# Patient Record
Sex: MALE | Race: WHITE | Employment: OTHER
[De-identification: names, ages, dates, MRNs, and addresses within clinical notes are randomized per-mention and may not be internally consistent; named-entity substitution may affect disease eponyms.]

---

## 2017-01-12 RX ORDER — OMEPRAZOLE 20 MG/1
CAPSULE, DELAYED RELEASE ORAL
Qty: 30 CAPSULE | Refills: 2 | Status: SHIPPED | OUTPATIENT
Start: 2017-01-12 | End: 2017-04-13 | Stop reason: SDUPTHER

## 2017-01-20 RX ORDER — FUROSEMIDE 40 MG/1
TABLET ORAL
Qty: 60 TABLET | Refills: 2 | Status: SHIPPED | OUTPATIENT
Start: 2017-01-20 | End: 2017-05-07 | Stop reason: SDUPTHER

## 2017-01-24 RX ORDER — INSULIN GLARGINE 100 [IU]/ML
INJECTION, SOLUTION SUBCUTANEOUS
Qty: 15 ML | Refills: 0 | Status: SHIPPED | OUTPATIENT
Start: 2017-01-24 | End: 2017-02-15 | Stop reason: SDUPTHER

## 2017-01-24 RX ORDER — PEN NEEDLE, DIABETIC 32GX 5/32"
NEEDLE, DISPOSABLE MISCELLANEOUS
Qty: 100 EACH | Refills: 0 | Status: SHIPPED | OUTPATIENT
Start: 2017-01-24 | End: 2017-01-24 | Stop reason: SDUPTHER

## 2017-01-25 RX ORDER — PEN NEEDLE, DIABETIC 32GX 5/32"
NEEDLE, DISPOSABLE MISCELLANEOUS
Qty: 100 EACH | Refills: 1 | Status: SHIPPED | OUTPATIENT
Start: 2017-01-25 | End: 2017-06-22 | Stop reason: SDUPTHER

## 2017-01-27 ENCOUNTER — TELEPHONE (OUTPATIENT)
Dept: FAMILY MEDICINE CLINIC | Facility: CLINIC | Age: 72
End: 2017-01-27

## 2017-02-10 ENCOUNTER — HOSPITAL ENCOUNTER (OUTPATIENT)
Age: 72
Setting detail: SPECIMEN
Discharge: HOME | End: 2017-02-10
Payer: MEDICARE

## 2017-02-10 ENCOUNTER — OFFICE VISIT (OUTPATIENT)
Dept: FAMILY MEDICINE CLINIC | Facility: CLINIC | Age: 72
End: 2017-02-10

## 2017-02-10 VITALS
TEMPERATURE: 97.4 F | DIASTOLIC BLOOD PRESSURE: 68 MMHG | HEIGHT: 71 IN | WEIGHT: 233.2 LBS | BODY MASS INDEX: 32.65 KG/M2 | SYSTOLIC BLOOD PRESSURE: 132 MMHG | HEART RATE: 72 BPM

## 2017-02-10 DIAGNOSIS — E11.42 TYPE 2 DIABETES MELLITUS WITH DIABETIC POLYNEUROPATHY, WITH LONG-TERM CURRENT USE OF INSULIN (HCC): Primary | ICD-10-CM

## 2017-02-10 DIAGNOSIS — I25.810 CORONARY ARTERY DISEASE INVOLVING CORONARY BYPASS GRAFT OF NATIVE HEART WITHOUT ANGINA PECTORIS: ICD-10-CM

## 2017-02-10 DIAGNOSIS — Z79.4 TYPE 2 DIABETES MELLITUS WITH DIABETIC POLYNEUROPATHY, WITH LONG-TERM CURRENT USE OF INSULIN (HCC): Primary | ICD-10-CM

## 2017-02-10 PROCEDURE — 4040F PNEUMOC VAC/ADMIN/RCVD: CPT | Performed by: FAMILY MEDICINE

## 2017-02-10 PROCEDURE — 3044F HG A1C LEVEL LT 7.0%: CPT | Performed by: FAMILY MEDICINE

## 2017-02-10 PROCEDURE — 1123F ACP DISCUSS/DSCN MKR DOCD: CPT | Performed by: FAMILY MEDICINE

## 2017-02-10 PROCEDURE — G8598 ASA/ANTIPLAT THER USED: HCPCS | Performed by: FAMILY MEDICINE

## 2017-02-10 PROCEDURE — G8484 FLU IMMUNIZE NO ADMIN: HCPCS | Performed by: FAMILY MEDICINE

## 2017-02-10 PROCEDURE — G8427 DOCREV CUR MEDS BY ELIG CLIN: HCPCS | Performed by: FAMILY MEDICINE

## 2017-02-10 PROCEDURE — G8417 CALC BMI ABV UP PARAM F/U: HCPCS | Performed by: FAMILY MEDICINE

## 2017-02-10 PROCEDURE — 3017F COLORECTAL CA SCREEN DOC REV: CPT | Performed by: FAMILY MEDICINE

## 2017-02-10 PROCEDURE — 1036F TOBACCO NON-USER: CPT | Performed by: FAMILY MEDICINE

## 2017-02-10 PROCEDURE — 99214 OFFICE O/P EST MOD 30 MIN: CPT | Performed by: FAMILY MEDICINE

## 2017-02-10 ASSESSMENT — PATIENT HEALTH QUESTIONNAIRE - PHQ9
SUM OF ALL RESPONSES TO PHQ QUESTIONS 1-9: 0
1. LITTLE INTEREST OR PLEASURE IN DOING THINGS: 0
SUM OF ALL RESPONSES TO PHQ9 QUESTIONS 1 & 2: 0
2. FEELING DOWN, DEPRESSED OR HOPELESS: 0

## 2017-02-11 ASSESSMENT — ENCOUNTER SYMPTOMS
SORE THROAT: 0
CONSTIPATION: 0
ABDOMINAL PAIN: 0
NAUSEA: 0
SHORTNESS OF BREATH: 0
DIARRHEA: 0

## 2017-02-14 LAB
CREATININE URINE: 74.3 MG/DL (ref 39–259)
MICROALBUMIN/CREAT 24H UR: 197 MG/L
MICROALBUMIN/CREAT UR-RTO: 265 MCG/MG CREAT
PHYSICIAN ID COMMENT: NORMAL
PHYSICIAN: NORMAL
ZZ NTE CLEAN UP: ORDERED TEST: NORMAL
ZZ NTE WITH NAME CLEAN UP: SPECIMEN SOURCE: NORMAL

## 2017-02-16 RX ORDER — INSULIN GLARGINE 100 [IU]/ML
INJECTION, SOLUTION SUBCUTANEOUS
Qty: 15 ML | Refills: 1 | Status: SHIPPED | OUTPATIENT
Start: 2017-02-16 | End: 2017-03-29 | Stop reason: SDUPTHER

## 2017-02-28 ENCOUNTER — HOSPITAL ENCOUNTER (OUTPATIENT)
Dept: PAIN MANAGEMENT | Age: 72
Discharge: HOME | End: 2017-02-28
Payer: MEDICARE

## 2017-02-28 ENCOUNTER — HOSPITAL ENCOUNTER (OUTPATIENT)
Age: 72
Discharge: HOME | End: 2017-02-28
Payer: MEDICARE

## 2017-02-28 DIAGNOSIS — I25.810 CORONARY ARTERY DISEASE INVOLVING CORONARY BYPASS GRAFT OF NATIVE HEART WITHOUT ANGINA PECTORIS: ICD-10-CM

## 2017-02-28 DIAGNOSIS — E11.610 CHARCOT FOOT DUE TO DIABETES MELLITUS (HCC): ICD-10-CM

## 2017-02-28 DIAGNOSIS — Z51.81 MEDICATION MONITORING ENCOUNTER: ICD-10-CM

## 2017-02-28 DIAGNOSIS — M47.816 SPONDYLOSIS OF LUMBAR REGION WITHOUT MYELOPATHY OR RADICULOPATHY: ICD-10-CM

## 2017-02-28 DIAGNOSIS — M48.061 LUMBAR SPINAL STENOSIS: ICD-10-CM

## 2017-02-28 DIAGNOSIS — M47.26 OSTEOARTHRITIS OF SPINE WITH RADICULOPATHY, LUMBAR REGION: Primary | ICD-10-CM

## 2017-02-28 DIAGNOSIS — M75.100 TEAR OF ROTATOR CUFF, UNSPECIFIED LATERALITY, UNSPECIFIED TEAR EXTENT: ICD-10-CM

## 2017-02-28 DIAGNOSIS — K59.03 DRUG-INDUCED CONSTIPATION: ICD-10-CM

## 2017-02-28 DIAGNOSIS — G89.29 ENCOUNTER FOR CHRONIC PAIN MANAGEMENT: ICD-10-CM

## 2017-02-28 DIAGNOSIS — M00.9 PYOGENIC ARTHRITIS OF LEFT KNEE JOINT, DUE TO UNSPECIFIED ORGANISM (HCC): ICD-10-CM

## 2017-02-28 DIAGNOSIS — E11.42 DIABETIC PERIPHERAL NEUROPATHY ASSOCIATED WITH TYPE 2 DIABETES MELLITUS (HCC): ICD-10-CM

## 2017-02-28 DIAGNOSIS — M47.899 FACET SYNDROME: ICD-10-CM

## 2017-02-28 DIAGNOSIS — M51.36 DDD (DEGENERATIVE DISC DISEASE), LUMBAR: ICD-10-CM

## 2017-02-28 DIAGNOSIS — E08.41 DIABETIC MONONEUROPATHY ASSOCIATED WITH DIABETES MELLITUS DUE TO UNDERLYING CONDITION (HCC): ICD-10-CM

## 2017-02-28 DIAGNOSIS — M00.862 ARTHRITIS OF LEFT KNEE DUE TO OTHER BACTERIA (HCC): ICD-10-CM

## 2017-02-28 DIAGNOSIS — M50.30 DDD (DEGENERATIVE DISC DISEASE), CERVICAL: ICD-10-CM

## 2017-02-28 LAB
ALBUMIN SERPL-MCNC: 3.9 G/DL (ref 3.5–5.2)
ALBUMIN/GLOBULIN RATIO: ABNORMAL (ref 1–2.5)
ALP BLD-CCNC: 97 U/L (ref 40–129)
ALT SERPL-CCNC: 16 U/L (ref 5–41)
ANION GAP SERPL CALCULATED.3IONS-SCNC: 11 MMOL/L (ref 9–17)
AST SERPL-CCNC: 14 U/L
BILIRUB SERPL-MCNC: 0.42 MG/DL (ref 0.3–1.2)
BUN BLDV-MCNC: 27 MG/DL (ref 8–23)
BUN/CREAT BLD: ABNORMAL (ref 9–20)
CALCIUM SERPL-MCNC: 9.2 MG/DL (ref 8.6–10.4)
CHLORIDE BLD-SCNC: 108 MMOL/L (ref 98–107)
CHOLESTEROL/HDL RATIO: 2.4
CHOLESTEROL: 90 MG/DL
CO2: 26 MMOL/L (ref 20–31)
CREAT SERPL-MCNC: 1.23 MG/DL (ref 0.7–1.2)
GFR AFRICAN AMERICAN: >60 ML/MIN
GFR NON-AFRICAN AMERICAN: 58 ML/MIN
GFR SERPL CREATININE-BSD FRML MDRD: ABNORMAL ML/MIN/{1.73_M2}
GFR SERPL CREATININE-BSD FRML MDRD: ABNORMAL ML/MIN/{1.73_M2}
GLUCOSE BLD-MCNC: 54 MG/DL (ref 70–99)
HDLC SERPL-MCNC: 37 MG/DL
LDL CHOLESTEROL: 38 MG/DL (ref 0–130)
POTASSIUM SERPL-SCNC: 4.6 MMOL/L (ref 3.7–5.3)
SODIUM BLD-SCNC: 145 MMOL/L (ref 135–144)
TOTAL PROTEIN: 7 G/DL (ref 6.4–8.3)
TRIGL SERPL-MCNC: 73 MG/DL
VLDLC SERPL CALC-MCNC: ABNORMAL MG/DL (ref 1–30)

## 2017-02-28 PROCEDURE — 80061 LIPID PANEL: CPT

## 2017-02-28 PROCEDURE — 36415 COLL VENOUS BLD VENIPUNCTURE: CPT

## 2017-02-28 PROCEDURE — 99213 OFFICE O/P EST LOW 20 MIN: CPT

## 2017-02-28 PROCEDURE — 99213 OFFICE O/P EST LOW 20 MIN: CPT | Performed by: NURSE PRACTITIONER

## 2017-02-28 PROCEDURE — 80053 COMPREHEN METABOLIC PANEL: CPT

## 2017-02-28 PROCEDURE — 80307 DRUG TEST PRSMV CHEM ANLYZR: CPT

## 2017-02-28 RX ORDER — FENTANYL 25 UG/H
1 PATCH TRANSDERMAL
Qty: 10 PATCH | Refills: 0 | Status: SHIPPED | OUTPATIENT
Start: 2017-03-01 | End: 2017-02-28 | Stop reason: SDUPTHER

## 2017-02-28 RX ORDER — FENTANYL 25 UG/H
1 PATCH TRANSDERMAL
Qty: 10 PATCH | Refills: 0 | Status: SHIPPED | OUTPATIENT
Start: 2017-03-07 | End: 2017-04-05 | Stop reason: SDUPTHER

## 2017-03-05 LAB
6-ACETYLMORPHINE, UR: NOT DETECTED
7-AMINOCLONAZEPAM, URINE: NOT DETECTED
ALPHA-OH-ALPRAZ, URINE: NOT DETECTED
ALPRAZOLAM, URINE: NOT DETECTED
AMPHETAMINES, URINE: NOT DETECTED
BARBITURATES, URINE: NOT DETECTED
BENZOYLECGONINE, UR: NOT DETECTED
BUPRENORPHINE URINE: NOT DETECTED
CARISOPRODOL, UR: NOT DETECTED
CLONAZEPAM, URINE: NOT DETECTED
CODEINE, URINE: NOT DETECTED
CREATININE URINE: 196.9 MG/DL (ref 20–400)
DIAZEPAM, URINE: NOT DETECTED
DRUGS EXPECTED, UR: NORMAL
EER HI RES INTERP UR: NORMAL
ETHYL GLUCURONIDE UR: NOT DETECTED
FENTANYL URINE: PRESENT
HYDROCODONE, URINE: NOT DETECTED
HYDROMORPHONE, URINE: NOT DETECTED
LORAZEPAM, URINE: NOT DETECTED
MARIJUANA METAB, UR: NOT DETECTED
MDA, UR: NOT DETECTED
MDEA, EVE, UR: NOT DETECTED
MDMA URINE: NOT DETECTED
MEPERIDINE METAB, UR: NOT DETECTED
METHADONE, URINE: NOT DETECTED
METHAMPHETAMINE, URINE: NOT DETECTED
METHYLPHENIDATE: NOT DETECTED
MIDAZOLAM, URINE: NOT DETECTED
MORPHINE URINE: NOT DETECTED
NORBUPRENORPHINE, URINE: NOT DETECTED
NORDIAZEPAM, URINE: NOT DETECTED
NORFENTANYL, URINE: PRESENT
NORHYDROCODONE, URINE: NOT DETECTED
NOROXYCODONE, URINE: NOT DETECTED
NOROXYMORPHONE, URINE: NOT DETECTED
OXAZEPAM, URINE: NOT DETECTED
OXYCODONE URINE: NOT DETECTED
OXYMORPHONE, URINE: NOT DETECTED
PAIN MANAGEMENT DRUG PANEL INTERP, URINE: NORMAL
PAIN MGT DRUG PANEL, HI RES, UR: NORMAL
PCP,URINE: NOT DETECTED
PHENTERMINE, UR: NOT DETECTED
PROPOXYPHENE, URINE: NOT DETECTED
TAPENTADOL, URINE: NOT DETECTED
TAPENTADOL-O-SULFATE, URINE: NOT DETECTED
TEMAZEPAM, URINE: NOT DETECTED
TRAMADOL, URINE: NOT DETECTED
ZOLPIDEM, URINE: NOT DETECTED

## 2017-03-07 ENCOUNTER — PROCEDURE VISIT (OUTPATIENT)
Dept: PODIATRY | Facility: CLINIC | Age: 72
End: 2017-03-07

## 2017-03-07 VITALS
WEIGHT: 228 LBS | DIASTOLIC BLOOD PRESSURE: 73 MMHG | BODY MASS INDEX: 30.88 KG/M2 | HEIGHT: 72 IN | HEART RATE: 62 BPM | SYSTOLIC BLOOD PRESSURE: 121 MMHG

## 2017-03-07 DIAGNOSIS — Z79.4 TYPE 2 DIABETES MELLITUS WITH DIABETIC POLYNEUROPATHY, WITH LONG-TERM CURRENT USE OF INSULIN (HCC): ICD-10-CM

## 2017-03-07 DIAGNOSIS — B35.1 ONYCHOMYCOSIS: Primary | ICD-10-CM

## 2017-03-07 DIAGNOSIS — M14.672 CHARCOT'S JOINT OF LEFT FOOT: ICD-10-CM

## 2017-03-07 DIAGNOSIS — E11.42 TYPE 2 DIABETES MELLITUS WITH DIABETIC POLYNEUROPATHY, WITH LONG-TERM CURRENT USE OF INSULIN (HCC): ICD-10-CM

## 2017-03-07 PROCEDURE — 11721 DEBRIDE NAIL 6 OR MORE: CPT | Performed by: PODIATRIST

## 2017-03-07 PROCEDURE — 1036F TOBACCO NON-USER: CPT | Performed by: PODIATRIST

## 2017-03-07 ASSESSMENT — ENCOUNTER SYMPTOMS
NAUSEA: 0
DIARRHEA: 0
CONSTIPATION: 0
COLOR CHANGE: 0
VOMITING: 0

## 2017-03-22 RX ORDER — SIMVASTATIN 20 MG
TABLET ORAL
Qty: 30 TABLET | Refills: 2 | Status: SHIPPED | OUTPATIENT
Start: 2017-03-22 | End: 2017-06-22 | Stop reason: SDUPTHER

## 2017-03-30 RX ORDER — INSULIN GLARGINE 100 [IU]/ML
INJECTION, SOLUTION SUBCUTANEOUS
Qty: 15 ML | Refills: 1 | Status: SHIPPED | OUTPATIENT
Start: 2017-03-30 | End: 2017-05-09 | Stop reason: SDUPTHER

## 2017-04-05 ENCOUNTER — HOSPITAL ENCOUNTER (OUTPATIENT)
Dept: PAIN MANAGEMENT | Age: 72
Discharge: HOME | End: 2017-04-05
Payer: MEDICARE

## 2017-04-05 DIAGNOSIS — K59.03 DRUG-INDUCED CONSTIPATION: ICD-10-CM

## 2017-04-05 DIAGNOSIS — M00.9 PYOGENIC ARTHRITIS OF LEFT KNEE JOINT, DUE TO UNSPECIFIED ORGANISM (HCC): ICD-10-CM

## 2017-04-05 DIAGNOSIS — M47.899 FACET SYNDROME: ICD-10-CM

## 2017-04-05 DIAGNOSIS — G89.29 ENCOUNTER FOR CHRONIC PAIN MANAGEMENT: ICD-10-CM

## 2017-04-05 DIAGNOSIS — E11.42 TYPE 2 DIABETES MELLITUS WITH DIABETIC POLYNEUROPATHY, WITH LONG-TERM CURRENT USE OF INSULIN (HCC): ICD-10-CM

## 2017-04-05 DIAGNOSIS — M48.061 LUMBAR SPINAL STENOSIS: ICD-10-CM

## 2017-04-05 DIAGNOSIS — E11.42 DIABETIC PERIPHERAL NEUROPATHY ASSOCIATED WITH TYPE 2 DIABETES MELLITUS (HCC): ICD-10-CM

## 2017-04-05 DIAGNOSIS — E08.41 DIABETIC MONONEUROPATHY ASSOCIATED WITH DIABETES MELLITUS DUE TO UNDERLYING CONDITION (HCC): ICD-10-CM

## 2017-04-05 DIAGNOSIS — M47.816 SPONDYLOSIS OF LUMBAR REGION WITHOUT MYELOPATHY OR RADICULOPATHY: ICD-10-CM

## 2017-04-05 DIAGNOSIS — E11.610 CHARCOT FOOT DUE TO DIABETES MELLITUS (HCC): ICD-10-CM

## 2017-04-05 DIAGNOSIS — E09.41 DIABETIC MONONEUROPATHY ASSOCIATED WITH DRUG OR CHEMICAL INDUCED DIABETES MELLITUS (HCC): ICD-10-CM

## 2017-04-05 DIAGNOSIS — M47.26 OSTEOARTHRITIS OF SPINE WITH RADICULOPATHY, LUMBAR REGION: Primary | ICD-10-CM

## 2017-04-05 DIAGNOSIS — Z51.81 MEDICATION MONITORING ENCOUNTER: ICD-10-CM

## 2017-04-05 DIAGNOSIS — Z79.4 TYPE 2 DIABETES MELLITUS WITH DIABETIC POLYNEUROPATHY, WITH LONG-TERM CURRENT USE OF INSULIN (HCC): ICD-10-CM

## 2017-04-05 DIAGNOSIS — M00.862 ARTHRITIS OF LEFT KNEE DUE TO OTHER BACTERIA (HCC): ICD-10-CM

## 2017-04-05 DIAGNOSIS — M75.100 TEAR OF ROTATOR CUFF, UNSPECIFIED LATERALITY, UNSPECIFIED TEAR EXTENT: ICD-10-CM

## 2017-04-05 DIAGNOSIS — M51.36 DDD (DEGENERATIVE DISC DISEASE), LUMBAR: ICD-10-CM

## 2017-04-05 PROCEDURE — 99213 OFFICE O/P EST LOW 20 MIN: CPT

## 2017-04-05 PROCEDURE — 99213 OFFICE O/P EST LOW 20 MIN: CPT | Performed by: NURSE PRACTITIONER

## 2017-04-05 RX ORDER — TOPIRAMATE 50 MG/1
50 TABLET, FILM COATED ORAL 2 TIMES DAILY
Qty: 60 TABLET | Refills: 3 | Status: SHIPPED | OUTPATIENT
Start: 2017-04-20 | End: 2017-08-03 | Stop reason: SDUPTHER

## 2017-04-05 RX ORDER — FENTANYL 25 UG/H
1 PATCH TRANSDERMAL
Qty: 10 PATCH | Refills: 0 | Status: SHIPPED | OUTPATIENT
Start: 2017-04-06 | End: 2017-05-05 | Stop reason: SDUPTHER

## 2017-04-05 RX ORDER — OXYCODONE HYDROCHLORIDE AND ACETAMINOPHEN 5; 325 MG/1; MG/1
1 TABLET ORAL EVERY 6 HOURS PRN
Qty: 120 TABLET | Refills: 0 | Status: SHIPPED | OUTPATIENT
Start: 2017-04-05 | End: 2017-08-03 | Stop reason: SDUPTHER

## 2017-04-05 RX ORDER — GABAPENTIN 800 MG/1
800 TABLET ORAL DAILY
Qty: 90 TABLET | Refills: 3 | Status: SHIPPED | OUTPATIENT
Start: 2017-04-29 | End: 2018-03-01 | Stop reason: SDUPTHER

## 2017-04-13 RX ORDER — OMEPRAZOLE 20 MG/1
CAPSULE, DELAYED RELEASE ORAL
Qty: 30 CAPSULE | Refills: 2 | Status: SHIPPED | OUTPATIENT
Start: 2017-04-13 | End: 2017-07-11 | Stop reason: SDUPTHER

## 2017-05-05 ENCOUNTER — HOSPITAL ENCOUNTER (OUTPATIENT)
Dept: PAIN MANAGEMENT | Age: 72
Discharge: HOME | End: 2017-05-05
Payer: MEDICARE

## 2017-05-05 DIAGNOSIS — Z79.4 TYPE 2 DIABETES MELLITUS WITH DIABETIC POLYNEUROPATHY, WITH LONG-TERM CURRENT USE OF INSULIN (HCC): ICD-10-CM

## 2017-05-05 DIAGNOSIS — K59.03 DRUG-INDUCED CONSTIPATION: ICD-10-CM

## 2017-05-05 DIAGNOSIS — M47.899 FACET SYNDROME: ICD-10-CM

## 2017-05-05 DIAGNOSIS — M00.862 ARTHRITIS OF LEFT KNEE DUE TO OTHER BACTERIA (HCC): ICD-10-CM

## 2017-05-05 DIAGNOSIS — M75.100 TEAR OF ROTATOR CUFF, UNSPECIFIED LATERALITY, UNSPECIFIED TEAR EXTENT: ICD-10-CM

## 2017-05-05 DIAGNOSIS — E11.42 TYPE 2 DIABETES MELLITUS WITH DIABETIC POLYNEUROPATHY, WITH LONG-TERM CURRENT USE OF INSULIN (HCC): ICD-10-CM

## 2017-05-05 DIAGNOSIS — G89.29 ENCOUNTER FOR CHRONIC PAIN MANAGEMENT: ICD-10-CM

## 2017-05-05 DIAGNOSIS — M51.36 DDD (DEGENERATIVE DISC DISEASE), LUMBAR: ICD-10-CM

## 2017-05-05 DIAGNOSIS — M47.816 SPONDYLOSIS OF LUMBAR REGION WITHOUT MYELOPATHY OR RADICULOPATHY: ICD-10-CM

## 2017-05-05 DIAGNOSIS — M47.26 OSTEOARTHRITIS OF SPINE WITH RADICULOPATHY, LUMBAR REGION: Primary | ICD-10-CM

## 2017-05-05 DIAGNOSIS — E11.42 DIABETIC PERIPHERAL NEUROPATHY ASSOCIATED WITH TYPE 2 DIABETES MELLITUS (HCC): ICD-10-CM

## 2017-05-05 DIAGNOSIS — M48.061 LUMBAR SPINAL STENOSIS: ICD-10-CM

## 2017-05-05 DIAGNOSIS — E11.610 CHARCOT FOOT DUE TO DIABETES MELLITUS (HCC): ICD-10-CM

## 2017-05-05 DIAGNOSIS — Z51.81 MEDICATION MONITORING ENCOUNTER: ICD-10-CM

## 2017-05-05 PROCEDURE — 99213 OFFICE O/P EST LOW 20 MIN: CPT | Performed by: NURSE PRACTITIONER

## 2017-05-05 PROCEDURE — 99213 OFFICE O/P EST LOW 20 MIN: CPT

## 2017-05-05 RX ORDER — FENTANYL 25 UG/H
1 PATCH TRANSDERMAL
Qty: 10 PATCH | Refills: 0 | Status: SHIPPED | OUTPATIENT
Start: 2017-05-06 | End: 2017-06-05 | Stop reason: SDUPTHER

## 2017-05-08 RX ORDER — FUROSEMIDE 40 MG/1
TABLET ORAL
Qty: 60 TABLET | Refills: 2 | Status: SHIPPED | OUTPATIENT
Start: 2017-05-08 | End: 2017-08-29 | Stop reason: SDUPTHER

## 2017-05-10 ENCOUNTER — PROCEDURE VISIT (OUTPATIENT)
Dept: PODIATRY | Age: 72
End: 2017-05-10
Payer: MEDICARE

## 2017-05-10 VITALS
WEIGHT: 227 LBS | SYSTOLIC BLOOD PRESSURE: 118 MMHG | HEIGHT: 72 IN | BODY MASS INDEX: 30.75 KG/M2 | HEART RATE: 63 BPM | DIASTOLIC BLOOD PRESSURE: 66 MMHG

## 2017-05-10 DIAGNOSIS — Z79.4 TYPE 2 DIABETES MELLITUS WITH DIABETIC POLYNEUROPATHY, WITH LONG-TERM CURRENT USE OF INSULIN (HCC): ICD-10-CM

## 2017-05-10 DIAGNOSIS — B35.1 ONYCHOMYCOSIS: Primary | ICD-10-CM

## 2017-05-10 DIAGNOSIS — E11.42 TYPE 2 DIABETES MELLITUS WITH DIABETIC POLYNEUROPATHY, WITH LONG-TERM CURRENT USE OF INSULIN (HCC): ICD-10-CM

## 2017-05-10 PROCEDURE — 1036F TOBACCO NON-USER: CPT | Performed by: PODIATRIST

## 2017-05-10 PROCEDURE — 11721 DEBRIDE NAIL 6 OR MORE: CPT | Performed by: PODIATRIST

## 2017-05-10 RX ORDER — INSULIN GLARGINE 100 [IU]/ML
INJECTION, SOLUTION SUBCUTANEOUS
Qty: 15 ML | Refills: 0 | Status: SHIPPED | OUTPATIENT
Start: 2017-05-10 | End: 2017-06-05 | Stop reason: SDUPTHER

## 2017-05-10 RX ORDER — TRAZODONE HYDROCHLORIDE 50 MG/1
TABLET ORAL
Qty: 30 TABLET | Refills: 1 | Status: SHIPPED | OUTPATIENT
Start: 2017-05-10 | End: 2017-07-11 | Stop reason: SDUPTHER

## 2017-05-10 ASSESSMENT — ENCOUNTER SYMPTOMS
VOMITING: 0
CONSTIPATION: 0
COLOR CHANGE: 0
DIARRHEA: 0
NAUSEA: 0

## 2017-05-12 ENCOUNTER — OFFICE VISIT (OUTPATIENT)
Dept: FAMILY MEDICINE CLINIC | Age: 72
End: 2017-05-12
Payer: MEDICARE

## 2017-05-12 VITALS
BODY MASS INDEX: 31.83 KG/M2 | TEMPERATURE: 97.9 F | WEIGHT: 235 LBS | HEART RATE: 59 BPM | SYSTOLIC BLOOD PRESSURE: 126 MMHG | DIASTOLIC BLOOD PRESSURE: 68 MMHG | HEIGHT: 72 IN

## 2017-05-12 DIAGNOSIS — E78.5 HYPERLIPIDEMIA, UNSPECIFIED HYPERLIPIDEMIA TYPE: ICD-10-CM

## 2017-05-12 DIAGNOSIS — Z79.4 TYPE 2 DIABETES MELLITUS WITH DIABETIC POLYNEUROPATHY, WITH LONG-TERM CURRENT USE OF INSULIN (HCC): Primary | ICD-10-CM

## 2017-05-12 DIAGNOSIS — Z23 NEED FOR PROPHYLACTIC VACCINATION AGAINST STREPTOCOCCUS PNEUMONIAE (PNEUMOCOCCUS): ICD-10-CM

## 2017-05-12 DIAGNOSIS — E11.42 TYPE 2 DIABETES MELLITUS WITH DIABETIC POLYNEUROPATHY, WITH LONG-TERM CURRENT USE OF INSULIN (HCC): Primary | ICD-10-CM

## 2017-05-12 DIAGNOSIS — I25.810 CORONARY ARTERY DISEASE INVOLVING CORONARY BYPASS GRAFT OF NATIVE HEART WITHOUT ANGINA PECTORIS: ICD-10-CM

## 2017-05-12 LAB — HBA1C MFR BLD: 6.6 %

## 2017-05-12 PROCEDURE — G8427 DOCREV CUR MEDS BY ELIG CLIN: HCPCS | Performed by: FAMILY MEDICINE

## 2017-05-12 PROCEDURE — 3044F HG A1C LEVEL LT 7.0%: CPT | Performed by: FAMILY MEDICINE

## 2017-05-12 PROCEDURE — 83036 HEMOGLOBIN GLYCOSYLATED A1C: CPT | Performed by: FAMILY MEDICINE

## 2017-05-12 PROCEDURE — 3017F COLORECTAL CA SCREEN DOC REV: CPT | Performed by: FAMILY MEDICINE

## 2017-05-12 PROCEDURE — 99214 OFFICE O/P EST MOD 30 MIN: CPT | Performed by: FAMILY MEDICINE

## 2017-05-12 PROCEDURE — 1123F ACP DISCUSS/DSCN MKR DOCD: CPT | Performed by: FAMILY MEDICINE

## 2017-05-12 PROCEDURE — G8417 CALC BMI ABV UP PARAM F/U: HCPCS | Performed by: FAMILY MEDICINE

## 2017-05-12 PROCEDURE — 4040F PNEUMOC VAC/ADMIN/RCVD: CPT | Performed by: FAMILY MEDICINE

## 2017-05-12 PROCEDURE — G0009 ADMIN PNEUMOCOCCAL VACCINE: HCPCS | Performed by: FAMILY MEDICINE

## 2017-05-12 PROCEDURE — 90732 PPSV23 VACC 2 YRS+ SUBQ/IM: CPT | Performed by: FAMILY MEDICINE

## 2017-05-12 PROCEDURE — G8598 ASA/ANTIPLAT THER USED: HCPCS | Performed by: FAMILY MEDICINE

## 2017-05-12 PROCEDURE — 1036F TOBACCO NON-USER: CPT | Performed by: FAMILY MEDICINE

## 2017-05-12 ASSESSMENT — PATIENT HEALTH QUESTIONNAIRE - PHQ9
1. LITTLE INTEREST OR PLEASURE IN DOING THINGS: 0
2. FEELING DOWN, DEPRESSED OR HOPELESS: 0
SUM OF ALL RESPONSES TO PHQ QUESTIONS 1-9: 0
SUM OF ALL RESPONSES TO PHQ9 QUESTIONS 1 & 2: 0

## 2017-05-12 ASSESSMENT — ENCOUNTER SYMPTOMS
ABDOMINAL PAIN: 0
SHORTNESS OF BREATH: 0
NAUSEA: 0
SORE THROAT: 0
CONSTIPATION: 0

## 2017-06-05 ENCOUNTER — HOSPITAL ENCOUNTER (OUTPATIENT)
Dept: PAIN MANAGEMENT | Age: 72
Discharge: HOME | End: 2017-06-05
Payer: MEDICARE

## 2017-06-05 DIAGNOSIS — E08.41 DIABETIC MONONEUROPATHY ASSOCIATED WITH DIABETES MELLITUS DUE TO UNDERLYING CONDITION (HCC): ICD-10-CM

## 2017-06-05 DIAGNOSIS — M48.061 LUMBAR SPINAL STENOSIS: ICD-10-CM

## 2017-06-05 DIAGNOSIS — M51.36 DDD (DEGENERATIVE DISC DISEASE), LUMBAR: ICD-10-CM

## 2017-06-05 DIAGNOSIS — Z79.4 TYPE 2 DIABETES MELLITUS WITH DIABETIC POLYNEUROPATHY, WITH LONG-TERM CURRENT USE OF INSULIN (HCC): ICD-10-CM

## 2017-06-05 DIAGNOSIS — E11.610 CHARCOT FOOT DUE TO DIABETES MELLITUS (HCC): ICD-10-CM

## 2017-06-05 DIAGNOSIS — M00.862 ARTHRITIS OF LEFT KNEE DUE TO OTHER BACTERIA (HCC): ICD-10-CM

## 2017-06-05 DIAGNOSIS — M50.30 DDD (DEGENERATIVE DISC DISEASE), CERVICAL: ICD-10-CM

## 2017-06-05 DIAGNOSIS — E11.42 DIABETIC PERIPHERAL NEUROPATHY ASSOCIATED WITH TYPE 2 DIABETES MELLITUS (HCC): ICD-10-CM

## 2017-06-05 DIAGNOSIS — E11.42 TYPE 2 DIABETES MELLITUS WITH DIABETIC POLYNEUROPATHY, WITH LONG-TERM CURRENT USE OF INSULIN (HCC): ICD-10-CM

## 2017-06-05 DIAGNOSIS — M47.816 SPONDYLOSIS OF LUMBAR REGION WITHOUT MYELOPATHY OR RADICULOPATHY: ICD-10-CM

## 2017-06-05 DIAGNOSIS — M47.899 FACET SYNDROME: ICD-10-CM

## 2017-06-05 DIAGNOSIS — K59.03 DRUG-INDUCED CONSTIPATION: ICD-10-CM

## 2017-06-05 DIAGNOSIS — E09.41 DIABETIC MONONEUROPATHY ASSOCIATED WITH DRUG OR CHEMICAL INDUCED DIABETES MELLITUS (HCC): ICD-10-CM

## 2017-06-05 DIAGNOSIS — M75.100 TEAR OF ROTATOR CUFF, UNSPECIFIED LATERALITY, UNSPECIFIED TEAR EXTENT: ICD-10-CM

## 2017-06-05 DIAGNOSIS — M00.9 PYOGENIC ARTHRITIS OF LEFT KNEE JOINT, DUE TO UNSPECIFIED ORGANISM (HCC): ICD-10-CM

## 2017-06-05 DIAGNOSIS — M47.26 OSTEOARTHRITIS OF SPINE WITH RADICULOPATHY, LUMBAR REGION: Primary | ICD-10-CM

## 2017-06-05 DIAGNOSIS — G89.29 ENCOUNTER FOR CHRONIC PAIN MANAGEMENT: ICD-10-CM

## 2017-06-05 DIAGNOSIS — Z51.81 MEDICATION MONITORING ENCOUNTER: ICD-10-CM

## 2017-06-05 PROCEDURE — 99213 OFFICE O/P EST LOW 20 MIN: CPT

## 2017-06-05 PROCEDURE — 99213 OFFICE O/P EST LOW 20 MIN: CPT | Performed by: NURSE PRACTITIONER

## 2017-06-05 RX ORDER — FENTANYL 25 UG/H
1 PATCH TRANSDERMAL
Qty: 10 PATCH | Refills: 0 | Status: SHIPPED | OUTPATIENT
Start: 2017-06-05 | End: 2017-06-27 | Stop reason: SDUPTHER

## 2017-06-06 RX ORDER — INSULIN GLARGINE 100 [IU]/ML
INJECTION, SOLUTION SUBCUTANEOUS
Qty: 5 PEN | Refills: 1 | Status: SHIPPED | OUTPATIENT
Start: 2017-06-06 | End: 2017-07-21 | Stop reason: SDUPTHER

## 2017-06-23 RX ORDER — PEN NEEDLE, DIABETIC 32GX 5/32"
NEEDLE, DISPOSABLE MISCELLANEOUS
Qty: 100 EACH | Refills: 1 | Status: SHIPPED | OUTPATIENT
Start: 2017-06-23 | End: 2017-09-28 | Stop reason: SDUPTHER

## 2017-06-23 RX ORDER — SIMVASTATIN 20 MG
TABLET ORAL
Qty: 30 TABLET | Refills: 2 | Status: SHIPPED | OUTPATIENT
Start: 2017-06-23 | End: 2017-09-27 | Stop reason: SDUPTHER

## 2017-06-27 ENCOUNTER — HOSPITAL ENCOUNTER (OUTPATIENT)
Dept: PAIN MANAGEMENT | Age: 72
Discharge: HOME | End: 2017-06-27
Payer: MEDICARE

## 2017-06-27 DIAGNOSIS — M75.100 TEAR OF ROTATOR CUFF, UNSPECIFIED LATERALITY, UNSPECIFIED TEAR EXTENT: ICD-10-CM

## 2017-06-27 DIAGNOSIS — K59.03 DRUG-INDUCED CONSTIPATION: ICD-10-CM

## 2017-06-27 DIAGNOSIS — Z51.81 MEDICATION MONITORING ENCOUNTER: ICD-10-CM

## 2017-06-27 DIAGNOSIS — E11.42 DIABETIC PERIPHERAL NEUROPATHY ASSOCIATED WITH TYPE 2 DIABETES MELLITUS (HCC): ICD-10-CM

## 2017-06-27 DIAGNOSIS — E08.41 DIABETIC MONONEUROPATHY ASSOCIATED WITH DIABETES MELLITUS DUE TO UNDERLYING CONDITION (HCC): ICD-10-CM

## 2017-06-27 DIAGNOSIS — E11.610 CHARCOT FOOT DUE TO DIABETES MELLITUS (HCC): ICD-10-CM

## 2017-06-27 DIAGNOSIS — M47.816 SPONDYLOSIS OF LUMBAR REGION WITHOUT MYELOPATHY OR RADICULOPATHY: ICD-10-CM

## 2017-06-27 DIAGNOSIS — M47.899 FACET SYNDROME: ICD-10-CM

## 2017-06-27 DIAGNOSIS — M50.30 DDD (DEGENERATIVE DISC DISEASE), CERVICAL: ICD-10-CM

## 2017-06-27 DIAGNOSIS — M00.862 ARTHRITIS OF LEFT KNEE DUE TO OTHER BACTERIA (HCC): ICD-10-CM

## 2017-06-27 DIAGNOSIS — G89.29 ENCOUNTER FOR CHRONIC PAIN MANAGEMENT: ICD-10-CM

## 2017-06-27 DIAGNOSIS — M47.26 OSTEOARTHRITIS OF SPINE WITH RADICULOPATHY, LUMBAR REGION: Primary | ICD-10-CM

## 2017-06-27 DIAGNOSIS — M51.36 DDD (DEGENERATIVE DISC DISEASE), LUMBAR: ICD-10-CM

## 2017-06-27 DIAGNOSIS — M48.061 LUMBAR SPINAL STENOSIS: ICD-10-CM

## 2017-06-27 PROCEDURE — 99213 OFFICE O/P EST LOW 20 MIN: CPT | Performed by: NURSE PRACTITIONER

## 2017-06-27 PROCEDURE — 99213 OFFICE O/P EST LOW 20 MIN: CPT

## 2017-06-27 RX ORDER — OXYCODONE HYDROCHLORIDE AND ACETAMINOPHEN 5; 325 MG/1; MG/1
1 TABLET ORAL EVERY 6 HOURS PRN
Qty: 120 TABLET | Refills: 0 | Status: SHIPPED | OUTPATIENT
Start: 2017-07-08 | End: 2017-10-03 | Stop reason: SDUPTHER

## 2017-06-27 RX ORDER — FENTANYL 25 UG/H
1 PATCH TRANSDERMAL
Qty: 10 PATCH | Refills: 0 | Status: SHIPPED | OUTPATIENT
Start: 2017-07-05 | End: 2017-08-03 | Stop reason: SDUPTHER

## 2017-07-07 ENCOUNTER — OFFICE VISIT (OUTPATIENT)
Dept: GASTROENTEROLOGY | Age: 72
End: 2017-07-07
Payer: MEDICARE

## 2017-07-07 VITALS
HEART RATE: 68 BPM | DIASTOLIC BLOOD PRESSURE: 65 MMHG | SYSTOLIC BLOOD PRESSURE: 101 MMHG | OXYGEN SATURATION: 97 % | BODY MASS INDEX: 32.05 KG/M2 | WEIGHT: 236.6 LBS | HEIGHT: 72 IN | TEMPERATURE: 98.1 F | RESPIRATION RATE: 14 BRPM

## 2017-07-07 DIAGNOSIS — K58.1 IRRITABLE BOWEL SYNDROME WITH CONSTIPATION: Primary | ICD-10-CM

## 2017-07-07 DIAGNOSIS — Z86.19 HX OF CLOSTRIDIUM DIFFICILE INFECTION: ICD-10-CM

## 2017-07-07 DIAGNOSIS — E66.01 MORBID OBESITY, UNSPECIFIED OBESITY TYPE (HCC): ICD-10-CM

## 2017-07-07 PROCEDURE — 1123F ACP DISCUSS/DSCN MKR DOCD: CPT | Performed by: INTERNAL MEDICINE

## 2017-07-07 PROCEDURE — G8598 ASA/ANTIPLAT THER USED: HCPCS | Performed by: INTERNAL MEDICINE

## 2017-07-07 PROCEDURE — G8417 CALC BMI ABV UP PARAM F/U: HCPCS | Performed by: INTERNAL MEDICINE

## 2017-07-07 PROCEDURE — 3017F COLORECTAL CA SCREEN DOC REV: CPT | Performed by: INTERNAL MEDICINE

## 2017-07-07 PROCEDURE — 99214 OFFICE O/P EST MOD 30 MIN: CPT | Performed by: INTERNAL MEDICINE

## 2017-07-07 PROCEDURE — 4040F PNEUMOC VAC/ADMIN/RCVD: CPT | Performed by: INTERNAL MEDICINE

## 2017-07-07 PROCEDURE — 1036F TOBACCO NON-USER: CPT | Performed by: INTERNAL MEDICINE

## 2017-07-07 PROCEDURE — G8427 DOCREV CUR MEDS BY ELIG CLIN: HCPCS | Performed by: INTERNAL MEDICINE

## 2017-07-07 ASSESSMENT — ENCOUNTER SYMPTOMS
VOMITING: 0
ABDOMINAL PAIN: 0
COUGH: 0
DIARRHEA: 0
ALLERGIC/IMMUNOLOGIC NEGATIVE: 1
BACK PAIN: 1
ABDOMINAL DISTENTION: 0
CONSTIPATION: 1
NAUSEA: 0
ANAL BLEEDING: 0
RESPIRATORY NEGATIVE: 1
BLOOD IN STOOL: 0
RECTAL PAIN: 0

## 2017-07-12 ENCOUNTER — PROCEDURE VISIT (OUTPATIENT)
Dept: PODIATRY | Age: 72
End: 2017-07-12
Payer: MEDICARE

## 2017-07-12 VITALS
HEART RATE: 73 BPM | BODY MASS INDEX: 30.75 KG/M2 | WEIGHT: 227 LBS | SYSTOLIC BLOOD PRESSURE: 102 MMHG | DIASTOLIC BLOOD PRESSURE: 63 MMHG | HEIGHT: 72 IN

## 2017-07-12 DIAGNOSIS — Z79.4 TYPE 2 DIABETES MELLITUS WITH DIABETIC POLYNEUROPATHY, WITH LONG-TERM CURRENT USE OF INSULIN (HCC): ICD-10-CM

## 2017-07-12 DIAGNOSIS — B35.1 ONYCHOMYCOSIS: Primary | ICD-10-CM

## 2017-07-12 DIAGNOSIS — M14.672 CHARCOT'S JOINT OF LEFT FOOT: ICD-10-CM

## 2017-07-12 DIAGNOSIS — E11.42 TYPE 2 DIABETES MELLITUS WITH DIABETIC POLYNEUROPATHY, WITH LONG-TERM CURRENT USE OF INSULIN (HCC): ICD-10-CM

## 2017-07-12 PROCEDURE — 1036F TOBACCO NON-USER: CPT | Performed by: PODIATRIST

## 2017-07-12 PROCEDURE — 11721 DEBRIDE NAIL 6 OR MORE: CPT | Performed by: PODIATRIST

## 2017-07-12 RX ORDER — OMEPRAZOLE 20 MG/1
CAPSULE, DELAYED RELEASE ORAL
Qty: 30 CAPSULE | Refills: 2 | Status: SHIPPED | OUTPATIENT
Start: 2017-07-12 | End: 2017-10-11 | Stop reason: SDUPTHER

## 2017-07-12 RX ORDER — TRAZODONE HYDROCHLORIDE 50 MG/1
TABLET ORAL
Qty: 30 TABLET | Refills: 2 | Status: SHIPPED | OUTPATIENT
Start: 2017-07-12 | End: 2017-10-11 | Stop reason: SDUPTHER

## 2017-07-12 ASSESSMENT — ENCOUNTER SYMPTOMS
DIARRHEA: 0
VOMITING: 0
NAUSEA: 0
COLOR CHANGE: 0
CONSTIPATION: 0

## 2017-07-21 RX ORDER — INSULIN GLARGINE 100 [IU]/ML
INJECTION, SOLUTION SUBCUTANEOUS
Qty: 15 ML | Refills: 2 | Status: SHIPPED | OUTPATIENT
Start: 2017-07-21 | End: 2017-09-28 | Stop reason: SDUPTHER

## 2017-07-31 ENCOUNTER — NURSE ONLY (OUTPATIENT)
Dept: PODIATRY | Age: 72
End: 2017-07-31
Payer: MEDICARE

## 2017-07-31 DIAGNOSIS — E11.42 TYPE 2 DIABETES MELLITUS WITH DIABETIC POLYNEUROPATHY, WITH LONG-TERM CURRENT USE OF INSULIN (HCC): Primary | ICD-10-CM

## 2017-07-31 DIAGNOSIS — R26.81 GAIT INSTABILITY: ICD-10-CM

## 2017-07-31 DIAGNOSIS — Z79.4 TYPE 2 DIABETES MELLITUS WITH DIABETIC POLYNEUROPATHY, WITH LONG-TERM CURRENT USE OF INSULIN (HCC): Primary | ICD-10-CM

## 2017-07-31 DIAGNOSIS — M21.969 FOOT DEFORMITY, UNSPECIFIED LATERALITY: ICD-10-CM

## 2017-07-31 DIAGNOSIS — M14.672 CHARCOT'S JOINT OF LEFT FOOT: ICD-10-CM

## 2017-07-31 PROCEDURE — A5513 MULTI DEN INSERT CUSTOM MOLD: HCPCS | Performed by: PODIATRIST

## 2017-07-31 PROCEDURE — A5500 DIAB SHOE FOR DENSITY INSERT: HCPCS | Performed by: PODIATRIST

## 2017-08-03 ENCOUNTER — HOSPITAL ENCOUNTER (OUTPATIENT)
Dept: PAIN MANAGEMENT | Age: 72
Discharge: HOME | End: 2017-08-03
Payer: MEDICARE

## 2017-08-03 VITALS
DIASTOLIC BLOOD PRESSURE: 76 MMHG | OXYGEN SATURATION: 98 % | WEIGHT: 225 LBS | SYSTOLIC BLOOD PRESSURE: 126 MMHG | HEIGHT: 72 IN | RESPIRATION RATE: 15 BRPM | BODY MASS INDEX: 30.48 KG/M2 | HEART RATE: 76 BPM | TEMPERATURE: 98.6 F

## 2017-08-03 DIAGNOSIS — M47.816 SPONDYLOSIS OF LUMBAR REGION WITHOUT MYELOPATHY OR RADICULOPATHY: ICD-10-CM

## 2017-08-03 DIAGNOSIS — E08.41 DIABETIC MONONEUROPATHY ASSOCIATED WITH DIABETES MELLITUS DUE TO UNDERLYING CONDITION (HCC): ICD-10-CM

## 2017-08-03 DIAGNOSIS — G89.29 ENCOUNTER FOR CHRONIC PAIN MANAGEMENT: ICD-10-CM

## 2017-08-03 DIAGNOSIS — M50.30 DDD (DEGENERATIVE DISC DISEASE), CERVICAL: ICD-10-CM

## 2017-08-03 DIAGNOSIS — E11.42 DIABETIC PERIPHERAL NEUROPATHY ASSOCIATED WITH TYPE 2 DIABETES MELLITUS (HCC): ICD-10-CM

## 2017-08-03 DIAGNOSIS — M75.100 TEAR OF ROTATOR CUFF, UNSPECIFIED LATERALITY, UNSPECIFIED TEAR EXTENT: Primary | ICD-10-CM

## 2017-08-03 DIAGNOSIS — M48.061 LUMBAR SPINAL STENOSIS: ICD-10-CM

## 2017-08-03 DIAGNOSIS — M51.36 DDD (DEGENERATIVE DISC DISEASE), LUMBAR: ICD-10-CM

## 2017-08-03 DIAGNOSIS — M47.899 FACET SYNDROME: ICD-10-CM

## 2017-08-03 DIAGNOSIS — Z51.81 MEDICATION MONITORING ENCOUNTER: ICD-10-CM

## 2017-08-03 DIAGNOSIS — K59.03 DRUG-INDUCED CONSTIPATION: ICD-10-CM

## 2017-08-03 DIAGNOSIS — E11.610 CHARCOT FOOT DUE TO DIABETES MELLITUS (HCC): ICD-10-CM

## 2017-08-03 PROCEDURE — 99213 OFFICE O/P EST LOW 20 MIN: CPT

## 2017-08-03 PROCEDURE — 99214 OFFICE O/P EST MOD 30 MIN: CPT | Performed by: PAIN MEDICINE

## 2017-08-03 RX ORDER — TOPIRAMATE 50 MG/1
50 TABLET, FILM COATED ORAL 2 TIMES DAILY
Qty: 60 TABLET | Refills: 3 | Status: SHIPPED | OUTPATIENT
Start: 2017-08-05 | End: 2017-12-05 | Stop reason: SDUPTHER

## 2017-08-03 RX ORDER — DOCUSATE SODIUM 100 MG/1
100 CAPSULE, LIQUID FILLED ORAL 2 TIMES DAILY
COMMUNITY
End: 2018-07-26 | Stop reason: ALTCHOICE

## 2017-08-03 RX ORDER — FENTANYL 25 UG/H
1 PATCH TRANSDERMAL
Qty: 10 PATCH | Refills: 0 | Status: SHIPPED | OUTPATIENT
Start: 2017-08-05 | End: 2017-09-05 | Stop reason: SDUPTHER

## 2017-08-03 ASSESSMENT — PAIN DESCRIPTION - ONSET: ONSET: ON-GOING

## 2017-08-03 ASSESSMENT — ENCOUNTER SYMPTOMS
SHORTNESS OF BREATH: 0
HEARTBURN: 0
NAUSEA: 0
RESPIRATORY NEGATIVE: 1
BLURRED VISION: 0
COUGH: 0
GASTROINTESTINAL NEGATIVE: 1
EYES NEGATIVE: 1
VOMITING: 0
BACK PAIN: 1

## 2017-08-03 ASSESSMENT — PAIN DESCRIPTION - ORIENTATION: ORIENTATION: RIGHT;LEFT;LOWER

## 2017-08-03 ASSESSMENT — PAIN DESCRIPTION - FREQUENCY: FREQUENCY: CONTINUOUS

## 2017-08-03 ASSESSMENT — PAIN DESCRIPTION - LOCATION: LOCATION: BACK;LEG

## 2017-08-03 ASSESSMENT — PAIN DESCRIPTION - PAIN TYPE: TYPE: CHRONIC PAIN

## 2017-08-03 ASSESSMENT — PAIN DESCRIPTION - DESCRIPTORS: DESCRIPTORS: CONSTANT

## 2017-08-03 ASSESSMENT — PAIN SCALES - GENERAL: PAINLEVEL_OUTOF10: 5

## 2017-08-03 ASSESSMENT — PAIN DESCRIPTION - PROGRESSION: CLINICAL_PROGRESSION: NOT CHANGED

## 2017-08-03 ASSESSMENT — PAIN DESCRIPTION - DIRECTION: RADIATING_TOWARDS: BILAT. LEGS

## 2017-08-04 ASSESSMENT — ENCOUNTER SYMPTOMS
PHOTOPHOBIA: 0
ABDOMINAL PAIN: 0

## 2017-08-17 ENCOUNTER — OFFICE VISIT (OUTPATIENT)
Dept: FAMILY MEDICINE CLINIC | Age: 72
End: 2017-08-17
Payer: MEDICARE

## 2017-08-17 VITALS
BODY MASS INDEX: 31.29 KG/M2 | DIASTOLIC BLOOD PRESSURE: 58 MMHG | WEIGHT: 231 LBS | SYSTOLIC BLOOD PRESSURE: 112 MMHG | OXYGEN SATURATION: 96 % | TEMPERATURE: 97.9 F | HEIGHT: 72 IN | HEART RATE: 67 BPM

## 2017-08-17 DIAGNOSIS — Z00.00 ROUTINE GENERAL MEDICAL EXAMINATION AT A HEALTH CARE FACILITY: Primary | ICD-10-CM

## 2017-08-17 PROCEDURE — G0438 PPPS, INITIAL VISIT: HCPCS | Performed by: FAMILY MEDICINE

## 2017-08-17 ASSESSMENT — LIFESTYLE VARIABLES
HOW OFTEN DO YOU HAVE SIX OR MORE DRINKS ON ONE OCCASION: 0
HOW OFTEN DO YOU HAVE A DRINK CONTAINING ALCOHOL: 1
HOW OFTEN DURING THE LAST YEAR HAVE YOU HAD A FEELING OF GUILT OR REMORSE AFTER DRINKING: 0
HOW OFTEN DURING THE LAST YEAR HAVE YOU FAILED TO DO WHAT WAS NORMALLY EXPECTED FROM YOU BECAUSE OF DRINKING: 0
HAVE YOU OR SOMEONE ELSE BEEN INJURED AS A RESULT OF YOUR DRINKING: 0
AUDIT TOTAL SCORE: 1
HOW OFTEN DURING THE LAST YEAR HAVE YOU NEEDED AN ALCOHOLIC DRINK FIRST THING IN THE MORNING TO GET YOURSELF GOING AFTER A NIGHT OF HEAVY DRINKING: 0
AUDIT-C TOTAL SCORE: 1
HOW MANY STANDARD DRINKS CONTAINING ALCOHOL DO YOU HAVE ON A TYPICAL DAY: 0
HOW OFTEN DURING THE LAST YEAR HAVE YOU FOUND THAT YOU WERE NOT ABLE TO STOP DRINKING ONCE YOU HAD STARTED: 0
HAS A RELATIVE, FRIEND, DOCTOR, OR ANOTHER HEALTH PROFESSIONAL EXPRESSED CONCERN ABOUT YOUR DRINKING OR SUGGESTED YOU CUT DOWN: 0
HOW OFTEN DURING THE LAST YEAR HAVE YOU BEEN UNABLE TO REMEMBER WHAT HAPPENED THE NIGHT BEFORE BECAUSE YOU HAD BEEN DRINKING: 0

## 2017-08-17 ASSESSMENT — ANXIETY QUESTIONNAIRES: GAD7 TOTAL SCORE: 0

## 2017-08-17 ASSESSMENT — PATIENT HEALTH QUESTIONNAIRE - PHQ9: SUM OF ALL RESPONSES TO PHQ QUESTIONS 1-9: 0

## 2017-08-25 ENCOUNTER — OFFICE VISIT (OUTPATIENT)
Dept: FAMILY MEDICINE CLINIC | Age: 72
End: 2017-08-25
Payer: MEDICARE

## 2017-08-25 VITALS
OXYGEN SATURATION: 95 % | BODY MASS INDEX: 31.15 KG/M2 | DIASTOLIC BLOOD PRESSURE: 74 MMHG | WEIGHT: 230 LBS | HEART RATE: 73 BPM | TEMPERATURE: 97.8 F | SYSTOLIC BLOOD PRESSURE: 128 MMHG | HEIGHT: 72 IN

## 2017-08-25 DIAGNOSIS — J01.00 SUBACUTE MAXILLARY SINUSITIS: ICD-10-CM

## 2017-08-25 DIAGNOSIS — E78.5 HYPERLIPIDEMIA, UNSPECIFIED HYPERLIPIDEMIA TYPE: ICD-10-CM

## 2017-08-25 DIAGNOSIS — Z87.891 HISTORY OF SMOKING: ICD-10-CM

## 2017-08-25 DIAGNOSIS — E11.42 TYPE 2 DIABETES MELLITUS WITH DIABETIC POLYNEUROPATHY, WITH LONG-TERM CURRENT USE OF INSULIN (HCC): Primary | ICD-10-CM

## 2017-08-25 DIAGNOSIS — I25.810 CORONARY ARTERY DISEASE INVOLVING CORONARY BYPASS GRAFT OF NATIVE HEART WITHOUT ANGINA PECTORIS: ICD-10-CM

## 2017-08-25 DIAGNOSIS — R09.89 PALPABLE ABDOMINAL AORTA: ICD-10-CM

## 2017-08-25 DIAGNOSIS — Z79.4 TYPE 2 DIABETES MELLITUS WITH DIABETIC POLYNEUROPATHY, WITH LONG-TERM CURRENT USE OF INSULIN (HCC): Primary | ICD-10-CM

## 2017-08-25 PROCEDURE — G8427 DOCREV CUR MEDS BY ELIG CLIN: HCPCS | Performed by: FAMILY MEDICINE

## 2017-08-25 PROCEDURE — 1036F TOBACCO NON-USER: CPT | Performed by: FAMILY MEDICINE

## 2017-08-25 PROCEDURE — G8598 ASA/ANTIPLAT THER USED: HCPCS | Performed by: FAMILY MEDICINE

## 2017-08-25 PROCEDURE — G8417 CALC BMI ABV UP PARAM F/U: HCPCS | Performed by: FAMILY MEDICINE

## 2017-08-25 PROCEDURE — 4040F PNEUMOC VAC/ADMIN/RCVD: CPT | Performed by: FAMILY MEDICINE

## 2017-08-25 PROCEDURE — 3017F COLORECTAL CA SCREEN DOC REV: CPT | Performed by: FAMILY MEDICINE

## 2017-08-25 PROCEDURE — 99214 OFFICE O/P EST MOD 30 MIN: CPT | Performed by: FAMILY MEDICINE

## 2017-08-25 PROCEDURE — 1123F ACP DISCUSS/DSCN MKR DOCD: CPT | Performed by: FAMILY MEDICINE

## 2017-08-25 PROCEDURE — 3046F HEMOGLOBIN A1C LEVEL >9.0%: CPT | Performed by: FAMILY MEDICINE

## 2017-08-25 RX ORDER — AMOXICILLIN AND CLAVULANATE POTASSIUM 875; 125 MG/1; MG/1
1 TABLET, FILM COATED ORAL 2 TIMES DAILY
Qty: 20 TABLET | Refills: 0 | Status: SHIPPED | OUTPATIENT
Start: 2017-08-25 | End: 2017-09-04

## 2017-08-25 ASSESSMENT — ENCOUNTER SYMPTOMS
NAUSEA: 0
ABDOMINAL PAIN: 0
COUGH: 0
SINUS PRESSURE: 1
SHORTNESS OF BREATH: 0
SORE THROAT: 1
CONSTIPATION: 0

## 2017-08-29 ENCOUNTER — HOSPITAL ENCOUNTER (OUTPATIENT)
Age: 72
Discharge: HOME | End: 2017-08-29
Payer: MEDICARE

## 2017-08-29 DIAGNOSIS — R53.83 FATIGUE, UNSPECIFIED TYPE: ICD-10-CM

## 2017-08-29 DIAGNOSIS — R53.83 FATIGUE, UNSPECIFIED TYPE: Primary | ICD-10-CM

## 2017-08-29 DIAGNOSIS — E78.5 HYPERLIPIDEMIA, UNSPECIFIED HYPERLIPIDEMIA TYPE: ICD-10-CM

## 2017-08-29 DIAGNOSIS — I25.810 CORONARY ARTERY DISEASE INVOLVING CORONARY BYPASS GRAFT OF NATIVE HEART WITHOUT ANGINA PECTORIS: ICD-10-CM

## 2017-08-29 LAB
ABSOLUTE EOS #: 0.1 K/UL (ref 0–0.4)
ABSOLUTE LYMPH #: 1.4 K/UL (ref 1–4.8)
ABSOLUTE MONO #: 0.6 K/UL (ref 0.1–1.3)
ALBUMIN SERPL-MCNC: 3.9 G/DL (ref 3.5–5.2)
ALBUMIN/GLOBULIN RATIO: ABNORMAL (ref 1–2.5)
ALP BLD-CCNC: 101 U/L (ref 40–129)
ALT SERPL-CCNC: 22 U/L (ref 5–41)
ANION GAP SERPL CALCULATED.3IONS-SCNC: 14 MMOL/L (ref 9–17)
AST SERPL-CCNC: 15 U/L
BASOPHILS # BLD: 1 %
BASOPHILS ABSOLUTE: 0.1 K/UL (ref 0–0.2)
BILIRUB SERPL-MCNC: 0.32 MG/DL (ref 0.3–1.2)
BUN BLDV-MCNC: 19 MG/DL (ref 8–23)
BUN/CREAT BLD: ABNORMAL (ref 9–20)
CALCIUM SERPL-MCNC: 8.9 MG/DL (ref 8.6–10.4)
CHLORIDE BLD-SCNC: 106 MMOL/L (ref 98–107)
CHOLESTEROL/HDL RATIO: 2.9
CHOLESTEROL: 103 MG/DL
CO2: 25 MMOL/L (ref 20–31)
CREAT SERPL-MCNC: 1.32 MG/DL (ref 0.7–1.2)
DIFFERENTIAL TYPE: ABNORMAL
EOSINOPHILS RELATIVE PERCENT: 2 %
GFR AFRICAN AMERICAN: >60 ML/MIN
GFR NON-AFRICAN AMERICAN: 53 ML/MIN
GFR SERPL CREATININE-BSD FRML MDRD: ABNORMAL ML/MIN/{1.73_M2}
GFR SERPL CREATININE-BSD FRML MDRD: ABNORMAL ML/MIN/{1.73_M2}
GLUCOSE BLD-MCNC: 182 MG/DL (ref 70–99)
HCT VFR BLD CALC: 44.2 % (ref 41–53)
HDLC SERPL-MCNC: 35 MG/DL
HEMOGLOBIN: 14.5 G/DL (ref 13.5–17.5)
LDL CHOLESTEROL: 43 MG/DL (ref 0–130)
LYMPHOCYTES # BLD: 18 %
MCH RBC QN AUTO: 29.6 PG (ref 26–34)
MCHC RBC AUTO-ENTMCNC: 32.9 G/DL (ref 31–37)
MCV RBC AUTO: 90 FL (ref 80–100)
MONOCYTES # BLD: 8 %
PDW BLD-RTO: 14.2 % (ref 11.5–14.9)
PLATELET # BLD: 149 K/UL (ref 150–450)
PLATELET ESTIMATE: ABNORMAL
PMV BLD AUTO: 8.7 FL (ref 6–12)
POTASSIUM SERPL-SCNC: 4.3 MMOL/L (ref 3.7–5.3)
RBC # BLD: 4.91 M/UL (ref 4.5–5.9)
RBC # BLD: ABNORMAL 10*6/UL
SEG NEUTROPHILS: 71 %
SEGMENTED NEUTROPHILS ABSOLUTE COUNT: 5.4 K/UL (ref 1.3–9.1)
SODIUM BLD-SCNC: 145 MMOL/L (ref 135–144)
TOTAL PROTEIN: 7 G/DL (ref 6.4–8.3)
TRIGL SERPL-MCNC: 126 MG/DL
VLDLC SERPL CALC-MCNC: ABNORMAL MG/DL (ref 1–30)
WBC # BLD: 7.5 K/UL (ref 3.5–11)
WBC # BLD: ABNORMAL 10*3/UL

## 2017-08-29 PROCEDURE — 80061 LIPID PANEL: CPT

## 2017-08-29 PROCEDURE — 36415 COLL VENOUS BLD VENIPUNCTURE: CPT

## 2017-08-29 PROCEDURE — 80053 COMPREHEN METABOLIC PANEL: CPT

## 2017-08-29 PROCEDURE — 85025 COMPLETE CBC W/AUTO DIFF WBC: CPT

## 2017-08-30 RX ORDER — FUROSEMIDE 40 MG/1
TABLET ORAL
Qty: 60 TABLET | Refills: 2 | Status: SHIPPED | OUTPATIENT
Start: 2017-08-30 | End: 2017-12-01 | Stop reason: SDUPTHER

## 2017-09-05 ENCOUNTER — HOSPITAL ENCOUNTER (OUTPATIENT)
Dept: PAIN MANAGEMENT | Age: 72
Discharge: HOME | End: 2017-09-05
Payer: MEDICARE

## 2017-09-05 VITALS
RESPIRATION RATE: 20 BRPM | HEART RATE: 84 BPM | TEMPERATURE: 98.5 F | DIASTOLIC BLOOD PRESSURE: 58 MMHG | WEIGHT: 228 LBS | OXYGEN SATURATION: 95 % | HEIGHT: 72 IN | BODY MASS INDEX: 30.88 KG/M2 | SYSTOLIC BLOOD PRESSURE: 133 MMHG

## 2017-09-05 DIAGNOSIS — M48.061 LUMBAR SPINAL STENOSIS: ICD-10-CM

## 2017-09-05 DIAGNOSIS — K59.03 DRUG-INDUCED CONSTIPATION: ICD-10-CM

## 2017-09-05 DIAGNOSIS — E11.610 CHARCOT FOOT DUE TO DIABETES MELLITUS (HCC): ICD-10-CM

## 2017-09-05 DIAGNOSIS — M50.30 DDD (DEGENERATIVE DISC DISEASE), CERVICAL: ICD-10-CM

## 2017-09-05 DIAGNOSIS — G89.29 ENCOUNTER FOR CHRONIC PAIN MANAGEMENT: ICD-10-CM

## 2017-09-05 DIAGNOSIS — M51.36 DDD (DEGENERATIVE DISC DISEASE), LUMBAR: ICD-10-CM

## 2017-09-05 DIAGNOSIS — M47.816 SPONDYLOSIS OF LUMBAR REGION WITHOUT MYELOPATHY OR RADICULOPATHY: ICD-10-CM

## 2017-09-05 DIAGNOSIS — Z51.81 MEDICATION MONITORING ENCOUNTER: ICD-10-CM

## 2017-09-05 DIAGNOSIS — M47.899 FACET SYNDROME: ICD-10-CM

## 2017-09-05 DIAGNOSIS — E08.41 DIABETIC MONONEUROPATHY ASSOCIATED WITH DIABETES MELLITUS DUE TO UNDERLYING CONDITION (HCC): ICD-10-CM

## 2017-09-05 DIAGNOSIS — M75.100 TEAR OF ROTATOR CUFF, UNSPECIFIED LATERALITY, UNSPECIFIED TEAR EXTENT: Primary | ICD-10-CM

## 2017-09-05 PROCEDURE — 99213 OFFICE O/P EST LOW 20 MIN: CPT | Performed by: NURSE PRACTITIONER

## 2017-09-05 PROCEDURE — 99213 OFFICE O/P EST LOW 20 MIN: CPT

## 2017-09-05 RX ORDER — FENTANYL 25 UG/H
1 PATCH TRANSDERMAL
Qty: 10 PATCH | Refills: 0 | Status: SHIPPED | OUTPATIENT
Start: 2017-09-05 | End: 2017-10-03 | Stop reason: SDUPTHER

## 2017-09-05 RX ORDER — FLUTICASONE PROPIONATE 50 MCG
SPRAY, SUSPENSION (ML) NASAL
Refills: 0 | COMMUNITY
Start: 2017-08-25 | End: 2017-11-27 | Stop reason: ALTCHOICE

## 2017-09-05 ASSESSMENT — ENCOUNTER SYMPTOMS
SORE THROAT: 1
EYES NEGATIVE: 1
COUGH: 1
BACK PAIN: 1

## 2017-09-06 ENCOUNTER — HOSPITAL ENCOUNTER (OUTPATIENT)
Dept: VASCULAR LAB | Age: 72
Discharge: HOME | End: 2017-09-06
Payer: MEDICARE

## 2017-09-06 DIAGNOSIS — R09.89 PALPABLE ABDOMINAL AORTA: ICD-10-CM

## 2017-09-06 DIAGNOSIS — Z87.891 HISTORY OF SMOKING: ICD-10-CM

## 2017-09-06 PROCEDURE — 93975 VASCULAR STUDY: CPT

## 2017-09-13 ENCOUNTER — PROCEDURE VISIT (OUTPATIENT)
Dept: PODIATRY | Age: 72
End: 2017-09-13
Payer: MEDICARE

## 2017-09-13 VITALS
DIASTOLIC BLOOD PRESSURE: 78 MMHG | HEIGHT: 72 IN | BODY MASS INDEX: 30.48 KG/M2 | SYSTOLIC BLOOD PRESSURE: 134 MMHG | WEIGHT: 225 LBS | HEART RATE: 83 BPM

## 2017-09-13 DIAGNOSIS — E11.42 TYPE 2 DIABETES MELLITUS WITH DIABETIC POLYNEUROPATHY, WITH LONG-TERM CURRENT USE OF INSULIN (HCC): ICD-10-CM

## 2017-09-13 DIAGNOSIS — B35.1 ONYCHOMYCOSIS: Primary | ICD-10-CM

## 2017-09-13 DIAGNOSIS — M14.672 CHARCOT'S JOINT OF LEFT FOOT: ICD-10-CM

## 2017-09-13 DIAGNOSIS — Z79.4 TYPE 2 DIABETES MELLITUS WITH DIABETIC POLYNEUROPATHY, WITH LONG-TERM CURRENT USE OF INSULIN (HCC): ICD-10-CM

## 2017-09-13 PROCEDURE — 11721 DEBRIDE NAIL 6 OR MORE: CPT | Performed by: PODIATRIST

## 2017-09-13 PROCEDURE — 1036F TOBACCO NON-USER: CPT | Performed by: PODIATRIST

## 2017-09-13 ASSESSMENT — ENCOUNTER SYMPTOMS
COLOR CHANGE: 0
NAUSEA: 0
DIARRHEA: 0
VOMITING: 0
CONSTIPATION: 0

## 2017-09-28 RX ORDER — INSULIN GLARGINE 100 [IU]/ML
INJECTION, SOLUTION SUBCUTANEOUS
Qty: 15 ML | Refills: 2 | Status: SHIPPED | OUTPATIENT
Start: 2017-09-28 | End: 2017-11-30 | Stop reason: SDUPTHER

## 2017-09-28 RX ORDER — SIMVASTATIN 20 MG
TABLET ORAL
Qty: 30 TABLET | Refills: 2 | Status: SHIPPED | OUTPATIENT
Start: 2017-09-28 | End: 2017-12-21 | Stop reason: SDUPTHER

## 2017-09-28 RX ORDER — PEN NEEDLE, DIABETIC 32GX 5/32"
NEEDLE, DISPOSABLE MISCELLANEOUS
Qty: 100 EACH | Refills: 2 | Status: SHIPPED | OUTPATIENT
Start: 2017-09-28 | End: 2018-03-13 | Stop reason: SDUPTHER

## 2017-10-03 ENCOUNTER — HOSPITAL ENCOUNTER (OUTPATIENT)
Dept: PAIN MANAGEMENT | Age: 72
Discharge: HOME | End: 2017-10-03
Payer: MEDICARE

## 2017-10-03 VITALS
HEIGHT: 72 IN | RESPIRATION RATE: 16 BRPM | SYSTOLIC BLOOD PRESSURE: 114 MMHG | WEIGHT: 226 LBS | DIASTOLIC BLOOD PRESSURE: 69 MMHG | HEART RATE: 67 BPM | BODY MASS INDEX: 30.61 KG/M2

## 2017-10-03 DIAGNOSIS — E11.610 CHARCOT FOOT DUE TO DIABETES MELLITUS (HCC): ICD-10-CM

## 2017-10-03 DIAGNOSIS — Z51.81 MEDICATION MONITORING ENCOUNTER: ICD-10-CM

## 2017-10-03 DIAGNOSIS — M47.26 OSTEOARTHRITIS OF SPINE WITH RADICULOPATHY, LUMBAR REGION: ICD-10-CM

## 2017-10-03 DIAGNOSIS — M48.061 LUMBAR SPINAL STENOSIS: ICD-10-CM

## 2017-10-03 DIAGNOSIS — E11.42 TYPE 2 DIABETES MELLITUS WITH DIABETIC POLYNEUROPATHY, WITH LONG-TERM CURRENT USE OF INSULIN (HCC): ICD-10-CM

## 2017-10-03 DIAGNOSIS — M51.36 DDD (DEGENERATIVE DISC DISEASE), LUMBAR: Primary | ICD-10-CM

## 2017-10-03 DIAGNOSIS — M47.899 FACET SYNDROME: ICD-10-CM

## 2017-10-03 DIAGNOSIS — M47.816 SPONDYLOSIS OF LUMBAR REGION WITHOUT MYELOPATHY OR RADICULOPATHY: ICD-10-CM

## 2017-10-03 DIAGNOSIS — Z79.4 TYPE 2 DIABETES MELLITUS WITH DIABETIC POLYNEUROPATHY, WITH LONG-TERM CURRENT USE OF INSULIN (HCC): ICD-10-CM

## 2017-10-03 PROCEDURE — 99214 OFFICE O/P EST MOD 30 MIN: CPT | Performed by: NURSE PRACTITIONER

## 2017-10-03 PROCEDURE — 99213 OFFICE O/P EST LOW 20 MIN: CPT

## 2017-10-03 PROCEDURE — 80307 DRUG TEST PRSMV CHEM ANLYZR: CPT

## 2017-10-03 RX ORDER — FENTANYL 25 UG/H
1 PATCH TRANSDERMAL
Qty: 10 PATCH | Refills: 0 | Status: SHIPPED | OUTPATIENT
Start: 2017-10-05 | End: 2017-11-03 | Stop reason: SDUPTHER

## 2017-10-03 RX ORDER — OXYCODONE HYDROCHLORIDE AND ACETAMINOPHEN 5; 325 MG/1; MG/1
1 TABLET ORAL EVERY 6 HOURS PRN
Qty: 90 TABLET | Refills: 0 | Status: SHIPPED | OUTPATIENT
Start: 2017-10-03 | End: 2017-12-05 | Stop reason: SDUPTHER

## 2017-10-03 ASSESSMENT — ENCOUNTER SYMPTOMS
SHORTNESS OF BREATH: 0
COUGH: 0
CONSTIPATION: 0
BACK PAIN: 1

## 2017-10-03 ASSESSMENT — PAIN SCALES - GENERAL: PAINLEVEL_OUTOF10: 5

## 2017-10-03 NOTE — IP AVS SNAPSHOT
After Visit Summary  (Discharge Instructions)    Medication List for Home    Based on the information you provided to us as well as any changes during this visit, the following is your updated medication list.  Compare this with your prescription bottles at home. If you have any questions or concerns, contact your primary care physician's office. Daily Medication List (This medication list can be shared with any healthcare provider who is helping you manage your medications)      These are medications you told us you were taking at home, CONTINUE taking them after you leave the hospital        Last Dose    Next Dose Due AM NOON PM NIGHT    fentaNYL 25 MCG/HR   Commonly known as:  [address] 1 patch onto the skin every 72 hours . Earliest Fill Date: 10/5/17   Start taking on:  10/5/2017                                         oxyCODONE-acetaminophen 5-325 MG per tablet   Commonly known as:  PERCOCET   Take 1 tablet by mouth every 6 hours as needed for Pain . ASK your doctor about these medications if you have questions        Last Dose    Next Dose Due AM NOON PM NIGHT    aspirin 81 MG tablet   Take 81 mg by mouth daily. BD PEN NEEDLE ALIZE U/F 32G X 4 MM Misc   Generic drug:  Insulin Pen Needle   use twice daily as directed                                         docusate sodium 100 MG capsule   Commonly known as:  COLACE   Take 100 mg by mouth 2 times daily                                         finasteride 5 MG tablet   Commonly known as:  PROSCAR   Take 5 mg by mouth daily.                                          fluticasone 50 MCG/ACT nasal spray   Commonly known as:  FLONASE   INHALE 2 SPRAYS IN EACH NOSTRIL ONE TIME A DAY                                         FREESTYLE LITE strip   Generic drug:  glucose blood VI test strips   TEST TWICE DAILY AS DIRECTED furosemide 40 MG tablet   Commonly known as:  LASIX   TAKE 1 TABLET BY MOUTH TWO TIMES A DAY                                         gabapentin 800 MG tablet   Commonly known as:  NEURONTIN   Take 1 tablet by mouth daily                                         LANTUS SOLOSTAR 100 UNIT/ML injection pen   Generic drug:  insulin glargine   INJECT 30 UNITS SUBCUTANEOUSLY EVERY MORNING AND 40 UNITS EVERY EVENING                                         metoprolol succinate 25 MG extended release tablet   Commonly known as:  TOPROL XL                                         NITROSTAT 0.4 MG SL tablet   Generic drug:  nitroGLYCERIN                                         omeprazole 20 MG delayed release capsule   Commonly known as:  PRILOSEC   TAKE 1 CAPSULE BY MOUTH ONE TIME A DAY                                         simvastatin 20 MG tablet   Commonly known as:  ZOCOR   TAKE 1 TABLET BY MOUTH IN THE EVENING                                         tamsulosin 0.4 MG capsule   Commonly known as:  FLOMAX   Take 0.4 mg by mouth daily. topiramate 50 MG tablet   Commonly known as:  TOPAMAX   Take 1 tablet by mouth 2 times daily                                         traZODone 50 MG tablet   Commonly known as:  DESYREL   TAKE 1 TABLET BY MOUTH ONE TIME A DAY IN THE EVENING                                         vitamin C 500 MG tablet   Take 1,000 mg by mouth 2 times daily                                         Vitamin D 1000 units Caps capsule   Commonly known as:  CHOLECALCIFEROL   Take 1,000 Units by mouth daily.                                               Where to Get Your Medications      You can get these medications from any pharmacy     Bring a paper prescription for each of these medications     fentaNYL 25 MCG/HR    oxyCODONE-acetaminophen 5-325 MG per tablet               Allergies as of 10/3/2017        Reactions    Flagyl [Metronidazole] Hives Your primary diagnosis was:  Not on File      Visit Information     Date & Time Department Dept. Phone    10/3/2017 [facility] [phone]       Follow-up Appointments    Below is a list of your follow-up and future appointments. This may not be a complete list as you may have made appointments directly with providers that we are not aware of or your providers may have made some for you. Please call your providers to confirm appointments. It is important to keep your appointments. Please bring your current insurance card, photo ID, co-pay, and all medication bottles to your appointment. If self-pay, payment is expected at the time of service. Future Appointments     11/22/2017 9:30 AM     Appointment with Aristeo Dumont DPM at [facility] ([phone])   [address]  [address] [facility] [id number]       11/27/2017 9:30 AM     Appointment with Marcial Gallardo MD at [facility] ([phone])   Please arrive 15 minutes prior to appointment, bring photo ID and insurance card. [address] [place] [phone]         Preventive Care        Date Due    Tetanus Combination Vaccine (1 - Tdap) 3/7/1965    Zoster Vaccine 3/7/2006    Yearly Flu Vaccine (1) 9/1/2017    Eye Exam By An Eye Doctor 11/9/2017    Urine Check For Kidney Problems 2/10/2018    Hemoglobin A1C (Test For Long-Term Glucose Control) 5/12/2018    Pneumococcal Vaccines (two) for all adults aged 72 and over (2 of 2 - PCV13) 5/12/2018    Cholesterol Screening 8/29/2018    Diabetic Foot Exam 9/13/2018    Colonoscopy 1/11/2022                 Care Plan Once You Return Home    This section includes instructions you will need to follow once you leave the hospital.  Your care team will discuss these with you, so you and those caring for you know how to best care for your health needs at home.   This section may also include educational information about certain health topics that may be of help to you. Discharge Instructions       See back in 4 weeks  Continue current medications  Pain clinic phone number [phone]    Important information for a smoker       SMOKING: QUIT SMOKING. THIS IS THE MOST IMPORTANT ACTION YOU CAN TAKE TO IMPROVE YOUR CURRENT AND FUTURE HEALTH. Call the [address] [place] [address] at [place] NOW ([phone])    Smoking harms nonsmokers. When nonsmokers are around people who smoke, they absorb nicotine, carbon monoxide, and other ingredients of tobacco smoke. DO NOT SMOKE AROUND CHILDREN     Children exposed to secondhand smoke are at an increased risk of:  Sudden Infant Death Syndrome (SIDS), acute respiratory infections, inflammation of the middle ear, and severe asthma. Over a longer time, it causes heart disease and lung cancer. There is no safe level of exposure to secondhand smoke. [employer] Signup     Our records indicate that you have an active [employer] account. You can view your After Visit Summary by going to [email or web address] and logging in with your [employer] username and password. If you don't have a [employer] username and password but a parent or guardian has access to your record, the parent or guardian should login with their own [employer] username and password and access your record to view the After Visit Summary. Additional Information  If you have questions, please contact the physician practice where you receive care. Remember, [employer] is NOT to be used for urgent needs. For medical emergencies, dial 911. For questions regarding your [employer] account call [phone]. If you have a clinical question, please call your doctor's office. View your information online  ? Review your current list of  medications, immunization, and allergies. ? Review your future test results online . ? Review your discharge instructions provided by your caregivers at discharge    Certain functionality such as prescription refills, scheduling appointments or sending messages to your provider are not activated if your provider does not use [employer] in his/her office    For questions regarding your MyChart account call [phone]. If you have a clinical question, please call your doctor's office. The information on all pages of the After Visit Summary has been reviewed with me, the patient and/or responsible adult, by my health care provider(s). I had the opportunity to ask questions regarding this information. I understand I should dispose of my armband safely at home to protect my health information. A complete copy of the After Visit Summary has been given to me, the patient and/or responsible adult.            Patient Signature/Responsible Adult:____________________    Clinician Signature:_____________________    Date:_____________________    Time:_____________________

## 2017-10-03 NOTE — IP AVS SNAPSHOT
Patient Information     Patient Name DOB Author Runner 1945         This is your updated medication list to keep with you all times      TAKE these medications     fentaNYL 25 MCG/HR   Commonly known as:  [address] 1 patch onto the skin every 72 hours . Earliest Fill Date: 10/5/17   Start taking on:  10/5/2017       oxyCODONE-acetaminophen 5-325 MG per tablet   Commonly known as:  PERCOCET   Take 1 tablet by mouth every 6 hours as needed for Pain .          ASK your doctor about these medications     aspirin 81 MG tablet       BD PEN NEEDLE ALIZE U/F 32G X 4 MM Misc   Generic drug:  Insulin Pen Needle   use twice daily as directed       docusate sodium 100 MG capsule   Commonly known as:  COLACE       finasteride 5 MG tablet   Commonly known as:  PROSCAR       fluticasone 50 MCG/ACT nasal spray   Commonly known as:  FLONASE       FREESTYLE LITE strip   Generic drug:  glucose blood VI test strips   TEST TWICE DAILY AS DIRECTED       furosemide 40 MG tablet   Commonly known as:  LASIX   TAKE 1 TABLET BY MOUTH TWO TIMES A DAY       gabapentin 800 MG tablet   Commonly known as:  NEURONTIN   Take 1 tablet by mouth daily       LANTUS SOLOSTAR 100 UNIT/ML injection pen   Generic drug:  insulin glargine   INJECT 30 UNITS SUBCUTANEOUSLY EVERY MORNING AND 40 UNITS EVERY EVENING       metoprolol succinate 25 MG extended release tablet   Commonly known as:  TOPROL XL       NITROSTAT 0.4 MG SL tablet   Generic drug:  nitroGLYCERIN       omeprazole 20 MG delayed release capsule   Commonly known as:  PRILOSEC   TAKE 1 CAPSULE BY MOUTH ONE TIME A DAY       simvastatin 20 MG tablet   Commonly known as:  ZOCOR   TAKE 1 TABLET BY MOUTH IN THE EVENING       tamsulosin 0.4 MG capsule   Commonly known as:  FLOMAX       topiramate 50 MG tablet   Commonly known as:  TOPAMAX   Take 1 tablet by mouth 2 times daily       traZODone 50 MG tablet   Commonly known as:  DESYREL

## 2017-10-03 NOTE — PROGRESS NOTES
TONSILLECTOMY      UPPER GASTROINTESTINAL ENDOSCOPY  11-3-15    VENA CAVA FILTER PLACEMENT      WRIST SURGERY      I&D       Allergies   Allergen Reactions    Flagyl [Metronidazole] Hives         Current Outpatient Prescriptions:     [START ON 10/5/2017] fentaNYL (DURAGESIC) 25 MCG/HR, Place 1 patch onto the skin every 72 hours . Earliest Fill Date: 10/5/17, Disp: 10 patch, Rfl: 0    oxyCODONE-acetaminophen (PERCOCET) 5-325 MG per tablet, Take 1 tablet by mouth every 6 hours as needed for Pain ., Disp: 90 tablet, Rfl: 0    simvastatin (ZOCOR) 20 MG tablet, TAKE 1 TABLET BY MOUTH IN THE EVENING , Disp: 30 tablet, Rfl: 2    LANTUS SOLOSTAR 100 UNIT/ML injection pen, INJECT 30 UNITS SUBCUTANEOUSLY EVERY MORNING AND 40 UNITS EVERY EVENING, Disp: 15 mL, Rfl: 2    BD PEN NEEDLE ALIZE U/F 32G X 4 MM MISC, use twice daily as directed, Disp: 100 each, Rfl: 2    fluticasone (FLONASE) 50 MCG/ACT nasal spray, INHALE 2 SPRAYS IN EACH NOSTRIL ONE TIME A DAY, Disp: , Rfl: 0    furosemide (LASIX) 40 MG tablet, TAKE 1 TABLET BY MOUTH TWO TIMES A DAY , Disp: 60 tablet, Rfl: 2    docusate sodium (COLACE) 100 MG capsule, Take 100 mg by mouth 2 times daily, Disp: , Rfl:     topiramate (TOPAMAX) 50 MG tablet, Take 1 tablet by mouth 2 times daily, Disp: 60 tablet, Rfl: 3    traZODone (DESYREL) 50 MG tablet, TAKE 1 TABLET BY MOUTH ONE TIME A DAY IN THE EVENING, Disp: 30 tablet, Rfl: 2    omeprazole (PRILOSEC) 20 MG delayed release capsule, TAKE 1 CAPSULE BY MOUTH ONE TIME A DAY , Disp: 30 capsule, Rfl: 2    gabapentin (NEURONTIN) 800 MG tablet, Take 1 tablet by mouth daily, Disp: 90 tablet, Rfl: 3    FREESTYLE LITE strip, TEST TWICE DAILY AS DIRECTED, Disp: 100 strip, Rfl: 1    metoprolol succinate (TOPROL XL) 25 MG extended release tablet, , Disp: , Rfl:     aspirin 81 MG tablet, Take 81 mg by mouth daily. , Disp: , Rfl:     Vitamin D (CHOLECALCIFEROL) 1000 UNITS CAPS capsule, Take 1,000 Units by mouth daily. , Disp: , Rfl:     Ascorbic Acid (VITAMIN C) 500 MG tablet, Take 1,000 mg by mouth 2 times daily , Disp: , Rfl:     NITROSTAT 0.4 MG SL tablet, , Disp: , Rfl:     finasteride (PROSCAR) 5 MG tablet, Take 5 mg by mouth daily. , Disp: , Rfl:     tamsulosin (FLOMAX) 0.4 MG capsule, Take 0.4 mg by mouth daily. , Disp: , Rfl:     Family History   Problem Relation Age of Onset    Heart Failure Father     Cancer Mother      breast    Cancer Maternal Grandfather        Social History     Social History    Marital status:      Spouse name: N/A    Number of children: N/A    Years of education: N/A     Occupational History    retired [employer]      Social History Main Topics    Smoking status: Former Smoker     Packs/day: 1.00     Years: 30.00     Types: Cigarettes     Quit date: 2/2/2002    Smokeless tobacco: Never Used    Alcohol use Yes      Comment: rare    Drug use: No    Sexual activity: Not on file     Other Topics Concern    Not on file     Social History Narrative       Review of Systems:  Review of Systems   Constitution: Negative for chills and fever. Cardiovascular: Negative for chest pain. Respiratory: Negative for cough and shortness of breath. Musculoskeletal: Positive for arthritis, back pain and muscle weakness. Negative for falls. Gastrointestinal: Negative for constipation. Neurological: Positive for numbness and paresthesias. Physical Exam:  /69  Pulse 67  Resp 16  Ht 6' (1.829 m)  Wt 226 lb (102.5 kg)  BMI 30.65 kg/m2    Physical Exam   Constitutional: He is oriented to person, place, and time and well-developed, well-nourished, and in no distress. Cardiovascular: Normal rate. Pulmonary/Chest: Effort normal.   Musculoskeletal: Normal range of motion. Lumbar back: He exhibits tenderness, bony tenderness and pain. He exhibits normal range of motion. Legs:  numbness   Neurological: He is alert and oriented to person, place, and time.    Antalgic

## 2017-10-07 LAB
6-ACETYLMORPHINE, UR: NOT DETECTED
7-AMINOCLONAZEPAM, URINE: NOT DETECTED
ALPHA-OH-ALPRAZ, URINE: NOT DETECTED
ALPRAZOLAM, URINE: NOT DETECTED
AMPHETAMINES, URINE: NOT DETECTED
BARBITURATES, URINE: NOT DETECTED
BENZOYLECGONINE, UR: NOT DETECTED
BUPRENORPHINE URINE: NOT DETECTED
CARISOPRODOL, UR: NOT DETECTED
CLONAZEPAM, URINE: NOT DETECTED
CODEINE, URINE: NOT DETECTED
CREATININE URINE: 172.3 MG/DL (ref 20–400)
DIAZEPAM, URINE: NOT DETECTED
DRUGS EXPECTED, UR: NORMAL
EER HI RES INTERP UR: NORMAL
ETHYL GLUCURONIDE UR: NOT DETECTED
FENTANYL URINE: PRESENT
HYDROCODONE, URINE: NOT DETECTED
HYDROMORPHONE, URINE: NOT DETECTED
LORAZEPAM, URINE: NOT DETECTED
MARIJUANA METAB, UR: NOT DETECTED
MDA, UR: NOT DETECTED
MDEA, EVE, UR: NOT DETECTED
MDMA URINE: NOT DETECTED
MEPERIDINE METAB, UR: NOT DETECTED
METHADONE, URINE: NOT DETECTED
METHAMPHETAMINE, URINE: NOT DETECTED
METHYLPHENIDATE: NOT DETECTED
MIDAZOLAM, URINE: NOT DETECTED
MORPHINE URINE: NOT DETECTED
NORBUPRENORPHINE, URINE: NOT DETECTED
NORDIAZEPAM, URINE: NOT DETECTED
NORFENTANYL, URINE: PRESENT
NORHYDROCODONE, URINE: NOT DETECTED
NOROXYCODONE, URINE: PRESENT
NOROXYMORPHONE, URINE: PRESENT
OXAZEPAM, URINE: NOT DETECTED
OXYCODONE URINE: PRESENT
OXYMORPHONE, URINE: PRESENT
PAIN MANAGEMENT DRUG PANEL INTERP, URINE: NORMAL
PAIN MGT DRUG PANEL, HI RES, UR: NORMAL
PCP,URINE: NOT DETECTED
PHENTERMINE, UR: NOT DETECTED
PROPOXYPHENE, URINE: NOT DETECTED
TAPENTADOL, URINE: NOT DETECTED
TAPENTADOL-O-SULFATE, URINE: NOT DETECTED
TEMAZEPAM, URINE: NOT DETECTED
TRAMADOL, URINE: NOT DETECTED
ZOLPIDEM, URINE: NOT DETECTED

## 2017-10-12 RX ORDER — OMEPRAZOLE 20 MG/1
CAPSULE, DELAYED RELEASE ORAL
Qty: 30 CAPSULE | Refills: 2 | Status: SHIPPED | OUTPATIENT
Start: 2017-10-12 | End: 2018-01-03 | Stop reason: SDUPTHER

## 2017-10-12 RX ORDER — TRAZODONE HYDROCHLORIDE 50 MG/1
TABLET ORAL
Qty: 30 TABLET | Refills: 2 | Status: SHIPPED | OUTPATIENT
Start: 2017-10-12 | End: 2018-06-01

## 2017-11-03 ENCOUNTER — HOSPITAL ENCOUNTER (OUTPATIENT)
Dept: PAIN MANAGEMENT | Age: 72
Discharge: HOME | End: 2017-11-03
Payer: MEDICARE

## 2017-11-03 VITALS
OXYGEN SATURATION: 97 % | SYSTOLIC BLOOD PRESSURE: 126 MMHG | DIASTOLIC BLOOD PRESSURE: 70 MMHG | HEART RATE: 70 BPM | HEIGHT: 72 IN | BODY MASS INDEX: 30.61 KG/M2 | WEIGHT: 226 LBS | TEMPERATURE: 97.7 F | RESPIRATION RATE: 16 BRPM

## 2017-11-03 DIAGNOSIS — E11.610 CHARCOT FOOT DUE TO DIABETES MELLITUS (HCC): ICD-10-CM

## 2017-11-03 DIAGNOSIS — M51.36 DDD (DEGENERATIVE DISC DISEASE), LUMBAR: Primary | ICD-10-CM

## 2017-11-03 DIAGNOSIS — M47.899 FACET SYNDROME: ICD-10-CM

## 2017-11-03 DIAGNOSIS — M47.816 SPONDYLOSIS OF LUMBAR REGION WITHOUT MYELOPATHY OR RADICULOPATHY: ICD-10-CM

## 2017-11-03 DIAGNOSIS — Z51.81 MEDICATION MONITORING ENCOUNTER: ICD-10-CM

## 2017-11-03 DIAGNOSIS — M47.26 OSTEOARTHRITIS OF SPINE WITH RADICULOPATHY, LUMBAR REGION: ICD-10-CM

## 2017-11-03 PROCEDURE — 99213 OFFICE O/P EST LOW 20 MIN: CPT | Performed by: NURSE PRACTITIONER

## 2017-11-03 PROCEDURE — 99213 OFFICE O/P EST LOW 20 MIN: CPT

## 2017-11-03 RX ORDER — FENTANYL 25 UG/H
1 PATCH TRANSDERMAL
Qty: 10 PATCH | Refills: 0 | Status: SHIPPED | OUTPATIENT
Start: 2017-11-04 | End: 2017-12-05 | Stop reason: SDUPTHER

## 2017-11-03 ASSESSMENT — ENCOUNTER SYMPTOMS
RESPIRATORY NEGATIVE: 1
EYES NEGATIVE: 1
CONSTIPATION: 1
BACK PAIN: 1

## 2017-11-03 NOTE — PROGRESS NOTES
1 TABLET BY MOUTH IN THE EVENING , Disp: 30 tablet, Rfl: 2    simvastatin (ZOCOR) 20 MG tablet, TAKE 1 TABLET BY MOUTH IN THE EVENING , Disp: 30 tablet, Rfl: 2    LANTUS SOLOSTAR 100 UNIT/ML injection pen, INJECT 30 UNITS SUBCUTANEOUSLY EVERY MORNING AND 40 UNITS EVERY EVENING, Disp: 15 mL, Rfl: 2    furosemide (LASIX) 40 MG tablet, TAKE 1 TABLET BY MOUTH TWO TIMES A DAY , Disp: 60 tablet, Rfl: 2    topiramate (TOPAMAX) 50 MG tablet, Take 1 tablet by mouth 2 times daily, Disp: 60 tablet, Rfl: 3    gabapentin (NEURONTIN) 800 MG tablet, Take 1 tablet by mouth daily, Disp: 90 tablet, Rfl: 3    metoprolol succinate (TOPROL XL) 25 MG extended release tablet, , Disp: , Rfl:     aspirin 81 MG tablet, Take 81 mg by mouth daily. , Disp: , Rfl:     Vitamin D (CHOLECALCIFEROL) 1000 UNITS CAPS capsule, Take 1,000 Units by mouth daily. , Disp: , Rfl:     Ascorbic Acid (VITAMIN C) 500 MG tablet, Take 1,000 mg by mouth 2 times daily , Disp: , Rfl:     finasteride (PROSCAR) 5 MG tablet, Take 5 mg by mouth daily. , Disp: , Rfl:     tamsulosin (FLOMAX) 0.4 MG capsule, Take 0.4 mg by mouth daily. , Disp: , Rfl:     BD PEN NEEDLE ALIZE U/F 32G X 4 MM MISC, use twice daily as directed, Disp: 100 each, Rfl: 2    fluticasone (FLONASE) 50 MCG/ACT nasal spray, INHALE 2 SPRAYS IN EACH NOSTRIL ONE TIME A DAY, Disp: , Rfl: 0    docusate sodium (COLACE) 100 MG capsule, Take 100 mg by mouth 2 times daily, Disp: , Rfl:     FREESTYLE LITE strip, TEST TWICE DAILY AS DIRECTED, Disp: 100 strip, Rfl: 1    NITROSTAT 0.4 MG SL tablet, , Disp: , Rfl:     Family History   Problem Relation Age of Onset    Heart Failure Father     Cancer Mother      breast    Cancer Maternal Grandfather        Social History     Social History    Marital status:       Spouse name: N/A    Number of children: N/A    Years of education: N/A     Occupational History    retired [employer]      Social History Main Topics    Smoking status: Former Smoker     Packs/day: 1.00     Years: 30.00     Types: Cigarettes     Quit date: 2/2/2002    Smokeless tobacco: Never Used    Alcohol use Yes      Comment: rare    Drug use: No    Sexual activity: Not on file     Other Topics Concern    Not on file     Social History Narrative    No narrative on file       Review of Systems:  Review of Systems   Constitution: Positive for weakness. HENT: Negative. Eyes: Negative. Cardiovascular: Negative. Respiratory: Negative. Endocrine:        Blood sugars under control   Skin: Negative. Musculoskeletal: Positive for back pain and joint pain. Gastrointestinal: Positive for constipation. Genitourinary: Negative. Neurological: Positive for loss of balance and numbness. Psychiatric/Behavioral: Negative. Physical Exam:  /70   Pulse 70   Temp 97.7 °F (36.5 °C) (Oral)   Resp 16   Ht 6' (1.829 m)   Wt 226 lb (102.5 kg)   SpO2 97%   BMI 30.65 kg/m²     Physical Exam   Constitutional: He is well-developed, well-nourished, and in no distress. HENT:   Head: Normocephalic. Neck: Normal range of motion. Neck supple. Pulmonary/Chest: Effort normal.   Musculoskeletal:        Left knee: He exhibits decreased range of motion and deformity. Tenderness found. Cervical back: He exhibits tenderness. Lumbar back: He exhibits decreased range of motion and tenderness. Neurological:   Reflex Scores:       Patellar reflexes are 1+ on the right side and 0 on the left side. Achilles reflexes are 1+ on the right side and 1+ on the left side. Ambulates with a cnae   Skin: Skin is warm, dry and intact.         Chronic discoloration lower extremities   Psychiatric: Affect and judgment normal.       Record/Diagnostics Review:    As above, I did review the imaging  10/7/2017 10:22 AM - Parvin Carpenter Incoming Lab Results From [facility]     Component Results     Component Value Ref Range & Units Status Collected Lab   [facility]   [place] [address] [address], [place] [address] AT THIS AM    Creatinine, Ur 172.3  20.0 - 400.0 mg/dL Final 10/03/2017  9:45 AM [facility]   [facility] Drug Panel, Hi Res, Ur See Below   Final 10/03/2017  9:45 AM [facility]   (NOTE)   Methodology: Qualitative Enzyme Immunoassay and Qualitative Liquid   Chromatography-Time of Flight-Mass Spectrometry or Tandem Mass   Spectrometry, Quantitative Spectrophotometry   The absence of expected drug(s) and/or drug metabolite(s) may   indicate non-compliance, inappropriate timing of specimen   collection relative to drug administration, poor drug absorption,   diluted/adulterated urine, or limitations of testing. The   concentration must be greater than or equal to the cutoff to be   reported as present.  If specific drug concentrations are   required, contact the laboratory within two weeks of specimen   collection to request quantification by a second analytical   technique. Interpretive questions should be directed to the   laboratory. Results based on immunoassay detection that do not match clinical   expectations should be   interpreted with caution. Confirmatory testing by mass   spectrometry for immunoassay-based results is available, if   ordered within two weeks of specimen collection. Additional   charges apply. For medical purposes only; not valid for forensic use. This test was developed and its performance characteristics   determined by [facility]. The U.S. Food and Drug   Administration has not approved or cleared this test; however, FDA   clearance or approval is not currently required for clinical use. The results are not intended to be used as the sole means for   clinical diagnosis or patient management decisions. EER Hi Res Interp Ur See Note   Final 10/03/2017  9:45 AM [facility]   (NOTE)   Access [facility] Enhanced Report using either link below:   -Direct access: [email or web address]   -Enter Username, Password: 4 weeks

## 2017-11-27 ENCOUNTER — OFFICE VISIT (OUTPATIENT)
Dept: FAMILY MEDICINE CLINIC | Age: 72
End: 2017-11-27
Payer: MEDICARE

## 2017-11-27 VITALS
SYSTOLIC BLOOD PRESSURE: 108 MMHG | OXYGEN SATURATION: 98 % | TEMPERATURE: 97.6 F | WEIGHT: 226.6 LBS | HEIGHT: 72 IN | BODY MASS INDEX: 30.69 KG/M2 | HEART RATE: 61 BPM | DIASTOLIC BLOOD PRESSURE: 68 MMHG

## 2017-11-27 DIAGNOSIS — E78.5 HYPERLIPIDEMIA, UNSPECIFIED HYPERLIPIDEMIA TYPE: ICD-10-CM

## 2017-11-27 DIAGNOSIS — Z79.4 TYPE 2 DIABETES MELLITUS WITH DIABETIC POLYNEUROPATHY, WITH LONG-TERM CURRENT USE OF INSULIN (HCC): Primary | ICD-10-CM

## 2017-11-27 DIAGNOSIS — I25.810 CORONARY ARTERY DISEASE INVOLVING CORONARY BYPASS GRAFT OF NATIVE HEART WITHOUT ANGINA PECTORIS: ICD-10-CM

## 2017-11-27 DIAGNOSIS — E11.42 TYPE 2 DIABETES MELLITUS WITH DIABETIC POLYNEUROPATHY, WITH LONG-TERM CURRENT USE OF INSULIN (HCC): Primary | ICD-10-CM

## 2017-11-27 DIAGNOSIS — Z23 NEED FOR INFLUENZA VACCINATION: ICD-10-CM

## 2017-11-27 LAB — HBA1C MFR BLD: 6.5 %

## 2017-11-27 PROCEDURE — G8417 CALC BMI ABV UP PARAM F/U: HCPCS | Performed by: FAMILY MEDICINE

## 2017-11-27 PROCEDURE — 90686 IIV4 VACC NO PRSV 0.5 ML IM: CPT | Performed by: FAMILY MEDICINE

## 2017-11-27 PROCEDURE — 83036 HEMOGLOBIN GLYCOSYLATED A1C: CPT | Performed by: FAMILY MEDICINE

## 2017-11-27 PROCEDURE — 3044F HG A1C LEVEL LT 7.0%: CPT | Performed by: FAMILY MEDICINE

## 2017-11-27 PROCEDURE — 99214 OFFICE O/P EST MOD 30 MIN: CPT | Performed by: FAMILY MEDICINE

## 2017-11-27 PROCEDURE — 1123F ACP DISCUSS/DSCN MKR DOCD: CPT | Performed by: FAMILY MEDICINE

## 2017-11-27 PROCEDURE — G8484 FLU IMMUNIZE NO ADMIN: HCPCS | Performed by: FAMILY MEDICINE

## 2017-11-27 PROCEDURE — G0008 ADMIN INFLUENZA VIRUS VAC: HCPCS | Performed by: FAMILY MEDICINE

## 2017-11-27 PROCEDURE — 1036F TOBACCO NON-USER: CPT | Performed by: FAMILY MEDICINE

## 2017-11-27 PROCEDURE — G8427 DOCREV CUR MEDS BY ELIG CLIN: HCPCS | Performed by: FAMILY MEDICINE

## 2017-11-27 PROCEDURE — 3017F COLORECTAL CA SCREEN DOC REV: CPT | Performed by: FAMILY MEDICINE

## 2017-11-27 PROCEDURE — 4040F PNEUMOC VAC/ADMIN/RCVD: CPT | Performed by: FAMILY MEDICINE

## 2017-11-27 PROCEDURE — G8598 ASA/ANTIPLAT THER USED: HCPCS | Performed by: FAMILY MEDICINE

## 2017-11-27 ASSESSMENT — ENCOUNTER SYMPTOMS
CONSTIPATION: 0
SHORTNESS OF BREATH: 0
NAUSEA: 0
SORE THROAT: 0
ABDOMINAL PAIN: 0
BACK PAIN: 1
DIARRHEA: 0
COUGH: 0

## 2017-11-29 ENCOUNTER — PROCEDURE VISIT (OUTPATIENT)
Dept: PODIATRY | Age: 72
End: 2017-11-29
Payer: MEDICARE

## 2017-11-29 VITALS
HEIGHT: 71 IN | HEART RATE: 63 BPM | WEIGHT: 221 LBS | BODY MASS INDEX: 30.94 KG/M2 | SYSTOLIC BLOOD PRESSURE: 132 MMHG | DIASTOLIC BLOOD PRESSURE: 76 MMHG

## 2017-11-29 DIAGNOSIS — Z79.4 TYPE 2 DIABETES MELLITUS WITH DIABETIC POLYNEUROPATHY, WITH LONG-TERM CURRENT USE OF INSULIN (HCC): ICD-10-CM

## 2017-11-29 DIAGNOSIS — M14.672 CHARCOT'S JOINT OF LEFT FOOT: ICD-10-CM

## 2017-11-29 DIAGNOSIS — E11.42 TYPE 2 DIABETES MELLITUS WITH DIABETIC POLYNEUROPATHY, WITH LONG-TERM CURRENT USE OF INSULIN (HCC): ICD-10-CM

## 2017-11-29 DIAGNOSIS — B35.1 ONYCHOMYCOSIS: Primary | ICD-10-CM

## 2017-11-29 PROCEDURE — 11721 DEBRIDE NAIL 6 OR MORE: CPT | Performed by: PODIATRIST

## 2017-11-29 ASSESSMENT — ENCOUNTER SYMPTOMS
DIARRHEA: 0
CONSTIPATION: 0
NAUSEA: 0
COLOR CHANGE: 0
VOMITING: 0

## 2017-11-29 NOTE — PROGRESS NOTES
[address] [facility]  Return Patient  Chief Complaint   Patient presents with    Nail Problem     b/l nail trim     Other     Last seen PCP 11/27/2017 Mazie Crigler MD        Christel Wylie is a 71y.o. year old male who is here for a diabetic foot check and nail trim. He would like his nails trimmed today. No new issues. History of surgery type: Plantar planing medial and lateral foot. Vital signs are stable. Pain level is 0. Patient denies N/V/F/C. Patient was compliant with postoperative instructions. He is in a diabetic shoe        Review of Systems   Constitutional: Negative for chills, diaphoresis, fatigue, fever and unexpected weight change. Cardiovascular: Negative for leg swelling. Gastrointestinal: Negative for constipation, diarrhea, nausea and vomiting. Musculoskeletal: Positive for gait problem. Negative for arthralgias and joint swelling. Skin: Negative for color change, pallor, rash and wound. Neurological: Positive for numbness. Negative for weakness. Vascular: DP and PT pulses palpable 2/4, Right Foot and 2/4 on the Left Foot. CFT <3 seconds, Right Foot and <3 seconds on the Left Foot. Edema is absent,  Right Foot and minimal on the Left Foot. Neurological:   Sensation absent  to light touch to level of digits, both feet. Musculoskeletal:   Muscle strength is 5/5 on the Right Foot and 5/5 on the Left Foot. Structural deformities are present on the Right Foot and present on the Left Foot, but improved  Flat medial arch left foot. Integument:  Warm, dry, supple both feet. Infection is absent. No odor. No macerated.      Sites of Onychomycosis Involvement (Check affected area)  [x] [x] [x] [x] [x] [x] [x] [x] [x] [x]  5 4 3 2 1 1 2 3 4 5                          Right                                        Left    Thickness  [x] [x] [x] [x] [x] [x] [x] [x] [x] [x]  5 4 3 2 1 1 2 3 4 5                         Right

## 2017-12-01 RX ORDER — INSULIN GLARGINE 100 [IU]/ML
INJECTION, SOLUTION SUBCUTANEOUS
Qty: 15 ML | Refills: 2 | Status: SHIPPED | OUTPATIENT
Start: 2017-12-01 | End: 2018-02-05 | Stop reason: SDUPTHER

## 2017-12-01 RX ORDER — FUROSEMIDE 40 MG/1
TABLET ORAL
Qty: 60 TABLET | Refills: 2 | Status: SHIPPED | OUTPATIENT
Start: 2017-12-01 | End: 2018-02-16 | Stop reason: SDUPTHER

## 2017-12-05 ENCOUNTER — HOSPITAL ENCOUNTER (OUTPATIENT)
Dept: PAIN MANAGEMENT | Age: 72
Discharge: HOME | End: 2017-12-05
Payer: MEDICARE

## 2017-12-05 VITALS
OXYGEN SATURATION: 98 % | TEMPERATURE: 97.5 F | HEIGHT: 71 IN | HEART RATE: 66 BPM | WEIGHT: 221 LBS | RESPIRATION RATE: 16 BRPM | BODY MASS INDEX: 30.94 KG/M2

## 2017-12-05 DIAGNOSIS — Z51.81 MEDICATION MONITORING ENCOUNTER: ICD-10-CM

## 2017-12-05 DIAGNOSIS — K59.03 DRUG-INDUCED CONSTIPATION: ICD-10-CM

## 2017-12-05 DIAGNOSIS — M47.26 OSTEOARTHRITIS OF SPINE WITH RADICULOPATHY, LUMBAR REGION: Primary | ICD-10-CM

## 2017-12-05 DIAGNOSIS — M47.816 SPONDYLOSIS OF LUMBAR REGION WITHOUT MYELOPATHY OR RADICULOPATHY: ICD-10-CM

## 2017-12-05 DIAGNOSIS — E11.610 CHARCOT FOOT DUE TO DIABETES MELLITUS (HCC): ICD-10-CM

## 2017-12-05 DIAGNOSIS — M00.862 ARTHRITIS OF LEFT KNEE DUE TO OTHER BACTERIA (HCC): ICD-10-CM

## 2017-12-05 DIAGNOSIS — E08.41 DIABETIC MONONEUROPATHY ASSOCIATED WITH DIABETES MELLITUS DUE TO UNDERLYING CONDITION (HCC): ICD-10-CM

## 2017-12-05 DIAGNOSIS — M47.899 FACET SYNDROME: ICD-10-CM

## 2017-12-05 PROCEDURE — 99213 OFFICE O/P EST LOW 20 MIN: CPT | Performed by: NURSE PRACTITIONER

## 2017-12-05 PROCEDURE — 99213 OFFICE O/P EST LOW 20 MIN: CPT

## 2017-12-05 RX ORDER — OXYCODONE HYDROCHLORIDE AND ACETAMINOPHEN 5; 325 MG/1; MG/1
1 TABLET ORAL EVERY 6 HOURS PRN
Qty: 90 TABLET | Refills: 0 | Status: SHIPPED | OUTPATIENT
Start: 2017-12-10 | End: 2018-03-01 | Stop reason: SDUPTHER

## 2017-12-05 RX ORDER — FENTANYL 25 UG/H
1 PATCH TRANSDERMAL
Qty: 10 PATCH | Refills: 0 | Status: SHIPPED | OUTPATIENT
Start: 2018-01-04 | End: 2018-01-08 | Stop reason: SDUPTHER

## 2017-12-05 RX ORDER — TOPIRAMATE 50 MG/1
50 TABLET, FILM COATED ORAL 2 TIMES DAILY
Qty: 60 TABLET | Refills: 3 | Status: SHIPPED | OUTPATIENT
Start: 2017-12-12 | End: 2018-04-05 | Stop reason: SDUPTHER

## 2017-12-05 RX ORDER — FENTANYL 25 UG/H
1 PATCH TRANSDERMAL
Qty: 10 PATCH | Refills: 0 | Status: SHIPPED | OUTPATIENT
Start: 2017-12-05 | End: 2017-12-05 | Stop reason: SDUPTHER

## 2017-12-05 ASSESSMENT — ENCOUNTER SYMPTOMS
DIARRHEA: 1
BACK PAIN: 1
EYES NEGATIVE: 1

## 2017-12-05 NOTE — PROGRESS NOTES
[facility]  Progress  Note    Patient is here today to review medication contract. Chief Complaint: low back         HPI:   Pt reports low back pain for over 20 years after a compression fracture he suffered after pulling a post out of the ground. He also has multiple joint pain. He also suffers from diabetic neuropathy with bilat Charcot's foot. He has had foot surgery in the past, but no surgery for his back pain. He has tried lumber epidurals with little noted relief. He  Pain is unchanged. No ED visits. Sleep is good. He takes trazodone for sleep, he sleeps 6-7 hours a night.     Back Pain   This is a chronic problem. The current episode started more than 1 year ago. The problem occurs constantly. Quality: sharp. Radiates to: down legs. The pain is at a severity of 6/10. The pain is moderate. The pain is the same all the time. Exacerbated by: nothing. Stiffness is present all day. Associated symptoms include leg pain, numbness and weakness. Risk factors include history of osteoporosis and sedentary lifestyle. He has tried analgesics and home exercises for the symptoms. The treatment provided mild relief. Possible side effects, risk of tolerance and or dependence and alternative treatments discussed    Obtaining appropriate analgesic effect of treatment   No signs of potential drug abuse or diversion identified    Treatment goals:  Functional status:  Walk farther      Aberrancy  none  Analgesia pain 6  Adverse  Effects : none, BM daily  ADL;s : activity, yard and housework,  Home exercises    Patient denies any new neurological symptoms. No bowel or bladder incontinence, no weakness, and no falling. Pill count: appropriate 89.35  Controlled Substances Monitoring:     Attestation: The Prescription Monitoring Report for this patient was reviewed today.  TACOS Rob)  Documentation: Obtaining appropriate analgesic effect of treatment., No signs of potential drug abuse or diversion the imaging  10/7/2017 10:22 AM - Jayden, Mhpn Incoming Lab Results From [facility]     Component Results     Component Value Ref Range & Units Status Collected Lab   Pain Management Drug Panel Interp, Urine Consistent   Final 10/03/2017  9:45 AM [facility]   (NOTE)   ________________________________________________________________   DRUGS EXPECTED:   FENTANYL [10/3/17]   PERCOCET (OXYCODONE) [10/3/17]   ________________________________________________________________   CONSISTENT with medications provided:   FENTANYL : based on fentanyl, norfentanyl   PERCOCET (OXYCODONE) : based on oxycodone, noroxycodone,   noroxymorphone, oxymorphone   ________________________________________________________________   Drugs Not Included in this Assay:   Acetaminophen   ________________________________________________________________   INTERPRETIVE INFORMATION: Pain Mgt Rogers, Mass Spec/EMIT, Ur,                            Interp   Interpretation depends on accuracy and completeness of patient   medication information submitted by client. 6-Acetylmorphine, Ur Not Detected   Final 10/03/2017  9:45 AM [facility]   7-Aminoclonazepam, Urine Not Detected   Final 10/03/2017  9:45 AM [facility]   Alpha-OH-Alpraz, Urine Not Detected   Final 10/03/2017  9:45 AM [facility]   Alprazolam, Urine Not Detected   Final 10/03/2017  9:45 AM [facility]   Amphetamines, urine Not Detected   Final 10/03/2017  9:45 AM [facility]   Barbiturates, Ur Not Detected   Final 10/03/2017  9:45 AM [facility]   Benzoylecgonine, Ur Not Detected   Final 10/03/2017  9:45 AM [facility]   Buprenorphine Urine Not Detected   Final 10/03/2017  9:45 AM [facility]   Carisoprodol, Ur Not Detected   Final 10/03/2017  9:45 AM [facility]   (NOTE)   The carisoprodol immunoassay has cross-reactivity to carisoprodol   and meprobamate.     Clonazepam, Urine Not Detected   Final 10/03/2017  9:45 AM [facility]   Codeine, Urine Not Detected   Final 10/03/2017  9:45 AM [facility]   MDA, Ur Not Detected   Final 10/03/2017  9:45 AM [facility]   Diazepam, Urine Not Urine Not Detected   Final 10/03/2017  9:45 AM [facility]   Zolpidem, Urine Not Detected   Final 10/03/2017  9:45 AM [facility]   Drugs Expected, Ur   Final 10/03/2017  9:45 AM [facility]- [address] [place]   [place] [address] WEARING PATCH, [place] [address] AT THIS AM    Creatinine, Ur 172.3  20.0 - 400.0 mg/dL Final 10/03/2017  9:45 AM [facility]   Pain Mgt Drug Panel, Hi Res, Ur See Below   Final 10/03/2017  9:45 AM [facility]   (NOTE)   Methodology: Qualitative Enzyme Immunoassay and Qualitative Liquid   Chromatography-Time of Flight-Mass Spectrometry or Tandem Mass   Spectrometry, Quantitative Spectrophotometry   The absence of expected drug(s) and/or drug metabolite(s) may   indicate non-compliance, inappropriate timing of specimen   collection relative to drug administration, poor drug absorption,   diluted/adulterated urine, or limitations of testing. The   concentration must be greater than or equal to the cutoff to be   reported as present.  If specific drug concentrations are   required, contact the laboratory within two weeks of specimen   collection to request quantification by a second analytical   technique. Interpretive questions should be directed to the   laboratory. Results based on immunoassay detection that do not match clinical   expectations should be   interpreted with caution. Confirmatory testing by mass   spectrometry for immunoassay-based results is available, if   ordered within two weeks of specimen collection. Additional   charges apply. For medical purposes only; not valid for forensic use. This test was developed and its performance characteristics   determined by official.fm. The U.S. Food and Drug   Administration has not approved or cleared this test; however, FDA   clearance or approval is not currently required for clinical use. The results are not intended to be used as the sole means for   clinical diagnosis or patient management decisions.     EER Hi Res Interp Ur See Note   Final 10/03/2017  9:45 AM [facility]   (NOTE)   Access [facility] Enhanced Report using either link below:   -Direct access: [email or web address]   -Enter Username, Password: [email or web address]   Username: [email or web address]   Password: [email or web address]   Performed by Cherise Carrillo [address] [place] [address] [phone]   www. Alley Sarabia MD, Lab. Director   Performed at [facility]: FLACO RGEEN [address] [facility]. [address] [phone]    Testing Performed By     [facility] Luke Russo Name Director Address Valid Date Range   [address]  MD Cherise Alvarez [address] [place] [address] & [address] [address] 08/31/17 [address] [place] [address] LAB Amee Diaz MD [address] [facility] [address] 08/30/17 6417-06/53/27 2021   Lab and Collection     DRUG SCREEN, PAIN on 10/3/2017   Result History     DRUG SCREEN, PAIN on 10/7/2017          Result Information     Status: Final result (Collected: 10/3/2017 09:45) Provider Status: Reviewed   Reviewed By Brad Pyle MD on 10/11/2017 10:06   Brenda Souza DPM on 10/9/2017 11:19   Romana Rode, APRN on 10/9/2017 07:59   Romana Rode, APRN on 10/9/2017 07:59   Kia Ackerman MD on 10/9/2017 07:43   Tabatha Araujo MD on 10/7/2017 18:46   Result Notes     Notes Recorded by Tabatha Araujo MD on 10/7/2017 at 6:46 PM EDT  Urine screen normal  Pain Management Drug Panel Interp, Urine  Consistent  ConsistentCM   Comments: (NOTE)   ________________________________________________________________   DRUGS EXPECTED:   FENTANYL [10/3/17]   PERCOCET (OXYCODONE) [10/3/17]   ________________________________________________________________   CONSISTENT with medications provided:   FENTANYL : based on fentanyl, norfentanyl   PERCOCET (OXYCODONE) : based on oxycodone, noroxycodone,   noroxymorphone, oxymorphone   ________________________________________________________________ Drugs Not Included in this Assay:     Electronically signed by Hank Beaulieu MD on 10/7/2017 at 6:46 PM          Routing History     Priority Sent On From To Message Type    10/7/2017 10:22 AM Jayden, Mhpn Incoming Lab Results From [address] [place] [address], DPM CC'd Results    10/7/2017 10:22 AM Jayden, Mhpn Incoming Lab Results From [facility], APRN Results    10/7/2017 10:22 AM Jayden, Mhpn Incoming Lab Results From [facility] JULIO & MD NERY CC'd Results    10/7/2017 10:22 AM Jayden, Mhpn Incoming Lab Results From Ron Colvin MD CC'd Results    10/7/2017 10:22 AM Jayden, Mhpn Incoming Lab Results From Jeannette Farfan MD [address] [place] [address] Results   Click to Print Result   View 40 MALIK[address] Quigley (Order #[id number]) on 10/3/17       Assessment:  Problem List Items Addressed This Visit     Osteoarthritis of spine with radiculopathy, lumbar region - Primary    Relevant Medications    fentaNYL (DURAGESIC) 25 MCG/HR    topiramate (TOPAMAX) 50 MG tablet (Start on 12/12/2017)    oxyCODONE-acetaminophen (PERCOCET) 5-325 MG per tablet (Start on 12/10/2017)    Facet syndrome    Relevant Medications    fentaNYL (DURAGESIC) 25 MCG/HR    oxyCODONE-acetaminophen (PERCOCET) 5-325 MG per tablet (Start on 12/10/2017)    Medication monitoring encounter    Spondylosis of lumbar region without myelopathy or radiculopathy    Relevant Medications    fentaNYL (DURAGESIC) 25 MCG/HR    oxyCODONE-acetaminophen (PERCOCET) 5-325 MG per tablet (Start on 12/10/2017)    Arthritis, septic ([address]    Relevant Medications    fentaNYL (DURAGESIC) 25 MCG/HR    oxyCODONE-acetaminophen (PERCOCET) 5-325 MG per tablet (Start on 12/10/2017)    Charcot foot due to diabetes mellitus ([address]    Relevant Medications    fentaNYL (DURAGESIC) 25 MCG/HR    oxyCODONE-acetaminophen (PERCOCET) 5-325 MG per tablet (Start on 12/10/2017)    Diabetic mononeuropathy associated with diabetes mellitus due to underlying condition ([facility]    Relevant Medications    topiramate (TOPAMAX) 50 MG tablet (Start on 12/12/2017)    Drug-induced constipation      Other Visit Diagnoses    None. Treatment Plan:  DISCUSSION: Treatment options discussed with patient and all questions answered to patient's satisfaction. TREATMENT OPTIONS:   Return in 4 weeks  [facility] handout  Prescription  opioids  Continue opioid therapy. Script written for percocet, topamax, fentnayl patch  x2  Contract requirements met. Satisfactory pain management plan. Medication helps with personal goals and self care needs. Patient is stable on current regimen of meds and medication is effectively managing pain, we will continue current medications without changes. As always, we encourage daily stretching and strengthening exercises, and recommend minimizing use of pain medications unless patient cannot get through daily activities due to pain.   Follow up appointment made for 4 weeks

## 2017-12-22 RX ORDER — SIMVASTATIN 20 MG
TABLET ORAL
Qty: 30 TABLET | Refills: 2 | Status: SHIPPED | OUTPATIENT
Start: 2017-12-22 | End: 2018-03-18 | Stop reason: SDUPTHER

## 2018-01-04 RX ORDER — OMEPRAZOLE 20 MG/1
CAPSULE, DELAYED RELEASE ORAL
Qty: 30 CAPSULE | Refills: 2 | Status: SHIPPED | OUTPATIENT
Start: 2018-01-04 | End: 2018-04-04 | Stop reason: SDUPTHER

## 2018-01-06 ENCOUNTER — HOSPITAL ENCOUNTER (OUTPATIENT)
Age: 73
Discharge: HOME | End: 2018-01-06
Payer: MEDICARE

## 2018-01-06 LAB — PROSTATE SPECIFIC ANTIGEN: 1.11 UG/L

## 2018-01-06 PROCEDURE — 36415 COLL VENOUS BLD VENIPUNCTURE: CPT

## 2018-01-06 PROCEDURE — 84153 ASSAY OF PSA TOTAL: CPT

## 2018-01-08 ENCOUNTER — HOSPITAL ENCOUNTER (OUTPATIENT)
Dept: PAIN MANAGEMENT | Age: 73
Discharge: HOME | End: 2018-01-08
Payer: MEDICARE

## 2018-01-08 VITALS
TEMPERATURE: 98.4 F | RESPIRATION RATE: 16 BRPM | BODY MASS INDEX: 29.54 KG/M2 | SYSTOLIC BLOOD PRESSURE: 121 MMHG | OXYGEN SATURATION: 98 % | WEIGHT: 211 LBS | DIASTOLIC BLOOD PRESSURE: 70 MMHG | HEIGHT: 71 IN | HEART RATE: 84 BPM

## 2018-01-08 DIAGNOSIS — M47.26 OSTEOARTHRITIS OF SPINE WITH RADICULOPATHY, LUMBAR REGION: Primary | ICD-10-CM

## 2018-01-08 DIAGNOSIS — E08.41 DIABETIC MONONEUROPATHY ASSOCIATED WITH DIABETES MELLITUS DUE TO UNDERLYING CONDITION (HCC): ICD-10-CM

## 2018-01-08 DIAGNOSIS — M00.862 ARTHRITIS OF LEFT KNEE DUE TO OTHER BACTERIA (HCC): ICD-10-CM

## 2018-01-08 DIAGNOSIS — M47.899 FACET SYNDROME: ICD-10-CM

## 2018-01-08 DIAGNOSIS — E11.610 CHARCOT FOOT DUE TO DIABETES MELLITUS (HCC): ICD-10-CM

## 2018-01-08 DIAGNOSIS — M47.816 SPONDYLOSIS OF LUMBAR REGION WITHOUT MYELOPATHY OR RADICULOPATHY: ICD-10-CM

## 2018-01-08 DIAGNOSIS — K59.03 DRUG-INDUCED CONSTIPATION: ICD-10-CM

## 2018-01-08 DIAGNOSIS — Z51.81 MEDICATION MONITORING ENCOUNTER: ICD-10-CM

## 2018-01-08 DIAGNOSIS — M50.30 DDD (DEGENERATIVE DISC DISEASE), CERVICAL: ICD-10-CM

## 2018-01-08 PROCEDURE — 99213 OFFICE O/P EST LOW 20 MIN: CPT

## 2018-01-08 PROCEDURE — 99213 OFFICE O/P EST LOW 20 MIN: CPT | Performed by: NURSE PRACTITIONER

## 2018-01-08 RX ORDER — FENTANYL 25 UG/H
1 PATCH TRANSDERMAL
Qty: 10 PATCH | Refills: 0 | Status: SHIPPED | OUTPATIENT
Start: 2018-02-04 | End: 2018-03-01 | Stop reason: SDUPTHER

## 2018-01-08 ASSESSMENT — ENCOUNTER SYMPTOMS
EYES NEGATIVE: 1
GASTROINTESTINAL NEGATIVE: 1
RESPIRATORY NEGATIVE: 1

## 2018-01-08 NOTE — PROGRESS NOTES
GASTROINTESTINAL ENDOSCOPY  11-3-15    VENA CAVA FILTER PLACEMENT      WRIST SURGERY      I&D       Allergies   Allergen Reactions    Flagyl [Metronidazole] Hives         Current Outpatient Prescriptions:     [START ON 2/4/2018] fentaNYL (DURAGESIC) 25 MCG/HR, Place 1 patch onto the skin every 72 hours for 30 days. Earliest Fill Date: 2/4/18, Disp: 10 patch, Rfl: 0    omeprazole (PRILOSEC) 20 MG delayed release capsule, TAKE 1 CAPSULE BY MOUTH ONE TIME A DAY , Disp: 30 capsule, Rfl: 2    simvastatin (ZOCOR) 20 MG tablet, TAKE 1 TABLET BY MOUTH IN THE EVENING , Disp: 30 tablet, Rfl: 2    topiramate (TOPAMAX) 50 MG tablet, Take 1 tablet by mouth 2 times daily, Disp: 60 tablet, Rfl: 3    oxyCODONE-acetaminophen (PERCOCET) 5-325 MG per tablet, Take 1 tablet by mouth every 6 hours as needed for Pain . Earliest Fill Date: 12/10/17, Disp: 90 tablet, Rfl: 0    LANTUS SOLOSTAR 100 UNIT/ML injection pen, INJECT 30 UNITS SUBCUTANEOUSLY EVERY MORNING AND 40 UNITS EVERY EVENING , Disp: 15 mL, Rfl: 2    furosemide (LASIX) 40 MG tablet, TAKE 1 TABLET BY MOUTH TWO TIMES A DAY , Disp: 60 tablet, Rfl: 2    docusate sodium (COLACE) 100 MG capsule, Take 100 mg by mouth 2 times daily, Disp: , Rfl:     gabapentin (NEURONTIN) 800 MG tablet, Take 1 tablet by mouth daily, Disp: 90 tablet, Rfl: 3    metoprolol succinate (TOPROL XL) 25 MG extended release tablet, , Disp: , Rfl:     aspirin 81 MG tablet, Take 81 mg by mouth daily. , Disp: , Rfl:     Vitamin D (CHOLECALCIFEROL) 1000 UNITS CAPS capsule, Take 1,000 Units by mouth daily. , Disp: , Rfl:     Ascorbic Acid (VITAMIN C) 500 MG tablet, Take 1,000 mg by mouth 2 times daily , Disp: , Rfl:     finasteride (PROSCAR) 5 MG tablet, Take 5 mg by mouth daily.   , Disp: , Rfl:     traZODone (DESYREL) 50 MG tablet, TAKE 1 TABLET BY MOUTH IN THE EVENING , Disp: 30 tablet, Rfl: 2    BD PEN NEEDLE ALIZE U/F 32G X 4 MM MISC, use twice daily as directed, Disp: 100 each, Rfl: 2   place, and time. A sensory deficit is present. Gait abnormal.   Reflex Scores:       Patellar reflexes are 0 on the right side and 1+ on the left side. Achilles reflexes are 1+ on the right side and 1+ on the left side. Sensory deficit lower extremitiies       Record/Diagnostics Review:    As above, I did review the imaging  10/7/2017 10:22 AM - Jayden, Mhpn Incoming Lab Results From [facility]     Component Results     Component Value Ref Range & Units Status Collected Lab   Pain Management Drug Panel Interp, Urine Consistent   Final 10/03/2017  9:45 AM [facility]   (NOTE)   ________________________________________________________________   DRUGS EXPECTED:   FENTANYL [10/3/17]   PERCOCET (OXYCODONE) [10/3/17]   ________________________________________________________________   CONSISTENT with medications provided:   FENTANYL : based on fentanyl, norfentanyl   PERCOCET (OXYCODONE) : based on oxycodone, noroxycodone,   noroxymorphone, oxymorphone   ________________________________________________________________   Drugs Not Included in this Assay:   Acetaminophen   ________________________________________________________________   INTERPRETIVE INFORMATION: Pain Mgt Rogers, Mass Spec/EMIT, Ur,                            Interp   Interpretation depends on accuracy and completeness of patient   medication information submitted by client.     6-Acetylmorphine, Ur Not Detected   Final 10/03/2017  9:45 AM [facility]   7-Aminoclonazepam, Urine Not Detected   Final 10/03/2017  9:45 AM [facility]   Alpha-OH-Alpraz, Urine Not Detected   Final 10/03/2017  9:45 AM [facility]   Alprazolam, Urine Not Detected   Final 10/03/2017  9:45 AM [facility]   Amphetamines, urine Not Detected   Final 10/03/2017  9:45 AM [facility]   Barbiturates, Ur Not Detected   Final 10/03/2017  9:45 AM [facility]   Benzoylecgonine, Ur Not Detected   Final 10/03/2017  9:45 AM [facility]   Buprenorphine Urine Not Detected   Final 10/03/2017  9:45 AM [facility]   Carisoprodol, Ur Not Detected   Final 10/03/2017 Arthritis, septic ([facility]    Relevant Medications    fentaNYL (DURAGESIC) 25 MCG/HR (Start on 2/4/2018)    Charcot foot due to diabetes mellitus ([address]    Relevant Medications    fentaNYL (DURAGESIC) 25 MCG/HR (Start on 2/4/2018)    Diabetic mononeuropathy associated with diabetes mellitus due to underlying condition ([facility]    Relevant Medications    fentaNYL (DURAGESIC) 25 MCG/HR (Start on 2/4/2018)    Drug-induced constipation    Relevant Medications    fentaNYL (DURAGESIC) 25 MCG/HR (Start on 2/4/2018)      Other Visit Diagnoses    None. Treatment Plan:  DISCUSSION: Treatment options discussed with patient and all questions answered to patient's satisfaction. TREATMENT OPTIONS:   Return in 4 weeks  Continue opioid therapy. Script written for fentanyl patch  Contract requirements met. Satisfactory pain management plan. Medication helps with personal goals and self care needs. Patient is stable on current regimen of meds and medication is effectively managing pain, we will continue current medications without changes. As always, we encourage daily stretching and strengthening exercises, and recommend minimizing use of pain medications unless patient cannot get through daily activities due to pain.   Follow up appointment made for 4 weeks

## 2018-02-06 ENCOUNTER — PROCEDURE VISIT (OUTPATIENT)
Dept: PODIATRY | Age: 73
End: 2018-02-06
Payer: MEDICARE

## 2018-02-06 VITALS
WEIGHT: 211 LBS | HEIGHT: 72 IN | DIASTOLIC BLOOD PRESSURE: 71 MMHG | HEART RATE: 65 BPM | SYSTOLIC BLOOD PRESSURE: 114 MMHG | BODY MASS INDEX: 28.58 KG/M2

## 2018-02-06 DIAGNOSIS — M14.672 CHARCOT'S JOINT OF LEFT FOOT: ICD-10-CM

## 2018-02-06 DIAGNOSIS — Z79.4 TYPE 2 DIABETES MELLITUS WITH DIABETIC POLYNEUROPATHY, WITH LONG-TERM CURRENT USE OF INSULIN (HCC): ICD-10-CM

## 2018-02-06 DIAGNOSIS — B35.1 ONYCHOMYCOSIS: Primary | ICD-10-CM

## 2018-02-06 DIAGNOSIS — E11.42 TYPE 2 DIABETES MELLITUS WITH DIABETIC POLYNEUROPATHY, WITH LONG-TERM CURRENT USE OF INSULIN (HCC): ICD-10-CM

## 2018-02-06 PROCEDURE — 11721 DEBRIDE NAIL 6 OR MORE: CPT | Performed by: PODIATRIST

## 2018-02-06 RX ORDER — INSULIN GLARGINE 100 [IU]/ML
INJECTION, SOLUTION SUBCUTANEOUS
Qty: 5 PEN | Refills: 1 | Status: SHIPPED | OUTPATIENT
Start: 2018-02-06 | End: 2018-03-20 | Stop reason: SDUPTHER

## 2018-02-06 ASSESSMENT — ENCOUNTER SYMPTOMS
DIARRHEA: 0
CONSTIPATION: 0
VOMITING: 0
COLOR CHANGE: 0
NAUSEA: 0

## 2018-02-06 NOTE — PROGRESS NOTES
Left    Dystrophic Changes                                                                 [x] [x] [x] [x] [x] [x] [x] [x] [x] [x]  5 4 3 2 1 1 2 3 4 5                         Right                                        Left    Color                                                                  [x] [x] [x] [x] [x] [x] [x] [x] [x] [x]  5 4 3 2 1 1 2 3 4 5                          Right                                        Left    Incurvation/Ingrowin                                                                   [] [] [] [] [] [] [] [] [] []  5 4 3 2 1 1 2 3 4 5                         Right                                        Left    Inflammation/Pain                                                                   [] [] [] [] [] [] [] [] [] []  5 4 3 2 1 1 2 3 4 5                         Right                                        Left      Radiographs from last visit: 3 views left Foot:  Noted reduction of the plantar prominence at the medial longitudinal arch and the lateral arch. Assesment :   1. Onychomycosis  NV DEBRIDEMENT OF NAILS, 6 OR MORE   2. Type 2 diabetes mellitus with diabetic polyneuropathy, with long-term current use of insulin ([facility])  NV DEBRIDEMENT OF NAILS, 6 OR MORE     DIABETES FOOT EXAM   3. Charcot's joint of left foot   DIABETES FOOT EXAM         Plan: Pt was evaluated and examined. Patient was given personalized discharge instructions. Nails 1-10 were debrided sharply in length and thickness with a nipper and , without incident. Pt will follow up in 9 weeks or sooner if any problems arise. Diagnosis was discussed with the pt and all of their questions were answered in detail. Proper foot hygiene and care was discussed with the pt. Informed patient on proper diabetic foot care and importance of tight glycemic control. Patient to check feet daily and contact the office with any questions/problems/concerns.   Other comorbidity noted and will be managed by

## 2018-02-19 RX ORDER — FUROSEMIDE 40 MG/1
TABLET ORAL
Qty: 60 TABLET | Refills: 2 | Status: SHIPPED | OUTPATIENT
Start: 2018-02-19 | End: 2018-05-19 | Stop reason: SDUPTHER

## 2018-03-01 ENCOUNTER — TELEPHONE (OUTPATIENT)
Dept: PAIN MANAGEMENT | Age: 73
End: 2018-03-01

## 2018-03-01 ENCOUNTER — HOSPITAL ENCOUNTER (OUTPATIENT)
Dept: PAIN MANAGEMENT | Age: 73
Discharge: HOME | End: 2018-03-01
Payer: MEDICARE

## 2018-03-01 VITALS
DIASTOLIC BLOOD PRESSURE: 77 MMHG | HEIGHT: 72 IN | OXYGEN SATURATION: 98 % | WEIGHT: 211 LBS | TEMPERATURE: 98 F | HEART RATE: 63 BPM | BODY MASS INDEX: 28.58 KG/M2 | SYSTOLIC BLOOD PRESSURE: 146 MMHG | RESPIRATION RATE: 16 BRPM

## 2018-03-01 DIAGNOSIS — M47.816 SPONDYLOSIS OF LUMBAR REGION WITHOUT MYELOPATHY OR RADICULOPATHY: ICD-10-CM

## 2018-03-01 DIAGNOSIS — K59.03 DRUG-INDUCED CONSTIPATION: ICD-10-CM

## 2018-03-01 DIAGNOSIS — M50.30 DDD (DEGENERATIVE DISC DISEASE), CERVICAL: ICD-10-CM

## 2018-03-01 DIAGNOSIS — Z51.81 MEDICATION MONITORING ENCOUNTER: ICD-10-CM

## 2018-03-01 DIAGNOSIS — M47.899 FACET SYNDROME: ICD-10-CM

## 2018-03-01 DIAGNOSIS — G89.29 ENCOUNTER FOR CHRONIC PAIN MANAGEMENT: ICD-10-CM

## 2018-03-01 DIAGNOSIS — E11.610 CHARCOT FOOT DUE TO DIABETES MELLITUS (HCC): ICD-10-CM

## 2018-03-01 DIAGNOSIS — E08.41 DIABETIC MONONEUROPATHY ASSOCIATED WITH DIABETES MELLITUS DUE TO UNDERLYING CONDITION (HCC): ICD-10-CM

## 2018-03-01 DIAGNOSIS — M00.862 ARTHRITIS OF LEFT KNEE DUE TO OTHER BACTERIA (HCC): ICD-10-CM

## 2018-03-01 DIAGNOSIS — M47.26 OSTEOARTHRITIS OF SPINE WITH RADICULOPATHY, LUMBAR REGION: Primary | ICD-10-CM

## 2018-03-01 PROCEDURE — 99213 OFFICE O/P EST LOW 20 MIN: CPT

## 2018-03-01 PROCEDURE — 99213 OFFICE O/P EST LOW 20 MIN: CPT | Performed by: PAIN MEDICINE

## 2018-03-01 RX ORDER — GABAPENTIN 800 MG/1
800 TABLET ORAL DAILY
Qty: 90 TABLET | Refills: 3 | Status: SHIPPED | OUTPATIENT
Start: 2018-03-01 | End: 2019-03-04 | Stop reason: SDUPTHER

## 2018-03-01 RX ORDER — OXYCODONE HYDROCHLORIDE AND ACETAMINOPHEN 5; 325 MG/1; MG/1
1 TABLET ORAL EVERY 8 HOURS PRN
Qty: 90 TABLET | Refills: 0 | Status: SHIPPED | OUTPATIENT
Start: 2018-03-01 | End: 2018-03-31

## 2018-03-01 RX ORDER — FENTANYL 25 UG/H
1 PATCH TRANSDERMAL
Qty: 10 PATCH | Refills: 0 | Status: SHIPPED | OUTPATIENT
Start: 2018-03-08 | End: 2018-04-05 | Stop reason: SDUPTHER

## 2018-03-01 ASSESSMENT — ENCOUNTER SYMPTOMS
COUGH: 0
BLURRED VISION: 0
CONSTIPATION: 0
GASTROINTESTINAL NEGATIVE: 1
BACK PAIN: 1
HEARTBURN: 0
ABDOMINAL PAIN: 0
SORE THROAT: 0
SHORTNESS OF BREATH: 0
NAUSEA: 0
EYE PAIN: 0

## 2018-03-01 ASSESSMENT — PAIN SCALES - GENERAL: PAINLEVEL_OUTOF10: 6

## 2018-03-01 ASSESSMENT — PAIN DESCRIPTION - PROGRESSION: CLINICAL_PROGRESSION: GRADUALLY WORSENING

## 2018-03-01 ASSESSMENT — PAIN DESCRIPTION - ORIENTATION: ORIENTATION: RIGHT;LEFT

## 2018-03-01 ASSESSMENT — PAIN DESCRIPTION - DIRECTION: RADIATING_TOWARDS: BILATERAL LEGS

## 2018-03-01 ASSESSMENT — PAIN DESCRIPTION - FREQUENCY: FREQUENCY: CONTINUOUS

## 2018-03-01 ASSESSMENT — PAIN DESCRIPTION - LOCATION: LOCATION: BACK

## 2018-03-01 ASSESSMENT — PAIN DESCRIPTION - DESCRIPTORS: DESCRIPTORS: SHARP

## 2018-03-01 ASSESSMENT — PAIN DESCRIPTION - ONSET: ONSET: ON-GOING

## 2018-03-01 ASSESSMENT — PAIN DESCRIPTION - PAIN TYPE: TYPE: CHRONIC PAIN

## 2018-03-01 NOTE — PROGRESS NOTES
Constitutional: Negative for chills, fever, malaise/fatigue and weight loss. HENT: Negative for ear pain, hearing loss and sore throat. Eyes: Negative for blurred vision and pain. Respiratory: Negative for cough and shortness of breath. Cardiovascular: Negative for chest pain, palpitations and claudication. Gastrointestinal: Negative. Negative for abdominal pain, constipation, heartburn and nausea. Genitourinary: Negative. Negative for dysuria and hematuria. Musculoskeletal: Positive for back pain and myalgias. Negative for falls. Skin: Negative for rash. Neurological: Positive for tingling (feet) and numbness (diabetic neuropathy bilaterally below knees). Negative for tremors, sensory change, speech change, focal weakness, seizures and headaches. Endo/Heme/Allergies: Bruises/bleeds easily. Psychiatric/Behavioral: Negative for depression, substance abuse and suicidal ideas. The patient is not nervous/anxious. GENERAL PHYSICAL EXAM:  Vitals: BP (!) 146/77   Pulse 63   Temp 98 °F (36.7 °C) (Oral)   Resp 16   Ht 6' (1.829 m)   Wt 211 lb (95.7 kg)   SpO2 98%   BMI 28.62 kg/m² , Body mass index is 28.62 kg/m². Physical Exam   Constitutional: He is oriented to person, place, and time. He appears well-developed and well-nourished. HENT:   Head: Normocephalic and atraumatic. Eyes: Conjunctivae and EOM are normal. Pupils are equal, round, and reactive to light. Neck: Normal range of motion. Neck supple. No thyromegaly present. Cardiovascular: Normal rate and regular rhythm. Pulmonary/Chest: Effort normal. No respiratory distress. Abdominal: He exhibits no distension. Musculoskeletal: He exhibits no edema. Legs:  Neurological: He is alert and oriented to person, place, and time. He displays no atrophy and no tremor. A sensory deficit is present. No cranial nerve deficit. He exhibits normal muscle tone.  Gait normal.   Reflex Scores:       Patellar reflexes are 0 on the right side and 0 on the left side. Achilles reflexes are 0 on the right side and 0 on the left side. Decreased sensation to touch in his lower extremities bilaterally  Decreased muscular strength in the feet especially on the left side   Skin:        Psychiatric: He has a normal mood and affect. His speech is normal and behavior is normal. Judgment and thought content normal. Cognition and memory are normal.    Right Ankle Exam     Comments:  Has flat foot bilaterally. Severe arthritic changes noted in the feet. Mild swelling of the feet bilaterally. Palpation revealed the some allodynia and hyperesthesia. There is decreased sensation to light touch. Muscle strength is somewhat decreased bilaterally. No cyanosis noted      Right Knee Exam     Muscle Strength     The patient has normal right knee strength. Left Knee Exam     Muscle Strength     The patient has normal left knee strength. Right Hip Exam     Muscle Strength   The patient has normal right hip strength. Left Hip Exam     Muscle Strength   The patient has normal left hip strength. Back Exam     Tenderness   The patient is experiencing tenderness in the lumbar and sacroiliac. Range of Motion   Back extension: Limited and painful. Back flexion: Limited and painful. Back lateral bend right: Limited and painful. Back lateral bend left: Limited and painful. Back rotation right: Limited and painful. Back rotation left: Limited and painful.      Other   Toe Walk: abnormal  Heel Walk: abnormal  Sensation: normal  Gait: abnormal (ambulates with walker gait is somewhat unsteady and antalgic)   Erythema: no back redness  Scars: absent    Comments:    kyphosis present and scoliosis absent  Alignment of her shoulders, scapulae and iliac crests--symmetric  Paraspinal tenderness:Present  Lumbar lordosis-----------Decreased  Movements of the lumbar spine indicated above are diminished range with pain   Facet loading--- : Right side--    Pain-Moderate                                   Left side----   Pain  Moderate  Igor's test -------------- Right side---positive                                           Left side-----positive            Nurses Notes and Vital Signs reviewed. DATA  Labs:  Benzodiazepines   Date Value Ref Range Status   04/17/2013 NEGATIVE NEG Final     Comment:           (Positive cutoff 200 ng/mL)                 Benzodiazepine Screen, Urine   Date Value Ref Range Status   05/20/2014 NEGATIVE NEG Final     Comment:           (Positive cutoff 200 ng/mL)                    Imaging:  Radiology Images and Reports reviewed where indicated and necessary  COMPARISON:    12/27/2007       FINDINGS:   AP, crosstable lateral and cone-down lumbosacral views obtained.       There is multilevel facet arthropathy and anterior spurring. There is    redemonstration of mild to moderate wedge compression deformity of L1    vertebral body. No acute fracture is evident. No paraspinal mass. SI    joints show degenerative changes. Cholecystectomy noted.               IMPRESSION:       Old L1 compression. No acute osseous abnormality.       Report electronically signed by Criss Langley M.D. on 1/3/2015     IMPRESSION:          No evidence of acute traumatic injury involving the abdomen or pelvis.       Sclerotic foci involving the right fifth rib, which may represent    sequelae of chronic fractures though underlying lesion not excluded. Age-indeterminate nondisplaced fractures involving the right 6th-9th    ribs.  Few chronic appearing nondisplaced left rib fractures also    present. Correlation with patient history and presentation suggested.       Enlarged prostate gland again noted, measuring up to 5.7 cm.  Bladder wall    thickening, which may be related to chronic bladder outlet obstruction           Report electronically signed by Pancho Vasquez MD on 1/3/2015       Patient Active Problem List   Diagnosis    DDD (degenerative disc disease), lumbar    Osteoarthritis of spine with radiculopathy, lumbar region    Lumbar spinal stenosis    DDD (degenerative disc disease), cervical    Facet syndrome    Compression fracture    Tear of rotator cuff    Medication monitoring encounter    Spondylosis of lumbar region without myelopathy or radiculopathy    Coronary artery disease involving coronary bypass graft of native heart without angina pectoris    IBS (irritable bowel syndrome)    Arthritis, septic (HCC)    Charcot foot due to diabetes mellitus ([address]    Type 2 diabetes mellitus with diabetic polyneuropathy, with long-term current use of insulin (HCC)    Hyperlipidemia    Diabetic mononeuropathy associated with diabetes mellitus due to underlying condition ([address]    Drug-induced constipation        ASSESSMENT    Sonal Monroy is a 71 y.o. male with     1. Osteoarthritis of spine with radiculopathy, lumbar region    2. Diabetic mononeuropathy associated with diabetes mellitus due to underlying condition ([address]    3. Facet syndrome    4. Charcot foot due to diabetes mellitus ([address]    5. Medication monitoring encounter    6. Spondylosis of lumbar region without myelopathy or radiculopathy    7. Drug-induced constipation    8. DDD (degenerative disc disease), cervical    9. 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 Encounter for chronic pain management           PLAN    We will continue current pain medications  Current medications are being tolerated without any Adverse side effects. Orders Placed This Encounter   Medications    fentaNYL (DURAGESIC) 25 MCG/HR     Sig: Place 1 patch onto the skin every 72 hours for 30 days. Earliest Fill Date: 3/8/18     Dispense:  10 patch     Refill:  0     Fill 2-4-18    oxyCODONE-acetaminophen (PERCOCET) 5-325 MG per tablet     Sig: Take 1 tablet by mouth every 8 hours as needed for Pain for up to 30 days.      Dispense:  90 tablet     Refill:  0    gabapentin (NEURONTIN) 800 MG tablet     Sig: Take 1 tablet by mouth daily for 90 days. Dispense:  90 tablet     Refill:  3     Fill 4-29-17     Urine drug screens have been appropriate. No aberrant activity noted. Analgesia is achieved. Activities of daily living are possible because of medications. Safe use of medications explained to patient. Counselling/Preventive measures for pain  Control:    [x]  Spine strengthening exercises are discussed with patient in detail. [x] Ill effects of being on chronic pain medications such as sleep disturbances, hormonal changes, withdrawal symptoms,  chronic opioid dependence and tolerance were discussed with patient. I had asked the patient to minimize medication use and utilize pain medications only for uncontrolled rest pain or pain with exertional activities. I advised patient not to self escalate pain medications without consulting with us. At each of patient's future visits we will try to taper pain medications, while adjusting the adjunct medications, and re-evaluating for Physical Therapy to improve spinal and joint strength. We will continue to have discussions to decrease pain medications as tolerated. We discussed the same at today's visit and have not been to implement it, as the patient's pain is somewhat under control with current medications. Decision Making Process : Patient's health history and referral records thoroughly reviewed before focused physical examination and discussion with patient. Over 50% of today's visit is spent on examining the patient and counseling. Level of complexity of date to be reviewed is Moderate. The chart date reviewed include the following: Imaging Reports. Summary of Care. Time spent reviewing with patient the below reports:   Medication safety, Treatment options.     Level of diagnosis and management options of this case is multiple: involving the following management options: Interventions as needed,

## 2018-03-13 DIAGNOSIS — E11.42 DIABETIC POLYNEUROPATHY ASSOCIATED WITH TYPE 2 DIABETES MELLITUS (HCC): Primary | ICD-10-CM

## 2018-03-15 RX ORDER — PEN NEEDLE, DIABETIC 32GX 5/32"
NEEDLE, DISPOSABLE MISCELLANEOUS
Qty: 100 EACH | Refills: 1 | Status: SHIPPED | OUTPATIENT
Start: 2018-03-15 | End: 2018-06-08 | Stop reason: SDUPTHER

## 2018-03-19 RX ORDER — SIMVASTATIN 20 MG
TABLET ORAL
Qty: 30 TABLET | Refills: 2 | Status: SHIPPED | OUTPATIENT
Start: 2018-03-19 | End: 2018-06-19 | Stop reason: SDUPTHER

## 2018-03-20 DIAGNOSIS — E11.42 DIABETIC POLYNEUROPATHY ASSOCIATED WITH TYPE 2 DIABETES MELLITUS (HCC): Primary | ICD-10-CM

## 2018-03-20 RX ORDER — INSULIN GLARGINE 100 [IU]/ML
INJECTION, SOLUTION SUBCUTANEOUS
Qty: 15 ML | Refills: 1 | Status: SHIPPED | OUTPATIENT
Start: 2018-03-20 | End: 2018-05-03 | Stop reason: SDUPTHER

## 2018-03-27 ENCOUNTER — HOSPITAL ENCOUNTER (OUTPATIENT)
Age: 73
Setting detail: SPECIMEN
Discharge: HOME | End: 2018-03-27
Payer: MEDICARE

## 2018-03-27 ENCOUNTER — OFFICE VISIT (OUTPATIENT)
Dept: FAMILY MEDICINE CLINIC | Age: 73
End: 2018-03-27
Payer: MEDICARE

## 2018-03-27 VITALS
HEIGHT: 72 IN | WEIGHT: 224.4 LBS | DIASTOLIC BLOOD PRESSURE: 70 MMHG | BODY MASS INDEX: 30.4 KG/M2 | SYSTOLIC BLOOD PRESSURE: 138 MMHG | TEMPERATURE: 97.9 F | RESPIRATION RATE: 14 BRPM | HEART RATE: 72 BPM

## 2018-03-27 DIAGNOSIS — E78.5 HYPERLIPIDEMIA, UNSPECIFIED HYPERLIPIDEMIA TYPE: ICD-10-CM

## 2018-03-27 DIAGNOSIS — E08.41 DIABETIC MONONEUROPATHY ASSOCIATED WITH DIABETES MELLITUS DUE TO UNDERLYING CONDITION (HCC): ICD-10-CM

## 2018-03-27 DIAGNOSIS — E55.9 VITAMIN D DEFICIENCY: ICD-10-CM

## 2018-03-27 DIAGNOSIS — E08.41 DIABETIC MONONEUROPATHY ASSOCIATED WITH DIABETES MELLITUS DUE TO UNDERLYING CONDITION (HCC): Primary | ICD-10-CM

## 2018-03-27 DIAGNOSIS — I25.810 CORONARY ARTERY DISEASE INVOLVING CORONARY BYPASS GRAFT OF NATIVE HEART WITHOUT ANGINA PECTORIS: ICD-10-CM

## 2018-03-27 LAB
CREATININE URINE: 88.5 MG/DL (ref 39–259)
HBA1C MFR BLD: 6.1 %
MICROALBUMIN/CREAT 24H UR: 92 MG/L
MICROALBUMIN/CREAT UR-RTO: 104 MCG/MG CREAT

## 2018-03-27 PROCEDURE — 83036 HEMOGLOBIN GLYCOSYLATED A1C: CPT | Performed by: FAMILY MEDICINE

## 2018-03-27 PROCEDURE — 99214 OFFICE O/P EST MOD 30 MIN: CPT | Performed by: FAMILY MEDICINE

## 2018-03-27 PROCEDURE — 1036F TOBACCO NON-USER: CPT | Performed by: FAMILY MEDICINE

## 2018-03-27 PROCEDURE — G8417 CALC BMI ABV UP PARAM F/U: HCPCS | Performed by: FAMILY MEDICINE

## 2018-03-27 PROCEDURE — G8427 DOCREV CUR MEDS BY ELIG CLIN: HCPCS | Performed by: FAMILY MEDICINE

## 2018-03-27 PROCEDURE — 4040F PNEUMOC VAC/ADMIN/RCVD: CPT | Performed by: FAMILY MEDICINE

## 2018-03-27 PROCEDURE — 3017F COLORECTAL CA SCREEN DOC REV: CPT | Performed by: FAMILY MEDICINE

## 2018-03-27 PROCEDURE — G8482 FLU IMMUNIZE ORDER/ADMIN: HCPCS | Performed by: FAMILY MEDICINE

## 2018-03-27 PROCEDURE — 1123F ACP DISCUSS/DSCN MKR DOCD: CPT | Performed by: FAMILY MEDICINE

## 2018-03-27 PROCEDURE — G8598 ASA/ANTIPLAT THER USED: HCPCS | Performed by: FAMILY MEDICINE

## 2018-03-27 ASSESSMENT — ENCOUNTER SYMPTOMS
VOMITING: 0
NAUSEA: 0
CONSTIPATION: 0
DIARRHEA: 0
SORE THROAT: 0
COUGH: 0
ABDOMINAL PAIN: 0
BACK PAIN: 0
SHORTNESS OF BREATH: 0

## 2018-03-27 NOTE — PROGRESS NOTES
Subjective:      Patient ID: Ebonie Maier is a 68 y.o. male. Visit Information    Have you changed or started any medications since your last visit including any over-the-counter medicines, vitamins, or herbal medicines? no   Are you having any side effects from any of your medications? -  no  Have you stopped taking any of your medications? Is so, why? -  no    Have you seen any other physician or provider since your last visit? Yes - Records Obtained  Have you had any other diagnostic tests since your last visit? Yes - Records Obtained  Have you been seen in the emergency room and/or had an admission to a hospital since we last saw you? No  Have you had your routine dental cleaning in the past 6 months? no    Have you activated your [employer] account? If not, what are your barriers?  Yes     Patient Care Team:  Beatriz Rene MD as PCP - General  Beatriz Rene MD as PCP - S Attributed Provider  Avel Wetzel MD as Referring Physician (Cardiology)  Lester Gonzalez MD as Consulting Physician (Gastroenterology)  Issac Antoine MD as Consulting Physician (Internal Medicine Cardiovascular Disease)  Larissa Mejía MD as Consulting Physician (Pain Management)  Luz Marina Mixon DPM as Consulting Physician (Podiatry)  Janet Josue MD as Surgeon (Ophthalmology)    Medical History Review  Past Medical, Family, and Social History reviewed and does not contribute to the patient presenting condition    Health Maintenance   Topic Date Due    DTaP/Tdap/Td vaccine (1 - Tdap) 03/07/1965    Shingles Vaccine (1 of 2 - 2 Dose Series) 03/07/1996    Diabetic retinal exam  11/09/2017    Diabetic microalbuminuria test  02/10/2018    Pneumococcal low/med risk (2 of 2 - PCV13) 05/12/2018    Lipid screen  08/29/2018    Potassium monitoring  08/29/2018    Creatinine monitoring  08/29/2018    A1C test (Diabetic or Prediabetic)  11/27/2018    Diabetic foot exam  02/06/2019    Colon cancer screen colonoscopy 01/11/2022    Flu vaccine  Completed    AAA screen  Completed    Hepatitis C screen  Addressed       HPI  71-year-old male is seen in the office today for follow-up for his diabetes  hyperlipidemia blood sugars are running between 90 and 140 he is on Lantus twice a day he also watches his diet . Has coronary artery disease denies of any chest pain or shortness of  breath blood pressure is good is on Zocor also has got  neuropathy and is seen by  pain clinic taking medications from them  Review of Systems   Constitutional: Negative for appetite change and unexpected weight change. HENT: Negative for ear pain and sore throat. Eyes: Negative for visual disturbance. Respiratory: Negative for cough and shortness of breath. Cardiovascular: Negative for chest pain and leg swelling. Gastrointestinal: Negative for abdominal pain, constipation, diarrhea, nausea and vomiting. Endocrine: Negative for polydipsia and polyuria. Genitourinary: Negative for frequency and urgency. Musculoskeletal: Negative for back pain. Neurological: Negative for dizziness, syncope and headaches. Objective:   Physical Exam   Constitutional: He is oriented to person, place, and time. He appears well-developed and well-nourished. /70 (Site: Right Arm, Position: Sitting, Cuff Size: Large Adult)   Pulse 72   Temp 97.9 °F (36.6 °C) (Oral)   Resp 14   Ht 6' (1.829 m)   Wt 224 lb 6.4 oz (101.8 kg)   BMI 30.43 kg/m²    HENT:   Head: Normocephalic. Mouth/Throat: Oropharynx is clear and moist.   Cardiovascular: Normal rate and regular rhythm. Pulmonary/Chest: Breath sounds normal. He has no rales. Abdominal: Soft. There is no tenderness. Musculoskeletal: Normal range of motion. He exhibits no edema. Lymphadenopathy:     He has no cervical adenopathy. Neurological: He is alert and oriented to person, place, and time. Nursing note and vitals reviewed. hb A1c today is 6.1    Assessment:       1. Diabetic mononeuropathy associated with diabetes mellitus due to underlying condition (HCC)  Controlled  Microalbumin, Ur    Comprehensive Metabolic Panel    POCT glycosylated hemoglobin (Hb A1C)   2. Hyperlipidemia, unspecified hyperlipidemia type  Lipid Panel   3. Coronary artery disease involving coronary bypass graft of native heart without angina pectoris  Stable under care of a cardiologist    4. Vitamin D deficiency  Vitamin D 25 Hydroxy            Plan:         Orders Placed This Encounter   Procedures    Microalbumin, Ur     Standing Status:   Future     Standing Expiration Date:   3/27/2019    Lipid Panel     Standing Status:   Future     Standing Expiration Date:   3/27/2019     Order Specific Question:   Is Patient Fasting?/# of Hours     Answer:   12    Comprehensive Metabolic Panel     Standing Status:   Future     Standing Expiration Date:   3/27/2019    Vitamin D 25 Hydroxy     Standing Status:   Future     Standing Expiration Date:   3/27/2019    POCT glycosylated hemoglobin (Hb A1C)     No orders of the defined types were placed in this encounter. Return in about 4 months (around 7/27/2018) for diabetes.     Continue current medications reviewed from the chart

## 2018-04-05 ENCOUNTER — HOSPITAL ENCOUNTER (OUTPATIENT)
Dept: PAIN MANAGEMENT | Age: 73
Discharge: HOME | End: 2018-04-05
Payer: MEDICARE

## 2018-04-05 ENCOUNTER — HOSPITAL ENCOUNTER (OUTPATIENT)
Age: 73
Discharge: HOME | End: 2018-04-05
Payer: MEDICARE

## 2018-04-05 VITALS
SYSTOLIC BLOOD PRESSURE: 140 MMHG | DIASTOLIC BLOOD PRESSURE: 79 MMHG | RESPIRATION RATE: 16 BRPM | HEART RATE: 61 BPM | BODY MASS INDEX: 29.26 KG/M2 | HEIGHT: 72 IN | WEIGHT: 216 LBS

## 2018-04-05 DIAGNOSIS — M50.30 DDD (DEGENERATIVE DISC DISEASE), CERVICAL: ICD-10-CM

## 2018-04-05 DIAGNOSIS — E78.5 HYPERLIPIDEMIA, UNSPECIFIED HYPERLIPIDEMIA TYPE: ICD-10-CM

## 2018-04-05 DIAGNOSIS — E08.41 DIABETIC MONONEUROPATHY ASSOCIATED WITH DIABETES MELLITUS DUE TO UNDERLYING CONDITION (HCC): ICD-10-CM

## 2018-04-05 DIAGNOSIS — M51.36 DDD (DEGENERATIVE DISC DISEASE), LUMBAR: Primary | ICD-10-CM

## 2018-04-05 DIAGNOSIS — Z51.81 MEDICATION MONITORING ENCOUNTER: ICD-10-CM

## 2018-04-05 DIAGNOSIS — M47.816 SPONDYLOSIS OF LUMBAR REGION WITHOUT MYELOPATHY OR RADICULOPATHY: ICD-10-CM

## 2018-04-05 DIAGNOSIS — E11.610 CHARCOT FOOT DUE TO DIABETES MELLITUS (HCC): ICD-10-CM

## 2018-04-05 DIAGNOSIS — E55.9 VITAMIN D DEFICIENCY: ICD-10-CM

## 2018-04-05 DIAGNOSIS — M47.899 FACET SYNDROME: ICD-10-CM

## 2018-04-05 DIAGNOSIS — M47.26 OSTEOARTHRITIS OF SPINE WITH RADICULOPATHY, LUMBAR REGION: ICD-10-CM

## 2018-04-05 DIAGNOSIS — K59.03 DRUG-INDUCED CONSTIPATION: ICD-10-CM

## 2018-04-05 DIAGNOSIS — M00.862 ARTHRITIS OF LEFT KNEE DUE TO OTHER BACTERIA (HCC): ICD-10-CM

## 2018-04-05 LAB
ALBUMIN SERPL-MCNC: 4.2 G/DL (ref 3.5–5.2)
ALBUMIN/GLOBULIN RATIO: ABNORMAL (ref 1–2.5)
ALP BLD-CCNC: 94 U/L (ref 40–129)
ALT SERPL-CCNC: 23 U/L (ref 5–41)
ANION GAP SERPL CALCULATED.3IONS-SCNC: 12 MMOL/L (ref 9–17)
AST SERPL-CCNC: 21 U/L
BILIRUB SERPL-MCNC: 0.28 MG/DL (ref 0.3–1.2)
BUN BLDV-MCNC: 28 MG/DL (ref 8–23)
BUN/CREAT BLD: ABNORMAL (ref 9–20)
CALCIUM SERPL-MCNC: 8.7 MG/DL (ref 8.6–10.4)
CHLORIDE BLD-SCNC: 103 MMOL/L (ref 98–107)
CHOLESTEROL/HDL RATIO: 3.2
CHOLESTEROL: 111 MG/DL
CO2: 26 MMOL/L (ref 20–31)
CREAT SERPL-MCNC: 1.28 MG/DL (ref 0.7–1.2)
GFR AFRICAN AMERICAN: >60 ML/MIN
GFR NON-AFRICAN AMERICAN: 55 ML/MIN
GFR SERPL CREATININE-BSD FRML MDRD: ABNORMAL ML/MIN/{1.73_M2}
GFR SERPL CREATININE-BSD FRML MDRD: ABNORMAL ML/MIN/{1.73_M2}
GLUCOSE BLD-MCNC: 122 MG/DL (ref 70–99)
HDLC SERPL-MCNC: 35 MG/DL
LDL CHOLESTEROL: 44 MG/DL (ref 0–130)
POTASSIUM SERPL-SCNC: 4 MMOL/L (ref 3.7–5.3)
SODIUM BLD-SCNC: 141 MMOL/L (ref 135–144)
TOTAL PROTEIN: 7 G/DL (ref 6.4–8.3)
TRIGL SERPL-MCNC: 159 MG/DL
VITAMIN D 25-HYDROXY: 27.6 NG/ML (ref 30–100)
VLDLC SERPL CALC-MCNC: ABNORMAL MG/DL (ref 1–30)

## 2018-04-05 PROCEDURE — 99214 OFFICE O/P EST MOD 30 MIN: CPT | Performed by: NURSE PRACTITIONER

## 2018-04-05 PROCEDURE — 80307 DRUG TEST PRSMV CHEM ANLYZR: CPT

## 2018-04-05 PROCEDURE — 80053 COMPREHEN METABOLIC PANEL: CPT

## 2018-04-05 PROCEDURE — 82306 VITAMIN D 25 HYDROXY: CPT

## 2018-04-05 PROCEDURE — 36415 COLL VENOUS BLD VENIPUNCTURE: CPT

## 2018-04-05 PROCEDURE — 80061 LIPID PANEL: CPT

## 2018-04-05 PROCEDURE — 99213 OFFICE O/P EST LOW 20 MIN: CPT

## 2018-04-05 RX ORDER — FENTANYL 25 UG/H
1 PATCH TRANSDERMAL
Qty: 10 PATCH | Refills: 0 | Status: SHIPPED | OUTPATIENT
Start: 2018-04-07 | End: 2018-05-01 | Stop reason: SDUPTHER

## 2018-04-05 RX ORDER — TOPIRAMATE 50 MG/1
50 TABLET, FILM COATED ORAL 2 TIMES DAILY
Qty: 60 TABLET | Refills: 3 | Status: SHIPPED | OUTPATIENT
Start: 2018-04-07 | End: 2018-08-03 | Stop reason: SDUPTHER

## 2018-04-05 RX ORDER — OMEPRAZOLE 20 MG/1
CAPSULE, DELAYED RELEASE ORAL
Qty: 30 CAPSULE | Refills: 2 | Status: SHIPPED | OUTPATIENT
Start: 2018-04-05 | End: 2018-07-03 | Stop reason: SDUPTHER

## 2018-04-05 ASSESSMENT — ENCOUNTER SYMPTOMS
CONSTIPATION: 0
COUGH: 0
BACK PAIN: 1
SHORTNESS OF BREATH: 0

## 2018-04-10 LAB
6-ACETYLMORPHINE, UR: NOT DETECTED
7-AMINOCLONAZEPAM, URINE: NOT DETECTED
ALPHA-OH-ALPRAZ, URINE: NOT DETECTED
ALPRAZOLAM, URINE: NOT DETECTED
AMPHETAMINES, URINE: NOT DETECTED
BARBITURATES, URINE: NOT DETECTED
BENZOYLECGONINE, UR: NOT DETECTED
BUPRENORPHINE URINE: NOT DETECTED
CARISOPRODOL, UR: NOT DETECTED
CLONAZEPAM, URINE: NOT DETECTED
CODEINE, URINE: NOT DETECTED
CREATININE URINE: 152.3 MG/DL (ref 20–400)
DIAZEPAM, URINE: NOT DETECTED
DRUGS EXPECTED, UR: NORMAL
EER HI RES INTERP UR: NORMAL
ETHYL GLUCURONIDE UR: NOT DETECTED
FENTANYL URINE: PRESENT
HYDROCODONE, URINE: NOT DETECTED
HYDROMORPHONE, URINE: NOT DETECTED
LORAZEPAM, URINE: NOT DETECTED
MARIJUANA METAB, UR: NOT DETECTED
MDA, UR: NOT DETECTED
MDEA, EVE, UR: NOT DETECTED
MDMA URINE: NOT DETECTED
MEPERIDINE METAB, UR: NOT DETECTED
METHADONE, URINE: NOT DETECTED
METHAMPHETAMINE, URINE: NOT DETECTED
METHYLPHENIDATE: NOT DETECTED
MIDAZOLAM, URINE: NOT DETECTED
MORPHINE URINE: NOT DETECTED
NORBUPRENORPHINE, URINE: NOT DETECTED
NORDIAZEPAM, URINE: NOT DETECTED
NORFENTANYL, URINE: PRESENT
NORHYDROCODONE, URINE: NOT DETECTED
NOROXYCODONE, URINE: PRESENT
NOROXYMORPHONE, URINE: NOT DETECTED
OXAZEPAM, URINE: NOT DETECTED
OXYCODONE URINE: PRESENT
OXYMORPHONE, URINE: NOT DETECTED
PAIN MANAGEMENT DRUG PANEL INTERP, URINE: NORMAL
PAIN MGT DRUG PANEL, HI RES, UR: NORMAL
PCP,URINE: NOT DETECTED
PHENTERMINE, UR: NOT DETECTED
PROPOXYPHENE, URINE: NOT DETECTED
TAPENTADOL, URINE: NOT DETECTED
TAPENTADOL-O-SULFATE, URINE: NOT DETECTED
TEMAZEPAM, URINE: NOT DETECTED
TRAMADOL, URINE: NOT DETECTED
ZOLPIDEM, URINE: NOT DETECTED

## 2018-04-17 ENCOUNTER — OFFICE VISIT (OUTPATIENT)
Dept: PODIATRY | Age: 73
End: 2018-04-17
Payer: MEDICARE

## 2018-04-17 VITALS
HEART RATE: 65 BPM | WEIGHT: 214 LBS | HEIGHT: 72 IN | BODY MASS INDEX: 28.99 KG/M2 | SYSTOLIC BLOOD PRESSURE: 124 MMHG | DIASTOLIC BLOOD PRESSURE: 73 MMHG

## 2018-04-17 DIAGNOSIS — B35.1 ONYCHOMYCOSIS: Primary | ICD-10-CM

## 2018-04-17 DIAGNOSIS — M14.672 CHARCOT'S JOINT OF LEFT FOOT: ICD-10-CM

## 2018-04-17 DIAGNOSIS — Z79.4 TYPE 2 DIABETES MELLITUS WITH DIABETIC POLYNEUROPATHY, WITH LONG-TERM CURRENT USE OF INSULIN (HCC): ICD-10-CM

## 2018-04-17 DIAGNOSIS — E11.42 TYPE 2 DIABETES MELLITUS WITH DIABETIC POLYNEUROPATHY, WITH LONG-TERM CURRENT USE OF INSULIN (HCC): ICD-10-CM

## 2018-04-17 PROCEDURE — 11721 DEBRIDE NAIL 6 OR MORE: CPT | Performed by: PODIATRIST

## 2018-04-17 ASSESSMENT — ENCOUNTER SYMPTOMS
NAUSEA: 0
VOMITING: 0
CONSTIPATION: 0
DIARRHEA: 0
COLOR CHANGE: 0

## 2018-05-01 ENCOUNTER — HOSPITAL ENCOUNTER (OUTPATIENT)
Dept: PAIN MANAGEMENT | Age: 73
Discharge: HOME | End: 2018-05-01
Payer: MEDICARE

## 2018-05-01 VITALS
BODY MASS INDEX: 28.99 KG/M2 | DIASTOLIC BLOOD PRESSURE: 75 MMHG | SYSTOLIC BLOOD PRESSURE: 127 MMHG | TEMPERATURE: 97.7 F | WEIGHT: 214 LBS | HEART RATE: 70 BPM | RESPIRATION RATE: 16 BRPM | OXYGEN SATURATION: 98 % | HEIGHT: 72 IN

## 2018-05-01 DIAGNOSIS — M47.816 SPONDYLOSIS OF LUMBAR REGION WITHOUT MYELOPATHY OR RADICULOPATHY: ICD-10-CM

## 2018-05-01 DIAGNOSIS — M00.862 ARTHRITIS OF LEFT KNEE DUE TO OTHER BACTERIA (HCC): ICD-10-CM

## 2018-05-01 DIAGNOSIS — E11.610 CHARCOT FOOT DUE TO DIABETES MELLITUS (HCC): ICD-10-CM

## 2018-05-01 DIAGNOSIS — M50.30 DDD (DEGENERATIVE DISC DISEASE), CERVICAL: ICD-10-CM

## 2018-05-01 DIAGNOSIS — M75.100 TEAR OF ROTATOR CUFF, UNSPECIFIED LATERALITY, UNSPECIFIED TEAR EXTENT: ICD-10-CM

## 2018-05-01 DIAGNOSIS — E11.42 DIABETIC PERIPHERAL NEUROPATHY ASSOCIATED WITH TYPE 2 DIABETES MELLITUS (HCC): ICD-10-CM

## 2018-05-01 DIAGNOSIS — M51.36 DDD (DEGENERATIVE DISC DISEASE), LUMBAR: ICD-10-CM

## 2018-05-01 DIAGNOSIS — M47.899 FACET SYNDROME: ICD-10-CM

## 2018-05-01 DIAGNOSIS — K59.03 DRUG-INDUCED CONSTIPATION: Primary | ICD-10-CM

## 2018-05-01 DIAGNOSIS — M47.26 OSTEOARTHRITIS OF SPINE WITH RADICULOPATHY, LUMBAR REGION: ICD-10-CM

## 2018-05-01 DIAGNOSIS — Z51.81 MEDICATION MONITORING ENCOUNTER: ICD-10-CM

## 2018-05-01 DIAGNOSIS — E08.41 DIABETIC MONONEUROPATHY ASSOCIATED WITH DIABETES MELLITUS DUE TO UNDERLYING CONDITION (HCC): ICD-10-CM

## 2018-05-01 DIAGNOSIS — E09.41 DIABETIC MONONEUROPATHY ASSOCIATED WITH DRUG OR CHEMICAL INDUCED DIABETES MELLITUS (HCC): ICD-10-CM

## 2018-05-01 PROCEDURE — 99213 OFFICE O/P EST LOW 20 MIN: CPT

## 2018-05-01 PROCEDURE — 99213 OFFICE O/P EST LOW 20 MIN: CPT | Performed by: NURSE PRACTITIONER

## 2018-05-01 RX ORDER — CHLORAL HYDRATE 500 MG
3000 CAPSULE ORAL 3 TIMES DAILY
COMMUNITY
End: 2018-11-15 | Stop reason: ALTCHOICE

## 2018-05-01 RX ORDER — FENTANYL 25 UG/H
1 PATCH TRANSDERMAL
Qty: 10 PATCH | Refills: 0 | Status: SHIPPED | OUTPATIENT
Start: 2018-05-08 | End: 2018-06-01 | Stop reason: SDUPTHER

## 2018-05-01 RX ORDER — OXYCODONE HYDROCHLORIDE AND ACETAMINOPHEN 5; 325 MG/1; MG/1
1 TABLET ORAL EVERY 6 HOURS PRN
Qty: 120 TABLET | Refills: 0 | Status: SHIPPED | OUTPATIENT
Start: 2018-05-05 | End: 2018-08-03 | Stop reason: SDUPTHER

## 2018-05-01 RX ORDER — OXYCODONE HYDROCHLORIDE AND ACETAMINOPHEN 5; 325 MG/1; MG/1
1 TABLET ORAL EVERY 6 HOURS PRN
COMMUNITY
End: 2018-05-01 | Stop reason: SDUPTHER

## 2018-05-01 ASSESSMENT — ENCOUNTER SYMPTOMS
EYES NEGATIVE: 1
GASTROINTESTINAL NEGATIVE: 1
BACK PAIN: 1
RESPIRATORY NEGATIVE: 1

## 2018-05-03 DIAGNOSIS — E11.42 DIABETIC POLYNEUROPATHY ASSOCIATED WITH TYPE 2 DIABETES MELLITUS (HCC): ICD-10-CM

## 2018-05-04 RX ORDER — INSULIN GLARGINE 100 [IU]/ML
INJECTION, SOLUTION SUBCUTANEOUS
Qty: 15 ML | Refills: 2 | Status: SHIPPED | OUTPATIENT
Start: 2018-05-04 | End: 2018-07-13 | Stop reason: SDUPTHER

## 2018-05-21 RX ORDER — FUROSEMIDE 40 MG/1
TABLET ORAL
Qty: 60 TABLET | Refills: 2 | Status: SHIPPED | OUTPATIENT
Start: 2018-05-21 | End: 2018-08-14 | Stop reason: SDUPTHER

## 2018-06-01 ENCOUNTER — HOSPITAL ENCOUNTER (OUTPATIENT)
Dept: PAIN MANAGEMENT | Age: 73
Discharge: HOME | End: 2018-06-01
Payer: MEDICARE

## 2018-06-01 VITALS
TEMPERATURE: 98 F | HEART RATE: 67 BPM | BODY MASS INDEX: 28.99 KG/M2 | DIASTOLIC BLOOD PRESSURE: 65 MMHG | HEIGHT: 72 IN | SYSTOLIC BLOOD PRESSURE: 124 MMHG | WEIGHT: 214 LBS | RESPIRATION RATE: 20 BRPM

## 2018-06-01 DIAGNOSIS — M47.26 OSTEOARTHRITIS OF SPINE WITH RADICULOPATHY, LUMBAR REGION: ICD-10-CM

## 2018-06-01 DIAGNOSIS — Z51.81 MEDICATION MONITORING ENCOUNTER: ICD-10-CM

## 2018-06-01 DIAGNOSIS — E09.41 DIABETIC MONONEUROPATHY ASSOCIATED WITH DRUG OR CHEMICAL INDUCED DIABETES MELLITUS (HCC): ICD-10-CM

## 2018-06-01 DIAGNOSIS — E11.610 CHARCOT FOOT DUE TO DIABETES MELLITUS (HCC): ICD-10-CM

## 2018-06-01 DIAGNOSIS — M47.816 SPONDYLOSIS OF LUMBAR REGION WITHOUT MYELOPATHY OR RADICULOPATHY: ICD-10-CM

## 2018-06-01 DIAGNOSIS — M47.899 FACET SYNDROME: ICD-10-CM

## 2018-06-01 DIAGNOSIS — M00.862 ARTHRITIS OF LEFT KNEE DUE TO OTHER BACTERIA (HCC): ICD-10-CM

## 2018-06-01 DIAGNOSIS — M50.30 DDD (DEGENERATIVE DISC DISEASE), CERVICAL: ICD-10-CM

## 2018-06-01 DIAGNOSIS — M51.36 DDD (DEGENERATIVE DISC DISEASE), LUMBAR: ICD-10-CM

## 2018-06-01 DIAGNOSIS — E11.42 DIABETIC PERIPHERAL NEUROPATHY ASSOCIATED WITH TYPE 2 DIABETES MELLITUS (HCC): ICD-10-CM

## 2018-06-01 DIAGNOSIS — E08.41 DIABETIC MONONEUROPATHY ASSOCIATED WITH DIABETES MELLITUS DUE TO UNDERLYING CONDITION (HCC): ICD-10-CM

## 2018-06-01 DIAGNOSIS — K59.03 DRUG-INDUCED CONSTIPATION: Primary | ICD-10-CM

## 2018-06-01 PROCEDURE — 99213 OFFICE O/P EST LOW 20 MIN: CPT

## 2018-06-01 PROCEDURE — 99213 OFFICE O/P EST LOW 20 MIN: CPT | Performed by: NURSE PRACTITIONER

## 2018-06-01 RX ORDER — NALOXONE HYDROCHLORIDE 4 MG/.1ML
1 SPRAY NASAL PRN
Qty: 1 EACH | Refills: 0 | Status: SHIPPED | OUTPATIENT
Start: 2018-06-01 | End: 2019-12-04 | Stop reason: SDUPTHER

## 2018-06-01 RX ORDER — FENTANYL 25 UG/H
1 PATCH TRANSDERMAL
Qty: 10 PATCH | Refills: 0 | Status: SHIPPED | OUTPATIENT
Start: 2018-06-07 | End: 2018-06-29 | Stop reason: SDUPTHER

## 2018-06-01 ASSESSMENT — ENCOUNTER SYMPTOMS
BACK PAIN: 1
GASTROINTESTINAL NEGATIVE: 1
RESPIRATORY NEGATIVE: 1

## 2018-06-08 DIAGNOSIS — E11.42 DIABETIC POLYNEUROPATHY ASSOCIATED WITH TYPE 2 DIABETES MELLITUS (HCC): ICD-10-CM

## 2018-06-08 RX ORDER — PEN NEEDLE, DIABETIC 32GX 5/32"
NEEDLE, DISPOSABLE MISCELLANEOUS
Qty: 50 EACH | Refills: 0 | Status: SHIPPED | OUTPATIENT
Start: 2018-06-08 | End: 2018-08-02 | Stop reason: SDUPTHER

## 2018-06-21 RX ORDER — SIMVASTATIN 20 MG
TABLET ORAL
Qty: 30 TABLET | Refills: 1 | Status: SHIPPED | OUTPATIENT
Start: 2018-06-21 | End: 2018-08-21 | Stop reason: SDUPTHER

## 2018-06-26 ENCOUNTER — OFFICE VISIT (OUTPATIENT)
Dept: PODIATRY | Age: 73
End: 2018-06-26
Payer: MEDICARE

## 2018-06-26 VITALS
HEART RATE: 63 BPM | DIASTOLIC BLOOD PRESSURE: 64 MMHG | WEIGHT: 214 LBS | SYSTOLIC BLOOD PRESSURE: 121 MMHG | BODY MASS INDEX: 28.99 KG/M2 | HEIGHT: 72 IN

## 2018-06-26 DIAGNOSIS — Z79.4 TYPE 2 DIABETES MELLITUS WITH DIABETIC POLYNEUROPATHY, WITH LONG-TERM CURRENT USE OF INSULIN (HCC): ICD-10-CM

## 2018-06-26 DIAGNOSIS — M14.672 CHARCOT'S JOINT OF LEFT FOOT: ICD-10-CM

## 2018-06-26 DIAGNOSIS — E11.42 TYPE 2 DIABETES MELLITUS WITH DIABETIC POLYNEUROPATHY, WITH LONG-TERM CURRENT USE OF INSULIN (HCC): ICD-10-CM

## 2018-06-26 DIAGNOSIS — B35.1 ONYCHOMYCOSIS: Primary | ICD-10-CM

## 2018-06-26 PROCEDURE — 11721 DEBRIDE NAIL 6 OR MORE: CPT | Performed by: PODIATRIST

## 2018-06-26 ASSESSMENT — ENCOUNTER SYMPTOMS
CONSTIPATION: 0
COLOR CHANGE: 0
NAUSEA: 0
DIARRHEA: 0
VOMITING: 0

## 2018-06-29 ENCOUNTER — HOSPITAL ENCOUNTER (OUTPATIENT)
Dept: PAIN MANAGEMENT | Age: 73
Discharge: HOME | End: 2018-06-29
Payer: MEDICARE

## 2018-06-29 VITALS
SYSTOLIC BLOOD PRESSURE: 114 MMHG | RESPIRATION RATE: 16 BRPM | HEART RATE: 68 BPM | HEIGHT: 72 IN | DIASTOLIC BLOOD PRESSURE: 63 MMHG | WEIGHT: 212 LBS | BODY MASS INDEX: 28.71 KG/M2

## 2018-06-29 DIAGNOSIS — M51.36 DDD (DEGENERATIVE DISC DISEASE), LUMBAR: Primary | ICD-10-CM

## 2018-06-29 DIAGNOSIS — E11.610 CHARCOT FOOT DUE TO DIABETES MELLITUS (HCC): ICD-10-CM

## 2018-06-29 DIAGNOSIS — M47.899 FACET SYNDROME: ICD-10-CM

## 2018-06-29 DIAGNOSIS — E08.41 DIABETIC MONONEUROPATHY ASSOCIATED WITH DIABETES MELLITUS DUE TO UNDERLYING CONDITION (HCC): ICD-10-CM

## 2018-06-29 DIAGNOSIS — K59.03 DRUG-INDUCED CONSTIPATION: ICD-10-CM

## 2018-06-29 DIAGNOSIS — M47.26 OSTEOARTHRITIS OF SPINE WITH RADICULOPATHY, LUMBAR REGION: ICD-10-CM

## 2018-06-29 DIAGNOSIS — M47.816 SPONDYLOSIS OF LUMBAR REGION WITHOUT MYELOPATHY OR RADICULOPATHY: ICD-10-CM

## 2018-06-29 DIAGNOSIS — M50.30 DDD (DEGENERATIVE DISC DISEASE), CERVICAL: ICD-10-CM

## 2018-06-29 DIAGNOSIS — Z51.81 MEDICATION MONITORING ENCOUNTER: ICD-10-CM

## 2018-06-29 DIAGNOSIS — M00.862 ARTHRITIS OF LEFT KNEE DUE TO OTHER BACTERIA (HCC): ICD-10-CM

## 2018-06-29 PROCEDURE — 99213 OFFICE O/P EST LOW 20 MIN: CPT

## 2018-06-29 RX ORDER — FENTANYL 25 UG/H
1 PATCH TRANSDERMAL
Qty: 10 PATCH | Refills: 0 | Status: SHIPPED | OUTPATIENT
Start: 2018-07-08 | End: 2018-08-03 | Stop reason: SDUPTHER

## 2018-06-29 ASSESSMENT — ENCOUNTER SYMPTOMS
BACK PAIN: 1
COUGH: 0
CONSTIPATION: 0
SHORTNESS OF BREATH: 0

## 2018-07-03 NOTE — TELEPHONE ENCOUNTER
Lm on vm to call the office to let us know if he is following and also to reschedule the appt for 07/27/18

## 2018-07-05 RX ORDER — OMEPRAZOLE 20 MG/1
CAPSULE, DELAYED RELEASE ORAL
Qty: 30 CAPSULE | Refills: 0 | Status: SHIPPED | OUTPATIENT
Start: 2018-07-05 | End: 2018-07-31 | Stop reason: SDUPTHER

## 2018-07-13 DIAGNOSIS — E11.42 DIABETIC POLYNEUROPATHY ASSOCIATED WITH TYPE 2 DIABETES MELLITUS (HCC): ICD-10-CM

## 2018-07-16 RX ORDER — INSULIN GLARGINE 100 [IU]/ML
INJECTION, SOLUTION SUBCUTANEOUS
Qty: 15 ML | Refills: 1 | Status: SHIPPED | OUTPATIENT
Start: 2018-07-16 | End: 2018-08-27 | Stop reason: SDUPTHER

## 2018-07-26 ENCOUNTER — OFFICE VISIT (OUTPATIENT)
Dept: FAMILY MEDICINE CLINIC | Age: 73
End: 2018-07-26
Payer: MEDICARE

## 2018-07-26 VITALS
BODY MASS INDEX: 30.8 KG/M2 | TEMPERATURE: 97.6 F | HEART RATE: 80 BPM | WEIGHT: 220 LBS | DIASTOLIC BLOOD PRESSURE: 72 MMHG | HEIGHT: 71 IN | SYSTOLIC BLOOD PRESSURE: 110 MMHG

## 2018-07-26 DIAGNOSIS — Z79.4 TYPE 2 DIABETES MELLITUS WITH DIABETIC POLYNEUROPATHY, WITH LONG-TERM CURRENT USE OF INSULIN (HCC): Primary | ICD-10-CM

## 2018-07-26 DIAGNOSIS — E11.42 TYPE 2 DIABETES MELLITUS WITH DIABETIC POLYNEUROPATHY, WITH LONG-TERM CURRENT USE OF INSULIN (HCC): Primary | ICD-10-CM

## 2018-07-26 DIAGNOSIS — Z23 NEED FOR PROPHYLACTIC VACCINATION AGAINST STREPTOCOCCUS PNEUMONIAE (PNEUMOCOCCUS): ICD-10-CM

## 2018-07-26 DIAGNOSIS — R19.7 DIARRHEA, UNSPECIFIED TYPE: ICD-10-CM

## 2018-07-26 DIAGNOSIS — I25.810 CORONARY ARTERY DISEASE INVOLVING CORONARY BYPASS GRAFT OF NATIVE HEART WITHOUT ANGINA PECTORIS: ICD-10-CM

## 2018-07-26 DIAGNOSIS — E78.5 HYPERLIPIDEMIA, UNSPECIFIED HYPERLIPIDEMIA TYPE: ICD-10-CM

## 2018-07-26 LAB — HBA1C MFR BLD: 6 %

## 2018-07-26 PROCEDURE — 4040F PNEUMOC VAC/ADMIN/RCVD: CPT | Performed by: FAMILY MEDICINE

## 2018-07-26 PROCEDURE — G0009 ADMIN PNEUMOCOCCAL VACCINE: HCPCS | Performed by: FAMILY MEDICINE

## 2018-07-26 PROCEDURE — 1123F ACP DISCUSS/DSCN MKR DOCD: CPT | Performed by: FAMILY MEDICINE

## 2018-07-26 PROCEDURE — G8417 CALC BMI ABV UP PARAM F/U: HCPCS | Performed by: FAMILY MEDICINE

## 2018-07-26 PROCEDURE — 3044F HG A1C LEVEL LT 7.0%: CPT | Performed by: FAMILY MEDICINE

## 2018-07-26 PROCEDURE — 90670 PCV13 VACCINE IM: CPT | Performed by: FAMILY MEDICINE

## 2018-07-26 PROCEDURE — 1101F PT FALLS ASSESS-DOCD LE1/YR: CPT | Performed by: FAMILY MEDICINE

## 2018-07-26 PROCEDURE — 83036 HEMOGLOBIN GLYCOSYLATED A1C: CPT | Performed by: FAMILY MEDICINE

## 2018-07-26 PROCEDURE — 1036F TOBACCO NON-USER: CPT | Performed by: FAMILY MEDICINE

## 2018-07-26 PROCEDURE — G8598 ASA/ANTIPLAT THER USED: HCPCS | Performed by: FAMILY MEDICINE

## 2018-07-26 PROCEDURE — G8427 DOCREV CUR MEDS BY ELIG CLIN: HCPCS | Performed by: FAMILY MEDICINE

## 2018-07-26 PROCEDURE — 2022F DILAT RTA XM EVC RTNOPTHY: CPT | Performed by: FAMILY MEDICINE

## 2018-07-26 PROCEDURE — 99214 OFFICE O/P EST MOD 30 MIN: CPT | Performed by: FAMILY MEDICINE

## 2018-07-26 PROCEDURE — 3017F COLORECTAL CA SCREEN DOC REV: CPT | Performed by: FAMILY MEDICINE

## 2018-07-26 ASSESSMENT — ENCOUNTER SYMPTOMS
SORE THROAT: 0
CONSTIPATION: 0
ABDOMINAL PAIN: 0
BACK PAIN: 1
COUGH: 0
DIARRHEA: 0
SHORTNESS OF BREATH: 0
NAUSEA: 0

## 2018-07-26 NOTE — PROGRESS NOTES
Subjective:      Patient ID: Vidal Ya is a 68 y.o. male. Here for diabetes follow up. Chronic Disease Visit Information    BP Readings from Last 3 Encounters:   07/26/18 110/72   06/29/18 114/63   06/26/18 121/64          Hemoglobin A1C (%)   Date Value   03/27/2018 6.1   11/27/2017 6.5   05/12/2017 6.6     Microalb/Crt. Ratio (mcg/mg creat)   Date Value   03/27/2018 104 (H)     LDL Cholesterol (mg/dL)   Date Value   04/05/2018 44     HDL (mg/dL)   Date Value   04/05/2018 35 (L)     BUN (mg/dL)   Date Value   04/05/2018 28 (H)     CREATININE (mg/dL)   Date Value   04/05/2018 1.28 (H)     Glucose (mg/dL)   Date Value   04/05/2018 122 (H)   04/03/2012 164 (H)            Have you changed or started any medications since your last visit including any over-the-counter medicines, vitamins, or herbal medicines? no   Are you having any side effects from any of your medications? -  no  Have you stopped taking any of your medications? Is so, why? -  no    Have you seen any other physician or provider since your last visit? No  Have you had any other diagnostic tests since your last visit? No  Have you been seen in the emergency room and/or had an admission to a hospital since we last saw you? No  Have you had your annual diabetic retinal (eye) exam? Yes - Records Requested  Have you had your routine dental cleaning in the past 6 months? no    Have you activated your [employer] account? If not, what are your barriers?  Yes     Patient Care Team:  Alphonso Felix MD as PCP - Makenna Fernandez MD as PCP - [facility] Attributed Provider  Pao Rodriges MD as Referring Physician (Cardiology)  Christopher Babinski, MD as Consulting Physician (Gastroenterology)  Ulisses Clifton MD as Consulting Physician (Internal Medicine Cardiovascular Disease)  Rhodia Goldberg, MD as Consulting Physician (Pain Management)  Arnulfo Zamora DPM as Consulting Physician (Podiatry)  Tamiko Oliver MD as Surgeon (Ophthalmology) Medical History Review      Health Maintenance   Topic Date Due    DTaP/Tdap/Td vaccine (1 - Tdap) 03/07/1965    Shingles Vaccine (1 of 2 - 2 Dose Series) 03/07/1996    Diabetic retinal exam  11/09/2017    Pneumococcal low/med risk (2 of 2 - PCV13) 05/12/2018    Flu vaccine (1) 09/01/2018    A1C test (Diabetic or Prediabetic)  03/27/2019    Diabetic microalbuminuria test  03/27/2019    Lipid screen  04/05/2019    Potassium monitoring  04/05/2019    Creatinine monitoring  04/05/2019    Diabetic foot exam  06/26/2019    Colon cancer screen colonoscopy  01/11/2022    AAA screen  Completed    Hepatitis C screen  Addressed       HPI  This 60-year-old male is seen in the Office. Today for follow-up for his diabetes coronary artery disease his blood sugars are running between  his hemoglobin A1c is good he is on Lantus twice a day. Blood pressure is good and he saw his cardiologist recently who started him on Toprol and has coronary artery disease is on Lasix and does Zocor. Patient also complains he has had diarrhea nearly every day for some time he is not on any stool softeners  he denies of any abdominal pain nausea or vomiting and I told him we will refer him to a gastroenterologist  Review of Systems   Constitutional: Negative for appetite change and fatigue. HENT: Negative for ear pain and sore throat. Eyes: Negative for visual disturbance. Respiratory: Negative for cough and shortness of breath. Cardiovascular: Negative for chest pain and leg swelling. Gastrointestinal: Negative for abdominal pain, constipation, diarrhea and nausea. Endocrine: Negative for polydipsia and polyuria. Genitourinary: Negative for frequency and urgency. Musculoskeletal: Positive for back pain. Negative for arthralgias. Neurological: Negative for dizziness, syncope and headaches. Objective:   Physical Exam   Constitutional: He is oriented to person, place, and time.  He appears

## 2018-08-02 DIAGNOSIS — E11.42 DIABETIC POLYNEUROPATHY ASSOCIATED WITH TYPE 2 DIABETES MELLITUS (HCC): ICD-10-CM

## 2018-08-03 ENCOUNTER — HOSPITAL ENCOUNTER (OUTPATIENT)
Dept: PAIN MANAGEMENT | Age: 73
Discharge: HOME | End: 2018-08-03
Payer: MEDICARE

## 2018-08-03 VITALS
BODY MASS INDEX: 30.8 KG/M2 | RESPIRATION RATE: 16 BRPM | SYSTOLIC BLOOD PRESSURE: 116 MMHG | DIASTOLIC BLOOD PRESSURE: 64 MMHG | HEART RATE: 94 BPM | HEIGHT: 71 IN | WEIGHT: 220 LBS

## 2018-08-03 DIAGNOSIS — E08.41 DIABETIC MONONEUROPATHY ASSOCIATED WITH DIABETES MELLITUS DUE TO UNDERLYING CONDITION (HCC): ICD-10-CM

## 2018-08-03 DIAGNOSIS — E09.41 DIABETIC MONONEUROPATHY ASSOCIATED WITH DRUG OR CHEMICAL INDUCED DIABETES MELLITUS (HCC): ICD-10-CM

## 2018-08-03 DIAGNOSIS — M00.862 ARTHRITIS OF LEFT KNEE DUE TO OTHER BACTERIA (HCC): ICD-10-CM

## 2018-08-03 DIAGNOSIS — Z51.81 MEDICATION MONITORING ENCOUNTER: ICD-10-CM

## 2018-08-03 DIAGNOSIS — K59.03 DRUG-INDUCED CONSTIPATION: ICD-10-CM

## 2018-08-03 DIAGNOSIS — M50.30 DDD (DEGENERATIVE DISC DISEASE), CERVICAL: ICD-10-CM

## 2018-08-03 DIAGNOSIS — E11.42 DIABETIC PERIPHERAL NEUROPATHY ASSOCIATED WITH TYPE 2 DIABETES MELLITUS (HCC): ICD-10-CM

## 2018-08-03 DIAGNOSIS — E11.610 CHARCOT FOOT DUE TO DIABETES MELLITUS (HCC): ICD-10-CM

## 2018-08-03 DIAGNOSIS — M47.26 OSTEOARTHRITIS OF SPINE WITH RADICULOPATHY, LUMBAR REGION: ICD-10-CM

## 2018-08-03 DIAGNOSIS — M47.899 FACET SYNDROME: ICD-10-CM

## 2018-08-03 DIAGNOSIS — M51.36 DDD (DEGENERATIVE DISC DISEASE), LUMBAR: Primary | ICD-10-CM

## 2018-08-03 DIAGNOSIS — M47.816 SPONDYLOSIS OF LUMBAR REGION WITHOUT MYELOPATHY OR RADICULOPATHY: ICD-10-CM

## 2018-08-03 PROCEDURE — 99213 OFFICE O/P EST LOW 20 MIN: CPT | Performed by: NURSE PRACTITIONER

## 2018-08-03 PROCEDURE — 99213 OFFICE O/P EST LOW 20 MIN: CPT

## 2018-08-03 RX ORDER — TOPIRAMATE 50 MG/1
50 TABLET, FILM COATED ORAL 2 TIMES DAILY
Qty: 60 TABLET | Refills: 3 | Status: SHIPPED | OUTPATIENT
Start: 2018-08-08 | End: 2019-01-04 | Stop reason: SDUPTHER

## 2018-08-03 RX ORDER — FENTANYL 25 UG/H
1 PATCH TRANSDERMAL
Qty: 10 PATCH | Refills: 0 | Status: SHIPPED | OUTPATIENT
Start: 2018-08-08 | End: 2018-08-03 | Stop reason: SDUPTHER

## 2018-08-03 RX ORDER — PEN NEEDLE, DIABETIC 32GX 5/32"
NEEDLE, DISPOSABLE MISCELLANEOUS
Qty: 1 PACKAGE | Refills: 0 | Status: SHIPPED | OUTPATIENT
Start: 2018-08-03 | End: 2018-09-18 | Stop reason: SDUPTHER

## 2018-08-03 RX ORDER — OXYCODONE HYDROCHLORIDE AND ACETAMINOPHEN 5; 325 MG/1; MG/1
1 TABLET ORAL EVERY 6 HOURS PRN
Qty: 120 TABLET | Refills: 0 | Status: SHIPPED | OUTPATIENT
Start: 2018-08-08 | End: 2018-08-03 | Stop reason: SDUPTHER

## 2018-08-03 RX ORDER — OXYCODONE HYDROCHLORIDE AND ACETAMINOPHEN 5; 325 MG/1; MG/1
1 TABLET ORAL EVERY 6 HOURS PRN
Qty: 120 TABLET | Refills: 0 | Status: SHIPPED | OUTPATIENT
Start: 2018-08-08 | End: 2018-12-05 | Stop reason: SDUPTHER

## 2018-08-03 RX ORDER — TOPIRAMATE 50 MG/1
50 TABLET, FILM COATED ORAL 2 TIMES DAILY
Qty: 60 TABLET | Refills: 3 | Status: SHIPPED | OUTPATIENT
Start: 2018-08-08 | End: 2018-08-03 | Stop reason: SDUPTHER

## 2018-08-03 RX ORDER — FENTANYL 25 UG/H
1 PATCH TRANSDERMAL
Qty: 10 PATCH | Refills: 0 | Status: SHIPPED | OUTPATIENT
Start: 2018-08-08 | End: 2018-08-31 | Stop reason: SDUPTHER

## 2018-08-03 ASSESSMENT — ENCOUNTER SYMPTOMS
CONSTIPATION: 0
BACK PAIN: 1
COUGH: 0
SHORTNESS OF BREATH: 0

## 2018-08-03 NOTE — PROGRESS NOTES
Patient is here today to review medication contract. Chief Complaint:  Back and neuropathic pain    [facility]    Pt reports low back pain for over 20 years after a compression fracture he suffered after pulling a post out of the ground. He also suffers from diabetic neuropathy with bilat Charcot's foot. He had tingling and numbness to hand and feet. He has had foot surgery in the past, but no surgery for his back pain. He has tried lumber epidurals with little noted relief. He is opioid dependant for pain control. We prescribe Fentanyl q72 hours and percocet that he uses sparingly. He does report a worsening if his back pain but states he is trying not to take the percocet. He is swimming in pool and staying active. He is also taking gabapentin and topamax for the neuropathic pain    HPI:   Back Pain   This is a chronic problem. The current episode started more than 1 year ago. The problem occurs constantly. The problem is unchanged. The pain is present in the lumbar spine. The quality of the pain is described as aching, burning and shooting. The pain radiates to the right foot and left foot. The pain is at a severity of 5/10. The pain is mild. The pain is the same all the time. The symptoms are aggravated by sitting, standing and position. Associated symptoms include numbness and paresthesias. Pertinent negatives include no chest pain or fever. He has tried analgesics, bed rest and home exercises for the symptoms. The treatment provided mild relief. Patient denies any new neurological symptoms. No bowel or bladder incontinence, no weakness, and no falling. Pill count: short 1 patch, fell off in pool. Had 12 percocet left from May refill    Morphine equivalent dose as reported on OARRS:60    Attestation: The Prescription Monitoring Report for this patient was reviewed today.  Flavio Lake, TACOS - CNP)  Documentation: Possible medication side effects, risk of tolerance/dependence & alternative treatments Father     Cancer Mother         breast    Cancer Maternal Grandfather        Social History     Social History    Marital status:      Spouse name: N/A    Number of children: N/A    Years of education: N/A     Occupational History    retired [employer]      Social History Main Topics    Smoking status: Former Smoker     Packs/day: 1.00     Years: 30.00     Types: Cigarettes     Quit date: 2/2/2002    Smokeless tobacco: Never Used    Alcohol use No      Comment: rare    Drug use: No    Sexual activity: No     Other Topics Concern    Not on file     Social History Narrative    No narrative on file       Review of Systems:  Review of Systems   Constitution: Negative for chills and fever. Cardiovascular: Negative for chest pain. Respiratory: Negative for cough and shortness of breath. Musculoskeletal: Positive for back pain. Gastrointestinal: Negative for constipation. Neurological: Positive for numbness and paresthesias. Physical Exam:  /64   Pulse 94   Resp 16   Ht 5' 11\" (1.803 m)   Wt 220 lb (99.8 kg)   BMI 30.68 kg/m²     Physical Exam   Constitutional: He is oriented to person, place, and time and well-developed, well-nourished, and in no distress. Cardiovascular: Normal rate. Pulmonary/Chest: Effort normal.   Musculoskeletal: Normal range of motion. Neurological: He is alert and oriented to person, place, and time. Gait normal.   Antalgic gait using cane   Skin: Skin is warm and dry.    Psychiatric: Affect normal.       Record/Diagnostics Review:    Last alexander April  and was appropriate     Left foot XR 3/2016  Lumbar XR 1/2015    Assessment:  Problem List Items Addressed This Visit     DDD (degenerative disc disease), lumbar - Primary    Relevant Medications    fentaNYL ([address]) 25 MCG/HR (Start on 8/8/2018)    oxyCODONE-acetaminophen (PERCOCET) 5-325 MG per tablet (Start on 8/8/2018)    Osteoarthritis of spine with radiculopathy, lumbar region    Relevant Medications    fentaNYL (DURAGESIC) 25 MCG/HR (Start on 8/8/2018)    oxyCODONE-acetaminophen (PERCOCET) 5-325 MG per tablet (Start on 8/8/2018)    topiramate (TOPAMAX) 50 MG tablet (Start on 8/8/2018)    DDD (degenerative disc disease), cervical    Relevant Medications    fentaNYL (DURAGESIC) 25 MCG/HR (Start on 8/8/2018)    oxyCODONE-acetaminophen (PERCOCET) 5-325 MG per tablet (Start on 8/8/2018)    Facet syndrome    Relevant Medications    fentaNYL (DURAGESIC) 25 MCG/HR (Start on 8/8/2018)    oxyCODONE-acetaminophen (PERCOCET) 5-325 MG per tablet (Start on 8/8/2018)    Medication monitoring encounter    Relevant Medications    fentaNYL (DURAGESIC) 25 MCG/HR (Start on 8/8/2018)    oxyCODONE-acetaminophen (PERCOCET) 5-325 MG per tablet (Start on 8/8/2018)    Spondylosis of lumbar region without myelopathy or radiculopathy    Relevant Medications    fentaNYL (DURAGESIC) 25 MCG/HR (Start on 8/8/2018)    oxyCODONE-acetaminophen (PERCOCET) 5-325 MG per tablet (Start on 8/8/2018)    Arthritis, septic ([address]    Relevant Medications    fentaNYL (DURAGESIC) 25 MCG/HR (Start on 8/8/2018)    oxyCODONE-acetaminophen (PERCOCET) 5-325 MG per tablet (Start on 8/8/2018)    Charcot foot due to diabetes mellitus ([address]    Relevant Medications    fentaNYL ([address]) 25 MCG/HR (Start on 8/8/2018)    oxyCODONE-acetaminophen (PERCOCET) 5-325 MG per tablet (Start on 8/8/2018)    Diabetic mononeuropathy associated with diabetes mellitus due to underlying condition ([address]    Relevant Medications    fentaNYL ([address]) 25 MCG/HR (Start on 8/8/2018)    oxyCODONE-acetaminophen (PERCOCET) 5-325 MG per tablet (Start on 8/8/2018)    topiramate (TOPAMAX) 50 MG tablet (Start on 8/8/2018)    Drug-induced constipation    Relevant Medications    fentaNYL (DURAGESIC) 25 MCG/HR (Start on 8/8/2018)    oxyCODONE-acetaminophen (PERCOCET) 5-325 MG per tablet (Start on 8/8/2018)      Other Visit Diagnoses     Diabetic mononeuropathy associated with drug or chemical induced diabetes mellitus ([facility]        Relevant Medications    oxyCODONE-acetaminophen (PERCOCET) 5-325 MG per tablet (Start on 8/8/2018)    topiramate (TOPAMAX) 50 MG tablet (Start on 8/8/2018)    Diabetic peripheral neuropathy associated with type 2 diabetes mellitus ([facility]        Relevant Medications    oxyCODONE-acetaminophen (PERCOCET) 5-325 MG per tablet (Start on 8/8/2018)    topiramate (TOPAMAX) 50 MG tablet (Start on 8/8/2018)            Treatment Plan:  DISCUSSION: Treatment options discussed with patient and all questions answered to patient's satisfaction. Patient relates current medications are helping the pain. Patient reports taking pain medications as prescribed, denies obtaining medications from different sources and denies use of illegal drugs. Patient denies side effects from medications like nausea, vomiting, constipation or drowsiness. Patient reports current activities of daily living are possible due to medications and would like to continue them. As always, we encourage daily stretching and strengthening exercises, and recommend minimizing use of pain medications unless patient cannot get through daily activities due to pain. Discussed with the patient the effect the patients medical condition and opioid medication may have on the patients ability to safely operate a vehicle. Pt verbalized understanding. TREATMENT OPTIONS:     Contract requirements met. Continue opioid therapy.  Script printed for fentanyl percocet and topamax  Follow up appointment made for 4 weeks

## 2018-08-16 RX ORDER — FUROSEMIDE 40 MG/1
TABLET ORAL
Qty: 60 TABLET | Refills: 2 | Status: SHIPPED | OUTPATIENT
Start: 2018-08-16 | End: 2018-11-13 | Stop reason: SDUPTHER

## 2018-08-27 DIAGNOSIS — E11.42 DIABETIC POLYNEUROPATHY ASSOCIATED WITH TYPE 2 DIABETES MELLITUS (HCC): ICD-10-CM

## 2018-08-28 RX ORDER — INSULIN GLARGINE 100 [IU]/ML
INJECTION, SOLUTION SUBCUTANEOUS
Qty: 15 ML | Refills: 2 | Status: SHIPPED | OUTPATIENT
Start: 2018-08-28 | End: 2018-10-30 | Stop reason: SDUPTHER

## 2018-08-31 ENCOUNTER — HOSPITAL ENCOUNTER (OUTPATIENT)
Dept: PAIN MANAGEMENT | Age: 73
Discharge: HOME | End: 2018-08-31
Payer: MEDICARE

## 2018-08-31 VITALS
BODY MASS INDEX: 29.68 KG/M2 | RESPIRATION RATE: 16 BRPM | HEIGHT: 71 IN | WEIGHT: 212 LBS | SYSTOLIC BLOOD PRESSURE: 108 MMHG | DIASTOLIC BLOOD PRESSURE: 70 MMHG | HEART RATE: 64 BPM | TEMPERATURE: 97.9 F | OXYGEN SATURATION: 100 %

## 2018-08-31 DIAGNOSIS — E11.610 CHARCOT FOOT DUE TO DIABETES MELLITUS (HCC): ICD-10-CM

## 2018-08-31 DIAGNOSIS — M50.30 DDD (DEGENERATIVE DISC DISEASE), CERVICAL: ICD-10-CM

## 2018-08-31 DIAGNOSIS — M47.899 FACET SYNDROME: ICD-10-CM

## 2018-08-31 DIAGNOSIS — M47.816 SPONDYLOSIS OF LUMBAR REGION WITHOUT MYELOPATHY OR RADICULOPATHY: ICD-10-CM

## 2018-08-31 DIAGNOSIS — E08.41 DIABETIC MONONEUROPATHY ASSOCIATED WITH DIABETES MELLITUS DUE TO UNDERLYING CONDITION (HCC): ICD-10-CM

## 2018-08-31 DIAGNOSIS — K59.03 DRUG-INDUCED CONSTIPATION: ICD-10-CM

## 2018-08-31 DIAGNOSIS — M51.36 DDD (DEGENERATIVE DISC DISEASE), LUMBAR: Primary | ICD-10-CM

## 2018-08-31 DIAGNOSIS — Z51.81 MEDICATION MONITORING ENCOUNTER: ICD-10-CM

## 2018-08-31 DIAGNOSIS — M47.26 OSTEOARTHRITIS OF SPINE WITH RADICULOPATHY, LUMBAR REGION: ICD-10-CM

## 2018-08-31 DIAGNOSIS — E09.41 DIABETIC MONONEUROPATHY ASSOCIATED WITH DRUG OR CHEMICAL INDUCED DIABETES MELLITUS (HCC): ICD-10-CM

## 2018-08-31 DIAGNOSIS — M00.862 ARTHRITIS OF LEFT KNEE DUE TO OTHER BACTERIA (HCC): ICD-10-CM

## 2018-08-31 PROCEDURE — 99213 OFFICE O/P EST LOW 20 MIN: CPT

## 2018-08-31 PROCEDURE — 99213 OFFICE O/P EST LOW 20 MIN: CPT | Performed by: NURSE PRACTITIONER

## 2018-08-31 RX ORDER — FENTANYL 25 UG/H
1 PATCH TRANSDERMAL
Qty: 10 PATCH | Refills: 0 | Status: SHIPPED | OUTPATIENT
Start: 2018-09-07 | End: 2018-10-03 | Stop reason: SDUPTHER

## 2018-08-31 ASSESSMENT — ENCOUNTER SYMPTOMS
BACK PAIN: 1
RESPIRATORY NEGATIVE: 1
DIARRHEA: 1
EYES NEGATIVE: 1

## 2018-08-31 NOTE — PROGRESS NOTES
04/05/2018  8:30 AM [facility]   Tapentadol-O-Sulfate, Urine Not Detected   Final 04/05/2018  8:30 AM [facility]   Tapentadol, Urine Not Detected   Final 04/05/2018  8:30 AM [facility]   Temazepam, Urine Not Detected   Final 04/05/2018  8:30 AM [facility]   Tramadol, Urine Not Detected   Final 04/05/2018  8:30 AM [facility]   Zolpidem, Urine Not Detected   Final 04/05/2018  8:30 AM [facility]   Drugs Expected, Ur   Final 04/05/2018  8:30 AM [facility]   FENTANYL 4.5 WEARING PATCH, PERCOCET 4.5 LAST NIGHT    Creatinine, Ur 152.3  20.0 - 400.0 mg/dL Final 04/05/2018  8:30 AM [facility]   Pain Mgt Drug Panel, Hi Res, Ur See Below   Final 04/05/2018  8:30 AM [facility]   (NOTE)   Methodology: Qualitative Enzyme Immunoassay and Qualitative Liquid   Chromatography-Time of Flight-Mass Spectrometry or Tandem Mass   Spectrometry, Quantitative Spectrophotometry   The absence of expected drug(s) and/or drug metabolite(s) may   indicate non-compliance, inappropriate timing of specimen   collection relative to drug administration, poor drug absorption,   diluted/adulterated urine, or limitations of testing. The   concentration must be greater than or equal to the cutoff to be   reported as present.  If specific drug concentrations are   required, contact the laboratory within two weeks of specimen   collection to request quantification by a second analytical   technique. Interpretive questions should be directed to the   laboratory. Results based on immunoassay detection that do not match clinical   expectations should be   interpreted with caution. Confirmatory testing by mass   spectrometry for immunoassay-based results is available, if   ordered within two weeks of specimen collection. Additional   charges apply. For medical purposes only; not valid for forensic use. This test was developed and its performance characteristics   determined by [employer]. The U.S.  Food and Drug   Administration has not approved or cleared this test; however, FDA   clearance or approval is not currently required for clinical use. The results are not intended to be used as the sole means for   clinical diagnosis or patient management decisions. EER Hi Res Interp Ur See Note   Final 04/05/2018  8:30 AM [facility]   (NOTE)   Access [facility] Enhanced Report using either link below:   -Direct access: [email or web address]   -Enter Username, Password: [email or web address]   Username: [email or web address] Password: [email or web address]   Performed by [place] [address], [address] [facility] [phone]   www. Joss Manrique MD, Lab. Director   Performed at [address] [place] [address] Dr Karely Rios.    [place], [place] [phone]            Past Medical History:   Diagnosis Date    Abdominal pain     Benign prostatic hypertrophy     C. difficile colitis     CAD (coronary artery disease) 1/3/12    s/p CABGx3    Constipation     Diabetes mellitus (HCC)     Diarrhea     DVT (deep venous thrombosis) (HCC)     left calf    Ejection fraction < 50%     Gallstones     Heartburn     Hx of blood clots     Hyperlipidemia     Kidney stones     MI (myocardial infarction) ([facility]     2011    Mitral regurgitation     MRSA (methicillin resistant staph aureus) culture positive     Numbness and tingling     hands and feet    Rib fractures 1-2015    right    Wears glasses     readers       Past Surgical History:   Procedure Laterality Date    APPENDECTOMY      CARDIAC CATHETERIZATION  12/29/11    CHOLECYSTECTOMY      COLONOSCOPY  01/11/2012    wnl, 10 yr recall    CORONARY ANGIOPLASTY WITH STENT PLACEMENT      x 1    CORONARY ARTERY BYPASS GRAFT  1/3/12    x3    KNEE ARTHROSCOPY      left    KNEE SURGERY Left     I&D    OTHER SURGICAL HISTORY Left 3/7/2016    plantar plane &  exostectomy medial foot left    TONSILLECTOMY      UPPER GASTROINTESTINAL ENDOSCOPY  11-3-15    VENA CAVA FILTER PLACEMENT      WRIST SURGERY      I&D satisfaction. Possible side effects, risk of tolerance and or dependence and alternative treatments discussed    Obtaining appropriate analgesic effect of treatment   No signs of potential drug abuse or diversion identified    [x] Ill effects of being on chronic pain medications such as sleep disturbances, respiratory depression, hormonal changes, withdrawal symptoms, chronic opioid dependence and tolerance as well as risk of taking opioids with Benzodiazepines and taking opioids along with alcohol,  were discussed with patient. I had asked the patient to minimize medication use and utilize pain medications only for uncontrolled rest pain or pain with exertional activities. I advised patient not to self-escalate pain medications without consulting with us. At each of patient's future visits we will try to taper pain medications, while adjusting the adjunct medications, and re-evaluating for Physical Therapy to improve spinal and joint strength. We will continue to have discussions to decrease pain medications as tolerated. Counseled patient on effects their pain medication and /or their medical condition may have on their  ability to drive or operate machinery. Instructed not to drive or operate machinery if drowsy     I also discussed with the patient regarding the dangers of combining narcotic pain medication with tranquilizers, alcohol or illegal drugs or taking the medication any way other than prescribed. The dangers were discussed  including respiratory depression and death. Patient was told to tell  all  physicians regarding the medications he is getting from pain clinic. Patient is warned not to take any unprescribed medications over-the-counter medications that can depress breathing . Patient is advised to talk to the pharmacist or physicians if planning to take any over-the-counter medications before  takeing them.  Patient is strongly advised to avoid tranquilizers or  relaxants, illegal drugs

## 2018-09-04 ENCOUNTER — OFFICE VISIT (OUTPATIENT)
Dept: PODIATRY | Age: 73
End: 2018-09-04
Payer: MEDICARE

## 2018-09-04 VITALS
HEIGHT: 72 IN | HEART RATE: 69 BPM | SYSTOLIC BLOOD PRESSURE: 135 MMHG | BODY MASS INDEX: 28.71 KG/M2 | DIASTOLIC BLOOD PRESSURE: 75 MMHG | WEIGHT: 212 LBS

## 2018-09-04 DIAGNOSIS — M14.672 CHARCOT'S JOINT OF LEFT FOOT: ICD-10-CM

## 2018-09-04 DIAGNOSIS — Z79.4 TYPE 2 DIABETES MELLITUS WITH DIABETIC POLYNEUROPATHY, WITH LONG-TERM CURRENT USE OF INSULIN (HCC): ICD-10-CM

## 2018-09-04 DIAGNOSIS — B35.1 ONYCHOMYCOSIS: Primary | ICD-10-CM

## 2018-09-04 DIAGNOSIS — E11.42 TYPE 2 DIABETES MELLITUS WITH DIABETIC POLYNEUROPATHY, WITH LONG-TERM CURRENT USE OF INSULIN (HCC): ICD-10-CM

## 2018-09-04 DIAGNOSIS — E11.610 DIABETIC CHARCOT FOOT (HCC): ICD-10-CM

## 2018-09-04 DIAGNOSIS — M21.969 FOOT DEFORMITY, UNSPECIFIED LATERALITY: ICD-10-CM

## 2018-09-04 DIAGNOSIS — E11.42 TYPE 2 DIABETES MELLITUS WITH DIABETIC POLYNEUROPATHY, WITHOUT LONG-TERM CURRENT USE OF INSULIN (HCC): ICD-10-CM

## 2018-09-04 PROCEDURE — 99999 PR OFFICE/OUTPT VISIT,PROCEDURE ONLY: CPT | Performed by: PODIATRIST

## 2018-09-04 PROCEDURE — 11721 DEBRIDE NAIL 6 OR MORE: CPT | Performed by: PODIATRIST

## 2018-09-04 ASSESSMENT — ENCOUNTER SYMPTOMS
DIARRHEA: 0
NAUSEA: 0
VOMITING: 0
COLOR CHANGE: 0
CONSTIPATION: 0

## 2018-09-04 NOTE — PROGRESS NOTES
Left    Dystrophic Changes                                                                 [x] [x] [x] [x] [x] [x] [x] [x] [x] [x]  5 4 3 2 1 1 2 3 4 5                         Right                                        Left    Color                                                                  [x] [x] [x] [x] [x] [x] [x] [x] [x] [x]  5 4 3 2 1 1 2 3 4 5                          Right                                        Left    Incurvation/Ingrowin                                                                   [] [] [] [] [] [] [] [] [] []  5 4 3 2 1 1 2 3 4 5                         Right                                        Left    Inflammation/Pain                                                                   [] [] [] [] [] [] [] [] [] []  5 4 3 2 1 1 2 3 4 5                         Right                                        Left      Radiographs from last visit: 3 views left Foot:  Noted reduction of the plantar prominence at the medial longitudinal arch and the lateral arch. Assesment :    Diagnosis Orders   1. Onychomycosis  NM DEBRIDEMENT OF NAILS, 6 OR MORE   2. Type 2 diabetes mellitus with diabetic polyneuropathy, with long-term current use of insulin ([facility])  NM DEBRIDEMENT OF NAILS, 6 OR MORE    HM DIABETES FOOT EXAM   3. Charcot's joint of left foot  NM DEBRIDEMENT OF NAILS, 6 OR MORE    HM DIABETES FOOT EXAM   4. Foot deformity, unspecified laterality     5. Diabetic Charcot foot ([facility]     6. Type 2 diabetes mellitus with diabetic polyneuropathy, without long-term current use of insulin ([facility]           Plan: Pt was evaluated and examined. Patient was given personalized discharge instructions. Nails 1-10 were debrided sharply in length and thickness with a nipper and , without incident. Pt will follow up in 9 weeks or sooner if any problems arise. Diagnosis was discussed with the pt and all of their questions were answered in detail.  Proper foot hygiene and care

## 2018-09-20 ENCOUNTER — TELEPHONE (OUTPATIENT)
Dept: FAMILY MEDICINE CLINIC | Age: 73
End: 2018-09-20

## 2018-09-20 NOTE — TELEPHONE ENCOUNTER
Per Dr. Tejal Yusuf verbal order called the patient and lm on vm letting him know that his insurance may not cover his Omeprazole anymore and that he can try Zantac to replace it if they decide not to cover. Told him to call if he has any questions.

## 2018-10-01 ENCOUNTER — NURSE ONLY (OUTPATIENT)
Dept: PODIATRY | Age: 73
End: 2018-10-01

## 2018-10-03 ENCOUNTER — HOSPITAL ENCOUNTER (OUTPATIENT)
Dept: PAIN MANAGEMENT | Age: 73
Discharge: HOME | End: 2018-10-03
Payer: MEDICARE

## 2018-10-03 VITALS
HEART RATE: 68 BPM | HEIGHT: 72 IN | TEMPERATURE: 98.7 F | OXYGEN SATURATION: 97 % | DIASTOLIC BLOOD PRESSURE: 69 MMHG | BODY MASS INDEX: 28.71 KG/M2 | WEIGHT: 212 LBS | SYSTOLIC BLOOD PRESSURE: 117 MMHG | RESPIRATION RATE: 68 BRPM

## 2018-10-03 DIAGNOSIS — M47.816 SPONDYLOSIS OF LUMBAR REGION WITHOUT MYELOPATHY OR RADICULOPATHY: ICD-10-CM

## 2018-10-03 DIAGNOSIS — M47.26 OSTEOARTHRITIS OF SPINE WITH RADICULOPATHY, LUMBAR REGION: ICD-10-CM

## 2018-10-03 DIAGNOSIS — K59.03 DRUG-INDUCED CONSTIPATION: ICD-10-CM

## 2018-10-03 DIAGNOSIS — E11.610 CHARCOT FOOT DUE TO DIABETES MELLITUS (HCC): ICD-10-CM

## 2018-10-03 DIAGNOSIS — E08.41 DIABETIC MONONEUROPATHY ASSOCIATED WITH DIABETES MELLITUS DUE TO UNDERLYING CONDITION (HCC): ICD-10-CM

## 2018-10-03 DIAGNOSIS — Z51.81 MEDICATION MONITORING ENCOUNTER: ICD-10-CM

## 2018-10-03 DIAGNOSIS — M47.899 FACET SYNDROME: ICD-10-CM

## 2018-10-03 DIAGNOSIS — M50.30 DDD (DEGENERATIVE DISC DISEASE), CERVICAL: ICD-10-CM

## 2018-10-03 DIAGNOSIS — M00.862 ARTHRITIS OF LEFT KNEE DUE TO OTHER BACTERIA (HCC): ICD-10-CM

## 2018-10-03 PROCEDURE — 99214 OFFICE O/P EST MOD 30 MIN: CPT | Performed by: PAIN MEDICINE

## 2018-10-03 PROCEDURE — 99213 OFFICE O/P EST LOW 20 MIN: CPT

## 2018-10-03 RX ORDER — FENTANYL 25 UG/H
1 PATCH TRANSDERMAL
Qty: 10 PATCH | Refills: 0 | Status: SHIPPED | OUTPATIENT
Start: 2018-10-03 | End: 2018-11-01 | Stop reason: SDUPTHER

## 2018-10-03 ASSESSMENT — ENCOUNTER SYMPTOMS
RESPIRATORY NEGATIVE: 1
COUGH: 0
PHOTOPHOBIA: 0
ABDOMINAL PAIN: 0
CONSTIPATION: 0
BLURRED VISION: 0
DIARRHEA: 1
EYES NEGATIVE: 1
ORTHOPNEA: 0
NAUSEA: 0
HEARTBURN: 0
SHORTNESS OF BREATH: 0
BACK PAIN: 1
SPUTUM PRODUCTION: 0

## 2018-10-03 ASSESSMENT — PAIN DESCRIPTION - PROGRESSION: CLINICAL_PROGRESSION: NOT CHANGED

## 2018-10-03 ASSESSMENT — PAIN DESCRIPTION - ONSET: ONSET: ON-GOING

## 2018-10-03 ASSESSMENT — PAIN DESCRIPTION - DESCRIPTORS: DESCRIPTORS: SHARP

## 2018-10-03 ASSESSMENT — PAIN DESCRIPTION - LOCATION: LOCATION: BACK

## 2018-10-03 ASSESSMENT — PAIN DESCRIPTION - ORIENTATION: ORIENTATION: LOWER;RIGHT;LEFT

## 2018-10-03 ASSESSMENT — PAIN DESCRIPTION - PAIN TYPE: TYPE: CHRONIC PAIN

## 2018-10-03 ASSESSMENT — PAIN DESCRIPTION - DIRECTION: RADIATING_TOWARDS: BILATERAL LEGS TO FEET

## 2018-10-03 ASSESSMENT — PAIN DESCRIPTION - FREQUENCY: FREQUENCY: CONTINUOUS

## 2018-10-03 ASSESSMENT — PAIN SCALES - GENERAL: PAINLEVEL_OUTOF10: 5

## 2018-10-04 ENCOUNTER — OFFICE VISIT (OUTPATIENT)
Dept: GASTROENTEROLOGY | Age: 73
End: 2018-10-04
Payer: MEDICARE

## 2018-10-04 VITALS
WEIGHT: 220.1 LBS | SYSTOLIC BLOOD PRESSURE: 128 MMHG | DIASTOLIC BLOOD PRESSURE: 75 MMHG | BODY MASS INDEX: 29.85 KG/M2 | HEART RATE: 69 BPM

## 2018-10-04 DIAGNOSIS — E66.8 MODERATE OBESITY: ICD-10-CM

## 2018-10-04 DIAGNOSIS — R19.7 DIARRHEA, UNSPECIFIED TYPE: Primary | ICD-10-CM

## 2018-10-04 DIAGNOSIS — Z86.19 HX OF CLOSTRIDIUM DIFFICILE INFECTION: ICD-10-CM

## 2018-10-04 DIAGNOSIS — K58.0 IRRITABLE BOWEL SYNDROME WITH DIARRHEA: ICD-10-CM

## 2018-10-04 PROCEDURE — 3017F COLORECTAL CA SCREEN DOC REV: CPT | Performed by: INTERNAL MEDICINE

## 2018-10-04 PROCEDURE — 99214 OFFICE O/P EST MOD 30 MIN: CPT | Performed by: INTERNAL MEDICINE

## 2018-10-04 PROCEDURE — 1101F PT FALLS ASSESS-DOCD LE1/YR: CPT | Performed by: INTERNAL MEDICINE

## 2018-10-04 PROCEDURE — G8484 FLU IMMUNIZE NO ADMIN: HCPCS | Performed by: INTERNAL MEDICINE

## 2018-10-04 PROCEDURE — G8427 DOCREV CUR MEDS BY ELIG CLIN: HCPCS | Performed by: INTERNAL MEDICINE

## 2018-10-04 PROCEDURE — G8417 CALC BMI ABV UP PARAM F/U: HCPCS | Performed by: INTERNAL MEDICINE

## 2018-10-04 RX ORDER — OMEPRAZOLE 20 MG/1
CAPSULE, DELAYED RELEASE ORAL
Qty: 30 CAPSULE | Refills: 2 | Status: SHIPPED | OUTPATIENT
Start: 2018-10-04 | End: 2018-12-29 | Stop reason: SDUPTHER

## 2018-10-04 ASSESSMENT — ENCOUNTER SYMPTOMS
RECTAL PAIN: 0
SORE THROAT: 0
EYES NEGATIVE: 1
COUGH: 0
ALLERGIC/IMMUNOLOGIC NEGATIVE: 1
CHOKING: 0
ABDOMINAL PAIN: 0
DIARRHEA: 1
BLOOD IN STOOL: 0
BACK PAIN: 1
TROUBLE SWALLOWING: 0
SINUS PRESSURE: 0
RESPIRATORY NEGATIVE: 1
NAUSEA: 1
WHEEZING: 0
VOICE CHANGE: 0
ANAL BLEEDING: 0
CONSTIPATION: 0
ABDOMINAL DISTENTION: 0
VOMITING: 0

## 2018-10-04 NOTE — PROGRESS NOTES
Pulmonary/Chest: Effort normal and breath sounds normal.   Abdominal: Soft. Bowel sounds are normal.   NON TENDER, NON DISTENTED  LIVER SPLEEN AND HERNIAS ARE NOT  PALPABLE  BOWEL SOUNDS ARE POSITIVE   bolated    Genitourinary: Rectum normal.   Musculoskeletal: Normal range of motion. Neurological: He is alert and oriented to person, place, and time. He has normal reflexes. Skin: Skin is warm. Assessment:      As above      Plan:      Check for C diff    If negative plan Colonoscopy     Pt was advised in detail about some life style and dietary modifications. He was advised about avoidance of caffeine, nicotine and chocolate. Pt was also told to stay away from any kind of fast foods, soda pops. He was also advised to avoid lots of spices, grease and fried food etc.     Instructions were also given about trying to arrange the timing, quality and quantity of food. Instructions were given about using ample amount of fiber including dietary and supplemental fiber either metamucil, bennafiber or citrucell etc.  Pt was advised about drinking ample amount of water without any colors or chemicals. Stress was given about regular exercise. Pt has verbalized understanding and agreement to these modifications.       The patient was instructed to start taking some OTC Probiotics products   These are available over the counter at the Pharmacy stores and Grocery stores  He was explained about the beneficial effects they have in the GI track  They will help to establish the good bacterial maxi and will help with the digestion and bowel movements  The patient has verbalized understanding and agreement to this plan

## 2018-10-09 ENCOUNTER — HOSPITAL ENCOUNTER (OUTPATIENT)
Age: 73
Discharge: HOME | End: 2018-10-09
Payer: MEDICARE

## 2018-10-10 ENCOUNTER — HOSPITAL ENCOUNTER (OUTPATIENT)
Age: 73
Discharge: HOME | End: 2018-10-10
Payer: MEDICARE

## 2018-10-10 DIAGNOSIS — Z86.19 HX OF CLOSTRIDIUM DIFFICILE INFECTION: ICD-10-CM

## 2018-10-10 DIAGNOSIS — I25.810 CORONARY ARTERY DISEASE INVOLVING CORONARY BYPASS GRAFT OF NATIVE HEART WITHOUT ANGINA PECTORIS: ICD-10-CM

## 2018-10-10 DIAGNOSIS — R19.7 DIARRHEA, UNSPECIFIED TYPE: ICD-10-CM

## 2018-10-10 DIAGNOSIS — E66.8 MODERATE OBESITY: ICD-10-CM

## 2018-10-10 DIAGNOSIS — K58.0 IRRITABLE BOWEL SYNDROME WITH DIARRHEA: ICD-10-CM

## 2018-10-10 DIAGNOSIS — E78.5 HYPERLIPIDEMIA, UNSPECIFIED HYPERLIPIDEMIA TYPE: ICD-10-CM

## 2018-10-10 LAB
ALBUMIN SERPL-MCNC: 4.1 G/DL (ref 3.5–5.2)
ALBUMIN/GLOBULIN RATIO: ABNORMAL (ref 1–2.5)
ALP BLD-CCNC: 98 U/L (ref 40–129)
ALT SERPL-CCNC: 23 U/L (ref 5–41)
ANION GAP SERPL CALCULATED.3IONS-SCNC: 10 MMOL/L (ref 9–17)
AST SERPL-CCNC: 20 U/L
BILIRUB SERPL-MCNC: 0.36 MG/DL (ref 0.3–1.2)
BUN BLDV-MCNC: 25 MG/DL (ref 8–23)
BUN/CREAT BLD: ABNORMAL (ref 9–20)
CALCIUM SERPL-MCNC: 8.8 MG/DL (ref 8.6–10.4)
CHLORIDE BLD-SCNC: 106 MMOL/L (ref 98–107)
CHOLESTEROL/HDL RATIO: 2.9
CHOLESTEROL: 106 MG/DL
CO2: 27 MMOL/L (ref 20–31)
CREAT SERPL-MCNC: 1.33 MG/DL (ref 0.7–1.2)
CULTURE: NORMAL
DIRECT EXAM: NORMAL
GFR AFRICAN AMERICAN: >60 ML/MIN
GFR NON-AFRICAN AMERICAN: 53 ML/MIN
GFR SERPL CREATININE-BSD FRML MDRD: ABNORMAL ML/MIN/{1.73_M2}
GFR SERPL CREATININE-BSD FRML MDRD: ABNORMAL ML/MIN/{1.73_M2}
GLUCOSE BLD-MCNC: 101 MG/DL (ref 70–99)
HDLC SERPL-MCNC: 36 MG/DL
LDL CHOLESTEROL: 53 MG/DL (ref 0–130)
Lab: NORMAL
Lab: NORMAL
POTASSIUM SERPL-SCNC: 4 MMOL/L (ref 3.7–5.3)
SODIUM BLD-SCNC: 143 MMOL/L (ref 135–144)
SPECIMEN DESCRIPTION: NORMAL
SPECIMEN DESCRIPTION: NORMAL
STATUS: NORMAL
STATUS: NORMAL
TOTAL PROTEIN: 6.9 G/DL (ref 6.4–8.3)
TRIGL SERPL-MCNC: 84 MG/DL
VLDLC SERPL CALC-MCNC: ABNORMAL MG/DL (ref 1–30)

## 2018-10-10 PROCEDURE — 80061 LIPID PANEL: CPT

## 2018-10-10 PROCEDURE — 87493 C DIFF AMPLIFIED PROBE: CPT

## 2018-10-10 PROCEDURE — 87505 NFCT AGENT DETECTION GI: CPT

## 2018-10-10 PROCEDURE — 36415 COLL VENOUS BLD VENIPUNCTURE: CPT

## 2018-10-10 PROCEDURE — 87329 GIARDIA AG IA: CPT

## 2018-10-10 PROCEDURE — 80053 COMPREHEN METABOLIC PANEL: CPT

## 2018-10-10 PROCEDURE — 87328 CRYPTOSPORIDIUM AG IA: CPT

## 2018-10-11 LAB
C DIFFICILE TOXINS, PCR: NORMAL
CAMPYLOBACTER PCR: NORMAL
DIRECT EXAM: NORMAL
Lab: NORMAL
SALMONELLA PCR: NORMAL
SHIGATOXIN GENE PCR: NORMAL
SHIGELLA SP PCR: NORMAL
SPECIMEN DESCRIPTION: NORMAL
SPECIMEN DESCRIPTION: NORMAL
SPECIMEN: NORMAL
STATUS: NORMAL

## 2018-10-18 RX ORDER — SIMVASTATIN 20 MG
TABLET ORAL
Qty: 30 TABLET | Refills: 2 | Status: SHIPPED | OUTPATIENT
Start: 2018-10-18 | End: 2019-01-14 | Stop reason: SDUPTHER

## 2018-10-30 DIAGNOSIS — E11.42 DIABETIC POLYNEUROPATHY ASSOCIATED WITH TYPE 2 DIABETES MELLITUS (HCC): ICD-10-CM

## 2018-11-01 ENCOUNTER — HOSPITAL ENCOUNTER (OUTPATIENT)
Dept: PAIN MANAGEMENT | Age: 73
Discharge: HOME | End: 2018-11-01
Payer: MEDICARE

## 2018-11-01 VITALS
HEART RATE: 64 BPM | DIASTOLIC BLOOD PRESSURE: 69 MMHG | TEMPERATURE: 98 F | OXYGEN SATURATION: 98 % | WEIGHT: 220 LBS | RESPIRATION RATE: 16 BRPM | BODY MASS INDEX: 29.8 KG/M2 | SYSTOLIC BLOOD PRESSURE: 122 MMHG | HEIGHT: 72 IN

## 2018-11-01 DIAGNOSIS — M47.899 FACET SYNDROME: ICD-10-CM

## 2018-11-01 DIAGNOSIS — M00.862 ARTHRITIS OF LEFT KNEE DUE TO OTHER BACTERIA (HCC): ICD-10-CM

## 2018-11-01 DIAGNOSIS — G89.29 ENCOUNTER FOR CHRONIC PAIN MANAGEMENT: ICD-10-CM

## 2018-11-01 DIAGNOSIS — M50.30 DDD (DEGENERATIVE DISC DISEASE), CERVICAL: ICD-10-CM

## 2018-11-01 DIAGNOSIS — K59.03 DRUG-INDUCED CONSTIPATION: ICD-10-CM

## 2018-11-01 DIAGNOSIS — E11.610 CHARCOT FOOT DUE TO DIABETES MELLITUS (HCC): ICD-10-CM

## 2018-11-01 DIAGNOSIS — Z51.81 MEDICATION MONITORING ENCOUNTER: ICD-10-CM

## 2018-11-01 DIAGNOSIS — M47.26 OSTEOARTHRITIS OF SPINE WITH RADICULOPATHY, LUMBAR REGION: Primary | ICD-10-CM

## 2018-11-01 DIAGNOSIS — E08.41 DIABETIC MONONEUROPATHY ASSOCIATED WITH DIABETES MELLITUS DUE TO UNDERLYING CONDITION (HCC): ICD-10-CM

## 2018-11-01 DIAGNOSIS — M47.816 SPONDYLOSIS OF LUMBAR REGION WITHOUT MYELOPATHY OR RADICULOPATHY: ICD-10-CM

## 2018-11-01 DIAGNOSIS — M51.36 DDD (DEGENERATIVE DISC DISEASE), LUMBAR: ICD-10-CM

## 2018-11-01 PROCEDURE — 99213 OFFICE O/P EST LOW 20 MIN: CPT

## 2018-11-01 PROCEDURE — 99213 OFFICE O/P EST LOW 20 MIN: CPT | Performed by: NURSE PRACTITIONER

## 2018-11-01 RX ORDER — FENTANYL 25 UG/H
1 PATCH TRANSDERMAL
Qty: 10 PATCH | Refills: 0 | Status: SHIPPED | OUTPATIENT
Start: 2018-11-07 | End: 2018-12-05 | Stop reason: SDUPTHER

## 2018-11-01 RX ORDER — INSULIN GLARGINE 100 [IU]/ML
INJECTION, SOLUTION SUBCUTANEOUS
Qty: 15 ML | Refills: 1 | Status: SHIPPED | OUTPATIENT
Start: 2018-11-01 | End: 2018-12-16 | Stop reason: SDUPTHER

## 2018-11-01 ASSESSMENT — ENCOUNTER SYMPTOMS
EYES NEGATIVE: 1
BACK PAIN: 1
DIARRHEA: 1
RESPIRATORY NEGATIVE: 1

## 2018-11-01 NOTE — PROGRESS NOTES
appropriate:yes      Record/Diagnostics Review:      As above, I did review the imaging      4/10/2018 11:26 AM - Jayden, M[facility]pn Incoming Lab Results From [facility]     Component Results     Component Value Ref Range & Units Status Collected Lab   Pain Management Drug Panel Interp, Urine Consistent   Final 04/05/2018  8:30 AM [facility]   (NOTE)   ________________________________________________________________   DRUGS EXPECTED:   FENTANYL   PERCOCET (OXYCODONE) [4/4/18]   ________________________________________________________________   CONSISTENT with medications provided:   FENTANYL: based on fentanyl, norfentanyl   PERCOCET (OXYCODONE) : based on oxycodone, noroxycodone   ________________________________________________________________   Drugs Not Included in this Assay:   Acetaminophen   ________________________________________________________________   INTERPRETIVE INFORMATION: Pain Mgt [place], Mass Spec/EMIT, Ur,                            Interp   Interpretation depends on accuracy and completeness of patient   medication information submitted by client. 6-Acetylmorphine, Ur Not Detected   Final 04/05/2018  8:30 AM [facility]   7-Aminoclonazepam, Urine Not Detected   Final 04/05/2018  8:30 AM [facility]   Alpha-OH-Alpraz, Urine Not Detected   Final 04/05/2018  8:30 AM [facility]   Alprazolam, Urine Not Detected   Final 04/05/2018  8:30 AM [facility]   Amphetamines, urine Not Detected   Final 04/05/2018  8:30 AM [facility]   Barbiturates, Ur Not Detected   Final 04/05/2018  8:30 AM [facility]   Benzoylecgonine, Ur Not Detected   Final 04/05/2018  8:30 AM [facility]   Buprenorphine Urine Not Detected   Final 04/05/2018  8:30 AM [facility]   Carisoprodol, Ur Not Detected   Final 04/05/2018  8:30 AM [facility]   (NOTE)   The carisoprodol immunoassay has cross-reactivity to carisoprodol   and meprobamate.     Clonazepam, Urine Not Detected   Final 04/05/2018  8:30 AM [facility]   Codeine, Urine Not Detected   Final 04/05/2018  8:30 AM [facility]   MDA, Ur Not Detected   Final 04/05/2018 Res Interp Ur See Note   Final 04/05/2018  8:30 AM [facility]   (NOTE)   Access [facility] Enhanced Report using either link below:   -Direct access: [email or web address]   -Enter Username, Password: [email or web address]   Username: [email or web address] Password: [email or web address]   Performed by [place] [address], [address] [facility] [phone]   www. Aleisha Lassiter MD, Lab. Director   Performed at [address] [place] [address] Dr Karely Rios.    [address] [phone]          Past Medical History:   Diagnosis Date    Abdominal pain     Benign prostatic hypertrophy     C. difficile colitis     CAD (coronary artery disease) 1/3/12    s/p CABGx3    Constipation     Diabetes mellitus (HCC)     Diarrhea     Diarrhea     DVT (deep venous thrombosis) (HCC)     left calf    Ejection fraction < 50%     Gallstones     Heartburn     Hx of blood clots     Hyperlipidemia     Kidney stones     MI (myocardial infarction) ([facility]     2011    Mitral regurgitation     MRSA (methicillin resistant staph aureus) culture positive     Numbness and tingling     hands and feet    Rib fractures 1-2015    right    Wears glasses     readers       Past Surgical History:   Procedure Laterality Date    APPENDECTOMY      CARDIAC CATHETERIZATION  12/29/11    CHOLECYSTECTOMY      COLONOSCOPY  01/11/2012    wnl, 10 yr recall    CORONARY ANGIOPLASTY WITH STENT PLACEMENT      x 1    CORONARY ARTERY BYPASS GRAFT  1/3/12    x3    KNEE ARTHROSCOPY      left    KNEE SURGERY Left     I&D    OTHER SURGICAL HISTORY Left 3/7/2016    plantar plane &  exostectomy medial foot left    TONSILLECTOMY      UPPER GASTROINTESTINAL ENDOSCOPY  11-3-15    VENA CAVA FILTER PLACEMENT      WRIST SURGERY      I&D       Allergies   Allergen Reactions    Flagyl [Metronidazole] Hives    Metronidazole      Other reaction(s): Vomiting         Current Outpatient Prescriptions:     Probiotic Product thought content normal. Cognition and memory are normal.         Assessment:    Problem List Items Addressed This Visit     DDD (degenerative disc disease), lumbar    Relevant Medications    fentaNYL (DURAGESIC) 25 MCG/HR (Start on 11/7/2018)    Osteoarthritis of spine with radiculopathy, lumbar region - Primary    Relevant Medications    fentaNYL (DURAGESIC) 25 MCG/HR (Start on 11/7/2018)    DDD (degenerative disc disease), cervical    Relevant Medications    fentaNYL (DURAGESIC) 25 MCG/HR (Start on 11/7/2018)    Facet syndrome ([address]    Relevant Medications    fentaNYL (DURAGESIC) 25 MCG/HR (Start on 11/7/2018)    Encounter for chronic pain management    Relevant Medications    fentaNYL ([address]) 25 MCG/HR (Start on 11/7/2018)    Spondylosis of lumbar region without myelopathy or radiculopathy    Relevant Medications    fentaNYL (DURAGESIC) 25 MCG/HR (Start on 11/7/2018)    Arthritis, septic ([address]    Relevant Medications    fentaNYL (DURAGESIC) 25 MCG/HR (Start on 11/7/2018)    Charcot foot due to diabetes mellitus ([address]    Relevant Medications    fentaNYL ([address]) 25 MCG/HR (Start on 11/7/2018)    Diabetic mononeuropathy associated with diabetes mellitus due to underlying condition ([address]    Relevant Medications    fentaNYL ([address]) 25 MCG/HR (Start on 11/7/2018)    Drug-induced constipation    Relevant Medications    fentaNYL (DURAGESIC) 25 MCG/HR (Start on 11/7/2018)              Treatment Plan:  DISCUSSION: Treatment options discussed withpatient and all questions answered to patient's satisfaction.      Possible side effects, risk of tolerance and or dependence and alternative treatments discussed    Obtaining appropriate analgesic effect of treatment   No signs of potential drug abuse or diversion identified    [x] Ill effects of being on chronic pain medications such as sleepdisturbances, respiratory depression, hormonal changes, withdrawal symptoms, chronic opioid dependence and tolerance as well as

## 2018-11-02 ENCOUNTER — NURSE ONLY (OUTPATIENT)
Dept: PODIATRY | Age: 73
End: 2018-11-02
Payer: MEDICARE

## 2018-11-02 ENCOUNTER — TELEPHONE (OUTPATIENT)
Dept: GASTROENTEROLOGY | Age: 73
End: 2018-11-02

## 2018-11-02 DIAGNOSIS — M21.969 FOOT DEFORMITY, UNSPECIFIED LATERALITY: ICD-10-CM

## 2018-11-02 DIAGNOSIS — Z79.4 TYPE 2 DIABETES MELLITUS WITH DIABETIC POLYNEUROPATHY, WITH LONG-TERM CURRENT USE OF INSULIN (HCC): Primary | ICD-10-CM

## 2018-11-02 DIAGNOSIS — R26.81 GAIT INSTABILITY: ICD-10-CM

## 2018-11-02 DIAGNOSIS — E11.42 TYPE 2 DIABETES MELLITUS WITH DIABETIC POLYNEUROPATHY, WITH LONG-TERM CURRENT USE OF INSULIN (HCC): Primary | ICD-10-CM

## 2018-11-02 PROCEDURE — A5513 MULTI DEN INSERT CUSTOM MOLD: HCPCS | Performed by: PODIATRIST

## 2018-11-02 PROCEDURE — A5500 DIAB SHOE FOR DENSITY INSERT: HCPCS | Performed by: PODIATRIST

## 2018-11-15 ENCOUNTER — OFFICE VISIT (OUTPATIENT)
Dept: PODIATRY | Age: 73
End: 2018-11-15
Payer: MEDICARE

## 2018-11-15 VITALS — WEIGHT: 212 LBS | HEIGHT: 72 IN | BODY MASS INDEX: 28.71 KG/M2

## 2018-11-15 DIAGNOSIS — Z79.4 TYPE 2 DIABETES MELLITUS WITH DIABETIC POLYNEUROPATHY, WITH LONG-TERM CURRENT USE OF INSULIN (HCC): ICD-10-CM

## 2018-11-15 DIAGNOSIS — M14.672 CHARCOT'S JOINT OF LEFT FOOT: ICD-10-CM

## 2018-11-15 DIAGNOSIS — E11.42 TYPE 2 DIABETES MELLITUS WITH DIABETIC POLYNEUROPATHY, WITH LONG-TERM CURRENT USE OF INSULIN (HCC): ICD-10-CM

## 2018-11-15 DIAGNOSIS — B35.1 ONYCHOMYCOSIS: Primary | ICD-10-CM

## 2018-11-15 PROCEDURE — 11721 DEBRIDE NAIL 6 OR MORE: CPT | Performed by: PODIATRIST

## 2018-11-15 PROCEDURE — 99999 PR OFFICE/OUTPT VISIT,PROCEDURE ONLY: CPT | Performed by: PODIATRIST

## 2018-11-15 RX ORDER — FUROSEMIDE 40 MG/1
TABLET ORAL
Qty: 60 TABLET | Refills: 1 | Status: SHIPPED | OUTPATIENT
Start: 2018-11-15 | End: 2019-01-14 | Stop reason: SDUPTHER

## 2018-11-15 ASSESSMENT — ENCOUNTER SYMPTOMS
CONSTIPATION: 0
DIARRHEA: 0
COLOR CHANGE: 0
NAUSEA: 0
VOMITING: 0

## 2018-11-15 NOTE — PROGRESS NOTES
vibration using a 128-Hz tuning fork, ankle reflexes, visual skin inspection, vascular exam including assessment of pedal pulses, orthopedic exam for deformities, and shoe inspection. Increased risk factors noted on the diabetic foot exam include decreased sensory exam and peripheral neuropathy. Shoegear inspected and found to be appropriate size and wear. No orders of the defined types were placed in this encounter. Follow up 9 weeks.

## 2018-11-27 ENCOUNTER — TELEPHONE (OUTPATIENT)
Dept: GASTROENTEROLOGY | Age: 73
End: 2018-11-27

## 2018-11-27 ENCOUNTER — OFFICE VISIT (OUTPATIENT)
Dept: FAMILY MEDICINE CLINIC | Age: 73
End: 2018-11-27
Payer: MEDICARE

## 2018-11-27 ENCOUNTER — ANESTHESIA EVENT (OUTPATIENT)
Dept: OPERATING ROOM | Age: 73
End: 2018-11-27
Payer: MEDICARE

## 2018-11-27 VITALS
DIASTOLIC BLOOD PRESSURE: 62 MMHG | WEIGHT: 222 LBS | HEIGHT: 72 IN | TEMPERATURE: 98.7 F | BODY MASS INDEX: 30.07 KG/M2 | SYSTOLIC BLOOD PRESSURE: 110 MMHG | HEART RATE: 88 BPM

## 2018-11-27 DIAGNOSIS — E78.5 HYPERLIPIDEMIA, UNSPECIFIED HYPERLIPIDEMIA TYPE: ICD-10-CM

## 2018-11-27 DIAGNOSIS — Z79.4 TYPE 2 DIABETES MELLITUS WITH DIABETIC POLYNEUROPATHY, WITH LONG-TERM CURRENT USE OF INSULIN (HCC): Primary | ICD-10-CM

## 2018-11-27 DIAGNOSIS — M47.899 FACET SYNDROME: ICD-10-CM

## 2018-11-27 DIAGNOSIS — E11.42 TYPE 2 DIABETES MELLITUS WITH DIABETIC POLYNEUROPATHY, WITH LONG-TERM CURRENT USE OF INSULIN (HCC): Primary | ICD-10-CM

## 2018-11-27 DIAGNOSIS — I25.810 CORONARY ARTERY DISEASE INVOLVING CORONARY BYPASS GRAFT OF NATIVE HEART WITHOUT ANGINA PECTORIS: ICD-10-CM

## 2018-11-27 PROCEDURE — G8417 CALC BMI ABV UP PARAM F/U: HCPCS | Performed by: FAMILY MEDICINE

## 2018-11-27 PROCEDURE — 1036F TOBACCO NON-USER: CPT | Performed by: FAMILY MEDICINE

## 2018-11-27 PROCEDURE — G8427 DOCREV CUR MEDS BY ELIG CLIN: HCPCS | Performed by: FAMILY MEDICINE

## 2018-11-27 PROCEDURE — G8598 ASA/ANTIPLAT THER USED: HCPCS | Performed by: FAMILY MEDICINE

## 2018-11-27 PROCEDURE — 3044F HG A1C LEVEL LT 7.0%: CPT | Performed by: FAMILY MEDICINE

## 2018-11-27 PROCEDURE — 3017F COLORECTAL CA SCREEN DOC REV: CPT | Performed by: FAMILY MEDICINE

## 2018-11-27 PROCEDURE — 1123F ACP DISCUSS/DSCN MKR DOCD: CPT | Performed by: FAMILY MEDICINE

## 2018-11-27 PROCEDURE — 99213 OFFICE O/P EST LOW 20 MIN: CPT | Performed by: FAMILY MEDICINE

## 2018-11-27 PROCEDURE — 2022F DILAT RTA XM EVC RTNOPTHY: CPT | Performed by: FAMILY MEDICINE

## 2018-11-27 PROCEDURE — G8484 FLU IMMUNIZE NO ADMIN: HCPCS | Performed by: FAMILY MEDICINE

## 2018-11-27 PROCEDURE — 1101F PT FALLS ASSESS-DOCD LE1/YR: CPT | Performed by: FAMILY MEDICINE

## 2018-11-27 PROCEDURE — G8510 SCR DEP NEG, NO PLAN REQD: HCPCS | Performed by: FAMILY MEDICINE

## 2018-11-27 PROCEDURE — 4040F PNEUMOC VAC/ADMIN/RCVD: CPT | Performed by: FAMILY MEDICINE

## 2018-11-27 ASSESSMENT — PATIENT HEALTH QUESTIONNAIRE - PHQ9
2. FEELING DOWN, DEPRESSED OR HOPELESS: 0
1. LITTLE INTEREST OR PLEASURE IN DOING THINGS: 0
SUM OF ALL RESPONSES TO PHQ9 QUESTIONS 1 & 2: 0
SUM OF ALL RESPONSES TO PHQ QUESTIONS 1-9: 0
SUM OF ALL RESPONSES TO PHQ QUESTIONS 1-9: 0

## 2018-11-27 ASSESSMENT — ENCOUNTER SYMPTOMS
SHORTNESS OF BREATH: 0
DIARRHEA: 0
CONSTIPATION: 0
ABDOMINAL PAIN: 0
COUGH: 0
NAUSEA: 0
SORE THROAT: 0

## 2018-11-28 ENCOUNTER — ANESTHESIA (OUTPATIENT)
Dept: OPERATING ROOM | Age: 73
End: 2018-11-28
Payer: MEDICARE

## 2018-11-28 ENCOUNTER — HOSPITAL ENCOUNTER (OUTPATIENT)
Age: 73
Setting detail: OUTPATIENT SURGERY
Discharge: HOME | End: 2018-11-28
Attending: INTERNAL MEDICINE | Admitting: INTERNAL MEDICINE
Payer: MEDICARE

## 2018-11-28 VITALS — DIASTOLIC BLOOD PRESSURE: 55 MMHG | OXYGEN SATURATION: 97 % | SYSTOLIC BLOOD PRESSURE: 96 MMHG

## 2018-11-28 VITALS
HEIGHT: 72 IN | HEART RATE: 59 BPM | DIASTOLIC BLOOD PRESSURE: 112 MMHG | RESPIRATION RATE: 9 BRPM | OXYGEN SATURATION: 100 % | WEIGHT: 219 LBS | SYSTOLIC BLOOD PRESSURE: 138 MMHG | TEMPERATURE: 97.2 F | BODY MASS INDEX: 29.66 KG/M2

## 2018-11-28 LAB — GLUCOSE BLD-MCNC: 79 MG/DL (ref 75–110)

## 2018-11-28 PROCEDURE — 3700000000 HC ANESTHESIA ATTENDED CARE: Performed by: INTERNAL MEDICINE

## 2018-11-28 PROCEDURE — 3609027000 HC COLONOSCOPY: Performed by: INTERNAL MEDICINE

## 2018-11-28 PROCEDURE — 2709999900 HC NON-CHARGEABLE SUPPLY: Performed by: INTERNAL MEDICINE

## 2018-11-28 PROCEDURE — 45330 DIAGNOSTIC SIGMOIDOSCOPY: CPT | Performed by: INTERNAL MEDICINE

## 2018-11-28 PROCEDURE — 2500000003 HC RX 250 WO HCPCS: Performed by: NURSE ANESTHETIST, CERTIFIED REGISTERED

## 2018-11-28 PROCEDURE — 7100000010 HC PHASE II RECOVERY - FIRST 15 MIN: Performed by: INTERNAL MEDICINE

## 2018-11-28 PROCEDURE — 6360000002 HC RX W HCPCS: Performed by: NURSE ANESTHETIST, CERTIFIED REGISTERED

## 2018-11-28 PROCEDURE — 2580000003 HC RX 258: Performed by: NURSE ANESTHETIST, CERTIFIED REGISTERED

## 2018-11-28 PROCEDURE — 2580000003 HC RX 258: Performed by: ANESTHESIOLOGY

## 2018-11-28 PROCEDURE — 7100000011 HC PHASE II RECOVERY - ADDTL 15 MIN: Performed by: INTERNAL MEDICINE

## 2018-11-28 PROCEDURE — 82947 ASSAY GLUCOSE BLOOD QUANT: CPT

## 2018-11-28 PROCEDURE — 3700000001 HC ADD 15 MINUTES (ANESTHESIA): Performed by: INTERNAL MEDICINE

## 2018-11-28 RX ORDER — LIDOCAINE HYDROCHLORIDE 10 MG/ML
1 INJECTION, SOLUTION EPIDURAL; INFILTRATION; INTRACAUDAL; PERINEURAL
Status: DISCONTINUED | OUTPATIENT
Start: 2018-11-29 | End: 2018-11-28 | Stop reason: HOSPADM

## 2018-11-28 RX ORDER — SODIUM CHLORIDE, SODIUM LACTATE, POTASSIUM CHLORIDE, CALCIUM CHLORIDE 600; 310; 30; 20 MG/100ML; MG/100ML; MG/100ML; MG/100ML
INJECTION, SOLUTION INTRAVENOUS CONTINUOUS
Status: DISCONTINUED | OUTPATIENT
Start: 2018-11-29 | End: 2018-11-28 | Stop reason: HOSPADM

## 2018-11-28 RX ORDER — FENTANYL CITRATE 50 UG/ML
25 INJECTION, SOLUTION INTRAMUSCULAR; INTRAVENOUS EVERY 5 MIN PRN
Status: DISCONTINUED | OUTPATIENT
Start: 2018-11-28 | End: 2018-11-28 | Stop reason: HOSPADM

## 2018-11-28 RX ORDER — LIDOCAINE HYDROCHLORIDE 20 MG/ML
INJECTION, SOLUTION EPIDURAL; INFILTRATION; INTRACAUDAL; PERINEURAL PRN
Status: DISCONTINUED | OUTPATIENT
Start: 2018-11-28 | End: 2018-11-28 | Stop reason: SDUPTHER

## 2018-11-28 RX ORDER — SODIUM CHLORIDE, SODIUM LACTATE, POTASSIUM CHLORIDE, CALCIUM CHLORIDE 600; 310; 30; 20 MG/100ML; MG/100ML; MG/100ML; MG/100ML
INJECTION, SOLUTION INTRAVENOUS CONTINUOUS PRN
Status: DISCONTINUED | OUTPATIENT
Start: 2018-11-28 | End: 2018-11-28 | Stop reason: SDUPTHER

## 2018-11-28 RX ORDER — PROPOFOL 10 MG/ML
INJECTION, EMULSION INTRAVENOUS PRN
Status: DISCONTINUED | OUTPATIENT
Start: 2018-11-28 | End: 2018-11-28 | Stop reason: SDUPTHER

## 2018-11-28 RX ORDER — ONDANSETRON 2 MG/ML
4 INJECTION INTRAMUSCULAR; INTRAVENOUS
Status: DISCONTINUED | OUTPATIENT
Start: 2018-11-28 | End: 2018-11-28 | Stop reason: HOSPADM

## 2018-11-28 RX ORDER — FENTANYL CITRATE 50 UG/ML
50 INJECTION, SOLUTION INTRAMUSCULAR; INTRAVENOUS EVERY 5 MIN PRN
Status: DISCONTINUED | OUTPATIENT
Start: 2018-11-28 | End: 2018-11-28 | Stop reason: HOSPADM

## 2018-11-28 RX ADMIN — PROPOFOL 30 MG: 10 INJECTION, EMULSION INTRAVENOUS at 09:43

## 2018-11-28 RX ADMIN — PROPOFOL 80 MG: 10 INJECTION, EMULSION INTRAVENOUS at 09:40

## 2018-11-28 RX ADMIN — LIDOCAINE HYDROCHLORIDE 100 MG: 20 INJECTION, SOLUTION EPIDURAL; INFILTRATION; INTRACAUDAL; PERINEURAL at 09:40

## 2018-11-28 RX ADMIN — SODIUM CHLORIDE, POTASSIUM CHLORIDE, SODIUM LACTATE AND CALCIUM CHLORIDE: 600; 310; 30; 20 INJECTION, SOLUTION INTRAVENOUS at 09:03

## 2018-11-28 RX ADMIN — SODIUM CHLORIDE, POTASSIUM CHLORIDE, SODIUM LACTATE AND CALCIUM CHLORIDE: 600; 310; 30; 20 INJECTION, SOLUTION INTRAVENOUS at 09:39

## 2018-11-28 ASSESSMENT — PULMONARY FUNCTION TESTS
PIF_VALUE: 1

## 2018-11-28 ASSESSMENT — PAIN SCALES - GENERAL
PAINLEVEL_OUTOF10: 0

## 2018-11-28 ASSESSMENT — PAIN - FUNCTIONAL ASSESSMENT: PAIN_FUNCTIONAL_ASSESSMENT: 0-10

## 2018-11-28 NOTE — H&P
muscle aches, swelling of joints  NEUROLOGICAL:  negative for headaches, dizziness, lightheadedness, numbness, pain, tingling extremities  BEHAVIOR/PSYCH:  negative for depression, anxiety    Physical Exam:   /62   Pulse 67   Temp 98.4 °F (36.9 °C) (Oral)   Resp 18   Ht 6' (1.829 m)   Wt 219 lb (99.3 kg)   SpO2 98%   BMI 29.70 kg/m²   No results for input(s): POCGLU in the last 72 hours. General Appearance:  alert, well appearing, and in no acute distress  Mental status: oriented to person, place, and time with normal affect  Head:  normocephalic, atraumatic. Eye: no icterus, redness, pupils equal and reactive, extraocular eye movements intact, conjunctiva clear  Ear: normal external ear, no discharge, hearing intact  Nose:  no drainage noted  Mouth: mucous membranes moist  Neck: supple, no carotid bruits, thyroid not palpable  Lungs: Bilateral equal air entry, clear to ausculation, no wheezing, rales or rhonchi, normal effort  Cardiovascular: normal rate, regular rhythm, no murmur, gallop, rub. Abdomen: Soft, nontender, nondistended, normal bowel sounds, no hepatomegaly or splenomegaly  Neurologic: There are no new focal motor or sensory deficits, normal muscle tone and bulk, no abnormal sensation, normal speech, cranial nerves II through XII grossly intact  Skin: No gross lesions, rashes, bruising or bleeding on exposed skin area  Extremities:  peripheral pulses palpable, no pedal edema or calf pain with palpation  Psych: normal affect     Investigations:      Laboratory Testing:  No results found for this or any previous visit (from the past 24 hour(s)). No results for input(s): HGB, HCT, WBC, MCV, PLATELET, NA, K, CL, CO2, BUN, CREATININE, GLUCOSE, INR, PROTIME, APTT, AST, ALT, LABALBU, HCG in the last 720 hours. Imaging/Diagnostics:      Diagnosis:      1. Diarrhea    Plans:     1.  Colonoscopy       TACOS Lozano CNP  11/28/2018  9:06 AM

## 2018-12-04 ENCOUNTER — NURSE ONLY (OUTPATIENT)
Dept: PODIATRY | Age: 73
End: 2018-12-04

## 2018-12-04 DIAGNOSIS — Z79.4 TYPE 2 DIABETES MELLITUS WITH DIABETIC POLYNEUROPATHY, WITH LONG-TERM CURRENT USE OF INSULIN (HCC): Primary | ICD-10-CM

## 2018-12-04 DIAGNOSIS — M21.969 FOOT DEFORMITY, UNSPECIFIED LATERALITY: ICD-10-CM

## 2018-12-04 DIAGNOSIS — E11.42 TYPE 2 DIABETES MELLITUS WITH DIABETIC POLYNEUROPATHY, WITHOUT LONG-TERM CURRENT USE OF INSULIN (HCC): ICD-10-CM

## 2018-12-04 DIAGNOSIS — E11.42 TYPE 2 DIABETES MELLITUS WITH DIABETIC POLYNEUROPATHY, WITH LONG-TERM CURRENT USE OF INSULIN (HCC): Primary | ICD-10-CM

## 2018-12-04 NOTE — PROGRESS NOTES
Dispense 1 pair of Diabetic Shoes with 3 pairs of custom inserts. (Already billed) Appropriate paperwork was obtained from the primary care physician and the patient was fitted in the office.

## 2018-12-05 ENCOUNTER — HOSPITAL ENCOUNTER (OUTPATIENT)
Dept: PAIN MANAGEMENT | Age: 73
Discharge: HOME | End: 2018-12-05
Payer: MEDICARE

## 2018-12-05 VITALS
SYSTOLIC BLOOD PRESSURE: 125 MMHG | HEART RATE: 71 BPM | DIASTOLIC BLOOD PRESSURE: 69 MMHG | TEMPERATURE: 98.3 F | OXYGEN SATURATION: 98 % | BODY MASS INDEX: 29.66 KG/M2 | HEIGHT: 72 IN | WEIGHT: 219 LBS | RESPIRATION RATE: 16 BRPM

## 2018-12-05 DIAGNOSIS — K59.03 DRUG-INDUCED CONSTIPATION: ICD-10-CM

## 2018-12-05 DIAGNOSIS — M47.899 FACET SYNDROME: ICD-10-CM

## 2018-12-05 DIAGNOSIS — E11.610 CHARCOT FOOT DUE TO DIABETES MELLITUS (HCC): Primary | ICD-10-CM

## 2018-12-05 DIAGNOSIS — M47.26 OSTEOARTHRITIS OF SPINE WITH RADICULOPATHY, LUMBAR REGION: ICD-10-CM

## 2018-12-05 DIAGNOSIS — G89.29 ENCOUNTER FOR CHRONIC PAIN MANAGEMENT: ICD-10-CM

## 2018-12-05 DIAGNOSIS — M00.862 ARTHRITIS OF LEFT KNEE DUE TO OTHER BACTERIA (HCC): ICD-10-CM

## 2018-12-05 DIAGNOSIS — E08.41 DIABETIC MONONEUROPATHY ASSOCIATED WITH DIABETES MELLITUS DUE TO UNDERLYING CONDITION (HCC): ICD-10-CM

## 2018-12-05 DIAGNOSIS — M47.816 SPONDYLOSIS OF LUMBAR REGION WITHOUT MYELOPATHY OR RADICULOPATHY: ICD-10-CM

## 2018-12-05 DIAGNOSIS — E09.41 DIABETIC MONONEUROPATHY ASSOCIATED WITH DRUG OR CHEMICAL INDUCED DIABETES MELLITUS (HCC): ICD-10-CM

## 2018-12-05 DIAGNOSIS — Z51.81 MEDICATION MONITORING ENCOUNTER: ICD-10-CM

## 2018-12-05 DIAGNOSIS — M50.30 DDD (DEGENERATIVE DISC DISEASE), CERVICAL: ICD-10-CM

## 2018-12-05 DIAGNOSIS — E11.42 DIABETIC PERIPHERAL NEUROPATHY ASSOCIATED WITH TYPE 2 DIABETES MELLITUS (HCC): ICD-10-CM

## 2018-12-05 DIAGNOSIS — M51.36 DDD (DEGENERATIVE DISC DISEASE), LUMBAR: ICD-10-CM

## 2018-12-05 PROCEDURE — 99213 OFFICE O/P EST LOW 20 MIN: CPT | Performed by: NURSE PRACTITIONER

## 2018-12-05 PROCEDURE — 99213 OFFICE O/P EST LOW 20 MIN: CPT

## 2018-12-05 RX ORDER — FENTANYL 25 UG/H
1 PATCH TRANSDERMAL
Qty: 10 PATCH | Refills: 0 | Status: SHIPPED | OUTPATIENT
Start: 2018-12-07 | End: 2019-01-04 | Stop reason: SDUPTHER

## 2018-12-05 RX ORDER — OXYCODONE HYDROCHLORIDE AND ACETAMINOPHEN 5; 325 MG/1; MG/1
1 TABLET ORAL EVERY 6 HOURS PRN
Qty: 120 TABLET | Refills: 0 | Status: SHIPPED | OUTPATIENT
Start: 2018-12-13 | End: 2019-03-04 | Stop reason: SDUPTHER

## 2018-12-05 ASSESSMENT — ENCOUNTER SYMPTOMS
RESPIRATORY NEGATIVE: 1
DIARRHEA: 1
EYES NEGATIVE: 1
BACK PAIN: 1

## 2018-12-05 NOTE — PROGRESS NOTES
[facility] PROGRESS NOTE      Patient here today to review Medication Agreement    Chief Complaint: low back pain    HPI: He c/o low back pain which is unchanged. He has had back pain for 10 years after he pulled a post out of the ground, States he fractured a vertebrae in his spine. No0 history of lumbar surgery. He has diabetic neuopathy. He sleeps well. No ED visits,    Back Pain   This is a chronic problem. The current episode started more than 1 year ago. The problem occurs constantly. The problem is unchanged. The pain is present in the lumbar spine. The quality of the pain is described as aching. Radiates to: down legs. The pain is at a severity of 5/10. The pain is moderate. The pain is the same all the time. Exacerbated by: nothing. Associated symptoms include numbness. (Neuropathy) He has tried analgesics for the symptoms. The treatment provided mild relief. Patient denies any new neurological symptoms. No bowel or bladder incontinence, no weakness, and no falling. Treatment goals:  Functional status: walk farther      Aberrancy:none   Analgesia:pain 5    Adverse  Effects :none has diarrhea    ADL;s : household tasks        Pill count: appropriate 34 percocet tabs left, brought narcan    Morphine equivalent dose as reported on OARRS:60  Attestation: The Prescription Monitoring Report for this patient was reviewed today. TACOS Cantu CNP)  Documentation: Obtaining appropriate analgesic effect of treatment., No signs of potential drug abuse or diversion identified. TACOS Cantu CNP)  Medication Contracts: Existing medication contract. TACOS Cantu CNP)  Review ofOARRS does not show any aberrant prescription behavior. Medication is helping the patient stay active. Patient denies any side effects and reports adequate analgesia. No sign of misuse/abuse.         When was thelast UDS:  4-5-18           Was the UDS appropriate:yes      Record/Diagnostics ([address]) 25 MCG/HR (Start on 12/7/2018)    oxyCODONE-acetaminophen (PERCOCET) 5-325 MG per tablet (Start on 12/13/2018)    Diabetic mononeuropathy associated with diabetes mellitus due to underlying condition ([facility] [address]    Relevant Medications    fentaNYL ([address]) 25 MCG/HR (Start on 12/7/2018)    oxyCODONE-acetaminophen (PERCOCET) 5-325 MG per tablet (Start on 12/13/2018)      Other Visit Diagnoses     Diabetic mononeuropathy associated with drug or chemical induced diabetes mellitus ([facility] [address]        Relevant Medications    oxyCODONE-acetaminophen (PERCOCET) 5-325 MG per tablet (Start on 12/13/2018)    Drug-induced constipation        Relevant Medications    fentaNYL (DURAGESIC) 25 MCG/HR (Start on 12/7/2018)    oxyCODONE-acetaminophen (PERCOCET) 5-325 MG per tablet (Start on 12/13/2018)    Medication monitoring encounter        Relevant Medications    fentaNYL (DURAGESIC) 25 MCG/HR (Start on 12/7/2018)    oxyCODONE-acetaminophen (PERCOCET) 5-325 MG per tablet (Start on 12/13/2018)    Diabetic peripheral neuropathy associated with type 2 diabetes mellitus ([facility] [address]        Relevant Medications    oxyCODONE-acetaminophen (PERCOCET) 5-325 MG per tablet (Start on 12/13/2018)            Treatment Plan:  DISCUSSION: Treatment options discussed withpatient and all questions answered to patient's satisfaction. Possible side effects, risk of tolerance and or dependence and alternative treatments discussed    Obtaining appropriate analgesic effect of treatment   No signs of potential drug abuse or diversion identified    [x] Ill effects of being on chronic pain medications such as sleepdisturbances, respiratory depression, hormonal changes, withdrawal symptoms, chronic opioid dependence and tolerance as well as risk of taking opioids with Benzodiazepines and taking opioids along with alcohol,  werediscussed with patient.  I had asked the patient to minimize medication use and utilize pain medications only for uncontrolled rest

## 2018-12-16 DIAGNOSIS — E11.42 DIABETIC POLYNEUROPATHY ASSOCIATED WITH TYPE 2 DIABETES MELLITUS (HCC): ICD-10-CM

## 2018-12-17 RX ORDER — INSULIN GLARGINE 100 [IU]/ML
INJECTION, SOLUTION SUBCUTANEOUS
Qty: 15 ML | Refills: 0 | Status: SHIPPED | OUTPATIENT
Start: 2018-12-17 | End: 2019-01-03 | Stop reason: SDUPTHER

## 2018-12-31 RX ORDER — OMEPRAZOLE 20 MG/1
CAPSULE, DELAYED RELEASE ORAL
Qty: 30 CAPSULE | Refills: 1 | Status: SHIPPED | OUTPATIENT
Start: 2018-12-31 | End: 2019-03-06 | Stop reason: SDUPTHER

## 2018-12-31 NOTE — TELEPHONE ENCOUNTER
Please Approve or Refuse.   Send to Pharmacy per Pt's Request:      Next Visit Date:  3/28/19  Last Visit Date: Visit date not found    Hemoglobin A1C (%)   Date Value   07/26/2018 6.0   03/27/2018 6.1   11/27/2017 6.5             ( goal A1C is < 7)   BP Readings from Last 3 Encounters:   12/05/18 125/69   11/28/18 (!) 96/55   11/28/18 (!) 138/112          (goal 120/80)  BUN   Date Value Ref Range Status   10/10/2018 25 (H) 8 - 23 mg/dL Final     CREATININE   Date Value Ref Range Status   10/10/2018 1.33 (H) 0.70 - 1.20 mg/dL Final     Potassium   Date Value Ref Range Status   10/10/2018 4.0 3.7 - 5.3 mmol/L Final

## 2019-01-03 DIAGNOSIS — E11.42 DIABETIC POLYNEUROPATHY ASSOCIATED WITH TYPE 2 DIABETES MELLITUS (HCC): ICD-10-CM

## 2019-01-04 ENCOUNTER — HOSPITAL ENCOUNTER (OUTPATIENT)
Dept: PAIN MANAGEMENT | Age: 74
Discharge: HOME | End: 2019-01-04
Payer: MEDICARE

## 2019-01-04 ENCOUNTER — HOSPITAL ENCOUNTER (OUTPATIENT)
Age: 74
Discharge: HOME | End: 2019-01-04
Payer: MEDICARE

## 2019-01-04 VITALS
BODY MASS INDEX: 29.66 KG/M2 | HEIGHT: 72 IN | SYSTOLIC BLOOD PRESSURE: 104 MMHG | WEIGHT: 219 LBS | DIASTOLIC BLOOD PRESSURE: 69 MMHG | HEART RATE: 80 BPM | OXYGEN SATURATION: 97 % | TEMPERATURE: 97.6 F | RESPIRATION RATE: 16 BRPM

## 2019-01-04 DIAGNOSIS — M50.30 DDD (DEGENERATIVE DISC DISEASE), CERVICAL: ICD-10-CM

## 2019-01-04 DIAGNOSIS — K59.03 DRUG-INDUCED CONSTIPATION: ICD-10-CM

## 2019-01-04 DIAGNOSIS — G89.29 ENCOUNTER FOR CHRONIC PAIN MANAGEMENT: ICD-10-CM

## 2019-01-04 DIAGNOSIS — Z51.81 MEDICATION MONITORING ENCOUNTER: ICD-10-CM

## 2019-01-04 DIAGNOSIS — M47.899 FACET SYNDROME: ICD-10-CM

## 2019-01-04 DIAGNOSIS — E11.610 CHARCOT FOOT DUE TO DIABETES MELLITUS (HCC): Primary | ICD-10-CM

## 2019-01-04 DIAGNOSIS — M51.36 DDD (DEGENERATIVE DISC DISEASE), LUMBAR: ICD-10-CM

## 2019-01-04 DIAGNOSIS — E08.41 DIABETIC MONONEUROPATHY ASSOCIATED WITH DIABETES MELLITUS DUE TO UNDERLYING CONDITION (HCC): ICD-10-CM

## 2019-01-04 DIAGNOSIS — E09.41 DIABETIC MONONEUROPATHY ASSOCIATED WITH DRUG OR CHEMICAL INDUCED DIABETES MELLITUS (HCC): ICD-10-CM

## 2019-01-04 DIAGNOSIS — M47.26 OSTEOARTHRITIS OF SPINE WITH RADICULOPATHY, LUMBAR REGION: ICD-10-CM

## 2019-01-04 DIAGNOSIS — M00.862 ARTHRITIS OF LEFT KNEE DUE TO OTHER BACTERIA (HCC): ICD-10-CM

## 2019-01-04 DIAGNOSIS — M47.816 SPONDYLOSIS OF LUMBAR REGION WITHOUT MYELOPATHY OR RADICULOPATHY: ICD-10-CM

## 2019-01-04 LAB — PROSTATE SPECIFIC ANTIGEN: 1.11 UG/L

## 2019-01-04 PROCEDURE — 36415 COLL VENOUS BLD VENIPUNCTURE: CPT

## 2019-01-04 PROCEDURE — 84153 ASSAY OF PSA TOTAL: CPT

## 2019-01-04 PROCEDURE — 99213 OFFICE O/P EST LOW 20 MIN: CPT

## 2019-01-04 PROCEDURE — 99213 OFFICE O/P EST LOW 20 MIN: CPT | Performed by: NURSE PRACTITIONER

## 2019-01-04 RX ORDER — FENTANYL 25 UG/H
1 PATCH TRANSDERMAL
Qty: 10 PATCH | Refills: 0 | Status: SHIPPED | OUTPATIENT
Start: 2019-01-06 | End: 2019-02-05 | Stop reason: SDUPTHER

## 2019-01-04 RX ORDER — TOPIRAMATE 50 MG/1
50 TABLET, FILM COATED ORAL 2 TIMES DAILY
Qty: 60 TABLET | Refills: 3 | Status: SHIPPED | OUTPATIENT
Start: 2019-01-17 | End: 2019-05-08 | Stop reason: SDUPTHER

## 2019-01-04 RX ORDER — INSULIN GLARGINE 100 [IU]/ML
INJECTION, SOLUTION SUBCUTANEOUS
Qty: 15 ML | Refills: 0 | Status: SHIPPED | OUTPATIENT
Start: 2019-01-04 | End: 2019-01-25 | Stop reason: SDUPTHER

## 2019-01-04 ASSESSMENT — ENCOUNTER SYMPTOMS
HEARTBURN: 1
BACK PAIN: 1
EYES NEGATIVE: 1
RESPIRATORY NEGATIVE: 1

## 2019-01-17 ENCOUNTER — OFFICE VISIT (OUTPATIENT)
Dept: GASTROENTEROLOGY | Age: 74
End: 2019-01-17
Payer: MEDICARE

## 2019-01-17 VITALS
BODY MASS INDEX: 29.95 KG/M2 | SYSTOLIC BLOOD PRESSURE: 121 MMHG | WEIGHT: 220.8 LBS | HEART RATE: 80 BPM | DIASTOLIC BLOOD PRESSURE: 71 MMHG

## 2019-01-17 DIAGNOSIS — G47.419 MODERATE AUTOSOMAL DOMINANT NARCOLEPSY, OBESITY, AND TYPE 2 DIABETES MELLITUS (HCC): ICD-10-CM

## 2019-01-17 DIAGNOSIS — E66.9 MODERATE AUTOSOMAL DOMINANT NARCOLEPSY, OBESITY, AND TYPE 2 DIABETES MELLITUS (HCC): ICD-10-CM

## 2019-01-17 DIAGNOSIS — E11.9 MODERATE AUTOSOMAL DOMINANT NARCOLEPSY, OBESITY, AND TYPE 2 DIABETES MELLITUS (HCC): ICD-10-CM

## 2019-01-17 DIAGNOSIS — Z86.19 HX OF CLOSTRIDIUM DIFFICILE INFECTION: ICD-10-CM

## 2019-01-17 DIAGNOSIS — K58.9 IRRITABLE BOWEL SYNDROME WITHOUT DIARRHEA: Primary | ICD-10-CM

## 2019-01-17 PROCEDURE — 99214 OFFICE O/P EST MOD 30 MIN: CPT | Performed by: INTERNAL MEDICINE

## 2019-01-17 PROCEDURE — 3046F HEMOGLOBIN A1C LEVEL >9.0%: CPT | Performed by: INTERNAL MEDICINE

## 2019-01-17 PROCEDURE — G8427 DOCREV CUR MEDS BY ELIG CLIN: HCPCS | Performed by: INTERNAL MEDICINE

## 2019-01-17 PROCEDURE — 1123F ACP DISCUSS/DSCN MKR DOCD: CPT | Performed by: INTERNAL MEDICINE

## 2019-01-17 PROCEDURE — G8484 FLU IMMUNIZE NO ADMIN: HCPCS | Performed by: INTERNAL MEDICINE

## 2019-01-17 PROCEDURE — 1036F TOBACCO NON-USER: CPT | Performed by: INTERNAL MEDICINE

## 2019-01-17 PROCEDURE — 1101F PT FALLS ASSESS-DOCD LE1/YR: CPT | Performed by: INTERNAL MEDICINE

## 2019-01-17 PROCEDURE — G8417 CALC BMI ABV UP PARAM F/U: HCPCS | Performed by: INTERNAL MEDICINE

## 2019-01-17 PROCEDURE — 2022F DILAT RTA XM EVC RTNOPTHY: CPT | Performed by: INTERNAL MEDICINE

## 2019-01-17 PROCEDURE — 4040F PNEUMOC VAC/ADMIN/RCVD: CPT | Performed by: INTERNAL MEDICINE

## 2019-01-17 PROCEDURE — 3017F COLORECTAL CA SCREEN DOC REV: CPT | Performed by: INTERNAL MEDICINE

## 2019-01-17 PROCEDURE — G8598 ASA/ANTIPLAT THER USED: HCPCS | Performed by: INTERNAL MEDICINE

## 2019-01-17 ASSESSMENT — ENCOUNTER SYMPTOMS
VOICE CHANGE: 0
NAUSEA: 0
BLOOD IN STOOL: 0
RECTAL PAIN: 0
ABDOMINAL DISTENTION: 0
COUGH: 0
ANAL BLEEDING: 0
GASTROINTESTINAL NEGATIVE: 1
ALLERGIC/IMMUNOLOGIC NEGATIVE: 1
BACK PAIN: 1
EYES NEGATIVE: 1
TROUBLE SWALLOWING: 0
ABDOMINAL PAIN: 0
RESPIRATORY NEGATIVE: 1
SINUS PRESSURE: 0
CHOKING: 0
SORE THROAT: 0
DIARRHEA: 0
CONSTIPATION: 0
WHEEZING: 0
VOMITING: 0

## 2019-01-21 RX ORDER — SODIUM, POTASSIUM,MAG SULFATES 17.5-3.13G
SOLUTION, RECONSTITUTED, ORAL ORAL
Qty: 1 BOTTLE | Refills: 0 | Status: SHIPPED | OUTPATIENT
Start: 2019-01-21 | End: 2019-02-05 | Stop reason: ALTCHOICE

## 2019-01-24 ENCOUNTER — OFFICE VISIT (OUTPATIENT)
Dept: PODIATRY | Age: 74
End: 2019-01-24
Payer: MEDICARE

## 2019-01-24 VITALS — WEIGHT: 212 LBS | HEIGHT: 72 IN | BODY MASS INDEX: 28.71 KG/M2

## 2019-01-24 DIAGNOSIS — M14.672 CHARCOT'S JOINT OF LEFT FOOT: ICD-10-CM

## 2019-01-24 DIAGNOSIS — E11.42 TYPE 2 DIABETES MELLITUS WITH DIABETIC POLYNEUROPATHY, WITH LONG-TERM CURRENT USE OF INSULIN (HCC): ICD-10-CM

## 2019-01-24 DIAGNOSIS — Z79.4 TYPE 2 DIABETES MELLITUS WITH DIABETIC POLYNEUROPATHY, WITH LONG-TERM CURRENT USE OF INSULIN (HCC): ICD-10-CM

## 2019-01-24 DIAGNOSIS — B35.1 ONYCHOMYCOSIS: Primary | ICD-10-CM

## 2019-01-24 PROCEDURE — 11721 DEBRIDE NAIL 6 OR MORE: CPT | Performed by: PODIATRIST

## 2019-01-24 PROCEDURE — 99999 PR OFFICE/OUTPT VISIT,PROCEDURE ONLY: CPT | Performed by: PODIATRIST

## 2019-01-24 ASSESSMENT — ENCOUNTER SYMPTOMS
NAUSEA: 0
VOMITING: 0
DIARRHEA: 0
COLOR CHANGE: 0
CONSTIPATION: 0

## 2019-01-25 DIAGNOSIS — E11.42 DIABETIC POLYNEUROPATHY ASSOCIATED WITH TYPE 2 DIABETES MELLITUS (HCC): ICD-10-CM

## 2019-01-28 RX ORDER — INSULIN GLARGINE 100 [IU]/ML
INJECTION, SOLUTION SUBCUTANEOUS
Qty: 15 ML | Refills: 0 | Status: SHIPPED | OUTPATIENT
Start: 2019-01-28 | End: 2019-02-16 | Stop reason: SDUPTHER

## 2019-02-05 ENCOUNTER — HOSPITAL ENCOUNTER (OUTPATIENT)
Dept: PAIN MANAGEMENT | Age: 74
Discharge: HOME | End: 2019-02-05
Payer: MEDICARE

## 2019-02-05 VITALS
BODY MASS INDEX: 28.71 KG/M2 | OXYGEN SATURATION: 99 % | RESPIRATION RATE: 16 BRPM | WEIGHT: 212 LBS | SYSTOLIC BLOOD PRESSURE: 106 MMHG | TEMPERATURE: 97.8 F | HEART RATE: 67 BPM | DIASTOLIC BLOOD PRESSURE: 54 MMHG | HEIGHT: 72 IN

## 2019-02-05 DIAGNOSIS — E11.610 CHARCOT FOOT DUE TO DIABETES MELLITUS (HCC): Primary | ICD-10-CM

## 2019-02-05 DIAGNOSIS — M51.36 DDD (DEGENERATIVE DISC DISEASE), LUMBAR: ICD-10-CM

## 2019-02-05 DIAGNOSIS — M50.30 DDD (DEGENERATIVE DISC DISEASE), CERVICAL: ICD-10-CM

## 2019-02-05 DIAGNOSIS — E09.41 DIABETIC MONONEUROPATHY ASSOCIATED WITH DRUG OR CHEMICAL INDUCED DIABETES MELLITUS (HCC): ICD-10-CM

## 2019-02-05 DIAGNOSIS — K59.03 DRUG-INDUCED CONSTIPATION: ICD-10-CM

## 2019-02-05 DIAGNOSIS — M47.899 FACET SYNDROME: ICD-10-CM

## 2019-02-05 DIAGNOSIS — E11.42 DIABETIC PERIPHERAL NEUROPATHY ASSOCIATED WITH TYPE 2 DIABETES MELLITUS (HCC): ICD-10-CM

## 2019-02-05 DIAGNOSIS — M47.816 SPONDYLOSIS OF LUMBAR REGION WITHOUT MYELOPATHY OR RADICULOPATHY: ICD-10-CM

## 2019-02-05 DIAGNOSIS — M47.26 OSTEOARTHRITIS OF SPINE WITH RADICULOPATHY, LUMBAR REGION: ICD-10-CM

## 2019-02-05 DIAGNOSIS — M00.862 ARTHRITIS OF LEFT KNEE DUE TO OTHER BACTERIA (HCC): ICD-10-CM

## 2019-02-05 DIAGNOSIS — G89.29 ENCOUNTER FOR CHRONIC PAIN MANAGEMENT: ICD-10-CM

## 2019-02-05 DIAGNOSIS — E08.41 DIABETIC MONONEUROPATHY ASSOCIATED WITH DIABETES MELLITUS DUE TO UNDERLYING CONDITION (HCC): ICD-10-CM

## 2019-02-05 DIAGNOSIS — Z51.81 MEDICATION MONITORING ENCOUNTER: ICD-10-CM

## 2019-02-05 PROCEDURE — 99213 OFFICE O/P EST LOW 20 MIN: CPT | Performed by: NURSE PRACTITIONER

## 2019-02-05 PROCEDURE — 99213 OFFICE O/P EST LOW 20 MIN: CPT

## 2019-02-05 RX ORDER — FENTANYL 25 UG/H
1 PATCH TRANSDERMAL
Qty: 10 PATCH | Refills: 0 | Status: SHIPPED | OUTPATIENT
Start: 2019-02-09 | End: 2019-03-04 | Stop reason: SDUPTHER

## 2019-02-05 RX ORDER — OXYCODONE HYDROCHLORIDE AND ACETAMINOPHEN 5; 325 MG/1; MG/1
1 TABLET ORAL EVERY 6 HOURS PRN
COMMUNITY
End: 2019-03-28

## 2019-02-05 ASSESSMENT — ENCOUNTER SYMPTOMS
RESPIRATORY NEGATIVE: 1
BACK PAIN: 1
EYES NEGATIVE: 1
GASTROINTESTINAL NEGATIVE: 1

## 2019-02-16 DIAGNOSIS — E11.42 DIABETIC POLYNEUROPATHY ASSOCIATED WITH TYPE 2 DIABETES MELLITUS (HCC): ICD-10-CM

## 2019-02-17 DIAGNOSIS — E11.42 DIABETIC POLYNEUROPATHY ASSOCIATED WITH TYPE 2 DIABETES MELLITUS (HCC): ICD-10-CM

## 2019-02-18 RX ORDER — INSULIN GLARGINE 100 [IU]/ML
INJECTION, SOLUTION SUBCUTANEOUS
Qty: 15 ML | Refills: 2 | Status: SHIPPED | OUTPATIENT
Start: 2019-02-18 | End: 2019-04-25 | Stop reason: SDUPTHER

## 2019-02-18 RX ORDER — PEN NEEDLE, DIABETIC 32GX 5/32"
NEEDLE, DISPOSABLE MISCELLANEOUS
Qty: 100 EACH | Refills: 1 | Status: SHIPPED | OUTPATIENT
Start: 2019-02-18 | End: 2019-05-27 | Stop reason: SDUPTHER

## 2019-02-22 ENCOUNTER — ANESTHESIA EVENT (OUTPATIENT)
Dept: OPERATING ROOM | Age: 74
End: 2019-02-22
Payer: MEDICARE

## 2019-02-22 ENCOUNTER — TELEPHONE (OUTPATIENT)
Dept: GASTROENTEROLOGY | Age: 74
End: 2019-02-22

## 2019-02-25 ENCOUNTER — HOSPITAL ENCOUNTER (OUTPATIENT)
Age: 74
Setting detail: OUTPATIENT SURGERY
Discharge: HOME | End: 2019-02-25
Attending: INTERNAL MEDICINE | Admitting: INTERNAL MEDICINE
Payer: MEDICARE

## 2019-02-25 ENCOUNTER — ANESTHESIA (OUTPATIENT)
Dept: OPERATING ROOM | Age: 74
End: 2019-02-25
Payer: MEDICARE

## 2019-02-25 VITALS
TEMPERATURE: 97.9 F | BODY MASS INDEX: 29.12 KG/M2 | DIASTOLIC BLOOD PRESSURE: 42 MMHG | WEIGHT: 215 LBS | HEART RATE: 58 BPM | SYSTOLIC BLOOD PRESSURE: 97 MMHG | OXYGEN SATURATION: 99 % | RESPIRATION RATE: 15 BRPM | HEIGHT: 72 IN

## 2019-02-25 VITALS
RESPIRATION RATE: 11 BRPM | SYSTOLIC BLOOD PRESSURE: 88 MMHG | OXYGEN SATURATION: 99 % | DIASTOLIC BLOOD PRESSURE: 51 MMHG

## 2019-02-25 LAB
GLUCOSE BLD-MCNC: 90 MG/DL (ref 75–110)
GLUCOSE BLD-MCNC: 94 MG/DL (ref 75–110)

## 2019-02-25 PROCEDURE — 6360000002 HC RX W HCPCS: Performed by: NURSE ANESTHETIST, CERTIFIED REGISTERED

## 2019-02-25 PROCEDURE — 3700000000 HC ANESTHESIA ATTENDED CARE: Performed by: INTERNAL MEDICINE

## 2019-02-25 PROCEDURE — 82947 ASSAY GLUCOSE BLOOD QUANT: CPT

## 2019-02-25 PROCEDURE — 3609010300 HC COLONOSCOPY W/BIOPSY SINGLE/MULTIPLE: Performed by: INTERNAL MEDICINE

## 2019-02-25 PROCEDURE — 2709999900 HC NON-CHARGEABLE SUPPLY: Performed by: INTERNAL MEDICINE

## 2019-02-25 PROCEDURE — C1773 RET DEV, INSERTABLE: HCPCS | Performed by: INTERNAL MEDICINE

## 2019-02-25 PROCEDURE — 3700000001 HC ADD 15 MINUTES (ANESTHESIA): Performed by: INTERNAL MEDICINE

## 2019-02-25 PROCEDURE — 7100000011 HC PHASE II RECOVERY - ADDTL 15 MIN: Performed by: INTERNAL MEDICINE

## 2019-02-25 PROCEDURE — 7100000010 HC PHASE II RECOVERY - FIRST 15 MIN: Performed by: INTERNAL MEDICINE

## 2019-02-25 PROCEDURE — 2500000003 HC RX 250 WO HCPCS: Performed by: NURSE ANESTHETIST, CERTIFIED REGISTERED

## 2019-02-25 PROCEDURE — 45385 COLONOSCOPY W/LESION REMOVAL: CPT | Performed by: INTERNAL MEDICINE

## 2019-02-25 PROCEDURE — 88305 TISSUE EXAM BY PATHOLOGIST: CPT

## 2019-02-25 PROCEDURE — 2580000003 HC RX 258: Performed by: ANESTHESIOLOGY

## 2019-02-25 PROCEDURE — 45380 COLONOSCOPY AND BIOPSY: CPT | Performed by: INTERNAL MEDICINE

## 2019-02-25 RX ORDER — HYDROMORPHONE HCL 110MG/55ML
0.5 PATIENT CONTROLLED ANALGESIA SYRINGE INTRAVENOUS EVERY 5 MIN PRN
Status: DISCONTINUED | OUTPATIENT
Start: 2019-02-25 | End: 2019-02-25 | Stop reason: HOSPADM

## 2019-02-25 RX ORDER — OXYCODONE HYDROCHLORIDE AND ACETAMINOPHEN 5; 325 MG/1; MG/1
1 TABLET ORAL PRN
Status: DISCONTINUED | OUTPATIENT
Start: 2019-02-25 | End: 2019-02-25 | Stop reason: HOSPADM

## 2019-02-25 RX ORDER — MEPERIDINE HYDROCHLORIDE 50 MG/ML
12.5 INJECTION INTRAMUSCULAR; INTRAVENOUS; SUBCUTANEOUS EVERY 5 MIN PRN
Status: DISCONTINUED | OUTPATIENT
Start: 2019-02-25 | End: 2019-02-25 | Stop reason: HOSPADM

## 2019-02-25 RX ORDER — LABETALOL HYDROCHLORIDE 5 MG/ML
5 INJECTION, SOLUTION INTRAVENOUS EVERY 10 MIN PRN
Status: DISCONTINUED | OUTPATIENT
Start: 2019-02-25 | End: 2019-02-25 | Stop reason: HOSPADM

## 2019-02-25 RX ORDER — DIPHENHYDRAMINE HYDROCHLORIDE 50 MG/ML
12.5 INJECTION INTRAMUSCULAR; INTRAVENOUS
Status: DISCONTINUED | OUTPATIENT
Start: 2019-02-25 | End: 2019-02-25 | Stop reason: HOSPADM

## 2019-02-25 RX ORDER — SODIUM CHLORIDE, SODIUM LACTATE, POTASSIUM CHLORIDE, CALCIUM CHLORIDE 600; 310; 30; 20 MG/100ML; MG/100ML; MG/100ML; MG/100ML
INJECTION, SOLUTION INTRAVENOUS CONTINUOUS
Status: DISCONTINUED | OUTPATIENT
Start: 2019-02-26 | End: 2019-02-25 | Stop reason: HOSPADM

## 2019-02-25 RX ORDER — DEXAMETHASONE SODIUM PHOSPHATE 10 MG/ML
4 INJECTION INTRAMUSCULAR; INTRAVENOUS
Status: DISCONTINUED | OUTPATIENT
Start: 2019-02-25 | End: 2019-02-25 | Stop reason: HOSPADM

## 2019-02-25 RX ORDER — HYDRALAZINE HYDROCHLORIDE 20 MG/ML
5 INJECTION INTRAMUSCULAR; INTRAVENOUS EVERY 10 MIN PRN
Status: DISCONTINUED | OUTPATIENT
Start: 2019-02-25 | End: 2019-02-25 | Stop reason: HOSPADM

## 2019-02-25 RX ORDER — FENTANYL CITRATE 50 UG/ML
25 INJECTION, SOLUTION INTRAMUSCULAR; INTRAVENOUS EVERY 5 MIN PRN
Status: DISCONTINUED | OUTPATIENT
Start: 2019-02-25 | End: 2019-02-25 | Stop reason: HOSPADM

## 2019-02-25 RX ORDER — PROPOFOL 10 MG/ML
INJECTION, EMULSION INTRAVENOUS PRN
Status: DISCONTINUED | OUTPATIENT
Start: 2019-02-25 | End: 2019-02-25 | Stop reason: SDUPTHER

## 2019-02-25 RX ORDER — LIDOCAINE HYDROCHLORIDE 10 MG/ML
1 INJECTION, SOLUTION EPIDURAL; INFILTRATION; INTRACAUDAL; PERINEURAL
Status: DISCONTINUED | OUTPATIENT
Start: 2019-02-26 | End: 2019-02-25 | Stop reason: HOSPADM

## 2019-02-25 RX ORDER — LIDOCAINE HYDROCHLORIDE 20 MG/ML
INJECTION, SOLUTION INFILTRATION; PERINEURAL PRN
Status: DISCONTINUED | OUTPATIENT
Start: 2019-02-25 | End: 2019-02-25 | Stop reason: SDUPTHER

## 2019-02-25 RX ORDER — OXYCODONE HYDROCHLORIDE AND ACETAMINOPHEN 5; 325 MG/1; MG/1
2 TABLET ORAL PRN
Status: DISCONTINUED | OUTPATIENT
Start: 2019-02-25 | End: 2019-02-25 | Stop reason: HOSPADM

## 2019-02-25 RX ORDER — ONDANSETRON 2 MG/ML
4 INJECTION INTRAMUSCULAR; INTRAVENOUS
Status: DISCONTINUED | OUTPATIENT
Start: 2019-02-25 | End: 2019-02-25 | Stop reason: HOSPADM

## 2019-02-25 RX ADMIN — PROPOFOL 40 MG: 10 INJECTION, EMULSION INTRAVENOUS at 09:08

## 2019-02-25 RX ADMIN — SODIUM CHLORIDE, POTASSIUM CHLORIDE, SODIUM LACTATE AND CALCIUM CHLORIDE: 600; 310; 30; 20 INJECTION, SOLUTION INTRAVENOUS at 08:23

## 2019-02-25 RX ADMIN — PROPOFOL 50 MG: 10 INJECTION, EMULSION INTRAVENOUS at 09:03

## 2019-02-25 RX ADMIN — PROPOFOL 50 MG: 10 INJECTION, EMULSION INTRAVENOUS at 08:51

## 2019-02-25 RX ADMIN — PROPOFOL 50 MG: 10 INJECTION, EMULSION INTRAVENOUS at 08:58

## 2019-02-25 RX ADMIN — PROPOFOL 30 MG: 10 INJECTION, EMULSION INTRAVENOUS at 08:53

## 2019-02-25 RX ADMIN — PROPOFOL 20 MG: 10 INJECTION, EMULSION INTRAVENOUS at 09:12

## 2019-02-25 RX ADMIN — LIDOCAINE HYDROCHLORIDE 50 MG: 20 INJECTION, SOLUTION INFILTRATION; PERINEURAL at 08:51

## 2019-02-25 ASSESSMENT — PULMONARY FUNCTION TESTS
PIF_VALUE: 0

## 2019-02-25 ASSESSMENT — PAIN SCALES - GENERAL
PAINLEVEL_OUTOF10: 0

## 2019-02-25 ASSESSMENT — PAIN - FUNCTIONAL ASSESSMENT: PAIN_FUNCTIONAL_ASSESSMENT: 0-10

## 2019-02-26 ENCOUNTER — TELEPHONE (OUTPATIENT)
Dept: GASTROENTEROLOGY | Age: 74
End: 2019-02-26

## 2019-02-26 LAB — SURGICAL PATHOLOGY REPORT: NORMAL

## 2019-03-04 ENCOUNTER — HOSPITAL ENCOUNTER (OUTPATIENT)
Dept: PAIN MANAGEMENT | Age: 74
Discharge: HOME | End: 2019-03-04
Payer: MEDICARE

## 2019-03-04 VITALS
OXYGEN SATURATION: 98 % | HEART RATE: 61 BPM | SYSTOLIC BLOOD PRESSURE: 97 MMHG | BODY MASS INDEX: 28.99 KG/M2 | WEIGHT: 214 LBS | TEMPERATURE: 97.8 F | RESPIRATION RATE: 16 BRPM | HEIGHT: 72 IN | DIASTOLIC BLOOD PRESSURE: 60 MMHG

## 2019-03-04 DIAGNOSIS — M50.30 DDD (DEGENERATIVE DISC DISEASE), CERVICAL: ICD-10-CM

## 2019-03-04 DIAGNOSIS — M51.36 DDD (DEGENERATIVE DISC DISEASE), LUMBAR: ICD-10-CM

## 2019-03-04 DIAGNOSIS — E09.41 DIABETIC MONONEUROPATHY ASSOCIATED WITH DRUG OR CHEMICAL INDUCED DIABETES MELLITUS (HCC): ICD-10-CM

## 2019-03-04 DIAGNOSIS — K59.03 DRUG-INDUCED CONSTIPATION: ICD-10-CM

## 2019-03-04 DIAGNOSIS — M00.862 ARTHRITIS OF LEFT KNEE DUE TO OTHER BACTERIA (HCC): ICD-10-CM

## 2019-03-04 DIAGNOSIS — E11.42 DIABETIC PERIPHERAL NEUROPATHY ASSOCIATED WITH TYPE 2 DIABETES MELLITUS (HCC): ICD-10-CM

## 2019-03-04 DIAGNOSIS — E11.610 CHARCOT FOOT DUE TO DIABETES MELLITUS (HCC): ICD-10-CM

## 2019-03-04 DIAGNOSIS — M47.816 SPONDYLOSIS OF LUMBAR REGION WITHOUT MYELOPATHY OR RADICULOPATHY: ICD-10-CM

## 2019-03-04 DIAGNOSIS — Z51.81 MEDICATION MONITORING ENCOUNTER: ICD-10-CM

## 2019-03-04 DIAGNOSIS — M47.899 FACET SYNDROME: ICD-10-CM

## 2019-03-04 DIAGNOSIS — E08.41 DIABETIC MONONEUROPATHY ASSOCIATED WITH DIABETES MELLITUS DUE TO UNDERLYING CONDITION (HCC): ICD-10-CM

## 2019-03-04 DIAGNOSIS — M47.26 OSTEOARTHRITIS OF SPINE WITH RADICULOPATHY, LUMBAR REGION: ICD-10-CM

## 2019-03-04 PROCEDURE — 99213 OFFICE O/P EST LOW 20 MIN: CPT

## 2019-03-04 PROCEDURE — 99213 OFFICE O/P EST LOW 20 MIN: CPT | Performed by: NURSE PRACTITIONER

## 2019-03-04 RX ORDER — FENTANYL 25 UG/H
1 PATCH TRANSDERMAL
Qty: 10 PATCH | Refills: 0 | Status: SHIPPED | OUTPATIENT
Start: 2019-03-11 | End: 2019-04-03 | Stop reason: SDUPTHER

## 2019-03-04 RX ORDER — OXYCODONE HYDROCHLORIDE AND ACETAMINOPHEN 5; 325 MG/1; MG/1
1 TABLET ORAL EVERY 6 HOURS PRN
Qty: 120 TABLET | Refills: 0 | Status: SHIPPED | OUTPATIENT
Start: 2019-03-13 | End: 2019-07-08 | Stop reason: SDUPTHER

## 2019-03-04 RX ORDER — GABAPENTIN 800 MG/1
800 TABLET ORAL DAILY
Qty: 90 TABLET | Refills: 3 | Status: SHIPPED | OUTPATIENT
Start: 2019-03-21 | End: 2020-02-03 | Stop reason: SDUPTHER

## 2019-03-04 ASSESSMENT — ENCOUNTER SYMPTOMS
EYES NEGATIVE: 1
RESPIRATORY NEGATIVE: 1
GASTROINTESTINAL NEGATIVE: 1
BACK PAIN: 1

## 2019-03-18 RX ORDER — FUROSEMIDE 40 MG/1
TABLET ORAL
Qty: 60 TABLET | Refills: 0 | Status: SHIPPED | OUTPATIENT
Start: 2019-03-18 | End: 2019-04-13 | Stop reason: SDUPTHER

## 2019-03-19 PROBLEM — K63.5 COLON POLYP: Status: ACTIVE | Noted: 2019-02-25

## 2019-03-21 ENCOUNTER — OFFICE VISIT (OUTPATIENT)
Dept: GASTROENTEROLOGY | Age: 74
End: 2019-03-21
Payer: MEDICARE

## 2019-03-21 VITALS
SYSTOLIC BLOOD PRESSURE: 113 MMHG | BODY MASS INDEX: 30.14 KG/M2 | WEIGHT: 222.2 LBS | HEART RATE: 52 BPM | DIASTOLIC BLOOD PRESSURE: 69 MMHG

## 2019-03-21 DIAGNOSIS — R19.7 DIARRHEA, UNSPECIFIED TYPE: ICD-10-CM

## 2019-03-21 DIAGNOSIS — K58.9 IRRITABLE BOWEL SYNDROME WITHOUT DIARRHEA: Primary | ICD-10-CM

## 2019-03-21 DIAGNOSIS — K63.5 POLYP OF TRANSVERSE COLON, UNSPECIFIED TYPE: ICD-10-CM

## 2019-03-21 PROCEDURE — 1101F PT FALLS ASSESS-DOCD LE1/YR: CPT | Performed by: INTERNAL MEDICINE

## 2019-03-21 PROCEDURE — 1123F ACP DISCUSS/DSCN MKR DOCD: CPT | Performed by: INTERNAL MEDICINE

## 2019-03-21 PROCEDURE — 99214 OFFICE O/P EST MOD 30 MIN: CPT | Performed by: INTERNAL MEDICINE

## 2019-03-21 PROCEDURE — G8417 CALC BMI ABV UP PARAM F/U: HCPCS | Performed by: INTERNAL MEDICINE

## 2019-03-21 PROCEDURE — G8598 ASA/ANTIPLAT THER USED: HCPCS | Performed by: INTERNAL MEDICINE

## 2019-03-21 PROCEDURE — 1036F TOBACCO NON-USER: CPT | Performed by: INTERNAL MEDICINE

## 2019-03-21 PROCEDURE — 4040F PNEUMOC VAC/ADMIN/RCVD: CPT | Performed by: INTERNAL MEDICINE

## 2019-03-21 PROCEDURE — G8484 FLU IMMUNIZE NO ADMIN: HCPCS | Performed by: INTERNAL MEDICINE

## 2019-03-21 PROCEDURE — G8427 DOCREV CUR MEDS BY ELIG CLIN: HCPCS | Performed by: INTERNAL MEDICINE

## 2019-03-21 PROCEDURE — 3017F COLORECTAL CA SCREEN DOC REV: CPT | Performed by: INTERNAL MEDICINE

## 2019-03-21 RX ORDER — CHOLESTYRAMINE 4 G/9G
1 POWDER, FOR SUSPENSION ORAL
Qty: 90 PACKET | Refills: 3 | Status: SHIPPED | OUTPATIENT
Start: 2019-03-21 | End: 2019-08-22 | Stop reason: ALTCHOICE

## 2019-03-21 ASSESSMENT — ENCOUNTER SYMPTOMS
VOMITING: 0
VOICE CHANGE: 0
ALLERGIC/IMMUNOLOGIC NEGATIVE: 1
COUGH: 0
SORE THROAT: 0
NAUSEA: 0
WHEEZING: 0
RECTAL PAIN: 0
BACK PAIN: 1
DIARRHEA: 1
CONSTIPATION: 0
ABDOMINAL PAIN: 0
SINUS PRESSURE: 0
BLOOD IN STOOL: 0
ABDOMINAL DISTENTION: 0
RESPIRATORY NEGATIVE: 1
CHOKING: 0
TROUBLE SWALLOWING: 0
EYES NEGATIVE: 1
ANAL BLEEDING: 0

## 2019-03-22 RX ORDER — SIMVASTATIN 20 MG
TABLET ORAL
Qty: 30 TABLET | Refills: 0 | Status: SHIPPED | OUTPATIENT
Start: 2019-03-22 | End: 2019-04-24 | Stop reason: SDUPTHER

## 2019-03-28 ENCOUNTER — HOSPITAL ENCOUNTER (OUTPATIENT)
Age: 74
Setting detail: SPECIMEN
Discharge: HOME | End: 2019-03-28
Payer: MEDICARE

## 2019-03-28 ENCOUNTER — OFFICE VISIT (OUTPATIENT)
Dept: FAMILY MEDICINE CLINIC | Age: 74
End: 2019-03-28
Payer: MEDICARE

## 2019-03-28 VITALS
OXYGEN SATURATION: 98 % | DIASTOLIC BLOOD PRESSURE: 64 MMHG | BODY MASS INDEX: 30.75 KG/M2 | HEART RATE: 51 BPM | WEIGHT: 227 LBS | HEIGHT: 72 IN | SYSTOLIC BLOOD PRESSURE: 106 MMHG | TEMPERATURE: 97.8 F

## 2019-03-28 DIAGNOSIS — E11.42 TYPE 2 DIABETES MELLITUS WITH DIABETIC POLYNEUROPATHY, WITH LONG-TERM CURRENT USE OF INSULIN (HCC): Primary | ICD-10-CM

## 2019-03-28 DIAGNOSIS — E55.9 VITAMIN D DEFICIENCY: ICD-10-CM

## 2019-03-28 DIAGNOSIS — Z79.4 TYPE 2 DIABETES MELLITUS WITH DIABETIC POLYNEUROPATHY, WITH LONG-TERM CURRENT USE OF INSULIN (HCC): ICD-10-CM

## 2019-03-28 DIAGNOSIS — Z79.4 TYPE 2 DIABETES MELLITUS WITH DIABETIC POLYNEUROPATHY, WITH LONG-TERM CURRENT USE OF INSULIN (HCC): Primary | ICD-10-CM

## 2019-03-28 DIAGNOSIS — M47.899 FACET SYNDROME: ICD-10-CM

## 2019-03-28 DIAGNOSIS — E11.610 CHARCOT FOOT DUE TO DIABETES MELLITUS (HCC): ICD-10-CM

## 2019-03-28 DIAGNOSIS — E11.42 TYPE 2 DIABETES MELLITUS WITH DIABETIC POLYNEUROPATHY, WITH LONG-TERM CURRENT USE OF INSULIN (HCC): ICD-10-CM

## 2019-03-28 DIAGNOSIS — I25.810 CORONARY ARTERY DISEASE INVOLVING CORONARY BYPASS GRAFT OF NATIVE HEART WITHOUT ANGINA PECTORIS: ICD-10-CM

## 2019-03-28 DIAGNOSIS — E78.5 HYPERLIPIDEMIA, UNSPECIFIED HYPERLIPIDEMIA TYPE: ICD-10-CM

## 2019-03-28 LAB
CREATININE URINE: 66.8 MG/DL (ref 39–259)
HBA1C MFR BLD: 6.1 %
MICROALBUMIN/CREAT 24H UR: 37 MG/L
MICROALBUMIN/CREAT UR-RTO: 55 MCG/MG CREAT

## 2019-03-28 PROCEDURE — G8427 DOCREV CUR MEDS BY ELIG CLIN: HCPCS | Performed by: FAMILY MEDICINE

## 2019-03-28 PROCEDURE — G8484 FLU IMMUNIZE NO ADMIN: HCPCS | Performed by: FAMILY MEDICINE

## 2019-03-28 PROCEDURE — 3017F COLORECTAL CA SCREEN DOC REV: CPT | Performed by: FAMILY MEDICINE

## 2019-03-28 PROCEDURE — G8598 ASA/ANTIPLAT THER USED: HCPCS | Performed by: FAMILY MEDICINE

## 2019-03-28 PROCEDURE — 83036 HEMOGLOBIN GLYCOSYLATED A1C: CPT | Performed by: FAMILY MEDICINE

## 2019-03-28 PROCEDURE — 99214 OFFICE O/P EST MOD 30 MIN: CPT | Performed by: FAMILY MEDICINE

## 2019-03-28 PROCEDURE — 4040F PNEUMOC VAC/ADMIN/RCVD: CPT | Performed by: FAMILY MEDICINE

## 2019-03-28 PROCEDURE — 1123F ACP DISCUSS/DSCN MKR DOCD: CPT | Performed by: FAMILY MEDICINE

## 2019-03-28 PROCEDURE — G8417 CALC BMI ABV UP PARAM F/U: HCPCS | Performed by: FAMILY MEDICINE

## 2019-03-28 PROCEDURE — 3044F HG A1C LEVEL LT 7.0%: CPT | Performed by: FAMILY MEDICINE

## 2019-03-28 PROCEDURE — 1036F TOBACCO NON-USER: CPT | Performed by: FAMILY MEDICINE

## 2019-03-28 PROCEDURE — 2022F DILAT RTA XM EVC RTNOPTHY: CPT | Performed by: FAMILY MEDICINE

## 2019-03-28 ASSESSMENT — ENCOUNTER SYMPTOMS
NAUSEA: 0
SHORTNESS OF BREATH: 0
ABDOMINAL PAIN: 0
DIARRHEA: 0
COUGH: 0
CONSTIPATION: 0
SORE THROAT: 0

## 2019-03-28 ASSESSMENT — PATIENT HEALTH QUESTIONNAIRE - PHQ9
2. FEELING DOWN, DEPRESSED OR HOPELESS: 0
SUM OF ALL RESPONSES TO PHQ9 QUESTIONS 1 & 2: 0
SUM OF ALL RESPONSES TO PHQ QUESTIONS 1-9: 0
1. LITTLE INTEREST OR PLEASURE IN DOING THINGS: 0
SUM OF ALL RESPONSES TO PHQ QUESTIONS 1-9: 0

## 2019-03-30 ENCOUNTER — HOSPITAL ENCOUNTER (OUTPATIENT)
Age: 74
Discharge: HOME | End: 2019-03-30
Payer: MEDICARE

## 2019-03-30 LAB
C DIFF AG + TOXIN: NORMAL
SPECIMEN DESCRIPTION: NORMAL

## 2019-03-30 PROCEDURE — 87324 CLOSTRIDIUM AG IA: CPT

## 2019-03-30 PROCEDURE — 87449 NOS EACH ORGANISM AG IA: CPT

## 2019-04-03 ENCOUNTER — HOSPITAL ENCOUNTER (OUTPATIENT)
Dept: PAIN MANAGEMENT | Age: 74
Discharge: HOME | End: 2019-04-03
Payer: MEDICARE

## 2019-04-03 VITALS
TEMPERATURE: 97.7 F | SYSTOLIC BLOOD PRESSURE: 133 MMHG | DIASTOLIC BLOOD PRESSURE: 66 MMHG | OXYGEN SATURATION: 98 % | HEIGHT: 72 IN | WEIGHT: 227 LBS | BODY MASS INDEX: 30.75 KG/M2 | RESPIRATION RATE: 16 BRPM | HEART RATE: 51 BPM

## 2019-04-03 DIAGNOSIS — E11.610 CHARCOT FOOT DUE TO DIABETES MELLITUS (HCC): ICD-10-CM

## 2019-04-03 DIAGNOSIS — M47.899 FACET SYNDROME: ICD-10-CM

## 2019-04-03 DIAGNOSIS — E09.41 DIABETIC MONONEUROPATHY ASSOCIATED WITH DRUG OR CHEMICAL INDUCED DIABETES MELLITUS (HCC): ICD-10-CM

## 2019-04-03 DIAGNOSIS — M00.862 ARTHRITIS OF LEFT KNEE DUE TO OTHER BACTERIA (HCC): ICD-10-CM

## 2019-04-03 DIAGNOSIS — K59.03 DRUG-INDUCED CONSTIPATION: ICD-10-CM

## 2019-04-03 DIAGNOSIS — Z51.81 MEDICATION MONITORING ENCOUNTER: ICD-10-CM

## 2019-04-03 DIAGNOSIS — M47.26 OSTEOARTHRITIS OF SPINE WITH RADICULOPATHY, LUMBAR REGION: Primary | ICD-10-CM

## 2019-04-03 DIAGNOSIS — M50.30 DDD (DEGENERATIVE DISC DISEASE), CERVICAL: ICD-10-CM

## 2019-04-03 DIAGNOSIS — M47.816 SPONDYLOSIS OF LUMBAR REGION WITHOUT MYELOPATHY OR RADICULOPATHY: ICD-10-CM

## 2019-04-03 DIAGNOSIS — E08.41 DIABETIC MONONEUROPATHY ASSOCIATED WITH DIABETES MELLITUS DUE TO UNDERLYING CONDITION (HCC): ICD-10-CM

## 2019-04-03 PROCEDURE — 80307 DRUG TEST PRSMV CHEM ANLYZR: CPT

## 2019-04-03 PROCEDURE — 99214 OFFICE O/P EST MOD 30 MIN: CPT | Performed by: PAIN MEDICINE

## 2019-04-03 PROCEDURE — 99213 OFFICE O/P EST LOW 20 MIN: CPT

## 2019-04-03 RX ORDER — FENTANYL 25 UG/H
1 PATCH TRANSDERMAL
Qty: 10 PATCH | Refills: 0 | Status: SHIPPED | OUTPATIENT
Start: 2019-04-11 | End: 2019-05-08 | Stop reason: SDUPTHER

## 2019-04-03 ASSESSMENT — PAIN DESCRIPTION - PROGRESSION: CLINICAL_PROGRESSION: NOT CHANGED

## 2019-04-03 ASSESSMENT — PAIN DESCRIPTION - DESCRIPTORS: DESCRIPTORS: SHARP

## 2019-04-03 ASSESSMENT — PAIN DESCRIPTION - ONSET: ONSET: ON-GOING

## 2019-04-03 ASSESSMENT — PAIN DESCRIPTION - ORIENTATION: ORIENTATION: RIGHT;LEFT;LOWER

## 2019-04-03 ASSESSMENT — ENCOUNTER SYMPTOMS
BOWEL INCONTINENCE: 0
ALLERGIC/IMMUNOLOGIC NEGATIVE: 1
GASTROINTESTINAL NEGATIVE: 1
EYES NEGATIVE: 1
RESPIRATORY NEGATIVE: 1
BACK PAIN: 1
ABDOMINAL PAIN: 0

## 2019-04-03 ASSESSMENT — PAIN DESCRIPTION - LOCATION: LOCATION: BACK

## 2019-04-03 ASSESSMENT — PAIN DESCRIPTION - FREQUENCY: FREQUENCY: CONTINUOUS

## 2019-04-03 ASSESSMENT — PAIN - FUNCTIONAL ASSESSMENT: PAIN_FUNCTIONAL_ASSESSMENT: PREVENTS OR INTERFERES SOME ACTIVE ACTIVITIES AND ADLS

## 2019-04-03 ASSESSMENT — PAIN DESCRIPTION - PAIN TYPE: TYPE: CHRONIC PAIN

## 2019-04-03 ASSESSMENT — PAIN SCALES - GENERAL: PAINLEVEL_OUTOF10: 5

## 2019-04-03 NOTE — PROGRESS NOTES
[address] Pain Management  Patient Pain Assessment  RECHECK - Dr. Terry Allen    Primary Care Physician: Jana White MD    Chief complaint:   Chief Complaint   Patient presents with    Back Pain   . HISTORY OF PRESENT ILLNESS:  Asia Espinosa is 69 y.o. male with    Patient returned to the pain clinic with chief complaint of pain involving the low back with pain radiating to both lower extremities patient also has a diabetic peripheral neuropathy with burning pain in the legs bilaterally. Patient also has a Charcot's foot bilaterally. He had undergone surgery for this in the past.  Patient had undergone lumbar epidural steroid injections for the pain which helped him for a short while. Patient pain is under control with current medications with fentanyl and Percocet. Back Pain   This is a chronic problem. The current episode started more than 1 year ago. The problem occurs constantly. The problem is unchanged. The pain is present in the lumbar spine and sacro-iliac. The quality of the pain is described as aching. The pain radiates to the right thigh and left thigh. The pain is at a severity of 5/10 (3-7). The pain is moderate. The pain is worse during the day. The symptoms are aggravated by standing and bending (Walking, lifting and ADLs). Associated symptoms include numbness and tingling. Pertinent negatives include no abdominal pain, bladder incontinence or bowel incontinence. Risk factors include lack of exercise and obesity. Patient relates current medications are helping the pain. Patient reports taking pain medications as prescribed, denies obtaining medications from different sources and denies use of illegal drugs. Patient denies side effects from medications like nausea, vomiting, constipation or drowsiness. Patient reports current activities of daily living ar possible due to medications and would like to continue them.        RX Monitoring 3/4/2019   Attestation The Prescription Monitoring Report for this patient was reviewed today. Acute Pain Prescriptions -   Chronic Pain Routine Monitoring Random urine drug screen sent today.;Obtaining appropriate analgesic effect of treatment. Chronic Pain > 80 MEDD Obtained or confirmed a written medication contract was on file. Current Pain Assessment  Pain Assessment  Pain Assessment: 0-10  Pain Level: 5  Patient's Stated Pain Goal: (decreased pain with increased activity)  Pain Type: Chronic pain  Pain Location: Back  Pain Orientation: Right, Left, Lower  Pain Radiating Towards: bilat leg to feet  Pain Descriptors: Sharp  Pain Frequency: Continuous  Pain Onset: On-going  Clinical Progression: Not changed  Effect of Pain on Daily Activities: painful when walking long distances, stretching  Functional Pain Assessment: Prevents or interferes some active activities and ADLs  Non-Pharmaceutical Pain Intervention(s): Distraction, Elevation, Cold applied, Massage, Heat applied, Repositioned, Relaxation techniques, Rest                    ADVERSE MEDICATION EFFECTS:   Constipation: no  Bowel Regimen: No  Diet: common adult  Appetite:  ok  Sedation:  no  Urinary Retention: no    FOCUSED PAINSCALE:  Highest : 7  Lowest :3  Average: Range-5  When and What  was your last procedure:    Injections years ago  Was your procedure effective:  no    ACTIVITY/SOCIAL/EMOTIONAL:  Sleep Pattern: 8 hours per night.  generally restful sleep  Energy Level:  Normal  Currently attending Physical Therapy:  No  Home Exercises: daily stretching at home  Mobility: painful to walk  Currently seeing a Psychiatrist or Psychologist:  No  Emotional Issues: normal   Mood: appropriate     ABERRANT BEHAVIORS SINCE LAST VISIT:  Have you ever been treated in another Pain Clinic no  Refills for prescriptions appropriate: yes  Lost rx/pills:no  Taking more medication than prescribed:  no  Are you receiving PAIN medications from  other doctors: no  Last Urine/Serum Drug Screen : 4/5/18- will obtain today  Was Serum/UDS as anticipated?   yes  Brought pill bottles in :yes   Was Pill count appropriate? :yes   Are currently pregnant?not applicable  Recent ER visits: Yes- 2/2019 colonoscopy              Past Medical History      Diagnosis Date    Abdominal pain     Benign prostatic hypertrophy     C. difficile colitis     CAD (coronary artery disease) 1/3/12    s/p CABGx3    Colon polyp 02/25/2019    tubular adenoma    Constipation     Diabetes mellitus ([facility] [address]     Diarrhea     Diarrhea     DVT [facility]deep venous thrombosis) ([facility])     left calf    Ejection fraction < 50%     Gallstones     Heartburn     Hx of blood clots     Hyperlipidemia     Kidney stones     MI (myocardial infarction) ([facility] [address]     2011    Mitral regurgitation     MRSA (methicillin resistant staph aureus) culture positive     Numbness and tingling     hands and feet    Rib fractures 1-2015    right    Wears glasses     readers       Surgical History  Past Surgical History:   Procedure Laterality Date    APPENDECTOMY      CARDIAC CATHETERIZATION  12/29/11    CHOLECYSTECTOMY      COLONOSCOPY  01/11/2012    wnl, 10 yr recall    COLONOSCOPY  11/28/2018    ATTEMPTED NOT CLEAN (N/A )    COLONOSCOPY  02/25/2019    tubular adenoma    COLONOSCOPY N/A 2/25/2019    COLONOSCOPY WITH BIOPSY performed by Dakota Newton MD at [facility]      x 1    CORONARY ARTERY BYPASS GRAFT  1/3/12    x3    KNEE ARTHROSCOPY      left    KNEE SURGERY Left     I&D    OTHER SURGICAL HISTORY Left 3/7/2016    plantar plane &  exostectomy medial foot left    AL COLON CA SCRN NOT HI RSK IND N/A 11/28/2018    COLONOSCOPY ATTEMPTED NOT CLEAN performed by Dakota Newton MD at [place] [address] ENDOSCOPY  11-3-15    VENA CAVA FILTER PLACEMENT      WRIST SURGERY      I&D       Medications  Current Outpatient Medications   Medication Sig Dispense Refill    blood glucose test strips (FREESTYLE LITE) strip TEST TWICE DAILY AS DIRECTED 100 strip 1    simvastatin (ZOCOR) 20 MG tablet TAKE 1 TABLET BY MOUTH IN THE EVENING  30 tablet 0    cholestyramine (QUESTRAN) 4 g packet Take 1 packet by mouth 3 times daily (with meals) 90 packet 3    furosemide (LASIX) 40 MG tablet TAKE 1 TABLET BY MOUTH TWO TIMES A DAY  60 tablet 0    omeprazole (PRILOSEC) 20 MG delayed release capsule TAKE 1 CAPSULE BY MOUTH ONE TIME A DAY  30 capsule 1    metoprolol tartrate (LOPRESSOR) 25 MG tablet TAKE 1 TABLET BY MOUTH TWO TIMES A DAY  11    fentaNYL (DURAGESIC) 25 MCG/HR Place 1 patch onto the skin every 72 hours for 30 days. 10 patch 0    oxyCODONE-acetaminophen (PERCOCET) 5-325 MG per tablet Take 1 tablet by mouth every 6 hours as needed for Pain for up to 30 days. 120 tablet 0    gabapentin (NEURONTIN) 800 MG tablet Take 1 tablet by mouth daily for 90 days. 90 tablet 3    LANTUS SOLOSTAR 100 UNIT/ML injection pen INJECT 30 UNITS SUBCUTANEOUSLY IN THE MORNING AND 40 UNITS IN THE EVENING 15 mL 2    BD PEN NEEDLE ALIZE U/F 32G X 4 MM MISC use twice daily as directed 100 each 1    topiramate (TOPAMAX) 50 MG tablet Take 1 tablet by mouth 2 times daily 60 tablet 3    Probiotic Product (PROBIOTIC-10 PO) Take by mouth      naloxone (NARCAN) 4 MG/0.1ML LIQD nasal spray 1 spray by Nasal route as needed (if needed for respiratory depression) 1 each 0    aspirin 81 MG tablet Take 81 mg by mouth daily.  Vitamin D (CHOLECALCIFEROL) 1000 UNITS CAPS capsule Take 1,000 Units by mouth daily.  Ascorbic Acid (VITAMIN C) 500 MG tablet Take 1,000 mg by mouth daily       NITROSTAT 0.4 MG SL tablet       finasteride (PROSCAR) 5 MG tablet Take 5 mg by mouth daily.  tamsulosin (FLOMAX) 0.4 MG capsule Take 0.4 mg by mouth daily. No current facility-administered medications for this encounter.         Allergies  Flagyl [metronidazole] and Metronidazole    Family History  family history includes Cancer in his maternal grandfather and mother; Heart Failure in his father. Social History  Social History     Socioeconomic History    Marital status:      Spouse name: None    Number of children: None    Years of education: None    Highest education level: None   Occupational History    Occupation: retired [employer]   Social Needs    Financial resource strain: None    Food insecurity:     Worry: None     Inability: None    Transportation needs:     Medical: None     Non-medical: None   Tobacco Use    Smoking status: Former Smoker     Packs/day: 1.00     Years: 30.00     Pack years: 30.00     Types: Cigarettes     Last attempt to quit: 2002     Years since quittin.1    Smokeless tobacco: Never Used   Substance and Sexual Activity    Alcohol use: No     Comment: rare    Drug use: No    Sexual activity: Never   Lifestyle    Physical activity:     Days per week: None     Minutes per session: None    Stress: None   Relationships    Social connections:     Talks on phone: None     Gets together: None     Attends [employer] service: None     Active member of club or organization: None     Attends meetings of clubs or organizations: None     Relationship status: None    Intimate partner violence:     Fear of current or ex partner: None     Emotionally abused: None     Physically abused: None     Forced sexual activity: None   Other Topics Concern    None   Social History Narrative    None      reports that he does not use drugs. REVIEW OF SYSTEMS:  Review of Systems   Constitutional: Negative. HENT: Negative. Eyes: Negative. Respiratory: Negative. Cardiovascular: Negative. Gastrointestinal: Negative. Negative for abdominal pain and bowel incontinence. Endocrine: Negative. Genitourinary: Negative. Negative for bladder incontinence. Musculoskeletal: Positive for back pain. Skin: Negative. Allergic/Immunologic: Negative. Neurological: Positive for tingling and numbness. Hematological: Negative. Psychiatric/Behavioral: Negative. GENERAL PHYSICAL EXAM:  Vitals: /66   Pulse 51   Temp 97.7 °F (36.5 °C) (Oral)   Resp 16   Ht 6' (1.829 m)   Wt 227 lb (103 kg)   SpO2 98%   BMI 30.79 kg/m² , Body mass index is 30.79 kg/m². Physical Exam   Constitutional: He is oriented to person, place, and time. He appears well-developed and well-nourished. HENT:   Head: Normocephalic and atraumatic. Eyes: Pupils are equal, round, and reactive to light. Conjunctivae and EOM are normal.   Neck: Neck supple. No tracheal deviation present. No thyromegaly present. Cardiovascular: Normal rate and regular rhythm. Pulmonary/Chest: Effort normal. No respiratory distress. Abdominal: Soft. He exhibits no distension. Musculoskeletal: He exhibits no edema. Neurological: He is alert and oriented to person, place, and time. He displays no atrophy and no tremor. A sensory deficit is present. No cranial nerve deficit. He exhibits normal muscle tone. Coordination normal.   Reflex Scores:       Patellar reflexes are 0 on the right side and 0 on the left side. Achilles reflexes are 0 on the right side and 0 on the left side. Decreased sensation to light touch below the knees bilaterally from neuropathy   Skin: Skin is warm and dry. Psychiatric: He has a normal mood and affect. His speech is normal and behavior is normal. Judgment and thought content normal. Cognition and memory are normal.    Right Ankle Exam     Muscle Strength   The patient has normal right ankle strength. Comments:  Deformity joints in the feet from Charcot's disease bilaterally      Left Ankle Exam     Muscle Strength   The patient has normal left ankle strength. Right Knee Exam     Muscle Strength   The patient has normal right knee strength.       Left Knee Exam     Muscle Strength   The patient has normal left knee     Report electronically signed by Jerrica Strauss M.D. on 1/26/2015       Patient Active Problem List   Diagnosis    DDD (degenerative disc disease), lumbar    Osteoarthritis of spine with radiculopathy, lumbar region    Lumbar spinal stenosis    DDD (degenerative disc disease), cervical    Facet syndrome ([address]    Compression fracture    Medication monitoring encounter    Tear of rotator cuff    Encounter for chronic pain management    Spondylosis of lumbar region without myelopathy or radiculopathy    Coronary artery disease involving coronary bypass graft of native heart without angina pectoris    IBS (irritable bowel syndrome)    Charcot foot due to diabetes mellitus ([address]    Type 2 diabetes mellitus with diabetic polyneuropathy, with long-term current use of insulin (HCC)    Hyperlipidemia    Colon polyp    Vitamin D deficiency        ASSESSMENT    Rogers Lovett is a 69 y.o. male with     1. Osteoarthritis of spine with radiculopathy, lumbar region    2. Spondylosis of lumbar region without myelopathy or radiculopathy    3. Drug-induced constipation    4. DDD (degenerative disc disease), cervical    5. Arthritis of left knee due to other bacteria ([address]    6. Diabetic mononeuropathy associated with diabetes mellitus due to underlying condition ([address]    7. Facet syndrome ([address]    8. Charcot foot due to diabetes mellitus ([address]    9. Medication monitoring encounter    10. Diabetic mononeuropathy associated with drug or chemical induced diabetes mellitus ([address]           PLAN    We will continue current pain medications  Current medications are being tolerated without any Adverse side effects. Orders Placed This Encounter   Medications    fentaNYL (DURAGESIC) 25 MCG/HR     Sig: Place 1 patch onto the skin every 72 hours for 30 days. Dispense:  10 patch     Refill:  0     Reduce doses taken as pain becomes manageable     Urine drug screens have been appropriate.   No aberrant activity noted.  Analgesia is achieved. Activities of daily living are possible because of medications. Safe use of medications explained to patient. Counselling/Preventive measures for pain  Control:    [x]  Spine strengthening exercises are discussed with patient in detail. [x] Ill effects of being on chronic pain medications such as sleep disturbances, hormonal changes, withdrawal symptoms,  chronic opioid dependence and tolerance were discussed with patient. I had asked the patient to minimize medication use and utilize pain medications only for uncontrolled rest pain or pain with exertional activities. I advised patient not to self escalate pain medications without consulting with us. At each of patient's future visits we will try to taper pain medications, while adjusting the adjunct medications, and re-evaluating for Physical Therapy to improve spinal and joint strength. We will continue to have discussions to decrease pain medications as tolerated. I also discussed with the patient regarding the dangers of combining narcotic pain medication with tranquilizers, alcohol or illegal drugs or taking the medication any other than prescribed. The dangers including the respiratory depression and death. Patient was told to tell  to all  physicians regarding the medications he is getting from pain clinic. Patient is warned not to take any unprescribed medications over-the-counter medications that can depress breathing . Patient is advised to talk to the pharmacist or physicians if planning to take any over-the-counter medications before  takeing them. Patient is strongly advised to avoid tranquilizers or  Relaxants for any medications that can depress breathing or recreational drugs. Patient is also advised to tell us if there is any changes in his medications from other physicians.    We discussed the same at today's visit and have not been to implement it, as the patient's pain is not under control with current medications. Orders Placed This Encounter   Procedures    DRUG SCREEN, PAIN     Standing Status:   Standing     Number of Occurrences:   1       Decision Making Process : Patient's health history and referral records thoroughly reviewed before focused physical examination and discussion with patient. I have spent 20 Over 50% of today's visit is spent on examining the patient and counseling and coordinating the care. Level of complexity of date to be reviewed is Moderate. The chart date reviewed include the following: Imaging Reports. Summary of Care. Time spent reviewing with patient the below reports:   Medication safety, Treatment options. Level of diagnosis and management options of this case is multiple: involving the following management options: Interventions as needed, medication management as appropriate, future visits, activity modification, heat/ice as needed, Urine drug screen as required. [x]The patient's questions were answered to the best of my abilities. This note was created using voice recognition software. There may be inaccuracies of transcription  that are inadvertently overlooked prior to the signature. There is any questions about the transcription please contact me. Return in  4 weeks  with  CNP  for further plan of treatment.          Electronically signed by Roderick Mills MD on 4/3/2019 at 10:36 AM

## 2019-04-04 ENCOUNTER — OFFICE VISIT (OUTPATIENT)
Dept: PODIATRY | Age: 74
End: 2019-04-04
Payer: MEDICARE

## 2019-04-04 VITALS — WEIGHT: 212 LBS | HEIGHT: 72 IN | BODY MASS INDEX: 28.71 KG/M2

## 2019-04-04 DIAGNOSIS — Z79.4 TYPE 2 DIABETES MELLITUS WITH DIABETIC POLYNEUROPATHY, WITH LONG-TERM CURRENT USE OF INSULIN (HCC): ICD-10-CM

## 2019-04-04 DIAGNOSIS — B35.1 ONYCHOMYCOSIS: Primary | ICD-10-CM

## 2019-04-04 DIAGNOSIS — E11.42 TYPE 2 DIABETES MELLITUS WITH DIABETIC POLYNEUROPATHY, WITH LONG-TERM CURRENT USE OF INSULIN (HCC): ICD-10-CM

## 2019-04-04 DIAGNOSIS — M14.672 CHARCOT'S JOINT OF LEFT FOOT: ICD-10-CM

## 2019-04-04 PROCEDURE — 99999 PR OFFICE/OUTPT VISIT,PROCEDURE ONLY: CPT | Performed by: PODIATRIST

## 2019-04-04 PROCEDURE — 11721 DEBRIDE NAIL 6 OR MORE: CPT | Performed by: PODIATRIST

## 2019-04-04 ASSESSMENT — ENCOUNTER SYMPTOMS
VOMITING: 0
DIARRHEA: 0
COLOR CHANGE: 0
NAUSEA: 0
CONSTIPATION: 0

## 2019-04-04 NOTE — PROGRESS NOTES
Left    Dystrophic Changes                                                                 [x] [x] [x] [x] [x] [x] [x] [x] [x] [x]  5 4 3 2 1 1 2 3 4 5                         Right                                        Left    Color                                                                  [x] [x] [x] [x] [x] [x] [x] [x] [x] [x]  5 4 3 2 1 1 2 3 4 5                          Right                                        Left    Incurvation/Ingrowin                                                                   [] [] [] [] [] [] [] [] [] []  5 4 3 2 1 1 2 3 4 5                         Right                                        Left    Inflammation/Pain                                                                   [] [] [] [] [] [] [] [] [] []  5 4 3 2 1 1 2 3 4 5                         Right                                        Left         Assesment :    Diagnosis Orders   1. Onychomycosis  NE DEBRIDEMENT OF NAILS, 6 OR MORE     DIABETES FOOT EXAM   2. Type 2 diabetes mellitus with diabetic polyneuropathy, with long-term current use of insulin (HCC)  NE DEBRIDEMENT OF NAILS, 6 OR MORE    HM DIABETES FOOT EXAM   3. Charcot's joint of left foot   DIABETES FOOT EXAM         Plan: Pt was evaluated and examined. Patient was given personalized discharge instructions. Nails 1-10 were debrided sharply in length and thickness with a nipper and , without incident. Pt will follow up in 9 weeks or sooner if any problems arise. Diagnosis was discussed with the pt and all of their questions were answered in detail. Proper foot hygiene and care was discussed with the pt. Informed patient on proper diabetic foot care and importance of tight glycemic control. Patient to check feet daily and contact the office with any questions/problems/concerns. Other comorbidity noted and will be managed by PCP. Diabetic foot examination performed this visit.   The exam included neurological sensory exam, a 10-g monofilament and pinprick sensation, vibration using a 128-Hz tuning fork, ankle reflexes, visual skin inspection, vascular exam including assessment of pedal pulses, orthopedic exam for deformities, and shoe inspection. Increased risk factors noted on the diabetic foot exam include decreased sensory exam and peripheral neuropathy. Shoegear inspected and found to be appropriate size and wear. No orders of the defined types were placed in this encounter. Follow up 9 weeks.

## 2019-04-07 LAB
6-ACETYLMORPHINE, UR: NOT DETECTED
7-AMINOCLONAZEPAM, URINE: NOT DETECTED
ALPHA-OH-ALPRAZ, URINE: NOT DETECTED
ALPRAZOLAM, URINE: NOT DETECTED
AMPHETAMINES, URINE: NOT DETECTED
BARBITURATES, URINE: NOT DETECTED
BENZOYLECGONINE, UR: NOT DETECTED
BUPRENORPHINE URINE: NOT DETECTED
CARISOPRODOL, UR: NOT DETECTED
CLONAZEPAM, URINE: NOT DETECTED
CODEINE, URINE: NOT DETECTED
CREATININE URINE: 143.1 MG/DL (ref 20–400)
DIAZEPAM, URINE: NOT DETECTED
DRUGS EXPECTED, UR: NORMAL
EER HI RES INTERP UR: NORMAL
ETHYL GLUCURONIDE UR: NOT DETECTED
FENTANYL URINE: PRESENT
HYDROCODONE, URINE: NOT DETECTED
HYDROMORPHONE, URINE: NOT DETECTED
LORAZEPAM, URINE: NOT DETECTED
MARIJUANA METAB, UR: NOT DETECTED
MDA, UR: NOT DETECTED
MDEA, EVE, UR: NOT DETECTED
MDMA URINE: NOT DETECTED
MEPERIDINE METAB, UR: NOT DETECTED
METHADONE, URINE: NOT DETECTED
METHAMPHETAMINE, URINE: NOT DETECTED
METHYLPHENIDATE: NOT DETECTED
MIDAZOLAM, URINE: NOT DETECTED
MORPHINE URINE: NOT DETECTED
NORBUPRENORPHINE, URINE: NOT DETECTED
NORDIAZEPAM, URINE: NOT DETECTED
NORFENTANYL, URINE: PRESENT
NORHYDROCODONE, URINE: NOT DETECTED
NOROXYCODONE, URINE: PRESENT
NOROXYMORPHONE, URINE: NOT DETECTED
OXAZEPAM, URINE: NOT DETECTED
OXYCODONE URINE: PRESENT
OXYMORPHONE, URINE: NOT DETECTED
PAIN MANAGEMENT DRUG PANEL INTERP, URINE: NORMAL
PAIN MGT DRUG PANEL, HI RES, UR: NORMAL
PCP,URINE: NOT DETECTED
PHENTERMINE, UR: NOT DETECTED
PROPOXYPHENE, URINE: NOT DETECTED
TAPENTADOL, URINE: NOT DETECTED
TAPENTADOL-O-SULFATE, URINE: NOT DETECTED
TEMAZEPAM, URINE: NOT DETECTED
TRAMADOL, URINE: NOT DETECTED
ZOLPIDEM, URINE: NOT DETECTED

## 2019-04-22 ENCOUNTER — TELEPHONE (OUTPATIENT)
Dept: FAMILY MEDICINE CLINIC | Age: 74
End: 2019-04-22

## 2019-04-22 NOTE — TELEPHONE ENCOUNTER
Pt called stating that the pharmacy is having problems getting his test strips billed and needed information from us. I called [facility] and they needed chart notes that support the necessity for the test strips to be billed via Medicare part A.  I have sent the records needed

## 2019-04-25 DIAGNOSIS — E11.42 DIABETIC POLYNEUROPATHY ASSOCIATED WITH TYPE 2 DIABETES MELLITUS (HCC): ICD-10-CM

## 2019-04-26 RX ORDER — INSULIN GLARGINE 100 [IU]/ML
INJECTION, SOLUTION SUBCUTANEOUS
Qty: 15 ML | Refills: 1 | Status: SHIPPED | OUTPATIENT
Start: 2019-04-26 | End: 2019-06-14 | Stop reason: SDUPTHER

## 2019-04-26 NOTE — TELEPHONE ENCOUNTER
Please Approve or Refuse.   Send to Pharmacy per Pt's Request:      Next Visit Date:  7/1/2019  Last Visit Date: 2/10/2017    Hemoglobin A1C (%)   Date Value   03/28/2019 6.1   07/26/2018 6.0   03/27/2018 6.1             ( goal A1C is < 7)   BP Readings from Last 3 Encounters:   04/03/19 133/66   03/28/19 106/64   03/21/19 113/69          (goal 120/80)  BUN   Date Value Ref Range Status   10/10/2018 25 (H) 8 - 23 mg/dL Final     CREATININE   Date Value Ref Range Status   10/10/2018 1.33 (H) 0.70 - 1.20 mg/dL Final     Potassium   Date Value Ref Range Status   10/10/2018 4.0 3.7 - 5.3 mmol/L Final

## 2019-04-27 ENCOUNTER — HOSPITAL ENCOUNTER (OUTPATIENT)
Age: 74
Discharge: HOME | End: 2019-04-27
Payer: MEDICARE

## 2019-04-27 DIAGNOSIS — E78.5 HYPERLIPIDEMIA, UNSPECIFIED HYPERLIPIDEMIA TYPE: ICD-10-CM

## 2019-04-27 DIAGNOSIS — E55.9 VITAMIN D DEFICIENCY: ICD-10-CM

## 2019-04-27 DIAGNOSIS — I25.810 CORONARY ARTERY DISEASE INVOLVING CORONARY BYPASS GRAFT OF NATIVE HEART WITHOUT ANGINA PECTORIS: ICD-10-CM

## 2019-04-27 LAB
ALBUMIN SERPL-MCNC: 4.2 G/DL (ref 3.5–5.2)
ALBUMIN/GLOBULIN RATIO: ABNORMAL (ref 1–2.5)
ALP BLD-CCNC: 95 U/L (ref 40–129)
ALT SERPL-CCNC: 17 U/L (ref 5–41)
ANION GAP SERPL CALCULATED.3IONS-SCNC: 10 MMOL/L (ref 9–17)
AST SERPL-CCNC: 16 U/L
BILIRUB SERPL-MCNC: 0.29 MG/DL (ref 0.3–1.2)
BUN BLDV-MCNC: 23 MG/DL (ref 8–23)
BUN/CREAT BLD: ABNORMAL (ref 9–20)
CALCIUM SERPL-MCNC: 9 MG/DL (ref 8.6–10.4)
CHLORIDE BLD-SCNC: 105 MMOL/L (ref 98–107)
CHOLESTEROL/HDL RATIO: 2.9
CHOLESTEROL: 114 MG/DL
CO2: 26 MMOL/L (ref 20–31)
CREAT SERPL-MCNC: 1.12 MG/DL (ref 0.7–1.2)
GFR AFRICAN AMERICAN: >60 ML/MIN
GFR NON-AFRICAN AMERICAN: >60 ML/MIN
GFR SERPL CREATININE-BSD FRML MDRD: ABNORMAL ML/MIN/{1.73_M2}
GFR SERPL CREATININE-BSD FRML MDRD: ABNORMAL ML/MIN/{1.73_M2}
GLUCOSE BLD-MCNC: 67 MG/DL (ref 70–99)
HDLC SERPL-MCNC: 39 MG/DL
LDL CHOLESTEROL: 49 MG/DL (ref 0–130)
POTASSIUM SERPL-SCNC: 4.5 MMOL/L (ref 3.7–5.3)
SODIUM BLD-SCNC: 141 MMOL/L (ref 135–144)
TOTAL PROTEIN: 7.1 G/DL (ref 6.4–8.3)
TRIGL SERPL-MCNC: 129 MG/DL
VITAMIN D 25-HYDROXY: 28.4 NG/ML (ref 30–100)
VLDLC SERPL CALC-MCNC: ABNORMAL MG/DL (ref 1–30)

## 2019-04-27 PROCEDURE — 80053 COMPREHEN METABOLIC PANEL: CPT

## 2019-04-27 PROCEDURE — 82306 VITAMIN D 25 HYDROXY: CPT

## 2019-04-27 PROCEDURE — 36415 COLL VENOUS BLD VENIPUNCTURE: CPT

## 2019-04-27 PROCEDURE — 80061 LIPID PANEL: CPT

## 2019-04-29 ENCOUNTER — TELEPHONE (OUTPATIENT)
Dept: GASTROENTEROLOGY | Age: 74
End: 2019-04-29

## 2019-05-08 ENCOUNTER — HOSPITAL ENCOUNTER (OUTPATIENT)
Dept: PAIN MANAGEMENT | Age: 74
Discharge: HOME | End: 2019-05-08
Payer: MEDICARE

## 2019-05-08 VITALS
HEIGHT: 72 IN | HEART RATE: 64 BPM | DIASTOLIC BLOOD PRESSURE: 69 MMHG | SYSTOLIC BLOOD PRESSURE: 128 MMHG | WEIGHT: 214 LBS | RESPIRATION RATE: 16 BRPM | BODY MASS INDEX: 28.99 KG/M2

## 2019-05-08 DIAGNOSIS — K59.03 DRUG-INDUCED CONSTIPATION: ICD-10-CM

## 2019-05-08 DIAGNOSIS — M00.862 ARTHRITIS OF LEFT KNEE DUE TO OTHER BACTERIA (HCC): ICD-10-CM

## 2019-05-08 DIAGNOSIS — M48.061 SPINAL STENOSIS OF LUMBAR REGION, UNSPECIFIED WHETHER NEUROGENIC CLAUDICATION PRESENT: ICD-10-CM

## 2019-05-08 DIAGNOSIS — M47.899 FACET SYNDROME: ICD-10-CM

## 2019-05-08 DIAGNOSIS — E11.610 CHARCOT FOOT DUE TO DIABETES MELLITUS (HCC): ICD-10-CM

## 2019-05-08 DIAGNOSIS — Z51.81 MEDICATION MONITORING ENCOUNTER: ICD-10-CM

## 2019-05-08 DIAGNOSIS — M47.26 OSTEOARTHRITIS OF SPINE WITH RADICULOPATHY, LUMBAR REGION: ICD-10-CM

## 2019-05-08 DIAGNOSIS — M51.36 DDD (DEGENERATIVE DISC DISEASE), LUMBAR: Primary | ICD-10-CM

## 2019-05-08 DIAGNOSIS — M50.30 DDD (DEGENERATIVE DISC DISEASE), CERVICAL: ICD-10-CM

## 2019-05-08 DIAGNOSIS — E08.41 DIABETIC MONONEUROPATHY ASSOCIATED WITH DIABETES MELLITUS DUE TO UNDERLYING CONDITION (HCC): ICD-10-CM

## 2019-05-08 DIAGNOSIS — M47.816 SPONDYLOSIS OF LUMBAR REGION WITHOUT MYELOPATHY OR RADICULOPATHY: ICD-10-CM

## 2019-05-08 PROCEDURE — 99213 OFFICE O/P EST LOW 20 MIN: CPT | Performed by: NURSE PRACTITIONER

## 2019-05-08 PROCEDURE — 99213 OFFICE O/P EST LOW 20 MIN: CPT

## 2019-05-08 RX ORDER — TOPIRAMATE 50 MG/1
50 TABLET, FILM COATED ORAL 2 TIMES DAILY
Qty: 60 TABLET | Refills: 3 | Status: SHIPPED | OUTPATIENT
Start: 2019-05-08 | End: 2019-09-06 | Stop reason: SDUPTHER

## 2019-05-08 RX ORDER — FENTANYL 25 UG/H
1 PATCH TRANSDERMAL
Qty: 10 PATCH | Refills: 0 | Status: SHIPPED | OUTPATIENT
Start: 2019-05-10 | End: 2019-06-10 | Stop reason: SDUPTHER

## 2019-05-08 ASSESSMENT — ENCOUNTER SYMPTOMS
SHORTNESS OF BREATH: 0
COUGH: 0
CONSTIPATION: 0
BACK PAIN: 1

## 2019-05-08 NOTE — PROGRESS NOTES
Patient is here today to review medication contract. Chief Complaint:  Back and foot pain    [facility]     Pt reports low back pain for over 20 years after a compression fracture he suffered after pulling a post out of the ground. He also suffers from diabetic neuropathy with bilat Charcot's foot. He had tingling and numbness to hand and feet. He has had foot surgery in the past, but no surgery for his back pain. He has tried lumber epidurals with little noted relief. He is opioid dependant for pain control. We prescribe Fentanyl q72 hours and percocet that he uses sparingly. He does report a worsening if his back pain but states he is trying not to take the percocet. Last filled percocet in march for 120 tabs that he says will last about 3 months. He is also taking gabapentin and topamax for the neuropathic pain      HPI:     Back Pain   This is a chronic problem. The current episode started more than 1 year ago. The problem occurs constantly. The problem is unchanged. The pain is present in the lumbar spine. The quality of the pain is described as aching, burning and shooting. The pain radiates to the right foot. The pain is at a severity of 5/10. The pain is moderate. The pain is worse during the day. The symptoms are aggravated by sitting and standing. Associated symptoms include leg pain, numbness and paresthesias. Pertinent negatives include no chest pain or fever. Risk factors include obesity. He has tried home exercises and analgesics for the symptoms. The treatment provided mild relief. Patient denies any new neurological symptoms. No bowel or bladder incontinence, no weakness, and no falling. Pill count: appropriate    Morphine equivalent:60     Pt does have narcan as a precaution for accidental overdose due his high MED, and has been instructed on its proper use    Attestation: The Prescription Monitoring Report for this patient was reviewed today.  TACOS Thompson - ZACK)  Chronic Pain Routine Monitoring: Possible medication side effects, risk of tolerance/dependence & alternative treatments discussed., Obtaining appropriate analgesic effect of treatment., No signs of potential drug abuse or diversion identified: otherwise, see note documentation Yanet Newman, APRN - CNP)      Past Medical History:   Diagnosis Date    Abdominal pain     Benign prostatic hypertrophy     C. difficile colitis     CAD (coronary artery disease) 1/3/12    s/p CABGx3    Colon polyp 02/25/2019    tubular adenoma    Constipation     Diabetes mellitus [facility] [address]     Diarrhea     Diarrhea     D[facility]deep [facility] thrombosis) ([facility])     left calf    Ejection fraction < 50%     Gallstones     Heartburn     Hx of blood clots     Hyperlipidemia     Kidney stones     MI (myocardial infarction) ([facility] [address]     2011    Mitral regurgitation     MRSA (methicillin resistant staph aureus) culture positive     Numbness and tingling     hands and feet    Rib fractures 1-2015    right    Wears glasses     readers       Past Surgical History:   Procedure Laterality Date    APPENDECTOMY      CARDIAC CATHETERIZATION  12/29/11    CHOLECYSTECTOMY      COLONOSCOPY  01/11/2012    wnl, 10 yr recall    COLONOSCOPY  11/28/2018    ATTEMPTED NOT CLEAN (N/A )    COLONOSCOPY  02/25/2019    tubular adenoma    COLONOSCOPY N/A 2/25/2019    COLONOSCOPY WITH BIOPSY performed by Joanna [facility] at [facility]      x 1    CORONARY ARTERY BYPASS GRAFT  1/3/12    x3    KNEE ARTHROSCOPY      left    KNEE SURGERY Left     I&D    OTHER SURGICAL HISTORY Left 3/7/2016    plantar plane &  exostectomy medial foot left    OR COLON CA SCRN NOT HI RSK IND N/A 11/28/2018    COLONOSCOPY ATTEMPTED NOT CLEAN performed by Joanna Campos MD at [address] [facility]  11-3-15    VENA CAVA FILTER PLACEMENT      WRIST SURGERY      I&D       Allergies   Allergen Reactions    daily.  , Disp: , Rfl:     tamsulosin (FLOMAX) 0.4 MG capsule, Take 0.4 mg by mouth daily. , Disp: , Rfl:     Family History   Problem Relation Age of Onset    Heart Failure Father     Cancer Mother         breast    Cancer Maternal Grandfather        Social History     Socioeconomic History    Marital status:      Spouse name: Not on file    Number of children: Not on file    Years of education: Not on file    Highest education level: Not on file   Occupational History    Occupation: retired [employer]   Social Needs    Financial resource strain: Not on file    Food insecurity:     Worry: Not on file     Inability: Not on file   TRAFFIQ needs:     Medical: Not on file     Non-medical: Not on file   Tobacco Use    Smoking status: Former Smoker     Packs/day: 1.00     Years: 30.00     Pack years: 30.00     Types: Cigarettes     Last attempt to quit: 2002     Years since quittin.2    Smokeless tobacco: Never Used   Substance and Sexual Activity    Alcohol use: No     Comment: rare    Drug use: No    Sexual activity: Never   Lifestyle    Physical activity:     Days per week: Not on file     Minutes per session: Not on file    Stress: Not on file   Relationships    Social connections:     Talks on phone: Not on file     Gets together: Not on file     Attends Restorationist service: Not on file     Active member of club or organization: Not on file     Attends meetings of clubs or organizations: Not on file     Relationship status: Not on file    Intimate partner violence:     Fear of current or ex partner: Not on file     Emotionally abused: Not on file     Physically abused: Not on file     Forced sexual activity: Not on file   Other Topics Concern    Not on file   Social History Narrative    Not on file       Review of Systems:  Review of Systems   Constitution: Negative for chills and fever. Cardiovascular: Negative for chest pain.    Respiratory: Negative for cough and shortness of breath. Musculoskeletal: Positive for back pain. Gastrointestinal: Negative for constipation. Neurological: Positive for numbness and paresthesias. Physical Exam:  /69   Pulse 64   Resp 16   Ht 6' (1.829 m)   Wt 214 lb (97.1 kg)   BMI 29.02 kg/m²     Physical Exam   Constitutional: He is oriented to person, place, and time. Cardiovascular: Normal rate. Pulmonary/Chest: Effort normal.   Musculoskeletal:        Lumbar back: He exhibits decreased range of motion. Antalgic gait using cane   Neurological: He is alert and oriented to person, place, and time. Skin: Skin is warm and dry.        Record/Diagnostics Review:    Last alexander April   and was appropriate   Left foot XR 3/2016  Lumbar XR 1/2015    Assessment:  Problem List Items Addressed This Visit     DDD (degenerative disc disease), lumbar - Primary    Relevant Medications    fentaNYL (DURAGESIC) 25 MCG/HR (Start on 5/10/2019)    Osteoarthritis of spine with radiculopathy, lumbar region    Relevant Medications    fentaNYL (DURAGESIC) 25 MCG/HR (Start on 5/10/2019)    topiramate (TOPAMAX) 50 MG tablet    Lumbar spinal stenosis    DDD (degenerative disc disease), cervical    Relevant Medications    fentaNYL (DURAGESIC) 25 MCG/HR (Start on 5/10/2019)    Facet syndrome ([address]    Relevant Medications    fentaNYL ([address]) 25 MCG/HR (Start on 5/10/2019)    Medication monitoring encounter    Relevant Medications    fentaNYL (DURAGESIC) 25 MCG/HR (Start on 5/10/2019)    Spondylosis of lumbar region without myelopathy or radiculopathy    Relevant Medications    fentaNYL ([address]) 25 MCG/HR (Start on 5/10/2019)    Charcot foot due to diabetes mellitus ([address]    Relevant Medications    fentaNYL ([address]) 25 MCG/HR (Start on 5/10/2019)      Other Visit Diagnoses     Diabetic mononeuropathy associated with diabetes mellitus due to underlying condition ([address]        Relevant Medications    fentaNYL (DURAGESIC) 25 MCG/HR (Start on 5/10/2019)    topiramate (TOPAMAX) 50 MG tablet    Drug-induced constipation        Relevant Medications    fentaNYL (DURAGESIC) 25 MCG/HR (Start on 5/10/2019)    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 Patient reports taking pain medications as prescribed, denies obtaining medications from different sources and denies use of illegal drugs. Patient denies side effects from medications like nausea, vomiting, constipation or drowsiness. Patient reports current activities of daily living are possible due to medications and would like to continue them. As always, we encourage daily stretching and strengthening exercises, and recommend minimizing use of pain medications unless patient cannot get through daily activities due to pain. Contract requirements met. Continue opioid therapy.  Script written for fentanyl and topamax  Follow up appointment made for 4 weeks

## 2019-05-27 DIAGNOSIS — E11.42 DIABETIC POLYNEUROPATHY ASSOCIATED WITH TYPE 2 DIABETES MELLITUS (HCC): ICD-10-CM

## 2019-05-28 RX ORDER — PEN NEEDLE, DIABETIC 32GX 5/32"
NEEDLE, DISPOSABLE MISCELLANEOUS
Qty: 200 EACH | Refills: 0 | Status: SHIPPED | OUTPATIENT
Start: 2019-05-28 | End: 2019-09-16 | Stop reason: SDUPTHER

## 2019-05-28 NOTE — TELEPHONE ENCOUNTER
Please Approve or Refuse.   Send to Pharmacy per Pt's Request:      Next Visit Date:  7/1/2019  Last Visit Date: 2/10/2017    Hemoglobin A1C (%)   Date Value   03/28/2019 6.1   07/26/2018 6.0   03/27/2018 6.1             ( goal A1C is < 7)   BP Readings from Last 3 Encounters:   05/08/19 128/69   04/03/19 133/66   03/28/19 106/64          (goal 120/80)  BUN   Date Value Ref Range Status   04/27/2019 23 8 - 23 mg/dL Final     CREATININE   Date Value Ref Range Status   04/27/2019 1.12 0.70 - 1.20 mg/dL Final     Potassium   Date Value Ref Range Status   04/27/2019 4.5 3.7 - 5.3 mmol/L Final

## 2019-06-10 ENCOUNTER — HOSPITAL ENCOUNTER (OUTPATIENT)
Dept: PAIN MANAGEMENT | Age: 74
Discharge: HOME | End: 2019-06-10
Payer: MEDICARE

## 2019-06-10 VITALS
RESPIRATION RATE: 16 BRPM | TEMPERATURE: 98 F | DIASTOLIC BLOOD PRESSURE: 59 MMHG | OXYGEN SATURATION: 98 % | SYSTOLIC BLOOD PRESSURE: 114 MMHG | HEART RATE: 78 BPM

## 2019-06-10 DIAGNOSIS — E11.610 CHARCOT FOOT DUE TO DIABETES MELLITUS (HCC): ICD-10-CM

## 2019-06-10 DIAGNOSIS — M47.899 FACET SYNDROME: ICD-10-CM

## 2019-06-10 DIAGNOSIS — M00.862 ARTHRITIS OF LEFT KNEE DUE TO OTHER BACTERIA (HCC): ICD-10-CM

## 2019-06-10 DIAGNOSIS — M48.061 SPINAL STENOSIS OF LUMBAR REGION, UNSPECIFIED WHETHER NEUROGENIC CLAUDICATION PRESENT: ICD-10-CM

## 2019-06-10 DIAGNOSIS — E08.41 DIABETIC MONONEUROPATHY ASSOCIATED WITH DIABETES MELLITUS DUE TO UNDERLYING CONDITION (HCC): ICD-10-CM

## 2019-06-10 DIAGNOSIS — M50.30 DDD (DEGENERATIVE DISC DISEASE), CERVICAL: ICD-10-CM

## 2019-06-10 DIAGNOSIS — M47.816 SPONDYLOSIS OF LUMBAR REGION WITHOUT MYELOPATHY OR RADICULOPATHY: ICD-10-CM

## 2019-06-10 DIAGNOSIS — Z51.81 MEDICATION MONITORING ENCOUNTER: ICD-10-CM

## 2019-06-10 DIAGNOSIS — K59.03 DRUG-INDUCED CONSTIPATION: ICD-10-CM

## 2019-06-10 DIAGNOSIS — M51.36 DDD (DEGENERATIVE DISC DISEASE), LUMBAR: Primary | ICD-10-CM

## 2019-06-10 DIAGNOSIS — M47.26 OSTEOARTHRITIS OF SPINE WITH RADICULOPATHY, LUMBAR REGION: ICD-10-CM

## 2019-06-10 PROCEDURE — 99213 OFFICE O/P EST LOW 20 MIN: CPT | Performed by: NURSE PRACTITIONER

## 2019-06-10 PROCEDURE — 99213 OFFICE O/P EST LOW 20 MIN: CPT

## 2019-06-10 RX ORDER — FENTANYL 25 UG/H
1 PATCH TRANSDERMAL
Qty: 10 PATCH | Refills: 0 | Status: SHIPPED | OUTPATIENT
Start: 2019-06-10 | End: 2019-07-08 | Stop reason: SDUPTHER

## 2019-06-10 ASSESSMENT — ENCOUNTER SYMPTOMS
CONSTIPATION: 0
BACK PAIN: 1
SHORTNESS OF BREATH: 0
COUGH: 0

## 2019-06-10 NOTE — PROGRESS NOTES
Patient is here today to review medication contract. Chief Complaint: back pain    PMH: Pt reports low back pain for over 20 years after a compression fracture he suffered after pulling a post out of the ground. He also suffers from diabetic neuropathy with bilat Charcot's foot. He had tingling and numbness to hand and feet. He has had foot surgery in the past, but no surgery for his back pain. He has tried lumber epidurals with little noted relief. He is opioid dependant for pain control. We prescribe Fentanyl q72 hours and percocet that he uses sparingly. He does report a worsening if his back pain but states he is trying not to take the percocet. Last filled percocet in march for 120 tabs that he says will last about 3 months. He is also taking gabapentin and topamax for the neuropathic pain    Back Pain   This is a chronic problem. The current episode started more than 1 year ago. The problem occurs constantly. The problem is unchanged. The pain is present in the lumbar spine. The quality of the pain is described as aching. Radiates to: down both legs to the feet. The pain is at a severity of 5/10. The pain is moderate. Pertinent negatives include no chest pain or fever. He has tried analgesics and bed rest for the symptoms. The treatment provided mild relief. Patient denies any new neurological symptoms. No bowel or bladder incontinence, no weakness, and no falling. Pill count: appropriate    Morphine equivalent: 60    Periodic Controlled Substance Monitoring: Possible medication side effects, risk of tolerance/dependence & alternative treatments discussed., No signs of potential drug abuse or diversion identified. , Assessed functional status., Obtaining appropriate analgesic effect of treatment. Abdias Crouch, APRN - CNP)  Acute Pain Prescriptions: Prescription exceeds daily limit for a specific reason. See comments or note., Severe pain not adequately treated with lower dose.  Yaneth Force ABHISHEK Crouch, APRN - CNP)      Past Medical History:   Diagnosis Date    Abdominal pain     Benign prostatic hypertrophy     C. difficile colitis     CAD (coronary artery disease) 1/3/12    s/p CABGx3    Colon polyp 02/25/2019    tubular adenoma    Constipation     Diabetes mellitus ([facility] [address]     Diarrhea     Diarrhea     DVT (deep venous thrombosis) (HCC)     left calf    Ejection fraction < 50%     Gallstones     Heartburn     Hx of blood clots     Hyperlipidemia     Kidney stones     MI (myocardial infarction) ([facility] [address]     2011    Mitral regurgitation     MRSA (methicillin resistant staph aureus) culture positive     Numbness and tingling     hands and feet    Rib fractures 1-2015    right    Wears glasses     readers       Past Surgical History:   Procedure Laterality Date    APPENDECTOMY      CARDIAC CATHETERIZATION  12/29/11    CHOLECYSTECTOMY      COLONOSCOPY  01/11/2012    wnl, 10 yr recall    COLONOSCOPY  11/28/2018    ATTEMPTED NOT CLEAN (N/A )    COLONOSCOPY  02/25/2019    tubular adenoma    COLONOSCOPY N/A 2/25/2019    COLONOSCOPY WITH BIOPSY performed [facility]ne [facility]      x 1    CORONARY ARTERY BYPASS GRAFT  1/3/12    x3    KNEE ARTHROSCOPY      left    KNEE SURGERY Left     I&D    OTHER SURGICAL HISTORY Left 3/7/2016    plantar plane &  exostectomy medial foot left    NV COLON CA SCRN NOT HI RSK IND N/A 11/28/2018    COLONOSCOPY ATTEMPTED NOT CLEAN performed by Flor Byrne MD at [address] [facility] ENDOSCOPY  11-3-15    VENA CAVA FILTER PLACEMENT      WRIST SURGERY      I&D       Allergies   Allergen Reactions    Flagyl [Metronidazole] Hives    Metronidazole      Other reaction(s): Vomiting         Current Outpatient Medications:     BD PEN NEEDLE ALIZE U/F 32G X 4 MM MISC, USE TWICE DAILY AS DIRECTED, Disp: 200 each, Rfl: 0    omeprazole (PRILOSEC) 20 MG delayed release capsule, TAKE 1 CAPSULE BY MOUTH ONE TIME A DAY , Disp: 30 capsule, Rfl: 1    topiramate (TOPAMAX) 50 MG tablet, Take 1 tablet by mouth 2 times daily, Disp: 60 tablet, Rfl: 3    LANTUS SOLOSTAR 100 UNIT/ML injection pen, INJECT 30 UNITS SUBCUTANEOUSLY EVERY MORNING  AND 40 UNITS EVERY EVENING, Disp: 15 mL, Rfl: 1    simvastatin (ZOCOR) 20 MG tablet, TAKE 1 TABLET BY MOUTH IN THE EVENING , Disp: 30 tablet, Rfl: 1    furosemide (LASIX) 40 MG tablet, TAKE 1 TABLET BY MOUTH TWO TIMES A DAY , Disp: 60 tablet, Rfl: 1    blood glucose test strips (FREESTYLE LITE) strip, TEST TWICE DAILY AS DIRECTED, Disp: 100 strip, Rfl: 1    cholestyramine (QUESTRAN) 4 g packet, Take 1 packet by mouth 3 times daily (with meals), Disp: 90 packet, Rfl: 3    metoprolol tartrate (LOPRESSOR) 25 MG tablet, TAKE 1 TABLET BY MOUTH TWO TIMES A DAY, Disp: , Rfl: 11    gabapentin (NEURONTIN) 800 MG tablet, Take 1 tablet by mouth daily for 90 days. , Disp: 90 tablet, Rfl: 3    Probiotic Product (PROBIOTIC-10 PO), Take by mouth, Disp: , Rfl:     naloxone (NARCAN) 4 MG/0.1ML LIQD nasal spray, 1 spray by Nasal route as needed (if needed for respiratory depression), Disp: 1 each, Rfl: 0    aspirin 81 MG tablet, Take 81 mg by mouth daily. , Disp: , Rfl:     Vitamin D (CHOLECALCIFEROL) 1000 UNITS CAPS capsule, Take 1,000 Units by mouth daily. , Disp: , Rfl:     Ascorbic Acid (VITAMIN C) 500 MG tablet, Take 1,000 mg by mouth daily , Disp: , Rfl:     NITROSTAT 0.4 MG SL tablet, , Disp: , Rfl:     finasteride (PROSCAR) 5 MG tablet, Take 5 mg by mouth daily. , Disp: , Rfl:     tamsulosin (FLOMAX) 0.4 MG capsule, Take 0.4 mg by mouth daily. , Disp: , Rfl:     Family History   Problem Relation Age of Onset    Heart Failure Father     Cancer Mother         breast    Cancer Maternal Grandfather        Social History     Socioeconomic History    Marital status:       Spouse name: Not on file    Number of children: Not on file    Years of education: Not on file    Highest education level: Not on file   Occupational History    Occupation: retired [employer]   Social Needs    Financial resource strain: Not on file    Food insecurity:     Worry: Not on file     Inability: Not on file   [employer] needs:     Medical: Not on file     Non-medical: Not on file   Tobacco Use    Smoking status: Former Smoker     Packs/day: 1.00     Years: 30.00     Pack years: 30.00     Types: Cigarettes     Last attempt to quit: 2002     Years since quittin.3    Smokeless tobacco: Never Used   Substance and Sexual Activity    Alcohol use: No     Comment: rare    Drug use: No    Sexual activity: Never   Lifestyle    Physical activity:     Days per week: Not on file     Minutes per session: Not on file    Stress: Not on file   Relationships    Social connections:     Talks on phone: Not on file     Gets together: Not on file     Attends Moravian service: Not on file     Active member of club or organization: Not on file     Attends meetings of clubs or organizations: Not on file     Relationship status: Not on file    Intimate partner violence:     Fear of current or ex partner: Not on file     Emotionally abused: Not on file     Physically abused: Not on file     Forced sexual activity: Not on file   Other Topics Concern    Not on file   Social History Narrative    Not on file       Review of Systems:  Review of Systems   Constitution: Negative for chills and fever. Cardiovascular: Negative for chest pain and irregular heartbeat. Respiratory: Negative for cough and shortness of breath. Musculoskeletal: Positive for back pain, joint pain and myalgias. Gastrointestinal: Negative for constipation. Neurological: Negative for disturbances in coordination and loss of balance.        Physical Exam:  BP (!) 114/59   Pulse 78   Temp 98 °F (36.7 °C) (Oral)   Resp 16   SpO2 98%     Physical Exam   Constitutional: He is oriented to person,

## 2019-06-13 ENCOUNTER — OFFICE VISIT (OUTPATIENT)
Dept: PODIATRY | Age: 74
End: 2019-06-13
Payer: MEDICARE

## 2019-06-13 VITALS — HEIGHT: 72 IN | BODY MASS INDEX: 28.71 KG/M2 | WEIGHT: 212 LBS

## 2019-06-13 DIAGNOSIS — E11.42 TYPE 2 DIABETES MELLITUS WITH DIABETIC POLYNEUROPATHY, WITH LONG-TERM CURRENT USE OF INSULIN (HCC): ICD-10-CM

## 2019-06-13 DIAGNOSIS — Z79.4 TYPE 2 DIABETES MELLITUS WITH DIABETIC POLYNEUROPATHY, WITH LONG-TERM CURRENT USE OF INSULIN (HCC): ICD-10-CM

## 2019-06-13 DIAGNOSIS — B35.1 ONYCHOMYCOSIS: Primary | ICD-10-CM

## 2019-06-13 DIAGNOSIS — L03.031 PARONYCHIA OF GREAT TOE, RIGHT: ICD-10-CM

## 2019-06-13 DIAGNOSIS — M14.672 CHARCOT'S JOINT OF LEFT FOOT: ICD-10-CM

## 2019-06-13 PROCEDURE — 11721 DEBRIDE NAIL 6 OR MORE: CPT | Performed by: PODIATRIST

## 2019-06-13 PROCEDURE — 99999 PR OFFICE/OUTPT VISIT,PROCEDURE ONLY: CPT | Performed by: PODIATRIST

## 2019-06-13 PROCEDURE — 11730 AVULSION NAIL PLATE SIMPLE 1: CPT | Performed by: PODIATRIST

## 2019-06-13 RX ORDER — DOXYCYCLINE HYCLATE 100 MG/1
100 CAPSULE ORAL 2 TIMES DAILY
Qty: 28 CAPSULE | Refills: 0 | Status: SHIPPED | OUTPATIENT
Start: 2019-06-13 | End: 2019-06-27

## 2019-06-13 RX ORDER — FUROSEMIDE 40 MG/1
TABLET ORAL
Qty: 60 TABLET | Refills: 3 | Status: SHIPPED | OUTPATIENT
Start: 2019-06-13 | End: 2019-10-12 | Stop reason: SDUPTHER

## 2019-06-13 ASSESSMENT — ENCOUNTER SYMPTOMS
COLOR CHANGE: 0
CONSTIPATION: 0
DIARRHEA: 0
NAUSEA: 0
VOMITING: 0

## 2019-06-13 NOTE — PROGRESS NOTES
[address] [facility]  Return Patient  Chief Complaint   Patient presents with    Nail Problem     b/l nail trim/ last seen Dr. Beth Nobles 3/28/19    Diabetes     diabetic foot check/ last blood sugar: 80       Drew Boland is a 69y.o. year old male who is here for a diabetic foot check and nail trim. He would like his nails trimmed today. He has noticed some redness on his right big toe. His nail cracked. History of surgery type: Plantar planing medial and lateral foot. Vital signs are stable. Pain level is 0. Patient denies N/V/F/C. Patient was compliant with postoperative instructions. He is in a diabetic shoe        Review of Systems   Constitutional: Negative for chills, diaphoresis, fatigue, fever and unexpected weight change. Cardiovascular: Negative for leg swelling. Gastrointestinal: Negative for constipation, diarrhea, nausea and vomiting. Musculoskeletal: Positive for gait problem. Negative for arthralgias and joint swelling. Skin: Negative for color change, pallor, rash and wound. Neurological: Positive for numbness. Negative for weakness. Vascular: DP and PT pulses palpable 2/4, Right Foot and 2/4 on the Left Foot. CFT <3 seconds, Right Foot and <3 seconds on the Left Foot. Edema is absent,  Right Foot and minimal on the Left Foot. Neurological:   Sensation absent  to light touch to level of digits, both feet. Musculoskeletal:   Muscle strength is 5/5 on the Right Foot and 5/5 on the Left Foot. Structural deformities are present on the Right Foot and present on the Left Foot, but improved  Flat medial arch left foot. Integument:  Warm, dry, supple both feet. Infection is absent. No odor. No macerated.      Sites of Onychomycosis Involvement (Check affected area)  [x] [x] [x] [x] [x] [x] [x] [x] [x] [x]  5 4 3 2 1 1 2 3 4 5                          Right Proper foot hygiene and care was discussed with the pt. Informed patient on proper diabetic foot care and importance of tight glycemic control. Patient to check feet daily and contact the office with any questions/problems/concerns. Other comorbidity noted and will be managed by PCP. Diabetic foot examination performed this visit. The exam included neurological sensory exam, a 10-g monofilament and pinprick sensation, vibration using a 128-Hz tuning fork, ankle reflexes, visual skin inspection, vascular exam including assessment of pedal pulses, orthopedic exam for deformities, and shoe inspection. Increased risk factors noted on the diabetic foot exam include decreased sensory exam and peripheral neuropathy. Shoegear inspected and found to be appropriate size and wear. Simple nail avulsion(65776) Debrided Right hallux toenail with a slant-back. No anesthesia was utilized. The toe was prepped with betadine. A slant back procedure was performed on themedial border of the Right hallux toenails, simple nail avulsion of the nail border. The toe was dressed with antibiotic ointment and a band-aid. The patient tolerated the procedure well without apparent complications. Oral and written instructions were dispensed. Rx Doxy   Antibiotic ointment daily    Orders Placed This Encounter   Medications    doxycycline hyclate (VIBRAMYCIN) 100 MG capsule     Sig: Take 1 capsule by mouth 2 times daily for 14 days     Dispense:  28 capsule     Refill:  0     Follow up 9 weeks.

## 2019-06-14 DIAGNOSIS — E11.42 DIABETIC POLYNEUROPATHY ASSOCIATED WITH TYPE 2 DIABETES MELLITUS (HCC): ICD-10-CM

## 2019-06-14 RX ORDER — INSULIN GLARGINE 100 [IU]/ML
INJECTION, SOLUTION SUBCUTANEOUS
Qty: 15 ML | Refills: 3 | Status: SHIPPED | OUTPATIENT
Start: 2019-06-14 | End: 2019-09-08 | Stop reason: SDUPTHER

## 2019-06-14 NOTE — TELEPHONE ENCOUNTER
Please Approve or Refuse.   Send to Pharmacy per Pt's Request:      Next Visit Date: 7/1/19  Last Visit Date:3/28/19    Hemoglobin A1C (%)   Date Value   03/28/2019 6.1   07/26/2018 6.0   03/27/2018 6.1             ( goal A1C is < 7)   BP Readings from Last 3 Encounters:   06/10/19 (!) 114/59   05/08/19 128/69   04/03/19 133/66          (goal 120/80)  BUN   Date Value Ref Range Status   04/27/2019 23 8 - 23 mg/dL Final     CREATININE   Date Value Ref Range Status   04/27/2019 1.12 0.70 - 1.20 mg/dL Final     Potassium   Date Value Ref Range Status   04/27/2019 4.5 3.7 - 5.3 mmol/L Final

## 2019-07-01 ENCOUNTER — OFFICE VISIT (OUTPATIENT)
Dept: FAMILY MEDICINE CLINIC | Age: 74
End: 2019-07-01
Payer: MEDICARE

## 2019-07-01 VITALS
DIASTOLIC BLOOD PRESSURE: 70 MMHG | TEMPERATURE: 97.9 F | BODY MASS INDEX: 30.3 KG/M2 | WEIGHT: 223.4 LBS | RESPIRATION RATE: 14 BRPM | HEART RATE: 58 BPM | SYSTOLIC BLOOD PRESSURE: 120 MMHG

## 2019-07-01 DIAGNOSIS — E11.42 TYPE 2 DIABETES MELLITUS WITH DIABETIC POLYNEUROPATHY, WITH LONG-TERM CURRENT USE OF INSULIN (HCC): Primary | ICD-10-CM

## 2019-07-01 DIAGNOSIS — E78.5 HYPERLIPIDEMIA, UNSPECIFIED HYPERLIPIDEMIA TYPE: ICD-10-CM

## 2019-07-01 DIAGNOSIS — Z79.4 TYPE 2 DIABETES MELLITUS WITH DIABETIC POLYNEUROPATHY, WITH LONG-TERM CURRENT USE OF INSULIN (HCC): Primary | ICD-10-CM

## 2019-07-01 DIAGNOSIS — I25.810 CORONARY ARTERY DISEASE INVOLVING CORONARY BYPASS GRAFT OF NATIVE HEART WITHOUT ANGINA PECTORIS: ICD-10-CM

## 2019-07-01 DIAGNOSIS — E55.9 VITAMIN D DEFICIENCY: ICD-10-CM

## 2019-07-01 PROCEDURE — 99213 OFFICE O/P EST LOW 20 MIN: CPT | Performed by: FAMILY MEDICINE

## 2019-07-01 PROCEDURE — 1123F ACP DISCUSS/DSCN MKR DOCD: CPT | Performed by: FAMILY MEDICINE

## 2019-07-01 PROCEDURE — 4040F PNEUMOC VAC/ADMIN/RCVD: CPT | Performed by: FAMILY MEDICINE

## 2019-07-01 PROCEDURE — 2022F DILAT RTA XM EVC RTNOPTHY: CPT | Performed by: FAMILY MEDICINE

## 2019-07-01 PROCEDURE — 1036F TOBACCO NON-USER: CPT | Performed by: FAMILY MEDICINE

## 2019-07-01 PROCEDURE — 3044F HG A1C LEVEL LT 7.0%: CPT | Performed by: FAMILY MEDICINE

## 2019-07-01 PROCEDURE — 3017F COLORECTAL CA SCREEN DOC REV: CPT | Performed by: FAMILY MEDICINE

## 2019-07-01 PROCEDURE — G8427 DOCREV CUR MEDS BY ELIG CLIN: HCPCS | Performed by: FAMILY MEDICINE

## 2019-07-01 PROCEDURE — G8598 ASA/ANTIPLAT THER USED: HCPCS | Performed by: FAMILY MEDICINE

## 2019-07-01 PROCEDURE — G8417 CALC BMI ABV UP PARAM F/U: HCPCS | Performed by: FAMILY MEDICINE

## 2019-07-01 ASSESSMENT — ENCOUNTER SYMPTOMS
ABDOMINAL PAIN: 0
NAUSEA: 0
BACK PAIN: 0
SHORTNESS OF BREATH: 0
SORE THROAT: 0
DIARRHEA: 0
COUGH: 0
CONSTIPATION: 0

## 2019-07-01 NOTE — PROGRESS NOTES
colonoscopy  02/25/2029    Pneumococcal 65+ years Vaccine  Completed    AAA screen  Completed    Hepatitis C screen  Addressed               HPI  69 yr old maleIn the office today for follow-up for his diabetes hyperlipidemia coronary artery disease has no complaints his blood sugars have been running good he is on Lantus and he watches his diet. Has coronary artery disease and he sees a cardiologist no chest pain or shortness of breath has neuropathy and he goes to the pain clinic   Review of Systems   Constitutional: Negative for appetite change and unexpected weight change. HENT: Negative for ear pain, sneezing and sore throat. Eyes: Negative for visual disturbance. Respiratory: Negative for cough and shortness of breath. Cardiovascular: Negative for chest pain, palpitations and leg swelling. Gastrointestinal: Negative for abdominal pain, constipation, diarrhea and nausea. Endocrine: Negative for polydipsia and polyuria. Genitourinary: Negative for frequency and urgency. Musculoskeletal: Negative for arthralgias and back pain. Skin: Negative for rash. Neurological: Negative for dizziness, syncope and headaches. Objective:   Physical Exam   Constitutional: He is oriented to person, place, and time. He appears well-developed and well-nourished. /70   Pulse 58   Temp 97.9 °F (36.6 °C)   Resp 14   Wt 223 lb 6.4 oz (101.3 kg)   BMI 30.30 kg/m²    HENT:   Head: Normocephalic. Mouth/Throat: Oropharynx is clear and moist.   Eyes: Pupils are equal, round, and reactive to light. Cardiovascular: Normal rate and regular rhythm. No murmur heard. Pulmonary/Chest: Breath sounds normal. He has no rales. Abdominal: Soft. There is no tenderness. Musculoskeletal: He exhibits no edema. Lymphadenopathy:     He has no cervical adenopathy. Neurological: He is alert and oriented to person, place, and time. Nursing note and vitals reviewed.       Assessment:        Diagnosis

## 2019-07-08 ENCOUNTER — HOSPITAL ENCOUNTER (OUTPATIENT)
Dept: PAIN MANAGEMENT | Age: 74
Discharge: HOME | End: 2019-07-08
Payer: MEDICARE

## 2019-07-08 VITALS
HEART RATE: 71 BPM | TEMPERATURE: 98.3 F | DIASTOLIC BLOOD PRESSURE: 68 MMHG | RESPIRATION RATE: 16 BRPM | WEIGHT: 220 LBS | SYSTOLIC BLOOD PRESSURE: 119 MMHG | HEIGHT: 72 IN | BODY MASS INDEX: 29.8 KG/M2 | OXYGEN SATURATION: 98 %

## 2019-07-08 DIAGNOSIS — E08.41 DIABETIC MONONEUROPATHY ASSOCIATED WITH DIABETES MELLITUS DUE TO UNDERLYING CONDITION (HCC): ICD-10-CM

## 2019-07-08 DIAGNOSIS — M47.899 FACET SYNDROME: ICD-10-CM

## 2019-07-08 DIAGNOSIS — E11.610 CHARCOT FOOT DUE TO DIABETES MELLITUS (HCC): ICD-10-CM

## 2019-07-08 DIAGNOSIS — M00.862 ARTHRITIS OF LEFT KNEE DUE TO OTHER BACTERIA (HCC): ICD-10-CM

## 2019-07-08 DIAGNOSIS — M47.26 OSTEOARTHRITIS OF SPINE WITH RADICULOPATHY, LUMBAR REGION: ICD-10-CM

## 2019-07-08 DIAGNOSIS — M47.816 SPONDYLOSIS OF LUMBAR REGION WITHOUT MYELOPATHY OR RADICULOPATHY: ICD-10-CM

## 2019-07-08 DIAGNOSIS — M48.061 SPINAL STENOSIS OF LUMBAR REGION, UNSPECIFIED WHETHER NEUROGENIC CLAUDICATION PRESENT: ICD-10-CM

## 2019-07-08 DIAGNOSIS — M51.36 DDD (DEGENERATIVE DISC DISEASE), LUMBAR: Primary | ICD-10-CM

## 2019-07-08 DIAGNOSIS — E09.41 DIABETIC MONONEUROPATHY ASSOCIATED WITH DRUG OR CHEMICAL INDUCED DIABETES MELLITUS (HCC): ICD-10-CM

## 2019-07-08 DIAGNOSIS — K59.03 DRUG-INDUCED CONSTIPATION: ICD-10-CM

## 2019-07-08 DIAGNOSIS — Z51.81 MEDICATION MONITORING ENCOUNTER: ICD-10-CM

## 2019-07-08 DIAGNOSIS — G89.29 ENCOUNTER FOR CHRONIC PAIN MANAGEMENT: ICD-10-CM

## 2019-07-08 DIAGNOSIS — M50.30 DDD (DEGENERATIVE DISC DISEASE), CERVICAL: ICD-10-CM

## 2019-07-08 DIAGNOSIS — E11.42 DIABETIC PERIPHERAL NEUROPATHY ASSOCIATED WITH TYPE 2 DIABETES MELLITUS (HCC): ICD-10-CM

## 2019-07-08 PROCEDURE — 99213 OFFICE O/P EST LOW 20 MIN: CPT

## 2019-07-08 PROCEDURE — 99213 OFFICE O/P EST LOW 20 MIN: CPT | Performed by: NURSE PRACTITIONER

## 2019-07-08 RX ORDER — FENTANYL 25 UG/H
1 PATCH TRANSDERMAL
Qty: 10 PATCH | Refills: 0 | Status: SHIPPED | OUTPATIENT
Start: 2019-07-10 | End: 2019-08-08 | Stop reason: SDUPTHER

## 2019-07-08 RX ORDER — OXYCODONE HYDROCHLORIDE AND ACETAMINOPHEN 5; 325 MG/1; MG/1
1 TABLET ORAL EVERY 6 HOURS PRN
Qty: 120 TABLET | Refills: 0 | Status: SHIPPED | OUTPATIENT
Start: 2019-07-10 | End: 2019-10-03 | Stop reason: SDUPTHER

## 2019-07-08 ASSESSMENT — ENCOUNTER SYMPTOMS
EYES NEGATIVE: 1
RESPIRATORY NEGATIVE: 1
GASTROINTESTINAL NEGATIVE: 1
BACK PAIN: 1

## 2019-07-08 NOTE — PROGRESS NOTES
[facility] PROGRESS NOTE      Patient here today to review Medication Agreement    Chief Complaint:  Low back pain      HPI: He c/o low back pain which has not changed. Pain radiates down legs. He has done PT in the past. No history of lumbar surgery. He is on topamax and gabapentin for diabetic neuropathy. His sleep is good. He remains active. He has had no ED visits. Back Pain   The current episode started more than 1 year ago. The problem occurs constantly. The problem is unchanged. The pain is present in the lumbar spine. The quality of the pain is described as aching. Radiates to: down legs. The pain is at a severity of 5/10. The pain is the same all the time. Exacerbated by: nothing. Associated symptoms include numbness. He has tried analgesics and home exercises for the symptoms. Patient denies any new neurological symptoms. No bowel or bladder incontinence, no weakness, and no falling. Treatment goals:  Functional status: walk farther      Aberrancy:   Any alcoholic beverages  no     Any illegal drugs   no      Analgesia:pain 5      Adverse  Effects :no constipation    ADL;s :yard woirk, home exercises        Pill count: appropriate  #13 percocet  Last filled 3/2019,  Left, last fentanyl patch on, due to fill 7-10-19   Brought narcan expires 11/2019    Morphine equivalent dose as reported on OARRS:  90  Periodic Controlled Substance Monitoring: Possible medication side effects, risk of tolerance/dependence & alternative treatments discussed., No signs of potential drug abuse or diversion identified. , Assessed functional status., Obtaining appropriate analgesic effect of treatment. Brandyn Perkins, APRN - CNP)  Review ofOARRS does not show any aberrant prescription behavior. Medication is helping the patient stay active. Patient denies any side effects and reports adequate analgesia. No sign of misuse/abuse.         When was thelast UDS:    4/3/2019         Was the UDS Left     I&D    OTHER SURGICAL HISTORY Left 3/7/2016    plantar plane &  exostectomy medial foot left    NH COLON CA SCRN NOT HI RSK IND N/A 11/28/2018    COLONOSCOPY ATTEMPTED NOT CLEAN performed by Vernon Arias MD at [facility] ENDOSCOPY  11-3-15    VENA CAVA FILTER PLACEMENT      WRIST SURGERY      I&D       Allergies   Allergen Reactions    Flagyl [Metronidazole] Hives    Metronidazole      Other reaction(s): Vomiting         Current Outpatient Medications:     [START ON 7/10/2019] fentaNYL (DURAGESIC) 25 MCG/HR, Place 1 patch onto the skin every 72 hours for 30 days. , Disp: 10 patch, Rfl: 0    [START ON 7/10/2019] oxyCODONE-acetaminophen (PERCOCET) 5-325 MG per tablet, Take 1 tablet by mouth every 6 hours as needed for Pain for up to 30 days. , Disp: 120 tablet, Rfl: 0    omeprazole (PRILOSEC) 20 MG delayed release capsule, TAKE 1 CAPSULE BY MOUTH ONE TIME A DAY , Disp: 30 capsule, Rfl: 2    simvastatin (ZOCOR) 20 MG tablet, TAKE 1 TABLET BY MOUTH IN THE EVENING , Disp: 30 tablet, Rfl: 1    LANTUS SOLOSTAR 100 UNIT/ML injection pen, INJECT 30 UNITS SUBCUTANEOUSLY EVERY MORNING AND 40 UNITS EVERY EVENING., Disp: 15 mL, Rfl: 3    furosemide (LASIX) 40 MG tablet, TAKE 1 TABLET BY MOUTH TWO TIMES A DAY , Disp: 60 tablet, Rfl: 3    topiramate (TOPAMAX) 50 MG tablet, Take 1 tablet by mouth 2 times daily, Disp: 60 tablet, Rfl: 3    cholestyramine (QUESTRAN) 4 g packet, Take 1 packet by mouth 3 times daily (with meals), Disp: 90 packet, Rfl: 3    metoprolol tartrate (LOPRESSOR) 25 MG tablet, TAKE 1 TABLET BY MOUTH TWO TIMES A DAY, Disp: , Rfl: 11    gabapentin (NEURONTIN) 800 MG tablet, Take 1 tablet by mouth daily for 90 days. , Disp: 90 tablet, Rfl: 3    Probiotic Product (PROBIOTIC-10 PO), Take by mouth, Disp: , Rfl:     aspirin 81 MG tablet, Take 81 mg by mouth daily. , Disp: , Rfl:     Vitamin D (CHOLECALCIFEROL) 1000 UNITS CAPS capsule, Take 1,000 Units by mouth daily. , Disp: , Rfl:     Ascorbic Acid (VITAMIN C) 500 MG tablet, Take 1,000 mg by mouth daily , Disp: , Rfl:     finasteride (PROSCAR) 5 MG tablet, Take 5 mg by mouth daily. , Disp: , Rfl:     tamsulosin (FLOMAX) 0.4 MG capsule, Take 0.4 mg by mouth daily. , Disp: , Rfl:     BD PEN NEEDLE ALIZE U/F 32G X 4 MM MISC, USE TWICE DAILY AS DIRECTED, Disp: 200 each, Rfl: 0    blood glucose test strips (FREESTYLE LITE) strip, TEST TWICE DAILY AS DIRECTED, Disp: 100 strip, Rfl: 1    naloxone (NARCAN) 4 MG/0.1ML LIQD nasal spray, 1 spray by Nasal route as needed (if needed for respiratory depression), Disp: 1 each, Rfl: 0    NITROSTAT 0.4 MG SL tablet, , Disp: , Rfl:     Family History   Problem Relation Age of Onset    Heart Failure Father     Cancer Mother         breast    Cancer Maternal Grandfather        Social History     Socioeconomic History    Marital status:       Spouse name: Not on file    Number of children: Not on file    Years of education: Not on file    Highest education level: Not on file   Occupational History    Occupation: retired [employer]   Social Needs    Financial resource strain: Not on file    Food insecurity:     Worry: Not on file     Inability: Not on file   Reading Room needs:     Medical: Not on file     Non-medical: Not on file   Tobacco Use    Smoking status: Former Smoker     Packs/day: 1.00     Years: 30.00     Pack years: 30.00     Types: Cigarettes     Last attempt to quit: 2002     Years since quittin.4    Smokeless tobacco: Never Used   Substance and Sexual Activity    Alcohol use: No     Comment: rare    Drug use: No    Sexual activity: Never   Lifestyle    Physical activity:     Days per week: Not on file     Minutes per session: Not on file    Stress: Not on file   Relationships    Social connections:     Talks on phone: Not on file     Gets together: Not on file     Attends Holiness service: Not on file     Active member of club or organization: Not on file     Attends meetings of clubs or organizations: Not on file     Relationship status: Not on file    Intimate partner violence:     Fear of current or ex partner: Not on file     Emotionally abused: Not on file     Physically abused: Not on file     Forced sexual activity: Not on file   Other Topics Concern    Not on file   Social History Narrative    Not on file       Review of Systems:  Review of Systems   Constitution: Negative. HENT: Negative. Eyes: Negative. Cardiovascular: Negative. Respiratory: Negative. Endocrine:        Blood sugar 90 range   Hematologic/Lymphatic: Bruises/bleeds easily. Skin: Negative. Musculoskeletal: Positive for back pain and joint pain. Knees and shoulders   Gastrointestinal: Negative. Genitourinary: Negative. Neurological: Positive for loss of balance and numbness. Psychiatric/Behavioral: Negative. Physical Exam:  /68   Pulse 71   Temp 98.3 °F (36.8 °C) (Oral)   Resp 16   Ht 6' (1.829 m)   Wt 220 lb (99.8 kg)   SpO2 98%   BMI 29.84 kg/m²     Physical Exam   Constitutional: He appears well-developed. HENT:   Head: Normocephalic. Neck: Normal range of motion. Neck supple. Pulmonary/Chest: Effort normal.   Musculoskeletal:        Right shoulder: He exhibits tenderness. Left knee: Tenderness found. Medial joint line and lateral joint line tenderness noted. Lumbar back: He exhibits decreased range of motion and tenderness. Neurological: He is alert. He has normal strength. A sensory deficit is present. Reflex Scores:       Patellar reflexes are 0 on the right side and 0 on the left side. Achilles reflexes are 0 on the right side and 0 on the left side. Ambulates with a cane   Skin:        Psychiatric: He has a normal mood and affect.  His speech is normal and behavior is normal. Judgment and thought content normal. Cognition and memory are normal.

## 2019-07-19 ENCOUNTER — OFFICE VISIT (OUTPATIENT)
Dept: GASTROENTEROLOGY | Age: 74
End: 2019-07-19
Payer: MEDICARE

## 2019-07-19 VITALS
BODY MASS INDEX: 30.64 KG/M2 | HEART RATE: 61 BPM | DIASTOLIC BLOOD PRESSURE: 62 MMHG | SYSTOLIC BLOOD PRESSURE: 129 MMHG | WEIGHT: 225.9 LBS

## 2019-07-19 DIAGNOSIS — E66.8 MODERATE OBESITY: ICD-10-CM

## 2019-07-19 DIAGNOSIS — R19.7 DIARRHEA, UNSPECIFIED TYPE: ICD-10-CM

## 2019-07-19 DIAGNOSIS — Z86.010 HISTORY OF COLON POLYPS: ICD-10-CM

## 2019-07-19 DIAGNOSIS — K58.9 IRRITABLE BOWEL SYNDROME WITHOUT DIARRHEA: Primary | ICD-10-CM

## 2019-07-19 PROCEDURE — 1123F ACP DISCUSS/DSCN MKR DOCD: CPT | Performed by: INTERNAL MEDICINE

## 2019-07-19 PROCEDURE — 1036F TOBACCO NON-USER: CPT | Performed by: INTERNAL MEDICINE

## 2019-07-19 PROCEDURE — G8598 ASA/ANTIPLAT THER USED: HCPCS | Performed by: INTERNAL MEDICINE

## 2019-07-19 PROCEDURE — 99214 OFFICE O/P EST MOD 30 MIN: CPT | Performed by: INTERNAL MEDICINE

## 2019-07-19 PROCEDURE — G8427 DOCREV CUR MEDS BY ELIG CLIN: HCPCS | Performed by: INTERNAL MEDICINE

## 2019-07-19 PROCEDURE — 3017F COLORECTAL CA SCREEN DOC REV: CPT | Performed by: INTERNAL MEDICINE

## 2019-07-19 PROCEDURE — G8417 CALC BMI ABV UP PARAM F/U: HCPCS | Performed by: INTERNAL MEDICINE

## 2019-07-19 PROCEDURE — 4040F PNEUMOC VAC/ADMIN/RCVD: CPT | Performed by: INTERNAL MEDICINE

## 2019-07-19 ASSESSMENT — ENCOUNTER SYMPTOMS
CHOKING: 0
ABDOMINAL PAIN: 0
BLOOD IN STOOL: 0
NAUSEA: 0
ABDOMINAL DISTENTION: 0
SORE THROAT: 0
WHEEZING: 0
COUGH: 0
RESPIRATORY NEGATIVE: 1
GASTROINTESTINAL NEGATIVE: 1
CONSTIPATION: 0
VOMITING: 0
RECTAL PAIN: 0
EYES NEGATIVE: 1
TROUBLE SWALLOWING: 0
ANAL BLEEDING: 0
SINUS PRESSURE: 0
DIARRHEA: 0
VOICE CHANGE: 0
BACK PAIN: 1
ALLERGIC/IMMUNOLOGIC NEGATIVE: 1

## 2019-07-19 NOTE — PROGRESS NOTES
Subjective:      Patient ID: Laurie Mccartney is a 69 y.o. male. HPI    Dr. Fausto Prescott MD our mutual patient Laurie Mccartney was seen  for   1. Irritable bowel syndrome without diarrhea    2. Diarrhea, unspecified type    3. History of colon polyps    4. Moderate obesity     . Had colon in past  Had some diarrhea  Negative for C diff  On Questran and is helping much  Pt is clinically feeling well GI wise now  Has No significant abdominal pains, bloating or cramping  Has no sig GERD symptoms  Has no Dysphagia, No Nausea or any vomiting  Denies any rectal bleeding or any melena  Denies any sig constipation or any diarrhea symptoms  Weight is stable and appetite is generally good. Has obesity     Past Medical, Family, and Social History reviewed and does contribute to the patient presenting condition. patient\"s PMH/PSH,SH,PSYCH hx, MEDs, ALLERGIES, and ROS was all reviewed and updated ion the appropriate sections    Review of Systems   Constitutional: Negative. Negative for appetite change, fatigue and unexpected weight change. HENT: Negative. Negative for dental problem, postnasal drip, sinus pressure, sore throat, trouble swallowing and voice change. Eyes: Negative. Negative for visual disturbance. Respiratory: Negative. Negative for cough, choking and wheezing. Cardiovascular: Negative. Negative for chest pain, palpitations and leg swelling. Gastrointestinal: Negative. Negative for abdominal distention, abdominal pain, anal bleeding, blood in stool, constipation, diarrhea (soft stools), nausea, rectal pain and vomiting. Denies   Endocrine: Negative. Genitourinary: Negative. Negative for difficulty urinating. Musculoskeletal: Positive for arthralgias and back pain. Negative for gait problem and myalgias. Skin: Negative. Allergic/Immunologic: Negative. Negative for environmental allergies and food allergies. Neurological: Negative.   Negative for dizziness, weakness,

## 2019-08-08 ENCOUNTER — HOSPITAL ENCOUNTER (OUTPATIENT)
Dept: PAIN MANAGEMENT | Age: 74
Discharge: HOME | End: 2019-08-08
Payer: MEDICARE

## 2019-08-08 VITALS
RESPIRATION RATE: 20 BRPM | OXYGEN SATURATION: 98 % | DIASTOLIC BLOOD PRESSURE: 69 MMHG | BODY MASS INDEX: 30.48 KG/M2 | HEIGHT: 72 IN | WEIGHT: 225 LBS | TEMPERATURE: 98.3 F | SYSTOLIC BLOOD PRESSURE: 120 MMHG | HEART RATE: 72 BPM

## 2019-08-08 DIAGNOSIS — Z51.81 MEDICATION MONITORING ENCOUNTER: ICD-10-CM

## 2019-08-08 DIAGNOSIS — E08.41 DIABETIC MONONEUROPATHY ASSOCIATED WITH DIABETES MELLITUS DUE TO UNDERLYING CONDITION (HCC): ICD-10-CM

## 2019-08-08 DIAGNOSIS — E55.9 VITAMIN D DEFICIENCY: ICD-10-CM

## 2019-08-08 DIAGNOSIS — M47.899 FACET SYNDROME: ICD-10-CM

## 2019-08-08 DIAGNOSIS — M00.862 ARTHRITIS OF LEFT KNEE DUE TO OTHER BACTERIA (HCC): ICD-10-CM

## 2019-08-08 DIAGNOSIS — M48.061 SPINAL STENOSIS OF LUMBAR REGION, UNSPECIFIED WHETHER NEUROGENIC CLAUDICATION PRESENT: ICD-10-CM

## 2019-08-08 DIAGNOSIS — E11.610 CHARCOT FOOT DUE TO DIABETES MELLITUS (HCC): ICD-10-CM

## 2019-08-08 DIAGNOSIS — K59.03 DRUG-INDUCED CONSTIPATION: ICD-10-CM

## 2019-08-08 DIAGNOSIS — M50.30 DDD (DEGENERATIVE DISC DISEASE), CERVICAL: ICD-10-CM

## 2019-08-08 DIAGNOSIS — M51.36 DDD (DEGENERATIVE DISC DISEASE), LUMBAR: Primary | ICD-10-CM

## 2019-08-08 DIAGNOSIS — M47.26 OSTEOARTHRITIS OF SPINE WITH RADICULOPATHY, LUMBAR REGION: ICD-10-CM

## 2019-08-08 DIAGNOSIS — M47.816 SPONDYLOSIS OF LUMBAR REGION WITHOUT MYELOPATHY OR RADICULOPATHY: ICD-10-CM

## 2019-08-08 DIAGNOSIS — G89.29 ENCOUNTER FOR CHRONIC PAIN MANAGEMENT: ICD-10-CM

## 2019-08-08 PROCEDURE — 99213 OFFICE O/P EST LOW 20 MIN: CPT | Performed by: NURSE PRACTITIONER

## 2019-08-08 PROCEDURE — 99213 OFFICE O/P EST LOW 20 MIN: CPT

## 2019-08-08 RX ORDER — FENTANYL 25 UG/H
1 PATCH TRANSDERMAL
Qty: 10 PATCH | Refills: 0 | Status: SHIPPED | OUTPATIENT
Start: 2019-08-09 | End: 2019-08-08 | Stop reason: SDUPTHER

## 2019-08-08 RX ORDER — FENTANYL 25 UG/H
1 PATCH TRANSDERMAL
Qty: 10 PATCH | Refills: 0 | Status: SHIPPED | OUTPATIENT
Start: 2019-08-09 | End: 2019-09-06 | Stop reason: SDUPTHER

## 2019-08-08 ASSESSMENT — ENCOUNTER SYMPTOMS
GASTROINTESTINAL NEGATIVE: 1
EYES NEGATIVE: 1
BACK PAIN: 1
RESPIRATORY NEGATIVE: 1

## 2019-08-08 NOTE — PROGRESS NOTES
[facility] [address] PROGRESS NOTE      Patient here today to review Medication Agreement    Chief Complaint:  Low back pain       He returns to the pain clinic with c/o low back pain. . Pain is uchanged. He is on a fentnayl patch and percocet. He takes the percocet sparingly. He does home exercises. His sleep is good. He remains active. His blood sugars are under control. No Ed visits. He is on gabapentin and topamax for his diabetic neuropathy. Back Pain   This is a chronic problem. The current episode started more than 1 year ago. The problem occurs constantly. The problem is unchanged. The pain is present in the lumbar spine. Radiates to: down legs. The pain is at a severity of 6/10. The pain is moderate. The pain is the same all the time. Exacerbated by: nothing. Associated symptoms include numbness. He has tried analgesics and bed rest for the symptoms. The treatment provided mild relief. Patient denies any new neurological symptoms. No bowel or bladder incontinence, no weakness, and no falling. Treatment goals:  Functional status: walk farther        Aberrancy:   Any alcoholic beverages  no     Any illegal drugs   no        Analgesia:pain  6     Adverse  Effects :no constipation     ADL;s , home exercises         Pill count: appropriate  Brought narcan    Morphine equivalent dose as reported on OARRS:  90  Periodic Controlled Substance Monitoring: Possible medication side effects, risk of tolerance/dependence & alternative treatments discussed., No signs of potential drug abuse or diversion identified. , Assessed functional status., Obtaining appropriate analgesic effect of treatment. (Jamilah Murillo, TACOS - CNP)  Chronic Pain > 80 MEDD: Co-prescribed Naloxone., Obtained or confirmed \"Medication Contract\" on file. TACOS Costa - CNP)  Review ofOARRS does not show any aberrant prescription behavior. Medication is helping the patient stay active.  Patient denies any side effects NOT CLEAN performed by Akosua Carranza MD at [facility] ENDOSCOPY  11-3-15    VENA CAVA FILTER PLACEMENT      WRIST SURGERY      I&D       Allergies   Allergen Reactions    Flagyl [Metronidazole] Hives    Metronidazole      Other reaction(s): Vomiting         Current Outpatient Medications:     [START ON 8/9/2019] fentaNYL (DURAGESIC) 25 MCG/HR, Place 1 patch onto the skin every 72 hours for 30 days. , Disp: 10 patch, Rfl: 0    oxyCODONE-acetaminophen (PERCOCET) 5-325 MG per tablet, Take 1 tablet by mouth every 6 hours as needed for Pain for up to 30 days. , Disp: 120 tablet, Rfl: 0    omeprazole (PRILOSEC) 20 MG delayed release capsule, TAKE 1 CAPSULE BY MOUTH ONE TIME A DAY , Disp: 30 capsule, Rfl: 2    simvastatin (ZOCOR) 20 MG tablet, TAKE 1 TABLET BY MOUTH IN THE EVENING , Disp: 30 tablet, Rfl: 1    LANTUS SOLOSTAR 100 UNIT/ML injection pen, INJECT 30 UNITS SUBCUTANEOUSLY EVERY MORNING AND 40 UNITS EVERY EVENING., Disp: 15 mL, Rfl: 3    furosemide (LASIX) 40 MG tablet, TAKE 1 TABLET BY MOUTH TWO TIMES A DAY , Disp: 60 tablet, Rfl: 3    topiramate (TOPAMAX) 50 MG tablet, Take 1 tablet by mouth 2 times daily, Disp: 60 tablet, Rfl: 3    cholestyramine (QUESTRAN) 4 g packet, Take 1 packet by mouth 3 times daily (with meals), Disp: 90 packet, Rfl: 3    gabapentin (NEURONTIN) 800 MG tablet, Take 1 tablet by mouth daily for 90 days. , Disp: 90 tablet, Rfl: 3    Probiotic Product (PROBIOTIC-10 PO), Take by mouth, Disp: , Rfl:     aspirin 81 MG tablet, Take 81 mg by mouth daily. , Disp: , Rfl:     Vitamin D (CHOLECALCIFEROL) 1000 UNITS CAPS capsule, Take 2,000 Units by mouth daily , Disp: , Rfl:     Ascorbic Acid (VITAMIN C) 500 MG tablet, Take 1,000 mg by mouth daily , Disp: , Rfl:     tamsulosin (FLOMAX) 0.4 MG capsule, Take 0.4 mg by mouth daily.   , Disp: , Rfl:     BD PEN NEEDLE ALIZE U/F 32G X 4 MM MISC, USE TWICE DAILY AS DIRECTED, Disp: 200 each, Rfl: 0   8/9/2019)    Osteoarthritis of spine with radiculopathy, lumbar region    Relevant Medications    fentaNYL (DURAGESIC) 25 MCG/HR (Start on 8/9/2019)    Medication monitoring encounter    Relevant Medications    fentaNYL (DURAGESIC) 25 MCG/HR (Start on 8/9/2019)    Lumbar spinal stenosis    Facet syndrome ([address]    Relevant Medications    fentaNYL (DURAGESIC) 25 MCG/HR (Start on 8/9/2019)    Encounter for chronic pain management    Relevant Medications    fentaNYL (DURAGESIC) 25 MCG/HR (Start on 8/9/2019)    Drug-induced constipation    Relevant Medications    fentaNYL (DURAGESIC) 25 MCG/HR (Start on 8/9/2019)    Diabetic mononeuropathy associated with diabetes mellitus due to underlying condition ([address]    Relevant Medications    fentaNYL ([address]) 25 MCG/HR (Start on 8/9/2019)    DDD (degenerative disc disease), lumbar - Primary    Relevant Medications    fentaNYL (DURAGESIC) 25 MCG/HR (Start on 8/9/2019)    DDD (degenerative disc disease), cervical    Relevant Medications    fentaNYL (DURAGESIC) 25 MCG/HR (Start on 8/9/2019)    Charcot foot due to diabetes mellitus ([address]    Relevant Medications    fentaNYL (DURAGESIC) 25 MCG/HR (Start on 8/9/2019)    Arthritis, septic ([address]    Relevant Medications    fentaNYL (DURAGESIC) 25 MCG/HR (Start on 8/9/2019)          Treatment Plan:  DISCUSSION: Treatment options discussed withpatient and all questions answered to patient's satisfaction. Possible side effects, risk of tolerance and or dependence and alternative treatments discussed    Obtaining appropriate analgesic effect of treatment   No signs of potential drug abuse or diversion identified    [x] Ill effects of being on chronic pain medications such as sleep disturbances, respiratory depression, hormonal changes, withdrawal symptoms, chronic opioid dependence and tolerance as well as risk of taking opioids with Benzodiazepines and taking opioids along with alcohol,  werediscussed with patient.  I had asked the patient to minimize medication use and utilize pain medications only for uncontrolled rest pain or pain with exertional activities. I advised patient not to self-escalate painmedications without consulting with us. At each of patient's future visits we will try to taper pain medications, while adjusting the adjunct medications, and re-evaluating for Physical Therapy to improve spinal andjoint strength. We will continue to have discussions to decrease pain medications as tolerated. Counseled patient on effects their pain medication and /or their medical condition mayhave on their  ability to drive or operate machinery. Instructed not to drive or operate machinery if drowsy     I also discussed with the patient regarding the dangers of combining narcotic pain medication with tranquilizers, alcohol or illegal drugs or taking the medication any way other than prescribed. The dangers were discussed  including respiratory depression and death. Patient was told to tell  all  physicians regarding the medications he is getting from pain clinic. Patient is warned not to take any unprescribed medications over-the-countermedications that can depress breathing . Patient is advised to talk to the pharmacist or physicians if planning to take any over-the-counter medications before  takeing them. Patient is strongly advised to avoid tranquilizers or  relaxants, illegal drugs  or any medications that can depress breathing  Patient is also advised to tell us if there is any changes in their medications from other physicians.         TREATMENT OPTIONS:     Return in 4 weeks  Medication Agreement Requirements Met  Continue Opioid therapy  Script written for  Fentanyl patch  Follow up appointment made

## 2019-08-08 NOTE — DISCHARGE INSTR - COC
Influenza Virus Vaccine 10/20/2014, 09/04/2015    Influenza, [place], 3 Years and older, IM (Fluzone 3 yrs and older or Afluria 5 yrs and older) 11/07/2016    Influenza, [place], 3 yrs and older, IM, PF (Fluzone 3 yrs and older or Afluria 5 yrs and older) 11/27/2017    Pneumococcal Conjugate 13-valent ([id number]) 07/26/2018    Pneumococcal Polysaccharide ([id number]) 01/11/2012, 05/12/2017       Active Problems:  Patient Active Problem List   Diagnosis Code    DDD (degenerative disc disease), lumbar M51.36    Osteoarthritis of spine with radiculopathy, lumbar region M47.26    Lumbar spinal stenosis M48.061    DDD (degenerative disc disease), cervical M50.30    F[facility] syndrome ([facility] M47.819    Compression fracture [id number]    Medication monitoring encounter Z51.81    Tear of rotator cuff M75.100    Encounter for chronic pain management G89.29    Arthritis, septic ([facility] M00.9    Spondylosis of lumbar region without myelopathy or radiculopathy M47.816    Coronary artery disease involving coronary bypass graft of native heart without angina pectoris I25.810    IBS (irritable bowel syndrome) K58.9    Charcot foot due to diabetes mellitus ([facility] E11.610    Type 2 diabetes mellitus with diabetic polyneuropathy, with long-term current use of insulin ([facility]) E11.42, Z79.4    Hyperlipidemia E78.5    Vitamin D deficiency E55.9    Drug-induced constipation K59.03    Diabetic mononeuropathy associated with diabetes mellitus due to underlying condition ([facility] E08.41       Isolation/Infection:   Isolation          No Isolation            Nurse Assessment:  Last Vital Signs: There were no vitals taken for this visit.     Last documented pain score (0-10 scale):    Last Weight:   Wt Readings from Last 1 Encounters:   07/19/19 225 lb 14.4 oz (102.5 kg)     Mental Status:  {IP PT MENTAL STATUS:20030}    IV Access:  8 [facility] IV ACCESS:[id number]}    Nursing Mobility/ADLs:  Walking   {[facility] LETD:[id number]}  Transfer  {[facility]

## 2019-08-22 ENCOUNTER — OFFICE VISIT (OUTPATIENT)
Dept: PODIATRY | Age: 74
End: 2019-08-22
Payer: MEDICARE

## 2019-08-22 VITALS — BODY MASS INDEX: 28.71 KG/M2 | WEIGHT: 212 LBS | HEIGHT: 72 IN

## 2019-08-22 DIAGNOSIS — Z79.4 TYPE 2 DIABETES MELLITUS WITH DIABETIC POLYNEUROPATHY, WITH LONG-TERM CURRENT USE OF INSULIN (HCC): ICD-10-CM

## 2019-08-22 DIAGNOSIS — B35.1 ONYCHOMYCOSIS: Primary | ICD-10-CM

## 2019-08-22 DIAGNOSIS — M14.672 CHARCOT'S JOINT OF LEFT FOOT: ICD-10-CM

## 2019-08-22 DIAGNOSIS — L03.031 PARONYCHIA OF GREAT TOE, RIGHT: ICD-10-CM

## 2019-08-22 DIAGNOSIS — E11.42 TYPE 2 DIABETES MELLITUS WITH DIABETIC POLYNEUROPATHY, WITH LONG-TERM CURRENT USE OF INSULIN (HCC): ICD-10-CM

## 2019-08-22 PROCEDURE — 99999 PR OFFICE/OUTPT VISIT,PROCEDURE ONLY: CPT | Performed by: PODIATRIST

## 2019-08-22 PROCEDURE — 11730 AVULSION NAIL PLATE SIMPLE 1: CPT | Performed by: PODIATRIST

## 2019-08-22 PROCEDURE — 11721 DEBRIDE NAIL 6 OR MORE: CPT | Performed by: PODIATRIST

## 2019-08-22 RX ORDER — DOXYCYCLINE HYCLATE 100 MG/1
100 CAPSULE ORAL 2 TIMES DAILY
Qty: 28 CAPSULE | Refills: 0 | Status: SHIPPED | OUTPATIENT
Start: 2019-08-22 | End: 2019-09-05

## 2019-08-22 ASSESSMENT — ENCOUNTER SYMPTOMS
COLOR CHANGE: 0
NAUSEA: 0
CONSTIPATION: 0
DIARRHEA: 0
VOMITING: 0

## 2019-08-22 NOTE — PROGRESS NOTES
of tight glycemic control. Patient to check feet daily and contact the office with any questions/problems/concerns. Other comorbidity noted and will be managed by PCP. Diabetic foot examination performed this visit. The exam included neurological sensory exam, a 10-g monofilament and pinprick sensation, vibration using a 128-Hz tuning fork, ankle reflexes, visual skin inspection, vascular exam including assessment of pedal pulses, orthopedic exam for deformities, and shoe inspection. Increased risk factors noted on the diabetic foot exam include decreased sensory exam and peripheral neuropathy. Shoegear inspected and found to be appropriate size and wear. Simple nail avulsion([id number]) Debrided Right hallux toenail with a slant-back. No anesthesia was utilized. The toe was prepped with betadine. A slant back procedure was performed on themedial border of the Right hallux toenails, simple nail avulsion of the nail border. The toe was dressed with antibiotic ointment and a band-aid. The patient tolerated the procedure well without apparent complications. Oral and written instructions were dispensed. Rx Doxy - second course  Antibiotic ointment daily for 1 weeks  May need a P&A    Orders Placed This Encounter   Medications    doxycycline hyclate (VIBRAMYCIN) 100 MG capsule     Sig: Take 1 capsule by mouth 2 times daily for 14 days     Dispense:  28 capsule     Refill:  0     Follow up 9 weeks.

## 2019-08-23 NOTE — TELEPHONE ENCOUNTER
Please Approve or Refuse.   Send to Pharmacy per Pt's Request:      Next Visit Date: 10/1/2019  Last Visit Date: 2/10/2017    Hemoglobin A1C (%)   Date Value   03/28/2019 6.1   07/26/2018 6.0   03/27/2018 6.1             ( goal A1C is < 7)   BP Readings from Last 3 Encounters:   08/08/19 120/69   07/19/19 129/62   07/08/19 119/68          (goal 120/80)  BUN   Date Value Ref Range Status   04/27/2019 23 8 - 23 mg/dL Final     CREATININE   Date Value Ref Range Status   04/27/2019 1.12 0.70 - 1.20 mg/dL Final     Potassium   Date Value Ref Range Status   04/27/2019 4.5 3.7 - 5.3 mmol/L Final

## 2019-08-26 RX ORDER — SIMVASTATIN 20 MG
TABLET ORAL
Qty: 30 TABLET | Refills: 0 | Status: SHIPPED | OUTPATIENT
Start: 2019-08-26 | End: 2019-09-21 | Stop reason: SDUPTHER

## 2019-09-06 ENCOUNTER — HOSPITAL ENCOUNTER (OUTPATIENT)
Dept: PAIN MANAGEMENT | Age: 74
Discharge: HOME | End: 2019-09-06
Payer: MEDICARE

## 2019-09-06 VITALS
SYSTOLIC BLOOD PRESSURE: 132 MMHG | HEIGHT: 72 IN | BODY MASS INDEX: 28.71 KG/M2 | WEIGHT: 212 LBS | OXYGEN SATURATION: 98 % | RESPIRATION RATE: 16 BRPM | TEMPERATURE: 98.3 F | HEART RATE: 69 BPM | DIASTOLIC BLOOD PRESSURE: 73 MMHG

## 2019-09-06 DIAGNOSIS — M00.862 ARTHRITIS OF LEFT KNEE DUE TO OTHER BACTERIA (HCC): ICD-10-CM

## 2019-09-06 DIAGNOSIS — Z51.81 MEDICATION MONITORING ENCOUNTER: ICD-10-CM

## 2019-09-06 DIAGNOSIS — M47.899 FACET SYNDROME: ICD-10-CM

## 2019-09-06 DIAGNOSIS — M50.30 DDD (DEGENERATIVE DISC DISEASE), CERVICAL: ICD-10-CM

## 2019-09-06 DIAGNOSIS — M47.816 SPONDYLOSIS OF LUMBAR REGION WITHOUT MYELOPATHY OR RADICULOPATHY: ICD-10-CM

## 2019-09-06 DIAGNOSIS — M47.26 OSTEOARTHRITIS OF SPINE WITH RADICULOPATHY, LUMBAR REGION: ICD-10-CM

## 2019-09-06 DIAGNOSIS — M51.36 DDD (DEGENERATIVE DISC DISEASE), LUMBAR: Primary | ICD-10-CM

## 2019-09-06 DIAGNOSIS — E08.41 DIABETIC MONONEUROPATHY ASSOCIATED WITH DIABETES MELLITUS DUE TO UNDERLYING CONDITION (HCC): ICD-10-CM

## 2019-09-06 DIAGNOSIS — E11.610 CHARCOT FOOT DUE TO DIABETES MELLITUS (HCC): ICD-10-CM

## 2019-09-06 DIAGNOSIS — K59.03 DRUG-INDUCED CONSTIPATION: ICD-10-CM

## 2019-09-06 DIAGNOSIS — M48.061 SPINAL STENOSIS OF LUMBAR REGION, UNSPECIFIED WHETHER NEUROGENIC CLAUDICATION PRESENT: ICD-10-CM

## 2019-09-06 PROCEDURE — 99213 OFFICE O/P EST LOW 20 MIN: CPT

## 2019-09-06 PROCEDURE — 99213 OFFICE O/P EST LOW 20 MIN: CPT | Performed by: NURSE PRACTITIONER

## 2019-09-06 RX ORDER — FENTANYL 25 UG/H
1 PATCH TRANSDERMAL
Qty: 10 PATCH | Refills: 0 | Status: SHIPPED | OUTPATIENT
Start: 2019-09-08 | End: 2019-10-03 | Stop reason: SDUPTHER

## 2019-09-06 RX ORDER — TOPIRAMATE 50 MG/1
50 TABLET, FILM COATED ORAL 2 TIMES DAILY
Qty: 60 TABLET | Refills: 3 | Status: SHIPPED | OUTPATIENT
Start: 2019-09-16 | End: 2020-01-06 | Stop reason: SDUPTHER

## 2019-09-06 ASSESSMENT — ENCOUNTER SYMPTOMS
RESPIRATORY NEGATIVE: 1
BACK PAIN: 1
GASTROINTESTINAL NEGATIVE: 1

## 2019-09-06 NOTE — PROGRESS NOTES
SURGICAL HISTORY Left 3/7/2016    plantar plane &  exostectomy medial foot left    [place] COLON CA SCRN NOT HI RSK IND N/A 11/28/2018    COLONOSCOPY ATTEMPTED NOT CLEAN performed by Lynn Gregory MD at [address] [address]  11-3-15    VENA CAVA FILTER PLACEMENT      WRIST SURGERY      I&D       Allergies   Allergen Reactions    Flagyl [Metronidazole] Hives    Metronidazole      Other reaction(s): Vomiting         Current Outpatient Medications:     [START ON 9/8/2019] fentaNYL (DURAGESIC) 25 MCG/HR, Place 1 patch onto the skin every 72 hours for 30 days. , Disp: 10 patch, Rfl: 0    [START ON 9/16/2019] topiramate (TOPAMAX) 50 MG tablet, Take 1 tablet by mouth 2 times daily, Disp: 60 tablet, Rfl: 3    simvastatin (ZOCOR) 20 MG tablet, TAKE 1 TABLET BY MOUTH IN THE EVENING , Disp: 30 tablet, Rfl: 0    omeprazole (PRILOSEC) 20 MG delayed release capsule, TAKE 1 CAPSULE BY MOUTH ONE TIME A DAY , Disp: 30 capsule, Rfl: 2    LANTUS SOLOSTAR 100 UNIT/ML injection pen, INJECT 30 UNITS SUBCUTANEOUSLY EVERY MORNING AND 40 UNITS EVERY EVENING., Disp: 15 mL, Rfl: 3    furosemide (LASIX) 40 MG tablet, TAKE 1 TABLET BY MOUTH TWO TIMES A DAY , Disp: 60 tablet, Rfl: 3    gabapentin (NEURONTIN) 800 MG tablet, Take 1 tablet by mouth daily for 90 days. , Disp: 90 tablet, Rfl: 3    Probiotic Product (PROBIOTIC-10 PO), Take by mouth, Disp: , Rfl:     aspirin 81 MG tablet, Take 81 mg by mouth daily. , Disp: , Rfl:     Vitamin D (CHOLECALCIFEROL) 1000 UNITS CAPS capsule, Take 2,000 Units by mouth daily , Disp: , Rfl:     Ascorbic Acid (VITAMIN C) 500 MG tablet, Take 1,000 mg by mouth daily , Disp: , Rfl:     finasteride (PROSCAR) 5 MG tablet, Take 5 mg by mouth daily. , Disp: , Rfl:     tamsulosin (FLOMAX) 0.4 MG capsule, Take 0.4 mg by mouth daily.   , Disp: , Rfl:     BD PEN NEEDLE ALIZE U/F 32G X 4 MM MISC, USE TWICE DAILY AS DIRECTED, Disp: 200 each, Rfl: 0    blood glucose test strips (FREESTYLE LITE) strip, TEST TWICE DAILY AS DIRECTED, Disp: 100 strip, Rfl: 1    naloxone (NARCAN) 4 MG/0.1ML LIQD nasal spray, 1 spray by Nasal route as needed (if needed for respiratory depression), Disp: 1 each, Rfl: 0    NITROSTAT 0.4 MG SL tablet, , Disp: , Rfl:     Family History   Problem Relation Age of Onset    Heart Failure Father     Cancer Mother         breast    Cancer Maternal Grandfather        Social History     Socioeconomic History    Marital status:       Spouse name: Not on file    Number of children: Not on file    Years of education: Not on file    Highest education level: Not on file   Occupational History    Occupation: retired [employer]   Social Needs    Financial resource strain: Not on file    Food insecurity:     Worry: Not on file     Inability: Not on file   [employer] needs:     Medical: Not on file     Non-medical: Not on file   Tobacco Use    Smoking status: Former Smoker     Packs/day: 1.00     Years: 30.00     Pack years: 30.00     Types: Cigarettes     Last attempt to quit: 2002     Years since quittin.6    Smokeless tobacco: Never Used   Substance and Sexual Activity    Alcohol use: No     Comment: rare    Drug use: No    Sexual activity: Never   Lifestyle    Physical activity:     Days per week: Not on file     Minutes per session: Not on file    Stress: Not on file   Relationships    Social connections:     Talks on phone: Not on file     Gets together: Not on file     Attends Worship service: Not on file     Active member of club or organization: Not on file     Attends meetings of clubs or organizations: Not on file     Relationship status: Not on file    Intimate partner violence:     Fear of current or ex partner: Not on file     Emotionally abused: Not on file     Physically abused: Not on file     Forced sexual activity: Not on file   Other Topics Concern    Not on file   Social History Narrative    Not on file

## 2019-09-08 DIAGNOSIS — E11.42 DIABETIC POLYNEUROPATHY ASSOCIATED WITH TYPE 2 DIABETES MELLITUS (HCC): ICD-10-CM

## 2019-09-09 RX ORDER — INSULIN GLARGINE 100 [IU]/ML
INJECTION, SOLUTION SUBCUTANEOUS
Qty: 15 ML | Refills: 2 | Status: SHIPPED | OUTPATIENT
Start: 2019-09-09 | End: 2019-11-13 | Stop reason: SDUPTHER

## 2019-09-16 DIAGNOSIS — E11.42 DIABETIC POLYNEUROPATHY ASSOCIATED WITH TYPE 2 DIABETES MELLITUS (HCC): ICD-10-CM

## 2019-09-17 RX ORDER — PEN NEEDLE, DIABETIC 32GX 5/32"
NEEDLE, DISPOSABLE MISCELLANEOUS
Qty: 100 EACH | Refills: 0 | Status: SHIPPED | OUTPATIENT
Start: 2019-09-17 | End: 2019-11-04 | Stop reason: SDUPTHER

## 2019-10-01 ENCOUNTER — OFFICE VISIT (OUTPATIENT)
Dept: FAMILY MEDICINE CLINIC | Age: 74
End: 2019-10-01
Payer: MEDICARE

## 2019-10-01 VITALS
HEART RATE: 69 BPM | SYSTOLIC BLOOD PRESSURE: 125 MMHG | TEMPERATURE: 98.8 F | BODY MASS INDEX: 30.31 KG/M2 | WEIGHT: 223.8 LBS | DIASTOLIC BLOOD PRESSURE: 80 MMHG | HEIGHT: 72 IN | OXYGEN SATURATION: 96 %

## 2019-10-01 DIAGNOSIS — Z23 NEED FOR INFLUENZA VACCINATION: Primary | ICD-10-CM

## 2019-10-01 DIAGNOSIS — Z00.00 ROUTINE GENERAL MEDICAL EXAMINATION AT A HEALTH CARE FACILITY: ICD-10-CM

## 2019-10-01 DIAGNOSIS — E11.42 TYPE 2 DIABETES MELLITUS WITH DIABETIC POLYNEUROPATHY, WITH LONG-TERM CURRENT USE OF INSULIN (HCC): ICD-10-CM

## 2019-10-01 DIAGNOSIS — Z79.4 TYPE 2 DIABETES MELLITUS WITH DIABETIC POLYNEUROPATHY, WITH LONG-TERM CURRENT USE OF INSULIN (HCC): ICD-10-CM

## 2019-10-01 LAB — HBA1C MFR BLD: 5.9 %

## 2019-10-01 PROCEDURE — 3044F HG A1C LEVEL LT 7.0%: CPT | Performed by: FAMILY MEDICINE

## 2019-10-01 PROCEDURE — 3017F COLORECTAL CA SCREEN DOC REV: CPT | Performed by: FAMILY MEDICINE

## 2019-10-01 PROCEDURE — 83036 HEMOGLOBIN GLYCOSYLATED A1C: CPT | Performed by: FAMILY MEDICINE

## 2019-10-01 PROCEDURE — 90653 IIV ADJUVANT VACCINE IM: CPT | Performed by: FAMILY MEDICINE

## 2019-10-01 PROCEDURE — G8598 ASA/ANTIPLAT THER USED: HCPCS | Performed by: FAMILY MEDICINE

## 2019-10-01 PROCEDURE — G0439 PPPS, SUBSEQ VISIT: HCPCS | Performed by: FAMILY MEDICINE

## 2019-10-01 PROCEDURE — G0008 ADMIN INFLUENZA VIRUS VAC: HCPCS | Performed by: FAMILY MEDICINE

## 2019-10-01 PROCEDURE — G8482 FLU IMMUNIZE ORDER/ADMIN: HCPCS | Performed by: FAMILY MEDICINE

## 2019-10-01 PROCEDURE — 1123F ACP DISCUSS/DSCN MKR DOCD: CPT | Performed by: FAMILY MEDICINE

## 2019-10-01 PROCEDURE — 4040F PNEUMOC VAC/ADMIN/RCVD: CPT | Performed by: FAMILY MEDICINE

## 2019-10-01 ASSESSMENT — LIFESTYLE VARIABLES: HOW OFTEN DO YOU HAVE A DRINK CONTAINING ALCOHOL: 0

## 2019-10-01 ASSESSMENT — PATIENT HEALTH QUESTIONNAIRE - PHQ9
SUM OF ALL RESPONSES TO PHQ QUESTIONS 1-9: 0
SUM OF ALL RESPONSES TO PHQ QUESTIONS 1-9: 0

## 2019-10-03 ENCOUNTER — HOSPITAL ENCOUNTER (OUTPATIENT)
Dept: PAIN MANAGEMENT | Age: 74
Discharge: HOME | End: 2019-10-03
Payer: MEDICARE

## 2019-10-03 VITALS
TEMPERATURE: 98.1 F | WEIGHT: 223 LBS | SYSTOLIC BLOOD PRESSURE: 101 MMHG | OXYGEN SATURATION: 98 % | BODY MASS INDEX: 30.2 KG/M2 | DIASTOLIC BLOOD PRESSURE: 68 MMHG | HEART RATE: 71 BPM | HEIGHT: 72 IN | RESPIRATION RATE: 16 BRPM

## 2019-10-03 DIAGNOSIS — K59.03 DRUG-INDUCED CONSTIPATION: ICD-10-CM

## 2019-10-03 DIAGNOSIS — M47.899 FACET SYNDROME: ICD-10-CM

## 2019-10-03 DIAGNOSIS — M50.30 DDD (DEGENERATIVE DISC DISEASE), CERVICAL: ICD-10-CM

## 2019-10-03 DIAGNOSIS — G89.29 ENCOUNTER FOR CHRONIC PAIN MANAGEMENT: ICD-10-CM

## 2019-10-03 DIAGNOSIS — E11.610 CHARCOT FOOT DUE TO DIABETES MELLITUS (HCC): ICD-10-CM

## 2019-10-03 DIAGNOSIS — M47.26 OSTEOARTHRITIS OF SPINE WITH RADICULOPATHY, LUMBAR REGION: ICD-10-CM

## 2019-10-03 DIAGNOSIS — M00.862 ARTHRITIS OF LEFT KNEE DUE TO OTHER BACTERIA (HCC): ICD-10-CM

## 2019-10-03 DIAGNOSIS — M48.061 SPINAL STENOSIS OF LUMBAR REGION, UNSPECIFIED WHETHER NEUROGENIC CLAUDICATION PRESENT: ICD-10-CM

## 2019-10-03 DIAGNOSIS — E09.41 DIABETIC MONONEUROPATHY ASSOCIATED WITH DRUG OR CHEMICAL INDUCED DIABETES MELLITUS (HCC): ICD-10-CM

## 2019-10-03 DIAGNOSIS — E08.41 DIABETIC MONONEUROPATHY ASSOCIATED WITH DIABETES MELLITUS DUE TO UNDERLYING CONDITION (HCC): ICD-10-CM

## 2019-10-03 DIAGNOSIS — E11.42 DIABETIC PERIPHERAL NEUROPATHY ASSOCIATED WITH TYPE 2 DIABETES MELLITUS (HCC): ICD-10-CM

## 2019-10-03 DIAGNOSIS — M47.816 SPONDYLOSIS OF LUMBAR REGION WITHOUT MYELOPATHY OR RADICULOPATHY: ICD-10-CM

## 2019-10-03 DIAGNOSIS — Z51.81 MEDICATION MONITORING ENCOUNTER: ICD-10-CM

## 2019-10-03 DIAGNOSIS — M51.36 DDD (DEGENERATIVE DISC DISEASE), LUMBAR: Primary | ICD-10-CM

## 2019-10-03 PROCEDURE — 99213 OFFICE O/P EST LOW 20 MIN: CPT

## 2019-10-03 PROCEDURE — 99214 OFFICE O/P EST MOD 30 MIN: CPT | Performed by: PAIN MEDICINE

## 2019-10-03 RX ORDER — FENTANYL 25 UG/H
1 PATCH TRANSDERMAL
Qty: 10 PATCH | Refills: 0 | Status: SHIPPED | OUTPATIENT
Start: 2019-10-08 | End: 2019-10-29 | Stop reason: SDUPTHER

## 2019-10-03 RX ORDER — OXYCODONE HYDROCHLORIDE AND ACETAMINOPHEN 5; 325 MG/1; MG/1
1 TABLET ORAL EVERY 6 HOURS PRN
Qty: 120 TABLET | Refills: 0 | Status: SHIPPED | OUTPATIENT
Start: 2019-10-03 | End: 2020-01-06 | Stop reason: SDUPTHER

## 2019-10-03 RX ORDER — OXYCODONE HYDROCHLORIDE AND ACETAMINOPHEN 5; 325 MG/1; MG/1
1 TABLET ORAL EVERY 6 HOURS PRN
Qty: 120 TABLET | Refills: 0 | Status: SHIPPED | OUTPATIENT
Start: 2019-10-03 | End: 2019-10-29

## 2019-10-03 ASSESSMENT — ENCOUNTER SYMPTOMS
RECTAL PAIN: 0
BOWEL INCONTINENCE: 0
PHOTOPHOBIA: 0
EYE ITCHING: 0
VOMITING: 0
EYES NEGATIVE: 1
ALLERGIC/IMMUNOLOGIC NEGATIVE: 1
SORE THROAT: 0
NAUSEA: 0
CONSTIPATION: 0
COUGH: 0
BACK PAIN: 1
ABDOMINAL DISTENTION: 0
SHORTNESS OF BREATH: 0

## 2019-10-03 ASSESSMENT — PAIN DESCRIPTION - PAIN TYPE: TYPE: CHRONIC PAIN

## 2019-10-03 ASSESSMENT — PAIN DESCRIPTION - DIRECTION: RADIATING_TOWARDS: BILATERAL LEGS

## 2019-10-03 ASSESSMENT — PAIN SCALES - GENERAL: PAINLEVEL_OUTOF10: 4

## 2019-10-03 ASSESSMENT — PAIN DESCRIPTION - ORIENTATION: ORIENTATION: LOWER;RIGHT;LEFT

## 2019-10-03 ASSESSMENT — PAIN DESCRIPTION - ONSET: ONSET: ON-GOING

## 2019-10-03 ASSESSMENT — PAIN DESCRIPTION - LOCATION: LOCATION: BACK;NECK

## 2019-10-03 ASSESSMENT — PAIN DESCRIPTION - PROGRESSION: CLINICAL_PROGRESSION: NOT CHANGED

## 2019-10-03 ASSESSMENT — PAIN - FUNCTIONAL ASSESSMENT: PAIN_FUNCTIONAL_ASSESSMENT: ACTIVITIES ARE NOT PREVENTED

## 2019-10-03 ASSESSMENT — PAIN DESCRIPTION - FREQUENCY: FREQUENCY: CONTINUOUS

## 2019-10-07 RX ORDER — OMEPRAZOLE 20 MG/1
CAPSULE, DELAYED RELEASE ORAL
Qty: 30 CAPSULE | Refills: 1 | Status: SHIPPED | OUTPATIENT
Start: 2019-10-07 | End: 2019-11-30 | Stop reason: SDUPTHER

## 2019-10-14 RX ORDER — FUROSEMIDE 40 MG/1
TABLET ORAL
Qty: 60 TABLET | Refills: 2 | Status: SHIPPED | OUTPATIENT
Start: 2019-10-14 | End: 2020-01-15

## 2019-10-29 ENCOUNTER — HOSPITAL ENCOUNTER (OUTPATIENT)
Dept: PAIN MANAGEMENT | Age: 74
Discharge: HOME | End: 2019-10-29
Payer: MEDICARE

## 2019-10-29 VITALS
TEMPERATURE: 98.6 F | DIASTOLIC BLOOD PRESSURE: 74 MMHG | HEIGHT: 72 IN | RESPIRATION RATE: 16 BRPM | WEIGHT: 223 LBS | BODY MASS INDEX: 30.2 KG/M2 | HEART RATE: 90 BPM | SYSTOLIC BLOOD PRESSURE: 131 MMHG | OXYGEN SATURATION: 100 %

## 2019-10-29 DIAGNOSIS — K59.03 DRUG-INDUCED CONSTIPATION: ICD-10-CM

## 2019-10-29 DIAGNOSIS — M48.061 SPINAL STENOSIS OF LUMBAR REGION, UNSPECIFIED WHETHER NEUROGENIC CLAUDICATION PRESENT: ICD-10-CM

## 2019-10-29 DIAGNOSIS — Z51.81 MEDICATION MONITORING ENCOUNTER: ICD-10-CM

## 2019-10-29 DIAGNOSIS — M50.30 DDD (DEGENERATIVE DISC DISEASE), CERVICAL: ICD-10-CM

## 2019-10-29 DIAGNOSIS — M47.816 SPONDYLOSIS OF LUMBAR REGION WITHOUT MYELOPATHY OR RADICULOPATHY: ICD-10-CM

## 2019-10-29 DIAGNOSIS — M47.899 FACET SYNDROME: ICD-10-CM

## 2019-10-29 DIAGNOSIS — E11.610 CHARCOT FOOT DUE TO DIABETES MELLITUS (HCC): ICD-10-CM

## 2019-10-29 DIAGNOSIS — M51.36 DDD (DEGENERATIVE DISC DISEASE), LUMBAR: Primary | ICD-10-CM

## 2019-10-29 DIAGNOSIS — M47.26 OSTEOARTHRITIS OF SPINE WITH RADICULOPATHY, LUMBAR REGION: ICD-10-CM

## 2019-10-29 DIAGNOSIS — E08.41 DIABETIC MONONEUROPATHY ASSOCIATED WITH DIABETES MELLITUS DUE TO UNDERLYING CONDITION (HCC): ICD-10-CM

## 2019-10-29 DIAGNOSIS — M00.862 ARTHRITIS OF LEFT KNEE DUE TO OTHER BACTERIA (HCC): ICD-10-CM

## 2019-10-29 DIAGNOSIS — G89.29 ENCOUNTER FOR CHRONIC PAIN MANAGEMENT: ICD-10-CM

## 2019-10-29 PROCEDURE — 99213 OFFICE O/P EST LOW 20 MIN: CPT

## 2019-10-29 PROCEDURE — 99213 OFFICE O/P EST LOW 20 MIN: CPT | Performed by: NURSE PRACTITIONER

## 2019-10-29 RX ORDER — FENTANYL 25 UG/H
1 PATCH TRANSDERMAL
Qty: 10 PATCH | Refills: 0 | Status: SHIPPED | OUTPATIENT
Start: 2019-11-07 | End: 2019-12-04 | Stop reason: SDUPTHER

## 2019-10-29 ASSESSMENT — ENCOUNTER SYMPTOMS
GASTROINTESTINAL NEGATIVE: 1
SORE THROAT: 1
BACK PAIN: 1
EYES NEGATIVE: 1
RESPIRATORY NEGATIVE: 1

## 2019-10-31 ENCOUNTER — OFFICE VISIT (OUTPATIENT)
Dept: PODIATRY | Age: 74
End: 2019-10-31
Payer: MEDICARE

## 2019-10-31 VITALS — BODY MASS INDEX: 28.99 KG/M2 | WEIGHT: 214 LBS | HEIGHT: 72 IN

## 2019-10-31 DIAGNOSIS — B35.1 ONYCHOMYCOSIS: Primary | ICD-10-CM

## 2019-10-31 DIAGNOSIS — Z79.4 TYPE 2 DIABETES MELLITUS WITH DIABETIC POLYNEUROPATHY, WITH LONG-TERM CURRENT USE OF INSULIN (HCC): ICD-10-CM

## 2019-10-31 DIAGNOSIS — M14.672 CHARCOT'S JOINT OF LEFT FOOT: ICD-10-CM

## 2019-10-31 DIAGNOSIS — E11.42 TYPE 2 DIABETES MELLITUS WITH DIABETIC POLYNEUROPATHY, WITH LONG-TERM CURRENT USE OF INSULIN (HCC): ICD-10-CM

## 2019-10-31 DIAGNOSIS — M21.969 FOOT DEFORMITY, UNSPECIFIED LATERALITY: ICD-10-CM

## 2019-10-31 PROCEDURE — 11721 DEBRIDE NAIL 6 OR MORE: CPT | Performed by: PODIATRIST

## 2019-10-31 PROCEDURE — 99999 PR OFFICE/OUTPT VISIT,PROCEDURE ONLY: CPT | Performed by: PODIATRIST

## 2019-10-31 ASSESSMENT — ENCOUNTER SYMPTOMS
COLOR CHANGE: 0
DIARRHEA: 0
CONSTIPATION: 0
VOMITING: 0
NAUSEA: 0

## 2019-11-04 ENCOUNTER — OFFICE VISIT (OUTPATIENT)
Dept: FAMILY MEDICINE CLINIC | Age: 74
End: 2019-11-04
Payer: MEDICARE

## 2019-11-04 VITALS
HEART RATE: 74 BPM | BODY MASS INDEX: 30.48 KG/M2 | WEIGHT: 225 LBS | OXYGEN SATURATION: 98 % | DIASTOLIC BLOOD PRESSURE: 70 MMHG | SYSTOLIC BLOOD PRESSURE: 110 MMHG | HEIGHT: 72 IN

## 2019-11-04 DIAGNOSIS — E11.42 TYPE 2 DIABETES MELLITUS WITH DIABETIC POLYNEUROPATHY, WITH LONG-TERM CURRENT USE OF INSULIN (HCC): Primary | ICD-10-CM

## 2019-11-04 DIAGNOSIS — E78.5 HYPERLIPIDEMIA, UNSPECIFIED HYPERLIPIDEMIA TYPE: ICD-10-CM

## 2019-11-04 DIAGNOSIS — Z79.4 TYPE 2 DIABETES MELLITUS WITH DIABETIC POLYNEUROPATHY, WITH LONG-TERM CURRENT USE OF INSULIN (HCC): Primary | ICD-10-CM

## 2019-11-04 DIAGNOSIS — E55.9 VITAMIN D DEFICIENCY: ICD-10-CM

## 2019-11-04 DIAGNOSIS — I25.810 CORONARY ARTERY DISEASE INVOLVING CORONARY BYPASS GRAFT OF NATIVE HEART WITHOUT ANGINA PECTORIS: ICD-10-CM

## 2019-11-04 PROCEDURE — 1036F TOBACCO NON-USER: CPT | Performed by: FAMILY MEDICINE

## 2019-11-04 PROCEDURE — G8417 CALC BMI ABV UP PARAM F/U: HCPCS | Performed by: FAMILY MEDICINE

## 2019-11-04 PROCEDURE — G8482 FLU IMMUNIZE ORDER/ADMIN: HCPCS | Performed by: FAMILY MEDICINE

## 2019-11-04 PROCEDURE — 4040F PNEUMOC VAC/ADMIN/RCVD: CPT | Performed by: FAMILY MEDICINE

## 2019-11-04 PROCEDURE — 2022F DILAT RTA XM EVC RTNOPTHY: CPT | Performed by: FAMILY MEDICINE

## 2019-11-04 PROCEDURE — 3017F COLORECTAL CA SCREEN DOC REV: CPT | Performed by: FAMILY MEDICINE

## 2019-11-04 PROCEDURE — 3044F HG A1C LEVEL LT 7.0%: CPT | Performed by: FAMILY MEDICINE

## 2019-11-04 PROCEDURE — G8598 ASA/ANTIPLAT THER USED: HCPCS | Performed by: FAMILY MEDICINE

## 2019-11-04 PROCEDURE — 1123F ACP DISCUSS/DSCN MKR DOCD: CPT | Performed by: FAMILY MEDICINE

## 2019-11-04 PROCEDURE — 99213 OFFICE O/P EST LOW 20 MIN: CPT | Performed by: FAMILY MEDICINE

## 2019-11-04 PROCEDURE — G8427 DOCREV CUR MEDS BY ELIG CLIN: HCPCS | Performed by: FAMILY MEDICINE

## 2019-11-04 ASSESSMENT — ENCOUNTER SYMPTOMS
SORE THROAT: 0
CONSTIPATION: 0
BACK PAIN: 0
NAUSEA: 0
ABDOMINAL PAIN: 0
COUGH: 0
SHORTNESS OF BREATH: 0

## 2019-11-07 ENCOUNTER — HOSPITAL ENCOUNTER (OUTPATIENT)
Age: 74
Discharge: HOME | End: 2019-11-07
Payer: MEDICARE

## 2019-11-07 DIAGNOSIS — Z79.4 TYPE 2 DIABETES MELLITUS WITH DIABETIC POLYNEUROPATHY, WITH LONG-TERM CURRENT USE OF INSULIN (HCC): ICD-10-CM

## 2019-11-07 DIAGNOSIS — I25.810 CORONARY ARTERY DISEASE INVOLVING CORONARY BYPASS GRAFT OF NATIVE HEART WITHOUT ANGINA PECTORIS: ICD-10-CM

## 2019-11-07 DIAGNOSIS — E11.42 TYPE 2 DIABETES MELLITUS WITH DIABETIC POLYNEUROPATHY, WITH LONG-TERM CURRENT USE OF INSULIN (HCC): ICD-10-CM

## 2019-11-07 LAB
ALBUMIN SERPL-MCNC: 4.3 G/DL (ref 3.5–5.2)
ALBUMIN/GLOBULIN RATIO: ABNORMAL (ref 1–2.5)
ALP BLD-CCNC: 104 U/L (ref 40–129)
ALT SERPL-CCNC: 16 U/L (ref 5–41)
ANION GAP SERPL CALCULATED.3IONS-SCNC: 14 MMOL/L (ref 9–17)
AST SERPL-CCNC: 17 U/L
BILIRUB SERPL-MCNC: 0.3 MG/DL (ref 0.3–1.2)
BUN BLDV-MCNC: 27 MG/DL (ref 8–23)
BUN/CREAT BLD: ABNORMAL (ref 9–20)
CALCIUM SERPL-MCNC: 9.5 MG/DL (ref 8.6–10.4)
CHLORIDE BLD-SCNC: 106 MMOL/L (ref 98–107)
CHOLESTEROL/HDL RATIO: 2.3
CHOLESTEROL: 100 MG/DL
CO2: 24 MMOL/L (ref 20–31)
CREAT SERPL-MCNC: 1.44 MG/DL (ref 0.7–1.2)
GFR AFRICAN AMERICAN: 58 ML/MIN
GFR NON-AFRICAN AMERICAN: 48 ML/MIN
GFR SERPL CREATININE-BSD FRML MDRD: ABNORMAL ML/MIN/{1.73_M2}
GFR SERPL CREATININE-BSD FRML MDRD: ABNORMAL ML/MIN/{1.73_M2}
GLUCOSE BLD-MCNC: 51 MG/DL (ref 70–99)
HDLC SERPL-MCNC: 43 MG/DL
LDL CHOLESTEROL: 40 MG/DL (ref 0–130)
POTASSIUM SERPL-SCNC: 3.9 MMOL/L (ref 3.7–5.3)
SODIUM BLD-SCNC: 144 MMOL/L (ref 135–144)
TOTAL PROTEIN: 7.5 G/DL (ref 6.4–8.3)
TRIGL SERPL-MCNC: 85 MG/DL
VLDLC SERPL CALC-MCNC: NORMAL MG/DL (ref 1–30)

## 2019-11-07 PROCEDURE — 80053 COMPREHEN METABOLIC PANEL: CPT

## 2019-11-07 PROCEDURE — 36415 COLL VENOUS BLD VENIPUNCTURE: CPT

## 2019-11-07 PROCEDURE — 80061 LIPID PANEL: CPT

## 2019-11-13 ENCOUNTER — NURSE ONLY (OUTPATIENT)
Dept: PODIATRY | Age: 74
End: 2019-11-13

## 2019-11-13 DIAGNOSIS — E11.42 DIABETIC POLYNEUROPATHY ASSOCIATED WITH TYPE 2 DIABETES MELLITUS (HCC): ICD-10-CM

## 2019-11-15 RX ORDER — INSULIN GLARGINE 100 [IU]/ML
INJECTION, SOLUTION SUBCUTANEOUS
Qty: 15 ML | Refills: 1 | Status: SHIPPED | OUTPATIENT
Start: 2019-11-15 | End: 2019-12-28

## 2019-11-26 ENCOUNTER — NURSE ONLY (OUTPATIENT)
Dept: PODIATRY | Age: 74
End: 2019-11-26
Payer: MEDICARE

## 2019-11-26 DIAGNOSIS — Z79.4 TYPE 2 DIABETES MELLITUS WITH DIABETIC POLYNEUROPATHY, WITH LONG-TERM CURRENT USE OF INSULIN (HCC): ICD-10-CM

## 2019-11-26 DIAGNOSIS — E11.42 TYPE 2 DIABETES MELLITUS WITH DIABETIC POLYNEUROPATHY, WITH LONG-TERM CURRENT USE OF INSULIN (HCC): ICD-10-CM

## 2019-11-26 DIAGNOSIS — E11.42 TYPE 2 DIABETES MELLITUS WITH DIABETIC POLYNEUROPATHY, WITHOUT LONG-TERM CURRENT USE OF INSULIN (HCC): Primary | ICD-10-CM

## 2019-11-26 DIAGNOSIS — M21.969 FOOT DEFORMITY, UNSPECIFIED LATERALITY: ICD-10-CM

## 2019-11-26 PROCEDURE — A5513 MULTI DEN INSERT CUSTOM MOLD: HCPCS | Performed by: PODIATRIST

## 2019-11-26 PROCEDURE — A5500 DIAB SHOE FOR DENSITY INSERT: HCPCS | Performed by: PODIATRIST

## 2019-12-04 ENCOUNTER — HOSPITAL ENCOUNTER (OUTPATIENT)
Dept: PAIN MANAGEMENT | Age: 74
Discharge: HOME | End: 2019-12-04
Payer: MEDICARE

## 2019-12-04 VITALS
OXYGEN SATURATION: 98 % | WEIGHT: 225 LBS | HEART RATE: 70 BPM | BODY MASS INDEX: 30.48 KG/M2 | RESPIRATION RATE: 16 BRPM | SYSTOLIC BLOOD PRESSURE: 115 MMHG | HEIGHT: 72 IN | DIASTOLIC BLOOD PRESSURE: 72 MMHG | TEMPERATURE: 97.7 F

## 2019-12-04 DIAGNOSIS — K59.03 DRUG-INDUCED CONSTIPATION: ICD-10-CM

## 2019-12-04 DIAGNOSIS — M51.36 DDD (DEGENERATIVE DISC DISEASE), LUMBAR: Primary | ICD-10-CM

## 2019-12-04 DIAGNOSIS — Z51.81 MEDICATION MONITORING ENCOUNTER: ICD-10-CM

## 2019-12-04 DIAGNOSIS — M00.862 ARTHRITIS OF LEFT KNEE DUE TO OTHER BACTERIA (HCC): ICD-10-CM

## 2019-12-04 DIAGNOSIS — E08.41 DIABETIC MONONEUROPATHY ASSOCIATED WITH DIABETES MELLITUS DUE TO UNDERLYING CONDITION (HCC): ICD-10-CM

## 2019-12-04 DIAGNOSIS — M47.899 FACET SYNDROME: ICD-10-CM

## 2019-12-04 DIAGNOSIS — M47.26 OSTEOARTHRITIS OF SPINE WITH RADICULOPATHY, LUMBAR REGION: ICD-10-CM

## 2019-12-04 DIAGNOSIS — M48.061 SPINAL STENOSIS OF LUMBAR REGION, UNSPECIFIED WHETHER NEUROGENIC CLAUDICATION PRESENT: ICD-10-CM

## 2019-12-04 DIAGNOSIS — M50.30 DDD (DEGENERATIVE DISC DISEASE), CERVICAL: ICD-10-CM

## 2019-12-04 DIAGNOSIS — M47.816 SPONDYLOSIS OF LUMBAR REGION WITHOUT MYELOPATHY OR RADICULOPATHY: ICD-10-CM

## 2019-12-04 DIAGNOSIS — E11.610 CHARCOT FOOT DUE TO DIABETES MELLITUS (HCC): ICD-10-CM

## 2019-12-04 PROCEDURE — 99213 OFFICE O/P EST LOW 20 MIN: CPT

## 2019-12-04 PROCEDURE — 99213 OFFICE O/P EST LOW 20 MIN: CPT | Performed by: NURSE PRACTITIONER

## 2019-12-04 RX ORDER — FENTANYL 25 UG/H
1 PATCH TRANSDERMAL
Qty: 10 PATCH | Refills: 0 | Status: SHIPPED | OUTPATIENT
Start: 2019-12-07 | End: 2020-01-06 | Stop reason: SDUPTHER

## 2019-12-04 RX ORDER — NALOXONE HYDROCHLORIDE 4 MG/.1ML
1 SPRAY NASAL PRN
Qty: 1 EACH | Refills: 0 | Status: SHIPPED | OUTPATIENT
Start: 2019-12-04 | End: 2021-02-25 | Stop reason: SDUPTHER

## 2019-12-04 ASSESSMENT — ENCOUNTER SYMPTOMS
GASTROINTESTINAL NEGATIVE: 1
EYES NEGATIVE: 1
RESPIRATORY NEGATIVE: 1
BACK PAIN: 1

## 2019-12-19 RX ORDER — SIMVASTATIN 20 MG
TABLET ORAL
Qty: 30 TABLET | Refills: 1 | Status: SHIPPED | OUTPATIENT
Start: 2019-12-19 | End: 2020-02-20

## 2020-01-06 ENCOUNTER — HOSPITAL ENCOUNTER (OUTPATIENT)
Dept: PAIN MANAGEMENT | Age: 75
Discharge: HOME | End: 2020-01-06
Payer: MEDICARE

## 2020-01-06 VITALS
BODY MASS INDEX: 30.48 KG/M2 | SYSTOLIC BLOOD PRESSURE: 122 MMHG | TEMPERATURE: 97.7 F | WEIGHT: 225 LBS | HEART RATE: 70 BPM | OXYGEN SATURATION: 99 % | HEIGHT: 72 IN | DIASTOLIC BLOOD PRESSURE: 77 MMHG | RESPIRATION RATE: 16 BRPM

## 2020-01-06 PROCEDURE — 99213 OFFICE O/P EST LOW 20 MIN: CPT

## 2020-01-06 PROCEDURE — 99213 OFFICE O/P EST LOW 20 MIN: CPT | Performed by: NURSE PRACTITIONER

## 2020-01-06 RX ORDER — OXYCODONE HYDROCHLORIDE AND ACETAMINOPHEN 5; 325 MG/1; MG/1
1 TABLET ORAL EVERY 6 HOURS PRN
Qty: 120 TABLET | Refills: 0 | Status: SHIPPED | OUTPATIENT
Start: 2020-01-08 | End: 2020-03-03 | Stop reason: SDUPTHER

## 2020-01-06 RX ORDER — TOPIRAMATE 50 MG/1
50 TABLET, FILM COATED ORAL 2 TIMES DAILY
Qty: 60 TABLET | Refills: 3 | Status: SHIPPED | OUTPATIENT
Start: 2020-01-21 | End: 2020-04-30 | Stop reason: SDUPTHER

## 2020-01-06 RX ORDER — FENTANYL 25 UG/H
1 PATCH TRANSDERMAL
Qty: 10 PATCH | Refills: 0 | Status: SHIPPED | OUTPATIENT
Start: 2020-01-06 | End: 2020-02-03 | Stop reason: SDUPTHER

## 2020-01-06 RX ORDER — CHOLESTYRAMINE 4 G/9G
POWDER, FOR SUSPENSION ORAL
COMMUNITY
Start: 2019-12-26 | End: 2020-07-28 | Stop reason: ALTCHOICE

## 2020-01-06 ASSESSMENT — ENCOUNTER SYMPTOMS
RESPIRATORY NEGATIVE: 1
BACK PAIN: 1
GASTROINTESTINAL NEGATIVE: 1
EYES NEGATIVE: 1

## 2020-01-06 NOTE — PROGRESS NOTES
[facility] PROGRESS NOTE      Patient here today to review Medication Agreement    Chief Complaint:  Low back pain      HPI: He c/o low back pain radiating down legs and feet. He has history of old compression fracture lumbar spine. Pain is unchanged, He does home exercises. He is sleeping well. He has had no ED visits. Blood sugar, HGBA1C was 5.8. Back Pain   This is a chronic problem. The current episode started more than 1 year ago. The problem occurs constantly. The problem is unchanged. The pain is present in the lumbar spine. The quality of the pain is described as aching. Radiates to: legs and feet. The pain is at a severity of 6/10. The pain is moderate. The pain is the same all the time. Exacerbated by: nothing. Associated symptoms include numbness and weakness. Risk factors include history of osteoporosis. He has tried analgesics for the symptoms. The treatment provided mild relief. Patient denies any new neurological symptoms. No bowel or bladder incontinence, no weakness, and no falling. Treatment goals:  Functional status: walk farther        Aberrancy:   Any alcoholic beverages     no  Any illegal drugs   no        Analgesia:pain   6      Adverse  Effects :no constipation     ADL;s  home exercises              Pill count: appropriateBrought narcan       Morphine equivalent dose as reported on [place]:90  Periodic Controlled Substance Monitoring: Possible medication side effects, risk of tolerance/dependence & alternative treatments discussed., No signs of potential drug abuse or diversion identified. , Assessed functional status., Obtaining appropriate analgesic effect of treatment. Laisha Valle, APRN - CNP)  Review of[place]ARRS does not show any aberrant prescription behavior. Medication is helping the patient stay active. Patient denies any side effects and reports adequate analgesia. No sign of misuse/abuse.             As above, I did review the imaging           When [facility]   MDA, Ur Not Detected    Final 04/03/2019  8:20 AM [facility]   Diazepam, Urine Not Detected    Final 04/03/2019  8:20 AM [facility]   Ethyl Glucuronide Ur Not Detected    Final 04/03/2019  8:20 AM [facility]   Fentanyl, Ur Present    Final 04/03/2019  8:20 AM [facility]   Hydrocodone, Urine Not Detected    Final 04/03/2019  8:20 AM [facility]   Hydromorphone, Urine Not Detected    Final 04/03/2019  8:20 AM [facility]   Lorazepam, Urine Not Detected    Final 04/03/2019  8:20 AM [facility]   Marijuana Metab, Ur Not Detected    Final 04/03/2019  8:20 AM [facility]   MDEA, ASH, Ur Not Detected    Final 04/03/2019  8:20 AM [facility]   MDMA, Urine Not Detected    Final 04/03/2019  8:20 AM [facility]   Meperidine Metab, Ur Not Detected    Final 04/03/2019  8:20 AM [facility]   Methadone, Urine Not Detected    Final 04/03/2019  8:20 AM [facility]   Methamphetamine, Urine Not Detected    Final 04/03/2019  8:20 AM [facility]   Methylphenidate Not Detected    Final 04/03/2019  8:20 AM [facility]   Midazolam, Urine Not Detected    Final 04/03/2019  8:20 AM [facility]   Morphine Urine Not Detected    Final 04/03/2019  8:20 AM [facility]   Norbuprenorphine, Urine Not Detected    Final 04/03/2019  8:20 AM [facility]   Nordiazepam, Urine Not Detected    Final 04/03/2019  8:20 AM [facility]   Norfentanyl, Urine Present    Final 04/03/2019  8:20 AM [facility]   NORHYDROCODONE, URINE Not Detected    Final 04/03/2019  8:20 AM [facility]   Noroxycodone, Urine Present    Final 04/03/2019  8:20 AM [facility]   NOROXYMORPHONE, URINE Not Detected    Final 04/03/2019  8:20 AM [facility]   Oxazepam, Urine Not Detected    Final 04/03/2019  8:20 AM [facility]   Oxycodone Urine Present    Final 04/03/2019  8:20 AM [facility]   Oxymorphone, Urine Not Detected    Final 04/03/2019  8:20 AM [facility]   PCP, Urine Not Detected    Final 04/03/2019  8:20 AM [facility]   Phentermine, Ur Not Detected    Final 04/03/2019  8:20 AM [facility]   Propoxyphene, Urine Not Detected    Final 04/03/2019  8:20 AM [facility]   Tapentadol-O-Sulfate, Urine Not Detected    Final 04/03/2019  8:20 AM [facility]   Tapentadol, Urine be used as the sole means for   clinical diagnosis or patient management decisions. EER Hi Res Interp Ur See Note    Final 04/03/2019  8:20 AM [facility]   (NOTE)   Access [facility] Enhanced Report using either link below:   -Direct access: [email or web address]   -Enter Username, Password: [email or web address]   Username: [email or web address]   Password: [email or web address]   Performed by Kendra Carrillojose carlos , [id number] [facility] [phone]   www. Cali Fisher            Past Medical History:   Diagnosis Date    Abdominal pain     Benign prostatic hypertrophy     C. difficile colitis     CAD (coronary artery disease) 1/3/12    s/p CABGx3    Colon polyp 02/25/2019    tubular adenoma    Constipation     Diabetes mellitus ([facility]     Diarrhea     Diarrhea     DVT (deep venous thrombosis) ([facility])     left calf    Ejection fraction < 50%     Gallstones     Heartburn     Hx of blood clots     Hyperlipidemia     Kidney stones     MI (myocardial infarction) ([facility]     2011    Mitral regurgitation     MRSA (methicillin resistant staph aureus) culture positive     Numbness and tingling     hands and feet    Rib fractures 1-2015    right    Wears glasses     readers       Past Surgical History:   Procedure Laterality Date    APPENDECTOMY      CARDIAC CATHETERIZATION  12/29/11    CHOLECYSTECTOMY      COLONOSCOPY  01/11/2012    wnl, 10 yr recall    COLONOSCOPY  11/28/2018    ATTEMPTED NOT CLEAN (N/A )    COLONOSCOPY  02/25/2019    tubular adenoma    COLONOSCOPY N/A 2/25/2019    COLONOSCOPY WITH BIOPSY performed by Jean Carlos [facility]ahal MD at [facility]      x 1    CORONARY ARTERY BYPASS GRAFT  1/3/12    x3    KNEE ARTHROSCOPY      left    KNEE SURGERY Left     I&D    OTHER SURGICAL HISTORY Left 3/7/2016    plantar plane &  exostectomy medial foot left    [place] COLON CA SCRN NOT HI RSK IND N/A 11/28/2018    COLONOSCOPY ATTEMPTED NOT CLEAN performed by Gianluca Lassiter MD at [facility] ENDOSCOPY  11-3-15    VENA CAVA FILTER PLACEMENT      WRIST SURGERY      I&D       Allergies   Allergen Reactions    Flagyl [Metronidazole] Hives    Metronidazole      Other reaction(s): Vomiting         Current Outpatient Medications:     fentaNYL (DURAGESIC) 25 MCG/HR, Place 1 patch onto the skin every 72 hours for 30 days. , Disp: 10 patch, Rfl: 0    [START ON 1/21/2020] topiramate (TOPAMAX) 50 MG tablet, Take 1 tablet by mouth 2 times daily, Disp: 60 tablet, Rfl: 3    [START ON 1/8/2020] oxyCODONE-acetaminophen (PERCOCET) 5-325 MG per tablet, Take 1 tablet by mouth every 6 hours as needed for Pain for up to 30 days. , Disp: 120 tablet, Rfl: 0    LANTUS SOLOSTAR 100 UNIT/ML injection pen, INJECT 30 UNITS SUBCUTANEOUSLY EVERY MORNING AND 40 UNITS EVERY EVENING, Disp: 15 mL, Rfl: 1    simvastatin (ZOCOR) 20 MG tablet, TAKE 1 TABLET BY MOUTH IN THE EVENING , Disp: 30 tablet, Rfl: 1    omeprazole (PRILOSEC) 20 MG delayed release capsule, TAKE 1 CAPSULE BY MOUTH ONE TIME A DAY , Disp: 30 capsule, Rfl: 2    furosemide (LASIX) 40 MG tablet, TAKE 1 TABLET BY MOUTH TWO TIMES A DAY , Disp: 60 tablet, Rfl: 2    gabapentin (NEURONTIN) 800 MG tablet, Take 1 tablet by mouth daily for 90 days. , Disp: 90 tablet, Rfl: 3    Probiotic Product (PROBIOTIC-10 PO), Take by mouth, Disp: , Rfl:     aspirin 81 MG tablet, Take 81 mg by mouth daily. , Disp: , Rfl:     Vitamin D (CHOLECALCIFEROL) 1000 UNITS CAPS capsule, Take 2,000 Units by mouth daily , Disp: , Rfl:     Ascorbic Acid (VITAMIN C) 500 MG tablet, Take 1,000 mg by mouth daily , Disp: , Rfl:     finasteride (PROSCAR) 5 MG tablet, Take 5 mg by mouth daily. , Disp: , Rfl:     tamsulosin (FLOMAX) 0.4 MG capsule, Take 0.4 mg by mouth daily.   , Disp: , Rfl:     cholestyramine (QUESTRAN) 4 g packet, DISSOLVE & TAKE 1 packet BY MOUTH THREE TIMES A DAY WITH MEALS, Disp: , Rfl:    abused: Not on file     Forced sexual activity: Not on file   Other Topics Concern    Not on file   Social History Narrative    Not on file       Review of Systems:  Review of Systems   Constitution: Negative. Eyes: Negative. Cardiovascular: Negative. Respiratory: Negative. Endocrine:        Diabetic   Hematologic/Lymphatic: Bruises/bleeds easily. Skin: Negative. Musculoskeletal: Positive for back pain and joint pain. Gastrointestinal: Negative. Genitourinary: Negative. Neurological: Positive for loss of balance, numbness and weakness. Uses cane    Psychiatric/Behavioral: Negative. Physical Exam:  /77   Pulse 70   Temp 97.7 °F (36.5 °C) (Oral)   Resp 16   Ht 6' (1.829 m)   Wt 225 lb (102.1 kg)   SpO2 99%   BMI 30.52 kg/m²     Physical Exam  HENT:      Head: Normocephalic. Pulmonary:      Effort: Pulmonary effort is normal.   Musculoskeletal:      Right shoulder: He exhibits tenderness. Left knee: He exhibits deformity. Tenderness found. Medial joint line and lateral joint line tenderness noted. Lumbar back: He exhibits tenderness. Skin:     General: Skin is warm and dry. Neurological:      General: No focal deficit present. Mental Status: He is alert. Motor: Motor function is intact. Deep Tendon Reflexes:      Reflex Scores:       Patellar reflexes are 1+ on the right side and 0 on the left side. Achilles reflexes are 1+ on the right side and 1+ on the left side. Comments: Ambulates with a cane   Psychiatric:         Attention and Perception: Attention normal.         Mood and Affect: Mood normal.         Speech: Speech normal.         Behavior: Behavior normal.         Thought Content:  Thought content normal.         Cognition and Memory: Cognition normal.         Judgment: Judgment normal.           Assessment:    Problem List Items Addressed This Visit     Spondylosis of lumbar region without myelopathy or radiculopathy    Relevant Medications    fentaNYL (DURAGESIC) 25 MCG/HR    oxyCODONE-acetaminophen (PERCOCET) 5-325 MG per tablet (Start on 1/8/2020)    Osteoarthritis of spine with radiculopathy, lumbar region    Relevant Medications    fentaNYL (DURAGESIC) 25 MCG/HR    topiramate (TOPAMAX) 50 MG tablet (Start on 1/21/2020)    oxyCODONE-acetaminophen (PERCOCET) 5-325 MG per tablet (Start on 1/8/2020)    Medication monitoring encounter    Relevant Medications    fentaNYL (DURAGESIC) 25 MCG/HR    oxyCODONE-acetaminophen (PERCOCET) 5-325 MG per tablet (Start on 1/8/2020)    Lumbar spinal stenosis    Facet syndrome (HCC)    Relevant Medications    fentaNYL (DURAGESIC) 25 MCG/HR    oxyCODONE-acetaminophen (PERCOCET) 5-325 MG per tablet (Start on 1/8/2020)    Encounter for chronic pain management    Relevant Medications    fentaNYL (DURAGESIC) 25 MCG/HR    oxyCODONE-acetaminophen (PERCOCET) 5-325 MG per tablet (Start on 1/8/2020)    Drug-induced constipation    Relevant Medications    fentaNYL (DURAGESIC) 25 MCG/HR    oxyCODONE-acetaminophen (PERCOCET) 5-325 MG per tablet (Start on 1/8/2020)    Diabetic mononeuropathy associated with diabetes mellitus due to underlying condition ([address]    Relevant Medications    fentaNYL (DURAGESIC) 25 MCG/HR    topiramate (TOPAMAX) 50 MG tablet (Start on 1/21/2020)    oxyCODONE-acetaminophen (PERCOCET) 5-325 MG per tablet (Start on 1/8/2020)    DDD (degenerative disc disease), lumbar - Primary    Relevant Medications    fentaNYL (DURAGESIC) 25 MCG/HR    oxyCODONE-acetaminophen (PERCOCET) 5-325 MG per tablet (Start on 1/8/2020)    DDD (degenerative disc disease), cervical    Relevant Medications    fentaNYL (DURAGESIC) 25 MCG/HR    oxyCODONE-acetaminophen (PERCOCET) 5-325 MG per tablet (Start on 1/8/2020)    Charcot foot due to diabetes mellitus ([address]    Relevant Medications    fentaNYL (DURAGESIC) 25 MCG/HR    topiramate (TOPAMAX) 50 MG tablet (Start on 1/21/2020) oxyCODONE-acetaminophen (PERCOCET) 5-325 MG per tablet (Start on 1/8/2020)    Arthritis, septic ([facility]    Relevant Medications    fentaNYL (DURAGESIC) 25 MCG/HR    oxyCODONE-acetaminophen (PERCOCET) 5-325 MG per tablet (Start on 1/8/2020)      Other Visit Diagnoses     Diabetic mononeuropathy associated with drug or chemical induced diabetes mellitus ([facility]        Relevant Medications    topiramate (TOPAMAX) 50 MG tablet (Start on 1/21/2020)    oxyCODONE-acetaminophen (PERCOCET) 5-325 MG per tablet (Start on 1/8/2020)    Diabetic peripheral neuropathy associated with type 2 diabetes mellitus ([facility]        Relevant Medications    topiramate (TOPAMAX) 50 MG tablet (Start on 1/21/2020)    oxyCODONE-acetaminophen (PERCOCET) 5-325 MG per tablet (Start on 1/8/2020)            Treatment Plan:  DISCUSSION: Treatment options discussed withpatient and all questions answered to patient's satisfaction. Possible side effects, risk of tolerance and or dependence and alternative treatments discussed    Obtaining appropriate analgesic effect of treatment   No signs of potential drug abuse or diversion identified    [x] Ill effects of being on chronic pain medications such as sleep disturbances, respiratory depression, hormonal changes, withdrawal symptoms, chronic opioid dependence and tolerance as well as risk of taking opioids with Benzodiazepines and taking opioids along with alcohol,  werediscussed with patient. I had asked the patient to minimize medication use and utilize pain medications only for uncontrolled rest pain or pain with exertional activities. I advised patient not to self-escalate painmedications without consulting with us. At each of patient's future visits we will try to taper pain medications, while adjusting the adjunct medications, and re-evaluating for Physical Therapy to improve spinal andjoint strength. We will continue to have discussions to decrease pain medications as tolerated.       Counseled

## 2020-01-06 NOTE — DISCHARGE INSTR - COC
Continuity of Care Form    Patient Name: Ike Argueta   :  1945  MRN:  [id number]    Admit date:  (Not on file)  Discharge date:  ***    Code Status Order: No Order   Advance Directives:     Admitting Physician:  No admitting provider for patient encounter. PCP: Mis Talley MD    Discharging Nurse: [facility] Unit/Room#: No information available for this encounter.   Discharging Unit Phone Number: ***    Emergency Contact:   Extended Emergency Contact Information  Primary Emergency Contact: Rosita Rivera  Address: [place] [address] [place], [address] Phone: [phone]  Work Phone: [phone]  Mobile Phone: [phone]  Relation: Child  Secondary Emergency Contact: Kathryn Abrams-Jose  Address: [place], [place] [address] Phone: [phone]  Work Phone: [phone]  Mobile Phone: [phone]  Relation: Legal Guardian    Past Surgical History:  Past Surgical History:   Procedure Laterality Date    APPENDECTOMY      CARDIAC CATHETERIZATION  11    CHOLECYSTECTOMY      COLONOSCOPY  2012    wnl, 10 yr recall    COLONOSCOPY  2018    ATTEMPTED NOT CLEAN (N/A )    COLONOSCOPY  2019    tubular adenoma    COLONOSCOPY N/A 2019    COLONOSCOPY WITH BIOPSY performed by Trent Hardwick MD at [facility] 86      x 1    CORONARY ARTERY BYPASS GRAFT  1/3/12    x3    KNEE ARTHROSCOPY      left    KNEE SURGERY Left     I&D    OTHER SURGICAL HISTORY Left 3/7/2016    plantar plane &  exostectomy medial foot left    MO COLON CA SCRN NOT HI RSK IND N/A 2018    COLONOSCOPY ATTEMPTED NOT CLEAN performed by Trent Hardwick MD at [facility] 93  11-3-15    VENA CAVA FILTER PLACEMENT      WRIST SURGERY      I&D       Immunization History:   Immunization History   Administered Date(s) Administered    ***    Readmission Risk Assessment Score:  Readmission Risk              Risk of Unplanned Readmission:        0           Discharging to Facility/ Agency   · Name:   · Address:  · Phone:  · Fax:    Dialysis Facility (if applicable)   · Name:  · Address:  · Dialysis Schedule:  · Phone:  · Fax:    / signature: {Esignature:[id number]}    PHYSICIAN SECTION    Prognosis: {Prognosis:0566341818}    Condition at Discharge: 8 [facility] Patient Condition:976099761}    Rehab Potential (if transferring to Rehab): {Prognosis:5587317367}    Recommended Labs or Other Treatments After Discharge: ***    Physician Certification: I certify the above information and transfer of Hong Matthews  is necessary for the continuing treatment of the diagnosis listed and that he requires {Admit to Appropriate Level of Care:67755} for {GREATER/LESS:026526919} 30 days.      Update Admission H&P: {CHP DME Changes in BLRWF:288843661}    PHYSICIAN SIGNATURE:  {Esignature:[id number]}

## 2020-01-09 ENCOUNTER — OFFICE VISIT (OUTPATIENT)
Dept: PODIATRY | Age: 75
End: 2020-01-09
Payer: MEDICARE

## 2020-01-09 VITALS — WEIGHT: 214 LBS | HEIGHT: 72 IN | BODY MASS INDEX: 28.99 KG/M2

## 2020-01-09 PROCEDURE — 11721 DEBRIDE NAIL 6 OR MORE: CPT | Performed by: PODIATRIST

## 2020-01-09 PROCEDURE — 99999 PR OFFICE/OUTPT VISIT,PROCEDURE ONLY: CPT | Performed by: PODIATRIST

## 2020-01-09 ASSESSMENT — ENCOUNTER SYMPTOMS
NAUSEA: 0
DIARRHEA: 0
VOMITING: 0
COLOR CHANGE: 0
CONSTIPATION: 0

## 2020-01-09 NOTE — PROGRESS NOTES
[facility]  Return Patient  Chief Complaint   Patient presents with    Nail Problem     b/l nail trim/ last seen Dr. Lucille Handy 11/4/19    Other     last blood sugar 87       [place] November is a 69y.o. year old male who is here for a diabetic foot check and nail trim. He would like his nails trimmed today. History of surgery type: Plantar planing medial and lateral foot. Vital signs are stable. Pain level is 0. Patient denies N/V/F/C. Patient was compliant with postoperative instructions. He is in a diabetic shoe        Review of Systems   Constitutional: Negative for chills, diaphoresis, fatigue, fever and unexpected weight change. Cardiovascular: Negative for leg swelling. Gastrointestinal: Negative for constipation, diarrhea, nausea and vomiting. Musculoskeletal: Positive for gait problem. Negative for arthralgias and joint swelling. Skin: Negative for color change, pallor, rash and wound. Neurological: Positive for numbness. Negative for weakness. Vascular: DP and PT pulses palpable 2/4, Right Foot and 2/4 on the Left Foot. CFT <3 seconds, Right Foot and <3 seconds on the Left Foot. Edema is absent,  Right Foot and minimal on the Left Foot. Neurological:   Sensation absent  to light touch to level of digits, both feet. Musculoskeletal:   Muscle strength is 5/5 on the Right Foot and 5/5 on the Left Foot. Structural deformities are present on the Right Foot and present on the Left Foot, but improved  Flat medial arch left foot. Integument:  Warm, dry, supple both feet. Infection is absent. No odor. No macerated.      Sites of Onychomycosis Involvement (Check affected area)  [x] [x] [x] [x] [x] [x] [x] [x] [x] [x]  5 4 3 2 1 1 2 3 4 5                          Right                                        Left    Thickness  [x] [x] [x] [x] [x] [x] [x] [x] [x] [x]  5 4 3 2 1 1 2 3 4 5                         Right Left    Dystrophic Changes                                                                 [x] [x] [x] [x] [x] [x] [x] [x] [x] [x]  5 4 3 2 1 1 2 3 4 5                         Right                                        Left    Color                                                                  [x] [x] [x] [x] [x] [x] [x] [x] [x] [x]  5 4 3 2 1 1 2 3 4 5                          Right                                        Left    Incurvation/Ingrowin                                                                   [] [] [] [] [] [] [] [] [] []  5 4 3 2 1 1 2 3 4 5                         Right                                        Left    Inflammation/Pain                                                                   [] [] [] [] [] [] [] [] [] []  5 4 3 2 1 1 2 3 4 5                         Right                                        Left       Assesment :     Diagnosis Orders   1. Onychomycosis  TN DEBRIDEMENT OF NAILS, 6 OR MORE     DIABETES FOOT EXAM   2. Type 2 diabetes mellitus with diabetic polyneuropathy, with long-term current use of insulin ([facility])  TN DEBRIDEMENT OF NAILS, 6 OR MORE     DIABETES FOOT EXAM   3. Foot deformity, unspecified laterality   DIABETES FOOT EXAM   4. Type 2 diabetes mellitus with diabetic polyneuropathy, without long-term current use of insulin ([facility])   DIABETES FOOT EXAM   5. Charcot's joint of left foot   DIABETES FOOT EXAM         Plan: Pt was evaluated and examined. Patient was given personalized discharge instructions. Nails 1-10 were debrided sharply in length and thickness with a nipper and , without incident. Pt will follow up in 9 weeks or sooner if any problems arise. Diagnosis was discussed with the pt and all of their questions were answered in detail. Proper foot hygiene and care was discussed with the pt. Informed patient on proper diabetic foot care and importance of tight glycemic control.  Patient to check feet daily and contact the office with any questions/problems/concerns. Other comorbidity noted and will be managed by PCP. Diabetic foot examination performed this visit. The exam included neurological sensory exam, a 10-g monofilament and pinprick sensation, vibration using a 128-Hz tuning fork, ankle reflexes, visual skin inspection, vascular exam including assessment of pedal pulses, orthopedic exam for deformities, and shoe inspection. Increased risk factors noted on the diabetic foot exam include decreased sensory exam and peripheral neuropathy. Shoegear inspected and found to be appropriate size and wear. Diabetic shoes and insoles: This patient would benefit from extra depth diabetic shoes and custom inserts. I feel he/she qualifies due to the above performed physical exam and diagnosis. I will do the appropriate paperwork and send it to their primary care physician. Then the patient will be measured for shoes and custom inserts to properly off load and protect his/her feet. No orders of the defined types were placed in this encounter. Follow up 9 weeks.

## 2020-01-15 RX ORDER — FUROSEMIDE 40 MG/1
TABLET ORAL
Qty: 60 TABLET | Refills: 1 | Status: SHIPPED | OUTPATIENT
Start: 2020-01-15 | End: 2020-03-05

## 2020-01-24 ENCOUNTER — OFFICE VISIT (OUTPATIENT)
Dept: GASTROENTEROLOGY | Age: 75
End: 2020-01-24
Payer: MEDICARE

## 2020-01-24 VITALS
DIASTOLIC BLOOD PRESSURE: 77 MMHG | BODY MASS INDEX: 30.41 KG/M2 | HEART RATE: 86 BPM | SYSTOLIC BLOOD PRESSURE: 122 MMHG | WEIGHT: 224.2 LBS

## 2020-01-24 PROCEDURE — 99214 OFFICE O/P EST MOD 30 MIN: CPT | Performed by: INTERNAL MEDICINE

## 2020-01-24 PROCEDURE — 1123F ACP DISCUSS/DSCN MKR DOCD: CPT | Performed by: INTERNAL MEDICINE

## 2020-01-24 PROCEDURE — G8427 DOCREV CUR MEDS BY ELIG CLIN: HCPCS | Performed by: INTERNAL MEDICINE

## 2020-01-24 PROCEDURE — 3017F COLORECTAL CA SCREEN DOC REV: CPT | Performed by: INTERNAL MEDICINE

## 2020-01-24 PROCEDURE — 4040F PNEUMOC VAC/ADMIN/RCVD: CPT | Performed by: INTERNAL MEDICINE

## 2020-01-24 PROCEDURE — 1036F TOBACCO NON-USER: CPT | Performed by: INTERNAL MEDICINE

## 2020-01-24 PROCEDURE — G8417 CALC BMI ABV UP PARAM F/U: HCPCS | Performed by: INTERNAL MEDICINE

## 2020-01-24 PROCEDURE — G8482 FLU IMMUNIZE ORDER/ADMIN: HCPCS | Performed by: INTERNAL MEDICINE

## 2020-01-24 ASSESSMENT — ENCOUNTER SYMPTOMS
CONSTIPATION: 0
BACK PAIN: 1
BLOOD IN STOOL: 0
NAUSEA: 0
VOMITING: 0
GASTROINTESTINAL NEGATIVE: 1
ABDOMINAL DISTENTION: 0
SORE THROAT: 0
CHOKING: 0
DIARRHEA: 0
RESPIRATORY NEGATIVE: 1
WHEEZING: 0
RECTAL PAIN: 0
VOICE CHANGE: 0
ANAL BLEEDING: 0
COUGH: 0
TROUBLE SWALLOWING: 0
ALLERGIC/IMMUNOLOGIC NEGATIVE: 1
ABDOMINAL PAIN: 0
EYES NEGATIVE: 1
SINUS PRESSURE: 0

## 2020-02-03 ENCOUNTER — HOSPITAL ENCOUNTER (OUTPATIENT)
Dept: PAIN MANAGEMENT | Age: 75
Discharge: HOME | End: 2020-02-03
Payer: MEDICARE

## 2020-02-03 VITALS
SYSTOLIC BLOOD PRESSURE: 113 MMHG | OXYGEN SATURATION: 97 % | RESPIRATION RATE: 18 BRPM | DIASTOLIC BLOOD PRESSURE: 54 MMHG | HEART RATE: 79 BPM

## 2020-02-03 PROCEDURE — 99213 OFFICE O/P EST LOW 20 MIN: CPT | Performed by: NURSE PRACTITIONER

## 2020-02-03 PROCEDURE — 99213 OFFICE O/P EST LOW 20 MIN: CPT

## 2020-02-03 RX ORDER — FENTANYL 25 UG/H
1 PATCH TRANSDERMAL
Qty: 10 PATCH | Refills: 0 | Status: SHIPPED | OUTPATIENT
Start: 2020-02-05 | End: 2020-03-03 | Stop reason: SDUPTHER

## 2020-02-03 RX ORDER — GABAPENTIN 800 MG/1
800 TABLET ORAL DAILY
Qty: 90 TABLET | Refills: 3 | Status: SHIPPED | OUTPATIENT
Start: 2020-02-03 | End: 2021-02-25 | Stop reason: SDUPTHER

## 2020-02-03 ASSESSMENT — ENCOUNTER SYMPTOMS
BACK PAIN: 1
CONSTIPATION: 0
COUGH: 0
SHORTNESS OF BREATH: 0

## 2020-02-03 NOTE — PROGRESS NOTES
TIMES A DAY , Disp: 60 tablet, Rfl: 1    cholestyramine (QUESTRAN) 4 g packet, DISSOLVE & TAKE 1 packet BY MOUTH THREE TIMES A DAY WITH MEALS, Disp: , Rfl:     fentaNYL (DURAGESIC) 25 MCG/HR, Place 1 patch onto the skin every 72 hours for 30 days. , Disp: 10 patch, Rfl: 0    topiramate (TOPAMAX) 50 MG tablet, Take 1 tablet by mouth 2 times daily, Disp: 60 tablet, Rfl: 3    oxyCODONE-acetaminophen (PERCOCET) 5-325 MG per tablet, Take 1 tablet by mouth every 6 hours as needed for Pain for up to 30 days. , Disp: 120 tablet, Rfl: 0    LANTUS SOLOSTAR 100 UNIT/ML injection pen, INJECT 30 UNITS SUBCUTANEOUSLY EVERY MORNING AND 40 UNITS EVERY EVENING, Disp: 15 mL, Rfl: 1    simvastatin (ZOCOR) 20 MG tablet, TAKE 1 TABLET BY MOUTH IN THE EVENING , Disp: 30 tablet, Rfl: 1    naloxone (NARCAN) 4 MG/0.1ML LIQD nasal spray, 1 spray by Nasal route as needed (if needed for respiratory depression), Disp: 1 each, Rfl: 0    omeprazole (PRILOSEC) 20 MG delayed release capsule, TAKE 1 CAPSULE BY MOUTH ONE TIME A DAY , Disp: 30 capsule, Rfl: 2    Insulin Pen Needle (BD PEN NEEDLE ALIZE U/F) 32G X 4 MM MISC, use twice daily as directed, Disp: 100 each, Rfl: 1    blood glucose test strips (FREESTYLE LITE) strip, TEST TWICE DAILY AS DIRECTED, Disp: 100 strip, Rfl: 1    gabapentin (NEURONTIN) 800 MG tablet, Take 1 tablet by mouth daily for 90 days. , Disp: 90 tablet, Rfl: 3    Probiotic Product (PROBIOTIC-10 PO), Take by mouth, Disp: , Rfl:     aspirin 81 MG tablet, Take 81 mg by mouth daily. , Disp: , Rfl:     Vitamin D (CHOLECALCIFEROL) 1000 UNITS CAPS capsule, Take 2,000 Units by mouth daily , Disp: , Rfl:     Ascorbic Acid (VITAMIN C) 500 MG tablet, Take 1,000 mg by mouth daily , Disp: , Rfl:     NITROSTAT 0.4 MG SL tablet, , Disp: , Rfl:     finasteride (PROSCAR) 5 MG tablet, Take 5 mg by mouth daily. , Disp: , Rfl:     tamsulosin (FLOMAX) 0.4 MG capsule, Take 0.4 mg by mouth daily.   , Disp: , Rfl:     Family History Problem Relation Age of Onset    Heart Failure Father     Cancer Mother         breast    Cancer Maternal Grandfather        Social History     Socioeconomic History    Marital status:      Spouse name: Not on file    Number of children: Not on file    Years of education: Not on file    Highest education level: Not on file   Occupational History    Occupation: retired [employer]   Social Needs    Financial resource strain: Not on file    Food insecurity:     Worry: Not on file     Inability: Not on file   [employer] needs:     Medical: Not on file     Non-medical: Not on file   Tobacco Use    Smoking status: Former Smoker     Packs/day: 1.00     Years: 30.00     Pack years: 30.00     Types: Cigarettes     Last attempt to quit: 2002     Years since quittin.0    Smokeless tobacco: Never Used   Substance and Sexual Activity    Alcohol use: Not Currently     Comment: rare    Drug use: No    Sexual activity: Never   Lifestyle    Physical activity:     Days per week: Not on file     Minutes per session: Not on file    Stress: Not on file   Relationships    Social connections:     Talks on phone: Not on file     Gets together: Not on file     Attends Druze service: Not on file     Active member of club or organization: Not on file     Attends meetings of clubs or organizations: Not on file     Relationship status: Not on file    Intimate partner violence:     Fear of current or ex partner: Not on file     Emotionally abused: Not on file     Physically abused: Not on file     Forced sexual activity: Not on file   Other Topics Concern    Not on file   Social History Narrative    Not on file       Review of Systems:  Review of Systems   Constitution: Negative for chills and fever. Cardiovascular: Negative for chest pain and palpitations. Respiratory: Negative for cough and shortness of breath. Musculoskeletal: Positive for back pain.    Gastrointestinal: Negative for

## 2020-03-03 ENCOUNTER — HOSPITAL ENCOUNTER (OUTPATIENT)
Dept: PAIN MANAGEMENT | Age: 75
Discharge: HOME | End: 2020-03-03
Payer: MEDICARE

## 2020-03-03 VITALS
WEIGHT: 225 LBS | HEART RATE: 66 BPM | TEMPERATURE: 98.2 F | BODY MASS INDEX: 30.48 KG/M2 | RESPIRATION RATE: 16 BRPM | OXYGEN SATURATION: 98 % | DIASTOLIC BLOOD PRESSURE: 75 MMHG | HEIGHT: 72 IN | SYSTOLIC BLOOD PRESSURE: 113 MMHG

## 2020-03-03 PROCEDURE — 99213 OFFICE O/P EST LOW 20 MIN: CPT

## 2020-03-03 PROCEDURE — 99213 OFFICE O/P EST LOW 20 MIN: CPT | Performed by: NURSE PRACTITIONER

## 2020-03-03 RX ORDER — FENTANYL 25 UG/H
1 PATCH TRANSDERMAL
Qty: 10 PATCH | Refills: 0 | Status: SHIPPED | OUTPATIENT
Start: 2020-03-06 | End: 2020-03-26 | Stop reason: SDUPTHER

## 2020-03-03 RX ORDER — OXYCODONE HYDROCHLORIDE AND ACETAMINOPHEN 5; 325 MG/1; MG/1
1 TABLET ORAL EVERY 6 HOURS PRN
Qty: 120 TABLET | Refills: 0 | Status: SHIPPED | OUTPATIENT
Start: 2020-03-06 | End: 2020-03-26 | Stop reason: SDUPTHER

## 2020-03-03 ASSESSMENT — ENCOUNTER SYMPTOMS
GASTROINTESTINAL NEGATIVE: 1
BACK PAIN: 1
EYES NEGATIVE: 1
RESPIRATORY NEGATIVE: 1

## 2020-03-03 NOTE — PROGRESS NOTES
meprobamate.     Clonazepam, Urine Not Detected    Final 04/03/2019  8:20 AM [facility]   Codeine, Urine Not Detected    Final 04/03/2019  8:20 AM [facility]   MDA, Ur Not Detected    Final 04/03/2019  8:20 AM [facility]   Diazepam, Urine Not Detected    Final 04/03/2019  8:20 AM [facility]   Ethyl Glucuronide Ur Not Detected    Final 04/03/2019  8:20 AM [facility]   Fentanyl, Ur Present    Final 04/03/2019  8:20 AM [facility]   Hydrocodone, Urine Not Detected    Final 04/03/2019  8:20 AM [facility]   Hydromorphone, Urine Not Detected    Final 04/03/2019  8:20 AM [facility]   Lorazepam, Urine Not Detected    Final 04/03/2019  8:20 AM [facility]   Marijuana Metab, Ur Not Detected    Final 04/03/2019  8:20 AM [facility]   MDEA, ASH, Ur Not Detected    Final 04/03/2019  8:20 AM [facility]   MDMA, Urine Not Detected    Final 04/03/2019  8:20 AM [facility]   Meperidine Metab, Ur Not Detected    Final 04/03/2019  8:20 AM [facility]   Methadone, Urine Not Detected    Final 04/03/2019  8:20 AM [facility]   Methamphetamine, Urine Not Detected    Final 04/03/2019  8:20 AM [facility]   Methylphenidate Not Detected    Final 04/03/2019  8:20 AM [facility]   Midazolam, Urine Not Detected    Final 04/03/2019  8:20 AM [facility]   Morphine Urine Not Detected    Final 04/03/2019  8:20 AM [facility]   Norbuprenorphine, Urine Not Detected    Final 04/03/2019  8:20 AM [facility]   Nordiazepam, Urine Not Detected    Final 04/03/2019  8:20 AM [facility]   Norfentanyl, Urine Present    Final 04/03/2019  8:20 AM [facility]   NORHYDROCODONE, URINE Not Detected    Final 04/03/2019  8:20 AM [facility]   Noroxycodone, Urine Present    Final 04/03/2019  8:20 AM [facility]   NOROXYMORPHONE, URINE Not Detected    Final 04/03/2019  8:20 AM [facility]   Oxazepam, Urine Not Detected    Final 04/03/2019  8:20 AM [facility]   Oxycodone Urine Present    Final 04/03/2019  8:20 AM [facility]   Oxymorphone, Urine Not Detected    Final 04/03/2019  8:20 AM [facility]   PCP, Urine Not Detected    Final 04/03/2019  8:20 AM [facility]   Phentermine, Ur Not Detected    Final 04/03/2019  8:20 AM [facility]   Propoxyphene, Urine Not as directed, Disp: 100 each, Rfl: 1    simvastatin (ZOCOR) 20 MG tablet, TAKE 1 TABLET BY MOUTH IN THE EVENING , Disp: 30 tablet, Rfl: 2    cholestyramine (QUESTRAN) 4 g packet, DISSOLVE & TAKE 1 packet BY MOUTH THREE TIMES A DAY WITH MEALS, Disp: , Rfl:     naloxone (NARCAN) 4 MG/0.1ML LIQD nasal spray, 1 spray by Nasal route as needed (if needed for respiratory depression), Disp: 1 each, Rfl: 0    blood glucose test strips (FREESTYLE LITE) strip, TEST TWICE DAILY AS DIRECTED, Disp: 100 strip, Rfl: 1    NITROSTAT 0.4 MG SL tablet, , Disp: , Rfl:     Family History   Problem Relation Age of Onset    Heart Failure Father     Cancer Mother         breast    Cancer Maternal Grandfather        Social History     Socioeconomic History    Marital status:       Spouse name: Not on file    Number of children: Not on file    Years of education: Not on file    Highest education level: Not on file   Occupational History    Occupation: retired [employer]   Social Needs    Financial resource strain: Not on file    Food insecurity:     Worry: Not on file     Inability: Not on file   "[employer] needs:     Medical: Not on file     Non-medical: Not on file   Tobacco Use    Smoking status: Former Smoker     Packs/day: 1.00     Years: 30.00     Pack years: 30.00     Types: Cigarettes     Last attempt to quit: 2002     Years since quittin.0    Smokeless tobacco: Never Used   Substance and Sexual Activity    Alcohol use: Not Currently     Comment: rare    Drug use: No    Sexual activity: Never   Lifestyle    Physical activity:     Days per week: Not on file     Minutes per session: Not on file    Stress: Not on file   Relationships    Social connections:     Talks on phone: Not on file     Gets together: Not on file     Attends Yarsanism service: Not on file     Active member of club or organization: Not on file     Attends meetings of clubs or organizations: Not on file     Relationship Benzodiazepines and taking opioids along with alcohol,  werediscussed with patient. I had asked the patient to minimize medication use and utilize pain medications only for uncontrolled rest pain or pain with exertional activities. I advised patient not to self-escalate painmedications without consulting with us. At each of patient's future visits we will try to taper pain medications, while adjusting the adjunct medications, and re-evaluating for Physical Therapy to improve spinal andjoint strength. We will continue to have discussions to decrease pain medications as tolerated. Counseled patient on effects their pain medication and /or their medical condition mayhave on their  ability to drive or operate machinery. Instructed not to drive or operate machinery if drowsy     I also discussed with the patient regarding the dangers of combining narcotic pain medication with tranquilizers, alcohol or illegal drugs or taking the medication any way other than prescribed. The dangers were discussed  including respiratory depression and death. Patient was told to tell  all  physicians regarding the medications he is getting from pain clinic. Patient is warned not to take any unprescribed medications over-the-countermedications that can depress breathing . Patient is advised to talk to the pharmacist or physicians if planning to take any over-the-counter medications before  takeing them. Patient is strongly advised to avoid tranquilizers or  relaxants, illegal drugs  or any medications that can depress breathing  Patient is also advised to tell us if there is any changes in their medications from other physicians.         TREATMENT OPTIONS:     Return in 4 weeks  Medication Agreement Requirements Met  Continue Opioid therapy  Script written for percocet, fentanyl patch  Follow up appointment made

## 2020-03-03 NOTE — DISCHARGE INSTR - COC
Continuity of Care Form    Patient Name: Samir Batista   :  1945  MRN:  [id number]    Admit date:  (Not on file)  Discharge date:  ***    Code Status Order: No Order   Advance Directives:     Admitting Physician:  No admitting provider for patient encounter. PCP: Rollen Fothergill, MD    Discharging Nurse: [facility] Unit/Room#: No information available for this encounter.   Discharging Unit Phone Number: ***    Emergency Contact:   Extended Emergency Contact Information  Primary Emergency Contact: Rosita Rivera  Address: [place] [address] [address], [address] [place] [address] Phone: [phone]  Work Phone: [phone]  Mobile Phone: [phone]  Relation: Child  Secondary Emergency Contact: Kathryn Abrams-Jose  Address: [place], [address] Phone: [phone]  Work Phone: [phone]  Mobile Phone: [phone]  Relation: Legal Guardian    Past Surgical History:  Past Surgical History:   Procedure Laterality Date    APPENDECTOMY      CARDIAC CATHETERIZATION  11    CHOLECYSTECTOMY      COLONOSCOPY  2012    wnl, 10 yr recall    COLONOSCOPY  2018    ATTEMPTED NOT CLEAN (N/A )    COLONOSCOPY  2019    tubular adenoma    COLONOSCOPY N/A 2019    COLONOSCOPY WITH BIOPSY performed by [facility]ney [facility] at [facility] [place]      x 1    CORONARY ARTERY BYPASS GRAFT  1/3/12    x3    KNEE ARTHROSCOPY      left    KNEE SURGERY Left     I&D    OTHER SURGICAL HISTORY Left 3/7/2016    plantar plane &  exostectomy medial foot left    FL COLON CA SCRN NOT HI RSK IND N/A 2018    COLONOSCOPY ATTEMPTED NOT CLEAN performed by [facility] Kenney MD at [facility]  11-3-15    VENA CAVA FILTER PLACEMENT      WRIST SURGERY      I&D       Immunization History:   Immunization History   Administered Date(s) Administered    Influenza Virus Vaccine 10/20/2014, 09/04/2015    Influenza, [place]ie [place], IM, (6 mo and older Fluzone, Flulaval, Fluarix and 3 yrs and older Afluria) 11/07/2016    Influenza, Quadv, IM, PF (6 mo and older Fluzone, Flulaval, Fluarix, and 3 yrs and older Afluria) 11/27/2017    Influenza, Triv, inactivated, subunit, adjuvanted, IM (Fluad 65 yrs and older) 10/01/2019    Pneumococcal Conjugate 13-valent ([id number]) 07/26/2018    Pneumococcal Polysaccharide ([id number]) 01/11/2012, 05/12/2017    Td vaccine (adult) 05/02/1997       Active Problems:  Patient Active Problem List   Diagnosis Code    DDD (degenerative disc disease), lumbar M51.36    Osteoarthritis of spine with radiculopathy, lumbar region M47.26    Lumbar spinal stenosis M48.061    DDD (degenerative disc disease), cervical M50.30    Facet syndrome ([address] M47.819    Compression fracture [id number]    Medication monitoring encounter Z51.81    Tear of rotator cuff M75.100    Encounter for chronic pain management G89.29    Arthritis, septic ([address] M00.9    Spondylosis of lumbar region without myelopathy or radiculopathy M47.816    Coronary artery disease involving coronary bypass graft of native heart without angina pectoris I25.810    IBS (irritable bowel syndrome) K58.9    Charcot foot due to diabetes mellitus ([facility]) E11.610    Type 2 diabetes mellitus with diabetic polyneuropathy, with long-term current use of insulin ([facility]) E11.42, Z79.4    Hyperlipidemia E78.5    Vitamin D deficiency E55.9    Drug-induced constipation K59.03    Diabetic mononeuropathy associated with diabetes mellitus due to underlying condition ([address] E08.41       Isolation/Infection:   Isolation          No Isolation        Patient Infection Status     None to display          Nurse Assessment:  Last Vital Signs: There were no vitals taken for this visit.     Last documented pain score (0-10 scale):    Last Weight:   Wt Readings from Last 1 Encounters:   01/24/20 224 lb 3.2 oz (101.7 kg)     Mental Status:  {IP PT MENTAL STATUS:14734}    IV Access:  { MEET IV ACCESS:216490850}    Nursing Mobility/ADLs:  Walking   {P DME WRI}  Transfer  {CHP DME SQFK:412553281}  Bathing  {CHP DME JKIT:042954332}  Dressing  {CHP DME ZUDO:512718577}  Toileting  {CHP DME KFLP:629501387}  Feeding  {P DME RKQB:096551608}  Med Admin  {P DME CTVY:639831610}  Med Delivery   { MEET MED Delivery:335349790}    Wound Care Documentation and Therapy:  Incision 16 Foot Left (Active)   Number of days: 3579        Elimination:  Continence:   · Bowel: {YES / WY:55706}  · Bladder: {YES / BZ:53919}  Urinary Catheter: {Urinary Catheter:193101090}   Colostomy/Ileostomy/Ileal Conduit: {YES / NW:25645}       Date of Last BM: ***  No intake or output data in the 24 hours ending 20 0649  No intake/output data recorded.     Safety Concerns:     [address] Safety Concerns:824913387}    Impairments/Disabilities:      [address] Impairments/Disabilities:571458633}    Nutrition Therapy:  Current Nutrition Therapy:   [address] Diet List:527806992}    Routes of Feeding: {[facility] DME Other Feedings:720849855}  Liquids: {Slp liquid thickness:23524}  Daily Fluid Restriction: {CHP DME Yes amt example:072729290}  Last Modified Barium Swallow with Video (Video Swallowing Test): {Done Not Done UWNA:204736387}    Treatments at the Time of Hospital Discharge:   Respiratory Treatments: ***  Oxygen Therapy:  {Therapy; copd oxygen:66789}  Ventilator:    {[facility] Vent QCRM:331845043}    Rehab Therapies: {THERAPEUTIC INTERVENTION:0196772444}  Weight Bearing Status/Restrictions: [address] Weight Bearin}  Other Medical Equipment (for information only, NOT a DME order):  {EQUIPMENT:447696950}  Other Treatments: ***    Patient's personal belongings (please select all that are sent with patient):  {CHP DME Belongings:276733296}    RN SIGNATURE:  {Esignature:924018881}    CASE MANAGEMENT/SOCIAL WORK SECTION    Inpatient Status Date: ***    Readmission Risk Assessment Score:  Readmission Risk              Risk of Unplanned Readmission:        0           Discharging to Facility/ Agency   · Name:   · Address:  · Phone:  · Fax:    Dialysis Facility (if applicable)   · Name:  · Address:  · Dialysis Schedule:  · Phone:  · Fax:    / signature: {Esignature:320586392}    PHYSICIAN SECTION    Prognosis: {Prognosis:4937978718}    Condition at Discharge: [facility] Patient Condition:224926805}    Rehab Potential (if transferring to Rehab): {Prognosis:8867286095}    Recommended Labs or Other Treatments After Discharge: ***    Physician Certification: I certify the above information and transfer of Asuncion Hernandez  is necessary for the continuing treatment of the diagnosis listed and that he requires {Admit to Appropriate Level of Care:64599} for {GREATER/LESS:290285573} 30 days.      Update Admission H&P: {CHP DME Changes in SYVAB:505225115}    PHYSICIAN SIGNATURE:  {Esignature:416919990}

## 2020-03-05 ENCOUNTER — OFFICE VISIT (OUTPATIENT)
Dept: FAMILY MEDICINE CLINIC | Age: 75
End: 2020-03-05
Payer: MEDICARE

## 2020-03-05 ENCOUNTER — HOSPITAL ENCOUNTER (OUTPATIENT)
Age: 75
Setting detail: SPECIMEN
Discharge: HOME | End: 2020-03-05
Payer: MEDICARE

## 2020-03-05 VITALS
DIASTOLIC BLOOD PRESSURE: 60 MMHG | HEART RATE: 74 BPM | BODY MASS INDEX: 30.96 KG/M2 | WEIGHT: 228.6 LBS | OXYGEN SATURATION: 97 % | HEIGHT: 72 IN | SYSTOLIC BLOOD PRESSURE: 120 MMHG

## 2020-03-05 LAB
HBA1C MFR BLD: 6.5 %
MAGNESIUM: 2.2 MG/DL (ref 1.6–2.6)
POTASSIUM SERPL-SCNC: 4.1 MMOL/L (ref 3.7–5.3)

## 2020-03-05 PROCEDURE — 4040F PNEUMOC VAC/ADMIN/RCVD: CPT | Performed by: FAMILY MEDICINE

## 2020-03-05 PROCEDURE — 1123F ACP DISCUSS/DSCN MKR DOCD: CPT | Performed by: FAMILY MEDICINE

## 2020-03-05 PROCEDURE — G8417 CALC BMI ABV UP PARAM F/U: HCPCS | Performed by: FAMILY MEDICINE

## 2020-03-05 PROCEDURE — G8482 FLU IMMUNIZE ORDER/ADMIN: HCPCS | Performed by: FAMILY MEDICINE

## 2020-03-05 PROCEDURE — 99214 OFFICE O/P EST MOD 30 MIN: CPT | Performed by: FAMILY MEDICINE

## 2020-03-05 PROCEDURE — 3044F HG A1C LEVEL LT 7.0%: CPT | Performed by: FAMILY MEDICINE

## 2020-03-05 PROCEDURE — 2022F DILAT RTA XM EVC RTNOPTHY: CPT | Performed by: FAMILY MEDICINE

## 2020-03-05 PROCEDURE — 36415 COLL VENOUS BLD VENIPUNCTURE: CPT

## 2020-03-05 PROCEDURE — G8427 DOCREV CUR MEDS BY ELIG CLIN: HCPCS | Performed by: FAMILY MEDICINE

## 2020-03-05 PROCEDURE — 83036 HEMOGLOBIN GLYCOSYLATED A1C: CPT | Performed by: FAMILY MEDICINE

## 2020-03-05 PROCEDURE — 1036F TOBACCO NON-USER: CPT | Performed by: FAMILY MEDICINE

## 2020-03-05 PROCEDURE — 83735 ASSAY OF MAGNESIUM: CPT

## 2020-03-05 PROCEDURE — 3017F COLORECTAL CA SCREEN DOC REV: CPT | Performed by: FAMILY MEDICINE

## 2020-03-05 PROCEDURE — 84132 ASSAY OF SERUM POTASSIUM: CPT

## 2020-03-05 ASSESSMENT — ENCOUNTER SYMPTOMS
COUGH: 0
SORE THROAT: 0
BACK PAIN: 0
CHEST TIGHTNESS: 0
ABDOMINAL PAIN: 0
NAUSEA: 0
CONSTIPATION: 0
SHORTNESS OF BREATH: 0

## 2020-03-26 RX ORDER — FENTANYL 25 UG/H
1 PATCH TRANSDERMAL
Qty: 10 PATCH | Refills: 0 | Status: SHIPPED | OUTPATIENT
Start: 2020-04-05 | End: 2020-03-31 | Stop reason: SDUPTHER

## 2020-03-26 RX ORDER — OXYCODONE HYDROCHLORIDE AND ACETAMINOPHEN 5; 325 MG/1; MG/1
1 TABLET ORAL EVERY 6 HOURS PRN
Qty: 120 TABLET | Refills: 0 | Status: SHIPPED | OUTPATIENT
Start: 2020-04-16 | End: 2020-05-16

## 2020-03-31 RX ORDER — FENTANYL 25 UG/H
1 PATCH TRANSDERMAL
Qty: 10 PATCH | Refills: 0 | Status: SHIPPED | OUTPATIENT
Start: 2020-04-05 | End: 2020-04-30 | Stop reason: SDUPTHER

## 2020-03-31 RX ORDER — FENTANYL 25 UG/H
1 PATCH TRANSDERMAL
Qty: 10 PATCH | Refills: 0 | Status: SHIPPED | OUTPATIENT
Start: 2020-04-05 | End: 2020-03-31 | Stop reason: SDUPTHER

## 2020-04-24 ENCOUNTER — TELEPHONE (OUTPATIENT)
Dept: FAMILY MEDICINE CLINIC | Age: 75
End: 2020-04-24

## 2020-04-30 ENCOUNTER — HOSPITAL ENCOUNTER (OUTPATIENT)
Dept: PAIN MANAGEMENT | Age: 75
Discharge: HOME | End: 2020-04-30
Payer: MEDICARE

## 2020-04-30 PROCEDURE — 99214 OFFICE O/P EST MOD 30 MIN: CPT | Performed by: PAIN MEDICINE

## 2020-04-30 PROCEDURE — 99213 OFFICE O/P EST LOW 20 MIN: CPT

## 2020-04-30 RX ORDER — FENTANYL 25 UG/H
1 PATCH TRANSDERMAL
Qty: 10 PATCH | Refills: 0 | Status: SHIPPED | OUTPATIENT
Start: 2020-05-05 | End: 2020-06-04 | Stop reason: SDUPTHER

## 2020-04-30 RX ORDER — TOPIRAMATE 50 MG/1
50 TABLET, FILM COATED ORAL 2 TIMES DAILY
Qty: 60 TABLET | Refills: 3 | Status: SHIPPED | OUTPATIENT
Start: 2020-04-30 | End: 2020-08-31 | Stop reason: SDUPTHER

## 2020-04-30 RX ORDER — NALOXONE HYDROCHLORIDE 4 MG/.1ML
1 SPRAY NASAL PRN
Qty: 1 EACH | Refills: 5 | Status: SHIPPED | OUTPATIENT
Start: 2020-04-30 | End: 2020-06-03 | Stop reason: SDUPTHER

## 2020-04-30 ASSESSMENT — ENCOUNTER SYMPTOMS
BOWEL INCONTINENCE: 0
BACK PAIN: 1

## 2020-04-30 NOTE — PROGRESS NOTES
TECHNIQUE:  Multiplanar, multisequence MR images of the right shoulder    are obtained.       FINDINGS:  Osseous structures are in anatomic alignment. Bone marrow    signal is normal on all sequences. There is no evidence for Hill-Sachs    deformity of the humeral head.  No bony Bankart injury of the anterior    inferior glenoid identified. Mild hypertrophic degenerative changes of    the right acromioclavicular joint.  There is no evidence for os    acromialis.  Sagittal images show a type I acromion.       There is a small partial-thickness tear on the bursal side of the    anterior right supraspinatus tendon. The tear measures 7 mm    anteroposteriorly, 10 mm mediolaterally and involves less than 50% of the    tendon thickness. Small amount of fluid focally accumulated in the    subacromial subdeltoid bursa superficial to the site of the tear. Tendinosis of infraspinatus and subscapularis. No full-thickness tear of    the rotator cuff tendons of the right shoulder.        Mild tendinosis of intra-articular long head of the biceps tendon. Extra-articular long head of the biceps tendon has normal signal, caliber    and course in the bicipital groove. Small amount of fluid in the biceps    tendon sheath       Coracohumeral ligament is intact. Hyaline cartilage is preserved on both    sides of the glenohumeral joint.       No periarticular soft tissue masses or abnormal fluid collections seen.     No focal solid or cystic lesions seen in the spinoglenoid or    suprascapular notches.  Quadrilateral space is unremarkable.               IMPRESSION:          1. Small partial-thickness tear on the bursal side of the anterior right    supraspinatus tendon involves less than 50% of the tendon thickness.        2. Small amount of fluid focally accumulated in the subacromial    subdeltoid bursa superficial to the site of the tear.        3. Tendinosis of the right infraspinatus and subscapularis.  No    full-thickness activity modification, heat/ice as needed, Urine drug screen as required. [x]The patient's questions were answered to the best of my abilities. This note was created using voice recognition software. There may be inaccuracies of transcription  that are inadvertently overlooked prior to the signature. There is any questions about the transcription please contact me. Return in  4 weeks  with physician / CNP  for further plan of treatment. Due to the COVID-19 pandemic and the appropriate interventions by Leah Gandhi, our non-urgent pain management patients will not be seen in the office at this time for their protection and the protection of our staff. To offer continuity of care, their prescriptions will be escribed this month after a careful chart review and review of their OARRS report  Pursuant to the emergency declaration under the [address] and [place] [address] waiver authority and the [employer] Act, this Virtual Visit was conducted, with patient's consent, to reduce the patient's risk of exposure to COVID-19 and provide continuity of care for an established patient. Services were provided through a video synchronous discussion virtually to substitute for in-person appointment. \"  Documentation:  I communicated with the patient and/or health care decision maker about plan of care. Details of this discussion including any medical advice provided: Total Time: minutes: 11-20 minutes    I affirm this is a Patient Initiated Episode with an Established Patient who has not had a related appointment within my department in the past 7 days or scheduled within the next 24 hours.     Electronically signed by Marquis Amaya MD on 5/1/2020 at 4:16 AM

## 2020-05-01 ASSESSMENT — ENCOUNTER SYMPTOMS
PHOTOPHOBIA: 0
WHEEZING: 0
VOMITING: 0
SHORTNESS OF BREATH: 0
NAUSEA: 0
CONSTIPATION: 0
ABDOMINAL PAIN: 0

## 2020-05-07 RX ORDER — INSULIN GLARGINE 100 [IU]/ML
INJECTION, SOLUTION SUBCUTANEOUS
Qty: 15 ML | Refills: 1 | Status: SHIPPED | OUTPATIENT
Start: 2020-05-07 | End: 2020-06-03

## 2020-05-19 RX ORDER — SIMVASTATIN 20 MG
20 TABLET ORAL NIGHTLY
Qty: 90 TABLET | Refills: 1 | Status: SHIPPED | OUTPATIENT
Start: 2020-05-19 | End: 2020-11-16

## 2020-05-19 RX ORDER — PEN NEEDLE, DIABETIC 32GX 5/32"
NEEDLE, DISPOSABLE MISCELLANEOUS
Qty: 300 EACH | Refills: 1 | Status: SHIPPED | OUTPATIENT
Start: 2020-05-19 | End: 2021-03-16

## 2020-05-28 ENCOUNTER — OFFICE VISIT (OUTPATIENT)
Dept: PODIATRY | Age: 75
End: 2020-05-28
Payer: MEDICARE

## 2020-05-28 VITALS — HEIGHT: 72 IN | BODY MASS INDEX: 28.99 KG/M2 | WEIGHT: 214 LBS

## 2020-05-28 PROCEDURE — 11721 DEBRIDE NAIL 6 OR MORE: CPT | Performed by: PODIATRIST

## 2020-05-28 ASSESSMENT — ENCOUNTER SYMPTOMS
NAUSEA: 0
COLOR CHANGE: 0
CONSTIPATION: 0
DIARRHEA: 0
VOMITING: 0

## 2020-05-28 NOTE — PROGRESS NOTES
vibration using a 128-Hz tuning fork, ankle reflexes, visual skin inspection, vascular exam including assessment of pedal pulses, orthopedic exam for deformities, and shoe inspection. Increased risk factors noted on the diabetic foot exam include decreased sensory exam and peripheral neuropathy. Shoegear inspected and found to be appropriate size and wear. Diabetic shoes and insoles: This patient would benefit from extra depth diabetic shoes and custom inserts. I feel he/she qualifies due to the above performed physical exam and diagnosis. I will do the appropriate paperwork and send it to their primary care physician. Then the patient will be measured for shoes and custom inserts to properly off load and protect his/her feet. No orders of the defined types were placed in this encounter. Follow up 9 weeks.

## 2020-05-31 RX ORDER — OMEPRAZOLE 20 MG/1
CAPSULE, DELAYED RELEASE ORAL
Qty: 90 CAPSULE | Refills: 2 | Status: SHIPPED | OUTPATIENT
Start: 2020-05-31 | End: 2021-02-17

## 2020-06-03 ENCOUNTER — HOSPITAL ENCOUNTER (OUTPATIENT)
Dept: PAIN MANAGEMENT | Age: 75
Discharge: HOME | End: 2020-06-03
Payer: MEDICARE

## 2020-06-03 PROCEDURE — 99442 PR PHYS/QHP TELEPHONE EVALUATION 11-20 MIN: CPT | Performed by: NURSE PRACTITIONER

## 2020-06-03 PROCEDURE — 99213 OFFICE O/P EST LOW 20 MIN: CPT

## 2020-06-03 RX ORDER — GABAPENTIN 800 MG/1
TABLET ORAL
COMMUNITY
Start: 2020-05-29 | End: 2020-06-03

## 2020-06-03 RX ORDER — GABAPENTIN 800 MG/1
TABLET ORAL
COMMUNITY
Start: 2020-05-29 | End: 2020-06-03 | Stop reason: SDUPTHER

## 2020-06-03 RX ORDER — FUROSEMIDE 40 MG/1
40 TABLET ORAL 2 TIMES DAILY
Qty: 60 TABLET | Refills: 2 | Status: SHIPPED | OUTPATIENT
Start: 2020-06-03 | End: 2020-08-31

## 2020-06-03 RX ORDER — INSULIN GLARGINE 100 [IU]/ML
INJECTION, SOLUTION SUBCUTANEOUS
COMMUNITY
Start: 2020-05-28 | End: 2020-06-19 | Stop reason: SDUPTHER

## 2020-06-03 ASSESSMENT — ENCOUNTER SYMPTOMS
EYES NEGATIVE: 1
GASTROINTESTINAL NEGATIVE: 1
RESPIRATORY NEGATIVE: 1
BACK PAIN: 1
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
## 2020-06-03 NOTE — TELEPHONE ENCOUNTER
Please Approve or Refuse.   Send to Pharmacy per Pt's Request:      Next Visit Date:  7/28/2020   Last Visit Date: 3/5/2020    Hemoglobin A1C (%)   Date Value   03/05/2020 6.5   10/01/2019 5.9   03/28/2019 6.1             ( goal A1C is < 7)   BP Readings from Last 3 Encounters:   03/05/20 120/60   03/03/20 113/75   02/03/20 (!) 113/54          (goal 120/80)  BUN   Date Value Ref Range Status   11/07/2019 27 (H) 8 - 23 mg/dL Final     CREATININE   Date Value Ref Range Status   11/07/2019 1.44 (H) 0.70 - 1.20 mg/dL Final     Potassium   Date Value Ref Range Status   03/05/2020 4.1 3.7 - 5.3 mmol/L Final

## 2020-06-04 DIAGNOSIS — Z51.81 MEDICATION MONITORING ENCOUNTER: ICD-10-CM

## 2020-06-04 DIAGNOSIS — M00.862 ARTHRITIS OF LEFT KNEE DUE TO OTHER BACTERIA (HCC): ICD-10-CM

## 2020-06-04 DIAGNOSIS — M47.816 SPONDYLOSIS OF LUMBAR REGION WITHOUT MYELOPATHY OR RADICULOPATHY: ICD-10-CM

## 2020-06-04 DIAGNOSIS — K59.03 DRUG-INDUCED CONSTIPATION: ICD-10-CM

## 2020-06-04 DIAGNOSIS — M47.899 FACET SYNDROME: ICD-10-CM

## 2020-06-04 DIAGNOSIS — M50.30 DDD (DEGENERATIVE DISC DISEASE), CERVICAL: ICD-10-CM

## 2020-06-04 DIAGNOSIS — E08.41 DIABETIC MONONEUROPATHY ASSOCIATED WITH DIABETES MELLITUS DUE TO UNDERLYING CONDITION (HCC): ICD-10-CM

## 2020-06-04 DIAGNOSIS — M47.26 OSTEOARTHRITIS OF SPINE WITH RADICULOPATHY, LUMBAR REGION: ICD-10-CM

## 2020-06-04 DIAGNOSIS — E11.610 CHARCOT FOOT DUE TO DIABETES MELLITUS (HCC): ICD-10-CM

## 2020-06-04 RX ORDER — FENTANYL 25 UG/1
1 PATCH TRANSDERMAL
Qty: 10 PATCH | Refills: 0 | Status: SHIPPED | OUTPATIENT
Start: 2020-06-04 | End: 2020-07-01 | Stop reason: SDUPTHER

## 2020-06-17 RX ORDER — INSULIN GLARGINE 100 [IU]/ML
INJECTION, SOLUTION SUBCUTANEOUS
Qty: 15 ML | Refills: 0 | OUTPATIENT
Start: 2020-06-17

## 2020-06-19 RX ORDER — INSULIN GLARGINE 100 [IU]/ML
INJECTION, SOLUTION SUBCUTANEOUS
Qty: 15 ML | Refills: 0 | OUTPATIENT
Start: 2020-06-19

## 2020-06-19 RX ORDER — INSULIN GLARGINE 100 [IU]/ML
INJECTION, SOLUTION SUBCUTANEOUS
Qty: 15 PEN | Refills: 0 | Status: SHIPPED | OUTPATIENT
Start: 2020-06-19 | End: 2020-08-24

## 2020-07-01 ENCOUNTER — HOSPITAL ENCOUNTER (OUTPATIENT)
Dept: PAIN MANAGEMENT | Age: 75
Discharge: HOME | End: 2020-07-01
Payer: MEDICARE

## 2020-07-01 PROCEDURE — 99213 OFFICE O/P EST LOW 20 MIN: CPT

## 2020-07-01 PROCEDURE — 99441 PR PHYS/QHP TELEPHONE EVALUATION 5-10 MIN: CPT | Performed by: NURSE PRACTITIONER

## 2020-07-01 RX ORDER — FENTANYL 25 UG/H
1 PATCH TRANSDERMAL
Qty: 10 PATCH | Refills: 0 | Status: SHIPPED | OUTPATIENT
Start: 2020-07-03 | End: 2020-07-31 | Stop reason: SDUPTHER

## 2020-07-01 ASSESSMENT — ENCOUNTER SYMPTOMS
GASTROINTESTINAL NEGATIVE: 1
RESPIRATORY NEGATIVE: 1
BACK PAIN: 1
EYES NEGATIVE: 1

## 2020-07-01 NOTE — PROGRESS NOTES
Al[facility] 89 PROGRESS NOTE      Patient phone call to   review Medication Agreement    Chief Complaint: low back pain    HPI: He c/o low back pain radiating to his legs. No history of lumbar surgery, Pain is managed with fentanyl patch, He takes percocet sparingly. He uses bands to exercise with, Sleep is good. Blood sugars under control. No Ed visits,    Back Pain   This is a chronic problem. The problem occurs constantly. The problem is unchanged. The pain is present in the lumbar spine. The quality of the pain is described as aching. Radiates to: legs. The pain is at a severity of 6/10. The pain is moderate. The pain is the same all the time. Exacerbated by: nothing. Associated symptoms include numbness. (Feets) He has tried analgesics and home exercises for the symptoms. The treatment provided mild relief. Patient denies any new neurological symptoms. No bowel or bladder incontinence, no weakness, and no falling. Any new diagnostic workup: [x] no  [] yes [] Xray [] CT scan [] MRI [] DEXA scan     [] Other                    Treatment goals:  Functional status: walk farther      Aberrancy:   Any alcoholic beverages   no    Any illegal drugs     no    Analgesia:6      Adverse  Effects :none    ADL;s :  Home exercises        Pill count: appropriate does not need refill on percocet, applied last fentanyl patch this am    Morphine equivalent dose as reported on [place]:  60  Periodic Controlled Substance Monitoring: Possible medication side effects, risk of tolerance/dependence & alternative treatments discussed., No signs of potential drug abuse or diversion identified., Obtaining appropriate analgesic effect of treatment., Assessed functional status. Alison Romero, APRN - CNP)  Review of[place]ARRS does not show any aberrant prescription behavior. Medication is helping the patient stay active. Patient denies any side effects and reports adequate analgesia.  No sign of misuse/abuse. When was thelast UDS: 4-            Was the UDS appropriate:yes      Record/Diagnostics Review:      As above, I did review the imaging      4/7/2019  9:54 AM - Parvin Carpenter Incoming Lab Results From [facility]     Component Value Ref Range & Units Status Collected Lab   Pain Management Drug Panel Interp, Urine Consistent   Final 04/03/2019  8:20 AM [facility]   (NOTE)   ________________________________________________________________   DRUGS EXPECTED:   PERCOCET (OXYCODONE) [4/3/19]   FENTANYL [4/3/19]   ________________________________________________________________   CONSISTENT with medications provided:   PERCOCET (OXYCODONE) : based on oxycodone, noroxycodone   FENTANYL : based on fentanyl, norfentanyl   ________________________________________________________________   Drugs Not Included in this Assay:   Acetaminophen   ________________________________________________________________   INTERPRETIVE INFORMATION: Pain Mgt [place], Mass Spec/EMIT, Ur,                            Interp   Interpretation depends on accuracy and completeness of patient   medication information submitted by client. 6-Acetylmorphine, Ur Not Detected   Final 04/03/2019  8:20 AM [facility]   7-Aminoclonazepam, Urine Not Detected   Final 04/03/2019  8:20 AM [facility]   Alpha-OH-Alpraz, Urine Not Detected   Final 04/03/2019  8:20 AM [facility]   Alprazolam, Urine Not Detected   Final 04/03/2019  8:20 AM [facility]   Amphetamines, urine Not Detected   Final 04/03/2019  8:20 AM [facility]   Barbiturates, Ur Not Detected   Final 04/03/2019  8:20 AM [facility]   Benzoylecgonine, Ur Not Detected   Final 04/03/2019  8:20 AM [facility]   Buprenorphine Urine Not Detected   Final 04/03/2019  8:20 AM [facility]   Carisoprodol, Ur Not Detected   Final 04/03/2019  8:20 AM [facility]   (NOTE)   The carisoprodol immunoassay has cross-reactivity to carisoprodol   and meprobamate.     Clonazepam, Urine Not Detected   Final 04/03/2019  8:20 AM [facility]   Codeine, Urine Not Detected   Final 04/03/2019  8:20 AM [facility]   MDA, Ur Not Detected   Final 04/03/2019  8:20 AM [facility]   Diazepam, Urine Not Detected   Final 04/03/2019  8:20 AM [facility]   Ethyl Glucuronide Ur Not Detected   Final 04/03/2019  8:20 AM [facility]   Fentanyl, Ur Present   Final 04/03/2019  8:20 AM [facility]   Hydrocodone, Urine Not Detected   Final 04/03/2019  8:20 AM [facility]   Hydromorphone, Urine Not Detected   Final 04/03/2019  8:20 AM [facility]   Lorazepam, Urine Not Detected   Final 04/03/2019  8:20 AM [facility]   Marijuana Metab, Ur Not Detected   Final 04/03/2019  8:20 AM [facility]   MDEA, ASH, Ur Not Detected   Final 04/03/2019  8:20 AM [facility]   MDMA, Urine Not Detected   Final 04/03/2019  8:20 AM [facility]   Meperidine Metab, Ur Not Detected   Final 04/03/2019  8:20 AM [facility]   Methadone, Urine Not Detected   Final 04/03/2019  8:20 AM [facility]   Methamphetamine, Urine Not Detected   Final 04/03/2019  8:20 AM [facility]   Methylphenidate Not Detected   Final 04/03/2019  8:20 AM [facility]   Midazolam, Urine Not Detected   Final 04/03/2019  8:20 AM [facility]   Morphine Urine Not Detected   Final 04/03/2019  8:20 AM [facility]   Norbuprenorphine, Urine Not Detected   Final 04/03/2019  8:20 AM [facility]   Nordiazepam, Urine Not Detected   Final 04/03/2019  8:20 AM [facility]   Norfentanyl, Urine Present   Final 04/03/2019  8:20 AM [facility]   NORHYDROCODONE, URINE Not Detected   Final 04/03/2019  8:20 AM [facility]   Noroxycodone, Urine Present   Final 04/03/2019  8:20 AM [facility]   NOROXYMORPHONE, URINE Not Detected   Final 04/03/2019  8:20 AM [facility]   Oxazepam, Urine Not Detected   Final 04/03/2019  8:20 AM [facility]   Oxycodone Urine Present   Final 04/03/2019  8:20 AM [facility]   Oxymorphone, Urine Not Detected   Final 04/03/2019  8:20 AM [facility]   PCP, Urine Not Detected   Final 04/03/2019  8:20 AM [facility]   Phentermine, Ur Not Detected   Final 04/03/2019  8:20 AM [facility]   Propoxyphene, Urine Not Detected   Final 04/03/2019  8:20 AM [facility]   Tapentadol-O-Sulfate, Urine Not Detected   Final 04/03/2019  8:20 AM [facility]   Tapentadol, Urine Not Detected   Final 04/03/2019  8:20 AM [facility]   Temazepam, Urine Not Detected   Final 04/03/2019  8:20 AM [facility]   Tramadol, Urine Not Detected   Final 04/03/2019  8:20 AM [facility]   Zolpidem, Urine Not Detected   Final 04/03/2019  8:20 AM [facility]   Drugs Expected, Ur   Final 04/03/2019  8:20  [address] [facility]   PERCOCET 4/3/19 AM    TOPAMAX 4/3/2019 AM   FENTANYL 4/3/2019 AM    Creatinine, Ur 143.1  20.0 - 400.0 mg/dL Final 04/03/2019  8:20 AM [facility]   Pain Mgt Drug Panel, Hi Res, Ur See Below   Final 04/03/2019  8:20 AM [facility]   (NOTE)   Methodology: Qualitative Enzyme Immunoassay and Qualitative Liquid   Chromatography-Time of Flight-Mass Spectrometry or Tandem Mass   Spectrometry, Quantitative Spectrophotometry   The absence of expected drug(s) and/or drug metabolite(s) may   indicate non-compliance, inappropriate timing of specimen   collection relative to drug administration, poor drug absorption,   diluted/adulterated urine, or limitations of testing. The   concentration must be greater than or equal to the cutoff to be   reported as present.  If specific drug concentrations are   required, contact the laboratory within two weeks of specimen   collection to request quantification by a second analytical   technique. Interpretive questions should be directed to the   laboratory. Results based on immunoassay detection that do not match clinical   expectations should be   interpreted with caution. Confirmatory testing by mass   spectrometry for immunoassay-based results is available, if   ordered within two weeks of specimen collection. Additional   charges apply. For medical purposes only; not valid for forensic use. This test was developed and its performance characteristics   determined by [employer]. The U.S. Food and Drug   Administration has not approved or cleared this test; however, FDA   clearance or approval is not currently required for clinical use.    The results are not intended to be used as the sole means for   clinical diagnosis or patient management decisions. EER Hi Res Interp Ur See Note   Final 04/03/2019  8:20 AM [facility]   (NOTE)   Access [facility] Enhanced Report using either link below:   -Direct access: [email or web address]   -Enter Username, Password: [email or web address]   Username: [email or web address]   Password: [email or web address]   Performed by [address] [address] [facility] [phone]   www. Bia Pichardo MD, Lab.  Director    Testing Performed By                    Past Medical History:   Diagnosis Date    Abdominal pain     Benign prostatic hypertrophy     C. difficile colitis     CAD (coronary artery disease) 1/3/12    s/p CABGx3    Colon polyp 02/25/2019    tubular adenoma    Constipation     Diabetes mellitus ([facility] [address]     Diarrhea     Diarrhea     [facility] thrombosis[facility]     left calf    Ejection fraction < 50%     Gallstones     Heartburn     Hx of blood clots     Hyperlipidemia     Kidney stones     MI (myocardial infarction) ([facility] [address]     2011    Mitral regurgitation     MRSA (methicillin resistant staph aureus) culture positive     Numbness and tingling     hands and feet    Rib fractures 1-2015    right    Wears glasses     readers       Past Surgical History:   Procedure Laterality Date    APPENDECTOMY      CARDIAC CATHETERIZATION  12/29/11    CHOLECYSTECTOMY      COLONOSCOPY  01/11/2012    wnl, 10 yr recall    COLONOSCOPY  11/28/2018    ATTEMPTED NOT CLEAN (N/A )    COLONOSCOPY  02/25/2019    tubular adenoma    COLONOSCOPY N/A 2/25/2019    COLONOSCOPY WITH BIOPSY performed by Trevor Herring MD at [facility]      x 1    CORONARY ARTERY BYPASS GRAFT  1/3/12    x3    KNEE ARTHROSCOPY      left    KNEE SURGERY Left     I&D    OTHER SURGICAL HISTORY Left 3/7/2016    plantar plane &  exostectomy medial foot left    [facility] COLON CA SCRN NOT HI RSK IND N/A 11/28/2018 COLONOSCOPY ATTEMPTED NOT CLEAN performed by Osmar Kimbrough MD at [address] ENDOSCOPY  11-3-15    VENA CAVA FILTER PLACEMENT      WRIST SURGERY      I&D       Allergies   Allergen Reactions    Flagyl [Metronidazole] Hives    Metronidazole      Other reaction(s): Vomiting         Current Outpatient Medications:     LANTUS SOLOSTAR 100 UNIT/ML injection pen, Use 30 units in AM and 40 units in PM, Disp: 15 pen, Rfl: 0    fentaNYL (DURAGESIC) 25 MCG/HR, Place 1 patch onto the skin every 72 hours for 30 days. , Disp: 10 patch, Rfl: 0    furosemide (LASIX) 40 MG tablet, Take 1 tablet by mouth 2 times daily, Disp: 60 tablet, Rfl: 2    omeprazole (PRILOSEC) 20 MG delayed release capsule, TAKE 1 CAPSULE BY MOUTH ONE TIME A DAY, Disp: 90 capsule, Rfl: 2    simvastatin (ZOCOR) 20 MG tablet, Take 1 tablet by mouth nightly, Disp: 90 tablet, Rfl: 1    topiramate (TOPAMAX) 50 MG tablet, Take 1 tablet by mouth 2 times daily, Disp: 60 tablet, Rfl: 3    gabapentin (NEURONTIN) 800 MG tablet, Take 1 tablet by mouth daily for 90 days. , Disp: 90 tablet, Rfl: 3    Probiotic Product (PROBIOTIC-10 PO), Take by mouth, Disp: , Rfl:     aspirin 81 MG tablet, Take 81 mg by mouth daily. , Disp: , Rfl:     Vitamin D (CHOLECALCIFEROL) 1000 UNITS CAPS capsule, Take 2,000 Units by mouth daily , Disp: , Rfl:     Ascorbic Acid (VITAMIN C) 500 MG tablet, Take 1,000 mg by mouth daily , Disp: , Rfl:     finasteride (PROSCAR) 5 MG tablet, Take 5 mg by mouth daily.   , Disp: , Rfl:     Insulin Pen Needle (BD PEN NEEDLE ALIZE U/F) 32G X 4 MM MISC, use twice daily as directed, Disp: 300 each, Rfl: 1    cholestyramine (QUESTRAN) 4 g packet, DISSOLVE & TAKE 1 packet BY MOUTH THREE TIMES A DAY WITH MEALS, Disp: , Rfl:     naloxone (NARCAN) 4 MG/0.1ML LIQD nasal spray, 1 spray by Nasal route as needed (if needed for respiratory depression), Disp: 1 each, Rfl: 0    blood glucose test strips (FREESTYLE LITE) strip, TEST TWICE DAILY AS DIRECTED, Disp: 100 strip, Rfl: 1    NITROSTAT 0.4 MG SL tablet, , Disp: , Rfl:     tamsulosin (FLOMAX) 0.4 MG capsule, Take 0.4 mg by mouth daily. , Disp: , Rfl:     Family History   Problem Relation Age of Onset    Heart Failure Father     Cancer Mother         breast    Cancer Maternal Grandfather        Social History     Socioeconomic History    Marital status:       Spouse name: Not on file    Number of children: Not on file    Years of education: Not on file    Highest education level: Not on file   Occupational History    Occupation: retired [employer]   Social Needs    Financial resource strain: Not on file    Food insecurity     Worry: Not on file     Inability: Not on file   [place] needs     Medical: Not on file     Non-medical: Not on file   Tobacco Use    Smoking status: Former Smoker     Packs/day: 1.00     Years: 30.00     Pack years: 30.00     Types: Cigarettes     Last attempt to quit: 2002     Years since quittin.4    Smokeless tobacco: Never Used   Substance and Sexual Activity    Alcohol use: Not Currently     Comment: rare    Drug use: No    Sexual activity: Never   Lifestyle    Physical activity     Days per week: Not on file     Minutes per session: Not on file    Stress: Not on file   Relationships    Social connections     Talks on phone: Not on file     Gets together: Not on file     Attends [employer] service: Not on file     Active member of club or organization: Not on file     Attends meetings of clubs or organizations: Not on file     Relationship status: Not on file    Intimate partner violence     Fear of current or ex partner: Not on file     Emotionally abused: Not on file     Physically abused: Not on file     Forced sexual activity: Not on file   Other Topics Concern    Not on file   Social History Narrative    Not on file         Review of Systems:  Review of Systems   Constitution: Negative. HENT: Negative. Eyes: Negative. Cardiovascular: Negative. Respiratory: Negative. Endocrine:        Diabetic   Hematologic/Lymphatic: Bruises/bleeds easily. Skin: Negative. Musculoskeletal: Positive for back pain and joint pain. Gastrointestinal: Negative. Genitourinary: Negative. Neurological: Positive for numbness. Psychiatric/Behavioral: Negative. Physical Exam:    Physical Exam  Skin:         Neurological:      Mental Status: He is alert. Psychiatric:         Mood and Affect: Mood normal.         Thought Content: Thought content normal.           Assessment:    Problem List Items Addressed This Visit     Medication monitoring encounter    Lumbar spinal stenosis - Primary    Facet syndrome ([facility] [address]    Encounter for chronic pain management    DDD (degenerative disc disease), lumbar    DDD (degenerative disc disease), cervical            Treatment Plan:  DISCUSSION: Treatment options discussed withpatient and all questions answered to patient's satisfaction. Possible side effects, risk of tolerance and or dependence and alternative treatments discussed    Obtaining appropriate analgesic effect of treatment   No signs of potential drug abuse or diversion identified    [x] Ill effects of being on chronic pain medications such as sleep disturbances, respiratory depression, hormonal changes, withdrawal symptoms, chronic opioid dependence and tolerance as well as risk of taking opioids with Benzodiazepines and taking opioids along with alcohol,  werediscussed with patient. I had asked the patient to minimize medication use and utilize pain medications only for uncontrolled rest pain or pain with exertional activities. I advised patient not to self-escalate painmedications without consulting with us.   At each of patient's future visits we will try to taper pain medications, while adjusting the adjunct medications, and re-evaluating for Physical Therapy to improve spinal

## 2020-07-28 ENCOUNTER — OFFICE VISIT (OUTPATIENT)
Dept: FAMILY MEDICINE CLINIC | Age: 75
End: 2020-07-28
Payer: MEDICARE

## 2020-07-28 ENCOUNTER — HOSPITAL ENCOUNTER (OUTPATIENT)
Age: 75
Discharge: HOME | End: 2020-07-28
Payer: MEDICARE

## 2020-07-28 VITALS
HEART RATE: 67 BPM | WEIGHT: 226 LBS | OXYGEN SATURATION: 98 % | BODY MASS INDEX: 30.65 KG/M2 | TEMPERATURE: 97.9 F | SYSTOLIC BLOOD PRESSURE: 126 MMHG | DIASTOLIC BLOOD PRESSURE: 78 MMHG

## 2020-07-28 PROBLEM — M46.97 INFLAMMATORY SPONDYLOPATHY OF LUMBOSACRAL REGION (HCC): Status: ACTIVE | Noted: 2020-07-28

## 2020-07-28 LAB
CREATININE URINE: 100.5 MG/DL (ref 39–259)
MICROALBUMIN/CREAT 24H UR: 110 MG/L
MICROALBUMIN/CREAT UR-RTO: 109 MCG/MG CREAT

## 2020-07-28 PROCEDURE — G8427 DOCREV CUR MEDS BY ELIG CLIN: HCPCS | Performed by: FAMILY MEDICINE

## 2020-07-28 PROCEDURE — 3017F COLORECTAL CA SCREEN DOC REV: CPT | Performed by: FAMILY MEDICINE

## 2020-07-28 PROCEDURE — 2022F DILAT RTA XM EVC RTNOPTHY: CPT | Performed by: FAMILY MEDICINE

## 2020-07-28 PROCEDURE — G8417 CALC BMI ABV UP PARAM F/U: HCPCS | Performed by: FAMILY MEDICINE

## 2020-07-28 PROCEDURE — 3044F HG A1C LEVEL LT 7.0%: CPT | Performed by: FAMILY MEDICINE

## 2020-07-28 PROCEDURE — 1123F ACP DISCUSS/DSCN MKR DOCD: CPT | Performed by: FAMILY MEDICINE

## 2020-07-28 PROCEDURE — 82043 UR ALBUMIN QUANTITATIVE: CPT

## 2020-07-28 PROCEDURE — 82570 ASSAY OF URINE CREATININE: CPT

## 2020-07-28 PROCEDURE — 99214 OFFICE O/P EST MOD 30 MIN: CPT | Performed by: FAMILY MEDICINE

## 2020-07-28 PROCEDURE — 4040F PNEUMOC VAC/ADMIN/RCVD: CPT | Performed by: FAMILY MEDICINE

## 2020-07-28 PROCEDURE — 1036F TOBACCO NON-USER: CPT | Performed by: FAMILY MEDICINE

## 2020-07-28 ASSESSMENT — ENCOUNTER SYMPTOMS
SHORTNESS OF BREATH: 0
ABDOMINAL DISTENTION: 0
RHINORRHEA: 0
CHEST TIGHTNESS: 0
COUGH: 0
CONSTIPATION: 0
BACK PAIN: 1
VOMITING: 0
WHEEZING: 0
DIARRHEA: 0
ABDOMINAL PAIN: 0

## 2020-07-28 ASSESSMENT — PATIENT HEALTH QUESTIONNAIRE - PHQ9
SUM OF ALL RESPONSES TO PHQ QUESTIONS 1-9: 0
1. LITTLE INTEREST OR PLEASURE IN DOING THINGS: 0
2. FEELING DOWN, DEPRESSED OR HOPELESS: 0
SUM OF ALL RESPONSES TO PHQ9 QUESTIONS 1 & 2: 0
SUM OF ALL RESPONSES TO PHQ QUESTIONS 1-9: 0

## 2020-07-28 NOTE — PROGRESS NOTES
Chief Complaint   Patient presents with    Diabetes         Urmila Hay  here today for follow up on chronic medical problems, go over labs and/or diagnostic studies, and medication refills. Diabetes      HPI: Patient is here for follow-up on diabetes hyperlipidemia. Diabetes controlled, is on insulin, A1c 6.6 stable. Patient checks her sugars 2 times a day and usually runs around 100 and 140. Patient reports he does have some hypoglycemic episodes in the morning occasionally in the range of 40s to 50s blood sugar readings. He takes 30 and 40 units of insulin twice per day. Hyperlipidemia on Zocor. Patient has history of coronary artery disease status post 1 stent placed follows with cardiologist in stable patient is not on beta-blockers. Chronic lumbar spinal stenosis follows with pain management, pain is stable. Patient is up-to-date on health screening. /78 (Site: Left Upper Arm, Position: Sitting, Cuff Size: Large Adult)   Pulse 67   Temp 97.9 °F (36.6 °C) (Temporal)   Wt 226 lb (102.5 kg)   SpO2 98%   BMI 30.65 kg/m²    Body mass index is 30.65 kg/m². Wt Readings from Last 3 Encounters:   07/28/20 226 lb (102.5 kg)   05/28/20 214 lb (97.1 kg)   03/05/20 228 lb 9.6 oz (103.7 kg)        [x]Negative depression screening. PHQ Scores 7/28/2020 10/1/2019 3/28/2019 11/27/2018 8/17/2017 5/12/2017 2/10/2017   PHQ2 Score 0 0 0 0 0 0 0   PHQ9 Score 0 0 0 0 0 0 0      []1-4 = Minimal depression   []5-9 = Milddepression   []10-14 = Moderate depression   []15-19 = Moderately severe depression   []20-27 = Severe depression    Discussed testing with the patient and all questions fully answered.     Hospital Outpatient Visit on 03/05/2020   Component Date Value Ref Range Status    Potassium 03/05/2020 4.1  3.7 - 5.3 mmol/L Final    Magnesium 03/05/2020 2.2  1.6 - 2.6 mg/dL Final         Most recent labs reviewed:     Lab Results   Component Value Date    WBC 7.5 08/29/2017 HGB 14.5 08/29/2017    HCT 44.2 08/29/2017    MCV 90.0 08/29/2017     (L) 08/29/2017       @BRIEFLAB(NA,K,CL,CO2,BUN,CREATININE,GLUCOSE,CALCIUM)@     Lab Results   Component Value Date    ALT 16 11/07/2019    AST 17 11/07/2019    ALKPHOS 104 11/07/2019    BILITOT 0.30 11/07/2019       No results found for: TSHFT4, TSH    Lab Results   Component Value Date    CHOL 100 11/07/2019    CHOL 114 04/27/2019    CHOL 106 10/10/2018     Lab Results   Component Value Date    TRIG 85 11/07/2019    TRIG 129 04/27/2019    TRIG 84 10/10/2018     Lab Results   Component Value Date    HDL 43 11/07/2019    HDL 39 (L) 04/27/2019    HDL 36 (L) 10/10/2018     Lab Results   Component Value Date    LDLCHOLESTEROL 40 11/07/2019    LDLCHOLESTEROL 49 04/27/2019    LDLCHOLESTEROL 53 10/10/2018     Lab Results   Component Value Date    VLDL NOT REPORTED 11/07/2019    VLDL NOT REPORTED 04/27/2019    VLDL NOT REPORTED 10/10/2018     Lab Results   Component Value Date    CHOLHDLRATIO 2.3 11/07/2019    CHOLHDLRATIO 2.9 04/27/2019    CHOLHDLRATIO 2.9 10/10/2018       Lab Results   Component Value Date    LABA1C 6.5 03/05/2020       No results found for: [id number]    No results found for: FOLATE    No results found for: IRON, TIBC, FERRITIN    Lab Results   Component Value Date    VITD25 28.4 (L) 04/27/2019             Current Outpatient Medications   Medication Sig Dispense Refill    fentaNYL (DURAGESIC) 25 MCG/HR Place 1 patch onto the skin every 72 hours for 30 days.  10 patch 0    LANTUS SOLOSTAR 100 UNIT/ML injection pen Use 30 units in AM and 40 units in PM 15 pen 0    furosemide (LASIX) 40 MG tablet Take 1 tablet by mouth 2 times daily 60 tablet 2    omeprazole (PRILOSEC) 20 MG delayed release capsule TAKE 1 CAPSULE BY MOUTH ONE TIME A DAY 90 capsule 2    simvastatin (ZOCOR) 20 MG tablet Take 1 tablet by mouth nightly 90 tablet 1    topiramate (TOPAMAX) 50 MG tablet Take 1 tablet by mouth 2 times daily 60 tablet 3    gabapentin (NEURONTIN) 800 MG tablet Take 1 tablet by mouth daily for 90 days. 90 tablet 3    naloxone (NARCAN) 4 MG/0.1ML LIQD nasal spray 1 spray by Nasal route as needed (if needed for respiratory depression) 1 each 0    blood glucose test strips (FREESTYLE LITE) strip TEST TWICE DAILY AS DIRECTED 100 strip 1    Probiotic Product (PROBIOTIC-10 PO) Take by mouth      aspirin 81 MG tablet Take 81 mg by mouth daily.  Vitamin D (CHOLECALCIFEROL) 1000 UNITS CAPS capsule Take 2,000 Units by mouth daily       Ascorbic Acid (VITAMIN C) 500 MG tablet Take 1,000 mg by mouth daily       NITROSTAT 0.4 MG SL tablet       finasteride (PROSCAR) 5 MG tablet Take 5 mg by mouth daily.  tamsulosin (FLOMAX) 0.4 MG capsule Take 0.4 mg by mouth daily.  Insulin Pen Needle (BD PEN NEEDLE ALIZE U/F) 32G X 4 MM MISC use twice daily as directed (Patient not taking: Reported on 2020) 300 each 1     No current facility-administered medications for this visit. Social History     Socioeconomic History    Marital status:       Spouse name: Not on file    Number of children: Not on file    Years of education: Not on file    Highest education level: Not on file   Occupational History    Occupation: retired [employer]   Social Needs    Financial resource strain: Not on file    Food insecurity     Worry: Not on file     Inability: Not on file   [employer] needs     Medical: Not on file     Non-medical: Not on file   Tobacco Use    Smoking status: Former Smoker     Packs/day: 1.00     Years: 30.00     Pack years: 30.00     Types: Cigarettes     Last attempt to quit: 2002     Years since quittin.4    Smokeless tobacco: Never Used   Substance and Sexual Activity    Alcohol use: Not Currently     Comment: rare    Drug use: No    Sexual activity: Never   Lifestyle    Physical activity     Days per week: Not on file     Minutes per session: Not on file    Stress: Not on file behavioral problems, decreased concentration, dysphoric mood, sleep disturbance and suicidal ideas. The patient is not nervous/anxious and is not hyperactive. Physical Exam  Vitals signs and nursing note reviewed. Constitutional:       Appearance: Normal appearance. HENT:      Head: Normocephalic and atraumatic. Nose: Nose normal.      Mouth/Throat:      Mouth: Mucous membranes are moist.   Eyes:      Pupils: Pupils are equal, round, and reactive to light. Neck:      Musculoskeletal: Normal range of motion. Cardiovascular:      Rate and Rhythm: Normal rate and regular rhythm. Heart sounds: Normal heart sounds. Pulmonary:      Effort: Pulmonary effort is normal.      Breath sounds: Normal breath sounds. Abdominal:      General: Bowel sounds are normal. There is no distension. Palpations: Abdomen is soft. There is no mass. Tenderness: There is no abdominal tenderness. There is no right CVA tenderness or left CVA tenderness. Hernia: No hernia is present. Musculoskeletal:      Lumbar back: He exhibits pain and spasm. He exhibits normal range of motion, no tenderness, no bony tenderness, no edema, no deformity and no laceration. Skin:     General: Skin is warm. Findings: No rash. Neurological:      Mental Status: He is alert and oriented to person, place, and time. Cranial Nerves: Cranial nerves are intact. Sensory: Sensation is intact. Motor: Weakness present. Gait: Gait is intact. Psychiatric:         Attention and Perception: Attention normal.         Mood and Affect: Mood and affect normal.         Speech: Speech normal.         Behavior: Behavior normal.         Cognition and Memory: Cognition and memory normal.             ASSESSMENT AND PLAN      1. Type 2 diabetes mellitus with diabetic polyneuropathy, with long-term current use of insulin (HCC)  -Controlled continue same medications, continue watching her sugars.   Discussed if you get hypoglycemic ischemic episodes cut down on evening insulin by 5 units.  - Microalbumin, Ur; Future    2. Spondylosis of lumbar region without myelopathy or radiculopathy  -Stable follows with pain management    3. Mixed hyperlipidemia  -Stable continue statins    4. Coronary artery disease due to lipid rich plaque  Stable follows with cardiologist    5. Inflammatory spondylopathy of lumbosacral region ([facility] [address]  -Stable on current treatment        Orders Placed This Encounter   Procedures    Microalbumin, Ur     Standing Status:   Future     Number of Occurrences:   1     Standing Expiration Date:   7/27/2021         Medications Discontinued During This Encounter   Medication Reason    cholestyramine (Manish Ricardo) 4 g packet Therapy completed       Wilfredo Salazar received counseling on the following healthy behaviors: nutrition, exercise and medication adherence  Reviewed prior labs and health maintenance  Continue current medications, diet and exercise. Discussed use, benefit, and side effects of prescribed medications. Barriers to medication compliance addressed. Patient given educational materials - see patient instructions  Was a self-tracking handout given in paper form or via [employer]? Yes    Requested Prescriptions      No prescriptions requested or ordered in this encounter       All patient questions answered. Patient voiced understanding. Quality Measures    Body mass index is 30.65 kg/m². Elevated. Weight control planned discussed Healthy diet and regular exercise. BP: 126/78. Blood pressure is normal. Treatment plan consists of No treatment change needed. Fall Risk 10/1/2019 11/27/2018 8/17/2017 5/12/2017 2/10/2017 9/4/2015 6/25/2015   2 or more falls in past year? no no no no no yes no   Fall with injury in past year? no no no no no yes no     The patient does not have a history of falls. I did , complete a risk assessment for falls.  A plan of care for falls in-office gait and balance testing performed using The Timed Up and Go Test was negative for increased falls risk- no further intervention is currently indicated, No Treatment plan indicated    Lab Results   Component Value Date    LDLCHOLESTEROL 40 11/07/2019    (goal LDL reduction with dx if diabetes is 50% LDL reduction)    PHQ Scores 7/28/2020 10/1/2019 3/28/2019 11/27/2018 8/17/2017 5/12/2017 2/10/2017   PHQ2 Score 0 0 0 0 0 0 0   PHQ9 Score 0 0 0 0 0 0 0     Interpretation of Total Score Depression Severity: 1-4 = Minimal depression, 5-9 = Mild depression, 10-14 = Moderate depression, 15-19 = Moderately severe depression, 20-27 = Severe depression      The patient'spast medical, surgical, social, and family history as well as his   current medications and allergies were reviewed as documented in today's encounter. Medications, labs, diagnostic studies, consultations andfollow-up as documented in this encounter. Return in about 3 months (around 10/28/2020) for dm ,htn, hld. Patient wasseen with total face to face time of 25 minutes. More than 50% of this visit was counseling and education. Future Appointments   Date Time Provider Esteban Eller   7/31/2020  8:30 AM TACOS Blevins - CNP [facility] [id number]   8[facility]13/2020  8[facility]   10/22/2020  8:45 AM Dinora Aguirre [address]   10/29/2020  8:30 AM Axel Bush MD [facility]     This note was completed by using the assistance of a speech-recognition program. However, inadvertent computerized transcription errors may be present. Althoughevery effort was made to ensure accuracy, no guarantees can be provided that every mistake has been identified and corrected by editing.   Electronically signed by Axel Bush MD on 7/28/2020  1:28 PM

## 2020-07-30 RX ORDER — LISINOPRIL 2.5 MG/1
2.5 TABLET ORAL DAILY
Qty: 60 TABLET | Refills: 1 | Status: SHIPPED | OUTPATIENT
Start: 2020-07-30 | End: 2021-02-01 | Stop reason: SINTOL

## 2020-07-31 ENCOUNTER — HOSPITAL ENCOUNTER (OUTPATIENT)
Dept: PAIN MANAGEMENT | Age: 75
Discharge: HOME | End: 2020-07-31
Payer: MEDICARE

## 2020-07-31 PROCEDURE — 99213 OFFICE O/P EST LOW 20 MIN: CPT

## 2020-07-31 PROCEDURE — 99213 OFFICE O/P EST LOW 20 MIN: CPT | Performed by: NURSE PRACTITIONER

## 2020-07-31 RX ORDER — FENTANYL 25 UG/H
1 PATCH TRANSDERMAL
Qty: 10 PATCH | Refills: 0 | Status: SHIPPED | OUTPATIENT
Start: 2020-08-02 | End: 2020-08-31 | Stop reason: SDUPTHER

## 2020-07-31 ASSESSMENT — ENCOUNTER SYMPTOMS
EYES NEGATIVE: 1
BACK PAIN: 1
CONSTIPATION: 1
RESPIRATORY NEGATIVE: 1

## 2020-07-31 NOTE — PROGRESS NOTES
[address] [facility] PROGRESS NOTE      Patient  Video visit to  review Medication Agreement    Chief Complaint:  Back pain      He c/;o low back pain radiating down his legs and feet, His pain is unchanged. No history lumbar surgery, PT in the past gave minimal relief, Sleep is good. He remains active,  No Ed visits. Back Pain   This is a chronic problem. The problem occurs constantly. The pain is present in the lumbar spine. The quality of the pain is described as aching. Radiates to: legs and feet. The pain is at a severity of 5/10. The pain is moderate. The pain is the same all the time. Exacerbated by: nothing. Associated symptoms include numbness. He has tried analgesics for the symptoms. The treatment provided mild relief. Patient denies any new neurological symptoms. No bowel or bladder incontinence, no weakness, and no falling. Any new diagnostic workup: [x] no  [] yes [] Xray [] CT scan [] MRI [] DEXA scan     [] Other                    Treatment goals:  Functional status: get around better      Aberrancy:   Any alcoholic beverages   no    Any illegal drugs   no      Analgesia:5      Adverse  Effects :none    ADL;s :active        Pill count: appropriate    Morphine equivalent dose as reported on [place]ARRS:  Periodic Controlled Substance Monitoring: Possible medication side effects, risk of tolerance/dependence & alternative treatments discussed., No signs of potential drug abuse or diversion identified. , Assessed functional status., Obtaining appropriate analgesic effect of treatment. Elba Gibbs, APRN - CNP)  Review ofOARRS does not show any aberrant prescription behavior. Medication is helping the patient stay active. Patient denies any side effects and reports adequate analgesia. No sign of misuse/abuse.         When was thelast UDS: 4-7-2019            Was the UDS appropriate:yes      Record/Diagnostics Review:      As above, I did review the imaging      4/7/2019  9:54 AM - Jayden, [facility] Incoming Lab Results From [employer]     Component  Value  Ref Range & Units  Status  Collected  Lab    Pain Management Drug Panel Interp, Urine  Consistent   Final  04/03/2019  8:20 AM  DICK    (NOTE)   ________________________________________________________________   DRUGS EXPECTED:   PERCOCET (OXYCODONE) [4/3/19]   FENTANYL [4/3/19]   ________________________________________________________________   CONSISTENT with medications provided:   PERCOCET (OXYCODONE) : based on oxycodone, noroxycodone   FENTANYL : based on fentanyl, norfentanyl   ________________________________________________________________   Drugs Not Included in this Assay:   Acetaminophen   ________________________________________________________________   INTERPRETIVE INFORMATION: Pain Mgt [place], Mass Spec/EMIT, Ur,                            Interp   Interpretation depends on accuracy and completeness of patient   medication information submitted by client.           Past Medical History:   Diagnosis Date    Abdominal pain     Benign prostatic hypertrophy     C. difficile colitis     CAD (coronary artery disease) 1/3/12    s/p CABGx3    Colon polyp 02/25/2019    tubular adenoma    Constipation     Diabetes mellitus (HCC)     Diarrhea     Diarrhea     DVT (deep venous thrombosis) (HCC)     left calf    Ejection fraction < 50%     Gallstones     Heartburn     Hx of blood clots     Hyperlipidemia     Kidney stones     MI (myocardial infarction) ([address]     2011    Mitral regurgitation     MRSA (methicillin resistant staph aureus) culture positive     Numbness and tingling     hands and feet    Rib fractures 1-2015    right    Wears glasses     readers       Past Surgical History:   Procedure Laterality Date    APPENDECTOMY      CARDIAC CATHETERIZATION  12/29/11    CHOLECYSTECTOMY      COLONOSCOPY  01/11/2012    wnl, 10 yr recall    COLONOSCOPY  11/28/2018    ATTEMPTED NOT CLEAN (N/A )    COLONOSCOPY  02/25/2019 tubular adenoma    COLONOSCOPY N/A 2/25/2019    COLONOSCOPY WITH BIOPSY [facility] at [facility]      x 1    CORONARY ARTERY BYPASS GRAFT  1/3/12    x3    KNEE ARTHROSCOPY      left    KNEE SURGERY Left     I&D    OTHER SURGICAL HISTORY Left 3/7/2016    plantar plane &  exostectomy medial foot left    IL COLON CA SCRN NOT HI RSK IND N/A 11/28/2018    COLONOSCOPY ATTEMPTED NOT CLEAN performed by Zulay Love MD at [facility] ENDOSCOPY  11-3-15    VENA CAVA FILTER PLACEMENT      WRIST SURGERY      I&D       Allergies   Allergen Reactions    Flagyl [Metronidazole] Hives    Metronidazole      Other reaction(s): Vomiting         Current Outpatient Medications:     lisinopril (PRINIVIL;ZESTRIL) 2.5 MG tablet, Take 1 tablet by mouth daily, Disp: 60 tablet, Rfl: 1    fentaNYL (DURAGESIC) 25 MCG/HR, Place 1 patch onto the skin every 72 hours for 30 days. , Disp: 10 patch, Rfl: 0    LANTUS SOLOSTAR 100 UNIT/ML injection pen, Use 30 units in AM and 40 units in PM, Disp: 15 pen, Rfl: 0    furosemide (LASIX) 40 MG tablet, Take 1 tablet by mouth 2 times daily, Disp: 60 tablet, Rfl: 2    omeprazole (PRILOSEC) 20 MG delayed release capsule, TAKE 1 CAPSULE BY MOUTH ONE TIME A DAY, Disp: 90 capsule, Rfl: 2    simvastatin (ZOCOR) 20 MG tablet, Take 1 tablet by mouth nightly, Disp: 90 tablet, Rfl: 1    topiramate (TOPAMAX) 50 MG tablet, Take 1 tablet by mouth 2 times daily, Disp: 60 tablet, Rfl: 3    gabapentin (NEURONTIN) 800 MG tablet, Take 1 tablet by mouth daily for 90 days. , Disp: 90 tablet, Rfl: 3    Probiotic Product (PROBIOTIC-10 PO), Take by mouth, Disp: , Rfl:     aspirin 81 MG tablet, Take 81 mg by mouth daily. , Disp: , Rfl:     Vitamin D (CHOLECALCIFEROL) 1000 UNITS CAPS capsule, Take 2,000 Units by mouth daily , Disp: , Rfl:     Ascorbic Acid (VITAMIN C) 500 MG tablet, Take 1,000 mg by mouth daily , Disp: , Rfl:     NITROSTAT 0.4 MG SL tablet, , Disp: , Rfl:     finasteride (PROSCAR) 5 MG tablet, Take 5 mg by mouth daily. , Disp: , Rfl:     tamsulosin (FLOMAX) 0.4 MG capsule, Take 0.4 mg by mouth daily. , Disp: , Rfl:     Insulin Pen Needle (BD PEN NEEDLE ALIZE U/F) 32G X 4 MM MISC, use twice daily as directed (Patient not taking: Reported on 2020), Disp: 300 each, Rfl: 1    naloxone (NARCAN) 4 MG/0.1ML LIQD nasal spray, 1 spray by Nasal route as needed (if needed for respiratory depression), Disp: 1 each, Rfl: 0    blood glucose test strips (FREESTYLE LITE) strip, TEST TWICE DAILY AS DIRECTED, Disp: 100 strip, Rfl: 1    Family History   Problem Relation Age of Onset    Heart Failure Father     Cancer Mother         breast    Cancer Maternal Grandfather        Social History     Socioeconomic History    Marital status:       Spouse name: Not on file    Number of children: Not on file    Years of education: Not on file    Highest education level: Not on file   Occupational History    Occupation: retired [employer]   Social Needs    Financial resource strain: Not on file    Food insecurity     Worry: Not on file     Inability: Not on file   [email or web address] needs     Medical: Not on file     Non-medical: Not on file   Tobacco Use    Smoking status: Former Smoker     Packs/day: 1.00     Years: 30.00     Pack years: 30.00     Types: Cigarettes     Last attempt to quit: 2002     Years since quittin.5    Smokeless tobacco: Never Used   Substance and Sexual Activity    Alcohol use: Not Currently     Comment: rare    Drug use: No    Sexual activity: Never   Lifestyle    Physical activity     Days per week: Not on file     Minutes per session: Not on file    Stress: Not on file   Relationships    Social connections     Talks on phone: Not on file     Gets together: Not on file     Attends Sabianism service: Not on file     Active member of club or organization: Not on file     Attends meetings of clubs or organizations: Not on file     Relationship status: Not on file    Intimate partner violence     Fear of current or ex partner: Not on file     Emotionally abused: Not on file     Physically abused: Not on file     Forced sexual activity: Not on file   Other Topics Concern    Not on file   Social History Narrative    Not on file         Review of Systems:  Review of Systems   Constitution: Negative. HENT: Negative. Eyes: Negative. Cardiovascular: Negative. Respiratory: Negative. Endocrine:        Diabetic blood sugars around 100   Hematologic/Lymphatic: Bruises/bleeds easily. Skin: Negative. Musculoskeletal: Positive for back pain and joint pain. Gastrointestinal: Positive for constipation. Genitourinary: Negative. Neurological: Positive for loss of balance and numbness. Psychiatric/Behavioral: Negative. Physical Exam:    Physical Exam  HENT:      Head: Normocephalic. Pulmonary:      Effort: Pulmonary effort is normal.   Skin:         Neurological:      Mental Status: He is alert. Psychiatric:         Mood and Affect: Mood normal.         Thought Content: Thought content normal.           Assessment:            Treatment Plan:  DISCUSSION: Treatment options discussed withpatient and all questions answered to patient's satisfaction. Possible side effects, risk of tolerance and or dependence and alternative treatments discussed    Obtaining appropriate analgesic effect of treatment   No signs of potential drug abuse or diversion identified    [x] Ill effects of being on chronic pain medications such as sleep disturbances, respiratory depression, hormonal changes, withdrawal symptoms, chronic opioid dependence and tolerance as well as risk of taking opioids with Benzodiazepines and taking opioids along with alcohol,  werediscussed with patient.  I had asked the patient to minimize medication use and utilize pain medications only for

## 2020-08-02 ENCOUNTER — HOSPITAL ENCOUNTER (OUTPATIENT)
Age: 75
Discharge: HOME | End: 2020-08-02
Payer: MEDICARE

## 2020-08-02 PROCEDURE — 80307 DRUG TEST PRSMV CHEM ANLYZR: CPT

## 2020-08-06 LAB
6-ACETYLMORPHINE, UR: NOT DETECTED
7-AMINOCLONAZEPAM, URINE: NOT DETECTED
ALPHA-OH-ALPRAZ, URINE: NOT DETECTED
ALPRAZOLAM, URINE: NOT DETECTED
AMPHETAMINES, URINE: NOT DETECTED
BARBITURATES, URINE: NOT DETECTED
BENZOYLECGONINE, UR: NOT DETECTED
BUPRENORPHINE URINE: NOT DETECTED
CARISOPRODOL, UR: NOT DETECTED
CLONAZEPAM, URINE: NOT DETECTED
CODEINE, URINE: NOT DETECTED
CREATININE URINE: 77.8 MG/DL (ref 20–400)
DIAZEPAM, URINE: NOT DETECTED
DRUGS EXPECTED, UR: NORMAL
EER HI RES INTERP UR: NORMAL
ETHYL GLUCURONIDE UR: NOT DETECTED
FENTANYL URINE: PRESENT
HYDROCODONE, URINE: NOT DETECTED
HYDROMORPHONE, URINE: NOT DETECTED
LORAZEPAM, URINE: NOT DETECTED
MARIJUANA METAB, UR: NOT DETECTED
MDA, UR: NOT DETECTED
MDEA, EVE, UR: NOT DETECTED
MDMA URINE: NOT DETECTED
MEPERIDINE METAB, UR: NOT DETECTED
METHADONE, URINE: NOT DETECTED
METHAMPHETAMINE, URINE: NOT DETECTED
METHYLPHENIDATE: NOT DETECTED
MIDAZOLAM, URINE: NOT DETECTED
MORPHINE URINE: NOT DETECTED
NORBUPRENORPHINE, URINE: NOT DETECTED
NORDIAZEPAM, URINE: NOT DETECTED
NORFENTANYL, URINE: PRESENT
NORHYDROCODONE, URINE: NOT DETECTED
NOROXYCODONE, URINE: NOT DETECTED
NOROXYMORPHONE, URINE: NOT DETECTED
OXAZEPAM, URINE: NOT DETECTED
OXYCODONE URINE: PRESENT
OXYMORPHONE, URINE: NOT DETECTED
PAIN MANAGEMENT DRUG PANEL INTERP, URINE: NORMAL
PAIN MGT DRUG PANEL, HI RES, UR: NORMAL
PCP,URINE: NOT DETECTED
PHENTERMINE, UR: NOT DETECTED
PROPOXYPHENE, URINE: NOT DETECTED
TAPENTADOL, URINE: NOT DETECTED
TAPENTADOL-O-SULFATE, URINE: NOT DETECTED
TEMAZEPAM, URINE: NOT DETECTED
TRAMADOL, URINE: NOT DETECTED
ZOLPIDEM, URINE: NOT DETECTED

## 2020-08-13 ENCOUNTER — OFFICE VISIT (OUTPATIENT)
Dept: PODIATRY | Age: 75
End: 2020-08-13
Payer: MEDICARE

## 2020-08-13 VITALS — WEIGHT: 226 LBS | BODY MASS INDEX: 30.61 KG/M2 | HEIGHT: 72 IN

## 2020-08-13 PROCEDURE — 11750 EXCISION NAIL&NAIL MATRIX: CPT | Performed by: PODIATRIST

## 2020-08-13 PROCEDURE — 11721 DEBRIDE NAIL 6 OR MORE: CPT | Performed by: PODIATRIST

## 2020-08-13 PROCEDURE — 99999 PR OFFICE/OUTPT VISIT,PROCEDURE ONLY: CPT | Performed by: PODIATRIST

## 2020-08-13 ASSESSMENT — ENCOUNTER SYMPTOMS
CONSTIPATION: 0
COLOR CHANGE: 0
VOMITING: 0
DIARRHEA: 0
NAUSEA: 0

## 2020-08-13 NOTE — PROGRESS NOTES
[facility]  Return Patient  Chief Complaint   Patient presents with    Nail Problem     nail trim/last saw Rk Moreno 7/28/2020    Ingrown Toenail     right hallux    Diabetes     blood sugar 112       Marc Armijo is a 77y.o. year old male who is here for a diabetic foot check and nail trim. He would like his nails trimmed today. His right big toe has been bleeding again. He has been putting antibiotic ointment on it. History of surgery type: Plantar planing medial and lateral foot. Vital signs are stable. Pain level is 0. Patient denies N/V/F/C. Patient was compliant with postoperative instructions. He is in a diabetic shoe        Review of Systems   Constitutional: Negative for chills, diaphoresis, fatigue, fever and unexpected weight change. Cardiovascular: Negative for leg swelling. Gastrointestinal: Negative for constipation, diarrhea, nausea and vomiting. Musculoskeletal: Positive for gait problem. Negative for arthralgias and joint swelling. Skin: Negative for color change, pallor, rash and wound. Neurological: Positive for numbness. Negative for weakness. Vascular: DP and PT pulses palpable 2/4, Right Foot and 2/4 on the Left Foot. CFT <3 seconds, Right Foot and <3 seconds on the Left Foot. Edema is absent,  Right Foot and minimal on the Left Foot. Neurological:   Sensation absent  to light touch to level of digits, both feet. Musculoskeletal:   Muscle strength is 5/5 on the Right Foot and 5/5 on the Left Foot. Structural deformities are present on the Right Foot and present on the Left Foot, but improved  Flat medial arch left foot. Integument:  Warm, dry, supple both feet. Infection is absent. No odor. No macerated.      Sites of Onychomycosis Involvement (Check affected area)  [x] [x] [x] [x] [x] [x] [x] [x] [x] [x]  5 4 3 2 1 1 2 3 4 5                          Right Left    Thickness  [x] [x] [x] [x] [x] [x] [x] [x] [x] [x]  5 4 3 2 1 1 2 3 4 5                         Right                                        Left    Dystrophic Changes                                                                 [x] [x] [x] [x] [x] [x] [x] [x] [x] [x]  5 4 3 2 1 1 2 3 4 5                         Right                                        Left    Color                                                                  [x] [x] [x] [x] [x] [x] [x] [x] [x] [x]  5 4 3 2 1 1 2 3 4 5                          Right                                        Left    Incurvation/Ingrowin                                                                   [] [] [] [] [x] [] [] [] [] []  5 4 3 2 1 1 2 3 4 5                         Right                                        Left    Inflammation/Pain                                                                   [] [] [] [] [] [] [] [] [] []  5 4 3 2 1 1 2 3 4 5                         Right                                        Left     right hallux, medial border incurvate nail, red, pus present    Assesment :     Diagnosis Orders   1. Onychomycosis  LA DEBRIDEMENT OF NAILS, 6 OR MORE   2. Type 2 diabetes mellitus with diabetic polyneuropathy, with long-term current use of insulin (HCC)  LA DEBRIDEMENT OF NAILS, 6 OR MORE   3. Charcot's joint of left foot     4. Foot deformity, unspecified laterality     5. Paronychia of great toe, right  LA REMOVAL OF NAIL BED         Plan: Pt was evaluated and examined. Patient was given personalized discharge instructions. Nails 1-10 were debrided sharply in length and thickness with a nipper and , without incident. Pt will follow up in 9 weeks or sooner if any problems arise. Diagnosis was discussed with the pt and all of their questions were answered in detail. Proper foot hygiene and care was discussed with the pt. Informed patient on proper diabetic foot care and importance of tight glycemic control. instructed to take Tylenol or Ibuprofen PRN pain. Post-operative chemical matrixectomy instruction sheet dispensed to patient. No orders of the defined types were placed in this encounter. Follow up 9 weeks.

## 2020-08-24 RX ORDER — INSULIN GLARGINE 100 [IU]/ML
INJECTION, SOLUTION SUBCUTANEOUS
Qty: 45 ML | Refills: 0 | Status: SHIPPED | OUTPATIENT
Start: 2020-08-24 | End: 2020-10-28

## 2020-08-24 NOTE — TELEPHONE ENCOUNTER
Last seen 7/28/20    Next Visit Date:  Future Appointments   Date Time Provider Esteban Eller   8/31/2020 10:00 AM TACOS Crawley - ZACK KINGRIO Fernandez   10/22/2020  8:45 AM Carmel Hebert DPM [place] [facility]   10/29/2020  8:30 AM Nicholas Eddy MD fp sc [facility]   1/7/2021  9:15 AM Carmel Hebert  [address] Maintenance   Topic Date Due    DTaP/Tdap/Td vaccine (1 - Tdap) [birth year]/1965    Shingles Vaccine (1 of 2) 03/07/1996    Flu vaccine (1) 09/01/2020    Annual Wellness Visit (AWV)  10/01/2020    Lipid screen  11/07/2020    Creatinine monitoring  11/07/2020    Diabetic foot exam  01/09/2021    A1C test (Diabetic or Prediabetic)  03/05/2021    Potassium monitoring  03/05/2021    Diabetic retinal exam  03/16/2021    Diabetic microalbuminuria test  07/28/2021    Colon cancer screen colonoscopy  02/25/2029    Pneumococcal 65+ years Vaccine  Completed    AAA screen  Completed    Hepatitis C screen  Addressed    Hepatitis A vaccine  Aged Out    Hib vaccine  Aged Out    Meningococcal (ACWY) vaccine  Aged Out       Hemoglobin A1C (%)   Date Value   03/05/2020 6.5   10/01/2019 5.9   03/28/2019 6.1             ( goal A1C is < 7)   Microalb/Crt.  Ratio (mcg/mg creat)   Date Value   07/28/2020 109 (H)     LDL Cholesterol (mg/dL)   Date Value   11/07/2019 40       (goal LDL is <100)   AST (U/L)   Date Value   11/07/2019 17     ALT (U/L)   Date Value   11/07/2019 16     BUN (mg/dL)   Date Value   11/07/2019 27 (H)     BP Readings from Last 3 Encounters:   07/28/20 126/78   03/05/20 120/60   03/03/20 113/75          (goal 120/80)    All Future Testing planned in [facility]  Lab Frequency Next Occurrence               Patient Active Problem List:     Coronary artery disease due to lipid rich plaque     DDD (degenerative disc disease), lumbar     Osteoarthritis of spine with radiculopathy, lumbar region     Lumbar spinal stenosis     DDD (degenerative disc disease), cervical     Facet syndrome (HCC)     Compression fracture     Medication monitoring encounter     Arthritis, septic (HCC)     Spondylosis of lumbar region without myelopathy or radiculopathy     Coronary artery disease involving coronary bypass graft of native heart without angina pectoris     IBS (irritable bowel syndrome)     Charcot foot due to diabetes mellitus ([facility]     Type 2 diabetes mellitus with diabetic polyneuropathy, with long-term current use of insulin (HCC)     Hyperlipidemia     Vitamin D deficiency     Drug-induced constipation     Inflammatory spondylopathy of lumbosacral region ([facility]     Chronic, continuous use of opioids

## 2020-08-31 ENCOUNTER — HOSPITAL ENCOUNTER (OUTPATIENT)
Dept: PAIN MANAGEMENT | Age: 75
Discharge: HOME | End: 2020-08-31
Payer: MEDICARE

## 2020-08-31 PROCEDURE — 99213 OFFICE O/P EST LOW 20 MIN: CPT | Performed by: NURSE PRACTITIONER

## 2020-08-31 PROCEDURE — 99213 OFFICE O/P EST LOW 20 MIN: CPT

## 2020-08-31 RX ORDER — FUROSEMIDE 40 MG/1
TABLET ORAL
Qty: 60 TABLET | Refills: 0 | Status: SHIPPED | OUTPATIENT
Start: 2020-08-31 | End: 2020-09-08

## 2020-08-31 RX ORDER — TOPIRAMATE 50 MG/1
50 TABLET, FILM COATED ORAL 2 TIMES DAILY
Qty: 60 TABLET | Refills: 3 | Status: SHIPPED | OUTPATIENT
Start: 2020-08-31 | End: 2020-12-17 | Stop reason: SDUPTHER

## 2020-08-31 RX ORDER — FENTANYL 25 UG/H
1 PATCH TRANSDERMAL
Qty: 10 PATCH | Refills: 0 | Status: SHIPPED | OUTPATIENT
Start: 2020-09-01 | End: 2020-09-30 | Stop reason: SDUPTHER

## 2020-08-31 ASSESSMENT — ENCOUNTER SYMPTOMS
COUGH: 0
SHORTNESS OF BREATH: 0
CONSTIPATION: 0
BACK PAIN: 1

## 2020-08-31 NOTE — PROGRESS NOTES
Patient completed a video visit today to review medication contract. Chief Complaint: back pain    [facility]    Pt reports low back pain for over 20 years after a compression fracture he suffered after pulling a post out of the ground. He also suffers from diabetic neuropathy with bilat Charcot's foot. He had tingling and numbness to hand and feet. He has had foot surgery in the past, but no surgery for his back pain. He has tried lumber epidurals with little noted relief. He is opioid dependant for pain control. We prescribe Fentanyl q72 hours and percocet that he uses sparingly. He is also taking topamax for the neuropathic pain. His pain is unchanged from previous visit. Back Pain   This is a chronic problem. The current episode started more than 1 year ago. The problem occurs constantly. The problem is unchanged. The pain is present in the lumbar spine. The quality of the pain is described as aching. Radiates to: down both legs to feet. The pain is at a severity of 5/10. The pain is moderate. The symptoms are aggravated by position, standing and sitting (walking). Associated symptoms include numbness, paresthesias and tingling. Pertinent negatives include no chest pain or fever. He has tried analgesics and bed rest for the symptoms. The treatment provided mild relief. Patient denies any new neurological symptoms. No bowel or bladder incontinence, no weakness, and no falling. Pill count: appropriate due 9/1    Morphine equivalent: 60    Periodic Controlled Substance Monitoring: Possible medication side effects, risk of tolerance/dependence & alternative treatments discussed., No signs of potential drug abuse or diversion identified., Obtaining appropriate analgesic effect of treatment.  Jacky Posey, APRN - CNP)      Past Medical History:   Diagnosis Date    Abdominal pain     Benign prostatic hypertrophy     C. difficile colitis     CAD (coronary artery disease) 1/3/12    s/p CABGx3 insecurity     Worry: Not on file     Inability: Not on file    Transportation needs     Medical: Not on file     Non-medical: Not on file   Tobacco Use    Smoking status: Former Smoker     Packs/day: 1.00     Years: 30.00     Pack years: 30.00     Types: Cigarettes     Last attempt to quit: 2002     Years since quittin.5    Smokeless tobacco: Never Used   Substance and Sexual Activity    Alcohol use: Not Currently     Comment: rare    Drug use: No    Sexual activity: Never   Lifestyle    Physical activity     Days per week: Not on file     Minutes per session: Not on file    Stress: Not on file   Relationships    Social connections     Talks on phone: Not on file     Gets together: Not on file     Attends Zoroastrian service: Not on file     Active member of club or organization: Not on file     Attends meetings of clubs or organizations: Not on file     Relationship status: Not on file    Intimate partner violence     Fear of current or ex partner: Not on file     Emotionally abused: Not on file     Physically abused: Not on file     Forced sexual activity: Not on file   Other Topics Concern    Not on file   Social History Narrative    Not on file       Review of Systems:  Review of Systems   Constitution: Negative for chills and fever. Cardiovascular: Negative for chest pain and palpitations. Respiratory: Negative for cough and shortness of breath. Musculoskeletal: Positive for back pain. Gastrointestinal: Negative for constipation. Neurological: Positive for numbness, paresthesias and tingling. Negative for disturbances in coordination and loss of balance. Physical Exam:  There were no vitals taken for this visit. Physical Exam  HENT:      Head: Normocephalic. Pulmonary:      Effort: Pulmonary effort is normal.   Neurological:      Mental Status: He is alert.    Psychiatric:         Mood and Affect: Mood normal.         Behavior: Behavior normal.         Record/Diagnostics Review:    Last alexander 8/2020 and was appropriate     Assessment:  Problem List Items Addressed This Visit     Osteoarthritis of spine with radiculopathy, lumbar region    Relevant Medications    fentaNYL ([address]) 25 MCG/HR (Start on 9/1/2020)    topiramate (TOPAMAX) 50 MG tablet    DDD (degenerative disc disease), cervical    Relevant Medications    fentaNYL (DURAGESIC) 25 MCG/HR (Start on 9/1/2020)    Facet syndrome ([address]    Relevant Medications    fentaNYL ([address]) 25 MCG/HR (Start on 9/1/2020)    Medication monitoring encounter    Relevant Medications    fentaNYL (DURAGESIC) 25 MCG/HR (Start on 9/1/2020)    Arthritis, septic ([address]    Relevant Medications    fentaNYL ([address]) 25 MCG/HR (Start on 9/1/2020)    Spondylosis of lumbar region without myelopathy or radiculopathy    Relevant Medications    fentaNYL ([address]) 25 MCG/HR (Start on 9/1/2020)    Charcot foot due to diabetes mellitus ([facility]    Relevant Medications    fentaNYL (DURAGESIC) 25 MCG/HR (Start on 9/1/2020)    topiramate (TOPAMAX) 50 MG tablet    Drug-induced constipation    Relevant Medications    fentaNYL (DURAGESIC) 25 MCG/HR (Start on 9/1/2020)      Other Visit Diagnoses     Diabetic mononeuropathy associated with diabetes mellitus due to underlying condition ([address]        Relevant Medications    fentaNYL (DURAGESIC) 25 MCG/HR (Start on 9/1/2020)    topiramate (TOPAMAX) 50 MG tablet             Treatment Plan:  Patient relates current medications are helping the pain. Patient reports taking pain medications as prescribed, denies obtaining medications from different sources and denies use of illegal drugs. Patient denies side effects from medications like nausea, vomiting, constipation or drowsiness. Patient reports current activities of daily living are possible due to medications and would like to continue them.      As always, we encourage daily stretching and strengthening exercises, and recommend minimizing use of pain medications unless patient cannot get through daily activities due to pain. Contract requirements met. Continue opioid therapy. Script written for fentanyl  Follow up appointment made for 4 weeks    Eugene Antoine is a 77 y.o. male being evaluated by a Virtual Visit (video visit) encounter to address concerns as mentioned above. A caregiver was present when appropriate. Due to this being a TeleHealth encounter (During [facility] public [facility] emergency), evaluation of the following organ systems was limited: Vitals/Constitutional/EENT/Resp/CV/GI//MS/Neuro/Skin/Heme-Lymph-Imm. Pursuant to the emergency declaration under the [address] [place] [address], [address] [place] [address] authority and the [employer] Act, this Virtual Visit was conducted with patient's (and/or legal guardian's) consent, to reduce the patient's risk of exposure to COVID-19 and provide necessary medical care. The patient (and/or legal guardian) has also been advised to contact this office for worsening conditions or problems, and seek emergency medical treatment and/or call 911 if deemed necessary. Services were provided through a video synchronous discussion virtually to substitute for in-person clinic visit. Patient and provider were located at their individual homes. --TACOS Gutiérrez CNP on 8/31/2020 at 9:39 AM    An electronic signature was used to authenticate this note.

## 2020-09-08 RX ORDER — FUROSEMIDE 40 MG/1
TABLET ORAL
Qty: 60 TABLET | Refills: 3 | Status: SHIPPED | OUTPATIENT
Start: 2020-09-08 | End: 2021-01-27

## 2020-09-30 ENCOUNTER — HOSPITAL ENCOUNTER (OUTPATIENT)
Dept: PAIN MANAGEMENT | Age: 75
Discharge: HOME | End: 2020-09-30
Payer: MEDICARE

## 2020-09-30 PROCEDURE — 99213 OFFICE O/P EST LOW 20 MIN: CPT | Performed by: NURSE PRACTITIONER

## 2020-09-30 PROCEDURE — 99213 OFFICE O/P EST LOW 20 MIN: CPT

## 2020-09-30 RX ORDER — FENTANYL 25 UG/H
1 PATCH TRANSDERMAL
Qty: 10 PATCH | Refills: 0 | Status: SHIPPED | OUTPATIENT
Start: 2020-10-01 | End: 2020-10-28 | Stop reason: SDUPTHER

## 2020-09-30 ASSESSMENT — ENCOUNTER SYMPTOMS
RESPIRATORY NEGATIVE: 1
EYES NEGATIVE: 1
BACK PAIN: 1
GASTROINTESTINAL NEGATIVE: 1

## 2020-09-30 NOTE — PROGRESS NOTES
[facility] PROGRESS NOTE      Patient  Video visit to  review Medication Agreement    Chief Complaint:  Low back pain    He c/o low back pain radiating down both legs and feet, Pain is unchanged. He has no history of lumbar surgery, he states fell last week, his blood sugar dropped to 50 range, He states passed out for a few seconds. He states twisted his  left knee and ankle and hurt left shoulder, He did not go to the Ed. The pain is decreasing. His sleep is good. He is ambulating with a cane. He wears a brace that he bought  on his left ankle and left knee. Back Pain   This is a chronic problem. The problem occurs constantly. The problem is unchanged. The pain is present in the lumbar spine. The quality of the pain is described as aching. Radiates to: legs. The pain is at a severity of 5/10. The pain is moderate. The pain is the same all the time. Exacerbated by: nothing. Associated symptoms include numbness. He has tried analgesics and heat for the symptoms. Patient denies any new neurological symptoms. No bowel or bladder incontinence, no weakness, and no falling. Any new diagnostic workup: [x] no  [] yes [] Xray [] CT scan [] MRI [] DEXA scan     [] Other                    Treatment goals:  Functional status: walk more      Aberrancy:   Any alcoholic beverages    no   Any illegal drugs no        Analgesia:5      Adverse  Effects :none    ADL;s :yard work        Pill count: appropriate fill date     Morphine equivalent dose as reported on OARRS:60 has narcan at home  Periodic Controlled Substance Monitoring: Possible medication side effects, risk of tolerance/dependence & alternative treatments discussed., No signs of potential drug abuse or diversion identified. , Assessed functional status., Obtaining appropriate analgesic effect of treatment. Bello Gamboa, APRN - CNP)  Review ofOARRS does not show any aberrant prescription behavior.  Medication is helping the patient stay active. Patient denies any side effects and reports adequate analgesia. No sign of misuse/abuse. When was thelast UDS:   8-2-2020          Was the UDS appropriate:yes      Record/Diagnostics Review:      As above, I did review the imaging    8/6/2020  7:00 PM - Jaydne, [facility] Incoming Lab Results From "[employer]     Component  Value  Ref Range & Units  Status  Collected  Lab    Pain Management Drug Panel Interp, Urine  Consistent   Final  08/02/2020  9:53 AM  [facility]    (NOTE)   ________________________________________________________________   DRUGS EXPECTED:   FENTANYL   OXYCODONE   ________________________________________________________________   CONSISTENT with medications provided:   FENTANYL: based on fentanyl, norfentanyl   OXYCODONE: based on oxycodone   ________________________________________________________________   INTERPRETIVE INFORMATION: [facility] [place], [place] Spec/EMIT, Ur,                            Interp   Interpretation depends on accuracy and completeness of patient   medication information submitted by client.             Past Medical History:   Diagnosis Date    Abdominal pain     Benign prostatic hypertrophy     C. difficile colitis     CAD (coronary artery disease) 1/3/12    s/p CABGx3    Colon polyp 02/25/2019    tubular adenoma    Constipation     Diabetes mellitus ([address]     Diarrhea     Diarrhea     DVT (deep venous thrombosis) ([facility])     left calf    Ejection fraction < 50%     Gallstones     Heartburn     Hx of blood clots     Hyperlipidemia     Kidney stones     MI (myocardial infarction) ([address]     2011    Mitral regurgitation     MRSA (methicillin resistant staph aureus) culture positive     Numbness and tingling     hands and feet    Rib fractures 1-2015    right    Wears glasses     readers       Past Surgical History:   Procedure Laterality Date    APPENDECTOMY      CARDIAC CATHETERIZATION  12/29/11    CHOLECYSTECTOMY      COLONOSCOPY  01/11/2012    wnl, 10 UNITS CAPS capsule, Take 2,000 Units by mouth daily , Disp: , Rfl:     Ascorbic Acid (VITAMIN C) 500 MG tablet, Take 1,000 mg by mouth daily , Disp: , Rfl:     finasteride (PROSCAR) 5 MG tablet, Take 5 mg by mouth daily. , Disp: , Rfl:     tamsulosin (FLOMAX) 0.4 MG capsule, Take 0.4 mg by mouth daily. , Disp: , Rfl:     Insulin Pen Needle (BD PEN NEEDLE ALIZE U/F) 32G X 4 MM MISC, use twice daily as directed, Disp: 300 each, Rfl: 1    naloxone (NARCAN) 4 MG/0.1ML LIQD nasal spray, 1 spray by Nasal route as needed (if needed for respiratory depression), Disp: 1 each, Rfl: 0    blood glucose test strips (FREESTYLE LITE) strip, TEST TWICE DAILY AS DIRECTED, Disp: 100 strip, Rfl: 1    NITROSTAT 0.4 MG SL tablet, , Disp: , Rfl:     Family History   Problem Relation Age of Onset    Heart Failure Father     Cancer Mother         breast    Cancer Maternal Grandfather        Social History     Socioeconomic History    Marital status:       Spouse name: Not on file    Number of children: Not on file    Years of education: Not on file    Highest education level: Not on file   Occupational History    Occupation: retired [employer]   Social Needs    Financial resource strain: Not on file    Food insecurity     Worry: Not on file     Inability: Not on file   [employer] needs     Medical: Not on file     Non-medical: Not on file   Tobacco Use    Smoking status: Former Smoker     Packs/day: 1.00     Years: 30.00     Pack years: 30.00     Types: Cigarettes     Last attempt to quit: 2002     Years since quittin.6    Smokeless tobacco: Never Used   Substance and Sexual Activity    Alcohol use: Not Currently     Comment: rare    Drug use: No    Sexual activity: Never   Lifestyle    Physical activity     Days per week: Not on file     Minutes per session: Not on file    Stress: Not on file   Relationships    Social connections     Talks on phone: Not on file     Gets together: Not on file Drug-induced constipation    Relevant Medications    fentaNYL (DURAGESIC) 25 MCG/HR (Start on 10/1/2020)    Diabetic mononeuropathy associated with diabetes mellitus due to underlying condition ([address]    Relevant Medications    fentaNYL ([address]) 25 MCG/HR (Start on 10/1/2020)    DDD (degenerative disc disease), lumbar    Relevant Medications    fentaNYL (DURAGESIC) 25 MCG/HR (Start on 10/1/2020)    DDD (degenerative disc disease), cervical    Relevant Medications    fentaNYL (DURAGESIC) 25 MCG/HR (Start on 10/1/2020)    Chronic, continuous use of opioids    Charcot foot due to diabetes mellitus ([address]    Relevant Medications    fentaNYL (DURAGESIC) 25 MCG/HR (Start on 10/1/2020)    Arthritis, septic ([address]    Relevant Medications    fentaNYL (DURAGESIC) 25 MCG/HR (Start on 10/1/2020)          Treatment Plan:  DISCUSSION: Treatment options discussed withpatient and all questions answered to patient's satisfaction. Possible side effects, risk of tolerance and or dependence and alternative treatments discussed    Obtaining appropriate analgesic effect of treatment   No signs of potential drug abuse or diversion identified    [x] Ill effects of being on chronic pain medications such as sleep disturbances, respiratory depression, hormonal changes, withdrawal symptoms, chronic opioid dependence and tolerance as well as risk of taking opioids with Benzodiazepines and taking opioids along with alcohol,  werediscussed with patient. I had asked the patient to minimize medication use and utilize pain medications only for uncontrolled rest pain or pain with exertional activities. I advised patient not to self-escalate painmedications without consulting with us. At each of patient's future visits we will try to taper pain medications, while adjusting the adjunct medications, and re-evaluating for Physical Therapy to improve spinal andjoint strength.  We will continue to have discussions to decrease pain medications as tolerated. Counseled patient on effects their pain medication and /or their medical condition mayhave on their  ability to drive or operate machinery. Instructed not to drive or operate machinery if drowsy     I also discussed with the patient regarding the dangers of combining narcotic pain medication with tranquilizers, alcohol or illegal drugs or taking the medication any way other than prescribed. The dangers were discussed  including respiratory depression and death. Patient was told to tell  all  physicians regarding the medications he is getting from pain clinic. Patient is warned not to take any unprescribed medications over-the-countermedications that can depress breathing . Patient is advised to talk to the pharmacist or physicians if planning to take any over-the-counter medications before  takeing them. Patient is strongly advised to avoid tranquilizers or  relaxants, illegal drugs  or any medications that can depress breathing  Patient is also advised to tell us if there is any changes in their medications from other physicians.     Location:  patient  at  home   ,provider working from home    TREATMENT OPTIONS:       Medication Agreement Requirements Met  Continue Opioid therapy  Script written for fentanyl patch  Follow up appointment made

## 2020-10-22 ENCOUNTER — OFFICE VISIT (OUTPATIENT)
Dept: PODIATRY | Age: 75
End: 2020-10-22
Payer: MEDICARE

## 2020-10-22 VITALS — HEIGHT: 72 IN | BODY MASS INDEX: 29.93 KG/M2 | WEIGHT: 221 LBS

## 2020-10-22 PROCEDURE — 99213 OFFICE O/P EST LOW 20 MIN: CPT | Performed by: PODIATRIST

## 2020-10-22 PROCEDURE — 3044F HG A1C LEVEL LT 7.0%: CPT | Performed by: PODIATRIST

## 2020-10-22 PROCEDURE — 11721 DEBRIDE NAIL 6 OR MORE: CPT | Performed by: PODIATRIST

## 2020-10-22 PROCEDURE — L4360 PNEUMAT WALKING BOOT PRE CST: HCPCS | Performed by: PODIATRIST

## 2020-10-22 PROCEDURE — 4040F PNEUMOC VAC/ADMIN/RCVD: CPT | Performed by: PODIATRIST

## 2020-10-22 PROCEDURE — G8428 CUR MEDS NOT DOCUMENT: HCPCS | Performed by: PODIATRIST

## 2020-10-22 PROCEDURE — 1036F TOBACCO NON-USER: CPT | Performed by: PODIATRIST

## 2020-10-22 PROCEDURE — G8484 FLU IMMUNIZE NO ADMIN: HCPCS | Performed by: PODIATRIST

## 2020-10-22 PROCEDURE — G8417 CALC BMI ABV UP PARAM F/U: HCPCS | Performed by: PODIATRIST

## 2020-10-22 PROCEDURE — 3017F COLORECTAL CA SCREEN DOC REV: CPT | Performed by: PODIATRIST

## 2020-10-22 PROCEDURE — 2022F DILAT RTA XM EVC RTNOPTHY: CPT | Performed by: PODIATRIST

## 2020-10-22 PROCEDURE — 1123F ACP DISCUSS/DSCN MKR DOCD: CPT | Performed by: PODIATRIST

## 2020-10-22 ASSESSMENT — ENCOUNTER SYMPTOMS
NAUSEA: 0
COLOR CHANGE: 0
CONSTIPATION: 0
DIARRHEA: 0
VOMITING: 0

## 2020-10-22 NOTE — PROGRESS NOTES
[facility]  Return Patient  Chief Complaint   Patient presents with    Nail Problem     b/l nail trim/ last seen Dr. Castillo Head 7/28/20    Ankle Pain     left ankle        Yoan Adrian is a 77y.o. year old male who is here for a diabetic foot check and nail trim. He would like his nails trimmed today. History of surgery type: Plantar planing medial and lateral foot. Vital signs are stable. Pain level is 0. Patient denies N/V/F/C. Patient was compliant with postoperative instructions. He is in a diabetic shoe    He also relates falling 2 weeks ago, at home. BG went low. Injured his left ankle, could not put weight on it immediately, but has been walking on it since. Using a cane, used a brace for a few days this helped. Has edema. Review of Systems   Constitutional: Negative for chills, diaphoresis, fatigue, fever and unexpected weight change. Cardiovascular: Negative for leg swelling. Gastrointestinal: Negative for constipation, diarrhea, nausea and vomiting. Musculoskeletal: Positive for gait problem. Negative for arthralgias and joint swelling. Skin: Negative for color change, pallor, rash and wound. Neurological: Positive for numbness. Negative for weakness. Vascular: DP and PT pulses palpable 2/4, Right Foot and 2/4 on the Left Foot. CFT <3 seconds, Right Foot and <3 seconds on the Left Foot. Edema is absent,  Right Foot and minimal on the Left Foot. Neurological:   Sensation absent  to light touch to level of digits, both feet. Musculoskeletal:   Muscle strength is 5/5 on the Right Foot and 5/5 on the Left Foot. Structural deformities are present on the Right Foot and present on the Left Foot, but improved  Flat medial arch left foot. Pain left ankle, more over the distal fibula. Edema present, no erythema, no bruising, pain with rom of ankle. Muscle strength inhibited from guarding left. Integument:  Warm, dry, supple both feet.   Infection is absent. No odor. No macerated. Sites of Onychomycosis Involvement (Check affected area)  [x] [x] [x] [x] [x] [x] [x] [x] [x] [x]  5 4 3 2 1 1 2 3 4 5                          Right                                        Left    Thickness  [x] [x] [x] [x] [x] [x] [x] [x] [x] [x]  5 4 3 2 1 1 2 3 4 5                         Right                                        Left    Dystrophic Changes                                                                 [x] [x] [x] [x] [x] [x] [x] [x] [x] [x]  5 4 3 2 1 1 2 3 4 5                         Right                                        Left    Color                                                                  [x] [x] [x] [x] [x] [x] [x] [x] [x] [x]  5 4 3 2 1 1 2 3 4 5                          Right                                        Left    Incurvation/Ingrowin                                                                   [] [] [] [] [x] [] [] [] [] []  5 4 3 2 1 1 2 3 4 5                         Right                                        Left    Inflammation/Pain                                                                   [] [] [] [] [] [] [] [] [] []  5 4 3 2 1 1 2 3 4 5                         Right                                        Left    Radiographs: 3 views left Ankle: Three weightbearing views (AP, Mortise, and Lateral) of the left ankle  were obtained in the office today and reviewed, revealing minimally displaced, spiral oblique fracture of the distal fibula, no dislocation, or radioopaque foreign body/tumor. The ankle mortise is maintained with minimal widening of the medial clear spaces. Overall alignment is satisfactory. Assesment :     Diagnosis Orders   1. Onychomycosis  DE DEBRIDEMENT OF NAILS, 6 OR MORE   2. Type 2 diabetes mellitus with diabetic polyneuropathy, with long-term current use of insulin (HCC)  DE DEBRIDEMENT OF NAILS, 6 OR MORE   3. Charcot's joint of left foot  XR ANKLE LEFT (MIN 3 VIEWS)   4.  Foot deformity, unspecified laterality     5. Closed torus fracture of distal end of left fibula, initial encounter  HI PNEUMAT WALKING BOOT PRE CST         Plan: Pt was evaluated and examined. Patient was given personalized discharge instructions. Nails 1-10 were debrided sharply in length and thickness with a nipper and , without incident. Pt will follow up in 9 weeks or sooner if any problems arise. Diagnosis was discussed with the pt and all of their questions were answered in detail. Proper foot hygiene and care was discussed with the pt. Informed patient on proper diabetic foot care and importance of tight glycemic control. Patient to check feet daily and contact the office with any questions/problems/concerns. Other comorbidity noted and will be managed by PCP. Diabetic foot examination performed this visit. The exam included neurological sensory exam, a 10-g monofilament and pinprick sensation, vibration using a 128-Hz tuning fork, ankle reflexes, visual skin inspection, vascular exam including assessment of pedal pulses, orthopedic exam for deformities, and shoe inspection. Increased risk factors noted on the diabetic foot exam include decreased sensory exam and peripheral neuropathy. Shoegear inspected and found to be appropriate size and wear. Diabetic shoes and insoles: This patient would benefit from extra depth diabetic shoes and custom inserts. I feel he/she qualifies due to the above performed physical exam and diagnosis. I will do the appropriate paperwork and send it to their primary care physician. Then the patient will be measured for shoes and custom inserts to properly off load and protect his/her feet. Limit walking    I have dispensed a pneumatic equalizer walker. Due to the patient's diagnosis and related symptoms this is medically necessary for the treatment.  The function of this device is to restrict and limit motion and provide stabilization and compression to the affected area. This device will allow the patient to slowly increase strength and ROM of the extremity. Specific instructions on weight bearing and ROM exercises were discussed and given to the patient. The goals and function of this device was explained in detail to the patient. Upon gait analysis, the device appeared to be fitting well and the patient states that the device is comfortable at this time. The patient was shown how to properly apply, wear, and care for the device. The patient was able to apply properly and ambulate without distress. At that time, the device was  dispensed, it was suitable for the patient's condition and was not substandard. No guarantees were given and the precautions were reviewed. Written instructions and warranty information was given along with the list of the twenty-one (21) Durable Medical Equipment Supplier Guidelines. All the questions were answered satisfactorily    No orders of the defined types were placed in this encounter. Follow up 9 weeks.

## 2020-10-25 ASSESSMENT — ENCOUNTER SYMPTOMS
NAUSEA: 0
VOMITING: 0
BOWEL INCONTINENCE: 0
SHORTNESS OF BREATH: 0
BACK PAIN: 1
WHEEZING: 0
ABDOMINAL PAIN: 0
PHOTOPHOBIA: 0
CONSTIPATION: 0

## 2020-10-26 NOTE — PROGRESS NOTES
Jess Carsuo is a 77 y.o. male evaluated on 10/28/2020. Modality of virtual service provided -via Video/audio  Consent:  Patient and/or health care decision maker is aware that that patient may receive a bill for this telephone service, depending on one's insurance coverage, and has provided verbal consent to proceed: Yes    Patient identification was verified at the start of the visit: Yes    Chief complaint: Jess Caruso is 77 y.o.,  male, with chief complaint of back pain . Patient is complaining of pain involving the low back with pain radiating to both lower extremities to the feet bilaterally. Patient has history of diabetes mellitus with peripheral neuropathy along with Charcot's foot. He reports his feet are hurting especially with activity. Overall he reports not much change in his pain. He is trying to be active at home and trying to exercise as much as he can. He previously had undergone lumbar epidural steroid injections which helped him only for short period of time. Fracture during fracture to the left fibula above the ankle area following a fall-has a walking boot being followed by Dr. Sukhdeep Barrera  Back pain is worse due to the way he is walking the knee is stable. Back Pain   This is a chronic problem. The current episode started more than 1 year ago. The problem occurs constantly. The problem is unchanged. The pain is present in the lumbar spine and sacro-iliac. The quality of the pain is described as aching. The pain radiates to the right thigh and left thigh. The pain is at a severity of 7/10 (5-8). The pain is moderate. The pain is the same all the time. The symptoms are aggravated by standing, bending, position, twisting and sitting (Walking, lifting and ADLs). Stiffness is present in the morning. Associated symptoms include numbness, tingling and weakness.  Pertinent negatives include no abdominal pain, bladder incontinence, bowel incontinence, chest pain, dysuria or leg pain. Risk factors include lack of exercise, obesity and sedentary lifestyle. Alleviating factors:heat and rest   Lifestyle changes experienced with pain: Wakes from sleep, Prevents or limits ADLs, Increases w/activity. Increases w/prolonged sitting/standing/walking  Mood changes,depressed  Patient currently unemployed. Physical therapy did not help the pain. Are you under psychological counseling at present: Yes  Goals for treatment include:  Decrease in pain  Enjoy daily and recreational activities, return to previous status. Patient relates current medications are helping the pain. Patient reports taking pain medications as prescribed, denies obtaining medications from different sources and denies use of illegal drugs. Patient denies side effects from medications like nausea, vomiting, constipation or drowsiness. Patient reports current activities of daily living ar possible due to medications and would like to continue them. ACTIVITY/SOCIAL/EMOTIONAL:  Sleep Pattern: 4 hours per night. nightime awakenings  Home Exercises: daily none  Activity:not significantly changed  Emotional Issues: Feels depressed.    Currently seeing a Psychiatrist or Psychologist:  No     ADVERSE MEDICATION EFFECTS:   Nausea and vomiting: no   Constipation: noUndercontrol-: no  Dizziness/drowsy/sleepy--no  Urinary Retention: no    ABERRANT BEHAVIORS SINCE LAST VISIT  Lost rx/pills:------------------------------------------ yes  Taking  medication as prescribed: ----------- yes  Urine Drug Screen ---------------------------------  yes  Recent ER visits: -------------------------------------No  Pill count is appropriate: ---------------------------yes   Refills for prescriptions appropriate:---------- yes      Past Medical History:   Diagnosis Date    Abdominal pain     Benign prostatic hypertrophy     C. difficile colitis     CAD (coronary artery disease) 1/3/12    s/p CABGx3    Colon polyp 02/25/2019 tubular adenoma    Constipation     Diabetes mellitus ([facility]     Diarrhea     Diarrhea     DVT (deep venous thrombosis) (HCC)     left calf    Ejection fraction < 50%     Gallstones     Heartburn     Hx of blood clots     Hyperlipidemia     Kidney stones     MI (myocardial infarction) ([facility]     2011    Mitral regurgitation     MRSA (methicillin resistant staph aureus) culture positive     Numbness and tingling     hands and feet    Rib fractures 1-2015    right    Wears glasses     readers       Past Surgical History:   Procedure Laterality Date    APPENDECTOMY      CARDIAC CATHETERIZATION  12/29/11    CHOLECYSTECTOMY      COLONOSCOPY  01/11/2012    wnl, 10 yr recall    COLONOSCOPY  11/28/2018    ATTEMPTED NOT CLEAN (N/A )    COLONOSCOPY  02/25/2019    tubular adenoma    COLONOSCOPY N/A 2/25/2019    COLONOSCOPY WITH BIOPSY performed by Jean[facility]Claude Tavarez [facility] at [facility]      x 1    CORONARY ARTERY BYPASS GRAFT  1/3/12    x3    KNEE ARTHROSCOPY      left    KNEE SURGERY Left     I&D    OTHER SURGICAL HISTORY Left 3/7/2016    plantar plane &  exostectomy medial foot left    ND COLON CA SCRN NOT HI RSK IND N/A 11/28/2018    COLONOSCOPY ATTEMPTED NOT CLEAN performed by Jean-Claude Tavarez MD at [facility] ENDOSCOPY  11-3-15    VENA CAVA FILTER PLACEMENT      WRIST SURGERY      I&D       Family History   Problem Relation Age of Onset    Heart Failure Father     Cancer Mother         breast    Cancer Maternal Grandfather        Social History     Socioeconomic History    Marital status:       Spouse name: None    Number of children: None    Years of education: None    Highest education level: None   Occupational History    Occupation: retired [employer]   Social Needs    Financial resource strain: None    Food insecurity     Worry: None     Inability: None    Transportation needs     Medical: None depression) 1 each 0    blood glucose test strips (FREESTYLE LITE) strip TEST TWICE DAILY AS DIRECTED 100 strip 1    Probiotic Product (PROBIOTIC-10 PO) Take by mouth      aspirin 81 MG tablet Take 81 mg by mouth daily.  Vitamin D (CHOLECALCIFEROL) 1000 UNITS CAPS capsule Take 2,000 Units by mouth daily       Ascorbic Acid (VITAMIN C) 500 MG tablet Take 1,000 mg by mouth daily       NITROSTAT 0.4 MG SL tablet       finasteride (PROSCAR) 5 MG tablet Take 5 mg by mouth daily.  tamsulosin (FLOMAX) 0.4 MG capsule Take 0.4 mg by mouth daily. No current facility-administered medications on file prior to encounter. Review of Systems   Constitutional: Negative for activity change, appetite change, chills and unexpected weight change. HENT: Negative for congestion, hearing loss and sore throat. Eyes: Negative for photophobia, pain and visual disturbance. Respiratory: Negative for shortness of breath and wheezing. Cardiovascular: Negative for chest pain and palpitations. Gastrointestinal: Negative for abdominal pain, bowel incontinence, constipation, nausea and vomiting. Endocrine: Negative for cold intolerance and polyuria. H/of diabetes mellitus with peripheral neuropathy and Charcot's foot   Genitourinary: Negative for bladder incontinence, dysuria and hematuria. Musculoskeletal: Positive for arthralgias and back pain. Skin: Negative for rash and wound. Allergic/Immunologic: Negative for immunocompromised state. Neurological: Positive for tingling, weakness and numbness. Hematological: Negative. Psychiatric/Behavioral: Negative for self-injury, sleep disturbance and suicidal ideas. The patient is not nervous/anxious.          Physical Exam       DATA:  LAB.:  8/6/2020  7:00 PM - [place] [place] Incoming Lab Results From [facility]     Component  Value  Ref Range & Units  Status  Collected  Lab    Pain Management Drug Panel Interp, Urine  Consistent   Final 08/02/2020  9:53 AM  [facility]    (NOTE)   ________________________________________________________________   DRUGS EXPECTED:   FENTANYL   OXYCODONE   ________________________________________________________________   [id number] with medications provided:   FENTANYL: based on fentanyl, norfentanyl   OXYCODONE: based on oxycodone        X-Ray reports:  Procedure: Mayda Inman STANDARD    CDX  01/03/2015     [id number]    Reason for Exam:  ^fall/ pain         FULL RESULT:   LUMBAR SPINE STANDARD         INDICATION:    fall/ pain.             COMPARISON:    12/27/2007         FINDINGS:    AP, crosstable lateral and cone-down lumbosacral views obtained.         There is multilevel facet arthropathy and anterior spurring. There is    redemonstration of mild to moderate wedge compression deformity of L1    vertebral body. No acute fracture is evident. No paraspinal mass. SI    joints show degenerative changes. Cholecystectomy noted.                   IMPRESSION:        Old L1 compression. No acute osseous abnormality.         Report electronically signed by Laine Chua M.D. on 1/3/2015        Procedure: Sondra Jorgensen  [place]  01/26/2015     [id number]    Reason for Exam:  ^RT SHOULDER - RCT, PAIN         FULL RESULT:   EXAM:  Right shoulder MRI without contrast dated 1/26/2015.         HISTORY:  Right shoulder pain and decreased range of motion since fall 3    weeks ago. Concern for rotator cuff tear.         COMPARISON:  Right shoulder radiographs dated 1/3/2015.         TECHNIQUE:  Multiplanar, multisequence MR images of the right shoulder    are obtained.         FINDINGS:  Osseous structures are in anatomic alignment. Bone marrow    signal is normal on all sequences. There is no evidence for Hill-Sachs    deformity of the humeral head.  No bony Bankart injury of the anterior    inferior glenoid identified.  Mild hypertrophic degenerative changes of    the right acromioclavicular joint.  There is no evidence for os    acromialis.  Sagittal images show a type I acromion.         There is a small partial-thickness tear on the bursal side of the    anterior right supraspinatus tendon. The tear measures 7 mm    anteroposteriorly, 10 mm mediolaterally and involves less than 50% of the    tendon thickness. Small amount of fluid focally accumulated in the    subacromial subdeltoid bursa superficial to the site of the tear. Tendinosis of infraspinatus and subscapularis. No full-thickness tear of    the rotator cuff tendons of the right shoulder.         Mild tendinosis of intra-articular long head of the biceps tendon. Extra-articular long head of the biceps tendon has normal signal, caliber    and course in the bicipital groove. Small amount of fluid in the biceps    tendon sheath         Coracohumeral ligament is intact. Hyaline cartilage is preserved on both    sides of the glenohumeral joint.         No periarticular soft tissue masses or abnormal fluid collections seen.      No focal solid or cystic lesions seen in the spinoglenoid or    suprascapular notches.  Quadrilateral space is unremarkable.                   IMPRESSION:           1. Small partial-thickness tear on the bursal side of the anterior right    supraspinatus tendon involves less than 50% of the tendon thickness.         2. Small amount of fluid focally accumulated in the subacromial    subdeltoid bursa superficial to the site of the tear.         3. Tendinosis of the right infraspinatus and subscapularis. No    full-thickness tear of the rotator cuff tendons of the right shoulder.         4. Mild tendinosis of the intra-articular long head of the biceps tendon. Small amount of fluid in the biceps tendon sheath.         Report electronically signed by Willard Stoddard M.D. on 1/26/2015 10:05       Clinical  impression:  1. DDD (degenerative disc disease), lumbar    2. Spondylosis of lumbar region without myelopathy or radiculopathy    3.  Osteoarthritis of spine with radiculopathy, lumbar region    4. Spinal stenosis of lumbar region with neurogenic claudication    5. DDD (degenerative disc disease), cervical    6. Diabetic peripheral neuropathy associated with type 2 diabetes mellitus ([address]    7. Charcot foot due to diabetes mellitus ([facility]    8. Encounter for chronic pain management    9. Medication monitoring encounter    10. Diabetic mononeuropathy associated with diabetes mellitus due to underlying condition ([address]    11. Facet syndrome    12. Drug-induced constipation    13. 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 Orders Placed This Encounter   Medications    fentaNYL (DURAGESIC) 25 MCG/HR     Sig: Place 1 patch onto the skin every 72 hours for 30 days. Dispense:  10 patch     Refill:  0     Reduce doses taken as pain becomes manageable     Urine drug screens have been appropriate. No aberrant activity noted. Analgesia is achieved. Activities of daily living are possible because of medications. Safe use of medications explained to patient. PDMP Monitoring:    Last PDMP Stephany Moses as Reviewed [facility]):  Review User Review Instant Review Result   Stacie Smith 10/28/2020  9:01 AM Reviewed PDMP [1]     Counselling/Preventive measures for pain  Control:    [x]  Spine strengthening exercises are discussed with patient in detail. [x] Ill effects of being on chronic pain medications such as sleep disturbances, hormonal changes, withdrawal symptoms,  chronic opioid dependence and tolerance were discussed with patient. I had asked the patient to minimize medication use and utilize pain medications only for uncontrolled rest pain or pain with exertional activities. I advised patient not to self escalate pain medications without consulting with us.   At each of patient's future visits we will try to taper pain medications, while adjusting the adjunct medications, and re-evaluating for Physical Therapy to improve spinal and joint strength. We will continue to have discussions to decrease pain medications as tolerated. I also discussed with the patient regarding the dangers of combining narcotic pain medication with tranquilizers, alcohol or illegal drugs or taking the medication any other than prescribed. The dangers including the respiratory depression and death. Patient was told to tell  to all  physicians regarding the medications he is getting from pain clinic. Patient is warned not to take any unprescribed medications over-the-counter medications that can depress breathing . Patient is advised to talk to the pharmacist or physicians if planning to take any over-the-counter medications before  takeing them. Patient is strongly advised to avoid tranquilizers or  Relaxants for any medications that can depress breathing or recreational drugs. Patient is also advised to tell us if there is any changes in his medications from other physicians. We discussed the same at today's visit and have not been to implement it, as the patient's pain is not under control with current medications. Patient's vitamin D levels were low in the past.  He is advised to talk to his primary care physician regarding checking the vitamin D. He is advised to supplement vitamin D orally especially in view of his fracture can talk to his primary care physician regarding further management of his vitamin D and possibly checking for osteoporosis. Decision Making Process : Patient's health history and referral records thoroughly reviewed before focused physical examination and discussion with patient. I have spent 25 mins. Over 50% of today's visit is spent on examining the patient and counseling and coordinating the care. Level of complexity of date to be reviewed is Moderate. The chart date reviewed include the following: Imaging Reports. Summary of Care. Time spent reviewing with patient the below reports:   Medication safety, Treatment options. Level of diagnosis and management options of this case is multiple: involving the following management options: Interventions as needed, medication management as appropriate, future visits, activity modification, heat/ice as needed, Urine drug screen as required. [x]The patient's questions were answered to the best of my abilities. This note was created using voice recognition software. There may be inaccuracies of transcription  that are inadvertently overlooked prior to the signature. There is any questions about the transcription please contact me. Return in  4 weeks  with physician / CNP  for further plan of treatment. Due to the COVID-19 pandemic and the appropriate interventions by Leah Gandhi, our non-urgent pain management patients will not be seen in the office at this time for their protection and the protection of our staff. To offer continuity of care, their prescriptions will be escribed this month after a careful chart review and review of their OARRS report  Pursuant to the emergency declaration under the [address]  and [place] [address] waiver authority and the [employer] Act, this Virtual Visit was conducted, with patient's consent, to reduce the patient's risk of exposure to COVID-19 and provide continuity of care for an established patient. Services were provided through a video synchronous discussion virtually to substitute for in-person appointment. \"  Documentation:  I communicated with the patient and/or health care decision maker about plan of care  Details of this discussion including any medical advice provided:   Total Time: minutes: 21-30 minutes    I affirm this is a Patient Initiated Episode with an Established Patient who has not had a related appointment within my department in the past 7 days or scheduled within the next 24 hours.     Electronically signed by Valentino Argueta MD on 10/29/2020 at 5:37 AM

## 2020-10-28 ENCOUNTER — HOSPITAL ENCOUNTER (OUTPATIENT)
Dept: PAIN MANAGEMENT | Age: 75
Discharge: HOME | End: 2020-10-28
Payer: MEDICARE

## 2020-10-28 PROCEDURE — 99213 OFFICE O/P EST LOW 20 MIN: CPT

## 2020-10-28 PROCEDURE — 99214 OFFICE O/P EST MOD 30 MIN: CPT | Performed by: PAIN MEDICINE

## 2020-10-28 RX ORDER — FENTANYL 25 UG/H
1 PATCH TRANSDERMAL
Qty: 10 PATCH | Refills: 0 | Status: SHIPPED | OUTPATIENT
Start: 2020-10-31 | End: 2020-11-19 | Stop reason: SDUPTHER

## 2020-10-28 RX ORDER — INSULIN GLARGINE 100 [IU]/ML
INJECTION, SOLUTION SUBCUTANEOUS
Qty: 45 ML | Refills: 0 | Status: SHIPPED | OUTPATIENT
Start: 2020-10-28 | End: 2021-01-05

## 2020-10-29 ENCOUNTER — OFFICE VISIT (OUTPATIENT)
Dept: FAMILY MEDICINE CLINIC | Age: 75
End: 2020-10-29
Payer: MEDICARE

## 2020-10-29 VITALS
HEART RATE: 61 BPM | SYSTOLIC BLOOD PRESSURE: 110 MMHG | HEIGHT: 72 IN | WEIGHT: 219 LBS | BODY MASS INDEX: 29.66 KG/M2 | DIASTOLIC BLOOD PRESSURE: 70 MMHG | OXYGEN SATURATION: 98 %

## 2020-10-29 PROBLEM — R80.9 MICROALBUMINURIA: Status: ACTIVE | Noted: 2020-10-29

## 2020-10-29 PROBLEM — E16.2 HYPOGLYCEMIA: Status: ACTIVE | Noted: 2020-10-29

## 2020-10-29 PROBLEM — S82.832D CLOSED FRACTURE OF PROXIMAL END OF LEFT FIBULA WITH ROUTINE HEALING: Status: ACTIVE | Noted: 2020-10-29

## 2020-10-29 LAB — HBA1C MFR BLD: 6.4 %

## 2020-10-29 PROCEDURE — G8427 DOCREV CUR MEDS BY ELIG CLIN: HCPCS | Performed by: FAMILY MEDICINE

## 2020-10-29 PROCEDURE — 90694 VACC AIIV4 NO PRSRV 0.5ML IM: CPT | Performed by: FAMILY MEDICINE

## 2020-10-29 PROCEDURE — G0008 ADMIN INFLUENZA VIRUS VAC: HCPCS | Performed by: FAMILY MEDICINE

## 2020-10-29 PROCEDURE — 2022F DILAT RTA XM EVC RTNOPTHY: CPT | Performed by: FAMILY MEDICINE

## 2020-10-29 PROCEDURE — 83036 HEMOGLOBIN GLYCOSYLATED A1C: CPT | Performed by: FAMILY MEDICINE

## 2020-10-29 PROCEDURE — 1123F ACP DISCUSS/DSCN MKR DOCD: CPT | Performed by: FAMILY MEDICINE

## 2020-10-29 PROCEDURE — G8484 FLU IMMUNIZE NO ADMIN: HCPCS | Performed by: FAMILY MEDICINE

## 2020-10-29 PROCEDURE — 3044F HG A1C LEVEL LT 7.0%: CPT | Performed by: FAMILY MEDICINE

## 2020-10-29 PROCEDURE — 4040F PNEUMOC VAC/ADMIN/RCVD: CPT | Performed by: FAMILY MEDICINE

## 2020-10-29 PROCEDURE — 99214 OFFICE O/P EST MOD 30 MIN: CPT | Performed by: FAMILY MEDICINE

## 2020-10-29 PROCEDURE — G8417 CALC BMI ABV UP PARAM F/U: HCPCS | Performed by: FAMILY MEDICINE

## 2020-10-29 PROCEDURE — 3017F COLORECTAL CA SCREEN DOC REV: CPT | Performed by: FAMILY MEDICINE

## 2020-10-29 PROCEDURE — 1036F TOBACCO NON-USER: CPT | Performed by: FAMILY MEDICINE

## 2020-10-29 RX ORDER — BLOOD PRESSURE TEST KIT
KIT MISCELLANEOUS
Qty: 1 KIT | Refills: 0 | Status: SHIPPED | OUTPATIENT
Start: 2020-10-29 | End: 2021-02-25

## 2020-10-29 ASSESSMENT — ENCOUNTER SYMPTOMS
COUGH: 0
SINUS PRESSURE: 0
RHINORRHEA: 0
COLOR CHANGE: 0
ABDOMINAL PAIN: 0
CONSTIPATION: 0
SINUS PAIN: 0
SHORTNESS OF BREATH: 0
SORE THROAT: 0
BLOOD IN STOOL: 0
DIARRHEA: 0
BACK PAIN: 0
PHOTOPHOBIA: 0
ABDOMINAL DISTENTION: 0
CHEST TIGHTNESS: 0
EYE PAIN: 0
WHEEZING: 0
VOICE CHANGE: 0

## 2020-10-29 NOTE — PROGRESS NOTES
Visit Information    Have you changed or started any medications since your last visit including any over-the-counter medicines, vitamins, or herbal medicines? no   Are you having any side effects from any of your medications? -  no  Have you stopped taking any of your medications? Is so, why? -  no    Have you seen any other physician or provider since your last visit? No  Have you had any other diagnostic tests since your last visit? Yes - Records Obtained  Have you been seen in the emergency room and/or had an admission to a hospital since we last saw you? No  Have you had your routine dental cleaning in the past 6 months? no    Have you activated your [employer] account? If not, what are your barriers?  Yes     Patient Care Team:  Jose Carlos Rogers MD as PCP - General (Family Medicine)  Jose Carlos Rogers MD as PCP - [facility]  Subhash Lundberg MD as Referring Physician (Cardiology)  Akira Pal MD as Consulting Physician (Gastroenterology)  Chris Borjas MD as Consulting Physician (Internal Medicine Cardiovascular Disease)  Natalie Garrett MD as Consulting Physician (Pain Management)  Steff Hill DPM as Consulting Physician (Podiatry)  Rob Medina MD as Surgeon (Ophthalmology)    Medical History Review  Past Medical, Family, and Social History reviewed and does contribute to the patient presenting condition    Health Maintenance   Topic Date Due    DTaP/Tdap/Td vaccine (1 - Tdap) [birth year]/1965    Shingles Vaccine (1 of 2) 03/07/1996    Annual Wellness Visit (AWV)  05/29/2019    Flu vaccine (1) 09/01/2020    Lipid screen  11/07/2020    Creatinine monitoring  11/07/2020    Diabetic foot exam  01/09/2021    A1C test (Diabetic or Prediabetic)  03/05/2021    Potassium monitoring  03/05/2021    Diabetic retinal exam  03/16/2021    Diabetic microalbuminuria test  07/28/2021    Colon cancer screen colonoscopy  02/25/2029    Pneumococcal 65+ years Vaccine  Completed    AAA screen Completed    Hepatitis C screen  Addressed    Hepatitis A vaccine  Aged Out    Hib vaccine  Aged Out    Meningococcal (ACWY) vaccine  Aged Out

## 2020-10-29 NOTE — PROGRESS NOTES
Chief Complaint   Patient presents with    Hypertension    Hyperlipidemia    Diabetes     since being put on lisinopril his bllood sugars have been going up and down     Other     A1C 6.4         Pedro Pablo Talamantes  here today for follow up on chronic medical problems, go over labs and/or diagnostic studies, and medication refills. Hypertension; Hyperlipidemia; Diabetes (since being put on lisinopril his bllood sugars have been going up and down ); and Other (A1C 6.4)      HPI: Patient is here for follow-up on diabetes, A1c stable 6.4 has decreased from 6.5. Patient was complaining of hypoglycemic episodes he was advised to decrease dose of insulin by 5 units. Patient reports recently he had episode of hypoglycemia while he was working for the whole day and passed out for few seconds. Patient reports his daughter was here and she check sugars it was 56. He took some juice and recovered immediately. Patient denies any chest pain shortness of breath. Patient reports he got left tibial fracture which happened after he fell. He has seen podiatrist and is not fracture boots. Patient has cut down on insulin takes 30 and 35 units and reports her sugars are on average 100. Patient was relating passing out lisinopril which was started for his microalbuminuria. Patient's blood pressure is running normal.      He denies any chest pain, any diaphoresis or any palpitations. He did not go to the ER at that time. /70   Pulse 61   Ht 6' (1.829 m)   Wt 219 lb (99.3 kg)   SpO2 98%   BMI 29.70 kg/m²    Body mass index is 29.7 kg/m². Wt Readings from Last 3 Encounters:   10/29/20 219 lb (99.3 kg)   10/22/20 221 lb (100.2 kg)   08/13/20 226 lb (102.5 kg)        [x]Negative depression screening.   PHQ Scores 7/28/2020 10/1/2019 3/28/2019 11/27/2018 8/17/2017 5/12/2017 2/10/2017   PHQ2 Score 0 0 0 0 0 0 0   PHQ9 Score 0 0 0 0 0 0 0      []1-4 = Minimal depression   []5-9 = Milddepression   []10-14 = Moderate depression   []15-19 = Moderately severe depression   []20-27 = Severe depression    Discussed testing with the patient and all questions fully answered. Hospital Outpatient Visit on 08/02/2020   Component Date Value Ref Range Status    Pain Management Drug Panel Interp,* 08/02/2020 Consistent   Final    Comment: (NOTE)  ________________________________________________________________  DRUGS EXPECTED:  FENTANYL  OXYCODONE  ________________________________________________________________  CONSISTENT with medications provided:  FENTANYL: based on fentanyl, norfentanyl  OXYCODONE: based on oxycodone  ________________________________________________________________  INTERPRETIVE INFORMATION: Pain Mgt Rogers, Mass Spec/EMIT, Ur,                           Interp  Interpretation depends on accuracy and completeness of patient   medication information submitted by client.  6-Acetylmorphine, Ur 08/02/2020 Not Detected   Final    7-Aminoclonazepam, Urine 08/02/2020 Not Detected   Final    Alpha-OH-Alpraz, Urine 08/02/2020 Not Detected   Final    Alprazolam, Urine 08/02/2020 Not Detected   Final    Amphetamines, urine 08/02/2020 Not Detected   Final    Barbiturates, Ur 08/02/2020 Not Detected   Final    Benzoylecgonine, Ur 08/02/2020 Not Detected   Final    Buprenorphine Urine 08/02/2020 Not Detected   Final    Carisoprodol, Ur 08/02/2020 Not Detected   Final    Comment: (NOTE)  The carisoprodol immunoassay has cross-reactivity to carisoprodol   and meprobamate.       Clonazepam, Urine 08/02/2020 Not Detected   Final    Codeine, Urine 08/02/2020 Not Detected   Final    MDA, Ur 08/02/2020 Not Detected   Final    Diazepam, Urine 08/02/2020 Not Detected   Final    Ethyl Glucuronide Ur 08/02/2020 Not Detected   Final    Fentanyl, Ur 08/02/2020 Present   Final    Hydrocodone, Urine 08/02/2020 Not Detected   Final    Hydromorphone, Urine 08/02/2020 Not Detected   Final    Lorazepam, Urine 08/02/2020 Not Detected   Final    Marijuana Metab, Ur 08/02/2020 Not Detected   Final    MDEA, ASH, Ur 08/02/2020 Not Detected   Final    MDMA, Urine 08/02/2020 Not Detected   Final    Meperidine Metab, Ur 08/02/2020 Not Detected   Final    Methadone, Urine 08/02/2020 Not Detected   Final    Methamphetamine, Urine 08/02/2020 Not Detected   Final    Methylphenidate 08/02/2020 Not Detected   Final    Midazolam, Urine 08/02/2020 Not Detected   Final    Morphine Urine 08/02/2020 Not Detected   Final    Norbuprenorphine, Urine 08/02/2020 Not Detected   Final    Nordiazepam, Urine 08/02/2020 Not Detected   Final    Norfentanyl, Urine 08/02/2020 Present   Final    NORHYDROCODONE, URINE 08/02/2020 Not Detected   Final    Noroxycodone, Urine 08/02/2020 Not Detected   Final    NOROXYMORPHONE, URINE 08/02/2020 Not Detected   Final    Oxazepam, Urine 08/02/2020 Not Detected   Final    Oxycodone Urine 08/02/2020 Present   Final    Oxymorphone, Urine 08/02/2020 Not Detected   Final    PCP, Urine 08/02/2020 Not Detected   Final    Phentermine, Ur 08/02/2020 Not Detected   Final    Propoxyphene, Urine 08/02/2020 Not Detected   Final    Tapentadol-O-Sulfate, Urine 08/02/2020 Not Detected   Final    Tapentadol, Urine 08/02/2020 Not Detected   Final    Temazepam, Urine 08/02/2020 Not Detected   Final    Tramadol, Urine 08/02/2020 Not Detected   Final    Zolpidem, Urine 08/02/2020 Not Detected   Final    Drugs Expected, Ur 08/02/2020 ON FENTANYL PATCH AND OXYCODONE   Final    Creatinine, Ur 08/02/2020 77.8  20.0 - 400.0 mg/dL Final    Pain Mgt Drug Panel, Hi Res, Ur 08/02/2020 See Below   Final    Comment: (NOTE)  Methodology: Qualitative Enzyme Immunoassay and Qualitative Liquid   Chromatography-Time of Flight-Mass Spectrometry or Tandem Mass   Spectrometry, Quantitative Spectrophotometry  The absence of expected drug(s) and/or drug metabolite(s) may   indicate non-compliance, inappropriate timing of specimen collection relative to drug administration, poor drug absorption,   diluted/adulterated urine, or limitations of testing. The   concentration must be greater than or equal to the cutoff to be   reported as present. If specific drug concentrations are   required, contact the laboratory within two weeks of specimen   collection to request quantification by a second analytical   technique. Interpretive questions should be directed to the   laboratory. Results based on immunoassay detection that do not match clinical   expectations should be  interpreted with caution. Confirmatory testing by mass   spectrometry for immunoassay-based results is available, if   ordered within two wee                           ks of specimen collection. Additional   charges apply. For medical purposes only; not valid for forensic use. This test was developed and its performance characteristics   determined by Alexandre Nolan. The U.S. Food and Drug   Administration has not approved or cleared this test; however, FDA   clearance or approval is not currently required for clinical use. The results are not intended to be used as the sole means for   clinical diagnosis or patient management decisions.  EER Hi Res Interp Ur 08/02/2020 See Note   Final    Comment: (NOTE)  Access [employer] Enhanced Report using either link below:  -Direct access: [email or web address]  -Enter Username, Password: [email or web address]  Username: [email or web address]  Password: [email or web address]  Performed By: Alexandre Luong [address] [place] [address]  : Minh Dudley.  Efe Nevarez MD, Amilcar Connor [address]           Most recent labs reviewed:     Lab Results   Component Value Date    WBC 7.5 08/29/2017    HGB 14.5 08/29/2017    HCT 44.2 08/29/2017    MCV 90.0 08/29/2017     (L) 08/29/2017       @BRIEFLAB(NA,K,CL,CO2,BUN,CREATININE,GLUCOSE,CALCIUM)@     Lab Results   Component Value Date    ALT 16 11/07/2019    AST 17 11/07/2019 ALKPHOS 104 11/07/2019    BILITOT 0.30 11/07/2019       No results found for: TSHFT4, TSH    Lab Results   Component Value Date    CHOL 100 11/07/2019    CHOL 114 04/27/2019    CHOL 106 10/10/2018     Lab Results   Component Value Date    TRIG 85 11/07/2019    TRIG 129 04/27/2019    TRIG 84 10/10/2018     Lab Results   Component Value Date    HDL 43 11/07/2019    HDL 39 (L) 04/27/2019    HDL 36 (L) 10/10/2018     Lab Results   Component Value Date    LDLCHOLESTEROL 40 11/07/2019    LDLCHOLESTEROL 49 04/27/2019    LDLCHOLESTEROL 53 10/10/2018     Lab Results   Component Value Date    VLDL NOT REPORTED 11/07/2019    VLDL NOT REPORTED 04/27/2019    VLDL NOT REPORTED 10/10/2018     Lab Results   Component Value Date    CHOLHDLRATIO 2.3 11/07/2019    CHOLHDLRATIO 2.9 04/27/2019    CHOLHDLRATIO 2.9 10/10/2018       Lab Results   Component Value Date    LABA1C 6.4 10/29/2020       No results found for: [id number]    No results found for: FOLATE    No results found for: IRON, TIBC, FERRITIN    Lab Results   Component Value Date    VITD25 28.4 (L) 04/27/2019             Current Outpatient Medications   Medication Sig Dispense Refill    zoster recombinant adjuvanted vaccine (SHINGRIX) 50 MCG/0.5ML SUSR injection Inject 0.5 mLs into the muscle See Admin Instructions 1 dose now and repeat in 2-6 months 0.5 mL 0    Tdap (ADACEL) 5-2-15.5 LF-MCG/0.5 injection Inject 0.5 mLs into the muscle once for 1 dose 0.5 mL 0    Blood Pressure KIT Dx: HTN. Needs automatic blood pressure machine to monitor his blood pressure. 1 kit 0    [START ON 10/31/2020] fentaNYL (DURAGESIC) 25 MCG/HR Place 1 patch onto the skin every 72 hours for 30 days.  10 patch 0    LANTUS SOLOSTAR 100 UNIT/ML injection pen INJECT 30 UNITS SUBCUTANEOUSLY IN THE MORNING & 40 UNITS IN THE EVENING 45 mL 0    furosemide (LASIX) 40 MG tablet TAKE 1 TABLET BY MOUTH TWO TIMES A DAY  60 tablet 3    topiramate (TOPAMAX) 50 MG tablet Take 1 tablet by mouth 2 times Substance and Sexual Activity    Alcohol use: Not Currently     Comment: rare    Drug use: No    Sexual activity: Never   Lifestyle    Physical activity     Days per week: Not on file     Minutes per session: Not on file    Stress: Not on file   Relationships    Social connections     Talks on phone: Not on file     Gets together: Not on file     Attends Taoist service: Not on file     Active member of club or organization: Not on file     Attends meetings of clubs or organizations: Not on file     Relationship status: Not on file    Intimate partner violence     Fear of current or ex partner: Not on file     Emotionally abused: Not on file     Physically abused: Not on file     Forced sexual activity: Not on file   Other Topics Concern    Not on file   Social History Narrative    Not on file     Counseling given: Not Answered        Family History   Problem Relation Age of Onset    Heart Failure Father     Cancer Mother         breast    Cancer Maternal Grandfather              -rest of complaints with corresponding details per ROS    The patient's past medical, surgical, social, and family history as well as his current medications and allergies were reviewed as documented intoday's encounter. Review of Systems   Constitutional: Negative for activity change, appetite change, fatigue, fever and unexpected weight change. HENT: Negative for congestion, ear pain, mouth sores, nosebleeds, postnasal drip, rhinorrhea, sinus pressure, sinus pain and voice change. Eyes: Negative for photophobia and visual disturbance. Respiratory: Negative for cough, chest tightness, shortness of breath and wheezing. Cardiovascular: Negative for chest pain, palpitations and leg swelling. Gastrointestinal: Negative for abdominal distention, abdominal pain, blood in stool, constipation and diarrhea.    Genitourinary: Negative for decreased urine volume, difficulty urinating, dysuria, flank pain, frequency, hematuria and urgency. Musculoskeletal: Positive for arthralgias. Negative for back pain, gait problem, myalgias, neck pain and neck stiffness. Skin: Negative for color change and rash. Neurological: Positive for light-headedness. Negative for dizziness, syncope, speech difficulty, weakness, numbness and headaches. Psychiatric/Behavioral: Negative for agitation, decreased concentration, dysphoric mood, sleep disturbance and suicidal ideas. The patient is not nervous/anxious and is not hyperactive. Physical Exam  Musculoskeletal:      Left foot: Decreased range of motion. Tenderness present. No bony tenderness, swelling or deformity. Comments: Patient is an stress boats. PHYSICAL EXAM:   VITALS:   Vitals:    10/29/20 0842   BP: 110/70   Pulse: 61   SpO2: 98%     GENERAL:  Patient is a well-developed, well-nourished male  in no acute distress, alert and oriented x3, appropriate and pleasant conversation. HEAD: Normocephalic, atraumatic. EYES: Pupils equal, round and reactive to light and accommodation, extraocular   movements intact. ENT: Moist mucous membranes. No erythema is noted. NECK: Supple. No masses. No lymphadenopathy. CARDIOVASCULAR: Regular rate and rhythm. PULMONARY: Lungs are clear to auscultation bilaterally. ABDOMEN: Soft, nontender, nondistended. Positive bowel sounds. NEUROLOGIC: Cranial nerves II through XII grossly intact. No focal deficits are noted. ASSESSMENT AND PLAN      1. Type 2 diabetes mellitus with diabetic polyneuropathy, with long-term current use of insulin (HCC)  -A1c is stable decrease to 6.4, discussed with patient to cut back on insulin more, take 25 units in the morning and 20 in the evening. Keep checking her blood sugars, and keep some snack always with you.  - POCT glycosylated hemoglobin (Hb A1C)  - Comprehensive Metabolic Panel; Future  - Blood Pressure KIT; Dx: HTN.  Needs automatic blood pressure machine to monitor his reviewed as documented in today's encounter. Medications, labs, diagnostic studies, consultations andfollow-up as documented in this encounter. Return in about 3 months (around 1/29/2021). Patient wasseen with total face to face time of 25 minutes. More than 50% of this visit was counseling and education. Future Appointments   Date Time Provider Esteban Eller   11/5/2020  8:45 AM Lavella Harada, DPM [facility]   11/19/2020 11:45 AM Alia Oakes, APRN - [id number] [facility]   1/7/2021  9:15 AM Lavella Harada, DPM [facility]   2/1/2021  9:15 AM Reji Wilkins MD [facility]   3/18/2021  9:30 AM Lavella Harada, [address] [place] [address]      This note was completed by using the assistance of a speech-recognition program. However, inadvertent computerized transcription errors may be present. Althoughevery effort was made to ensure accuracy, no guarantees can be provided that every mistake has been identified and corrected by editing.   Electronically signed by Reji Wilkins MD on 10/29/2020  12:51 PM

## 2020-11-05 ENCOUNTER — OFFICE VISIT (OUTPATIENT)
Dept: PODIATRY | Age: 75
End: 2020-11-05
Payer: MEDICARE

## 2020-11-05 ENCOUNTER — HOSPITAL ENCOUNTER (OUTPATIENT)
Age: 75
Discharge: HOME | End: 2020-11-05
Payer: MEDICARE

## 2020-11-05 VITALS — WEIGHT: 219 LBS | HEIGHT: 72 IN | BODY MASS INDEX: 29.66 KG/M2

## 2020-11-05 LAB
ABSOLUTE EOS #: 0.1 K/UL (ref 0–0.4)
ABSOLUTE IMMATURE GRANULOCYTE: ABNORMAL K/UL (ref 0–0.3)
ABSOLUTE LYMPH #: 1.7 K/UL (ref 1–4.8)
ABSOLUTE MONO #: 0.6 K/UL (ref 0.1–1.3)
ALBUMIN SERPL-MCNC: 3.9 G/DL (ref 3.5–5.2)
ALBUMIN/GLOBULIN RATIO: ABNORMAL (ref 1–2.5)
ALP BLD-CCNC: 147 U/L (ref 40–129)
ALT SERPL-CCNC: 14 U/L (ref 5–41)
ANION GAP SERPL CALCULATED.3IONS-SCNC: 10 MMOL/L (ref 9–17)
AST SERPL-CCNC: 13 U/L
BASOPHILS # BLD: 1 % (ref 0–2)
BASOPHILS ABSOLUTE: 0 K/UL (ref 0–0.2)
BILIRUB SERPL-MCNC: 0.41 MG/DL (ref 0.3–1.2)
BUN BLDV-MCNC: 25 MG/DL (ref 8–23)
BUN/CREAT BLD: ABNORMAL (ref 9–20)
CALCIUM SERPL-MCNC: 9.3 MG/DL (ref 8.6–10.4)
CHLORIDE BLD-SCNC: 103 MMOL/L (ref 98–107)
CHOLESTEROL, FASTING: 98 MG/DL
CHOLESTEROL/HDL RATIO: 2.5
CO2: 27 MMOL/L (ref 20–31)
CREAT SERPL-MCNC: 1.37 MG/DL (ref 0.7–1.2)
DIFFERENTIAL TYPE: ABNORMAL
EOSINOPHILS RELATIVE PERCENT: 2 % (ref 0–4)
GFR AFRICAN AMERICAN: >60 ML/MIN
GFR NON-AFRICAN AMERICAN: 51 ML/MIN
GFR SERPL CREATININE-BSD FRML MDRD: ABNORMAL ML/MIN/{1.73_M2}
GFR SERPL CREATININE-BSD FRML MDRD: ABNORMAL ML/MIN/{1.73_M2}
GLUCOSE BLD-MCNC: 139 MG/DL (ref 70–99)
HCT VFR BLD CALC: 41.3 % (ref 41–53)
HDLC SERPL-MCNC: 39 MG/DL
HEMOGLOBIN: 14.1 G/DL (ref 13.5–17.5)
IMMATURE GRANULOCYTES: ABNORMAL %
LDL CHOLESTEROL: 40 MG/DL (ref 0–130)
LYMPHOCYTES # BLD: 22 % (ref 24–44)
MCH RBC QN AUTO: 30.8 PG (ref 26–34)
MCHC RBC AUTO-ENTMCNC: 34.1 G/DL (ref 31–37)
MCV RBC AUTO: 90.1 FL (ref 80–100)
MONOCYTES # BLD: 8 % (ref 1–7)
NRBC AUTOMATED: ABNORMAL PER 100 WBC
PDW BLD-RTO: 13.9 % (ref 11.5–14.9)
PLATELET # BLD: 164 K/UL (ref 150–450)
PLATELET ESTIMATE: ABNORMAL
PMV BLD AUTO: 7.6 FL (ref 6–12)
POTASSIUM SERPL-SCNC: 4.1 MMOL/L (ref 3.7–5.3)
RBC # BLD: 4.59 M/UL (ref 4.5–5.9)
RBC # BLD: ABNORMAL 10*6/UL
SEG NEUTROPHILS: 67 % (ref 36–66)
SEGMENTED NEUTROPHILS ABSOLUTE COUNT: 5.5 K/UL (ref 1.3–9.1)
SODIUM BLD-SCNC: 140 MMOL/L (ref 135–144)
TOTAL PROTEIN: 7.1 G/DL (ref 6.4–8.3)
TRIGLYCERIDE, FASTING: 94 MG/DL
VITAMIN D 25-HYDROXY: 29.1 NG/ML (ref 30–100)
VLDLC SERPL CALC-MCNC: ABNORMAL MG/DL (ref 1–30)
WBC # BLD: 8 K/UL (ref 3.5–11)
WBC # BLD: ABNORMAL 10*3/UL

## 2020-11-05 PROCEDURE — 2022F DILAT RTA XM EVC RTNOPTHY: CPT | Performed by: PODIATRIST

## 2020-11-05 PROCEDURE — 3017F COLORECTAL CA SCREEN DOC REV: CPT | Performed by: PODIATRIST

## 2020-11-05 PROCEDURE — 82306 VITAMIN D 25 HYDROXY: CPT

## 2020-11-05 PROCEDURE — 80061 LIPID PANEL: CPT

## 2020-11-05 PROCEDURE — G8417 CALC BMI ABV UP PARAM F/U: HCPCS | Performed by: PODIATRIST

## 2020-11-05 PROCEDURE — 85025 COMPLETE CBC W/AUTO DIFF WBC: CPT

## 2020-11-05 PROCEDURE — G8484 FLU IMMUNIZE NO ADMIN: HCPCS | Performed by: PODIATRIST

## 2020-11-05 PROCEDURE — 80053 COMPREHEN METABOLIC PANEL: CPT

## 2020-11-05 PROCEDURE — 4040F PNEUMOC VAC/ADMIN/RCVD: CPT | Performed by: PODIATRIST

## 2020-11-05 PROCEDURE — G8427 DOCREV CUR MEDS BY ELIG CLIN: HCPCS | Performed by: PODIATRIST

## 2020-11-05 PROCEDURE — 1123F ACP DISCUSS/DSCN MKR DOCD: CPT | Performed by: PODIATRIST

## 2020-11-05 PROCEDURE — 3044F HG A1C LEVEL LT 7.0%: CPT | Performed by: PODIATRIST

## 2020-11-05 PROCEDURE — 36415 COLL VENOUS BLD VENIPUNCTURE: CPT

## 2020-11-05 PROCEDURE — 1036F TOBACCO NON-USER: CPT | Performed by: PODIATRIST

## 2020-11-05 PROCEDURE — 99213 OFFICE O/P EST LOW 20 MIN: CPT | Performed by: PODIATRIST

## 2020-11-05 ASSESSMENT — ENCOUNTER SYMPTOMS
VOMITING: 0
CONSTIPATION: 0
COLOR CHANGE: 0
DIARRHEA: 0
NAUSEA: 0

## 2020-11-05 NOTE — PROGRESS NOTES
[facility]  Return Patient  Chief Complaint   Patient presents with    Follow-up     left ankle        Yanet Sales is a 77y.o. year old male who is here for follow up left ankle. He is doing better. History of falling over 2 weeks ago, at home. BG went low. Injured his left ankle, could not put weight on it immediately, but has been walking on it since. Using a cane, used a brace for a few days this helped. Has edema. Review of Systems   Constitutional: Negative for chills, diaphoresis, fatigue, fever and unexpected weight change. Cardiovascular: Negative for leg swelling. Gastrointestinal: Negative for constipation, diarrhea, nausea and vomiting. Musculoskeletal: Positive for gait problem. Negative for arthralgias and joint swelling. Skin: Negative for color change, pallor, rash and wound. Neurological: Positive for numbness. Negative for weakness. Vascular: DP and PT pulses palpable 2/4, Right Foot and 2/4 on the Left Foot. CFT <3 seconds, Right Foot and <3 seconds on the Left Foot. Edema is absent,  Right Foot and minimal on the Left Foot. Neurological:   Sensation absent  to light touch to level of digits, both feet. Musculoskeletal:   Muscle strength is 5/5 on the Right Foot and 5/5 on the Left Foot. Structural deformities are present on the Right Foot and present on the Left Foot, but improved  Flat medial arch left foot. Pain left ankle, more over the distal fibula. Edema present, no erythema, no bruising, pain with rom of ankle. Muscle strength inhibited from guarding left. Integument:  Warm, dry, supple both feet. Infection is absent. No odor. No macerated. Radiographs: 3 views left Ankle: Three weightbearing views (AP, Mortise, and Lateral) of the left ankle  were obtained in the office today and reviewed, revealing minimally displaced, spiral oblique fracture of the distal fibula, no dislocation, or radioopaque foreign body/tumor.  The ankle mortise is maintained with minimal widening of the medial clear spaces. Overall alignment is satisfactory. Assesment :     Diagnosis Orders   1. Closed torus fracture of distal end of left fibula, initial encounter  XR ANKLE LEFT (MIN 3 VIEWS)   2. Type 2 diabetes mellitus with diabetic polyneuropathy, with long-term current use of insulin ([facility] [address]           Plan: Pt was evaluated and examined. Patient was given personalized discharge instructions. Pt will follow up in 4 weeks or sooner if any problems arise. Diagnosis was discussed with the pt and all of their questions were answered in detail. Proper foot hygiene and care was discussed with the pt. Informed patient on proper diabetic foot care and importance of tight glycemic control. Patient to check feet daily and contact the office with any questions/problems/concerns. Other comorbidity noted and will be managed by PCP. Limit walking  CAM walker continue for 2-4 more weeks    No orders of the defined types were placed in this encounter. Follow up 2 weeks.

## 2020-11-16 RX ORDER — SIMVASTATIN 20 MG
20 TABLET ORAL NIGHTLY
Qty: 90 TABLET | Refills: 0 | Status: SHIPPED | OUTPATIENT
Start: 2020-11-16 | End: 2021-02-10

## 2020-11-16 NOTE — TELEPHONE ENCOUNTER
Please Approve or Refuse.   Send to Pharmacy per Pt's Request:     Next Visit Date:  2/1/2021   Last Visit Date: 10/29/2020    Hemoglobin A1C (%)   Date Value   10/29/2020 6.4   03/05/2020 6.5   10/01/2019 5.9             ( goal A1C is < 7)   BP Readings from Last 3 Encounters:   10/29/20 110/70   07/28/20 126/78   03/05/20 120/60          (goal 120/80)  BUN   Date Value Ref Range Status   11/05/2020 25 (H) 8 - 23 mg/dL Final     CREATININE   Date Value Ref Range Status   11/05/2020 1.37 (H) 0.70 - 1.20 mg/dL Final     Potassium   Date Value Ref Range Status   11/05/2020 4.1 3.7 - 5.3 mmol/L Final

## 2020-11-19 ENCOUNTER — OFFICE VISIT (OUTPATIENT)
Dept: PODIATRY | Age: 75
End: 2020-11-19
Payer: MEDICARE

## 2020-11-19 ENCOUNTER — HOSPITAL ENCOUNTER (OUTPATIENT)
Dept: PAIN MANAGEMENT | Age: 75
Discharge: HOME | End: 2020-11-19
Payer: MEDICARE

## 2020-11-19 VITALS — HEIGHT: 72 IN | WEIGHT: 219 LBS | BODY MASS INDEX: 29.66 KG/M2

## 2020-11-19 PROCEDURE — 99213 OFFICE O/P EST LOW 20 MIN: CPT | Performed by: NURSE PRACTITIONER

## 2020-11-19 PROCEDURE — G8417 CALC BMI ABV UP PARAM F/U: HCPCS | Performed by: PODIATRIST

## 2020-11-19 PROCEDURE — 1036F TOBACCO NON-USER: CPT | Performed by: PODIATRIST

## 2020-11-19 PROCEDURE — 3044F HG A1C LEVEL LT 7.0%: CPT | Performed by: PODIATRIST

## 2020-11-19 PROCEDURE — G8427 DOCREV CUR MEDS BY ELIG CLIN: HCPCS | Performed by: PODIATRIST

## 2020-11-19 PROCEDURE — 99213 OFFICE O/P EST LOW 20 MIN: CPT | Performed by: PODIATRIST

## 2020-11-19 PROCEDURE — 99213 OFFICE O/P EST LOW 20 MIN: CPT

## 2020-11-19 PROCEDURE — 2022F DILAT RTA XM EVC RTNOPTHY: CPT | Performed by: PODIATRIST

## 2020-11-19 PROCEDURE — 3017F COLORECTAL CA SCREEN DOC REV: CPT | Performed by: PODIATRIST

## 2020-11-19 PROCEDURE — G8484 FLU IMMUNIZE NO ADMIN: HCPCS | Performed by: PODIATRIST

## 2020-11-19 PROCEDURE — 1123F ACP DISCUSS/DSCN MKR DOCD: CPT | Performed by: PODIATRIST

## 2020-11-19 PROCEDURE — 4040F PNEUMOC VAC/ADMIN/RCVD: CPT | Performed by: PODIATRIST

## 2020-11-19 RX ORDER — FENTANYL 25 UG/H
1 PATCH TRANSDERMAL
Qty: 10 PATCH | Refills: 0 | Status: SHIPPED | OUTPATIENT
Start: 2020-11-30 | End: 2020-12-17 | Stop reason: SDUPTHER

## 2020-11-19 ASSESSMENT — ENCOUNTER SYMPTOMS
DIARRHEA: 0
COUGH: 0
CONSTIPATION: 0
COLOR CHANGE: 0
BACK PAIN: 1
SHORTNESS OF BREATH: 0
VOMITING: 0
NAUSEA: 0
CONSTIPATION: 0

## 2020-11-19 NOTE — PROGRESS NOTES
[facility]  Return Patient  Chief Complaint   Patient presents with   Kendy Orr Check-Up     fx follow up left        Conner Nava is a 77y.o. year old male who is here for follow up left ankle. He is doing better. Wearing the CAM walker now for 4 weeks. History of falling over 2 weeks ago, at home. BG went low. Injured his left ankle, could not put weight on it immediately, but has been walking on it since. Using a cane, used a brace for a few days this helped. Has edema. Review of Systems   Constitutional: Negative for chills, diaphoresis, fatigue, fever and unexpected weight change. Cardiovascular: Negative for leg swelling. Gastrointestinal: Negative for constipation, diarrhea, nausea and vomiting. Musculoskeletal: Positive for gait problem. Negative for arthralgias and joint swelling. Skin: Negative for color change, pallor, rash and wound. Neurological: Positive for numbness. Negative for weakness. Vascular: DP and PT pulses palpable 2/4, Right Foot and 2/4 on the Left Foot. CFT <3 seconds, Right Foot and <3 seconds on the Left Foot. Edema is absent,  Right Foot and minimal on the Left Foot. Neurological:   Sensation absent  to light touch to level of digits, both feet. Musculoskeletal:   Muscle strength is 5/5 on the Right Foot and 5/5 on the Left Foot. Structural deformities are present on the Right Foot and present on the Left Foot, but improved  Flat medial arch left foot. Pain left ankle, more over the distal fibula. Edema present, no erythema, no bruising, pain with rom of ankle. Muscle strength inhibited from guarding left. Integument:  Warm, dry, supple both feet. Infection is absent. No odor. No macerated. Radiographs: 3 views left Ankle:   Three weightbearing views (AP, Mortise, and Lateral) of the left ankle  were obtained in the office today and reviewed, revealing minimally displaced, spiral oblique fracture of the distal fibula, no dislocation, or radioopaque foreign body/tumor. The ankle mortise is maintained with minimal widening of the medial clear spaces. Overall alignment is satisfactory. Assesment :     Diagnosis Orders   1. Closed torus fracture of distal end of left fibula, initial encounter  XR ANKLE LEFT (MIN 3 VIEWS)   2. Type 2 diabetes mellitus with diabetic polyneuropathy, with long-term current use of insulin (HCC)  XR ANKLE LEFT (MIN 3 VIEWS)         Plan: Pt was evaluated and examined. Patient was given personalized discharge instructions. Pt will follow up in 4 weeks or sooner if any problems arise. Diagnosis was discussed with the pt and all of their questions were answered in detail. Proper foot hygiene and care was discussed with the pt. Informed patient on proper diabetic foot care and importance of tight glycemic control. Patient to check feet daily and contact the office with any questions/problems/concerns. Other comorbidity noted and will be managed by PCP. Limit walking  CAM walker continue for 2 more weeks    No orders of the defined types were placed in this encounter. Follow up 2 weeks.

## 2020-11-19 NOTE — PROGRESS NOTES
tubular adenoma    Constipation     Diabetes mellitus ([facility] [address]     Diarrhea     Diarrhea     DVT (deep venous thrombosis) (HCC)     left calf    Ejection fraction < 50%     Gallstones     Heartburn     Hx of blood clots     Hyperlipidemia     Kidney stones     MI (myocardial infarction) ([facility] [address]     2011    Mitral regurgitation     MRSA (methicillin resistant staph aureus) culture positive     Numbness and tingling     hands and feet    Rib fractures 1-2015    right    Wears glasses     readers       Past Surgical History:   Procedure Laterality Date    APPENDECTOMY      CARDIAC CATHETERIZATION  12/29/11    CHOLECYSTECTOMY      COLONOSCOPY  01/11/2012    wnl, 10 yr recall    COLONOSCOPY  11/28/2018    ATTEMPTED NOT CLEAN (N/A )    COLONOSCOPY  02/25/2019    tubular adenoma    COLONOSCOPY N/A 2/25/2019    COLONOSCOPY WITH BIOPSY performed by Maria Guadalupe Gomez MD at [facility] 86      x 1    CORONARY ARTERY BYPASS GRAFT  1/3/12    x3    KNEE ARTHROSCOPY      left    KNEE SURGERY Left     I&D    OTHER SURGICAL HISTORY Left 3/7/2016    plantar plane &  exostectomy medial foot left    [place] COLON CA SCRN NOT HI RSK IND N/A 11/28/2018    COLONOSCOPY ATTEMPTED NOT CLEAN performed by Maria Guadalupe Gomez MD at [facility] ENDOSCOPY  11-3-15    VENA CAVA FILTER PLACEMENT      WRIST SURGERY      I&D       Allergies   Allergen Reactions    Flagyl [Metronidazole] Hives    Metronidazole      Other reaction(s): Vomiting         Current Outpatient Medications:     simvastatin (ZOCOR) 20 MG tablet, TAKE 1 TABLET BY MOUTH nightly, Disp: 90 tablet, Rfl: 0    zoster recombinant adjuvanted vaccine (SHINGRIX) 50 MCG/0.5ML SUSR injection, Inject 0.5 mLs into the muscle See Admin Instructions 1 dose now and repeat in 2-6 months, Disp: 0.5 mL, Rfl: 0    Blood Pressure KIT, Dx: HTN.  Needs automatic blood pressure machine to monitor his blood pressure., Disp: 1 kit, Rfl: 0    fentaNYL (DURAGESIC) 25 MCG/HR, Place 1 patch onto the skin every 72 hours for 30 days. , Disp: 10 patch, Rfl: 0    LANTUS SOLOSTAR 100 UNIT/ML injection pen, INJECT 30 UNITS SUBCUTANEOUSLY IN THE MORNING & 40 UNITS IN THE EVENING, Disp: 45 mL, Rfl: 0    furosemide (LASIX) 40 MG tablet, TAKE 1 TABLET BY MOUTH TWO TIMES A DAY , Disp: 60 tablet, Rfl: 3    topiramate (TOPAMAX) 50 MG tablet, Take 1 tablet by mouth 2 times daily, Disp: 60 tablet, Rfl: 3    lisinopril (PRINIVIL;ZESTRIL) 2.5 MG tablet, Take 1 tablet by mouth daily, Disp: 60 tablet, Rfl: 1    omeprazole (PRILOSEC) 20 MG delayed release capsule, TAKE 1 CAPSULE BY MOUTH ONE TIME A DAY, Disp: 90 capsule, Rfl: 2    Insulin Pen Needle (BD PEN NEEDLE ALIZE U/F) 32G X 4 MM MISC, use twice daily as directed, Disp: 300 each, Rfl: 1    gabapentin (NEURONTIN) 800 MG tablet, Take 1 tablet by mouth daily for 90 days. , Disp: 90 tablet, Rfl: 3    naloxone (NARCAN) 4 MG/0.1ML LIQD nasal spray, 1 spray by Nasal route as needed (if needed for respiratory depression), Disp: 1 each, Rfl: 0    blood glucose test strips (FREESTYLE LITE) strip, TEST TWICE DAILY AS DIRECTED, Disp: 100 strip, Rfl: 1    Probiotic Product (PROBIOTIC-10 PO), Take by mouth, Disp: , Rfl:     aspirin 81 MG tablet, Take 81 mg by mouth daily. , Disp: , Rfl:     Vitamin D (CHOLECALCIFEROL) 1000 UNITS CAPS capsule, Take 2,000 Units by mouth daily , Disp: , Rfl:     Ascorbic Acid (VITAMIN C) 500 MG tablet, Take 1,000 mg by mouth daily , Disp: , Rfl:     NITROSTAT 0.4 MG SL tablet, , Disp: , Rfl:     finasteride (PROSCAR) 5 MG tablet, Take 5 mg by mouth daily. , Disp: , Rfl:     tamsulosin (FLOMAX) 0.4 MG capsule, Take 0.4 mg by mouth daily.   , Disp: , Rfl:     Family History   Problem Relation Age of Onset    Heart Failure Father     Cancer Mother         breast    Cancer Maternal Grandfather        Social History     Socioeconomic History    Marital

## 2020-12-03 ENCOUNTER — OFFICE VISIT (OUTPATIENT)
Dept: PODIATRY | Age: 75
End: 2020-12-03
Payer: MEDICARE

## 2020-12-03 VITALS — WEIGHT: 219 LBS | BODY MASS INDEX: 29.66 KG/M2 | HEIGHT: 72 IN

## 2020-12-03 PROCEDURE — 3044F HG A1C LEVEL LT 7.0%: CPT | Performed by: PODIATRIST

## 2020-12-03 PROCEDURE — 1036F TOBACCO NON-USER: CPT | Performed by: PODIATRIST

## 2020-12-03 PROCEDURE — G8484 FLU IMMUNIZE NO ADMIN: HCPCS | Performed by: PODIATRIST

## 2020-12-03 PROCEDURE — G8417 CALC BMI ABV UP PARAM F/U: HCPCS | Performed by: PODIATRIST

## 2020-12-03 PROCEDURE — 3017F COLORECTAL CA SCREEN DOC REV: CPT | Performed by: PODIATRIST

## 2020-12-03 PROCEDURE — 4040F PNEUMOC VAC/ADMIN/RCVD: CPT | Performed by: PODIATRIST

## 2020-12-03 PROCEDURE — G8427 DOCREV CUR MEDS BY ELIG CLIN: HCPCS | Performed by: PODIATRIST

## 2020-12-03 PROCEDURE — 1123F ACP DISCUSS/DSCN MKR DOCD: CPT | Performed by: PODIATRIST

## 2020-12-03 PROCEDURE — 99213 OFFICE O/P EST LOW 20 MIN: CPT | Performed by: PODIATRIST

## 2020-12-03 PROCEDURE — 2022F DILAT RTA XM EVC RTNOPTHY: CPT | Performed by: PODIATRIST

## 2020-12-03 ASSESSMENT — ENCOUNTER SYMPTOMS
COLOR CHANGE: 0
DIARRHEA: 0
CONSTIPATION: 0
NAUSEA: 0
VOMITING: 0

## 2020-12-03 NOTE — PROGRESS NOTES
[facility]  Return Patient  Chief Complaint   Patient presents with    Follow-up     FX Left       Sb Kaplan is a 77y.o. year old male who is here for follow up left ankle. He is doing better. Wearing the CAM walker now for 6 weeks. History of falling over 2 weeks ago, at home. BG went low. Injured his left ankle, could not put weight on it immediately, but has been walking on it since. Using a cane, used a brace for a few days this helped. Has edema. Review of Systems   Constitutional: Negative for chills, diaphoresis, fatigue, fever and unexpected weight change. Cardiovascular: Negative for leg swelling. Gastrointestinal: Negative for constipation, diarrhea, nausea and vomiting. Musculoskeletal: Positive for gait problem. Negative for arthralgias and joint swelling. Skin: Negative for color change, pallor, rash and wound. Neurological: Positive for numbness. Negative for weakness. Vascular: DP and PT pulses palpable 2/4, Right Foot and 2/4 on the Left Foot. CFT <3 seconds, Right Foot and <3 seconds on the Left Foot. Edema is absent,  Right Foot and minimal on the Left Foot. Neurological:   Sensation absent  to light touch to level of digits, both feet. Musculoskeletal:   Muscle strength is 5/5 on the Right Foot and 5/5 on the Left Foot. Structural deformities are present on the Right Foot and present on the Left Foot, but improved  Flat medial arch left foot. Pain left ankle, more over the distal fibula. Edema present, no erythema, no bruising, pain with rom of ankle. Muscle strength inhibited from guarding left. Integument:  Warm, dry, supple both feet. Infection is absent. No odor. No macerated. Radiographs: 3 views left Ankle:   Three weightbearing views (AP, Mortise, and Lateral) of the left ankle  were obtained in the office today and reviewed, revealing minimally displaced, spiral oblique fracture of the distal fibula, no dislocation, or

## 2020-12-17 ENCOUNTER — OFFICE VISIT (OUTPATIENT)
Dept: PODIATRY | Age: 75
End: 2020-12-17
Payer: MEDICARE

## 2020-12-17 ENCOUNTER — HOSPITAL ENCOUNTER (OUTPATIENT)
Dept: PAIN MANAGEMENT | Age: 75
Discharge: HOME | End: 2020-12-17
Payer: MEDICARE

## 2020-12-17 VITALS — BODY MASS INDEX: 29.12 KG/M2 | HEIGHT: 72 IN | WEIGHT: 215 LBS

## 2020-12-17 PROCEDURE — 3044F HG A1C LEVEL LT 7.0%: CPT | Performed by: PODIATRIST

## 2020-12-17 PROCEDURE — 1123F ACP DISCUSS/DSCN MKR DOCD: CPT | Performed by: PODIATRIST

## 2020-12-17 PROCEDURE — 3017F COLORECTAL CA SCREEN DOC REV: CPT | Performed by: PODIATRIST

## 2020-12-17 PROCEDURE — G8484 FLU IMMUNIZE NO ADMIN: HCPCS | Performed by: PODIATRIST

## 2020-12-17 PROCEDURE — 2022F DILAT RTA XM EVC RTNOPTHY: CPT | Performed by: PODIATRIST

## 2020-12-17 PROCEDURE — 99213 OFFICE O/P EST LOW 20 MIN: CPT

## 2020-12-17 PROCEDURE — 99213 OFFICE O/P EST LOW 20 MIN: CPT | Performed by: PODIATRIST

## 2020-12-17 PROCEDURE — G8428 CUR MEDS NOT DOCUMENT: HCPCS | Performed by: PODIATRIST

## 2020-12-17 PROCEDURE — 4040F PNEUMOC VAC/ADMIN/RCVD: CPT | Performed by: PODIATRIST

## 2020-12-17 PROCEDURE — 99213 OFFICE O/P EST LOW 20 MIN: CPT | Performed by: NURSE PRACTITIONER

## 2020-12-17 PROCEDURE — G8417 CALC BMI ABV UP PARAM F/U: HCPCS | Performed by: PODIATRIST

## 2020-12-17 PROCEDURE — 1036F TOBACCO NON-USER: CPT | Performed by: PODIATRIST

## 2020-12-17 RX ORDER — TOPIRAMATE 50 MG/1
50 TABLET, FILM COATED ORAL 2 TIMES DAILY
Qty: 60 TABLET | Refills: 3 | Status: SHIPPED | OUTPATIENT
Start: 2020-12-17 | End: 2021-04-28 | Stop reason: SDUPTHER

## 2020-12-17 RX ORDER — FENTANYL 25 UG/H
1 PATCH TRANSDERMAL
Qty: 10 PATCH | Refills: 0 | Status: SHIPPED | OUTPATIENT
Start: 2020-12-30 | End: 2021-01-26 | Stop reason: SDUPTHER

## 2020-12-17 ASSESSMENT — ENCOUNTER SYMPTOMS
COLOR CHANGE: 0
NAUSEA: 0
VOMITING: 0
BACK PAIN: 1
CONSTIPATION: 0
SHORTNESS OF BREATH: 0
DIARRHEA: 0
COUGH: 0
CONSTIPATION: 0

## 2020-12-17 NOTE — PROGRESS NOTES
[facility]  Return Patient  Chief Complaint   Patient presents with    Follow-up     left ankle, having some pain when not wearing the boot       Yanet Sales is a 77y.o. year old male who is here for follow up left ankle. He is doing better, able to walk with out the CAM walker, no pain, no edema. . Wearing the CAM walker now for 8 weeks. History of falling over 2 weeks ago, at home. BG went low. Injured his left ankle, could not put weight on it immediately, but has been walking on it since. Using a cane, used a brace for a few days this helped. Has edema. Review of Systems   Constitutional: Negative for chills, diaphoresis, fatigue, fever and unexpected weight change. Cardiovascular: Negative for leg swelling. Gastrointestinal: Negative for constipation, diarrhea, nausea and vomiting. Musculoskeletal: Positive for gait problem. Negative for arthralgias and joint swelling. Skin: Negative for color change, pallor, rash and wound. Neurological: Positive for numbness. Negative for weakness. Vascular: DP and PT pulses palpable 2/4, Right Foot and 2/4 on the Left Foot. CFT <3 seconds, Right Foot and <3 seconds on the Left Foot. Edema is absent,  Right Foot and minimal on the Left Foot. Neurological:   Sensation absent  to light touch to level of digits, both feet. Musculoskeletal:   Muscle strength is 5/5 on the Right Foot and 5/5 on the Left Foot. Structural deformities are present on the Right Foot and present on the Left Foot, but improved  Flat medial arch left foot. No Pain left ankle,  over the distal fibula. Edema minimal, no erythema, no bruising, no pain with rom of ankle. Integument:  Warm, dry, supple both feet. Infection is absent. No odor. No macerated. Radiographs: 3 views left Ankle:   Three weightbearing views (AP, Mortise, and Lateral) of the left ankle  were obtained in the office today and reviewed, revealing minimally displaced, spiral oblique fracture of the distal fibula, no dislocation, or radioopaque foreign body/tumor. The ankle mortise is maintained with minimal widening of the medial clear spaces. Overall alignment is satisfactory. Assesment :     Diagnosis Orders   1. Closed torus fracture of distal end of left fibula, initial encounter  XR ANKLE LEFT (MIN 3 VIEWS)   2. Type 2 diabetes mellitus with diabetic polyneuropathy, with long-term current use of insulin ([address]           Plan: Pt was evaluated and examined. Patient was given personalized discharge instructions. Pt will follow up in for [facility] or sooner if any problems arise. Diagnosis was discussed with the pt and all of their questions were answered in detail. Proper foot hygiene and care was discussed with the pt. Informed patient on proper diabetic foot care and importance of tight glycemic control. Patient to check feet daily and contact the office with any questions/problems/concerns. Other comorbidity noted and will be managed by PCP. CAM walker, slowly transition back to diabetic shoe/boot    No orders of the defined types were placed in this encounter.     Follow up 3-4 weeks

## 2020-12-17 NOTE — PROGRESS NOTES
Patient completed a video visit today to review medication contract. Chief Complaint: back pain    [facility] Pt reports low back pain for over 20 years after a compression fracture he suffered after pulling a post out of the ground. He also suffers from diabetic neuropathy with bilat Charcot's foot. He had tingling and numbness to hand and feet. He has had foot surgery in the past, but no surgery for his back pain. He has tried lumber epidurals with little noted relief. He is opioid dependant for pain control. We prescribe Fentanyl q72 hours and percocet that he uses sparingly. He is also taking topamax for the neuropathic pain. His pain is unchanged from previous visit. Back Pain  This is a chronic problem. The current episode started more than 1 year ago. The problem occurs constantly. The problem is unchanged. The pain is present in the lumbar spine. The quality of the pain is described as aching. Radiates to: down both legs to the feet. The pain is at a severity of 5/10. The pain is moderate. The symptoms are aggravated by position, standing and sitting (walking). Associated symptoms include numbness, paresthesias and tingling. Pertinent negatives include no chest pain or fever. He has tried analgesics, bed rest and heat for the symptoms. The treatment provided mild relief. Patient denies any new neurological symptoms. No bowel or bladder incontinence, no weakness, and no falling. Pill count: appropriate due 12/30    Morphine equivalent: 60    Periodic Controlled Substance Monitoring: Possible medication side effects, risk of tolerance/dependence & alternative treatments discussed., No signs of potential drug abuse or diversion identified., Obtaining appropriate analgesic effect of treatment.  TACOS Perea - CNP)      Past Medical History:   Diagnosis Date    Abdominal pain     Benign prostatic hypertrophy     C. difficile colitis     CAD (coronary artery disease) 1/3/12 s/p CABGx3    Colon polyp 02/25/2019    tubular adenoma    Constipation     Diabetes mellitus ([facility] [address] [address]     Diarrhea     Diarrhea     DVT (deep venous thrombosis) (HCC)     left calf    Ejection fraction < 50%     Gallstones     Heartburn     Hx of blood clots     Hyperlipidemia     Kidney stones     MI (myocardial infarction) ([facility] [address]     2011    Mitral regurgitation     MRSA (methicillin resistant staph aureus) culture positive     Numbness and tingling     hands and feet    Rib fractures 1-2015    right    Wears glasses     readers       Past Surgical History:   Procedure Laterality Date    APPENDECTOMY      CARDIAC CATHETERIZATION  12/29/11    CHOLECYSTECTOMY      COLONOSCOPY  01/11/2012    wnl, 10 yr recall    COLONOSCOPY  11/28/2018    ATTEMPTED NOT CLEAN (N/A )    COLONOSCOPY  02/25/2019    tubular adenoma    COLONOSCOPY N/A 2/25/2019    COLONOSCOPY WITH BIOPSY performed by Mark [facility]al MD at [facility]      x 1    CORONARY ARTERY BYPASS GRAFT  1/3/12    x3    KNEE ARTHROSCOPY      left    KNEE SURGERY Left     I&D    OTHER SURGICAL HISTORY Left 3/7/2016    plantar plane &  exostectomy medial foot left    [place] COLON CA SCRN NOT HI RSK IND N/A 11/28/2018    COLONOSCOPY ATTEMPTED NOT CLEAN performed by Mark Bansal MD at [facility] ENDOSCOPY  11-3-15    VENA CAVA FILTER PLACEMENT      WRIST SURGERY      I&D       Allergies   Allergen Reactions    Flagyl [Metronidazole] Hives    Metronidazole      Other reaction(s): Vomiting         Current Outpatient Medications:     fentaNYL (DURAGESIC) 25 MCG/HR, Place 1 patch onto the skin every 72 hours for 30 days. , Disp: 10 patch, Rfl: 0    simvastatin (ZOCOR) 20 MG tablet, TAKE 1 TABLET BY MOUTH nightly, Disp: 90 tablet, Rfl: 0   zoster recombinant adjuvanted vaccine [facility]) 50 MCG/0.5ML SUSR injection, Inject 0.5 mLs into the muscle See Admin Instructions 1 dose now and repeat in 2-6 months, Disp: 0.5 mL, Rfl: 0    Blood Pressure KIT, Dx: HTN. Needs automatic blood pressure machine to monitor his blood pressure., Disp: 1 kit, Rfl: 0    LANTUS SOLOSTAR 100 UNIT/ML injection pen, INJECT 30 UNITS SUBCUTANEOUSLY IN THE MORNING & 40 UNITS IN THE EVENING, Disp: 45 mL, Rfl: 0    furosemide (LASIX) 40 MG tablet, TAKE 1 TABLET BY MOUTH TWO TIMES A DAY , Disp: 60 tablet, Rfl: 3    topiramate (TOPAMAX) 50 MG tablet, Take 1 tablet by mouth 2 times daily, Disp: 60 tablet, Rfl: 3    lisinopril (PRINIVIL;ZESTRIL) 2.5 MG tablet, Take 1 tablet by mouth daily, Disp: 60 tablet, Rfl: 1    omeprazole (PRILOSEC) 20 MG delayed release capsule, TAKE 1 CAPSULE BY MOUTH ONE TIME A DAY, Disp: 90 capsule, Rfl: 2    Insulin Pen Needle (BD PEN NEEDLE ALIZE U/F) 32G X 4 MM MISC, use twice daily as directed, Disp: 300 each, Rfl: 1    gabapentin (NEURONTIN) 800 MG tablet, Take 1 tablet by mouth daily for 90 days. , Disp: 90 tablet, Rfl: 3    naloxone (NARCAN) 4 MG/0.1ML LIQD nasal spray, 1 spray by Nasal route as needed (if needed for respiratory depression), Disp: 1 each, Rfl: 0    blood glucose test strips (FREESTYLE LITE) strip, TEST TWICE DAILY AS DIRECTED, Disp: 100 strip, Rfl: 1    Probiotic Product (PROBIOTIC-10 PO), Take by mouth, Disp: , Rfl:     aspirin 81 MG tablet, Take 81 mg by mouth daily. , Disp: , Rfl:     Vitamin D (CHOLECALCIFEROL) 1000 UNITS CAPS capsule, Take 2,000 Units by mouth daily , Disp: , Rfl:     Ascorbic Acid (VITAMIN C) 500 MG tablet, Take 1,000 mg by mouth daily , Disp: , Rfl:     NITROSTAT 0.4 MG SL tablet, , Disp: , Rfl:     finasteride (PROSCAR) 5 MG tablet, Take 5 mg by mouth daily. , Disp: , Rfl:     tamsulosin (FLOMAX) 0.4 MG capsule, Take 0.4 mg by mouth daily.   , Disp: , Rfl:     Family History Problem Relation Age of Onset    Heart Failure Father     Cancer Mother         breast    Cancer Maternal Grandfather        Social History     Socioeconomic History    Marital status:      Spouse name: Not on file    Number of children: Not on file    Years of education: Not on file    Highest education level: Not on file   Occupational History    Occupation: retired [employer]   Social Needs    Financial resource strain: Not on file    Food insecurity     Worry: Not on file     Inability: Not on file   [employer] needs     Medical: Not on file     Non-medical: Not on file   Tobacco Use    Smoking status: Former Smoker     Packs/day: 1.00     Years: 30.00     Pack years: 30.00     Types: Cigarettes     Quit date: 2002     Years since quittin.8    Smokeless tobacco: Never Used   Substance and Sexual Activity    Alcohol use: Not Currently     Comment: rare    Drug use: No    Sexual activity: Never   Lifestyle    Physical activity     Days per week: Not on file     Minutes per session: Not on file    Stress: Not on file   Relationships    Social connections     Talks on phone: Not on file     Gets together: Not on file     Attends [employer] service: Not on file     Active member of club or organization: Not on file     Attends meetings of clubs or organizations: Not on file     Relationship status: Not on file    Intimate partner violence     Fear of current or ex partner: Not on file     Emotionally abused: Not on file     Physically abused: Not on file     Forced sexual activity: Not on file   Other Topics Concern    Not on file   Social History Narrative    Not on file       Review of Systems:  Review of Systems   Constitution: Negative for chills and fever. Cardiovascular: Negative for chest pain and palpitations. Respiratory: Negative for cough and shortness of breath. Musculoskeletal: Positive for back pain. Gastrointestinal: Negative for constipation. Neurological: Positive for numbness, paresthesias and tingling. Negative for disturbances in coordination and loss of balance. Physical Exam:  There were no vitals taken for this visit. Physical Exam  HENT:      Head: Normocephalic. Pulmonary:      Effort: Pulmonary effort is normal.   Neurological:      Mental Status: He is alert.    Psychiatric:         Mood and Affect: Mood normal.         Behavior: Behavior normal.         Record/Diagnostics Review:    Last alexander 8/2020 and was appropriate     Assessment:  Problem List Items Addressed This Visit     Osteoarthritis of spine with radiculopathy, lumbar region    Relevant Medications    fentaNYL ([address]) 25 MCG/HR (Start on 12/30/2020)    topiramate (TOPAMAX) 50 MG tablet    DDD (degenerative disc disease), cervical    Relevant Medications    fentaNYL (DURAGESIC) 25 MCG/HR (Start on 12/30/2020)    Facet syndrome ([address]    Relevant Medications    fentaNYL (DURAGESIC) 25 MCG/HR (Start on 12/30/2020)    Medication monitoring encounter    Relevant Medications    fentaNYL (DURAGESIC) 25 MCG/HR (Start on 12/30/2020)    Arthritis, septic ([address]    Relevant Medications    fentaNYL ([address]) 25 MCG/HR (Start on 12/30/2020)    Spondylosis of lumbar region without myelopathy or radiculopathy    Relevant Medications    fentaNYL ([address]) 25 MCG/HR (Start on 12/30/2020)    Charcot foot due to diabetes mellitus ([address]    Relevant Medications    fentaNYL ([address]) 25 MCG/HR (Start on 12/30/2020)    topiramate (TOPAMAX) 50 MG tablet    Drug-induced constipation    Relevant Medications    fentaNYL (DURAGESIC) 25 MCG/HR (Start on 12/30/2020)    Diabetic mononeuropathy associated with diabetes mellitus due to underlying condition ([address]    Relevant Medications    fentaNYL (DURAGESIC) 25 MCG/HR (Start on 12/30/2020)    topiramate (TOPAMAX) 50 MG tablet             Treatment Plan: Patient relates current medications are helping the pain. Patient reports taking pain medications as prescribed, denies obtaining medications from different sources and denies use of illegal drugs. Patient denies side effects from medications like nausea, vomiting, constipation or drowsiness. Patient reports current activities of daily living are possible due to medications and would like to continue them. As always, we encourage daily stretching and strengthening exercises, and recommend minimizing use of pain medications unless patient cannot get through daily activities due to pain. Contract requirements met. Continue opioid therapy. Script written for fentanyl   Follow up appointment made for 4 weeks    Pamela Patino is a 77 y.o. male being evaluated by a Virtual Visit (video visit) encounter to address concerns as mentioned above. A caregiver was present when appropriate. Due to this being a TeleHealth encounter (During [id number] public health emergency), evaluation of the following organ systems was limited: Vitals/Constitutional/EENT/Resp/CV/GI//MS/Neuro/Skin/Heme-Lymph-Imm. Pursuant to the emergency declaration under the [address] [place] [address] authority and the [facility] and [employer] Act, this Virtual Visit was conducted with patient's (and/or legal guardian's) consent, to reduce the patient's risk of exposure to COVID-19 and provide necessary medical care. The patient (and/or legal guardian) has also been advised to contact this office for worsening conditions or problems, and seek emergency medical treatment and/or call 911 if deemed necessary.      Patient identification was verified at the start of the visit: Yes    Total time spent for this encounter: Not billed by time Services were provided through a video synchronous discussion virtually to substitute for in-person clinic visit. Patient and provider were located at their individual homes. --Laurian Boxer, APRN - CNP on 12/17/2020 at 3:57 PM    An electronic signature was used to authenticate this note.

## 2021-01-05 RX ORDER — INSULIN GLARGINE 100 [IU]/ML
INJECTION, SOLUTION SUBCUTANEOUS
Qty: 45 ML | Refills: 0 | Status: SHIPPED | OUTPATIENT
Start: 2021-01-05 | End: 2021-03-15

## 2021-01-07 ENCOUNTER — OFFICE VISIT (OUTPATIENT)
Dept: PODIATRY | Age: 76
End: 2021-01-07
Payer: MEDICARE

## 2021-01-07 VITALS — WEIGHT: 215 LBS | HEIGHT: 72 IN | BODY MASS INDEX: 29.12 KG/M2

## 2021-01-07 DIAGNOSIS — S82.822A CLOSED TORUS FRACTURE OF DISTAL END OF LEFT FIBULA, INITIAL ENCOUNTER: ICD-10-CM

## 2021-01-07 DIAGNOSIS — E11.42 TYPE 2 DIABETES MELLITUS WITH DIABETIC POLYNEUROPATHY, WITHOUT LONG-TERM CURRENT USE OF INSULIN (HCC): ICD-10-CM

## 2021-01-07 DIAGNOSIS — Z79.4 TYPE 2 DIABETES MELLITUS WITH DIABETIC POLYNEUROPATHY, WITH LONG-TERM CURRENT USE OF INSULIN (HCC): ICD-10-CM

## 2021-01-07 DIAGNOSIS — M14.672 CHARCOT'S JOINT OF LEFT FOOT: ICD-10-CM

## 2021-01-07 DIAGNOSIS — E11.42 TYPE 2 DIABETES MELLITUS WITH DIABETIC POLYNEUROPATHY, WITH LONG-TERM CURRENT USE OF INSULIN (HCC): ICD-10-CM

## 2021-01-07 DIAGNOSIS — B35.1 ONYCHOMYCOSIS: Primary | ICD-10-CM

## 2021-01-07 DIAGNOSIS — M21.969 FOOT DEFORMITY, UNSPECIFIED LATERALITY: ICD-10-CM

## 2021-01-07 PROCEDURE — 3046F HEMOGLOBIN A1C LEVEL >9.0%: CPT | Performed by: PODIATRIST

## 2021-01-07 PROCEDURE — 3017F COLORECTAL CA SCREEN DOC REV: CPT | Performed by: PODIATRIST

## 2021-01-07 PROCEDURE — 2022F DILAT RTA XM EVC RTNOPTHY: CPT | Performed by: PODIATRIST

## 2021-01-07 PROCEDURE — 1036F TOBACCO NON-USER: CPT | Performed by: PODIATRIST

## 2021-01-07 PROCEDURE — G8417 CALC BMI ABV UP PARAM F/U: HCPCS | Performed by: PODIATRIST

## 2021-01-07 PROCEDURE — G8484 FLU IMMUNIZE NO ADMIN: HCPCS | Performed by: PODIATRIST

## 2021-01-07 PROCEDURE — G8427 DOCREV CUR MEDS BY ELIG CLIN: HCPCS | Performed by: PODIATRIST

## 2021-01-07 PROCEDURE — 99213 OFFICE O/P EST LOW 20 MIN: CPT | Performed by: PODIATRIST

## 2021-01-07 PROCEDURE — 1123F ACP DISCUSS/DSCN MKR DOCD: CPT | Performed by: PODIATRIST

## 2021-01-07 PROCEDURE — 11721 DEBRIDE NAIL 6 OR MORE: CPT | Performed by: PODIATRIST

## 2021-01-07 PROCEDURE — 4040F PNEUMOC VAC/ADMIN/RCVD: CPT | Performed by: PODIATRIST

## 2021-01-07 ASSESSMENT — ENCOUNTER SYMPTOMS
CONSTIPATION: 0
COLOR CHANGE: 0
DIARRHEA: 0
VOMITING: 0
NAUSEA: 0

## 2021-01-07 NOTE — PROGRESS NOTES
[facility]  Return Patient  Chief Complaint   Patient presents with    Nail Problem     nail trim/last saw Jose Manuel Wallace 10/29/2020    Diabetes     blood sugar 112    Check-Up     fx daryn Yu is a 77y.o. year old male who is here for follow up left ankle. He is doing better, able to walk with out the CAM walker, minimal pain, little to no edema. Lesly Hardy wore the CAM walker for 8 weeks. History of falling over 2 weeks ago, at home. BG went low. Injured his left ankle, could not put weight on it immediately, but has been walking on it since. Using a cane, used a brace for a few days this helped. Has edema. Would like his nails trimmed. He is a diabetic, last     Review of Systems   Constitutional: Negative for chills, diaphoresis, fatigue, fever and unexpected weight change. Cardiovascular: Negative for leg swelling. Gastrointestinal: Negative for constipation, diarrhea, nausea and vomiting. Musculoskeletal: Positive for gait problem. Negative for arthralgias and joint swelling. Skin: Negative for color change, pallor, rash and wound. Neurological: Positive for numbness. Negative for weakness. Vascular: DP and PT pulses palpable 2/4, Right Foot and 2/4 on the Left Foot. CFT <3 seconds, Right Foot and <3 seconds on the Left Foot. Edema is absent,  Right Foot and minimal on the Left Foot. Neurological:   Sensation absent  to light touch to level of digits, both feet. Musculoskeletal:   Muscle strength is 5/5 on the Right Foot and 5/5 on the Left Foot. Structural deformities are present on the Right Foot and present on the Left Foot, but improved  Flat medial arch left foot. No Pain left ankle,  over the distal fibula. Edema minimal, no erythema, no bruising, no pain with rom of ankle. Integument:  Warm, dry, supple both feet. Infection is absent. No odor. No macerated.    Sites of Onychomycosis Involvement (Check affected area)  [x] [x] [x] [x] [x] [x] [x] [x] [x] [x]  5 4 3 2 1 1 2 3 4 5                          Right                                        Left    Thickness  [x] [x] [x] [x] [x] [x] [x] [x] [x] [x]  5 4 3 2 1 1 2 3 4 5                         Right                                        Left    Dystrophic Changes                                                                 [x] [x] [x] [x] [x] [x] [x] [x] [x] [x]  5 4 3 2 1 1 2 3 4 5                         Right                                        Left    Color                                                                  [x] [x] [x] [x] [x] [x] [x] [x] [x] [x]  5 4 3 2 1 1 2 3 4 5                          Right                                        Left    Incurvation/Ingrowin                                                                   [] [] [] [] [] [] [] [] [] []  5 4 3 2 1 1 2 3 4 5                         Right                                        Left    Inflammation/Pain                                                                   [x] [x] [x] [x] [x] [x] [x] [x] [x] [x]  5 4 3 2 1 1 2 3 4 5                         Right                                        Left    Radiographs: 3 views left Ankle: Three weightbearing views (AP, Mortise, and Lateral) of the left ankle  were obtained in the office today and reviewed, revealing minimally displaced, spiral oblique fracture of the distal fibula, no dislocation, or radioopaque foreign body/tumor. The ankle mortise is maintained with minimal widening of the medial clear spaces. Overall alignment is satisfactory. Assesment :     Diagnosis Orders   1. Onychomycosis  TN DEBRIDEMENT OF NAILS, 6 OR MORE    HM DIABETES FOOT EXAM   2. Type 2 diabetes mellitus with diabetic polyneuropathy, with long-term current use of insulin (HCC)  TN DEBRIDEMENT OF NAILS, 6 OR MORE    HM DIABETES FOOT EXAM   3.  Charcot's joint of left foot   DIABETES FOOT EXAM   4. Closed torus fracture of distal end of left fibula, initial encounter   DIABETES FOOT EXAM   5. Foot deformity, unspecified laterality   DIABETES FOOT EXAM   6. Type 2 diabetes mellitus with diabetic polyneuropathy, without long-term current use of insulin ([facility])   DIABETES FOOT EXAM         Plan: Pt was evaluated and examined. Patient was given personalized discharge instructions. Nails 1-10 were debrided sharply in length and thickness with a nipper and , without incident. Pt will follow up in 3 months or sooner if any problems arise. Diagnosis was discussed with the pt and all of their questions were answered in detail. Proper foot hygiene and care was discussed with the pt. Informed patient on proper diabetic foot care and importance of tight glycemic control. Patient to check feet daily and contact the office with any questions/problems/concerns. Other comorbidity noted and will be managed by PCP. Diabetic foot examination performed this visit. The exam included neurological sensory exam, a 10-g monofilament and pinprick sensation, vibration using a 128-Hz tuning fork, ankle reflexes, visual skin inspection, vascular exam including assessment of pedal pulses, orthopedic exam for deformities, and shoe inspection. Increased risk factors noted on the diabetic foot exam include decreased sensory exam and peripheral neuropathy. Shoegear inspected and found to be appropriate size and wear. Diabetic shoes and insoles: This patient would benefit from extra depth diabetic shoes and custom inserts. I feel he/she qualifies due to the above performed physical exam and diagnosis. I will do the appropriate paperwork and send it to their primary care physician. Then the patient will be measured for shoes and custom inserts to properly off load and protect his/her feet. No orders of the defined types were placed in this encounter.     Follow up 3-4 weeks

## 2021-01-26 ENCOUNTER — HOSPITAL ENCOUNTER (OUTPATIENT)
Dept: PAIN MANAGEMENT | Age: 76
Discharge: HOME | End: 2021-01-26
Payer: MEDICARE

## 2021-01-26 DIAGNOSIS — M50.30 DDD (DEGENERATIVE DISC DISEASE), CERVICAL: ICD-10-CM

## 2021-01-26 DIAGNOSIS — E09.41 DIABETIC MONONEUROPATHY ASSOCIATED WITH DRUG OR CHEMICAL INDUCED DIABETES MELLITUS (HCC): ICD-10-CM

## 2021-01-26 DIAGNOSIS — E11.42 DIABETIC PERIPHERAL NEUROPATHY ASSOCIATED WITH TYPE 2 DIABETES MELLITUS (HCC): ICD-10-CM

## 2021-01-26 DIAGNOSIS — M51.36 DDD (DEGENERATIVE DISC DISEASE), LUMBAR: ICD-10-CM

## 2021-01-26 DIAGNOSIS — K59.03 DRUG-INDUCED CONSTIPATION: ICD-10-CM

## 2021-01-26 DIAGNOSIS — M47.26 OSTEOARTHRITIS OF SPINE WITH RADICULOPATHY, LUMBAR REGION: ICD-10-CM

## 2021-01-26 DIAGNOSIS — M00.862 ARTHRITIS OF LEFT KNEE DUE TO OTHER BACTERIA (HCC): ICD-10-CM

## 2021-01-26 DIAGNOSIS — E11.610 CHARCOT FOOT DUE TO DIABETES MELLITUS (HCC): ICD-10-CM

## 2021-01-26 DIAGNOSIS — Z51.81 MEDICATION MONITORING ENCOUNTER: ICD-10-CM

## 2021-01-26 DIAGNOSIS — M48.062 SPINAL STENOSIS OF LUMBAR REGION WITH NEUROGENIC CLAUDICATION: ICD-10-CM

## 2021-01-26 DIAGNOSIS — M47.899 FACET SYNDROME: ICD-10-CM

## 2021-01-26 DIAGNOSIS — F11.90 CHRONIC, CONTINUOUS USE OF OPIOIDS: ICD-10-CM

## 2021-01-26 DIAGNOSIS — M47.816 SPONDYLOSIS OF LUMBAR REGION WITHOUT MYELOPATHY OR RADICULOPATHY: Primary | ICD-10-CM

## 2021-01-26 DIAGNOSIS — E08.41 DIABETIC MONONEUROPATHY ASSOCIATED WITH DIABETES MELLITUS DUE TO UNDERLYING CONDITION (HCC): ICD-10-CM

## 2021-01-26 PROCEDURE — 99213 OFFICE O/P EST LOW 20 MIN: CPT

## 2021-01-26 PROCEDURE — 99213 OFFICE O/P EST LOW 20 MIN: CPT | Performed by: NURSE PRACTITIONER

## 2021-01-26 RX ORDER — OXYCODONE HYDROCHLORIDE AND ACETAMINOPHEN 5; 325 MG/1; MG/1
1 TABLET ORAL EVERY 6 HOURS PRN
Qty: 120 TABLET | Refills: 0 | Status: SHIPPED | OUTPATIENT
Start: 2021-01-26 | End: 2021-03-24 | Stop reason: SDUPTHER

## 2021-01-26 RX ORDER — FENTANYL 25 UG/H
1 PATCH TRANSDERMAL
Qty: 10 PATCH | Refills: 0 | Status: SHIPPED | OUTPATIENT
Start: 2021-01-29 | End: 2021-02-25 | Stop reason: SDUPTHER

## 2021-01-26 ASSESSMENT — ENCOUNTER SYMPTOMS
GASTROINTESTINAL NEGATIVE: 1
EYES NEGATIVE: 1
BACK PAIN: 1
RESPIRATORY NEGATIVE: 1

## 2021-01-26 NOTE — PROGRESS NOTES
[facility] [address] PROGRESS NOTE      Patient  completed [x]  video visit   []   phone call:      Minutes :   to  review Medication Agreement    Location:  Provider:  working from    [x]    home    []   [facility] [place] , patient at  home   Chief Complaint: back pain  He c/o low back pain which has been more constant than normal.The pain radiates down into his legs, He had done PT in the past which did not give as much relief. He states fractured left fibula this past November and just got the cast off. He states he could feel that his blood sugar was getting low and he fell and then did not seek treatment for his left leg pain until he saw his podiatrist 3 weeks later, He also c/o right shoulder pain since his fall, He has appt coming up with his PCP., His sleep is good. He reports that his blood sugars have been in the 100-105 range,    Back Pain  This is a chronic problem. The problem occurs constantly. The problem has been gradually worsening since onset. The pain is present in the lumbar spine. The quality of the pain is described as aching. Radiates to: legs. The pain is at a severity of 6/10. The pain is moderate. The pain is the same all the time. Exacerbated by: nothing. Associated symptoms include numbness. (Feet) He has tried heat and ice for the symptoms.            Treatment goals:  Functional status: walk further      Aberrancy:   Any alcoholic beveragesno            Any illegal drugs   no      Analgesia:   6                Adverse  Effects :none      ADL;s :home exercises  Data:    When was thelast UDS:  8-2-2020           Was the UDS appropriate:      Record/Diagnostics Review:      As above, I did review the imaging     8/6/2020  7:00 PM - Parvin Carpenter Incoming Lab Results From [facility]    Component Value Ref Range & Units Status Collected Lab   Pain Management Drug Panel Interp, Urine Consistent   Final 08/02/2020  9:53 AM DICK   (NOTE) ________________________________________________________________   DRUGS EXPECTED:   FENTANYL   OXYCODONE   ________________________________________________________________   CONSISTENT with medications provided:   FENTANYL: based on fentanyl, norfentanyl   OXYCODONE: based on oxycodone   ________________________________________________________________   INTERPRETIVE INFORMATION: Pain Mgt [place], [place] Spec/EMIT, Ur,                            Interp   Interpretation depends on accuracy and completeness of patient   medication information submitted by client. Pill count: appropriate  fill date :1- for fentanyl patch  Percocet last filled 3-2020 he states has # 2 percocet tabs left  Morphine equivalent dose as reported on [place]AR[place]:  Periodic Controlled Substance Monitoring: Possible medication side effects, risk of tolerance/dependence & alternative treatments discussed., No signs of potential drug abuse or diversion identified., Obtaining appropriate analgesic effect of treatment., Assessed functional status. Summer Souza, APRN - CNP)  Review ofOARRS does not show any aberrant prescription behavior. Medication is helping the patient stay active. Patient denies any side effects and reports adequate analgesia. No sign of misuse/abuse.             Past Medical History:   Diagnosis Date    Abdominal pain     Benign prostatic hypertrophy     C. difficile colitis     CAD (coronary artery disease) 1/3/12    s/p CABGx3    Colon polyp 02/25/2019    tubular adenoma    Constipation     Diabetes mellitus ([facility] [address]     Diarrhea     Diarrhea     DVT (deep venous thrombosis) (HCC)     left calf    Ejection fraction < 50%     Gallstones     Heartburn     Hx of blood clots     Hyperlipidemia     Kidney stones     MI (myocardial infarction) ([facility] [address]     2011    Mitral regurgitation     MRSA (methicillin resistant staph aureus) culture positive     Numbness and tingling     hands and feet  Rib fractures 1-2015    right    Wears glasses     readers       Past Surgical History:   Procedure Laterality Date    APPENDECTOMY      CARDIAC CATHETERIZATION  12/29/11    CHOLECYSTECTOMY      COLONOSCOPY  01/11/2012    wnl, 10 yr recall    COLONOSCOPY  11/28/2018    ATTEMPTED NOT CLEAN (N/A )    COLONOSCOPY  02/25/2019    tubular adenoma    COLONOSCOPY N/A 2/25/2019    COLONOSCOPY WITH BIOPSY performed by Radha Torres MD at [facility]      x 1    CORONARY ARTERY BYPASS GRAFT  1/3/12    x3    KNEE ARTHROSCOPY      left    KNEE SURGERY Left     I&D    OTHER SURGICAL HISTORY Left 3/7/2016    plantar plane &  exostectomy medial foot left    AZ COLON CA SCRN NOT HI RSK IND N/A 11/28/2018    COLONOSCOPY ATTEMPTED NOT CLEAN performed by Radha Torres MD at [address] ENDOSCOPY  11-3-15    VENA CAVA FILTER PLACEMENT      WRIST SURGERY      I&D       Allergies   Allergen Reactions    Flagyl [Metronidazole] Hives    Metronidazole      Other reaction(s): Vomiting         Current Outpatient Medications:     LANTUS SOLOSTAR 100 UNIT/ML injection pen, INJECT 30 UNITS UNDER THE SKIN IN THE MORNING AND 40 UNITS IN THE EVENING, Disp: 45 mL, Rfl: 0    fentaNYL (DURAGESIC) 25 MCG/HR, Place 1 patch onto the skin every 72 hours for 30 days. , Disp: 10 patch, Rfl: 0    topiramate (TOPAMAX) 50 MG tablet, Take 1 tablet by mouth 2 times daily, Disp: 60 tablet, Rfl: 3    simvastatin (ZOCOR) 20 MG tablet, TAKE 1 TABLET BY MOUTH nightly, Disp: 90 tablet, Rfl: 0    lisinopril (PRINIVIL;ZESTRIL) 2.5 MG tablet, Take 1 tablet by mouth daily, Disp: 60 tablet, Rfl: 1    omeprazole (PRILOSEC) 20 MG delayed release capsule, TAKE 1 CAPSULE BY MOUTH ONE TIME A DAY, Disp: 90 capsule, Rfl: 2    gabapentin (NEURONTIN) 800 MG tablet, Take 1 tablet by mouth daily for 90 days. , Disp: 90 tablet, Rfl: 3   Probiotic Product (PROBIOTIC-10 PO), Take by mouth, Disp: , Rfl:     aspirin 81 MG tablet, Take 81 mg by mouth daily. , Disp: , Rfl:     Vitamin D (CHOLECALCIFEROL) 1000 UNITS CAPS capsule, Take 2,000 Units by mouth daily , Disp: , Rfl:     Ascorbic Acid (VITAMIN C) 500 MG tablet, Take 1,000 mg by mouth daily , Disp: , Rfl:     zoster recombinant adjuvanted vaccine (SHINGRIX) 50 MCG/0.5ML SUSR injection, Inject 0.5 mLs into the muscle See Admin Instructions 1 dose now and repeat in 2-6 months, Disp: 0.5 mL, Rfl: 0    Blood Pressure KIT, Dx: HTN. Needs automatic blood pressure machine to monitor his blood pressure., Disp: 1 kit, Rfl: 0    furosemide (LASIX) 40 MG tablet, TAKE 1 TABLET BY MOUTH TWO TIMES A DAY , Disp: 60 tablet, Rfl: 3    Insulin Pen Needle (BD PEN NEEDLE ALIZE U/F) 32G X 4 MM MISC, use twice daily as directed, Disp: 300 each, Rfl: 1    naloxone (NARCAN) 4 MG/0.1ML LIQD nasal spray, 1 spray by Nasal route as needed (if needed for respiratory depression), Disp: 1 each, Rfl: 0    blood glucose test strips (FREESTYLE LITE) strip, TEST TWICE DAILY AS DIRECTED, Disp: 100 strip, Rfl: 1    NITROSTAT 0.4 MG SL tablet, , Disp: , Rfl:     finasteride (PROSCAR) 5 MG tablet, Take 5 mg by mouth daily. , Disp: , Rfl:     tamsulosin (FLOMAX) 0.4 MG capsule, Take 0.4 mg by mouth daily. , Disp: , Rfl:     Family History   Problem Relation Age of Onset    Heart Failure Father     Cancer Mother         breast    Cancer Maternal Grandfather        Social History     Socioeconomic History    Marital status:       Spouse name: Not on file    Number of children: Not on file    Years of education: Not on file    Highest education level: Not on file   Occupational History    Occupation: retired [employer]   Social Needs    Financial resource strain: Not on file    Food insecurity     Worry: Not on file     Inability: Not on file   GERS needs     Medical: Not on file Non-medical: Not on file   Tobacco Use    Smoking status: Former Smoker     Packs/day: 1.00     Years: 30.00     Pack years: 30.00     Types: Cigarettes     Quit date: 2002     Years since quittin.9    Smokeless tobacco: Never Used   Substance and Sexual Activity    Alcohol use: Not Currently     Comment: rare    Drug use: No    Sexual activity: Never   Lifestyle    Physical activity     Days per week: Not on file     Minutes per session: Not on file    Stress: Not on file   Relationships    Social connections     Talks on phone: Not on file     Gets together: Not on file     Attends Mosque service: Not on file     Active member of club or organization: Not on file     Attends meetings of clubs or organizations: Not on file     Relationship status: Not on file    Intimate partner violence     Fear of current or ex partner: Not on file     Emotionally abused: Not on file     Physically abused: Not on file     Forced sexual activity: Not on file   Other Topics Concern    Not on file   Social History Narrative    Not on file         Review of Systems:  Review of Systems   Constitution: Negative. HENT: Negative. Eyes: Negative. Cardiovascular: Negative. Respiratory: Negative. Endocrine:        Blood sugar 100-105 range   Hematologic/Lymphatic: Bruises/bleeds easily. Skin: Negative. Musculoskeletal: Positive for back pain and joint pain. Gastrointestinal: Negative. Genitourinary: Negative. Neurological: Positive for numbness. Psychiatric/Behavioral: Negative. Physical Exam:  There were no vitals taken for this visit. Physical Exam  HENT:      Head: Normocephalic. Skin:         Neurological:      Mental Status: He is alert and oriented to person, place, and time. Psychiatric:         Mood and Affect: Mood normal.         Thought Content:  Thought content normal.           Assessment:      Problem List Items Addressed This Visit Spondylosis of lumbar region without myelopathy or radiculopathy - Primary    Relevant Medications    fentaNYL (DURAGESIC) 25 MCG/HR (Start on 1/29/2021)    oxyCODONE-acetaminophen (PERCOCET) 5-325 MG per tablet    Osteoarthritis of spine with radiculopathy, lumbar region    Relevant Medications    fentaNYL (DURAGESIC) 25 MCG/HR (Start on 1/29/2021)    oxyCODONE-acetaminophen (PERCOCET) 5-325 MG per tablet    Medication monitoring encounter    Relevant Medications    fentaNYL (DURAGESIC) 25 MCG/HR (Start on 1/29/2021)    oxyCODONE-acetaminophen (PERCOCET) 5-325 MG per tablet    Lumbar spinal stenosis    Facet syndrome ([address]    Relevant Medications    fentaNYL (DURAGESIC) 25 MCG/HR (Start on 1/29/2021)    oxyCODONE-acetaminophen (PERCOCET) 5-325 MG per tablet    Drug-induced constipation    Relevant Medications    fentaNYL (DURAGESIC) 25 MCG/HR (Start on 1/29/2021)    oxyCODONE-acetaminophen (PERCOCET) 5-325 MG per tablet    Diabetic mononeuropathy associated with diabetes mellitus due to underlying condition ([address]    Relevant Medications    fentaNYL ([address]) 25 MCG/HR (Start on 1/29/2021)    oxyCODONE-acetaminophen (PERCOCET) 5-325 MG per tablet    DDD (degenerative disc disease), lumbar    Relevant Medications    fentaNYL (DURAGESIC) 25 MCG/HR (Start on 1/29/2021)    oxyCODONE-acetaminophen (PERCOCET) 5-325 MG per tablet    DDD (degenerative disc disease), cervical    Relevant Medications    fentaNYL (DURAGESIC) 25 MCG/HR (Start on 1/29/2021)    oxyCODONE-acetaminophen (PERCOCET) 5-325 MG per tablet    Chronic, continuous use of opioids    Charcot foot due to diabetes mellitus ([address]    Relevant Medications    fentaNYL (DURAGESIC) 25 MCG/HR (Start on 1/29/2021)    oxyCODONE-acetaminophen (PERCOCET) 5-325 MG per tablet    Arthritis, septic ([address]    Relevant Medications    fentaNYL (DURAGESIC) 25 MCG/HR (Start on 1/29/2021)    oxyCODONE-acetaminophen (PERCOCET) 5-325 MG per tablet      Other Visit Diagnoses Diabetic mononeuropathy associated with drug or chemical induced diabetes mellitus ([facility] [address]        Relevant Medications    oxyCODONE-acetaminophen (PERCOCET) 5-325 MG per tablet    Diabetic peripheral neuropathy associated with type 2 diabetes mellitus ([facility] [address]        Relevant Medications    oxyCODONE-acetaminophen (PERCOCET) 5-325 MG per tablet          Treatment Plan:  DISCUSSION: Treatment options discussed withpatient and all questions answered to patient's satisfaction. Possible side effects, risk of tolerance and or dependence and alternative treatments discussed    Obtaining appropriate analgesic effect of treatment   No signs of potential drug abuse or diversion identified    [x] Ill effects of being on chronic pain medications such as sleep disturbances, respiratory depression, hormonal changes, withdrawal symptoms, chronic opioid dependence and tolerance as well as risk of taking opioids with Benzodiazepines and taking opioids along with alcohol,  werediscussed with patient. I had asked the patient to minimize medication use and utilize pain medications only for uncontrolled rest pain or pain with exertional activities. I advised patient not to self-escalate painmedications without consulting with us. At each of patient's future visits we will try to taper pain medications, while adjusting the adjunct medications, and re-evaluating for Physical Therapy to improve spinal andjoint strength. We will continue to have discussions to decrease pain medications as tolerated. Counseled patient on effects their pain medication and /or their medical condition mayhave on their  ability to drive or operate machinery.  Instructed not to drive or operate machinery if drowsy I also discussed with the patient regarding the dangers of combining narcotic pain medication with tranquilizers, alcohol or illegal drugs or taking the medication any way other than prescribed. The dangers were discussed  including respiratory depression and death. Patient was told to tell  all  physicians regarding the medications he is getting from pain clinic. Patient is warned not to take any unprescribed medications over-the-countermedications that can depress breathing . Patient is advised to talk to the pharmacist or physicians if planning to take any over-the-counter medications before  takeing them. Patient is strongly advised to avoid tranquilizers or  relaxants, illegal drugs  or any medications that can depress breathing  Patient is also advised to tell us if there is any changes in their medications from other physicians.             TREATMENT OPTIONS:       Medication Agreement Requirements Met  Continue Opioid therapy  Script written for percocet, fentanyl patch  Follow up appointment made

## 2021-01-27 RX ORDER — FUROSEMIDE 40 MG/1
TABLET ORAL
Qty: 60 TABLET | Refills: 3 | Status: SHIPPED | OUTPATIENT
Start: 2021-01-27 | End: 2021-05-26

## 2021-02-01 ENCOUNTER — OFFICE VISIT (OUTPATIENT)
Dept: FAMILY MEDICINE CLINIC | Age: 76
End: 2021-02-01
Payer: MEDICARE

## 2021-02-01 VITALS
SYSTOLIC BLOOD PRESSURE: 116 MMHG | WEIGHT: 229 LBS | BODY MASS INDEX: 31.02 KG/M2 | HEART RATE: 68 BPM | TEMPERATURE: 98.1 F | DIASTOLIC BLOOD PRESSURE: 64 MMHG | OXYGEN SATURATION: 97 % | HEIGHT: 72 IN

## 2021-02-01 DIAGNOSIS — E66.01 MORBID OBESITY, UNSPECIFIED OBESITY TYPE (HCC): ICD-10-CM

## 2021-02-01 DIAGNOSIS — E11.42 TYPE 2 DIABETES MELLITUS WITH DIABETIC POLYNEUROPATHY, WITH LONG-TERM CURRENT USE OF INSULIN (HCC): Primary | ICD-10-CM

## 2021-02-01 DIAGNOSIS — E55.9 VITAMIN D DEFICIENCY: ICD-10-CM
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
## 2021-02-01 DIAGNOSIS — E78.2 MIXED HYPERLIPIDEMIA: ICD-10-CM

## 2021-02-01 DIAGNOSIS — Z91.81 AT HIGH RISK FOR FALLS: ICD-10-CM

## 2021-02-01 DIAGNOSIS — I10 ESSENTIAL HYPERTENSION: ICD-10-CM

## 2021-02-01 DIAGNOSIS — Z79.4 TYPE 2 DIABETES MELLITUS WITH DIABETIC POLYNEUROPATHY, WITH LONG-TERM CURRENT USE OF INSULIN (HCC): Primary | ICD-10-CM

## 2021-02-01 DIAGNOSIS — I25.810 CORONARY ARTERY DISEASE INVOLVING CORONARY BYPASS GRAFT OF NATIVE HEART WITHOUT ANGINA PECTORIS: ICD-10-CM

## 2021-02-01 DIAGNOSIS — M47.816 SPONDYLOSIS OF LUMBAR REGION WITHOUT MYELOPATHY OR RADICULOPATHY: ICD-10-CM

## 2021-02-01 DIAGNOSIS — S89.302S: ICD-10-CM
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
## 2021-02-01 PROBLEM — S89.302A NONDISPLACED PHYSEAL FRACTURE OF DISTAL END OF LEFT FIBULA: Status: ACTIVE | Noted: 2021-02-01

## 2021-02-01 PROBLEM — M46.97 INFLAMMATORY SPONDYLOPATHY OF LUMBOSACRAL REGION (HCC): Status: RESOLVED | Noted: 2020-07-28 | Resolved: 2021-02-01

## 2021-02-01 PROBLEM — E16.2 HYPOGLYCEMIA: Status: RESOLVED | Noted: 2020-10-29 | Resolved: 2021-02-01

## 2021-02-01 LAB — HBA1C MFR BLD: 7.1 %

## 2021-02-01 PROCEDURE — 83036 HEMOGLOBIN GLYCOSYLATED A1C: CPT | Performed by: FAMILY MEDICINE

## 2021-02-01 PROCEDURE — 1036F TOBACCO NON-USER: CPT | Performed by: FAMILY MEDICINE

## 2021-02-01 PROCEDURE — 99214 OFFICE O/P EST MOD 30 MIN: CPT | Performed by: FAMILY MEDICINE

## 2021-02-01 PROCEDURE — G8427 DOCREV CUR MEDS BY ELIG CLIN: HCPCS | Performed by: FAMILY MEDICINE

## 2021-02-01 PROCEDURE — 0518F FALL PLAN OF CARE DOCD: CPT | Performed by: FAMILY MEDICINE

## 2021-02-01 PROCEDURE — G8484 FLU IMMUNIZE NO ADMIN: HCPCS | Performed by: FAMILY MEDICINE

## 2021-02-01 PROCEDURE — 4040F PNEUMOC VAC/ADMIN/RCVD: CPT | Performed by: FAMILY MEDICINE

## 2021-02-01 PROCEDURE — 3051F HG A1C>EQUAL 7.0%<8.0%: CPT | Performed by: FAMILY MEDICINE

## 2021-02-01 PROCEDURE — G8417 CALC BMI ABV UP PARAM F/U: HCPCS | Performed by: FAMILY MEDICINE

## 2021-02-01 PROCEDURE — 3017F COLORECTAL CA SCREEN DOC REV: CPT | Performed by: FAMILY MEDICINE

## 2021-02-01 PROCEDURE — 2022F DILAT RTA XM EVC RTNOPTHY: CPT | Performed by: FAMILY MEDICINE

## 2021-02-01 PROCEDURE — 1123F ACP DISCUSS/DSCN MKR DOCD: CPT | Performed by: FAMILY MEDICINE

## 2021-02-01 PROCEDURE — 3288F FALL RISK ASSESSMENT DOCD: CPT | Performed by: FAMILY MEDICINE

## 2021-02-01 ASSESSMENT — ENCOUNTER SYMPTOMS
SINUS PRESSURE: 0
COLOR CHANGE: 0
COUGH: 0
SHORTNESS OF BREATH: 0
PHOTOPHOBIA: 0
WHEEZING: 0
CHEST TIGHTNESS: 0
SORE THROAT: 0
BLOOD IN STOOL: 0
ABDOMINAL PAIN: 0
RHINORRHEA: 0
DIARRHEA: 0
ABDOMINAL DISTENTION: 0
VOMITING: 0
NAUSEA: 0
BACK PAIN: 1
CONSTIPATION: 0

## 2021-02-01 ASSESSMENT — PATIENT HEALTH QUESTIONNAIRE - PHQ9
SUM OF ALL RESPONSES TO PHQ QUESTIONS 1-9: 0
SUM OF ALL RESPONSES TO PHQ9 QUESTIONS 1 & 2: 0
2. FEELING DOWN, DEPRESSED OR HOPELESS: 0
SUM OF ALL RESPONSES TO PHQ QUESTIONS 1-9: 0

## 2021-02-01 NOTE — PROGRESS NOTES
Chief Complaint   Patient presents with    Diabetes    Hyperlipidemia    Coronary Artery Disease    Discuss Medications         [facility] Isael Laird  here today for follow up on chronic medical problems, go over labs and/or diagnostic studies, and medication refills. Diabetes, Hyperlipidemia, Coronary Artery Disease, and Discuss Medications      HPI: Patient is here for follow-up on diabetes. A1c has mildly increased to 7.1, patient is on insulin 30-40 units reports he has cut down takes 30 twice a day and symptoms 40 if he takes heavy meals. Patient reports    Patient reports he had a hypoglycemic episode while working in yard, he had a fall and fractured his left fibula. That happened in October. Patient reports he was hungry for long time. He did not have any further episodes. Patient reports he checks his sugars daily twice a day. He denies any balance problems denies any weakness. Hypertension patient was started on lisinopril low-dose reports that making fatigue and tired and he has stopped taking that. Blood pressure is running normal.      Coronary artery disease stable denies any chest pain shortness of breath denies any dyspnea on exertion. Patient follows with cardiologist once in a year. Hyperlipidemia stable on current treatment. Patient follows with pain management for lumbar radiculopathy is on narcotic medications. Denies any side effects. Vitamin D deficiency stable. /64   Pulse 68   Temp 98.1 °F (36.7 °C) (Temporal)   Ht 6' (1.829 m)   Wt 229 lb (103.9 kg)   SpO2 97%   BMI 31.06 kg/m²    Body mass index is 31.06 kg/m². Wt Readings from Last 3 Encounters:   02/01/21 229 lb (103.9 kg)   01/07/21 215 lb (97.5 kg)   12/17/20 215 lb (97.5 kg)        [x]Negative depression screening.   PHQ Scores 2/1/2021 7/28/2020 10/1/2019 3/28/2019 11/27/2018 8/17/2017 5/12/2017   PHQ2 Score 0 0 0 0 0 0 0   PHQ9 Score 0 0 0 0 0 0 0      []1-4 = Minimal depression []5-9 = Milddepression   []10-14 = Moderate depression   []15-19 = Moderately severe depression   []20-27 = Severe depression    Discussed testing with the patient and all questions fully answered.     Hospital Outpatient Visit on 11/05/2020   Component Date Value Ref Range Status    Vit D, 25-Hydroxy 11/05/2020 29.1* 30.0 - 100.0 ng/mL Final    Comment:    Reference Range:  Vitamin D status         Range   Deficiency              <20 ng/mL   Mild Deficiency       20-30 ng/mL   Sufficiency           ng/mL   Toxicity               >100 ng/mL      WBC 11/05/2020 8.0  3.5 - 11.0 k/uL Final    RBC 11/05/2020 4.59  4.5 - 5.9 m/uL Final    Hemoglobin 11/05/2020 14.1  13.5 - 17.5 g/dL Final    Hematocrit 11/05/2020 41.3  41 - 53 % Final    MCV 11/05/2020 90.1  80 - 100 fL Final    MCH 11/05/2020 30.8  26 - 34 pg Final    MCHC 11/05/2020 34.1  31 - 37 g/dL Final    RDW 11/05/2020 13.9  11.5 - 14.9 % Final    Platelets 27/19/2709 164  150 - 450 k/uL Final    MPV 11/05/2020 7.6  6.0 - 12.0 fL Final    NRBC Automated 11/05/2020 NOT REPORTED  per 100 WBC Final    Differential Type 11/05/2020 NOT REPORTED   Final    Seg Neutrophils 11/05/2020 67* 36 - 66 % Final    Lymphocytes 11/05/2020 22* 24 - 44 % Final    Monocytes 11/05/2020 8* 1 - 7 % Final    Eosinophils % 11/05/2020 2  0 - 4 % Final    Basophils 11/05/2020 1  0 - 2 % Final    Immature Granulocytes 11/05/2020 NOT REPORTED  0 % Final    Segs Absolute 11/05/2020 5.50  1.3 - 9.1 k/uL Final    Absolute Lymph # 11/05/2020 1.70  1.0 - 4.8 k/uL Final    Absolute Mono # 11/05/2020 0.60  0.1 - 1.3 k/uL Final    Absolute Eos # 11/05/2020 0.10  0.0 - 0.4 k/uL Final    Basophils Absolute 11/05/2020 0.00  0.0 - 0.2 k/uL Final    Absolute Immature Granulocyte 11/05/2020 NOT REPORTED  0.00 - 0.30 k/uL Final    WBC Morphology 11/05/2020 NOT REPORTED   Final    RBC Morphology 11/05/2020 NOT REPORTED   Final  Platelet Estimate 37/82/9849 NOT REPORTED   Final    Cholesterol, Fasting 11/05/2020 98  <200 mg/dL Final    Comment:    Cholesterol Guidelines:      <200  Desirable   200-240  Borderline      >240  Undesirable         HDL 11/05/2020 39* >40 mg/dL Final    Comment:    HDL Guidelines:    <40     Undesirable   40-59    Borderline    >59     Desirable         LDL Cholesterol 11/05/2020 40  0 - 130 mg/dL Final    Comment:    LDL Guidelines:     <100    Desirable   100-129   Near to/above Desirable   130-159   Borderline      >159   Undesirable     Direct (measured) LDL and calculated LDL are not interchangeable tests.  Chol/HDL Ratio 11/05/2020 2.5  <5 Final            Triglyceride, Fasting 11/05/2020 94  <150 mg/dL Final    Comment:    Triglyceride Guidelines:     <150   Desirable   150-199  Borderline   200-499  High     >499   Very high   Based on AHA Guidelines for fasting triglyceride, October 2012.          VLDL 11/05/2020 NOT REPORTED  1 - 30 mg/dL Final    Glucose 11/05/2020 139* 70 - 99 mg/dL Final    BUN 11/05/2020 25* 8 - 23 mg/dL Final    CREATININE 11/05/2020 1.37* 0.70 - 1.20 mg/dL Final    Bun/Cre Ratio 11/05/2020 NOT REPORTED  9 - 20 Final    Calcium 11/05/2020 9.3  8.6 - 10.4 mg/dL Final    Sodium 11/05/2020 140  135 - 144 mmol/L Final    Potassium 11/05/2020 4.1  3.7 - 5.3 mmol/L Final    Chloride 11/05/2020 103  98 - 107 mmol/L Final    CO2 11/05/2020 27  20 - 31 mmol/L Final    Anion Gap 11/05/2020 10  9 - 17 mmol/L Final    Alkaline Phosphatase 11/05/2020 147* 40 - 129 U/L Final    ALT 11/05/2020 14  5 - 41 U/L Final    AST 11/05/2020 13  <40 U/L Final    Total Bilirubin 11/05/2020 0.41  0.3 - 1.2 mg/dL Final    Total Protein 11/05/2020 7.1  6.4 - 8.3 g/dL Final    Albumin 11/05/2020 3.9  3.5 - 5.2 g/dL Final    Albumin/Globulin Ratio 11/05/2020 NOT REPORTED  1.0 - 2.5 Final    GFR Non- 11/05/2020 51* >60 mL/min Final  GFR  11/05/2020 >60  >60 mL/min Final    GFR Comment 11/05/2020        Final    Comment: Average GFR for 79or more years old:   76 mL/min/1.73sq m  Chronic Kidney Disease:   <60 mL/min/1.73sq m  Kidney failure:   <15 mL/min/1.73sq m              eGFR calculated using average adult body mass.  Additional eGFR calculator available at:        [email or web address]            GFR Staging 11/05/2020 NOT REPORTED   Final         Most recent labs reviewed:     Lab Results   Component Value Date    WBC 8.0 11/05/2020    HGB 14.1 11/05/2020    HCT 41.3 11/05/2020    MCV 90.1 11/05/2020     11/05/2020       @BRIEFLAB(NA,K,CL,CO2,BUN,CREATININE,GLUCOSE,CALCIUM)@     Lab Results   Component Value Date    ALT 14 11/05/2020    AST 13 11/05/2020    ALKPHOS 147 (H) 11/05/2020    BILITOT 0.41 11/05/2020       No results found for: TSHFT4, TSH    Lab Results   Component Value Date    CHOL 100 11/07/2019    CHOL 114 04/27/2019    CHOL 106 10/10/2018     Lab Results   Component Value Date    TRIG 85 11/07/2019    TRIG 129 04/27/2019    TRIG 84 10/10/2018     Lab Results   Component Value Date    HDL 39 (L) 11/05/2020    HDL 43 11/07/2019    HDL 39 (L) 04/27/2019     Lab Results   Component Value Date    LDLCHOLESTEROL 40 11/05/2020    LDLCHOLESTEROL 40 11/07/2019    LDLCHOLESTEROL 49 04/27/2019     Lab Results   Component Value Date    VLDL NOT REPORTED 11/05/2020    VLDL NOT REPORTED 11/07/2019    VLDL NOT REPORTED 04/27/2019     Lab Results   Component Value Date    CHOLHDLRATIO 2.5 11/05/2020    CHOLHDLRATIO 2.3 11/07/2019    CHOLHDLRATIO 2.9 04/27/2019       Lab Results   Component Value Date    LABA1C 7.1 02/01/2021       No results found for: [id number]    No results found for: FOLATE    No results found for: IRON, TIBC, FERRITIN    Lab Results   Component Value Date    VITD25 29.1 (L) 11/05/2020             Current Outpatient Medications   Medication Sig Dispense Refill  furosemide (LASIX) 40 MG tablet TAKE 1 TABLET BY MOUTH TWO TIMES A DAY  60 tablet 3    fentaNYL (DURAGESIC) 25 MCG/HR Place 1 patch onto the skin every 72 hours for 30 days. 10 patch 0    oxyCODONE-acetaminophen (PERCOCET) 5-325 MG per tablet Take 1 tablet by mouth every 6 hours as needed for Pain for up to 30 days. 120 tablet 0    LANTUS SOLOSTAR 100 UNIT/ML injection pen INJECT 30 UNITS UNDER THE SKIN IN THE MORNING AND 40 UNITS IN THE EVENING 45 mL 0    topiramate (TOPAMAX) 50 MG tablet Take 1 tablet by mouth 2 times daily 60 tablet 3    simvastatin (ZOCOR) 20 MG tablet TAKE 1 TABLET BY MOUTH nightly 90 tablet 0    Blood Pressure KIT Dx: HTN. Needs automatic blood pressure machine to monitor his blood pressure. 1 kit 0    omeprazole (PRILOSEC) 20 MG delayed release capsule TAKE 1 CAPSULE BY MOUTH ONE TIME A DAY 90 capsule 2    Insulin Pen Needle (BD PEN NEEDLE ALIZE U/F) 32G X 4 MM MISC use twice daily as directed 300 each 1    naloxone (NARCAN) 4 MG/0.1ML LIQD nasal spray 1 spray by Nasal route as needed (if needed for respiratory depression) 1 each 0    blood glucose test strips (FREESTYLE LITE) strip TEST TWICE DAILY AS DIRECTED 100 strip 1    Probiotic Product (PROBIOTIC-10 PO) Take by mouth      aspirin 81 MG tablet Take 81 mg by mouth daily.  Vitamin D (CHOLECALCIFEROL) 1000 UNITS CAPS capsule Take 2,000 Units by mouth daily       Ascorbic Acid (VITAMIN C) 500 MG tablet Take 1,000 mg by mouth daily       NITROSTAT 0.4 MG SL tablet       finasteride (PROSCAR) 5 MG tablet Take 5 mg by mouth daily.  tamsulosin (FLOMAX) 0.4 MG capsule Take 0.4 mg by mouth daily.  zoster recombinant adjuvanted vaccine [facility]) 50 MCG/0.5ML SUSR injection Inject 0.5 mLs into the muscle See Admin Instructions 1 dose now and repeat in 2-6 months (Patient not taking: Reported on 2/1/2021) 0.5 mL 0    gabapentin (NEURONTIN) 800 MG tablet Take 1 tablet by mouth daily for 90 days.  90 tablet 3 No current facility-administered medications for this visit. Social History     Socioeconomic History    Marital status:       Spouse name: Not on file    Number of children: Not on file    Years of education: Not on file    Highest education level: Not on file   Occupational History    Occupation: retired [employer]   Social Needs    Financial resource strain: Not on file    Food insecurity     Worry: Not on file     Inability: Not on file   [employer] needs     Medical: Not on file     Non-medical: Not on file   Tobacco Use    Smoking status: Former Smoker     Packs/day: 1.00     Years: 30.00     Pack years: 30.00     Types: Cigarettes     Quit date: 2002     Years since quittin.0    Smokeless tobacco: Never Used   Substance and Sexual Activity    Alcohol use: Not Currently     Comment: rare    Drug use: No    Sexual activity: Never   Lifestyle    Physical activity     Days per week: Not on file     Minutes per session: Not on file    Stress: Not on file   Relationships    Social connections     Talks on phone: Not on file     Gets together: Not on file     Attends Adventism service: Not on file     Active member of club or organization: Not on file     Attends meetings of clubs or organizations: Not on file     Relationship status: Not on file    Intimate partner violence     Fear of current or ex partner: Not on file     Emotionally abused: Not on file     Physically abused: Not on file     Forced sexual activity: Not on file   Other Topics Concern    Not on file   Social History Narrative    Not on file     Counseling given: Yes        Family History   Problem Relation Age of Onset    Heart Failure Father     Cancer Mother         breast    Cancer Maternal Grandfather              -rest of complaints with corresponding details per ROS The patient's past medical, surgical, social, and family history as well as his current medications and allergies were reviewed as documented intoday's encounter. Review of Systems   Constitutional: Negative for activity change, appetite change, diaphoresis, fatigue and unexpected weight change. HENT: Negative for congestion, ear discharge, postnasal drip, rhinorrhea, sinus pressure and sore throat. Eyes: Negative for photophobia and visual disturbance. Respiratory: Negative for cough, chest tightness, shortness of breath and wheezing. Cardiovascular: Negative for chest pain, palpitations and leg swelling. Gastrointestinal: Negative for abdominal distention, abdominal pain, blood in stool, constipation, diarrhea, nausea and vomiting. Endocrine: Negative for polyuria. Genitourinary: Negative for difficulty urinating, frequency and urgency. Musculoskeletal: Positive for arthralgias, back pain, gait problem and joint swelling. Negative for neck pain and neck stiffness. Skin: Negative for color change, rash and wound. Allergic/Immunologic: Positive for immunocompromised state. Neurological: Positive for weakness and numbness. Negative for dizziness, speech difficulty, light-headedness and headaches. Psychiatric/Behavioral: Negative for agitation, behavioral problems, decreased concentration, dysphoric mood, sleep disturbance and suicidal ideas. The patient is nervous/anxious. The patient is not hyperactive. Physical Exam  Vitals signs and nursing note reviewed. Constitutional:       Appearance: Normal appearance. He is obese. HENT:      Head: Normocephalic and atraumatic. Mouth/Throat:      Mouth: Mucous membranes are moist.   Eyes:      Pupils: Pupils are equal, round, and reactive to light. Neck:      Musculoskeletal: Normal range of motion. Cardiovascular:      Rate and Rhythm: Normal rate and regular rhythm. Heart sounds: Normal heart sounds. Pulmonary:      Effort: Pulmonary effort is normal.      Breath sounds: Normal breath sounds. No wheezing or rhonchi. Abdominal:      General: There is no distension. Palpations: Abdomen is soft. Tenderness: There is no abdominal tenderness. Musculoskeletal:      Lumbar back: He exhibits decreased range of motion, deformity, pain and spasm. He exhibits no tenderness. Right foot: Normal range of motion. Left foot: Normal range of motion. Lymphadenopathy:      Cervical: No cervical adenopathy. Skin:     General: Skin is warm. Findings: No rash. Neurological:      Mental Status: He is alert and oriented to person, place, and time. Cranial Nerves: Cranial nerves are intact. Sensory: Sensation is intact. Motor: Motor function is intact. No weakness. Coordination: Coordination is intact. Romberg sign negative. Gait: Gait is intact. Gait and tandem walk normal.   Psychiatric:         Attention and Perception: Attention and perception normal.         Mood and Affect: Mood is anxious. Speech: Speech normal.         Behavior: Behavior normal. Behavior is not agitated. Thought Content: Thought content normal. Thought content is not paranoid. Cognition and Memory: Cognition normal.             ASSESSMENT AND PLAN      1. Type 2 diabetes mellitus with diabetic polyneuropathy, with long-term current use of insulin ([facility])  A1c has mildly increased but controlled. Discussed with patient continue same medications and cut down on insulin if you notice low sugars.  - POCT glycosylated hemoglobin (Hb A1C)    2. Mixed hyperlipidemia  Stable on recent blood work. 3. Essential hypertension  Controlled discontinue lisinopril due to side effects continue to monitor. 4. Coronary artery disease involving coronary bypass graft of native heart without angina pectoris  Stable continue to follow with cardiologist    5.  Vitamin D deficiency Stable continue vitamin D    6. Fall, sequela  Patient denies any physical therapy needs or balance problems. Monitor your blood sugars    7. Nondisplaced physeal fracture of distal end of left fibula, sequela  Resolved    8. Morbid obesity, unspecified obesity type ([address]  Stable continue lifestyle changes    9. Spondylosis of lumbar region without myelopathy or radiculopathy  Follows with pain management    10. At high risk for falls  Discussed about the adjustment of medications. Continue to monitor. Orders Placed This Encounter   Procedures    POCT glycosylated hemoglobin (Hb A1C)         Medications Discontinued During This Encounter   Medication Reason    lisinopril (PRINIVIL;ZESTRIL) 2.5 MG tablet Side effects       Izzy Jackson received counseling on the following healthy behaviors: nutrition, exercise and medication adherence  Reviewed prior labs and health maintenance  Continue current medications, diet and exercise. Discussed use, benefit, and side effects of prescribed medications. Barriers to medication compliance addressed. Patient given educational materials - see patient instructions  Was a self-tracking handout given in paper form or via [employer]? Yes    Requested Prescriptions      No prescriptions requested or ordered in this encounter       All patient questions answered. Patient voiced understanding. Quality Measures    Body mass index is 31.06 kg/m². Elevated. Weight control planned discussed Healthy diet and regular exercise. BP: 116/64. Blood pressure is normal. Treatment plan consists of No treatment change needed. Fall Risk 2/1/2021 10/1/2019 11/27/2018 8/17/2017 5/12/2017 2/10/2017 9/4/2015   2 or more falls in past year? yes no no no no no yes   Fall with injury in past year?  yes no no no no no yes On the basis of positive falls risk screening, assessment and plan is as follows: in-office gait and balance testing performed using The Timed Up and Go Test was negative for increased falls risk- no further intervention is currently indicated.

## 2021-02-01 NOTE — PROGRESS NOTES
Visit Information    Have you changed or started any medications since your last visit including any over-the-counter medicines, vitamins, or herbal medicines? no   Are you having any side effects from any of your medications? -  no  Have you stopped taking any of your medications? Is so, why? -  no    Have you seen any other physician or provider since your last visit? Yes - Records Obtained  Have you had any other diagnostic tests since your last visit? Yes - Records Obtained  Have you been seen in the emergency room and/or had an admission to a hospital since we last saw you? No  Have you had your routine dental cleaning in the past 6 months? no    Have you activated your [employer] account? If not, what are your barriers?  Yes     Patient Care Team:  Jonathan Rm MD as PCP - General (Family Medicine)  Jonathan Rm MD as PCP - [facility] Provider  Radha Eduardo MD as Referring Physician (Cardiology)  Danae Johnson MD as Consulting Physician (Gastroenterology)  Walter Espinoza MD as Consulting Physician (Internal Medicine Cardiovascular Disease)  Nataliia Bowman MD as Consulting Physician (Pain Management)  Trav Cornelius DPM as Consulting Physician (Podiatry)  Roosevelt Ware MD as Surgeon (Ophthalmology)    Medical History Review  Past Medical, Family, and Social History reviewed and does contribute to the patient presenting condition    Health Maintenance   Topic Date Due    COVID-19 Vaccine (1 of 2) 03/07/1961    DTaP/Tdap/Td vaccine (1 - Tdap) 03/07/1965    Shingles Vaccine (1 of 2) 03/07/1996    Annual Wellness Visit (AWV)  05/29/2019    Diabetic retinal exam  03/16/2021    Diabetic microalbuminuria test  07/28/2021    A1C test (Diabetic or Prediabetic)  10/29/2021    Lipid screen  11/05/2021    Potassium monitoring  11/05/2021    Creatinine monitoring  11/05/2021    Diabetic foot exam  01/07/2022    Colon cancer screen colonoscopy  02/25/2029    Flu vaccine  Completed

## 2021-02-01 NOTE — PATIENT INSTRUCTIONS
Schedule a Vaccine  If you qualify for the vaccine as a Phase 1B recipient, call the [facility]) COVID-19 Vaccination Hotline to schedule your appointment or to get additional information about the [facility] locations which are offering the COVID-19 vaccine. To be 94% effective, it's important that you receive two doses of one of the COVID-19 vaccines. -If you are receiving the Monterroso Peter vaccine, your second shot will be scheduled as close to 21 days after the first shot as possible. -If you are receiving the Moderna vaccine, your second shot will be scheduled as close to 28 days after the first shot as possible. Call [phone]      Links to [facility]) website and [facility] website      [email or web address]    [email or web address]

## 2021-02-10 RX ORDER — SIMVASTATIN 20 MG
TABLET ORAL
Qty: 90 TABLET | Refills: 0 | Status: SHIPPED | OUTPATIENT
Start: 2021-02-10 | End: 2021-05-12

## 2021-02-16 DIAGNOSIS — K58.9 IRRITABLE BOWEL SYNDROME WITHOUT DIARRHEA: ICD-10-CM

## 2021-02-17 RX ORDER — OMEPRAZOLE 20 MG/1
CAPSULE, DELAYED RELEASE ORAL
Qty: 90 CAPSULE | Refills: 0 | Status: SHIPPED | OUTPATIENT
Start: 2021-02-17 | End: 2021-05-18

## 2021-02-17 NOTE — TELEPHONE ENCOUNTER
Please Approve or Refuse.   Send to Pharmacy per Pt's Request:    Next Visit Date:  5/3/2021   Last Visit Date: 2/1/2021    Hemoglobin A1C (%)   Date Value   02/01/2021 7.1   10/29/2020 6.4   03/05/2020 6.5             ( goal A1C is < 7)   BP Readings from Last 3 Encounters:   02/01/21 116/64   10/29/20 110/70   07/28/20 126/78          (goal 120/80)  BUN   Date Value Ref Range Status   11/05/2020 25 (H) 8 - 23 mg/dL Final     CREATININE   Date Value Ref Range Status   11/05/2020 1.37 (H) 0.70 - 1.20 mg/dL Final     Potassium   Date Value Ref Range Status   11/05/2020 4.1 3.7 - 5.3 mmol/L Final

## 2021-02-25 ENCOUNTER — HOSPITAL ENCOUNTER (OUTPATIENT)
Dept: PAIN MANAGEMENT | Age: 76
Discharge: HOME | End: 2021-02-25
Payer: MEDICARE

## 2021-02-25 DIAGNOSIS — M50.30 DDD (DEGENERATIVE DISC DISEASE), CERVICAL: ICD-10-CM

## 2021-02-25 DIAGNOSIS — M47.899 FACET SYNDROME: ICD-10-CM

## 2021-02-25 DIAGNOSIS — M48.062 SPINAL STENOSIS OF LUMBAR REGION WITH NEUROGENIC CLAUDICATION: ICD-10-CM

## 2021-02-25 DIAGNOSIS — M51.36 DDD (DEGENERATIVE DISC DISEASE), LUMBAR: ICD-10-CM

## 2021-02-25 DIAGNOSIS — M47.26 OSTEOARTHRITIS OF SPINE WITH RADICULOPATHY, LUMBAR REGION: ICD-10-CM

## 2021-02-25 DIAGNOSIS — F11.90 CHRONIC, CONTINUOUS USE OF OPIOIDS: ICD-10-CM

## 2021-02-25 DIAGNOSIS — M00.862 ARTHRITIS OF LEFT KNEE DUE TO OTHER BACTERIA (HCC): ICD-10-CM

## 2021-02-25 DIAGNOSIS — E08.41 DIABETIC MONONEUROPATHY ASSOCIATED WITH DIABETES MELLITUS DUE TO UNDERLYING CONDITION (HCC): ICD-10-CM

## 2021-02-25 DIAGNOSIS — E11.610 CHARCOT FOOT DUE TO DIABETES MELLITUS (HCC): ICD-10-CM

## 2021-02-25 DIAGNOSIS — K59.03 DRUG-INDUCED CONSTIPATION: ICD-10-CM

## 2021-02-25 DIAGNOSIS — M47.816 SPONDYLOSIS OF LUMBAR REGION WITHOUT MYELOPATHY OR RADICULOPATHY: Primary | ICD-10-CM

## 2021-02-25 DIAGNOSIS — Z51.81 MEDICATION MONITORING ENCOUNTER: ICD-10-CM

## 2021-02-25 PROCEDURE — 99213 OFFICE O/P EST LOW 20 MIN: CPT

## 2021-02-25 PROCEDURE — 99213 OFFICE O/P EST LOW 20 MIN: CPT | Performed by: NURSE PRACTITIONER

## 2021-02-25 RX ORDER — FENTANYL 25 UG/H
1 PATCH TRANSDERMAL
Qty: 10 PATCH | Refills: 0 | Status: SHIPPED | OUTPATIENT
Start: 2021-02-28 | End: 2021-03-24 | Stop reason: SDUPTHER

## 2021-02-25 RX ORDER — GABAPENTIN 800 MG/1
800 TABLET ORAL DAILY
Qty: 90 TABLET | Refills: 3 | Status: SHIPPED | OUTPATIENT
Start: 2021-02-28 | End: 2022-02-16 | Stop reason: SDUPTHER

## 2021-02-25 RX ORDER — NALOXONE HYDROCHLORIDE 4 MG/.1ML
1 SPRAY NASAL PRN
Qty: 1 EACH | Refills: 0 | Status: SHIPPED | OUTPATIENT
Start: 2021-02-25

## 2021-02-25 ASSESSMENT — ENCOUNTER SYMPTOMS
RESPIRATORY NEGATIVE: 1
GASTROINTESTINAL NEGATIVE: 1
BACK PAIN: 1
EYES NEGATIVE: 1

## 2021-02-25 NOTE — PROGRESS NOTES
Almaz 89 PROGRESS NOTE      Patient  completed [x]  video visit   []   phone call:      Minutes :       [x]    to  review Medication Agreement    []  Follow up after procedure   []  Discuss treatment options      Location:  Provider:  working from    [x]    home    []   [facility] - GIANNI BLANC ,   patient at  home       Chief Complaint:  Low back pain    He c/o low back pain radiating down his legs. His pain is unchanged. His pain is managed with fentanyl patch,He fractured left fibia in November, he still has some pain. His sleep is good. He had his first covid vaccine. His blood sugar  Was 57 this am.  No Ed visits. Back Pain  This is a chronic problem. The problem occurs constantly. The problem is unchanged. The pain is present in the lumbar spine. The quality of the pain is described as aching. Radiates to: legs. The pain is at a severity of 6/10. The pain is moderate. The pain is the same all the time. Exacerbated by: nothing. Associated symptoms include numbness. He has tried analgesics for the symptoms.      Treatment goals:  Functional status: walk farther      Aberrancy:   Any alcoholic beverages  no          Any illegal drugs   no      Analgesia:    6                 Adverse  Effects :no      ADL;s :back exercises      Data:    When was thelast UDS:   8-2-2020         Was the UDS appropriate:yes    Record/Diagnostics Review:      As above, I did review the imaging     8/6/2020  7:00 PM - [place], [facility] Incoming Lab Results From [facility]    Component Value Ref Range & Units Status Collected Lab   Pain Management Drug Panel Interp, Urine Consistent   Final 08/02/2020  9:53 AM [facility]   (NOTE)   ________________________________________________________________   DRUGS EXPECTED:   FENTANYL   OXYCODONE   ________________________________________________________________   CONSISTENT with medications provided:   FENTANYL: based on fentanyl, norfentanyl   OXYCODONE: based on oxycodone ________________________________________________________________   INTERPRETIVE INFORMATION: Pain Mgt Rogers, Mass Spec/EMIT, Ur,                            Interp   Interpretation depends on accuracy and completeness of patient   medication information submitted by client. 6-Acetylmorphine, Ur Not Detected   Final 08/0          FINAL    Procedure: Aimee Louise STANDARD    CDX  01/03/2015     [id number]   Reason for Exam:  ^fall/ pain       FULL RESULT:   LUMBAR SPINE STANDARD       INDICATION:   fall/ pain.           COMPARISON:    12/27/2007       FINDINGS:   AP, crosstable lateral and cone-down lumbosacral views obtained.       There is multilevel facet arthropathy and anterior spurring. There is    redemonstration of mild to moderate wedge compression deformity of L1    vertebral body. No acute fracture is evident. No paraspinal mass. SI    joints show degenerative changes. Cholecystectomy noted.               IMPRESSION:       Old L1 compression. No acute osseous abnormality.       Report electronically signed by Robyn Franco M.D. on 1/3/2015 4:27 PM    1/3/2015 4:27 PM   Transcribed by: [facility], [place], operated by [facility] on [place]  3 2015  4:29P    Read by: Allyson Rodriguez M.D.  [id number] on [place]  3 2015  4:29P    Electronically Signed by: Leigh Russo. Allyson Rodriguez M.D. on: Charlioney Felty 2015     4:29P                Pill count: appropriate  fill date :2-    Morphine equivalent dose as reported on OARRS:90 has narcan at home  Periodic Controlled Substance Monitoring: Possible medication side effects, risk of tolerance/dependence & alternative treatments discussed., No signs of potential drug abuse or diversion identified., Obtaining appropriate analgesic effect of treatment., Assessed functional status. (TACOS Hammer CNP)  Chronic Pain > 80 MEDD: Co-prescribed Naloxone., Obtained or confirmed \"Medication Contract\" on file.  TACOS Hammer CNP) Review ofOARRS does not show any aberrant prescription behavior. Medication is helping the patient stay active. Patient denies any side effects and reports adequate analgesia. No sign of misuse/abuse.             Past Medical History:   Diagnosis Date    Abdominal pain     Benign prostatic hypertrophy     C. difficile colitis     CAD (coronary artery disease) 1/3/12    s/p CABGx3    Colon polyp 02/25/2019    tubular adenoma    Constipation     [facility] [address]     Diarrhea     Diarrhea     [facility])     left calf    Ejection fraction < 50%     Gallstones     Heartburn     Hx of blood clots     Hyperlipidemia     Kidney stones     MI (myocardial infarction) ([facility] [address]     2011    Mitral regurgitation     MRSA (methicillin resistant staph aureus) culture positive     Numbness and tingling     hands and feet    Rib fractures 1-2015    right    Wears glasses     readers       Past Surgical History:   Procedure Laterality Date    APPENDECTOMY      CARDIAC CATHETERIZATION  12/29/11    CHOLECYSTECTOMY      COLONOSCOPY  01/11/2012    wnl, 10 yr recall    COLONOSCOPY  11/28/2018    ATTEMPTED NOT CLEAN (N/A )    COLONOSCOPY  02/25/2019    tubular adenoma    COLONOSCOPY N/A 2/25/2019    COLONOSCOPY WITH BIOPSY [address] [facility]      x 1    CORONARY ARTERY BYPASS GRAFT  1/3/12    x3    KNEE ARTHROSCOPY      left    KNEE SURGERY Left     I&D    OTHER SURGICAL HISTORY Left 3/7/2016    plantar plane &  exostectomy medial foot left    NC COLON CA SCRN NOT HI RSK IND N/A 11/28/2018    COLONOSCOPY ATTEMPTED NOT CLEAN performed by Tanner Vazquez MD at [address] ENDOSCOPY  11-3-15    VENA CAVA FILTER PLACEMENT      WRIST SURGERY      I&D       Allergies   Allergen Reactions    Flagyl [Metronidazole] Hives    Metronidazole      Other reaction(s): Vomiting Current Outpatient Medications:     simvastatin (ZOCOR) 20 MG tablet, TAKE 1 TABLET BY MOUTH EVERY DAY AT NIGHT, Disp: 90 tablet, Rfl: 0    furosemide (LASIX) 40 MG tablet, TAKE 1 TABLET BY MOUTH TWO TIMES A DAY , Disp: 60 tablet, Rfl: 3    fentaNYL (DURAGESIC) 25 MCG/HR, Place 1 patch onto the skin every 72 hours for 30 days. , Disp: 10 patch, Rfl: 0    oxyCODONE-acetaminophen (PERCOCET) 5-325 MG per tablet, Take 1 tablet by mouth every 6 hours as needed for Pain for up to 30 days. , Disp: 120 tablet, Rfl: 0    LANTUS SOLOSTAR 100 UNIT/ML injection pen, INJECT 30 UNITS UNDER THE SKIN IN THE MORNING AND 40 UNITS IN THE EVENING, Disp: 45 mL, Rfl: 0    topiramate (TOPAMAX) 50 MG tablet, Take 1 tablet by mouth 2 times daily, Disp: 60 tablet, Rfl: 3    gabapentin (NEURONTIN) 800 MG tablet, Take 1 tablet by mouth daily for 90 days. , Disp: 90 tablet, Rfl: 3    Probiotic Product (PROBIOTIC-10 PO), Take by mouth, Disp: , Rfl:     aspirin 81 MG tablet, Take 81 mg by mouth daily. , Disp: , Rfl:     Vitamin D (CHOLECALCIFEROL) 1000 UNITS CAPS capsule, Take 2,000 Units by mouth daily , Disp: , Rfl:     Ascorbic Acid (VITAMIN C) 500 MG tablet, Take 1,000 mg by mouth daily , Disp: , Rfl:     finasteride (PROSCAR) 5 MG tablet, Take 5 mg by mouth daily. , Disp: , Rfl:     tamsulosin (FLOMAX) 0.4 MG capsule, Take 0.4 mg by mouth daily.   , Disp: , Rfl:     omeprazole (PRILOSEC) 20 MG delayed release capsule, TAKE 1 CAPSULE BY MOUTH ONE TIME A DAY , Disp: 90 capsule, Rfl: 0    zoster recombinant adjuvanted vaccine [facility]) 50 MCG/0.5ML SUSR injection, Inject 0.5 mLs into the muscle See Admin Instructions 1 dose now and repeat in 2-6 months (Patient not taking: Reported on 2/1/2021), Disp: 0.5 mL, Rfl: 0    Insulin Pen Needle (BD PEN NEEDLE ALIZE U/F) 32G X 4 MM MISC, use twice daily as directed, Disp: 300 each, Rfl: 1   naloxone (NARCAN) 4 MG/0.1ML LIQD nasal spray, 1 spray by Nasal route as needed (if needed for respiratory depression), Disp: 1 each, Rfl: 0    blood glucose test strips (FREESTYLE LITE) strip, TEST TWICE DAILY AS DIRECTED, Disp: 100 strip, Rfl: 1    NITROSTAT 0.4 MG SL tablet, , Disp: , Rfl:     Family History   Problem Relation Age of Onset    Heart Failure Father     Cancer Mother         breast    Cancer Maternal Grandfather        Social History     Socioeconomic History    Marital status:       Spouse name: Not on file    Number of children: Not on file    Years of education: Not on file    Highest education level: Not on file   Occupational History    Occupation: retired [employer]   Social Needs    Financial resource strain: Not on file    Food insecurity     Worry: Not on file     Inability: Not on file   [email or web address] needs     Medical: Not on file     Non-medical: Not on file   Tobacco Use    Smoking status: Former Smoker     Packs/day: 1.00     Years: 30.00     Pack years: 30.00     Types: Cigarettes     Quit date: 2002     Years since quittin.0    Smokeless tobacco: Never Used   Substance and Sexual Activity    Alcohol use: Not Currently     Comment: rare    Drug use: No    Sexual activity: Never   Lifestyle    Physical activity     Days per week: Not on file     Minutes per session: Not on file    Stress: Not on file   Relationships    Social connections     Talks on phone: Not on file     Gets together: Not on file     Attends Hindu service: Not on file     Active member of club or organization: Not on file     Attends meetings of clubs or organizations: Not on file     Relationship status: Not on file    Intimate partner violence     Fear of current or ex partner: Not on file     Emotionally abused: Not on file     Physically abused: Not on file     Forced sexual activity: Not on file   Other Topics Concern    Not on file   Social History Narrative  Not on file         Review of Systems:  Review of Systems   Constitution: Negative. HENT: Negative. Eyes: Negative. Cardiovascular: Negative. Respiratory: Negative. Endocrine:        Diabetic, blood sugar 57   Hematologic/Lymphatic: Bruises/bleeds easily. Skin: Negative. Musculoskeletal: Positive for back pain and joint pain. Gastrointestinal: Negative. Genitourinary: Negative. Neurological: Positive for loss of balance and numbness. Psychiatric/Behavioral: Negative. Physical Exam:  There were no vitals taken for this visit. Physical Exam  HENT:      Head: Normocephalic. Pulmonary:      Effort: Pulmonary effort is normal.   Skin:         Neurological:      Mental Status: He is alert and oriented to person, place, and time. Psychiatric:         Mood and Affect: Mood normal.         Thought Content: Thought content normal.           Assessment:    Problem List Items Addressed This Visit     Spondylosis of lumbar region without myelopathy or radiculopathy - Primary    Osteoarthritis of spine with radiculopathy, lumbar region    Medication monitoring encounter    Lumbar spinal stenosis    Drug-induced constipation    DDD (degenerative disc disease), lumbar    DDD (degenerative disc disease), cervical    Chronic, continuous use of opioids              Treatment Plan:  DISCUSSION: Treatment options discussed withpatient and all questions answered to patient's satisfaction.      Possible side effects, risk of tolerance and or dependence and alternative treatments discussed    Obtaining appropriate analgesic effect of treatment   No signs of potential drug abuse or diversion identified [x] Ill effects of being on chronic pain medications such as sleep disturbances, respiratory depression, hormonal changes, withdrawal symptoms, chronic opioid dependence and tolerance as well as risk of taking opioids with Benzodiazepines and taking opioids along with alcohol,  werediscussed with patient. I had asked the patient to minimize medication use and utilize pain medications only for uncontrolled rest pain or pain with exertional activities. I advised patient not to self-escalate painmedications without consulting with us. At each of patient's future visits we will try to taper pain medications, while adjusting the adjunct medications, and re-evaluating for Physical Therapy to improve spinal andjoint strength. We will continue to have discussions to decrease pain medications as tolerated. Counseled patient on effects their pain medication and /or their medical condition mayhave on their  ability to drive or operate machinery. Instructed not to drive or operate machinery if drowsy     I also discussed with the patient regarding the dangers of combining narcotic pain medication with tranquilizers, alcohol or illegal drugs or taking the medication any way other than prescribed. The dangers were discussed  including respiratory depression and death. Patient was told to tell  all  physicians regarding the medications he is getting from pain clinic. Patient is warned not to take any unprescribed medications over-the-countermedications that can depress breathing . Patient is advised to talk to the pharmacist or physicians if planning to take any over-the-counter medications before  takeing them. Patient is strongly advised to avoid tranquilizers or  relaxants, illegal drugs  or any medications that can depress breathing  Patient is also advised to tell us if there is any changes in their medications from other physicians.     1. Will renew narcan        TREATMENT OPTIONS: Medication Agreement Requirements Met  Continue Opioid therapy  Script written for  Fentanyl patch, gabapentin, narcan  Follow up appointment made
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
## 2021-03-15 DIAGNOSIS — E11.42 TYPE 2 DIABETES MELLITUS WITH DIABETIC POLYNEUROPATHY, WITH LONG-TERM CURRENT USE OF INSULIN (HCC): ICD-10-CM

## 2021-03-15 DIAGNOSIS — Z79.4 TYPE 2 DIABETES MELLITUS WITH DIABETIC POLYNEUROPATHY, WITH LONG-TERM CURRENT USE OF INSULIN (HCC): ICD-10-CM

## 2021-03-15 RX ORDER — INSULIN GLARGINE 100 [IU]/ML
INJECTION, SOLUTION SUBCUTANEOUS
Qty: 45 ML | Refills: 0 | Status: SHIPPED | OUTPATIENT
Start: 2021-03-15 | End: 2021-05-18

## 2021-03-16 RX ORDER — PEN NEEDLE, DIABETIC 32GX 5/32"
NEEDLE, DISPOSABLE MISCELLANEOUS
Qty: 100 EACH | Refills: 0 | Status: SHIPPED | OUTPATIENT
Start: 2021-03-16 | End: 2021-05-12

## 2021-03-18 ENCOUNTER — OFFICE VISIT (OUTPATIENT)
Dept: PODIATRY | Age: 76
End: 2021-03-18
Payer: MEDICARE

## 2021-03-18 VITALS — HEIGHT: 72 IN | WEIGHT: 229 LBS | BODY MASS INDEX: 31.02 KG/M2

## 2021-03-18 DIAGNOSIS — B35.1 ONYCHOMYCOSIS: Primary | ICD-10-CM

## 2021-03-18 DIAGNOSIS — M14.672 CHARCOT'S JOINT OF LEFT FOOT: ICD-10-CM

## 2021-03-18 DIAGNOSIS — E11.42 TYPE 2 DIABETES MELLITUS WITH DIABETIC POLYNEUROPATHY, WITH LONG-TERM CURRENT USE OF INSULIN (HCC): ICD-10-CM

## 2021-03-18 DIAGNOSIS — Z79.4 TYPE 2 DIABETES MELLITUS WITH DIABETIC POLYNEUROPATHY, WITH LONG-TERM CURRENT USE OF INSULIN (HCC): ICD-10-CM

## 2021-03-18 PROCEDURE — 11721 DEBRIDE NAIL 6 OR MORE: CPT | Performed by: PODIATRIST

## 2021-03-18 ASSESSMENT — ENCOUNTER SYMPTOMS
CONSTIPATION: 0
VOMITING: 0
DIARRHEA: 0
COLOR CHANGE: 0
NAUSEA: 0

## 2021-03-18 NOTE — PROGRESS NOTES
monofilament and pinprick sensation, vibration using a 128-Hz tuning fork, ankle reflexes, visual skin inspection, vascular exam including assessment of pedal pulses, orthopedic exam for deformities, and shoe inspection. Increased risk factors noted on the diabetic foot exam include decreased sensory exam and peripheral neuropathy. Shoegear inspected and found to be appropriate size and wear. Diabetic shoes and insoles: This patient would benefit from extra depth diabetic shoes and custom inserts. I feel he/she qualifies due to the above performed physical exam and diagnosis. I will do the appropriate paperwork and send it to their primary care physician. Then the patient will be measured for shoes and custom inserts to properly off load and protect his/her feet. No orders of the defined types were placed in this encounter. Follow up 9 weeks.

## 2021-03-24 ENCOUNTER — HOSPITAL ENCOUNTER (OUTPATIENT)
Dept: PAIN MANAGEMENT | Age: 76
Discharge: HOME | End: 2021-03-24
Payer: MEDICARE

## 2021-03-24 DIAGNOSIS — K59.03 DRUG-INDUCED CONSTIPATION: ICD-10-CM

## 2021-03-24 DIAGNOSIS — M48.062 SPINAL STENOSIS OF LUMBAR REGION WITH NEUROGENIC CLAUDICATION: ICD-10-CM

## 2021-03-24 DIAGNOSIS — M47.816 SPONDYLOSIS OF LUMBAR REGION WITHOUT MYELOPATHY OR RADICULOPATHY: Primary | ICD-10-CM

## 2021-03-24 DIAGNOSIS — M51.36 DDD (DEGENERATIVE DISC DISEASE), LUMBAR: ICD-10-CM

## 2021-03-24 DIAGNOSIS — M50.30 DDD (DEGENERATIVE DISC DISEASE), CERVICAL: ICD-10-CM

## 2021-03-24 DIAGNOSIS — E11.610 CHARCOT FOOT DUE TO DIABETES MELLITUS (HCC): ICD-10-CM

## 2021-03-24 DIAGNOSIS — M47.899 FACET SYNDROME: ICD-10-CM

## 2021-03-24 DIAGNOSIS — M47.26 OSTEOARTHRITIS OF SPINE WITH RADICULOPATHY, LUMBAR REGION: ICD-10-CM

## 2021-03-24 DIAGNOSIS — E09.41 DIABETIC MONONEUROPATHY ASSOCIATED WITH DRUG OR CHEMICAL INDUCED DIABETES MELLITUS (HCC): ICD-10-CM

## 2021-03-24 DIAGNOSIS — M00.862 ARTHRITIS OF LEFT KNEE DUE TO OTHER BACTERIA (HCC): ICD-10-CM

## 2021-03-24 DIAGNOSIS — E08.41 DIABETIC MONONEUROPATHY ASSOCIATED WITH DIABETES MELLITUS DUE TO UNDERLYING CONDITION (HCC): ICD-10-CM

## 2021-03-24 DIAGNOSIS — E11.42 DIABETIC PERIPHERAL NEUROPATHY ASSOCIATED WITH TYPE 2 DIABETES MELLITUS (HCC): ICD-10-CM

## 2021-03-24 DIAGNOSIS — Z51.81 MEDICATION MONITORING ENCOUNTER: ICD-10-CM

## 2021-03-24 PROCEDURE — 99213 OFFICE O/P EST LOW 20 MIN: CPT

## 2021-03-24 PROCEDURE — 99213 OFFICE O/P EST LOW 20 MIN: CPT | Performed by: NURSE PRACTITIONER

## 2021-03-24 RX ORDER — FENTANYL 25 UG/H
1 PATCH TRANSDERMAL
Qty: 10 PATCH | Refills: 0 | Status: SHIPPED | OUTPATIENT
Start: 2021-03-30 | End: 2021-04-28 | Stop reason: SDUPTHER

## 2021-03-24 RX ORDER — OXYCODONE HYDROCHLORIDE AND ACETAMINOPHEN 5; 325 MG/1; MG/1
1 TABLET ORAL EVERY 6 HOURS PRN
Qty: 120 TABLET | Refills: 0 | Status: SHIPPED | OUTPATIENT
Start: 2021-03-25 | End: 2021-06-25 | Stop reason: SDUPTHER

## 2021-03-24 ASSESSMENT — ENCOUNTER SYMPTOMS
EYES NEGATIVE: 1
RESPIRATORY NEGATIVE: 1
BACK PAIN: 1
CONSTIPATION: 1

## 2021-03-24 NOTE — PROGRESS NOTES
medications provided:   FENTANYL: based on fentanyl, norfentanyl   OXYCODONE: based on oxycodone   ________________________________________________________________   INTERPRETIVE INFORMATION: Pain Mgt Rogers, Mass Spec/EMIT, Ur,                            Interp   Interpretation depends on accuracy and completeness of patient   medication information submitted by client. 6-Acetylmorphine, Ur Not Detected   Final          FINAL    Procedure: Katheryn Yan STANDARD    CDX  01/03/2015     [id number]   Reason for Exam:  ^fall/ pain       FULL RESULT:   LUMBAR SPINE STANDARD       INDICATION:   fall/ pain.           COMPARISON:    12/27/2007       FINDINGS:   AP, crosstable lateral and cone-down lumbosacral views obtained.       There is multilevel facet arthropathy and anterior spurring. There is    redemonstration of mild to moderate wedge compression deformity of L1    vertebral body. No acute fracture is evident. No paraspinal mass. SI    joints show degenerative changes. Cholecystectomy noted.               IMPRESSION:       Old L1 compression. No acute osseous abnormality.       Report electronically signed by Barbara Cooper M.D. on 1/3/2015 4:27 PM    1/3/2015 4:27 PM   Transcribed by: [facility] on [place]  3 2015  4:29P    Read by: Oswald Hicks M.D.  [id number] on [place]  3 2015  4:29P    Electronically Signed by: Lidia Hicks M.D. on: Aleksandra Young  3 2015     4:29P                                                                                                     Pill count: appropriate    fill date :3-    Morphine equivalent dose as reported on OARRS: 60  Periodic Controlled Substance Monitoring: Possible medication side effects, risk of tolerance/dependence & alternative treatments discussed., No signs of potential drug abuse or diversion identified. , Assessed functional status., Obtaining appropriate analgesic effect of treatment.  Roberto Jacobsen, APRN - CNP)  Review ofOARRS does not show any aberrant prescription behavior. Medication is helping the patient stay active. Patient denies any side effects and reports adequate analgesia. No sign of misuse/abuse.             Past Medical History:   Diagnosis Date    Abdominal pain     Benign prostatic hypertrophy     C. difficile colitis     CAD (coronary artery disease) 1/3/12    s/p CABGx3    Colon polyp 02/25/2019    tubular adenoma    Constipation     Diabetes mellitus ([facility] [address]     Diarrhea     Diarrhea     DVT [facility]deep venous thrombosis) ([facility])     left calf    Ejection fraction < 50%     Gallstones     Heartburn     Hx of blood clots     Hyperlipidemia     Kidney stones     MI (myocardial infarction) ([facility] [address]     2011    Mitral regurgitation     MRSA (methicillin resistant staph aureus) culture positive     Numbness and tingling     hands and feet    Rib fractures 1-2015    right    Wears glasses     readers       Past Surgical History:   Procedure Laterality Date    APPENDECTOMY      CARDIAC CATHETERIZATION  12/29/11    CHOLECYSTECTOMY      COLONOSCOPY  01/11/2012    wnl, 10 yr recall    COLONOSCOPY  11/28/2018    ATTEMPTED NOT CLEAN (N/A )    COLONOSCOPY  02/25/2019    tubular adenoma    COLONOSCOPY N/A 2/25/2019    COLONOSCOPY WITH BIOPSY [facility]      x 1    CORONARY ARTERY BYPASS GRAFT  1/3/12    x3    KNEE ARTHROSCOPY      left    KNEE SURGERY Left     I&D    OTHER SURGICAL HISTORY Left 3/7/2016    plantar plane &  exostectomy medial foot left    SD COLON CA SCRN NOT HI RSK IND N/A 11/28/2018    COLONOSCOPY ATTEMPTED NOT CLEAN performed by Claudia Edmondson MD at [address] [facility] ENDOSCOPY  11-3-15    VENA CAVA FILTER PLACEMENT      WRIST SURGERY      I&D       Allergies   Allergen Reactions    Flagyl [Metronidazole] Hives    Metronidazole      Other reaction(s): Vomiting         Current Outpatient Medications:     LANTUS SOLOSTAR 100 UNIT/ML injection pen, inject 30 units subcutaneously in the morning and 40 units in the evening, Disp: 45 mL, Rfl: 0    fentaNYL (DURAGESIC) 25 MCG/HR, Place 1 patch onto the skin every 72 hours for 30 days. , Disp: 10 patch, Rfl: 0    gabapentin (NEURONTIN) 800 MG tablet, Take 1 tablet by mouth daily for 90 days. , Disp: 90 tablet, Rfl: 3    omeprazole (PRILOSEC) 20 MG delayed release capsule, TAKE 1 CAPSULE BY MOUTH ONE TIME A DAY , Disp: 90 capsule, Rfl: 0    simvastatin (ZOCOR) 20 MG tablet, TAKE 1 TABLET BY MOUTH EVERY DAY AT NIGHT, Disp: 90 tablet, Rfl: 0    furosemide (LASIX) 40 MG tablet, TAKE 1 TABLET BY MOUTH TWO TIMES A DAY , Disp: 60 tablet, Rfl: 3    topiramate (TOPAMAX) 50 MG tablet, Take 1 tablet by mouth 2 times daily, Disp: 60 tablet, Rfl: 3    aspirin 81 MG tablet, Take 81 mg by mouth daily. , Disp: , Rfl:     Vitamin D (CHOLECALCIFEROL) 1000 UNITS CAPS capsule, Take 2,000 Units by mouth daily , Disp: , Rfl:     Ascorbic Acid (VITAMIN C) 500 MG tablet, Take 1,000 mg by mouth daily , Disp: , Rfl:     finasteride (PROSCAR) 5 MG tablet, Take 5 mg by mouth daily. , Disp: , Rfl:     tamsulosin (FLOMAX) 0.4 MG capsule, Take 0.4 mg by mouth daily.   , Disp: , Rfl:     Insulin Pen Needle (BD PEN NEEDLE ALIZE 2ND GEN) 32G X 4 MM MISC, use 2 times a day as directed, Disp: 100 each, Rfl: 0    naloxone (NARCAN) 4 MG/0.1ML LIQD nasal spray, 1 spray by Nasal route as needed (if needed for respiratory depression), Disp: 1 each, Rfl: 0    zoster recombinant adjuvanted vaccine (SHINGRIX) 50 MCG/0.5ML SUSR injection, Inject 0.5 mLs into the muscle See Admin Instructions 1 dose now and repeat in 2-6 months, Disp: 0.5 mL, Rfl: 0    blood glucose test strips (FREESTYLE LITE) strip, TEST TWICE DAILY AS DIRECTED, Disp: 100 strip, Rfl: 1    Probiotic Product (PROBIOTIC-10 PO), Take by mouth, Disp: , Rfl:     NITROSTAT 0.4 MG SL tablet, , Disp: , Rfl:     Family History   Problem Relation Age of Onset    Gastrointestinal: Positive for constipation. Genitourinary: Negative. Neurological: Positive for loss of balance, numbness and weakness. Physical Exam:  There were no vitals taken for this visit. Physical Exam  Pulmonary:      Effort: Pulmonary effort is normal.   Skin:         Neurological:      Mental Status: He is alert and oriented to person, place, and time. Psychiatric:         Mood and Affect: Mood normal.         Behavior: Behavior normal.         Thought Content: Thought content normal.           Assessment:      Problem List Items Addressed This Visit     Spondylosis of lumbar region without myelopathy or radiculopathy - Primary    Osteoarthritis of spine with radiculopathy, lumbar region    Medication monitoring encounter    Lumbar spinal stenosis    Facet syndrome ([facility] [address]    Diabetic mononeuropathy associated with diabetes mellitus due to underlying condition ([facility] [address]    DDD (degenerative disc disease), lumbar    DDD (degenerative disc disease), cervical    Charcot foot due to diabetes mellitus ([facility] [address]            Treatment Plan:  DISCUSSION: Treatment options discussed withpatient and all questions answered to patient's satisfaction. Possible side effects, risk of tolerance and or dependence and alternative treatments discussed    Obtaining appropriate analgesic effect of treatment   No signs of potential drug abuse or diversion identified    [x] Ill effects of being on chronic pain medications such as sleep disturbances, respiratory depression, hormonal changes, withdrawal symptoms, chronic opioid dependence and tolerance as well as risk of taking opioids with Benzodiazepines and taking opioids along with alcohol,  werediscussed with patient. I had asked the patient to minimize medication use and utilize pain medications only for uncontrolled rest pain or pain with exertional activities. I advised patient not to self-escalate painmedications without consulting with us.   At each of patient's future visits we will try to taper pain medications, while adjusting the adjunct medications, and re-evaluating for Physical Therapy to improve spinal andjoint strength. We will continue to have discussions to decrease pain medications as tolerated. Counseled patient on effects their pain medication and /or their medical condition mayhave on their  ability to drive or operate machinery. Instructed not to drive or operate machinery if drowsy     I also discussed with the patient regarding the dangers of combining narcotic pain medication with tranquilizers, alcohol or illegal drugs or taking the medication any way other than prescribed. The dangers were discussed  including respiratory depression and death. Patient was told to tell  all  physicians regarding the medications he is getting from pain clinic. Patient is warned not to take any unprescribed medications over-the-countermedications that can depress breathing . Patient is advised to talk to the pharmacist or physicians if planning to take any over-the-counter medications before  takeing them. Patient is strongly advised to avoid tranquilizers or  relaxants, illegal drugs  or any medications that can depress breathing  Patient is also advised to tell us if there is any changes in their medications from other physicians.             TREATMENT OPTIONS:       Medication Agreement Requirements Met  Continue Opioid therapy  Script written for  Percocet, fentanyl patch  Follow up appointment made

## 2021-03-29 ENCOUNTER — NURSE ONLY (OUTPATIENT)
Dept: PODIATRY | Age: 76
End: 2021-03-29

## 2021-03-29 VITALS — BODY MASS INDEX: 31.02 KG/M2 | HEIGHT: 72 IN | WEIGHT: 229 LBS

## 2021-04-24 ASSESSMENT — ENCOUNTER SYMPTOMS
BOWEL INCONTINENCE: 0
SORE THROAT: 0
VOMITING: 0
ABDOMINAL PAIN: 0
PHOTOPHOBIA: 0
EYE PAIN: 0
SHORTNESS OF BREATH: 0
NAUSEA: 0
WHEEZING: 0
BACK PAIN: 1
CONSTIPATION: 0

## 2021-04-24 NOTE — PROGRESS NOTES
Liat Adams is a 77 y.o. male evaluated on 4/28/2021. Modality of virtual service provided -via Video+audio   Consent:  Patient and/or health care decision maker is aware that that patient may receive a bill for this telephone service, depending on one's insurance coverage, and has provided verbal consent to proceed: Yes    Patient identification was verified at the start of the visit: Yes    Chief complaint: Liat Adams is 77 y.o.,  male, with  with chief complaint of pain involving low back as well as in the feet. .    Patient is complaining of pain involving the low back area with pain radiating to both lower extremities to the feet. Patient also has a history of diabetes mellitus with peripheral neuropathy with Charcot feet. Patient reports overall there is not much change in his pain since his last visit. He reports his circulation in the feet is pretty good. Patient had a fall and had a fracture of the left tibia which has healed since then. He had epidural steroid injections which helped the pain only for short period of time. The medications are helping the pain so that he can function at this time. Back Pain  This is a chronic problem. The current episode started more than 1 year ago. The problem occurs constantly. The problem is unchanged. The pain is present in the lumbar spine and sacro-iliac. The quality of the pain is described as aching. The pain radiates to the right thigh and left thigh. The pain is at a severity of 5/10 (4-8). The pain is moderate. The pain is the same all the time. The symptoms are aggravated by standing, bending, position, twisting and sitting (Walking, lifting and ADLs). Stiffness is present at night (Worsened at night in the feet and in the back). Associated symptoms include numbness, tingling and weakness. Pertinent negatives include no abdominal pain, bladder incontinence, bowel incontinence, chest pain, dysuria or leg pain.  (Both feet) Risk factors include lack of exercise, obesity and sedentary lifestyle. Alleviating factors: None pain is somewhat better when he stands  Lifestyle changes experienced with pain: Wakes from sleep, Prevents or limits ADLs, Increases w/activity. , Increases w/prolonged sitting/standing/walking  Mood changes,none  Patient currently unemployed. Physical therapy did not help the pain. Are you under psychological counseling at present: No  Goals for treatment include:  Decrease in pain  Enjoy daily and recreational activities, return to previous status. Patient relates current medications are helping the pain. Patient reports taking pain medications as prescribed, denies obtaining medications from different sources and denies use of illegal drugs. Patient denies side effects from medications like nausea, vomiting, constipation or drowsiness. Patient reports current activities of daily living ar possible due to medications and would like to continue them. ACTIVITY/SOCIAL/EMOTIONAL:  Sleep Pattern: 7 hours per night.  generally restful sleep  Home Exercises:  Stretching and strengthening daily  Activity:unchanged  Emotional Issues: normal.   Currently seeing a Psychiatrist or Psychologist:  No     ADVERSE MEDICATION EFFECTS:   Nausea and vomiting: no   Constipation: yes-Undercontrol-: yes  Dizziness/drowsy/sleepy--no  Urinary Retention: no    ABERRANT BEHAVIORS SINCE LAST VISIT  Lost rx/pills:------------------------------------------ no  Taking  medication as prescribed: ----------- yes  Urine Drug Screen ---------------------------------  yes             Date-----------------------------------------------8/6/2020              Results as expected ---------------------yes    Recent ER visits: -------------------------------------no  Pill count is appropriate: ---------------------------yes   Refills for prescriptions appropriate:---------- yes      Past Medical History:   Diagnosis Date    Abdominal pain     Benign prostatic hypertrophy     C. difficile colitis     CAD (coronary artery disease) 1/3/12    s/p CABGx3    Colon polyp 02/25/2019    tubular adenoma    Constipation     [facility]     Diarrhea     Diarrhea     D[facility]deep [facility]) [facility])     left calf    Ejection fraction < 50%     Gallstones     Heartburn     Hx of blood clots     Hyperlipidemia     Kidney stones     MI (myocardial infarction) [facility] [address]     2011    Mitral regurgitation     MRSA (methicillin resistant staph aureus) culture positive     Numbness and tingling     hands and feet    Rib fractures 1-2015    right    Wears glasses     readers       Past Surgical History:   Procedure Laterality Date    APPENDECTOMY      CARDIAC CATHETERIZATION  12/29/11    CHOLECYSTECTOMY      COLONOSCOPY  01/11/2012    wnl, 10 yr recall    COLONOSCOPY  11/28/2018    ATTEMPTED NOT CLEAN (N/A )    COLONOSCOPY  02/25/2019    tubular adenoma    COLONOSCOPY N/A 2/25/2019    COLONOSCOPY WITH BIOPSY performed by [facility] at [facility]      x 1    CORONARY ARTERY BYPASS GRAFT  1/3/12    x3    KNEE ARTHROSCOPY      left    KNEE SURGERY Left     I&D    OTHER SURGICAL HISTORY Left 3/7/2016    plantar plane &  exostectomy medial foot left    TX COLON CA SCRN NOT HI RSK IND N/A 11/28/2018    COLONOSCOPY ATTEMPTED NOT CLEAN performed by Ney Schmitt MD at [address] ENDOSCOPY  11-3-15    VENA CAVA FILTER PLACEMENT      WRIST SURGERY      I&D       Family History   Problem Relation Age of Onset    Heart Failure Father     Cancer Mother         breast    Cancer Maternal Grandfather        Social History     Socioeconomic History    Marital status:       Spouse name: None    Number of children: None    Years of education: None    Highest education level: None   Occupational History    Occupation: retired [employer]   Social Needs    Financial resource strain: None    Food insecurity     Worry: None     Inability: None    Transportation needs     Medical: None     Non-medical: None   Tobacco Use    Smoking status: Former Smoker     Packs/day: 1.00     Years: 30.00     Pack years: 30.00     Types: Cigarettes     Quit date: 2002     Years since quittin.2    Smokeless tobacco: Never Used   Substance and Sexual Activity    Alcohol use: Not Currently     Comment: rare    Drug use: No    Sexual activity: Never   Lifestyle    Physical activity     Days per week: None     Minutes per session: None    Stress: None   Relationships    Social connections     Talks on phone: None     Gets together: None     Attends Taoist service: None     Active member of club or organization: None     Attends meetings of clubs or organizations: None     Relationship status: None    Intimate partner violence     Fear of current or ex partner: None     Emotionally abused: None     Physically abused: None     Forced sexual activity: None   Other Topics Concern    None   Social History Narrative    None       Allergies   Allergen Reactions    Flagyl [Metronidazole] Hives    Metronidazole      Other reaction(s): Vomiting       Current Outpatient Medications on File Prior to Encounter   Medication Sig Dispense Refill    Insulin Pen Needle (BD PEN NEEDLE ALIZE 2ND GEN) 32G X 4 MM MISC use 2 times a day as directed 100 each 0    LANTUS SOLOSTAR 100 UNIT/ML injection pen inject 30 units subcutaneously in the morning and 40 units in the evening 45 mL 0    gabapentin (NEURONTIN) 800 MG tablet Take 1 tablet by mouth daily for 90 days.  90 tablet 3    naloxone (NARCAN) 4 MG/0.1ML LIQD nasal spray 1 spray by Nasal route as needed (if needed for respiratory depression) 1 each 0    omeprazole (PRILOSEC) 20 MG delayed release capsule TAKE 1 CAPSULE BY MOUTH ONE TIME A DAY  90 capsule 0    simvastatin (ZOCOR) 20 MG tablet TAKE 1 TABLET BY MOUTH EVERY DAY AT NIGHT 90 tablet 0    furosemide (LASIX) 40 MG tablet TAKE 1 TABLET BY MOUTH TWO TIMES A DAY  60 tablet 3    zoster recombinant adjuvanted vaccine (SHINGRIX) 50 MCG/0.5ML SUSR injection Inject 0.5 mLs into the muscle See Admin Instructions 1 dose now and repeat in 2-6 months 0.5 mL 0    blood glucose test strips (FREESTYLE LITE) strip TEST TWICE DAILY AS DIRECTED 100 strip 1    Probiotic Product (PROBIOTIC-10 PO) Take by mouth      aspirin 81 MG tablet Take 81 mg by mouth daily.  Vitamin D (CHOLECALCIFEROL) 1000 UNITS CAPS capsule Take 2,000 Units by mouth daily       Ascorbic Acid (VITAMIN C) 500 MG tablet Take 1,000 mg by mouth daily       NITROSTAT 0.4 MG SL tablet       finasteride (PROSCAR) 5 MG tablet Take 5 mg by mouth daily.  tamsulosin (FLOMAX) 0.4 MG capsule Take 0.4 mg by mouth daily. No current facility-administered medications on file prior to encounter. Review of Systems   Constitutional: Negative for activity change, appetite change, chills and unexpected weight change. HENT: Negative for congestion, hearing loss and sore throat. Eyes: Negative for photophobia, pain and visual disturbance. Respiratory: Negative for shortness of breath and wheezing. Cardiovascular: Negative for chest pain and palpitations. Gastrointestinal: Negative for abdominal pain, bowel incontinence, constipation, nausea and vomiting. Endocrine: Negative for cold intolerance and polyuria. H/of diabetes mellitus with peripheral neuropathy and Charcot's foot   Genitourinary: Negative for bladder incontinence, dysuria and hematuria. Musculoskeletal: Positive for arthralgias and back pain. Skin: Negative for rash and wound. Allergic/Immunologic: Negative for immunocompromised state. Neurological: Positive for tingling, weakness and numbness. Hematological: Negative. Psychiatric/Behavioral: Negative for self-injury, sleep disturbance and suicidal ideas.  The patient is not nervous/anxious. Physical Exam  Constitutional:       Appearance: Normal appearance. He is obese. Skin:         Neurological:      Mental Status: He is alert and oriented to person, place, and time. Psychiatric:         Mood and Affect: Mood normal.            DATA:  LAB.:  8/6/2020  7:00 PM - [place], [facility] Incoming Lab Results From [employer] [facility]    Component Value Ref Range & Units Status Collected Lab   Pain Management Drug Panel Interp, Urine Consistent   Final 08/02/2020  9:53 AM [facility]   (NOTE)   ________________________________________________________________   DRUGS EXPECTED:   FENTANYL   OXYCODONE   ________________________________________________________________   CONSISTENT with medications provided:   FENTANYL: based on fentanyl, norfentanyl   OXYCODONE: based on oxycodone   ________________________________________________________________       X-Ray reports:  Procedure:  MRI SHOULDER RT WOUT CONTRAST [facility]  01/26/2015     [id number] Reason for Exam:  ^RT SHOULDER - RCT, PAIN   FULL RESULT:   EXAM:  Right shoulder MRI without contrast dated 1/26/2015. HISTORY: Right shoulder pain and decreased range of motion since fall 3 weeks ago. Concern for rotator cuff tear. COMPARISON: Right shoulder radiographs dated 1/3/2015. TECHNIQUE: Multiplanar, multisequence MR images of the right shoulder are obtained. FINDINGS: Osseous structures are in anatomic alignment. Bone marrow signal is normal on all sequences. There is no evidence for Hill-Sachs deformity of the humeral head. No bony Bankart injury of the anterior inferior glenoid identified. Mild hypertrophic degenerative changes of the right acromioclavicular joint. There is no evidence for os acromialis. Sagittal images show a type I acromion. There is a small partial-thickness tear on the bursal side of the anterior right supraspinatus tendon.  The tear measures 7 mm anteroposteriorly, 10 mm mediolaterally and involves less than 50% of Old L1 compression. No acute osseous abnormality. Report electronically signed by José Antonio Watts M.D. on 1/3/2015     Clinical  impression:  1. Spondylosis of lumbar region without myelopathy or radiculopathy    2. Osteoarthritis of spine with radiculopathy, lumbar region    3. Spinal stenosis of lumbar region with neurogenic claudication    4. Facet syndrome ([address]    5. DDD (degenerative disc disease), lumbar    6. DDD (degenerative disc disease), cervical    7. Arthritis of left knee due to other bacteria ([address]    8. Diabetic peripheral neuropathy associated with type 2 diabetes mellitus ([address]    9. Charcot foot due to diabetes mellitus ([address]    10. Chronic, continuous use of opioids    11. Medication monitoring encounter    12. Diabetic mononeuropathy associated with diabetes mellitus due to underlying condition ([address]    13. Facet syndrome    14. Drug-induced constipation        Plan of care: We will continue current pain medications  Current medications are being tolerated without any Adverse side effects. Orders Placed This Encounter   Medications    fentaNYL (DURAGESIC) 25 MCG/HR     Sig: Place 1 patch onto the skin every 72 hours for 30 days. Dispense:  10 patch     Refill:  0     Reduce doses taken as pain becomes manageable    topiramate (TOPAMAX) 50 MG tablet     Sig: Take 1 tablet by mouth 2 times daily     Dispense:  60 tablet     Refill:  3     Fill fill 1-     Urine drug screens have been appropriate. No aberrant activity noted. Analgesia is achieved. Activities of daily living are possible because of medications. Safe use of medications explained to patient. PDMP Monitoring:    Last PDMP Rosalinda Andrade as Reviewed [facility]):  Review User Review Instant Review Result   Adrianna Joseph 4/24/2021  5:37 AM Unable to Review [2]     Counselling/Preventive measures for pain  Control:    [x]  Spine strengthening exercises are discussed with patient in detail.      [x] Ill effects of being on Level of complexity of date to be reviewed is Moderate. The chart date reviewed include the following: Imaging Reports. Summary of Care. Time spent reviewing with patient the below reports:   Medication safety, Treatment options. Level of diagnosis and management options of this case is multiple: involving the following management options: Interventions as needed, medication management as appropriate, future visits, activity modification, heat/ice as needed, Urine drug screen as required. [x]The patient's questions were answered to the best of my abilities. This note was created using voice recognition software. There may be inaccuracies of transcription  that are inadvertently overlooked prior to the signature. There is any questions about the transcription please contact me. Return in  4 weeks  with physician / CNP  for further plan of treatment. Due to the COVID-19 pandemic and the appropriate interventions by Leah Gandhi, our non-urgent pain management patients will not be seen in the office at this time for their protection and the protection of our staff. To offer continuity of care, their prescriptions will be escribed this month after a careful chart review and review of their OARRS report  Pursuant to the emergency declaration under the [employer] [facility] waiver authority and the [employer] Act, this Virtual Visit was conducted, with patient's consent, to reduce the patient's risk of exposure to COVID-19 and provide continuity of care for an established patient. Services were provided through a video synchronous discussion virtually to substitute for in-person appointment. \"  Documentation:  I communicated with the patient and/or health care decision maker about plan of care  Details of this discussion including any medical advice provided:   Total Time: minutes: 21-30

## 2021-04-28 ENCOUNTER — HOSPITAL ENCOUNTER (OUTPATIENT)
Dept: PAIN MANAGEMENT | Age: 76
Discharge: HOME | End: 2021-04-28
Payer: MEDICARE

## 2021-04-28 DIAGNOSIS — M00.862 ARTHRITIS OF LEFT KNEE DUE TO OTHER BACTERIA (HCC): ICD-10-CM

## 2021-04-28 DIAGNOSIS — M48.062 SPINAL STENOSIS OF LUMBAR REGION WITH NEUROGENIC CLAUDICATION: ICD-10-CM

## 2021-04-28 DIAGNOSIS — M47.26 OSTEOARTHRITIS OF SPINE WITH RADICULOPATHY, LUMBAR REGION: ICD-10-CM

## 2021-04-28 DIAGNOSIS — M47.816 SPONDYLOSIS OF LUMBAR REGION WITHOUT MYELOPATHY OR RADICULOPATHY: Primary | ICD-10-CM

## 2021-04-28 DIAGNOSIS — E11.610 CHARCOT FOOT DUE TO DIABETES MELLITUS (HCC): ICD-10-CM

## 2021-04-28 DIAGNOSIS — M50.30 DDD (DEGENERATIVE DISC DISEASE), CERVICAL: ICD-10-CM

## 2021-04-28 DIAGNOSIS — M47.899 FACET SYNDROME: ICD-10-CM

## 2021-04-28 DIAGNOSIS — K59.03 DRUG-INDUCED CONSTIPATION: ICD-10-CM

## 2021-04-28 DIAGNOSIS — E11.42 DIABETIC PERIPHERAL NEUROPATHY ASSOCIATED WITH TYPE 2 DIABETES MELLITUS (HCC): ICD-10-CM

## 2021-04-28 DIAGNOSIS — E08.41 DIABETIC MONONEUROPATHY ASSOCIATED WITH DIABETES MELLITUS DUE TO UNDERLYING CONDITION (HCC): ICD-10-CM

## 2021-04-28 DIAGNOSIS — F11.90 CHRONIC, CONTINUOUS USE OF OPIOIDS: ICD-10-CM

## 2021-04-28 DIAGNOSIS — M51.36 DDD (DEGENERATIVE DISC DISEASE), LUMBAR: ICD-10-CM

## 2021-04-28 DIAGNOSIS — Z51.81 MEDICATION MONITORING ENCOUNTER: ICD-10-CM

## 2021-04-28 PROCEDURE — 99214 OFFICE O/P EST MOD 30 MIN: CPT | Performed by: PAIN MEDICINE

## 2021-04-28 PROCEDURE — 99213 OFFICE O/P EST LOW 20 MIN: CPT

## 2021-04-28 RX ORDER — TOPIRAMATE 50 MG/1
50 TABLET, FILM COATED ORAL 2 TIMES DAILY
Qty: 60 TABLET | Refills: 3 | Status: SHIPPED | OUTPATIENT
Start: 2021-04-28 | End: 2021-08-20 | Stop reason: SDUPTHER

## 2021-04-28 RX ORDER — FENTANYL 25 UG/H
1 PATCH TRANSDERMAL
Qty: 10 PATCH | Refills: 0 | Status: SHIPPED | OUTPATIENT
Start: 2021-04-29 | End: 2021-05-26 | Stop reason: SDUPTHER

## 2021-05-03 ENCOUNTER — OFFICE VISIT (OUTPATIENT)
Dept: FAMILY MEDICINE CLINIC | Age: 76
End: 2021-05-03
Payer: MEDICARE

## 2021-05-03 VITALS
OXYGEN SATURATION: 97 % | HEART RATE: 72 BPM | HEIGHT: 72 IN | SYSTOLIC BLOOD PRESSURE: 122 MMHG | BODY MASS INDEX: 30.34 KG/M2 | DIASTOLIC BLOOD PRESSURE: 74 MMHG | WEIGHT: 224 LBS | RESPIRATION RATE: 14 BRPM

## 2021-05-03 DIAGNOSIS — Z79.4 TYPE 2 DIABETES MELLITUS WITH DIABETIC POLYNEUROPATHY, WITH LONG-TERM CURRENT USE OF INSULIN (HCC): ICD-10-CM

## 2021-05-03 DIAGNOSIS — Z00.00 ROUTINE GENERAL MEDICAL EXAMINATION AT A HEALTH CARE FACILITY: ICD-10-CM

## 2021-05-03 DIAGNOSIS — E11.42 TYPE 2 DIABETES MELLITUS WITH DIABETIC POLYNEUROPATHY, WITH LONG-TERM CURRENT USE OF INSULIN (HCC): ICD-10-CM

## 2021-05-03 DIAGNOSIS — Z00.00 MEDICARE ANNUAL WELLNESS VISIT, SUBSEQUENT: Primary | ICD-10-CM

## 2021-05-03 DIAGNOSIS — Z23 NEED FOR VACCINATION: ICD-10-CM

## 2021-05-03 LAB — HBA1C MFR BLD: 7 %

## 2021-05-03 PROCEDURE — 1123F ACP DISCUSS/DSCN MKR DOCD: CPT | Performed by: FAMILY MEDICINE

## 2021-05-03 PROCEDURE — G0439 PPPS, SUBSEQ VISIT: HCPCS | Performed by: FAMILY MEDICINE

## 2021-05-03 PROCEDURE — 83036 HEMOGLOBIN GLYCOSYLATED A1C: CPT | Performed by: FAMILY MEDICINE

## 2021-05-03 PROCEDURE — 4040F PNEUMOC VAC/ADMIN/RCVD: CPT | Performed by: FAMILY MEDICINE

## 2021-05-03 PROCEDURE — 3051F HG A1C>EQUAL 7.0%<8.0%: CPT | Performed by: FAMILY MEDICINE

## 2021-05-03 PROCEDURE — 3017F COLORECTAL CA SCREEN DOC REV: CPT | Performed by: FAMILY MEDICINE

## 2021-05-03 ASSESSMENT — LIFESTYLE VARIABLES
HAVE YOU OR SOMEONE ELSE BEEN INJURED AS A RESULT OF YOUR DRINKING: 0
HOW OFTEN DURING THE LAST YEAR HAVE YOU BEEN UNABLE TO REMEMBER WHAT HAPPENED THE NIGHT BEFORE BECAUSE YOU HAD BEEN DRINKING: 0
HOW OFTEN DO YOU HAVE SIX OR MORE DRINKS ON ONE OCCASION: 0
HOW OFTEN DURING THE LAST YEAR HAVE YOU NEEDED AN ALCOHOLIC DRINK FIRST THING IN THE MORNING TO GET YOURSELF GOING AFTER A NIGHT OF HEAVY DRINKING: 0
HOW OFTEN DURING THE LAST YEAR HAVE YOU FOUND THAT YOU WERE NOT ABLE TO STOP DRINKING ONCE YOU HAD STARTED: 0

## 2021-05-03 ASSESSMENT — PATIENT HEALTH QUESTIONNAIRE - PHQ9
SUM OF ALL RESPONSES TO PHQ QUESTIONS 1-9: 0
SUM OF ALL RESPONSES TO PHQ9 QUESTIONS 1 & 2: 0
SUM OF ALL RESPONSES TO PHQ QUESTIONS 1-9: 0
2. FEELING DOWN, DEPRESSED OR HOPELESS: 0
SUM OF ALL RESPONSES TO PHQ QUESTIONS 1-9: 0
SUM OF ALL RESPONSES TO PHQ9 QUESTIONS 1 & 2: 0
1. LITTLE INTEREST OR PLEASURE IN DOING THINGS: 0

## 2021-05-03 NOTE — PROGRESS NOTES
Visit Information    Have you changed or started any medications since your last visit including any over-the-counter medicines, vitamins, or herbal medicines? no   Are you having any side effects from any of your medications? -  no  Have you stopped taking any of your medications? Is so, why? -  no    Have you seen any other physician or provider since your last visit? Yes - Records Obtained  Have you had any other diagnostic tests since your last visit? No  Have you been seen in the emergency room and/or had an admission to a hospital since we last saw you? No  Have you had your routine dental cleaning in the past 6 months? no    Have you activated your [employer] account? If not, what are your barriers?  Yes     Patient Care Team:  Tasha Liu MD as PCP - General (Family Medicine)  Tasha Liu MD as PCP - [facility]  Gladis Robbins MD as Referring Physician (Cardiology)  uJlian Zacarias MD as Consulting Physician (Gastroenterology)  Carlos Ruvalcaba MD as Consulting Physician (Internal Medicine Cardiovascular Disease)  Olman Ahuja MD as Consulting Physician (Pain Management)  Brennen Basilio DPM as Consulting Physician (Podiatry)  José Roberts MD as Surgeon (Ophthalmology)    Medical History Review  Past Medical, Family, and Social History reviewed and does contribute to the patient presenting condition    Health Maintenance   Topic Date Due    DTaP/Tdap/Td vaccine (1 - Tdap) [birth year]/1965    Shingles Vaccine (1 of 2) Never done    Annual Wellness Visit (AWV)  Never done    Diabetic retinal exam  03/16/2021    Lipid screen  11/05/2021    Potassium monitoring  11/05/2021    Creatinine monitoring  11/05/2021    A1C test (Diabetic or Prediabetic)  02/01/2022    Diabetic foot exam  03/18/2022    Colon cancer screen colonoscopy  02/25/2029    Flu vaccine  Completed    Pneumococcal 65+ years Vaccine  Completed    COVID-19 Vaccine  Completed    AAA screen  Completed    Hepatitis C screen  Addressed    Hepatitis A vaccine  Aged Out    Hib vaccine  Aged Out    Meningococcal (ACWY) vaccine  Aged Out     Chief Complaint   Patient presents with   [employer] AWV

## 2021-05-03 NOTE — PROGRESS NOTES
Medicare Annual Wellness Visit  Name: Yasir Peguero Date: 5/3/2021   MRN: [id number] Sex: Male   Age: 77 y.o. Ethnicity: Non-/Non    : 1945 Race: Marcello Talamantes is here for Medicare AWV    Screenings for behavioral, psychosocial and functional/safety risks, and cognitive dysfunction are all negative except as indicated below. These results, as well as other patient data from the [address] form, are documented in Flowsheets linked to this Encounter. Allergies   Allergen Reactions    Flagyl [Metronidazole] Hives    Metronidazole      Other reaction(s): Vomiting         Prior to Visit Medications    Medication Sig Taking? Authorizing Provider   Tdap (ADACEL) 5-2-15.5 LF-MCG/0.5 injection Inject 0.5 mLs into the muscle once for 1 dose Yes Tasha Liu MD   zoster recombinant adjuvanted vaccine [facility]) 50 MCG/0.5ML SUSR injection Inject 0.5 mLs into the muscle once for 1 dose Yes Tasha Liu MD   fentaNYL (DURAGESIC) 25 MCG/HR Place 1 patch onto the skin every 72 hours for 30 days. Yes Olman Ahuja MD   topiramate (TOPAMAX) 50 MG tablet Take 1 tablet by mouth 2 times daily Yes Olman Ahuja MD   Insulin Pen Needle (BD PEN NEEDLE ALIZE 2ND GEN) 32G X 4 MM MISC use 2 times a day as directed Yes Tasha Liu MD   LANTUS SOLOSTAR 100 UNIT/ML injection pen inject 30 units subcutaneously in the morning and 40 units in the evening Yes Tasha Liu MD   gabapentin (NEURONTIN) 800 MG tablet Take 1 tablet by mouth daily for 90 days.  Yes TACOS Maria CNP   naloxone (NARCAN) 4 MG/0.1ML LIQD nasal spray 1 spray by Nasal route as needed (if needed for respiratory depression) Yes TACOS Maria CNP   omeprazole (PRILOSEC) 20 MG delayed release capsule TAKE 1 CAPSULE BY MOUTH ONE TIME A DAY  Yes Tasha Liu MD   simvastatin (ZOCOR) 20 MG tablet TAKE 1 TABLET BY MOUTH EVERY DAY AT NIGHT Yes Tasha Liu MD   furosemide (LASIX) 40 MG tablet TAKE 1 TABLET BY MOUTH TWO TIMES A DAY  Yes Emory Cabezas MD   zoster recombinant adjuvanted vaccine [facility]) 50 MCG/0.5ML SUSR injection Inject 0.5 mLs into the muscle See Admin Instructions 1 dose now and repeat in 2-6 months Yes Emory Cabezas MD   blood glucose test strips (FREESTYLE LITE) strip TEST TWICE DAILY AS DIRECTED Yes Benji Meneses MD   Probiotic Product (PROBIOTIC-10 PO) Take by mouth Yes Historical Provider, MD   aspirin 81 MG tablet Take 81 mg by mouth daily. Yes Historical Provider, MD   Vitamin D (CHOLECALCIFEROL) 1000 UNITS CAPS capsule Take 2,000 Units by mouth daily  Yes Historical Provider, MD   Ascorbic Acid (VITAMIN C) 500 MG tablet Take 1,000 mg by mouth daily  Yes Historical Provider, MD   NITROSTAT 0.4 MG SL tablet  Yes Historical Provider, MD   finasteride (PROSCAR) 5 MG tablet Take 5 mg by mouth daily. Yes Historical Provider, MD   tamsulosin (FLOMAX) 0.4 MG capsule Take 0.4 mg by mouth daily.    Yes Historical Provider, MD         Past Medical History:   Diagnosis Date    Abdominal pain     Benign prostatic hypertrophy     C. difficile colitis     CAD (coronary artery disease) 1/3/12    s/p CABGx3    Colon polyp 02/25/2019    tubular adenoma    Constipation     Diabetes mellitus ([address]     Diarrhea     Diarrhea     [facility]     left calf    Ejection fraction < 50%     Gallstones     Heartburn     Hx of blood clots     Hyperlipidemia     Kidney stones     MI (myocardial infarction) ([address]     2011    Mitral regurgitation     MRSA (methicillin resistant staph aureus) culture positive     Numbness and tingling     hands and feet    Rib fractures 1-2015    right    Wears glasses     readers       Past Surgical History:   Procedure Laterality Date    APPENDECTOMY      CARDIAC CATHETERIZATION  12/29/11    CHOLECYSTECTOMY      COLONOSCOPY  01/11/2012    wnl, 10 yr recall    COLONOSCOPY  11/28/2018    ATTEMPTED NOT CLEAN (N/A )  COLONOSCOPY  02/25/2019    tubular adenoma    COLONOSCOPY N/A 2/25/2019    COLONOSCOPY WITH BIOPSY performed by Dena Turner MD at [address]      x 1    CORONARY ARTERY BYPASS GRAFT  1/3/12    x3    KNEE ARTHROSCOPY      left    KNEE SURGERY Left     I&D    OTHER SURGICAL HISTORY Left 3/7/2016    plantar plane &  exostectomy medial foot left    AR COLON CA SCRN NOT HI RSK IND N/A 11/28/2018    COLONOSCOPY ATTEMPTED NOT CLEAN performed by Dena Turner MD at [address] ENDOSCOPY  11-3-15    VENA CAVA FILTER PLACEMENT      WRIST SURGERY      I&D         Family History   Problem Relation Age of Onset    Heart Failure Father     Cancer Mother         breast    Cancer Maternal Grandfather        CareTeam (Including outside providers/suppliers regularly involved in providing care):   Patient Care Team:  Shan Ko MD as PCP - General (Family Medicine)  [facility]an [facility]  Liudmila Yung MD as Referring Physician (Cardiology)  Dena Turner MD as Consulting Physician (Gastroenterology)  Luz Marina Moreno MD as Consulting Physician (Internal Medicine Cardiovascular Disease)  Jenn Gardner MD as Consulting Physician (Pain Management)  Kevin Garcia DPM as Consulting Physician (Podiatry)  Vimal Yan MD as Surgeon (Ophthalmology)    Wt Readings from Last 3 Encounters:   05/03/21 224 lb (101.6 kg)   03/29/21 229 lb (103.9 kg)   03/18/21 229 lb (103.9 kg)     Vitals:    05/03/21 0808   BP: 122/74   Site: Right Upper Arm   Position: Sitting   Cuff Size: Small Adult   Pulse: 72   Resp: 14   SpO2: 97%   Weight: 224 lb (101.6 kg)   Height: 6' (1.829 m)     Body mass index is 30.38 kg/m². Based upon direct observation of the patient, evaluation of cognition reveals recent and remote memory intact.         Patient's complete Health Risk Assessment and screening values have

## 2021-05-03 NOTE — PATIENT INSTRUCTIONS
Personalized Preventive Plan for Tierra Jackson - 5/3/2021  Medicare offers a range of preventive health benefits. Some of the tests and screenings are paid in full while other may be subject to a deductible, co-insurance, and/or copay. Some of these benefits include a comprehensive review of your medical history including lifestyle, illnesses that may run in your family, and various assessments and screenings as appropriate. After reviewing your medical record and screening and assessments performed today your provider may have ordered immunizations, labs, imaging, and/or referrals for you. A list of these orders (if applicable) as well as your Preventive Care list are included within your After Visit Summary for your review. Other Preventive Recommendations:    · A preventive eye exam performed by an eye specialist is recommended every 1-2 years to screen for glaucoma; cataracts, macular degeneration, and other eye disorders. · A preventive dental visit is recommended every 6 months. · Try to get at least 150 minutes of exercise per week or 10,000 steps per day on a pedometer . · Order or download the FREE \"Exercise & Physical Activity: Your Everyday Guide\" from [employer]. Call 2-626.321.2543 or search [employer] online. · You need 6782-2479 mg of calcium and 0158-9339 IU of vitamin D per day. It is possible to meet your calcium requirement with diet alone, but a vitamin D supplement is usually necessary to meet this goal.  · When exposed to the sun, use a sunscreen that protects against both UVA and UVB radiation with an SPF of 30 or greater. Reapply every 2 to 3 hours or after sweating, drying off with a towel, or swimming. · Always wear a seat belt when traveling in a car. Always wear a helmet when riding a bicycle or motorcycle.

## 2021-05-12 DIAGNOSIS — Z79.4 TYPE 2 DIABETES MELLITUS WITH DIABETIC POLYNEUROPATHY, WITH LONG-TERM CURRENT USE OF INSULIN (HCC): ICD-10-CM

## 2021-05-12 DIAGNOSIS — E11.42 TYPE 2 DIABETES MELLITUS WITH DIABETIC POLYNEUROPATHY, WITH LONG-TERM CURRENT USE OF INSULIN (HCC): ICD-10-CM

## 2021-05-12 RX ORDER — SIMVASTATIN 20 MG
TABLET ORAL
Qty: 90 TABLET | Refills: 0 | Status: SHIPPED | OUTPATIENT
Start: 2021-05-12 | End: 2021-08-09

## 2021-05-12 RX ORDER — PEN NEEDLE, DIABETIC 32GX 5/32"
NEEDLE, DISPOSABLE MISCELLANEOUS
Qty: 100 EACH | Refills: 0 | Status: SHIPPED | OUTPATIENT
Start: 2021-05-12 | End: 2021-07-07

## 2021-05-17 ENCOUNTER — NURSE ONLY (OUTPATIENT)
Dept: PODIATRY | Age: 76
End: 2021-05-17
Payer: MEDICARE

## 2021-05-17 DIAGNOSIS — M21.969 FOOT DEFORMITY, UNSPECIFIED LATERALITY: ICD-10-CM

## 2021-05-17 DIAGNOSIS — Z79.4 TYPE 2 DIABETES MELLITUS WITH DIABETIC POLYNEUROPATHY, WITH LONG-TERM CURRENT USE OF INSULIN (HCC): Primary | ICD-10-CM

## 2021-05-17 DIAGNOSIS — E11.42 TYPE 2 DIABETES MELLITUS WITH DIABETIC POLYNEUROPATHY, WITH LONG-TERM CURRENT USE OF INSULIN (HCC): Primary | ICD-10-CM

## 2021-05-17 PROCEDURE — A5513 MULTI DEN INSERT CUSTOM MOLD: HCPCS | Performed by: PODIATRIST

## 2021-05-17 PROCEDURE — A5500 DIAB SHOE FOR DENSITY INSERT: HCPCS | Performed by: PODIATRIST

## 2021-05-18 DIAGNOSIS — K58.9 IRRITABLE BOWEL SYNDROME WITHOUT DIARRHEA: ICD-10-CM

## 2021-05-18 RX ORDER — INSULIN GLARGINE 100 [IU]/ML
INJECTION, SOLUTION SUBCUTANEOUS
Qty: 45 ML | Refills: 0 | Status: SHIPPED | OUTPATIENT
Start: 2021-05-18 | End: 2021-07-21

## 2021-05-18 RX ORDER — OMEPRAZOLE 20 MG/1
CAPSULE, DELAYED RELEASE ORAL
Qty: 90 CAPSULE | Refills: 3 | Status: SHIPPED | OUTPATIENT
Start: 2021-05-18 | End: 2022-05-06

## 2021-05-26 ENCOUNTER — HOSPITAL ENCOUNTER (OUTPATIENT)
Dept: PAIN MANAGEMENT | Age: 76
Discharge: HOME | End: 2021-05-26
Payer: MEDICARE

## 2021-05-26 DIAGNOSIS — M47.899 FACET SYNDROME: ICD-10-CM

## 2021-05-26 DIAGNOSIS — E11.610 CHARCOT FOOT DUE TO DIABETES MELLITUS (HCC): ICD-10-CM

## 2021-05-26 DIAGNOSIS — F11.90 CHRONIC, CONTINUOUS USE OF OPIOIDS: ICD-10-CM

## 2021-05-26 DIAGNOSIS — M00.862 ARTHRITIS OF LEFT KNEE DUE TO OTHER BACTERIA (HCC): ICD-10-CM

## 2021-05-26 DIAGNOSIS — M47.26 OSTEOARTHRITIS OF SPINE WITH RADICULOPATHY, LUMBAR REGION: ICD-10-CM

## 2021-05-26 DIAGNOSIS — M47.816 SPONDYLOSIS OF LUMBAR REGION WITHOUT MYELOPATHY OR RADICULOPATHY: Primary | ICD-10-CM

## 2021-05-26 DIAGNOSIS — M51.36 DDD (DEGENERATIVE DISC DISEASE), LUMBAR: ICD-10-CM

## 2021-05-26 DIAGNOSIS — K59.03 DRUG-INDUCED CONSTIPATION: ICD-10-CM

## 2021-05-26 DIAGNOSIS — M50.30 DDD (DEGENERATIVE DISC DISEASE), CERVICAL: ICD-10-CM

## 2021-05-26 DIAGNOSIS — M48.062 SPINAL STENOSIS OF LUMBAR REGION WITH NEUROGENIC CLAUDICATION: ICD-10-CM

## 2021-05-26 DIAGNOSIS — E08.41 DIABETIC MONONEUROPATHY ASSOCIATED WITH DIABETES MELLITUS DUE TO UNDERLYING CONDITION (HCC): ICD-10-CM

## 2021-05-26 DIAGNOSIS — Z51.81 MEDICATION MONITORING ENCOUNTER: ICD-10-CM

## 2021-05-26 PROCEDURE — 99213 OFFICE O/P EST LOW 20 MIN: CPT | Performed by: NURSE PRACTITIONER

## 2021-05-26 PROCEDURE — 99213 OFFICE O/P EST LOW 20 MIN: CPT

## 2021-05-26 RX ORDER — FENTANYL 25 UG/H
1 PATCH TRANSDERMAL
Qty: 10 PATCH | Refills: 0 | Status: SHIPPED | OUTPATIENT
Start: 2021-05-29 | End: 2021-06-25 | Stop reason: SDUPTHER

## 2021-05-26 RX ORDER — FUROSEMIDE 40 MG/1
TABLET ORAL
Qty: 60 TABLET | Refills: 0 | Status: SHIPPED | OUTPATIENT
Start: 2021-05-26 | End: 2021-06-01

## 2021-05-26 ASSESSMENT — ENCOUNTER SYMPTOMS
RESPIRATORY NEGATIVE: 1
BACK PAIN: 1
GASTROINTESTINAL NEGATIVE: 1
EYES NEGATIVE: 1

## 2021-05-26 NOTE — PROGRESS NOTES
[facility] [address] PROGRESS NOTE      Patient  completed [x]  video visit   []   phone call:         Minutes :       [x]    to  review Medication Agreement    []  Follow up after procedure   []  Discuss treatment options      Location:  Provider:  working from    [x]    home    []   [facility] - [facility] ,   patient at home       Chief Complaint: low back pain    He c/o low back pain radiating down his legs. His feet have been bothering him, he has neuropathy and is on topamax and gabapentin. No history of lumbar surgery, He exercises and is active. His sleep is good. He has history of fractured left tibia this past November. He has had no Ed visits. Back Pain  The problem occurs constantly. The problem is unchanged. The pain is present in the lumbar spine. The quality of the pain is described as aching. Radiates to: legs. The pain is at a severity of 5/10. The pain is moderate. The pain is the same all the time. Exacerbated by: after activity  laying down. Associated symptoms include numbness. (Feet) He has tried analgesics and heat for the symptoms.            Treatment goals:  Functional status: move around more    Aberrancy:   Any alcoholic beverages  no          Any illegal drugs   no      Analgesia:     5                Adverse  Effects :no      ADL;s : exercises      Data:    When was thelast UDS:   8-2020       Was the UDS appropriate:  [x] yes []   no      Record/Diagnostics Review:      As above, I did review the imaging   8/6/2020  7:00 PM - Jayden, [facility] Incoming Lab Results From [facility]    Component Value Ref Range & Units Status Collected Lab   Pain Management Drug Panel Interp, Urine Consistent   Final 08/02/2020  9:53 AM [facility]   (NOTE)   ________________________________________________________________   DRUGS EXPECTED:   FENTANYL   OXYCODONE   ________________________________________________________________   CONSISTENT with medications provided:   FENTANYL: based on fentanyl, norfentanyl   OXYCODONE: based on oxycodone   ________________________________________________________________   INTERPRETIVE INFORMATION: Pain Mgt [place], [place] Spec/EMIT, Ur,                            Interp   Interpretation depends on accuracy and completeness of patient   medication information submitted by client. 6-Acetylmorphine, Ur Not Detected   Final             5/3/2021  8:17 AM - Kathy Machado MA    Component Value Ref Range & Units Status Collected Lab   Hemoglobin A1C 7.0  % Final 05/03/2021  8:17 AM Unknown   Lab and Collection         FINAL   Procedure: Manjula Rhodesan STANDARD    CDX  01/03/2015     [id number]   Reason for Exam:  ^fall/ pain       FULL RESULT:   LUMBAR SPINE STANDARD       INDICATION:   fall/ pain.           COMPARISON:    12/27/2007       FINDINGS:   AP, crosstable lateral and cone-down lumbosacral views obtained.       There is multilevel facet arthropathy and anterior spurring. There is    redemonstration of mild to moderate wedge compression deformity of L1    vertebral body. No acute fracture is evident. No paraspinal mass. SI    joints show degenerative changes. Cholecystectomy noted.               IMPRESSION:       Old L1 compression. No acute osseous abnormality.       Report electronically signed by Aneesh Diaz M.D. on 1/3/2015 4:27 PM    1/3/2015 4:27 PM   Transcribed by: [facility] on [place]  3 2015  4:29P    Read by: Taylor Pace M.D.  [id number] on [place]  3 2015  4:29P    Electronically Signed by: Beau Miranda. Taylor Pace M.D. on: Rivera Rea  3 2015     4:29P                                                                                               Pill count: appropriate    fill date :5-  Morphine equivalent dose as reported on OARRS: 61 has narcan at home  Periodic Controlled Substance Monitoring: Possible medication side effects, risk of tolerance/dependence & alternative treatments discussed., No signs of potential drug abuse or diversion identified. , Assessed functional status. , GASTROINTESTINAL ENDOSCOPY  11-3-15    VENA CAVA FILTER PLACEMENT      WRIST SURGERY      I&D       Allergies   Allergen Reactions    Flagyl [Metronidazole] Hives    Metronidazole      Other reaction(s): Vomiting         Current Outpatient Medications:     furosemide (LASIX) 40 MG tablet, TAKE 1 TABLET BY MOUTH TWO TIMES A DAY , Disp: 60 tablet, Rfl: 0    omeprazole (PRILOSEC) 20 MG delayed release capsule, TAKE 1 CAPSULE BY MOUTH EVERY DAY, Disp: 90 capsule, Rfl: 3    LANTUS SOLOSTAR 100 UNIT/ML injection pen, inject 30 units subcutaneously in the morning and 40 units in the evening, Disp: 45 mL, Rfl: 0    simvastatin (ZOCOR) 20 MG tablet, TAKE 1 TABLET BY MOUTH AT night , Disp: 90 tablet, Rfl: 0    fentaNYL (DURAGESIC) 25 MCG/HR, Place 1 patch onto the skin every 72 hours for 30 days. , Disp: 10 patch, Rfl: 0    topiramate (TOPAMAX) 50 MG tablet, Take 1 tablet by mouth 2 times daily, Disp: 60 tablet, Rfl: 3    gabapentin (NEURONTIN) 800 MG tablet, Take 1 tablet by mouth daily for 90 days. , Disp: 90 tablet, Rfl: 3    aspirin 81 MG tablet, Take 81 mg by mouth daily. , Disp: , Rfl:     Vitamin D (CHOLECALCIFEROL) 1000 UNITS CAPS capsule, Take 2,000 Units by mouth daily , Disp: , Rfl:     Ascorbic Acid (VITAMIN C) 500 MG tablet, Take 1,000 mg by mouth daily , Disp: , Rfl:     finasteride (PROSCAR) 5 MG tablet, Take 5 mg by mouth daily. , Disp: , Rfl:     tamsulosin (FLOMAX) 0.4 MG capsule, Take 0.4 mg by mouth daily.   , Disp: , Rfl:     BD PEN NEEDLE ALIZE 2ND GEN 32G X 4 MM MISC, use 2 times a day as directed, Disp: 100 each, Rfl: 0    naloxone (NARCAN) 4 MG/0.1ML LIQD nasal spray, 1 spray by Nasal route as needed (if needed for respiratory depression), Disp: 1 each, Rfl: 0    zoster recombinant adjuvanted vaccine (SHINGRIX) 50 MCG/0.5ML SUSR injection, Inject 0.5 mLs into the muscle See Admin Instructions 1 dose now and repeat in 2-6 months, Disp: 0.5 mL, Rfl: 0    blood glucose test strips (FREESTYLE LITE) strip, TEST TWICE DAILY AS DIRECTED, Disp: 100 strip, Rfl: 1    Probiotic Product (PROBIOTIC-10 PO), Take by mouth, Disp: , Rfl:     NITROSTAT 0.4 MG SL tablet, , Disp: , Rfl:     Family History   Problem Relation Age of Onset    Heart Failure Father     Cancer Mother         breast    Cancer Maternal Grandfather        Social History     Socioeconomic History    Marital status:      Spouse name: Not on file    Number of children: Not on file    Years of education: Not on file    Highest education level: Not on file   Occupational History    Occupation: retired [employer]   Tobacco Use    Smoking status: Former Smoker     Packs/day: 1.00     Years: 30.00     Pack years: 30.00     Types: Cigarettes     Quit date: 2002     Years since quittin.3    Smokeless tobacco: Never Used   Vaping Use    Vaping Use: Never used   Substance and Sexual Activity    Alcohol use: Not Currently     Comment: rare    Drug use: No    Sexual activity: Never   Other Topics Concern    Not on file   Social History Narrative    Not on file     Social Determinants of Health     Financial Resource Strain:     Difficulty of Paying Living Expenses:    Food Insecurity:     Worried About [address] in the Last Year:     [facility] in the Last Year:    Transportation Needs:     Lack of Transportation (Medical):      Lack of Transportation (Non-Medical):    Physical Activity:     Days of Exercise per Week:     Minutes of Exercise per Session:    Stress:     Feeling of Stress :    Social Connections:     Frequency of Communication with Friends and Family:     Frequency of Social Gatherings with Friends and Family:     Attends Orthodox Services:     Active Member of Clubs or Organizations:     Attends Club or Organization Meetings:     Marital Status:    Intimate Partner Violence:     Fear of Current or Ex-Partner:     Emotionally Abused:     Physically Abused:     Sexually Abused:          Review of Systems:  Review of Systems   Constitutional: Negative. HENT: Negative. Eyes: Negative. Cardiovascular: Negative. Respiratory: Negative. Endocrine:        Diabetic:   Hematologic/Lymphatic: Negative. Skin: Negative. Musculoskeletal: Positive for back pain and joint pain. Knees, wrist   Gastrointestinal: Negative. Genitourinary: Negative. Neurological: Positive for loss of balance and numbness. Psychiatric/Behavioral: Negative. Physical Exam:  There were no vitals taken for this visit. Physical Exam  HENT:      Head: Normocephalic. Pulmonary:      Effort: Pulmonary effort is normal.   Skin:         Neurological:      Mental Status: He is alert and oriented to person, place, and time. Psychiatric:         Mood and Affect: Mood normal.         Behavior: Behavior normal.         Thought Content: Thought content normal.           Assessment:    Problem List Items Addressed This Visit     Spondylosis of lumbar region without myelopathy or radiculopathy - Primary    Osteoarthritis of spine with radiculopathy, lumbar region    Medication monitoring encounter    Lumbar spinal stenosis    Facet syndrome (HCC)    DDD (degenerative disc disease), lumbar    DDD (degenerative disc disease), cervical    Chronic, continuous use of opioids    Charcot foot due to diabetes mellitus ([facility] [address]              Treatment Plan:  DISCUSSION: Treatment options discussed withpatient and all questions answered to patient's satisfaction.      Possible side effects, risk of tolerance and or dependence and alternative treatments discussed    Obtaining appropriate analgesic effect of treatment   No signs of potential drug abuse or diversion identified    [x] Ill effects of being on chronic pain medications such as sleep disturbances, respiratory depression, hormonal changes, withdrawal symptoms, chronic opioid dependence and tolerance as well as risk of taking opioids with Benzodiazepines and taking opioids along with alcohol,  werediscussed with patient. I had asked the patient to minimize medication use and utilize pain medications only for uncontrolled rest pain or pain with exertional activities. I advised patient not to self-escalate painmedications without consulting with us. At each of patient's future visits we will try to taper pain medications, while adjusting the adjunct medications, and re-evaluating for Physical Therapy to improve spinal andjoint strength. We will continue to have discussions to decrease pain medications as tolerated. Counseled patient on effects their pain medication and /or their medical condition mayhave on their  ability to drive or operate machinery. Instructed not to drive or operate machinery if drowsy     I also discussed with the patient regarding the dangers of combining narcotic pain medication with tranquilizers, alcohol or illegal drugs or taking the medication any way other than prescribed. The dangers were discussed  including respiratory depression and death. Patient was told to tell  all  physicians regarding the medications he is getting from pain clinic. Patient is warned not to take any unprescribed medications over-the-countermedications that can depress breathing . Patient is advised to talk to the pharmacist or physicians if planning to take any over-the-counter medications before  takeing them. Patient is strongly advised to avoid tranquilizers or  relaxants, illegal drugs  or any medications that can depress breathing  Patient is also advised to tell us if there is any changes in their medications from other physicians.             TREATMENT OPTIONS:       Medication Agreement Requirements Met  Continue Opioid therapy  Script written for  Fentanyl patch  Follow up appointment made

## 2021-06-01 RX ORDER — FUROSEMIDE 40 MG/1
TABLET ORAL
Qty: 60 TABLET | Refills: 0 | Status: SHIPPED | OUTPATIENT
Start: 2021-06-01 | End: 2021-06-28

## 2021-06-10 ENCOUNTER — OFFICE VISIT (OUTPATIENT)
Dept: PODIATRY | Age: 76
End: 2021-06-10
Payer: MEDICARE

## 2021-06-10 VITALS — HEIGHT: 72 IN | WEIGHT: 216 LBS | BODY MASS INDEX: 29.26 KG/M2

## 2021-06-10 DIAGNOSIS — Z79.4 TYPE 2 DIABETES MELLITUS WITH DIABETIC POLYNEUROPATHY, WITH LONG-TERM CURRENT USE OF INSULIN (HCC): ICD-10-CM

## 2021-06-10 DIAGNOSIS — B35.1 ONYCHOMYCOSIS: Primary | ICD-10-CM

## 2021-06-10 DIAGNOSIS — E11.42 TYPE 2 DIABETES MELLITUS WITH DIABETIC POLYNEUROPATHY, WITH LONG-TERM CURRENT USE OF INSULIN (HCC): ICD-10-CM

## 2021-06-10 DIAGNOSIS — M21.969 FOOT DEFORMITY, UNSPECIFIED LATERALITY: ICD-10-CM

## 2021-06-10 DIAGNOSIS — M14.672 CHARCOT'S JOINT OF LEFT FOOT: ICD-10-CM

## 2021-06-10 PROCEDURE — 11721 DEBRIDE NAIL 6 OR MORE: CPT | Performed by: PODIATRIST

## 2021-06-10 ASSESSMENT — ENCOUNTER SYMPTOMS
DIARRHEA: 0
COLOR CHANGE: 0
NAUSEA: 0
CONSTIPATION: 0
VOMITING: 0

## 2021-06-10 NOTE — PROGRESS NOTES
Left    Dystrophic Changes                                                                 [x] [x] [x] [x] [x] [x] [x] [x] [x] [x]  5 4 3 2 1 1 2 3 4 5                         Right                                        Left    Color                                                                  [x] [x] [x] [x] [x] [x] [x] [x] [x] [x]  5 4 3 2 1 1 2 3 4 5                          Right                                        Left    Incurvation/Ingrowin                                                                   [] [] [] [] [] [] [] [] [] []  5 4 3 2 1 1 2 3 4 5                         Right                                        Left    Inflammation/Pain                                                                   [] [] [] [] [] [] [] [] [] []  5 4 3 2 1 1 2 3 4 5                         Right                                        Left       Assesment :     Diagnosis Orders   1. Onychomycosis  OH DEBRIDEMENT OF NAILS, 6 OR MORE   2. Type 2 diabetes mellitus with diabetic polyneuropathy, with long-term current use of insulin (HCC)  OH DEBRIDEMENT OF NAILS, 6 OR MORE   3. Charcot's joint of left foot     4. Foot deformity, unspecified laterality           Plan: Pt was evaluated and examined. Patient was given personalized discharge instructions. Nails 1-10 were debrided sharply in length and thickness with a nipper and , without incident. Pt will follow up in 9 weeks or sooner if any problems arise. Diagnosis was discussed with the pt and all of their questions were answered in detail. Proper foot hygiene and care was discussed with the pt. Informed patient on proper diabetic foot care and importance of tight glycemic control. Patient to check feet daily and contact the office with any questions/problems/concerns. Other comorbidity noted and will be managed by PCP. Diabetic foot examination performed this visit.   The exam included neurological sensory exam, a 10-g monofilament and pinprick sensation, vibration using a 128-Hz tuning fork, ankle reflexes, visual skin inspection, vascular exam including assessment of pedal pulses, orthopedic exam for deformities, and shoe inspection. Increased risk factors noted on the diabetic foot exam include decreased sensory exam and peripheral neuropathy. Shoegear inspected and found to be appropriate size and wear. Diabetic shoes and insoles: This patient would benefit from extra depth diabetic shoes and custom inserts. I feel he/she qualifies due to the above performed physical exam and diagnosis. I will do the appropriate paperwork and send it to their primary care physician. Then the patient will be measured for shoes and custom inserts to properly off load and protect his/her feet. No orders of the defined types were placed in this encounter. Follow up 9 weeks.

## 2021-06-25 ENCOUNTER — HOSPITAL ENCOUNTER (OUTPATIENT)
Dept: PAIN MANAGEMENT | Age: 76
Discharge: HOME | End: 2021-06-25
Payer: MEDICARE

## 2021-06-25 DIAGNOSIS — M51.36 DDD (DEGENERATIVE DISC DISEASE), LUMBAR: ICD-10-CM

## 2021-06-25 DIAGNOSIS — Z51.81 MEDICATION MONITORING ENCOUNTER: ICD-10-CM

## 2021-06-25 DIAGNOSIS — E09.41 DIABETIC MONONEUROPATHY ASSOCIATED WITH DRUG OR CHEMICAL INDUCED DIABETES MELLITUS (HCC): ICD-10-CM

## 2021-06-25 DIAGNOSIS — M50.30 DDD (DEGENERATIVE DISC DISEASE), CERVICAL: ICD-10-CM

## 2021-06-25 DIAGNOSIS — K59.03 DRUG-INDUCED CONSTIPATION: ICD-10-CM

## 2021-06-25 DIAGNOSIS — M47.816 SPONDYLOSIS OF LUMBAR REGION WITHOUT MYELOPATHY OR RADICULOPATHY: Primary | ICD-10-CM

## 2021-06-25 DIAGNOSIS — M47.899 FACET SYNDROME: ICD-10-CM

## 2021-06-25 DIAGNOSIS — E11.42 DIABETIC PERIPHERAL NEUROPATHY ASSOCIATED WITH TYPE 2 DIABETES MELLITUS (HCC): ICD-10-CM

## 2021-06-25 DIAGNOSIS — M47.26 OSTEOARTHRITIS OF SPINE WITH RADICULOPATHY, LUMBAR REGION: ICD-10-CM

## 2021-06-25 DIAGNOSIS — E08.41 DIABETIC MONONEUROPATHY ASSOCIATED WITH DIABETES MELLITUS DUE TO UNDERLYING CONDITION (HCC): ICD-10-CM

## 2021-06-25 DIAGNOSIS — E11.610 CHARCOT FOOT DUE TO DIABETES MELLITUS (HCC): ICD-10-CM

## 2021-06-25 DIAGNOSIS — M48.062 SPINAL STENOSIS OF LUMBAR REGION WITH NEUROGENIC CLAUDICATION: ICD-10-CM

## 2021-06-25 DIAGNOSIS — M00.862 ARTHRITIS OF LEFT KNEE DUE TO OTHER BACTERIA (HCC): ICD-10-CM

## 2021-06-25 PROCEDURE — 99213 OFFICE O/P EST LOW 20 MIN: CPT

## 2021-06-25 PROCEDURE — 99213 OFFICE O/P EST LOW 20 MIN: CPT | Performed by: NURSE PRACTITIONER

## 2021-06-25 RX ORDER — OXYCODONE HYDROCHLORIDE AND ACETAMINOPHEN 5; 325 MG/1; MG/1
1 TABLET ORAL EVERY 6 HOURS PRN
Qty: 120 TABLET | Refills: 0 | Status: SHIPPED | OUTPATIENT
Start: 2021-06-25 | End: 2021-07-25

## 2021-06-25 RX ORDER — FENTANYL 25 UG/H
1 PATCH TRANSDERMAL
Qty: 10 PATCH | Refills: 0 | Status: SHIPPED | OUTPATIENT
Start: 2021-06-28 | End: 2021-07-23 | Stop reason: SDUPTHER

## 2021-06-25 ASSESSMENT — ENCOUNTER SYMPTOMS
RESPIRATORY NEGATIVE: 1
CONSTIPATION: 1
BACK PAIN: 1
EYES NEGATIVE: 1

## 2021-06-25 NOTE — PROGRESS NOTES
Almaz 89 PROGRESS NOTE      Patient  completed [x]  video visit   []   phone call:         Minutes :       [x]    to  review Medication Agreement    []  Follow up after procedure   []  Discuss treatment options      Location:  Provider:  working from    [x]    home    []   [facility] - GIANNI BLANC ,   patient at  home       Chief Complaint:  Low back pain    He c/o low back radiating down his legs. His pain is unchanged. He has no history of lumbar surgery, He reports PT helped for a  short time when he did it. Samantha Josie He is diabetic and has neuropathy, his blood sugars have been in the 100 range., He sleeps well. He has been active. No Ed visits. Back Pain  This is a chronic problem. The problem occurs constantly. The problem is unchanged. The pain is present in the lumbar spine. The quality of the pain is described as aching. Radiates to: legs. The pain is at a severity of 6/10. The pain is moderate. The pain is the same all the time. The symptoms are aggravated by sitting and lying down. Associated symptoms include numbness and weakness. He has tried analgesics for the symptoms.                    Treatment goals:  Functional status: walk without cane    Aberrancy:   Any alcoholic beverages    no        Any illegal drugs   no      Analgesia:      6               Adverse  Effects :constipation on occasion    ADL;s :active, yard work    Data:    When was thelast UDS:   8-2020         Was the UDS appropriate:  [x] yes []   no      Record/Diagnostics Review:      As above, I did review the imaging     8/6/2020  7:00 PM - Jayden, M[facility]pn Incoming Lab Results From [facility]    Component Value Ref Range & Units Status Collected Lab   Pain Management Drug Panel Interp, Urine Consistent   Final 08/02/2020  9:53 AM [facility]   (NOTE)   ________________________________________________________________   DRUGS EXPECTED:   FENTANYL   OXYCODONE   ________________________________________________________________   Patience Juarez helping the patient stay active. Patient denies any side effects and reports adequate analgesia. No sign of misuse/abuse.             Past Medical History:   Diagnosis Date    Abdominal pain     Benign prostatic hypertrophy     C. difficile colitis     CAD (coronary artery disease) 1/3/12    s/p CABGx3    Colon polyp 02/25/2019    tubular adenoma    Constipation     Diabetes mellitus ([facility] [place] [address]     Diarrhea     Diarrhea     DVT [facility]deep venous thrombosis) ([facility])     left calf    Ejection fraction < 50%     Gallstones     Heartburn     Hx of blood clots     Hyperlipidemia     Kidney stones     MI (myocardial infarction) ([facility] [place] [address]     2011    Mitral regurgitation     MRSA (methicillin resistant staph aureus) culture positive     Numbness and tingling     hands and feet    Rib fractures 1-2015    right    Wears glasses     readers       Past Surgical History:   Procedure Laterality Date    APPENDECTOMY      CARDIAC CATHETERIZATION  12/29/11    CHOLECYSTECTOMY      COLONOSCOPY  01/11/2012    wnl, 10 yr recall    COLONOSCOPY  11/28/2018    ATTEMPTED NOT CLEAN (N/A )    COLONOSCOPY  02/25/2019    tubular adenoma    COLONOSCOPY N/A 2/25/2019    COLONOSCOPY WITH BIOPSY performed by Hudson Hurtado [facility] at [facility]      x 1    CORONARY ARTERY BYPASS GRAFT  1/3/12    x3    KNEE ARTHROSCOPY      left    KNEE SURGERY Left     I&D    OTHER SURGICAL HISTORY Left 3/7/2016    plantar plane &  exostectomy medial foot left    [place] COLON CA SCRN NOT HI RSK IND N/A 11/28/2018    COLONOSCOPY ATTEMPTED NOT CLEAN performed by Hudson Hurtado MD at [address] [place] [address] ENDOSCOPY  11-3-15    VENA CAVA FILTER PLACEMENT      WRIST SURGERY      I&D       Allergies   Allergen Reactions    Flagyl [Metronidazole] Hives    Metronidazole      Other reaction(s): Vomiting         Current Outpatient Medications:     furosemide (LASIX) 40 MG tablet, TAKE 1 TABLET BY MOUTH TWO TIMES A DAY , Disp: 60 tablet, Rfl: 0    fentaNYL (DURAGESIC) 25 MCG/HR, Place 1 patch onto the skin every 72 hours for 30 days. , Disp: 10 patch, Rfl: 0    omeprazole (PRILOSEC) 20 MG delayed release capsule, TAKE 1 CAPSULE BY MOUTH EVERY DAY, Disp: 90 capsule, Rfl: 3    LANTUS SOLOSTAR 100 UNIT/ML injection pen, inject 30 units subcutaneously in the morning and 40 units in the evening, Disp: 45 mL, Rfl: 0    simvastatin (ZOCOR) 20 MG tablet, TAKE 1 TABLET BY MOUTH AT night , Disp: 90 tablet, Rfl: 0    topiramate (TOPAMAX) 50 MG tablet, Take 1 tablet by mouth 2 times daily, Disp: 60 tablet, Rfl: 3    gabapentin (NEURONTIN) 800 MG tablet, Take 1 tablet by mouth daily for 90 days. , Disp: 90 tablet, Rfl: 3    aspirin 81 MG tablet, Take 81 mg by mouth daily. , Disp: , Rfl:     Vitamin D (CHOLECALCIFEROL) 1000 UNITS CAPS capsule, Take 2,000 Units by mouth daily , Disp: , Rfl:     Ascorbic Acid (VITAMIN C) 500 MG tablet, Take 1,000 mg by mouth daily , Disp: , Rfl:     finasteride (PROSCAR) 5 MG tablet, Take 5 mg by mouth daily. , Disp: , Rfl:     tamsulosin (FLOMAX) 0.4 MG capsule, Take 0.4 mg by mouth daily.   , Disp: , Rfl:     BD PEN NEEDLE ALIZE 2ND GEN 32G X 4 MM MISC, use 2 times a day as directed, Disp: 100 each, Rfl: 0    naloxone (NARCAN) 4 MG/0.1ML LIQD nasal spray, 1 spray by Nasal route as needed (if needed for respiratory depression), Disp: 1 each, Rfl: 0    zoster recombinant adjuvanted vaccine (SHINGRIX) 50 MCG/0.5ML SUSR injection, Inject 0.5 mLs into the muscle See Admin Instructions 1 dose now and repeat in 2-6 months, Disp: 0.5 mL, Rfl: 0    blood glucose test strips (FREESTYLE LITE) strip, TEST TWICE DAILY AS DIRECTED, Disp: 100 strip, Rfl: 1    Probiotic Product (PROBIOTIC-10 PO), Take by mouth, Disp: , Rfl:     NITROSTAT 0.4 MG SL tablet, , Disp: , Rfl:     Family History   Problem Relation Age of Onset    Heart Failure Father     Cancer Mother         breast    Cancer Maternal Grandfather        Social History     Socioeconomic History    Marital status:      Spouse name: Not on file    Number of children: Not on file    Years of education: Not on file    Highest education level: Not on file   Occupational History    Occupation: retired [employer]   Tobacco Use    Smoking status: Former Smoker     Packs/day: 1.00     Years: 30.00     Pack years: 30.00     Types: Cigarettes     Quit date: 2002     Years since quittin.4    Smokeless tobacco: Never Used   Vaping Use    Vaping Use: Never used   Substance and Sexual Activity    Alcohol use: Not Currently     Comment: rare    Drug use: No    Sexual activity: Never   Other Topics Concern    Not on file   Social History Narrative    Not on file     Social Determinants of Health     Financial Resource Strain:     Difficulty of Paying Living Expenses:    Food Insecurity:     Worried About [address] in the Last Year:     [address] [employer] in the Last Year:    Transportation Needs:     Lack of Transportation (Medical):  Lack of Transportation (Non-Medical):    Physical Activity:     Days of Exercise per Week:     Minutes of Exercise per Session:    Stress:     Feeling of Stress :    Social Connections:     Frequency of Communication with Friends and Family:     Frequency of Social Gatherings with Friends and Family:     Attends Taoism Services:     Active Member of Clubs or Organizations:     Attends Club or Organization Meetings:     Marital Status:    Intimate Partner Violence:     Fear of Current or Ex-Partner:     Emotionally Abused:     Physically Abused:     Sexually Abused:          Review of Systems:  Review of Systems   Constitutional: Negative. HENT: Negative. Eyes: Negative. Cardiovascular: Negative. Respiratory: Negative. Endocrine:        Diabetic blood sugar 100 range   Hematologic/Lymphatic: Bruises/bleeds easily.    Musculoskeletal: Positive for each of patient's future visits we will try to taper pain medications, while adjusting the adjunct medications, and re-evaluating for Physical Therapy to improve spinal andjoint strength. We will continue to have discussions to decrease pain medications as tolerated. Counseled patient on effects their pain medication and /or their medical condition mayhave on their  ability to drive or operate machinery. Instructed not to drive or operate machinery if drowsy     I also discussed with the patient regarding the dangers of combining narcotic pain medication with tranquilizers, alcohol or illegal drugs or taking the medication any way other than prescribed. The dangers were discussed  including respiratory depression and death. Patient was told to tell  all  physicians regarding the medications he is getting from pain clinic. Patient is warned not to take any unprescribed medications over-the-countermedications that can depress breathing . Patient is advised to talk to the pharmacist or physicians if planning to take any over-the-counter medications before  takeing them. Patient is strongly advised to avoid tranquilizers or  relaxants, illegal drugs  or any medications that can depress breathing  Patient is also advised to tell us if there is any changes in their medications from other physicians.             TREATMENT OPTIONS:       Medication Agreement Requirements Met  Continue Opioid therapy  Script written for  Percocet, fentanyl patch  Follow up appointment made

## 2021-06-28 RX ORDER — FUROSEMIDE 40 MG/1
TABLET ORAL
Qty: 60 TABLET | Refills: 0 | Status: SHIPPED | OUTPATIENT
Start: 2021-06-28 | End: 2021-07-26

## 2021-07-06 DIAGNOSIS — Z79.4 TYPE 2 DIABETES MELLITUS WITH DIABETIC POLYNEUROPATHY, WITH LONG-TERM CURRENT USE OF INSULIN (HCC): ICD-10-CM

## 2021-07-06 DIAGNOSIS — E11.42 TYPE 2 DIABETES MELLITUS WITH DIABETIC POLYNEUROPATHY, WITH LONG-TERM CURRENT USE OF INSULIN (HCC): ICD-10-CM

## 2021-07-07 RX ORDER — PEN NEEDLE, DIABETIC 32GX 5/32"
NEEDLE, DISPOSABLE MISCELLANEOUS
Qty: 100 EACH | Refills: 0 | Status: SHIPPED | OUTPATIENT
Start: 2021-07-07 | End: 2021-08-20

## 2021-07-12 ENCOUNTER — NURSE ONLY (OUTPATIENT)
Dept: PODIATRY | Age: 76
End: 2021-07-12

## 2021-07-21 RX ORDER — INSULIN GLARGINE 100 [IU]/ML
INJECTION, SOLUTION SUBCUTANEOUS
Qty: 45 ML | Refills: 0 | Status: SHIPPED | OUTPATIENT
Start: 2021-07-21 | End: 2021-08-02

## 2021-07-23 ENCOUNTER — HOSPITAL ENCOUNTER (OUTPATIENT)
Dept: PAIN MANAGEMENT | Age: 76
Discharge: HOME | End: 2021-07-23
Payer: MEDICARE

## 2021-07-23 DIAGNOSIS — M47.816 SPONDYLOSIS OF LUMBAR REGION WITHOUT MYELOPATHY OR RADICULOPATHY: Primary | ICD-10-CM

## 2021-07-23 DIAGNOSIS — E11.610 CHARCOT FOOT DUE TO DIABETES MELLITUS (HCC): ICD-10-CM

## 2021-07-23 DIAGNOSIS — M47.899 FACET SYNDROME: ICD-10-CM

## 2021-07-23 DIAGNOSIS — Z51.81 MEDICATION MONITORING ENCOUNTER: ICD-10-CM

## 2021-07-23 DIAGNOSIS — M50.30 DDD (DEGENERATIVE DISC DISEASE), CERVICAL: ICD-10-CM

## 2021-07-23 DIAGNOSIS — F11.90 CHRONIC, CONTINUOUS USE OF OPIOIDS: ICD-10-CM

## 2021-07-23 DIAGNOSIS — M00.862 ARTHRITIS OF LEFT KNEE DUE TO OTHER BACTERIA (HCC): ICD-10-CM

## 2021-07-23 DIAGNOSIS — M48.062 SPINAL STENOSIS OF LUMBAR REGION WITH NEUROGENIC CLAUDICATION: ICD-10-CM

## 2021-07-23 DIAGNOSIS — M51.36 DDD (DEGENERATIVE DISC DISEASE), LUMBAR: ICD-10-CM

## 2021-07-23 DIAGNOSIS — E08.41 DIABETIC MONONEUROPATHY ASSOCIATED WITH DIABETES MELLITUS DUE TO UNDERLYING CONDITION (HCC): ICD-10-CM

## 2021-07-23 DIAGNOSIS — M47.26 OSTEOARTHRITIS OF SPINE WITH RADICULOPATHY, LUMBAR REGION: ICD-10-CM

## 2021-07-23 DIAGNOSIS — K59.03 DRUG-INDUCED CONSTIPATION: ICD-10-CM

## 2021-07-23 PROCEDURE — 99213 OFFICE O/P EST LOW 20 MIN: CPT

## 2021-07-23 PROCEDURE — 99213 OFFICE O/P EST LOW 20 MIN: CPT | Performed by: NURSE PRACTITIONER

## 2021-07-23 RX ORDER — FENTANYL 25 UG/H
1 PATCH TRANSDERMAL
Qty: 10 PATCH | Refills: 0 | Status: SHIPPED | OUTPATIENT
Start: 2021-07-28 | End: 2021-08-20 | Stop reason: SDUPTHER

## 2021-07-23 ASSESSMENT — ENCOUNTER SYMPTOMS
GASTROINTESTINAL NEGATIVE: 1
EYES NEGATIVE: 1
RESPIRATORY NEGATIVE: 1
BACK PAIN: 1

## 2021-07-23 NOTE — PROGRESS NOTES
Almaz 89 PROGRESS NOTE      Patient  completed [x]  video visit   []   phone call:         Minutes :       [x]    to  review Medication Agreement    []  Follow up after procedure   []  Discuss treatment options      Location:  Provider:  working from    [x]    home    []   [facility] - GIANNI BLANC ,   patient  In car      Chief Complaint: low back pain      He c/o low back pain  Radiating to legs and feet. Pain has not changed. No history of lumbar surgery. He reports PT never helped. He reports he is sleeping well. He tries to be active. He is diabetic, his last HGBA1C was 7. Back Pain  This is a chronic problem. The problem occurs constantly. The problem is unchanged. The pain is present in the lumbar spine. The quality of the pain is described as aching. Radiates to: legs and feet. The pain is at a severity of 5/10. The pain is moderate. The pain is the same all the time. Exacerbated by: nothing. Associated symptoms include numbness. He has tried analgesics for the symptoms.        Treatment goals:  Functional status: walk further    Aberrancy:   Any alcoholic beverages        no    Any illegal drugs  no    Analgesia:     5                Adverse  Effects :no    ADL;s :active    Data:    When was thelast UDS:    8-6-2020        Was the UDS appropriate:  [x] yes []   nono metabolites for oxycodone    Record/Diagnostics Review:      As above, I did review the imaging     8/6/2020  7:00 PM - Jayden, M[facility]pn Incoming Lab Results From [facility]    Component Value Ref Range & Units Status Collected Lab   Pain Management Drug Panel Interp, Urine Consistent   Final 08/02/2020  9:53 AM [facility]   (NOTE)   ________________________________________________________________   DRUGS EXPECTED:   FENTANYL   OXYCODONE   ________________________________________________________________   CONSISTENT with medications provided:   FENTANYL: based on fentanyl, norfentanyl   OXYCODONE: based on oxycodone ________________________________________________________________   INTERPRETIVE INFORMATION: Pain Mgt Rogers, Mass Spec/EMIT, Ur,                            Interp   Interpretation depends on accuracy and completeness of patient        FINAL **   Procedure:  LUMBAR SPINE STANDARD    CDX  01/03/2015     [id number]   Reason for Exam:  ^fall/ pain       FULL RESULT:   LUMBAR SPINE STANDARD       INDICATION:   fall/ pain.           COMPARISON:    12/27/2007       FINDINGS:   AP, crosstable lateral and cone-down lumbosacral views obtained.       There is multilevel facet arthropathy and anterior spurring. There is    redemonstration of mild to moderate wedge compression deformity of L1    vertebral body. No acute fracture is evident. No paraspinal mass. SI    joints show degenerative changes. Cholecystectomy noted.               IMPRESSION:       Old L1 compression. No acute osseous abnormality.       Report electronically signed by Guille Moreland M.D. on 1/3/2015 4:27 PM    1/3/2015 4:27 PM   Transcribed by: [facility] on [facility]  3 2015  4:29P    Read by: Savi Brown M.D.  [id number] on [place]o  3 2015  4:29P    Electronically Signed by: Willie Kim. Savi Brown M.D. on: Caren [place]  3 2015     4:29P                                                                      Pill count: appropriate    fill date :7-  Morphine equivalent dose as reported on OARRS: 90  Has narcan at home  Periodic Controlled Substance Monitoring: Possible medication side effects, risk of tolerance/dependence & alternative treatments discussed., No signs of potential drug abuse or diversion identified. , Assessed functional status., Obtaining appropriate analgesic effect of treatment. Rafiss Plants, APRN - CNP)  Review of[place]ARRS does not show any aberrant prescription behavior. Medication is helping the patient stay active. Patient denies any side effects and reports adequate analgesia. No sign of misuse/abuse.             Past Medical History:   Diagnosis Date    Abdominal pain     Benign prostatic hypertrophy     C. difficile colitis     CAD (coronary artery disease) 1/3/12    s/p CABGx3    Colon polyp 02/25/2019    tubular adenoma    Constipation     Diabetes mellitus ([facility])     Diarrhea     Diarrhea     DVT (deep venous thrombosis) ([facility])     left calf    Ejection fraction < 50%     Gallstones     Heartburn     Hx of blood clots     Hyperlipidemia     Kidney stones     MI (myocardial infarction) ([facility] [address]     2011    Mitral regurgitation     MRSA (methicillin resistant staph aureus) culture positive     Numbness and tingling     hands and feet    Rib fractures 1-2015    right    Wears glasses     readers       Past Surgical History:   Procedure Laterality Date    APPENDECTOMY      CARDIAC CATHETERIZATION  12/29/11    CHOLECYSTECTOMY      COLONOSCOPY  01/11/2012    wnl, 10 yr recall    COLONOSCOPY  11/28/2018    ATTEMPTED NOT CLEAN (N/A )    COLONOSCOPY  02/25/2019    tubular adenoma    COLONOSCOPY N/A 2/25/2019    COLONOSCOPY WITH BIOPSY performed by Olman May MD at [address]      x 1    CORONARY ARTERY BYPASS GRAFT  1/3/12    x3    KNEE ARTHROSCOPY      left    KNEE SURGERY Left     I&D    OTHER SURGICAL HISTORY Left 3/7/2016    plantar plane &  exostectomy medial foot left    [place] COLON CA SCRN NOT HI RSK IND N/A 11/28/2018    COLONOSCOPY ATTEMPTED NOT CLEAN performed by Olman May MD at [address] [place] [address] ENDOSCOPY  11-3-15    VENA CAVA FILTER PLACEMENT      WRIST SURGERY      I&D       Allergies   Allergen Reactions    Flagyl [Metronidazole] Hives    Metronidazole      Other reaction(s): Vomiting         Current Outpatient Medications:     LANTUS SOLOSTAR 100 UNIT/ML injection pen, inject 30 units subcutaneously in the morning and 40 units in the evening, Disp: 45 mL, Rfl: 0    furosemide (LASIX) 40 MG tablet, TAKE 1 TABLET BY MOUTH TWO TIMES A DAY , Disp: 60 tablet, Rfl: 0    fentaNYL (DURAGESIC) 25 MCG/HR, Place 1 patch onto the skin every 72 hours for 30 days. , Disp: 10 patch, Rfl: 0    oxyCODONE-acetaminophen (PERCOCET) 5-325 MG per tablet, Take 1 tablet by mouth every 6 hours as needed for Pain for up to 30 days. , Disp: 120 tablet, Rfl: 0    omeprazole (PRILOSEC) 20 MG delayed release capsule, TAKE 1 CAPSULE BY MOUTH EVERY DAY, Disp: 90 capsule, Rfl: 3    simvastatin (ZOCOR) 20 MG tablet, TAKE 1 TABLET BY MOUTH AT night , Disp: 90 tablet, Rfl: 0    topiramate (TOPAMAX) 50 MG tablet, Take 1 tablet by mouth 2 times daily, Disp: 60 tablet, Rfl: 3    gabapentin (NEURONTIN) 800 MG tablet, Take 1 tablet by mouth daily for 90 days. , Disp: 90 tablet, Rfl: 3    aspirin 81 MG tablet, Take 81 mg by mouth daily. , Disp: , Rfl:     Vitamin D (CHOLECALCIFEROL) 1000 UNITS CAPS capsule, Take 2,000 Units by mouth daily , Disp: , Rfl:     Ascorbic Acid (VITAMIN C) 500 MG tablet, Take 1,000 mg by mouth daily , Disp: , Rfl:     finasteride (PROSCAR) 5 MG tablet, Take 5 mg by mouth daily. , Disp: , Rfl:     tamsulosin (FLOMAX) 0.4 MG capsule, Take 0.4 mg by mouth daily.   , Disp: , Rfl:     Insulin Pen Needle (BD PEN NEEDLE ALIZE 2ND GEN) 32G X 4 MM MISC, USE TWO TIMES DAILY AS DIRECTED., Disp: 100 each, Rfl: 0    naloxone (NARCAN) 4 MG/0.1ML LIQD nasal spray, 1 spray by Nasal route as needed (if needed for respiratory depression), Disp: 1 each, Rfl: 0    zoster recombinant adjuvanted vaccine (SHINGRIX) 50 MCG/0.5ML SUSR injection, Inject 0.5 mLs into the muscle See Admin Instructions 1 dose now and repeat in 2-6 months, Disp: 0.5 mL, Rfl: 0    blood glucose test strips (FREESTYLE LITE) strip, TEST TWICE DAILY AS DIRECTED, Disp: 100 strip, Rfl: 1    NITROSTAT 0.4 MG SL tablet, , Disp: , Rfl:     Family History   Problem Relation Age of Onset    Heart Failure Father     Cancer Mother         breast    Cancer Maternal Grandfather        Social History Socioeconomic History    Marital status:      Spouse name: Not on file    Number of children: Not on file    Years of education: Not on file    Highest education level: Not on file   Occupational History    Occupation: retired [employer]   Tobacco Use    Smoking status: Former Smoker     Packs/day: 1.00     Years: 30.00     Pack years: 30.00     Types: Cigarettes     Quit date: 2002     Years since quittin.4    Smokeless tobacco: Never Used   Vaping Use    Vaping Use: Never used   Substance and Sexual Activity    Alcohol use: Not Currently     Comment: rare    Drug use: No    Sexual activity: Never   Other Topics Concern    Not on file   Social History Narrative    Not on file     Social Determinants of Health     Financial Resource Strain:     Difficulty of Paying Living Expenses:    Food Insecurity:     Worried About [address] in the Last Year:     [address] in the Last Year:    Transportation Needs:     Lack of Transportation (Medical):  Lack of Transportation (Non-Medical):    Physical Activity:     Days of Exercise per Week:     Minutes of Exercise per Session:    Stress:     Feeling of Stress :    Social Connections:     Frequency of Communication with Friends and Family:     Frequency of Social Gatherings with Friends and Family:     Attends Buddhist Services:     Active Member of Clubs or Organizations:     Attends Club or Organization Meetings:     Marital Status:    Intimate Partner Violence:     Fear of Current or Ex-Partner:     Emotionally Abused:     Physically Abused:     Sexually Abused:          Review of Systems:  Review of Systems   Constitutional: Negative. HENT: Negative. Eyes: Negative. Cardiovascular: Negative. Respiratory: Negative. Endocrine:        Diabetic   Hematologic/Lymphatic: Bruises/bleeds easily. Skin: Negative. Musculoskeletal: Positive for back pain and joint pain.    Gastrointestinal: Negative. Genitourinary: Negative. Neurological: Positive for numbness. Psychiatric/Behavioral: Negative. Physical Exam:  There were no vitals taken for this visit. Physical Exam  HENT:      Head: Normocephalic. Pulmonary:      Effort: Pulmonary effort is normal.   Skin:         Neurological:      Mental Status: He is alert and oriented to person, place, and time. Psychiatric:         Mood and Affect: Mood normal.         Thought Content: Thought content normal.           Assessment:    Problem List Items Addressed This Visit     Spondylosis of lumbar region without myelopathy or radiculopathy - Primary    Osteoarthritis of spine with radiculopathy, lumbar region    Medication monitoring encounter    Lumbar spinal stenosis    Diabetic mononeuropathy associated with diabetes mellitus due to underlying condition ([facility] [address]    DDD (degenerative disc disease), lumbar    DDD (degenerative disc disease), cervical    Chronic, continuous use of opioids            Treatment Plan:  DISCUSSION: Treatment options discussed withpatient and all questions answered to patient's satisfaction. Possible side effects, risk of tolerance and or dependence and alternative treatments discussed    Obtaining appropriate analgesic effect of treatment   No signs of potential drug abuse or diversion identified    [x] Ill effects of being on chronic pain medications such as sleep disturbances, respiratory depression, hormonal changes, withdrawal symptoms, chronic opioid dependence and tolerance as well as risk of taking opioids with Benzodiazepines and taking opioids along with alcohol,  werediscussed with patient. I had asked the patient to minimize medication use and utilize pain medications only for uncontrolled rest pain or pain with exertional activities. I advised patient not to self-escalate painmedications without consulting with us.   At each of patient's future visits we will try to taper pain medications, while adjusting the adjunct medications, and re-evaluating for Physical Therapy to improve spinal andjoint strength. We will continue to have discussions to decrease pain medications as tolerated. Counseled patient on effects their pain medication and /or their medical condition mayhave on their  ability to drive or operate machinery. Instructed not to drive or operate machinery if drowsy     I also discussed with the patient regarding the dangers of combining narcotic pain medication with tranquilizers, alcohol or illegal drugs or taking the medication any way other than prescribed. The dangers were discussed  including respiratory depression and death. Patient was told to tell  all  physicians regarding the medications he is getting from pain clinic. Patient is warned not to take any unprescribed medications over-the-countermedications that can depress breathing . Patient is advised to talk to the pharmacist or physicians if planning to take any over-the-counter medications before  takeing them. Patient is strongly advised to avoid tranquilizers or  relaxants, illegal drugs  or any medications that can depress breathing  Patient is also advised to tell us if there is any changes in their medications from other physicians.             TREATMENT OPTIONS:       Medication Agreement Requirements Met  Continue Opioid therapy  Script written for  fentanyyl patch  Follow up appointment made

## 2021-07-26 RX ORDER — FUROSEMIDE 40 MG/1
TABLET ORAL
Qty: 60 TABLET | Refills: 3 | Status: SHIPPED | OUTPATIENT
Start: 2021-07-26 | End: 2021-11-08

## 2021-08-02 RX ORDER — INSULIN GLARGINE 100 [IU]/ML
INJECTION, SOLUTION SUBCUTANEOUS
Qty: 45 ML | Refills: 0 | Status: SHIPPED | OUTPATIENT
Start: 2021-08-02 | End: 2021-10-04

## 2021-08-02 NOTE — TELEPHONE ENCOUNTER
Please Approve or Refuse.   Send to Pharmacy per Pt's Request:      Next Visit Date:  11/3/2021   Last Visit Date: 5/3/2021    Hemoglobin A1C (%)   Date Value   05/03/2021 7.0   02/01/2021 7.1   10/29/2020 6.4             ( goal A1C is < 7)   BP Readings from Last 3 Encounters:   05/03/21 122/74   02/01/21 116/64   10/29/20 110/70          (goal 120/80)  BUN   Date Value Ref Range Status   11/05/2020 25 (H) 8 - 23 mg/dL Final     CREATININE   Date Value Ref Range Status   11/05/2020 1.37 (H) 0.70 - 1.20 mg/dL Final     Potassium   Date Value Ref Range Status   11/05/2020 4.1 3.7 - 5.3 mmol/L Final

## 2021-08-09 RX ORDER — SIMVASTATIN 20 MG
TABLET ORAL
Qty: 90 TABLET | Refills: 3 | Status: SHIPPED | OUTPATIENT
Start: 2021-08-09 | End: 2022-08-01

## 2021-08-19 ENCOUNTER — OFFICE VISIT (OUTPATIENT)
Dept: PODIATRY | Age: 76
End: 2021-08-19
Payer: MEDICARE

## 2021-08-19 VITALS — WEIGHT: 214 LBS | HEIGHT: 72 IN | BODY MASS INDEX: 28.99 KG/M2

## 2021-08-19 DIAGNOSIS — E11.42 TYPE 2 DIABETES MELLITUS WITH DIABETIC POLYNEUROPATHY, WITH LONG-TERM CURRENT USE OF INSULIN (HCC): ICD-10-CM

## 2021-08-19 DIAGNOSIS — B35.1 ONYCHOMYCOSIS: Primary | ICD-10-CM

## 2021-08-19 DIAGNOSIS — Z79.4 TYPE 2 DIABETES MELLITUS WITH DIABETIC POLYNEUROPATHY, WITH LONG-TERM CURRENT USE OF INSULIN (HCC): ICD-10-CM

## 2021-08-19 DIAGNOSIS — M14.672 CHARCOT'S JOINT OF LEFT FOOT: ICD-10-CM

## 2021-08-19 DIAGNOSIS — M21.969 FOOT DEFORMITY, UNSPECIFIED LATERALITY: ICD-10-CM

## 2021-08-19 PROCEDURE — 11721 DEBRIDE NAIL 6 OR MORE: CPT | Performed by: PODIATRIST

## 2021-08-19 ASSESSMENT — ENCOUNTER SYMPTOMS
CONSTIPATION: 0
COLOR CHANGE: 0
NAUSEA: 0
DIARRHEA: 0
VOMITING: 0

## 2021-08-19 NOTE — PROGRESS NOTES
[facility]  Return Patient  Chief Complaint   Patient presents with    Nail Problem     nail trim/last saw Tereso Dial 5/3/21    Diabetes     blood sugar 97       Kayla Day is a 77y.o. year old male who is here for a diabetic foot check and nail trim. He would like his nails trimmed today. History of surgery type: Plantar planing medial and lateral foot. Vital signs are stable. Pain level is 0. Patient denies N/V/F/C. Patient was compliant with postoperative instructions. He is in a diabetic shoe        Review of Systems   Constitutional: Negative for chills, diaphoresis, fatigue, fever and unexpected weight change. Cardiovascular: Negative for leg swelling. Gastrointestinal: Negative for constipation, diarrhea, nausea and vomiting. Musculoskeletal: Positive for gait problem. Negative for arthralgias and joint swelling. Skin: Negative for color change, pallor, rash and wound. Neurological: Positive for numbness. Negative for weakness. Vascular: DP and PT pulses palpable 2/4, Right Foot and 2/4 on the Left Foot. CFT <3 seconds, Right Foot and <3 seconds on the Left Foot. Edema is absent,  Right Foot and minimal on the Left Foot. Neurological:   Sensation absent  to light touch to level of digits, both feet. Musculoskeletal:   Muscle strength is 5/5 on the Right Foot and 5/5 on the Left Foot. Structural deformities are present on the Right Foot and present on the Left Foot, but improved  Flat medial arch left foot. Integument:  Warm, dry, supple both feet. Infection is absent. No odor. No macerated.      Sites of Onychomycosis Involvement (Check affected area)  [x] [x] [x] [x] [x] [x] [x] [x] [x] [x]  5 4 3 2 1 1 2 3 4 5                          Right                                        Left    Thickness  [x] [x] [x] [x] [x] [x] [x] [x] [x] [x]  5 4 3 2 1 1 2 3 4 5                         Right Patient said his urine is orange not red since yesterday  His frequency continues also  His Dr Increased his Metformin this week,he is calling his doctor also to let him know what is going on  He said there are no other problems  His sugars are 221 this morning  Any Orders ? First name:            Male           MR # [id number]  Date of Birth: [birth year]	Time of Birth: 1908 h    Birth Weight:  4090 g   Date of Admission:     8/15      Gestational Age: 41.3      Source of admission [ x__ ] Inborn     [ __ ]Transport from    [facility]:  41.3 week GA male born to a 38 y/o  mother via VAVD. O+, GBS neg , PNLs neg/NR/imm. No significant maternal history. Delivery complicated by maternal T prior to delivery of 38.7 s/p ampicillin x1. AROM <18hrs with light mec. Baby born with vacuum assistance after pop off x1, vigorous and crying spontaneously. Warmed, dried, stimulated. Apgars 9/9.       Social History: No history of alcohol/tobacco exposure obtained  FHx: non-contributory to the condition being treated or details of FH documented here  ROS: unable to obtain ()     Interval Events: crib, antibiotics    **************************************************************************************************  Age: 2d    Vital Signs:  T(C): 36.6 (17 @ 05:00), Max: 36.9 (17 @ 18:00)  HR: 142 (17 @ 05:00) (138 - 146)  BP: 75/39 (17 @ 21:00) (72/54 - 75/39)  BP(mean): 55 (17 @ 21:00) (55 - 63)  ABP: --  ABP(mean): --  RR: 50 (17 @ 05:00) (44 - 54)  SpO2: 99% (17 @ 05:00) (98% - 100%)    Drug Dosing Weight: Weight (kg): 4.09 (15 Aug 2017 22:16)    MEDICATIONS:  MEDICATIONS  (STANDING):  gentamicin  IV Intermittent - Peds 20.5 milliGRAM(s) IV Intermittent every 36 hours  ampicillin IV Intermittent - NICU 410 milliGRAM(s) IV Intermittent every 12 hours    MEDICATIONS  (PRN):      RESPIRATORY SUPPORT:  [ _ ] Mechanical Ventilation:   [ _ ] Nasal Cannula: _ __ _ Liters, FiO2: ___ %  [ _ ]RA    LABS:         Blood type, Baby [08-15] ABO: O  Rh; Positive DC; Negative                                  17.5   16.52 )-----------( 208             [08-15 @ 21:25]                  52.9  S 59.0%  B 1.0%  [place] 0%  Myelo 0%  Promyelo 0%  Blasts 0%  Lymph 29.0%  Mono 9.0%  Eos 0.0%  Baso 2.0%  Retic 0%        140  |101  | 12     ------------------<72   Ca 9.0  Mg 2.2  Ph 6.8   [ @ 02:40]  5.3   | 20   | 0.68                   Bili T/D  [ @ 02:40] - 8.2/0.3            CAPILLARY BLOOD GLUCOSE          *************************************************************************************************  WEEKLY DATA  Postmenstrual age:			Date:  Head Circumference:			Date:  Weight gain: Gram/kg/day:		Date:  Weight gain: Gram/day:		Date:  [place] percentile for weight:			Date:    PHYSICAL EXAM:  General:	         Awake and active; in no acute distress  Head:		AFOF  Eyes:		Normally set bilaterally  Ears:		Patent bilaterally, no deformities  Nose/Mouth:	Nares patent, palate intact  Neck:		No masses, intact clavicles  Chest/Lungs:      Breath sounds equal to auscultation. No retractions  CV:		No murmurs appreciated, normal pulses bilaterally  Abdomen:          Soft nontender nondistended, no masses, bowel sounds present  :		Normal for gestational age  Spine:		Intact, no sacral dimples or tags  Anus:		Grossly patent  Extremities:	FROM, no hip clicks  Skin:		Pink, no lesions  Neuro exam:	Appropriate tone, activity    DISCHARGE PLANNING (date and status):  Hep B Vacc	:given 8/15  CCHD:			  :			na		  Hearing: passed   [place] screen:	  Circumcision:   Hip US rec:  	  Synagis: 			  Other Immunizations (with dates):    		  Neurodevelop eval?	  CPR class done?  	  PVS at DC?	  FE at DC?	  VITD at DC?  PMD:          Name:  ______________ _             Contact information:  ______________ _  Pharmacy: Name:  ______________ _              Contact information:  ______________ _    Follow-up appointments (list):    Time spent on the total initial encounter with > 50% of the visit spent on counseling and / or coordination of care:[ _ ] 30 min	[ _ ] 50 min[ - ] 70 min  Time spent on the total subsequent encounter with >50% of the visit spent on counseling and/or coordination of care:[ _ ] 15 min[ _ ] 25 min[ x_ ] 35 min  [ _ ] Discharge time spent >30 min Left    Dystrophic Changes                                                                 [x] [x] [x] [x] [x] [x] [x] [x] [x] [x]  5 4 3 2 1 1 2 3 4 5                         Right                                        Left    Color                                                                  [x] [x] [x] [x] [x] [x] [x] [x] [x] [x]  5 4 3 2 1 1 2 3 4 5                          Right                                        Left    Incurvation/Ingrowin                                                                   [] [] [] [] [] [] [] [] [] []  5 4 3 2 1 1 2 3 4 5                         Right                                        Left    Inflammation/Pain                                                                   [] [] [] [] [] [] [] [] [] []  5 4 3 2 1 1 2 3 4 5                         Right                                        Left       Assesment :     Diagnosis Orders   1. Onychomycosis  IA DEBRIDEMENT OF NAILS, 6 OR MORE   2. Type 2 diabetes mellitus with diabetic polyneuropathy, with long-term current use of insulin (HCC)  IA DEBRIDEMENT OF NAILS, 6 OR MORE   3. Charcot's joint of left foot     4. Foot deformity, unspecified laterality           Plan: Pt was evaluated and examined. Patient was given personalized discharge instructions. Nails 1-10 were debrided sharply in length and thickness with a nipper and , without incident. Pt will follow up in 9 weeks or sooner if any problems arise. Diagnosis was discussed with the pt and all of their questions were answered in detail. Proper foot hygiene and care was discussed with the pt. Informed patient on proper diabetic foot care and importance of tight glycemic control. Patient to check feet daily and contact the office with any questions/problems/concerns. Other comorbidity noted and will be managed by PCP. Diabetic foot examination performed this visit.   The exam included neurological sensory exam, a 10-g monofilament and pinprick sensation, vibration using a 128-Hz tuning fork, ankle reflexes, visual skin inspection, vascular exam including assessment of pedal pulses, orthopedic exam for deformities, and shoe inspection. Increased risk factors noted on the diabetic foot exam include decreased sensory exam and peripheral neuropathy. Shoegear inspected and found to be appropriate size and wear. Diabetic shoes and insoles: This patient would benefit from extra depth diabetic shoes and custom inserts. I feel he/she qualifies due to the above performed physical exam and diagnosis. I will do the appropriate paperwork and send it to their primary care physician. Then the patient will be measured for shoes and custom inserts to properly off load and protect his/her feet. No orders of the defined types were placed in this encounter. Follow up 9 weeks. First name:            Male           MR # [id number]  Date of Birth: [birth year]	Time of Birth: 1908 h    Birth Weight:  4090 g   Date of Admission:     8/15      Gestational Age: 41.3      Source of admission [ x__ ] Inborn     [ __ ]Transport from    \[facility]\"":  41.3 week GA male born to a 36 y/o  mother via VAVD. O+, GBS neg , PNLs neg/NR/imm. No significant maternal history. Delivery complicated by maternal T prior to delivery of 38.7 s/p ampicillin x1. AROM <18hrs with light mec. Baby born with vacuum assistance after pop off x1, vigorous and crying spontaneously. Warmed, dried, stimulated. Apgars 9/9.       Social History: No history of alcohol/tobacco exposure obtained  FHx: non-contributory to the condition being treated or details of FH documented here  ROS: unable to obtain ()     Interval Events: crib, antibiotics    **************************************************************************************************  Age: 2d    Vital Signs:  T(C): 36.6 (17 @ 05:00), Max: 36.9 (17 @ 18:00)  HR: 142 (17 @ 05:00) (138 - 146)  BP: 75/39 (17 @ 21:00) (72/54 - 75/39)  BP(mean): 55 (17 @ 21:00) (55 - 63)  ABP: --  ABP(mean): --  RR: 50 (17 @ 05:00) (44 - 54)  SpO2: 99% (17 @ 05:00) (98% - 100%)    Drug Dosing Weight: Weight (kg): 4.09 (15 Aug 2017 22:16)    MEDICATIONS:  MEDICATIONS  (STANDING):  gentamicin  IV Intermittent - Peds 20.5 milliGRAM(s) IV Intermittent every 36 hours  ampicillin IV Intermittent - NICU 410 milliGRAM(s) IV Intermittent every 12 hours    MEDICATIONS  (PRN):      RESPIRATORY SUPPORT:  [ _ ] Mechanical Ventilation:   [ _ ] Nasal Cannula: _ __ _ Liters, FiO2: ___ %  [ _ ]RA    LABS:         Blood type, Baby [08-15] ABO: O  Rh; Positive DC; Negative                                  17.5   16.52 )-----------( 208             [08-15 @ 21:25]                  52.9  S 59.0%  B 1.0%  [place] 0%  Myelo 0%  Promyelo 0%  Blasts 0%  Lymph 29.0%  Mono 9.0%  Eos 0.0%  Baso 2.0%  Retic 0%        140  |101  | 12     ------------------<72   Ca 9.0  Mg 2.2  Ph 6.8   [ @ 02:40]  5.3   | 20   | 0.68                   Bili T/D  [ @ 02:40] - 8.2/0.3            CAPILLARY BLOOD GLUCOSE          *************************************************************************************************  WEEKLY DATA  Postmenstrual age:			Date:  Head Circumference:			Date:  Weight gain: Gram/kg/day:		Date:  Weight gain: Gram/day:		Date:  [place] percentile for weight:			Date:    PHYSICAL EXAM:  General:	         Awake and active; in no acute distress  Head:		AFOF  Eyes:		Normally set bilaterally  Ears:		Patent bilaterally, no deformities  Nose/Mouth:	Nares patent, palate intact  Neck:		No masses, intact clavicles  Chest/Lungs:      Breath sounds equal to auscultation. No retractions  CV:		No murmurs appreciated, normal pulses bilaterally  Abdomen:          Soft nontender nondistended, no masses, bowel sounds present  :		Normal for gestational age  Spine:		Intact, no sacral dimples or tags  Anus:		Grossly patent  Extremities:	FROM, no hip clicks  Skin:		Pink, no lesions  Neuro exam:	Appropriate tone, activity    DISCHARGE PLANNING (date and status):  Hep B Vacc	:given 8/15  CCHD:			  :			na		  Hearing: passed   [place] screen:	  Circumcision: requested  Hip US rec:  	  Synagis: 		no  Other Immunizations (with dates):    		  Neurodevelop eval?	  CPR class done?  	  PVS at DC?	  FE at DC?	  VITD at DC?  PMD:          Name:  ______________ _             Contact information:  ______________ _  Pharmacy: Name:  ______________ _              Contact information:  ______________ _    Follow-up appointments (list):    Time spent on the total initial encounter with > 50% of the visit spent on counseling and / or coordination of care:[ _ ] 30 min	[ _ ] 50 min[ - ] 70 min  Time spent on the total subsequent encounter with >50% of the visit spent on counseling and/or coordination of care:[ _ ] 15 min[ _ ] 25 min[ x_ ] 35 min  [ _ ] Discharge time spent >30 min

## 2021-08-20 ENCOUNTER — HOSPITAL ENCOUNTER (OUTPATIENT)
Dept: PAIN MANAGEMENT | Age: 76
Discharge: HOME | End: 2021-08-20
Payer: MEDICARE

## 2021-08-20 ENCOUNTER — HOSPITAL ENCOUNTER (OUTPATIENT)
Age: 76
Discharge: HOME | End: 2021-08-20
Payer: MEDICARE

## 2021-08-20 DIAGNOSIS — E08.41 DIABETIC MONONEUROPATHY ASSOCIATED WITH DIABETES MELLITUS DUE TO UNDERLYING CONDITION (HCC): ICD-10-CM

## 2021-08-20 DIAGNOSIS — M51.36 DDD (DEGENERATIVE DISC DISEASE), LUMBAR: ICD-10-CM

## 2021-08-20 DIAGNOSIS — K59.03 DRUG-INDUCED CONSTIPATION: ICD-10-CM

## 2021-08-20 DIAGNOSIS — M47.816 SPONDYLOSIS OF LUMBAR REGION WITHOUT MYELOPATHY OR RADICULOPATHY: Primary | ICD-10-CM

## 2021-08-20 DIAGNOSIS — Z51.81 MEDICATION MONITORING ENCOUNTER: ICD-10-CM

## 2021-08-20 DIAGNOSIS — M48.062 SPINAL STENOSIS OF LUMBAR REGION WITH NEUROGENIC CLAUDICATION: ICD-10-CM

## 2021-08-20 DIAGNOSIS — M00.862 ARTHRITIS OF LEFT KNEE DUE TO OTHER BACTERIA (HCC): ICD-10-CM

## 2021-08-20 DIAGNOSIS — M47.816 SPONDYLOSIS OF LUMBAR REGION WITHOUT MYELOPATHY OR RADICULOPATHY: ICD-10-CM

## 2021-08-20 DIAGNOSIS — M50.30 DDD (DEGENERATIVE DISC DISEASE), CERVICAL: ICD-10-CM

## 2021-08-20 DIAGNOSIS — E11.610 CHARCOT FOOT DUE TO DIABETES MELLITUS (HCC): ICD-10-CM

## 2021-08-20 DIAGNOSIS — M47.26 OSTEOARTHRITIS OF SPINE WITH RADICULOPATHY, LUMBAR REGION: ICD-10-CM

## 2021-08-20 DIAGNOSIS — M47.899 FACET SYNDROME: ICD-10-CM

## 2021-08-20 PROCEDURE — 99442 PR PHYS/QHP TELEPHONE EVALUATION 11-20 MIN: CPT | Performed by: NURSE PRACTITIONER

## 2021-08-20 PROCEDURE — 80307 DRUG TEST PRSMV CHEM ANLYZR: CPT

## 2021-08-20 PROCEDURE — 99213 OFFICE O/P EST LOW 20 MIN: CPT

## 2021-08-20 RX ORDER — TOPIRAMATE 50 MG/1
50 TABLET, FILM COATED ORAL 2 TIMES DAILY
Qty: 60 TABLET | Refills: 3 | Status: SHIPPED | OUTPATIENT
Start: 2021-08-25 | End: 2021-12-20 | Stop reason: SDUPTHER

## 2021-08-20 RX ORDER — PEN NEEDLE, DIABETIC 32GX 5/32"
NEEDLE, DISPOSABLE MISCELLANEOUS
Qty: 100 EACH | Refills: 0 | Status: SHIPPED | OUTPATIENT
Start: 2021-08-20 | End: 2021-10-19

## 2021-08-20 RX ORDER — FENTANYL 25 UG/H
1 PATCH TRANSDERMAL
Qty: 10 PATCH | Refills: 0 | Status: SHIPPED | OUTPATIENT
Start: 2021-08-27 | End: 2021-09-23 | Stop reason: SDUPTHER

## 2021-08-20 ASSESSMENT — ENCOUNTER SYMPTOMS
BACK PAIN: 1
EYES NEGATIVE: 1
CONSTIPATION: 1
RESPIRATORY NEGATIVE: 1

## 2021-08-20 NOTE — PROGRESS NOTES
[facility] 89 PROGRESS NOTE      Patient  completed []  video visit   [x]   phone call:  Due to technical difficulties    11   Minutes :       [x]    to  review Medication Agreement    []  Follow up after procedure   []  Discuss treatment options      Location:  Provider:  working from    [x]    home    []   [facility] - GIANNI BLANC ,   patient at  home       Chief Complaint:  Low back pain    He c/o low back pain radiating down lefgs. No history fo lumbar surgery, He exercises with bands. His sleep is good. His blood sugars are under control. Back Pain  This is a chronic problem. The problem occurs constantly. The problem is unchanged. The pain is present in the lumbar spine. The quality of the pain is described as aching. Radiates to: legs. The pain is at a severity of 5/10. The pain is moderate. The pain is the same all the time. Associated symptoms include numbness and weakness. (Numbness feet) He has tried analgesics and home exercises for the symptoms.                Treatment goals:  Functional status: walk further      Aberrancy:   Any alcoholic beverages     no       Any illegal drugs   no      Analgesia:  5                   Adverse  Effects  Constipation on occasion    ADL;s :exercises    Data:    When was thelast UDS: 8-6-2020           Was the UDS appropriate:  [x] yes []   no      Record/Diagnostics Review:      As above, I did review the imaging     8/6/2020  7:00 PM - Jayden, Parvin Incoming Lab Results From [employer]    Component Value Ref Range & Units Status Collected Lab   Pain Management Drug Panel Interp, Urine Consistent   Final 08/02/2020  9:53 AM [facility]   (NOTE)   ________________________________________________________________   DRUGS EXPECTED:   FENTANYL   OXYCODONE   ________________________________________________________________   CONSISTENT with medications provided:   FENTANYL: based on fentanyl, norfentanyl   OXYCODONE: based on oxycodone ________________________________________________________________   INTERPRETIVE INFORMATION: Pain Mgt [place], Mass Spec/EMIT, Ur,                            Interp   Interpretation depends on accuracy and completeness of patient   medication information submitted by client. ** FINAL **   Procedure:  LUMBAR SPINE STANDARD    CDX  01/03/2015     [id number]   Reason for Exam:  ^fall/ pain       FULL RESULT:   LUMBAR SPINE STANDARD       INDICATION:   fall/ pain.           COMPARISON:    12/27/2007       FINDINGS:   AP, crosstable lateral and cone-down lumbosacral views obtained.       There is multilevel facet arthropathy and anterior spurring. There is    redemonstration of mild to moderate wedge compression deformity of L1    vertebral body. No acute fracture is evident. No paraspinal mass. SI    joints show degenerative changes. Cholecystectomy noted.               IMPRESSION:       Old L1 compression. No acute osseous abnormality.       Report electronically signed by Hilda Barnett M.D. on 1/3/2015 4:27 PM    1/3/2015 4:27 PM   Transcribed by: [facility] on [place]  3 2015  4:29P    Read by: Kirk Guevara M.D.  [id number] on [place]  3 2015  4:29P    Electronically Signed by: Gerson Diaz. Kirk Guevara M.D. on: [facility]  3 2015     4:29P                                                                           Pill count: appropriate    fill date : 8-    Morphine equivalent dose as reported on OARRS:60 has narcan at home  Periodic Controlled Substance Monitoring: Possible medication side effects, risk of tolerance/dependence & alternative treatments discussed., No signs of potential drug abuse or diversion identified. , Assessed functional status., Obtaining appropriate analgesic effect of treatment. Tristan Dumont, APRN - CNP)  Review ofOARRS does not show any aberrant prescription behavior. Medication is helping the patient stay active. Patient denies any side effects and reports adequate analgesia.  No sign of misuse/abuse.             Past Medical History:   Diagnosis Date    Abdominal pain     Benign prostatic hypertrophy     C. difficile colitis     CAD (coronary artery disease) 1/3/12    s/p CABGx3    Colon polyp 02/25/2019    tubular adenoma    Constipation     Diabetes mellitus ([facility] [address]     Diarrhea     Diarrhea     DVT (deep venous thrombosis) (HCC)     left calf    Ejection fraction < 50%     Gallstones     Heartburn     Hx of blood clots     Hyperlipidemia     Kidney stones     MI (myocardial infarction) ([facility] [address]     2011    Mitral regurgitation     MRSA (methicillin resistant staph aureus) culture positive     Numbness and tingling     hands and feet    Rib fractures 1-2015    right    Wears glasses     readers       Past Surgical History:   Procedure Laterality Date    APPENDECTOMY      CARDIAC CATHETERIZATION  12/29/11    CHOLECYSTECTOMY      COLONOSCOPY  01/11/2012    wnl, 10 yr recall    COLONOSCOPY  11/28/2018    ATTEMPTED NOT CLEAN (N/A )    COLONOSCOPY  02/25/2019    tubular adenoma    COLONOSCOPY N/A 2/25/2019    COLONOSCOPY WITH BIOPSY [facility]      x 1    CORONARY ARTERY BYPASS GRAFT  1/3/12    x3    KNEE ARTHROSCOPY      left    KNEE SURGERY Left     I&D    OTHER SURGICAL HISTORY Left 3/7/2016    plantar plane &  exostectomy medial foot left    LA COLON CA SCRN NOT HI RSK IND N/A 11/28/2018    COLONOSCOPY ATTEMPTED NOT CLEAN performed by Sav Winn MD at [address] [place] [address] ENDOSCOPY  11-3-15    VENA CAVA FILTER PLACEMENT      WRIST SURGERY      I&D       Allergies   Allergen Reactions    Flagyl [Metronidazole] Hives    Metronidazole      Other reaction(s): Vomiting         Current Outpatient Medications:     simvastatin (ZOCOR) 20 MG tablet, TAKE 1 TABLET BY MOUTH EVERY DAY AT NIGHT, Disp: 90 tablet, Rfl: 3    LANTUS SOLOSTAR 100 UNIT/ML injection pen, inject 30 units subcutaneously in the morning and 40 units in the evening, Disp: 45 mL, Rfl: 0    furosemide (LASIX) 40 MG tablet, TAKE 1 TABLET BY MOUTH TWO TIMES A DAY , Disp: 60 tablet, Rfl: 3    fentaNYL (DURAGESIC) 25 MCG/HR, Place 1 patch onto the skin every 72 hours for 30 days. , Disp: 10 patch, Rfl: 0    omeprazole (PRILOSEC) 20 MG delayed release capsule, TAKE 1 CAPSULE BY MOUTH EVERY DAY, Disp: 90 capsule, Rfl: 3    topiramate (TOPAMAX) 50 MG tablet, Take 1 tablet by mouth 2 times daily, Disp: 60 tablet, Rfl: 3    gabapentin (NEURONTIN) 800 MG tablet, Take 1 tablet by mouth daily for 90 days. , Disp: 90 tablet, Rfl: 3    zoster recombinant adjuvanted vaccine (SHINGRIX) 50 MCG/0.5ML SUSR injection, Inject 0.5 mLs into the muscle See Admin Instructions 1 dose now and repeat in 2-6 months, Disp: 0.5 mL, Rfl: 0    aspirin 81 MG tablet, Take 81 mg by mouth daily. , Disp: , Rfl:     Vitamin D (CHOLECALCIFEROL) 1000 UNITS CAPS capsule, Take 2,000 Units by mouth daily , Disp: , Rfl:     Ascorbic Acid (VITAMIN C) 500 MG tablet, Take 1,000 mg by mouth daily , Disp: , Rfl:     NITROSTAT 0.4 MG SL tablet, , Disp: , Rfl:     finasteride (PROSCAR) 5 MG tablet, Take 5 mg by mouth daily. , Disp: , Rfl:     tamsulosin (FLOMAX) 0.4 MG capsule, Take 0.4 mg by mouth daily. , Disp: , Rfl:     Insulin Pen Needle (BD PEN NEEDLE ALIZE 2ND GEN) 32G X 4 MM MISC, USE TWO TIMES DAILY AS DIRECTED., Disp: 100 each, Rfl: 0    naloxone (NARCAN) 4 MG/0.1ML LIQD nasal spray, 1 spray by Nasal route as needed (if needed for respiratory depression), Disp: 1 each, Rfl: 0    blood glucose test strips (FREESTYLE LITE) strip, TEST TWICE DAILY AS DIRECTED, Disp: 100 strip, Rfl: 1    Family History   Problem Relation Age of Onset    Heart Failure Father     Cancer Mother         breast    Cancer Maternal Grandfather        Social History     Socioeconomic History    Marital status:       Spouse name: Not on file    Number of children: Not on file    Years of education: Not on file    Highest education level: Not on file   Occupational History    Occupation: retired [employer]   Tobacco Use    Smoking status: Former Smoker     Packs/day: 1.00     Years: 30.00     Pack years: 30.00     Types: Cigarettes     Quit date: 2002     Years since quittin.5    Smokeless tobacco: Never Used   Vaping Use    Vaping Use: Never used   Substance and Sexual Activity    Alcohol use: Not Currently     Comment: rare    Drug use: No    Sexual activity: Never   Other Topics Concern    Not on file   Social History Narrative    Not on file     Social Determinants of Health     Financial Resource Strain:     Difficulty of Paying Living Expenses:    Food Insecurity:     Worried About [address] in the Last Year:     [address] in the Last Year:    Transportation Needs:     Lack of Transportation (Medical):  Lack of Transportation (Non-Medical):    Physical Activity:     Days of Exercise per Week:     Minutes of Exercise per Session:    Stress:     Feeling of Stress :    Social Connections:     Frequency of Communication with Friends and Family:     Frequency of Social Gatherings with Friends and Family:     Attends Worship Services:     Active Member of Clubs or Organizations:     Attends Club or Organization Meetings:     Marital Status:    Intimate Partner Violence:     Fear of Current or Ex-Partner:     Emotionally Abused:     Physically Abused:     Sexually Abused:          Review of Systems:  Review of Systems   Constitutional: Negative. HENT: Negative. Eyes: Negative. Cardiovascular: Negative. Respiratory: Negative. Endocrine:        Diabetic   Hematologic/Lymphatic: Bruises/bleeds easily. Skin: Negative. Musculoskeletal: Positive for back pain and joint pain. Gastrointestinal: Positive for constipation. Genitourinary: Negative.     Neurological: Positive for loss of balance, numbness and weakness. Psychiatric/Behavioral: Negative. Physical Exam:  There were no vitals taken for this visit. Physical Exam  Skin:         Neurological:      Mental Status: He is alert and oriented to person, place, and time. Psychiatric:         Mood and Affect: Mood normal.         Thought Content: Thought content normal.           Assment:    Problem List Items Addressed This Visit     Spondylosis of lumbar region without myelopathy or radiculopathy - Primary    Relevant Orders    DRUG SCREEN, PAIN    Osteoarthritis of spine with radiculopathy, lumbar region    Medication monitoring encounter    Relevant Orders    DRUG SCREEN, PAIN    Lumbar spinal stenosis    Facet syndrome (HCC)    Drug-induced constipation    Diabetic mononeuropathy associated with diabetes mellitus due to underlying condition ([facility])    DDD (degenerative disc disease), lumbar    Relevant Orders    DRUG SCREEN, PAIN    DDD (degenerative disc disease), cervical    Charcot foot due to diabetes mellitus ([facility] [address]              Treatment Plan:  DISCUSSION: Treatment options discussed withpatient and all questions answered to patient's satisfaction. Possible side effects, risk of tolerance and or dependence and alternative treatments discussed    Obtaining appropriate analgesic effect of treatment   No signs of potential drug abuse or diversion identified    [x] Ill effects of being on chronic pain medications such as sleep disturbances, respiratory depression, hormonal changes, withdrawal symptoms, chronic opioid dependence and tolerance as well as risk of taking opioids with Benzodiazepines and taking opioids along with alcohol,  werediscussed with patient. I had asked the patient to minimize medication use and utilize pain medications only for uncontrolled rest pain or pain with exertional activities. I advised patient not to self-escalate painmedications without consulting with us.   At each of patient's future visits we will try to taper pain medications, while adjusting the adjunct medications, and re-evaluating for Physical Therapy to improve spinal andjoint strength. We will continue to have discussions to decrease pain medications as tolerated. Counseled patient on effects their pain medication and /or their medical condition mayhave on their  ability to drive or operate machinery. Instructed not to drive or operate machinery if drowsy     I also discussed with the patient regarding the dangers of combining narcotic pain medication with tranquilizers, alcohol or illegal drugs or taking the medication any way other than prescribed. The dangers were discussed  including respiratory depression and death. Patient was told to tell  all  physicians regarding the medications he is getting from pain clinic. Patient is warned not to take any unprescribed medications over-the-countermedications that can depress breathing . Patient is advised to talk to the pharmacist or physicians if planning to take any over-the-counter medications before  takeing them. Patient is strongly advised to avoid tranquilizers or  relaxants, illegal drugs  or any medications that can depress breathing  Patient is also advised to tell us if there is any changes in their medications from other physicians.     1. UDT, instructed to get done by tomorrow      TREATMENT OPTIONS:     UDT  Medication Agreement Requirements Met  Continue Opioid therapy  Script written for  Fentanyl patch topamax  Follow up appointment made

## 2021-08-24 LAB
6-ACETYLMORPHINE, UR: NOT DETECTED
7-AMINOCLONAZEPAM, URINE: NOT DETECTED
ALPHA-OH-ALPRAZ, URINE: NOT DETECTED
ALPHA-OH-MIDAZOLAM, URINE: NOT DETECTED
ALPRAZOLAM, URINE: NOT DETECTED
AMPHETAMINES, URINE: NOT DETECTED
BARBITURATES, URINE: NOT DETECTED
BENZOYLECGONINE, UR: NOT DETECTED
BUPRENORPHINE URINE: NOT DETECTED
CARISOPRODOL, UR: NOT DETECTED
CLONAZEPAM, URINE: NOT DETECTED
CODEINE, URINE: NOT DETECTED
CREATININE URINE: 63.5 MG/DL (ref 20–400)
DIAZEPAM, URINE: NOT DETECTED
DRUGS EXPECTED, UR: NORMAL
EER HI RES INTERP UR: NORMAL
ETHYL GLUCURONIDE UR: NOT DETECTED
FENTANYL URINE: PRESENT
GABAPENTIN: PRESENT
HYDROCODONE, URINE: NOT DETECTED
HYDROMORPHONE, URINE: NOT DETECTED
LORAZEPAM, URINE: NOT DETECTED
MARIJUANA METAB, UR: NOT DETECTED
MDA, UR: NOT DETECTED
MDEA, EVE, UR: NOT DETECTED
MDMA URINE: NOT DETECTED
MEPERIDINE METAB, UR: NOT DETECTED
METHADONE, URINE: NOT DETECTED
METHAMPHETAMINE, URINE: NOT DETECTED
METHYLPHENIDATE: NOT DETECTED
MIDAZOLAM, URINE: NOT DETECTED
MORPHINE URINE: NOT DETECTED
NALOXONE URINE: NOT DETECTED
NORBUPRENORPHINE, URINE: NOT DETECTED
NORDIAZEPAM, URINE: NOT DETECTED
NORFENTANYL, URINE: PRESENT
NORHYDROCODONE, URINE: NOT DETECTED
NOROXYCODONE, URINE: PRESENT
NOROXYMORPHONE, URINE: NOT DETECTED
OXAZEPAM, URINE: NOT DETECTED
OXYCODONE URINE: NOT DETECTED
OXYMORPHONE, URINE: PRESENT
PAIN MANAGEMENT DRUG PANEL INTERP, URINE: NORMAL
PAIN MGT DRUG PANEL, HI RES, UR: NORMAL
PCP,URINE: NOT DETECTED
PHENTERMINE, UR: NOT DETECTED
PREGABALIN: NOT DETECTED
TAPENTADOL, URINE: NOT DETECTED
TAPENTADOL-O-SULFATE, URINE: NOT DETECTED
TEMAZEPAM, URINE: NOT DETECTED
TRAMADOL, URINE: NOT DETECTED
ZOLPIDEM METABOLITE (ZCA), URINE: NOT DETECTED
ZOLPIDEM, URINE: NOT DETECTED

## 2021-09-23 ENCOUNTER — HOSPITAL ENCOUNTER (OUTPATIENT)
Dept: PAIN MANAGEMENT | Age: 76
Discharge: HOME | End: 2021-09-23
Payer: MEDICARE

## 2021-09-23 DIAGNOSIS — E09.41 DIABETIC MONONEUROPATHY ASSOCIATED WITH DRUG OR CHEMICAL INDUCED DIABETES MELLITUS (HCC): ICD-10-CM

## 2021-09-23 DIAGNOSIS — M51.36 DDD (DEGENERATIVE DISC DISEASE), LUMBAR: ICD-10-CM

## 2021-09-23 DIAGNOSIS — M50.30 DDD (DEGENERATIVE DISC DISEASE), CERVICAL: ICD-10-CM

## 2021-09-23 DIAGNOSIS — M47.26 OSTEOARTHRITIS OF SPINE WITH RADICULOPATHY, LUMBAR REGION: ICD-10-CM

## 2021-09-23 DIAGNOSIS — M00.862 ARTHRITIS OF LEFT KNEE DUE TO OTHER BACTERIA (HCC): ICD-10-CM

## 2021-09-23 DIAGNOSIS — Z51.81 MEDICATION MONITORING ENCOUNTER: ICD-10-CM

## 2021-09-23 DIAGNOSIS — M48.062 SPINAL STENOSIS OF LUMBAR REGION WITH NEUROGENIC CLAUDICATION: Primary | ICD-10-CM

## 2021-09-23 DIAGNOSIS — M47.816 SPONDYLOSIS OF LUMBAR REGION WITHOUT MYELOPATHY OR RADICULOPATHY: ICD-10-CM

## 2021-09-23 DIAGNOSIS — F11.90 CHRONIC, CONTINUOUS USE OF OPIOIDS: ICD-10-CM

## 2021-09-23 DIAGNOSIS — E11.610 CHARCOT FOOT DUE TO DIABETES MELLITUS (HCC): ICD-10-CM

## 2021-09-23 DIAGNOSIS — E11.42 DIABETIC PERIPHERAL NEUROPATHY ASSOCIATED WITH TYPE 2 DIABETES MELLITUS (HCC): ICD-10-CM

## 2021-09-23 DIAGNOSIS — M47.899 FACET SYNDROME: ICD-10-CM

## 2021-09-23 DIAGNOSIS — K59.03 DRUG-INDUCED CONSTIPATION: ICD-10-CM

## 2021-09-23 DIAGNOSIS — E08.41 DIABETIC MONONEUROPATHY ASSOCIATED WITH DIABETES MELLITUS DUE TO UNDERLYING CONDITION (HCC): ICD-10-CM

## 2021-09-23 PROCEDURE — 99441 PR PHYS/QHP TELEPHONE EVALUATION 5-10 MIN: CPT | Performed by: NURSE PRACTITIONER

## 2021-09-23 PROCEDURE — 99213 OFFICE O/P EST LOW 20 MIN: CPT

## 2021-09-23 RX ORDER — FENTANYL 25 UG/H
1 PATCH TRANSDERMAL
Qty: 10 PATCH | Refills: 0 | Status: SHIPPED | OUTPATIENT
Start: 2021-09-26 | End: 2021-10-25 | Stop reason: SDUPTHER

## 2021-09-23 RX ORDER — OXYCODONE HYDROCHLORIDE AND ACETAMINOPHEN 5; 325 MG/1; MG/1
1 TABLET ORAL EVERY 6 HOURS PRN
Qty: 120 TABLET | Refills: 0 | Status: SHIPPED | OUTPATIENT
Start: 2021-09-23 | End: 2022-01-21 | Stop reason: SDUPTHER

## 2021-09-23 ASSESSMENT — ENCOUNTER SYMPTOMS
CONSTIPATION: 1
RESPIRATORY NEGATIVE: 1
BACK PAIN: 1
EYES NEGATIVE: 1

## 2021-09-23 NOTE — PROGRESS NOTES
[facility] [address] PROGRESS NOTE      Patient  completed [x]  video visit   [x]   phone call:   Due to technical difficulties 9      Minutes :       [x]    to  review Medication Agreement    []  Follow up after procedure   []  Discuss treatment options      Location:  Provider:  working from    [x]    home    []   [facility] - GIANNI BLANC ,   patient at home    Chief Complaint: low back pain    He c/o low back pain radiating down both legs, He has no history of lumbar surgery, He has numbness in his feet, he has diabetic neuropathy, He reports his blood sugars are between 100-110. He states is active, He continues to exercise, He reports his sleep is good. Back Pain  This is a chronic problem. The current episode started more than 1 year ago. The problem occurs intermittently. The problem is unchanged. The pain is present in the lumbar spine. The quality of the pain is described as aching. Radiates to: legs. The pain is at a severity of 5/10. The pain is moderate. The pain is the same all the time. Exacerbated by: nothing. Associated symptoms include numbness and weakness. He has tried analgesics and home exercises for the symptoms.        Treatment goals:  Functional status: walk farther      Aberrancy:   Any alcoholic beverages            Any illegal drugs         Analgesia:   5                  Adverse  Effects : constipation , manageable      ADL;s : home exercises      Data:    When was thelast UDS: 8-           Was the UDS appropriate:  [x] yes []   no      Record/Diagnostics Review:      As above, I did review the imaging     8/24/2021  6:09 PM - Jayden, [facility] Incoming Lab Results From [facility]    Component Value Ref Range & Units Status Collected Lab   Pain Management Drug Panel Interp, Urine Inconsistent   Final 08/20/2021  1:50 PM [facility]   (NOTE)   ________________________________________________________________   DRUGS EXPECTED:   OXYCODONE   FENTANYL equivalent dose as reported on OARRS:60  Periodic Controlled Substance Monitoring: Possible medication side effects, risk of tolerance/dependence & alternative treatments discussed., No signs of potential drug abuse or diversion identified. , Assessed functional status., Obtaining appropriate analgesic effect of treatment. Soraida Santana, APRN - CNP)  Review ofOARRS does not show any aberrant prescription behavior. Medication is helping the patient stay active. Patient denies any side effects and reports adequate analgesia. No sign of misuse/abuse.             Past Medical History:   Diagnosis Date    Abdominal pain     Benign prostatic hypertrophy     C. difficile colitis     CAD (coronary artery disease) 1/3/12    s/p CABGx3    Colon polyp 02/25/2019    tubular adenoma    Constipation     [facility] [address]     Diarrhea     Diarrhea     D[facility]deep venous thrombosis) [facility]     left calf    Ejection fraction < 50%     Gallstones     Heartburn     Hx of blood clots     Hyperlipidemia     Kidney stones     MI (myocardial infarction) ([facility] [address]     2011    Mitral regurgitation     MRSA (methicillin resistant staph aureus) culture positive     Numbness and tingling     hands and feet    Rib fractures 1-2015    right    Wears glasses     readers       Past Surgical History:   Procedure Laterality Date    APPENDECTOMY      CARDIAC CATHETERIZATION  12/29/11    CHOLECYSTECTOMY      COLONOSCOPY  01/11/2012    wnl, 10 yr recall    COLONOSCOPY  11/28/2018    ATTEMPTED NOT CLEAN (N/A )    COLONOSCOPY  02/25/2019    tubular adenoma    COLONOSCOPY N/A 2/25/2019    COLONOSCOPY WITH BIOPSY performed by Xochilt Enciso MD at [facility]      x 1    CORONARY ARTERY BYPASS GRAFT  1/3/12    x3    KNEE ARTHROSCOPY      left    KNEE SURGERY Left     I&D    OTHER SURGICAL HISTORY Left 3/7/2016    plantar plane &  exostectomy medial foot left    DC [facility]ON CA [facility]N NOT HI [address] N[facility]A 11/28/2018    COLONOSCOPY ATTEMPTED NOT CLEAN performed by Herve Shrestha MD at [facility] ENDOSCOPY  11-3-15    VENA CAVA FILTER PLACEMENT      WRIST SURGERY      I&D       Allergies   Allergen Reactions    Flagyl [Metronidazole] Hives    Metronidazole      Other reaction(s): Vomiting         Current Outpatient Medications:     fentaNYL (DURAGESIC) 25 MCG/HR, Place 1 patch onto the skin every 72 hours for 30 days. , Disp: 10 patch, Rfl: 0    simvastatin (ZOCOR) 20 MG tablet, TAKE 1 TABLET BY MOUTH EVERY DAY AT NIGHT, Disp: 90 tablet, Rfl: 3    LANTUS SOLOSTAR 100 UNIT/ML injection pen, inject 30 units subcutaneously in the morning and 40 units in the evening, Disp: 45 mL, Rfl: 0    furosemide (LASIX) 40 MG tablet, TAKE 1 TABLET BY MOUTH TWO TIMES A DAY , Disp: 60 tablet, Rfl: 3    omeprazole (PRILOSEC) 20 MG delayed release capsule, TAKE 1 CAPSULE BY MOUTH EVERY DAY, Disp: 90 capsule, Rfl: 3    gabapentin (NEURONTIN) 800 MG tablet, Take 1 tablet by mouth daily for 90 days. , Disp: 90 tablet, Rfl: 3    aspirin 81 MG tablet, Take 81 mg by mouth daily. , Disp: , Rfl:     Vitamin D (CHOLECALCIFEROL) 1000 UNITS CAPS capsule, Take 2,000 Units by mouth daily , Disp: , Rfl:     Ascorbic Acid (VITAMIN C) 500 MG tablet, Take 1,000 mg by mouth daily , Disp: , Rfl:     finasteride (PROSCAR) 5 MG tablet, Take 5 mg by mouth daily. , Disp: , Rfl:     tamsulosin (FLOMAX) 0.4 MG capsule, Take 0.4 mg by mouth daily.   , Disp: , Rfl:     Insulin Pen Needle (BD PEN NEEDLE ALIZE 2ND GEN) 32G X 4 MM MISC, USE TWICE DAILY AS DIRECTED, Disp: 100 each, Rfl: 0    topiramate (TOPAMAX) 50 MG tablet, Take 1 tablet by mouth 2 times daily, Disp: 60 tablet, Rfl: 3    naloxone (NARCAN) 4 MG/0.1ML LIQD nasal spray, 1 spray by Nasal route as needed (if needed for respiratory depression), Disp: 1 each, Rfl: 0    zoster recombinant adjuvanted vaccine (SHINGRIX) 50 MCG/0.5ML SUSR injection, Inject 0.5 mLs into the muscle See Admin Instructions 1 dose now and repeat in 2-6 months, Disp: 0.5 mL, Rfl: 0    blood glucose test strips (FREESTYLE LITE) strip, TEST TWICE DAILY AS DIRECTED, Disp: 100 strip, Rfl: 1    NITROSTAT 0.4 MG SL tablet, , Disp: , Rfl:     Family History   Problem Relation Age of Onset    Heart Failure Father     Cancer Mother         breast    Cancer Maternal Grandfather        Social History     Socioeconomic History    Marital status:      Spouse name: Not on file    Number of children: Not on file    Years of education: Not on file    Highest education level: Not on file   Occupational History    Occupation: retired [employer]   Tobacco Use    Smoking status: Former Smoker     Packs/day: 1.00     Years: 30.00     Pack years: 30.00     Types: Cigarettes     Quit date: 2002     Years since quittin.6    Smokeless tobacco: Never Used   Vaping Use    Vaping Use: Never used   Substance and Sexual Activity    Alcohol use: Not Currently     Comment: rare    Drug use: No    Sexual activity: Never   Other Topics Concern    Not on file   Social History Narrative    Not on file     Social Determinants of Health     Financial Resource Strain:     Difficulty of Paying Living Expenses:    Food Insecurity:     Worried About [address] in the Last Year:     [facility] in the Last Year:    Transportation Needs:     Lack of Transportation (Medical):      Lack of Transportation (Non-Medical):    Physical Activity:     Days of Exercise per Week:     Minutes of Exercise per Session:    Stress:     Feeling of Stress :    Social Connections:     Frequency of Communication with Friends and Family:     Frequency of Social Gatherings with Friends and Family:     Attends Confucianist Services:     Active Member of Clubs or Organizations:     Attends Club or Organization Meetings:     Marital Status:    Intimate Partner Violence:     Fear of Current or Ex-Partner:     Emotionally Abused:     Physically Abused:     Sexually Abused:          Review of Systems:  Review of Systems   Constitutional: Negative. HENT: Negative. Eyes: Negative. Cardiovascular: Negative. Respiratory: Negative. Endocrine:        Blood sugar 100-110   Hematologic/Lymphatic: Bruises/bleeds easily. Skin: Negative. Musculoskeletal: Positive for back pain and joint pain. Gastrointestinal: Positive for constipation. Genitourinary: Negative. Neurological: Positive for loss of balance, numbness and weakness. Psychiatric/Behavioral: Negative. Physical Exam:  There were no vitals taken for this visit. Physical Exam  Skin:         Neurological:      Mental Status: He is alert and oriented to person, place, and time. Psychiatric:         Mood and Affect: Mood normal.         Thought Content: Thought content normal.           Assessment:    Problem List Items Addressed This Visit     Spondylosis of lumbar region without myelopathy or radiculopathy    Osteoarthritis of spine with radiculopathy, lumbar region    Medication monitoring encounter    Lumbar spinal stenosis - Primary    Facet syndrome (HCC)    Diabetic mononeuropathy associated with diabetes mellitus due to underlying condition ([address]    DDD (degenerative disc disease), lumbar    DDD (degenerative disc disease), cervical    Chronic, continuous use of opioids    Charcot foot due to diabetes mellitus ([address]            Treatment Plan:  DISCUSSION: Treatment options discussed withpatient and all questions answered to patient's satisfaction.      Possible side effects, risk of tolerance and or dependence and alternative treatments discussed    Obtaining appropriate analgesic effect of treatment   No signs of potential drug abuse or diversion identified    [x] Ill effects of being on chronic pain medications such as sleep disturbances, respiratory depression, hormonal changes, withdrawal symptoms, chronic opioid dependence and tolerance as well as risk of taking opioids with Benzodiazepines and taking opioids along with alcohol,  werediscussed with patient. I had asked the patient to minimize medication use and utilize pain medications only for uncontrolled rest pain or pain with exertional activities. I advised patient not to self-escalate painmedications without consulting with us. At each of patient's future visits we will try to taper pain medications, while adjusting the adjunct medications, and re-evaluating for Physical Therapy to improve spinal andjoint strength. We will continue to have discussions to decrease pain medications as tolerated. Counseled patient on effects their pain medication and /or their medical condition mayhave on their  ability to drive or operate machinery. Instructed not to drive or operate machinery if drowsy     I also discussed with the patient regarding the dangers of combining narcotic pain medication with tranquilizers, alcohol or illegal drugs or taking the medication any way other than prescribed. The dangers were discussed  including respiratory depression and death. Patient was told to tell  all  physicians regarding the medications he is getting from pain clinic. Patient is warned not to take any unprescribed medications over-the-countermedications that can depress breathing . Patient is advised to talk to the pharmacist or physicians if planning to take any over-the-counter medications before  takeing them. Patient is strongly advised to avoid tranquilizers or  relaxants, illegal drugs  or any medications that can depress breathing  Patient is also advised to tell us if there is any changes in their medications from other physicians.             TREATMENT OPTIONS:       Medication Agreement Requirements Met  Continue Opioid therapy  Script written for  Fentanyl patch, percocet  Follow up appointment made

## 2021-10-04 RX ORDER — INSULIN GLARGINE 100 [IU]/ML
INJECTION, SOLUTION SUBCUTANEOUS
Qty: 45 ML | Refills: 0 | Status: SHIPPED | OUTPATIENT
Start: 2021-10-04 | End: 2021-11-22

## 2021-10-15 ASSESSMENT — ENCOUNTER SYMPTOMS
PHOTOPHOBIA: 0
SORE THROAT: 0
BACK PAIN: 1
BOWEL INCONTINENCE: 0
NAUSEA: 0
CONSTIPATION: 0
EYE PAIN: 0
SHORTNESS OF BREATH: 0
WHEEZING: 0
VOMITING: 0
ABDOMINAL PAIN: 0

## 2021-10-15 NOTE — PROGRESS NOTES
Jose Hickman is a 77 y.o. male evaluated on 10/25/2021. Modality of virtual service provided -via Video+audio/ phone   Consent:  Patient and/or health care decision maker is aware that that patient may receive a bill for this telephone service, depending on one's insurance coverage, and has provided verbal consent to proceed: Yes    Patient identification was verified at the start of the visit: Yes    Chief complaint: Jose Hickman is 77 y.o.,  male, with  with chief complaint of pain involving low back. .    Patient is complaining of pain involving the low back area with pain radiating to both lower extremities and feet. 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 Patient reports his pain is pretty constant and has not changed over the last year or so. He reports occasionally he wakes up from sleep. He is exercising on a regular basis. Overall he denies any change in his symptoms    Back Pain  This is a chronic problem. The current episode started more than 1 year ago. The problem occurs constantly. The problem is unchanged. The pain is present in the lumbar spine and sacro-iliac. The quality of the pain is described as aching. The pain radiates to the right thigh and left thigh. The pain is at a severity of 5/10 (4-7). The pain is moderate. The pain is the same all the time. The symptoms are aggravated by standing, bending, position, twisting and sitting (Walking, lifting and ADLs). Stiffness is present at night (Worsened at night in the feet and in the back). Associated symptoms include numbness, tingling and weakness. Pertinent negatives include no abdominal pain, bladder incontinence, bowel incontinence, chest pain, dysuria or leg pain. (Both feet) Risk factors include lack of exercise, obesity and sedentary lifestyle.       Alleviating factors:nothing   Lifestyle changes experienced with pain: Wakes from sleep, Prevents or limits ADLs, Increases w/activity. Increases w/prolonged sitting/standing/walking  Mood changes,none  Patient currently unemployed. Physical therapy did not help the pain. Are you under psychological counseling at present: No  Goals for treatment include:  Decrease in pain  Enjoy daily and recreational activities, return to previous status. Patient relates current medications are helping the pain. Patient reports taking pain medications as prescribed, denies obtaining medications from different sources and denies use of illegal drugs. Patient denies side effects from medications like nausea, vomiting, constipation or drowsiness. Patient reports current activities of daily living ar possible due to medications and would like to continue them. ACTIVITY/SOCIAL/EMOTIONAL:  Sleep Pattern: 7 hours per night.   I also told her that occasionally nightime awakenings and generally restful sleep  Home Exercises: Strengthening and stretching  Activity:not significantly changed  Emotional Issues: normal.   Currently seeing a Psychiatrist or Psychologist:  No     ADVERSE MEDICATION EFFECTS:   Nausea and vomiting: no   Constipation: yes-Undercontrol-: yes  Dizziness/drowsy/sleepy--no  Urinary Retention: no    ABERRANT BEHAVIORS SINCE LAST VISIT  Lost rx/pills:------------------------------------------ no  Taking  medication as prescribed: ----------- yes  Urine Drug Screen ---------------------------------  yes             Date------------------------------------------------ 8/ 24/2021              Results as expected ---------------------yes    Recent ER visits: -------------------------------------Yes  Pill count is appropriate: ---------------------------yes   Refills for prescriptions appropriate:---------- yes      Past Medical History:   Diagnosis Date    Abdominal pain     Benign prostatic hypertrophy     C. difficile colitis     CAD (coronary artery disease) 1/3/12    s/p CABGx3    Colon polyp 02/25/2019    tubular adenoma    Constipation     Diabetes mellitus ([facility]     Diarrhea     Diarrhea     DVT (deep venous thrombosis) (HCC)     left calf    Ejection fraction < 50%     Gallstones     Heartburn     Hx of blood clots     Hyperlipidemia     Kidney stones     MI (myocardial infarction) ([facility]     2011    Mitral regurgitation     MRSA (methicillin resistant staph aureus) culture positive     Numbness and tingling     hands and feet    Rib fractures 1-2015    right    Wears glasses     readers       Past Surgical History:   Procedure Laterality Date    APPENDECTOMY      CARDIAC CATHETERIZATION  12/29/11    CHOLECYSTECTOMY      COLONOSCOPY  01/11/2012    wnl, 10 yr recall    COLONOSCOPY  11/28/2018    ATTEMPTED NOT CLEAN (N/A )    COLONOSCOPY  02/25/2019    tubular adenoma    COLONOSCOPY N/A 2/25/2019    COLONOSCOPY WITH BIOPSY performed by Tracy Jones MD at [address]      x 1    CORONARY ARTERY BYPASS GRAFT  1/3/12    x3    KNEE ARTHROSCOPY      left    KNEE SURGERY Left     I&D    OTHER SURGICAL HISTORY Left 3/7/2016    plantar plane &  exostectomy medial foot left    MA COLON CA SCRN NOT HI RSK IND N/A 11/28/2018    COLONOSCOPY ATTEMPTED NOT CLEAN performed by Tracy Jones MD at [address] ENDOSCOPY  11-3-15    VENA CAVA FILTER PLACEMENT      WRIST SURGERY      I&D       Family History   Problem Relation Age of Onset    Heart Failure Father     Cancer Mother         breast    Cancer Maternal Grandfather        Social History     Socioeconomic History    Marital status:       Spouse name: None    Number of children: None    Years of education: None    Highest education level: None   Occupational History    Occupation: retired [employer]   Tobacco Use    Smoking status: Former Smoker     Packs/day: 1.00     Years: 30.00     Pack years: 30.00     Types: Cigarettes     Quit date: 2/2/2002 Years since quittin.7    Smokeless tobacco: Never Used   Vaping Use    Vaping Use: Never used   Substance and Sexual Activity    Alcohol use: Not Currently     Comment: rare    Drug use: No    Sexual activity: Never   Other Topics Concern    None   Social History Narrative    None     Social Determinants of Health     Financial Resource Strain:     Difficulty of Paying Living Expenses:    Food Insecurity:     Worried About Running Out of Food in the Last Year:     Ran Out of Food in the Last Year:    Transportation Needs:     Lack of Transportation (Medical):  Lack of Transportation (Non-Medical):    Physical Activity:     Days of Exercise per Week:     Minutes of Exercise per Session:    Stress:     Feeling of Stress :    Social Connections:     Frequency of Communication with Friends and Family:     Frequency of Social Gatherings with Friends and Family:     Attends Sikhism Services:     Active Member of Clubs or Organizations:     Attends Club or Organization Meetings:     Marital Status:    Intimate Partner Violence:     Fear of Current or Ex-Partner:     Emotionally Abused:     Physically Abused:     Sexually Abused:         Allergies   Allergen Reactions    Flagyl [Metronidazole] Hives    Metronidazole      Other reaction(s): Vomiting       Current Outpatient Medications on File Prior to Encounter   Medication Sig Dispense Refill    Insulin Pen Needle (BD PEN NEEDLE ALIZE 2ND GEN) 32G X 4 MM MISC use 2 daily as directed 100 each 0    LANTUS SOLOSTAR 100 UNIT/ML injection pen inject 30 units subcutaneously in the morning and 40 units in the evening 45 mL 0    topiramate (TOPAMAX) 50 MG tablet Take 1 tablet by mouth 2 times daily 60 tablet 3    simvastatin (ZOCOR) 20 MG tablet TAKE 1 TABLET BY MOUTH EVERY DAY AT NIGHT 90 tablet 3    furosemide (LASIX) 40 MG tablet TAKE 1 TABLET BY MOUTH TWO TIMES A DAY  60 tablet 3    omeprazole (PRILOSEC) 20 MG delayed release capsule TAKE 1 CAPSULE BY MOUTH EVERY DAY 90 capsule 3    gabapentin (NEURONTIN) 800 MG tablet Take 1 tablet by mouth daily for 90 days. 90 tablet 3    naloxone (NARCAN) 4 MG/0.1ML LIQD nasal spray 1 spray by Nasal route as needed (if needed for respiratory depression) 1 each 0    zoster recombinant adjuvanted vaccine (SHINGRIX) 50 MCG/0.5ML SUSR injection Inject 0.5 mLs into the muscle See Admin Instructions 1 dose now and repeat in 2-6 months 0.5 mL 0    blood glucose test strips (FREESTYLE LITE) strip TEST TWICE DAILY AS DIRECTED 100 strip 1    aspirin 81 MG tablet Take 81 mg by mouth daily.  Vitamin D (CHOLECALCIFEROL) 1000 UNITS CAPS capsule Take 2,000 Units by mouth daily       Ascorbic Acid (VITAMIN C) 500 MG tablet Take 1,000 mg by mouth daily       NITROSTAT 0.4 MG SL tablet       finasteride (PROSCAR) 5 MG tablet Take 5 mg by mouth daily.  tamsulosin (FLOMAX) 0.4 MG capsule Take 0.4 mg by mouth daily. No current facility-administered medications on file prior to encounter. Review of Systems   Constitutional: Negative for activity change, appetite change, chills, fatigue and unexpected weight change. HENT: Negative for congestion, hearing loss and sore throat. Eyes: Negative for photophobia, pain and visual disturbance. Respiratory: Negative for shortness of breath and wheezing. Cardiovascular: Negative for chest pain and palpitations. Gastrointestinal: Negative for abdominal pain, bowel incontinence, constipation, nausea and vomiting. Endocrine: Negative for cold intolerance and polyuria. H/of diabetes mellitus with peripheral neuropathy and Charcot's foot   Genitourinary: Negative for bladder incontinence, dysuria and hematuria. Musculoskeletal: Positive for arthralgias and back pain. Skin: Negative for rash and wound. Allergic/Immunologic: Negative for immunocompromised state. Neurological: Positive for tingling, weakness and numbness. Hematological: Negative. Psychiatric/Behavioral: Negative for self-injury, sleep disturbance and suicidal ideas. The patient is not nervous/anxious. No change in the review of systems since his last visit  Physical Exam  Skin:         Neurological:      Mental Status: He is alert and oriented to person, place, and time. Psychiatric:         Mood and Affect: Mood normal.        Ortho Exam     DATA:  LAB.:  8/24/2021  6:09 PM - [place] Incoming Lab Results From [facility]    Component Value Ref Range & Units Status Collected Lab   Pain Management Drug Panel Interp, Urine Inconsistent   Final 08/20/2021  1:50 PM [facility]   (NOTE)   ________________________________________________________________   DRUGS EXPECTED:   OXYCODONE   FENTANYL   ________________________________________________________________   CONSISTENT with medications provided:   OXYCODONE: based on noroxycodone, oxymorphone   FENTANYL: based on fentanyl, norfentanyl   ________________________________________________________________   INCONSISTENT with medications provided-       X-Ray reports:  Procedure:  MRI SHOULDER RT WOUT CONTRAST CMR  01/26/2015     [id number] Reason for Exam:  ^RT SHOULDER - RCT, PAIN   FULL RESULT:   EXAM:  Right shoulder MRI without contrast dated 1/26/2015. HISTORY: Right shoulder pain and decreased range of motion since fall 3 weeks ago. Concern for rotator cuff tear. COMPARISON: Right shoulder radiographs dated 1/3/2015. TECHNIQUE: Multiplanar, multisequence MR images of the right shoulder are obtained. FINDINGS: Osseous structures are in anatomic alignment. Bone marrow signal is normal on all sequences. There is no evidence for Hill-Sachs deformity of the humeral head. No bony Bankart injury of the anterior inferior glenoid identified. Mild hypertrophic degenerative changes of the right acromioclavicular joint. There is no evidence for os acromialis. Sagittal images show a type I acromion.    There is a small partial-thickness tear on the bursal side of the anterior right supraspinatus tendon. The tear measures 7 mm anteroposteriorly, 10 mm mediolaterally and involves less than 50% of the tendon thickness. Small amount of fluid focally accumulated in the subacromial subdeltoid bursa superficial to the site of the tear. Tendinosis of infraspinatus and subscapularis. No full-thickness tear of the rotator cuff tendons of the right shoulder. Mild tendinosis of intra-articular long head of the biceps tendon. Extra-articular long head of the biceps tendon has normal signal, caliber and course in the bicipital groove. Small amount of fluid in the biceps tendon sheath   Coracohumeral ligament is intact. Hyaline cartilage is preserved on both sides of the glenohumeral joint. No periarticular soft tissue masses or abnormal fluid collections seen. No focal solid or cystic lesions seen in the spinoglenoid or suprascapular notches. Quadrilateral space is unremarkable. IMPRESSION:    1. Small partial-thickness tear on the bursal side of the anterior right supraspinatus tendon involves less than 50% of the tendon thickness. 2. Small amount of fluid focally accumulated in the subacromial subdeltoid bursa superficial to the site of the tear. 3.Tendinosis of the right infraspinatus and subscapularis. No full-thickness tear of the rotator cuff tendons of the right shoulder. 4. Mild tendinosis of the intra-articular long head of the biceps tendon. Small amount of fluid in the biceps tendon sheath. Report electronically signed by Jamari Ortega M.D. on 1/26/2015 10   Procedure:  LUMBAR SPINE STANDARD CDX  01/03/2015     [id number] Reason for Exam:  ^fall/ pain FULL RESULT:   LUMBAR SPINE STANDARD INDICATION: fall/ pain. COMPARISON: 12/27/2007  FINDINGS: AP, crosstable lateral and cone-down lumbosacral views obtained. There is multilevel facet arthropathy and anterior spurring.  There is redemonstration of mild to moderate chronic pain medications such as sleep disturbances, hormonal changes, withdrawal symptoms,  chronic opioid dependence and tolerance were discussed with patient. I had asked the patient to minimize medication use and utilize pain medications only for uncontrolled rest pain or pain with exertional activities. I advised patient not to self escalate pain medications without consulting with us. At each of patient's future visits we will try to taper pain medications, while adjusting the adjunct medications, and re-evaluating for Physical Therapy to improve spinal and joint strength. We will continue to have discussions to decrease pain medications as tolerated. I also discussed with the patient regarding the dangers of combining narcotic pain medication with tranquilizers, alcohol or illegal drugs or taking the medication any other than prescribed. The dangers including the respiratory depression and death. Patient was told to tell  to all  physicians regarding the medications he is getting from pain clinic. Patient is warned not to take any unprescribed medications over-the-counter medications that can depress breathing . Patient is advised to talk to the pharmacist or physicians if planning to take any over-the-counter medications before  takeing them. Patient is strongly advised to avoid tranquilizers or  Relaxants for any medications that can depress breathing or recreational drugs. Patient is also advised to tell us if there is any changes in his medications from other physicians. We discussed the same at today's visit and have not been to implement it, as the patient's pain is not under control with current medications. Decision Making Process : Patient's health history and referral records thoroughly reviewed before focused physical examination and discussion with patient. Over 50% of today's visit is spent on examining the patient and counseling and coordinating the care.      Level of complexity of date to be reviewed is Moderate. The chart date reviewed include the following: Imaging Reports. Summary of Care. Time spent reviewing with patient the below reports:   Medication safety, Treatment options. Level of diagnosis and management options of this case is multiple: involving the following management options: Interventions as needed, medication management as appropriate, future visits, activity modification, heat/ice as needed, Urine drug screen as required. [x]The patient's questions were answered to the best of my abilities. This note was created using voice recognition software. There may be inaccuracies of transcription  that are inadvertently overlooked prior to the signature. There is any questions about the transcription please contact me. Return in  4 weeks  with physician / CNP  for further plan of treatment. Due to the COVID-19 pandemic and the appropriate interventions by Leah Gandhi, our non-urgent pain management patients will not be seen in the office at this time for their protection and the protection of our staff. To offer continuity of care, their prescriptions will be escribed this month after a careful chart review and review of their OARRS report  Pursuant to the emergency declaration under the [employer] and [facility], 1135 waiver authority and the [employer] and [employer] Act, this Virtual Visit was conducted, with patient's consent, to reduce the patient's risk of exposure to COVID-19 and provide continuity of care for an established patient. Services were provided through a video synchronous discussion virtually to substitute for in-person appointment. \"  Documentation:  I communicated with the patient and/or health care decision maker about plan of care  Details of this discussion including any medical advice provided:   Total Time: minutes: 21-30 minutes    I affirm this is a Patient Initiated Episode with an Established Patient who has not had a related appointment within my department in the past 7 days or scheduled within the next 24 hours.     Electronically signed by Johanna Sanchez MD on 10/25/2021 at 9:06 AM

## 2021-10-19 DIAGNOSIS — E11.42 TYPE 2 DIABETES MELLITUS WITH DIABETIC POLYNEUROPATHY, WITH LONG-TERM CURRENT USE OF INSULIN (HCC): ICD-10-CM

## 2021-10-19 DIAGNOSIS — Z79.4 TYPE 2 DIABETES MELLITUS WITH DIABETIC POLYNEUROPATHY, WITH LONG-TERM CURRENT USE OF INSULIN (HCC): ICD-10-CM

## 2021-10-19 RX ORDER — PEN NEEDLE, DIABETIC 32GX 5/32"
NEEDLE, DISPOSABLE MISCELLANEOUS
Qty: 100 EACH | Refills: 0 | Status: SHIPPED | OUTPATIENT
Start: 2021-10-19 | End: 2021-12-03

## 2021-10-25 ENCOUNTER — HOSPITAL ENCOUNTER (OUTPATIENT)
Dept: PAIN MANAGEMENT | Age: 76
Discharge: HOME | End: 2021-10-25
Payer: MEDICARE

## 2021-10-25 DIAGNOSIS — E08.41 DIABETIC MONONEUROPATHY ASSOCIATED WITH DIABETES MELLITUS DUE TO UNDERLYING CONDITION (HCC): ICD-10-CM

## 2021-10-25 DIAGNOSIS — M48.062 SPINAL STENOSIS OF LUMBAR REGION WITH NEUROGENIC CLAUDICATION: Primary | ICD-10-CM

## 2021-10-25 DIAGNOSIS — M00.862 ARTHRITIS OF LEFT KNEE DUE TO OTHER BACTERIA (HCC): ICD-10-CM

## 2021-10-25 DIAGNOSIS — G89.29 ENCOUNTER FOR CHRONIC PAIN MANAGEMENT: ICD-10-CM

## 2021-10-25 DIAGNOSIS — Z51.81 MEDICATION MONITORING ENCOUNTER: ICD-10-CM

## 2021-10-25 DIAGNOSIS — M47.899 FACET SYNDROME: ICD-10-CM

## 2021-10-25 DIAGNOSIS — E11.42 DIABETIC PERIPHERAL NEUROPATHY ASSOCIATED WITH TYPE 2 DIABETES MELLITUS (HCC): ICD-10-CM

## 2021-10-25 DIAGNOSIS — M50.30 DDD (DEGENERATIVE DISC DISEASE), CERVICAL: ICD-10-CM

## 2021-10-25 DIAGNOSIS — M47.26 OSTEOARTHRITIS OF SPINE WITH RADICULOPATHY, LUMBAR REGION: ICD-10-CM

## 2021-10-25 DIAGNOSIS — E11.610 CHARCOT FOOT DUE TO DIABETES MELLITUS (HCC): ICD-10-CM

## 2021-10-25 DIAGNOSIS — K59.03 DRUG-INDUCED CONSTIPATION: ICD-10-CM

## 2021-10-25 DIAGNOSIS — M51.36 DDD (DEGENERATIVE DISC DISEASE), LUMBAR: ICD-10-CM

## 2021-10-25 DIAGNOSIS — M47.816 SPONDYLOSIS OF LUMBAR REGION WITHOUT MYELOPATHY OR RADICULOPATHY: ICD-10-CM

## 2021-10-25 PROCEDURE — 99214 OFFICE O/P EST MOD 30 MIN: CPT | Performed by: PAIN MEDICINE

## 2021-10-25 PROCEDURE — 99213 OFFICE O/P EST LOW 20 MIN: CPT

## 2021-10-25 RX ORDER — FENTANYL 25 UG/H
1 PATCH TRANSDERMAL
Qty: 10 PATCH | Refills: 0 | Status: SHIPPED | OUTPATIENT
Start: 2021-10-26 | End: 2021-11-23 | Stop reason: SDUPTHER

## 2021-11-03 ENCOUNTER — OFFICE VISIT (OUTPATIENT)
Dept: FAMILY MEDICINE CLINIC | Age: 76
End: 2021-11-03
Payer: MEDICARE

## 2021-11-03 VITALS
HEART RATE: 74 BPM | BODY MASS INDEX: 31.15 KG/M2 | SYSTOLIC BLOOD PRESSURE: 126 MMHG | DIASTOLIC BLOOD PRESSURE: 80 MMHG | HEIGHT: 72 IN | OXYGEN SATURATION: 98 % | TEMPERATURE: 98 F | WEIGHT: 230 LBS

## 2021-11-03 DIAGNOSIS — E55.9 VITAMIN D DEFICIENCY: ICD-10-CM

## 2021-11-03 DIAGNOSIS — M47.816 SPONDYLOSIS OF LUMBAR REGION WITHOUT MYELOPATHY OR RADICULOPATHY: ICD-10-CM

## 2021-11-03 DIAGNOSIS — Z79.4 TYPE 2 DIABETES MELLITUS WITH DIABETIC POLYNEUROPATHY, WITH LONG-TERM CURRENT USE OF INSULIN (HCC): Primary | ICD-10-CM

## 2021-11-03 DIAGNOSIS — I25.810 CORONARY ARTERY DISEASE INVOLVING CORONARY BYPASS GRAFT OF NATIVE HEART WITHOUT ANGINA PECTORIS: ICD-10-CM

## 2021-11-03 DIAGNOSIS — I10 ESSENTIAL HYPERTENSION: ICD-10-CM

## 2021-11-03 DIAGNOSIS — E11.42 TYPE 2 DIABETES MELLITUS WITH DIABETIC POLYNEUROPATHY, WITH LONG-TERM CURRENT USE OF INSULIN (HCC): Primary | ICD-10-CM

## 2021-11-03 DIAGNOSIS — E78.2 MIXED HYPERLIPIDEMIA: ICD-10-CM

## 2021-11-03 DIAGNOSIS — Z23 NEED FOR INFLUENZA VACCINATION: ICD-10-CM

## 2021-11-03 DIAGNOSIS — I25.83 CORONARY ARTERY DISEASE DUE TO LIPID RICH PLAQUE: ICD-10-CM

## 2021-11-03 DIAGNOSIS — I25.10 CORONARY ARTERY DISEASE DUE TO LIPID RICH PLAQUE: ICD-10-CM

## 2021-11-03 LAB — HBA1C MFR BLD: 6.8 %

## 2021-11-03 PROCEDURE — G0008 ADMIN INFLUENZA VIRUS VAC: HCPCS | Performed by: FAMILY MEDICINE

## 2021-11-03 PROCEDURE — 3044F HG A1C LEVEL LT 7.0%: CPT | Performed by: FAMILY MEDICINE

## 2021-11-03 PROCEDURE — 3017F COLORECTAL CA SCREEN DOC REV: CPT | Performed by: FAMILY MEDICINE

## 2021-11-03 PROCEDURE — 83036 HEMOGLOBIN GLYCOSYLATED A1C: CPT | Performed by: FAMILY MEDICINE

## 2021-11-03 PROCEDURE — 99214 OFFICE O/P EST MOD 30 MIN: CPT | Performed by: FAMILY MEDICINE

## 2021-11-03 PROCEDURE — G8484 FLU IMMUNIZE NO ADMIN: HCPCS | Performed by: FAMILY MEDICINE

## 2021-11-03 PROCEDURE — 4040F PNEUMOC VAC/ADMIN/RCVD: CPT | Performed by: FAMILY MEDICINE

## 2021-11-03 PROCEDURE — 1036F TOBACCO NON-USER: CPT | Performed by: FAMILY MEDICINE

## 2021-11-03 PROCEDURE — 2022F DILAT RTA XM EVC RTNOPTHY: CPT | Performed by: FAMILY MEDICINE

## 2021-11-03 PROCEDURE — 90694 VACC AIIV4 NO PRSRV 0.5ML IM: CPT | Performed by: FAMILY MEDICINE

## 2021-11-03 PROCEDURE — G8417 CALC BMI ABV UP PARAM F/U: HCPCS | Performed by: FAMILY MEDICINE

## 2021-11-03 PROCEDURE — 1123F ACP DISCUSS/DSCN MKR DOCD: CPT | Performed by: FAMILY MEDICINE

## 2021-11-03 PROCEDURE — G8427 DOCREV CUR MEDS BY ELIG CLIN: HCPCS | Performed by: FAMILY MEDICINE

## 2021-11-03 ASSESSMENT — ENCOUNTER SYMPTOMS
ABDOMINAL DISTENTION: 0
SHORTNESS OF BREATH: 0
CHEST TIGHTNESS: 0
BLOOD IN STOOL: 0
ABDOMINAL PAIN: 0
BACK PAIN: 1
PHOTOPHOBIA: 0
NAUSEA: 0
WHEEZING: 0
COUGH: 0
RECTAL PAIN: 0
CONSTIPATION: 0

## 2021-11-03 NOTE — PROGRESS NOTES
Vaccine Information Sheet, \"Influenza - Inactivated\"  given to Theresa Perez, or parent/legal guardian of  Theresa Perez and verbalized understanding. Patient responses:    Have you ever had a reaction to a flu vaccine? No  Do you have any current illness? No  Have you ever had Guillian [place] Syndrome? No  Do you have a serious allergy to any of the following: Neomycin, Polymyxin, Thimerosal, eggs or egg products? No    Flu vaccine given per order. Please see immunization tab. Risks and benefits explained. Current VIS given. Visit Information    Have you changed or started any medications since your last visit including any over-the-counter medicines, vitamins, or herbal medicines? no   Are you having any side effects from any of your medications? -  no  Have you stopped taking any of your medications? Is so, why? -  no    Have you seen any other physician or provider since your last visit? No  Have you had any other diagnostic tests since your last visit? No  Have you been seen in the emergency room and/or had an admission to a hospital since we last saw you? No  Have you had your routine dental cleaning in the past 6 months? no    Have you activated your [employer] account? If not, what are your barriers?  Yes     Patient Care Team:  Bonifacio Hankins MD as PCP - General (Family Medicine)  Bonifacio Hankins MD as PCP - [facility]  Storm Mac MD as Referring Physician (Cardiology)  Juan Francisco Le MD as Consulting Physician (Gastroenterology)  Vernon Romo MD as Consulting Physician (Internal Medicine Cardiovascular Disease)  Izzy Liao MD as Consulting Physician (Pain Management)  Pepper Burrell DPM as Consulting Physician (Podiatry)  Kwesi Pepper MD as Surgeon (Ophthalmology)    Medical History Review  Past Medical, Family, and Social History reviewed and does contribute to the patient presenting condition    Health Maintenance   Topic Date Due    Shingles Vaccine (2 of 2) 07/02/2021    Flu vaccine (1) 09/01/2021    COVID-19 Vaccine (3 - Pfizer booster) 09/10/2021    Lipid screen  11/05/2021    Potassium monitoring  11/05/2021    Creatinine monitoring  11/05/2021    Diabetic retinal exam  03/05/2022    Diabetic foot exam  03/18/2022    A1C test (Diabetic or Prediabetic)  05/03/2022    Annual Wellness Visit (AWV)  05/04/2022    Colon cancer screen colonoscopy  02/25/2029    DTaP/Tdap/Td vaccine (2 - Td or Tdap) 05/07/2031    Pneumococcal 65+ years Vaccine  Completed    AAA screen  Completed    Hepatitis C screen  Addressed    Hepatitis A vaccine  Aged Out    Hib vaccine  Aged Out    Meningococcal (ACWY) vaccine  Aged Out

## 2021-11-03 NOTE — PROGRESS NOTES
Chief Complaint   Patient presents with    Hypertension    Hyperlipidemia    Diabetes         Jason Núñez  here today for follow up on chronic medical problems, go over labs and/or diagnostic studies, and medication refills. Hypertension, Hyperlipidemia, and Diabetes      HPI: Patient is here for follow-up on diabetes, A1c has decreased to 6.8. Patient is compliant with medication denies any side effects or hypoglycemic episodes. Usually the blood sugars are running in 90s. He checks his blood sugars occasionally. Hypertension controlled, denies any chest pain shortness of breath    Coronary artery disease stable follows with cardiologist.  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 Hyperlipidemia stable on statins. Vitamin D deficiency needs repeat vitamin D check. Lumbar degenerative disc disease follows with pain management. Pain is under control. /80   Pulse 74   Temp 98 °F (36.7 °C)   Ht 6' (1.829 m)   Wt 230 lb (104.3 kg)   SpO2 98%   BMI 31.19 kg/m²    Body mass index is 31.19 kg/m². Wt Readings from Last 3 Encounters:   11/03/21 230 lb (104.3 kg)   08/19/21 214 lb (97.1 kg)   06/10/21 216 lb (98 kg)        [x]Negative depression screening. PHQ Scores 5/3/2021 5/3/2021 2/1/2021 7/28/2020 10/1/2019 3/28/2019 11/27/2018   PHQ2 Score 0 0 0 0 0 0 0   PHQ9 Score 0 0 0 0 0 0 0      []1-4 = Minimal depression   []5-9 = Milddepression   []10-14 = Moderate depression   []15-19 = Moderately severe depression   []20-27 = Severe depression    Discussed testing with the patient and all questions fully answered.     Hospital Outpatient Visit on 08/20/2021   Component Date Value Ref Range Status    Pain Management Drug Panel Interp,* 08/20/2021 Inconsistent   Final    Comment: (NOTE)  ________________________________________________________________  DRUGS EXPECTED:  OXYCODONE  FENTANYL  ________________________________________________________________  CONSISTENT with medications provided:  OXYCODONE: based on noroxycodone, oxymorphone  FENTANYL: based on fentanyl, norfentanyl  ________________________________________________________________  INCONSISTENT with medications provided:  Gabapentin  ________________________________________________________________  INTERPRETIVE INFORMATION: Targeted drug profile Interp  Interpretation depends on accuracy and completeness of patient   medication information submitted by client.  6-Acetylmorphine, Ur 08/20/2021 Not Detected   Final    7-Aminoclonazepam, Urine 08/20/2021 Not Detected   Final    Alpha-OH-Alpraz, Urine 08/20/2021 Not Detected   Final    Alprazolam, Urine 08/20/2021 Not Detected   Final    Amphetamines, urine 08/20/2021 Not Detected   Final    Barbiturates, Ur 08/20/2021 Not Detected   Final    Benzoylecgonine, Ur 08/20/2021 Not Detected   Final    Buprenorphine Urine 08/20/2021 Not Detected   Final    Carisoprodol, Ur 08/20/2021 Not Detected   Final    Comment: (NOTE)  The carisoprodol immunoassay has cross-reactivity to carisoprodol   and meprobamate.       Clonazepam, Urine 08/20/2021 Not Detected   Final    Codeine, Urine 08/20/2021 Not Detected   Final    MDA, Ur 08/20/2021 Not Detected   Final    Diazepam, Urine 08/20/2021 Not Detected   Final    Ethyl Glucuronide Ur 08/20/2021 Not Detected   Final    Fentanyl, Ur 08/20/2021 Present   Final    Hydrocodone, Urine 08/20/2021 Not Detected   Final    Hydromorphone, Urine 08/20/2021 Not Detected   Final    Lorazepam, Urine 08/20/2021 Not Detected   Final    Marijuana Metab, Ur 08/20/2021 Not Detected   Final    MDEA, ASH, Ur 08/20/2021 Not Detected   Final    MDMA, Urine 08/20/2021 Not Detected   Final    Meperidine Metab, Ur 08/20/2021 Not Detected   Final    Methadone, Urine 08/20/2021 Not Detected   Final    Methamphetamine, Urine 08/20/2021 Not Detected   Final    Methylphenidate 08/20/2021 Not Detected   Final    Midazolam, Urine 08/20/2021 Not Detected   Final    Morphine Urine 08/20/2021 Not Detected   Final    Norbuprenorphine, Urine 08/20/2021 Not Detected   Final    Nordiazepam, Urine 08/20/2021 Not Detected   Final    Norfentanyl, Urine 08/20/2021 Present   Final    NORHYDROCODONE, URINE 08/20/2021 Not Detected   Final    Noroxycodone, Urine 08/20/2021 Present   Final    NOROXYMORPHONE, URINE 08/20/2021 Not Detected   Final    Oxazepam, Urine 08/20/2021 Not Detected   Final    Oxycodone Urine 08/20/2021 Not Detected   Final    Oxymorphone, Urine 08/20/2021 Present   Final    PCP, Urine 08/20/2021 Not Detected   Final    Phentermine, Ur 08/20/2021 Not Detected   Final    Tapentadol-O-Sulfate, Urine 08/20/2021 Not Detected   Final    Tapentadol, Urine 08/20/2021 Not Detected   Final    Temazepam, Urine 08/20/2021 Not Detected   Final    Tramadol, Urine 08/20/2021 Not Detected   Final    Zolpidem, Urine 08/20/2021 Not Detected   Final    Drugs Expected, Ur 08/20/2021 OXYCODONE AND FENTANYL   Corrected    CORRECTED ON 08/20 AT 1433: PREVIOUSLY REPORTED AS NONE STATED    Creatinine, Ur 08/20/2021 63.5  20.0 - 400.0 mg/dL Final    Pain Mgt Drug Panel, Hi Res, Ur 08/20/2021 See Below   Final    Comment: (NOTE)  Methodology: Qualitative Enzyme Immunoassay and Qualitative Liquid   Chromatography-Tandem Mass Spectrometry, Quantitative   Spectrophotometry  The absence of expected drug(s) and/or drug metabolite(s) may   indicate non-compliance, inappropriate timing of specimen   collection relative to drug administration, poor drug absorption,   diluted/adulterated urine, or limitations of testing. The   concentration must be greater than or equal to the cutoff to be   reported as present. If specific drug concentrations are   required, contact the laboratory within two weeks of specimen   collection to request quantification by a second analytical   technique. Interpretive questions should be directed to the   laboratory.   Results based on immunoassay detection that do not match clinical   expectations should be  interpreted with caution. Confirmatory testing by mass   spectrometry for immunoassay-based results is available, if   ordered within two weeks of specimen collection. Additiona                           l   charges apply. For medical purposes only; not valid for forensic use. This test was developed and its performance characteristics   determined by Alexandre Nolan. It has not been cleared or   approved by the [employer] and Drug Administration. This test was   performed in a CLIA certified laboratory and is intended for   clinical purposes.  EER Hi Res Interp Ur 08/20/2021 See Note   Final    Comment: (NOTE)  Access [employer] Enhanced Report using the link below:    -Direct access: [email or web address]  Performed By: Alexandre Luong [address]  [place], [address] [place] [address]  : Julio C Wilcox.  Neal Park MD      Naloxone Urine 08/20/2021 Not Detected   Final    Gabapentin 08/20/2021 Present   Final    Pregabalin 08/20/2021 Not Detected   Final    Alpha-OH-Midazolam, Urine 08/20/2021 Not Detected   Final    Zolpidem Metabolite (ZCA), Urine 08/20/2021 Not Detected   Final         Most recent labs reviewed:     Lab Results   Component Value Date    WBC 8.0 11/05/2020    HGB 14.1 11/05/2020    HCT 41.3 11/05/2020    MCV 90.1 11/05/2020     11/05/2020       @BRIEFLAB(NA,K,CL,CO2,BUN,CREATININE,GLUCOSE,CALCIUM)@     Lab Results   Component Value Date    ALT 14 11/05/2020    AST 13 11/05/2020    ALKPHOS 147 (H) 11/05/2020    BILITOT 0.41 11/05/2020       No results found for: TSHFT4, TSH    Lab Results   Component Value Date    CHOL 100 11/07/2019    CHOL 114 04/27/2019    CHOL 106 10/10/2018     Lab Results   Component Value Date    TRIG 85 11/07/2019    TRIG 129 04/27/2019    TRIG 84 10/10/2018     Lab Results   Component Value Date    HDL 39 (L) 11/05/2020    HDL 43 11/07/2019    HDL 39 (L) 04/27/2019     Lab Results   Component Value Date    LDLCHOLESTEROL 40 11/05/2020    LDLCHOLESTEROL 40 11/07/2019    LDLCHOLESTEROL 49 04/27/2019     Lab Results   Component Value Date    VLDL NOT REPORTED 11/05/2020    VLDL NOT REPORTED 11/07/2019    VLDL NOT REPORTED 04/27/2019     Lab Results   Component Value Date    CHOLHDLRATIO 2.5 11/05/2020    CHOLHDLRATIO 2.3 11/07/2019    CHOLHDLRATIO 2.9 04/27/2019       Lab Results   Component Value Date    LABA1C 6.8 11/03/2021       No results found for: AIAXYUQW97    No results found for: FOLATE    No results found for: IRON, TIBC, FERRITIN    Lab Results   Component Value Date    VITD25 29.1 (L) 11/05/2020             Current Outpatient Medications   Medication Sig Dispense Refill    fentaNYL (DURAGESIC) 25 MCG/HR Place 1 patch onto the skin every 72 hours for 30 days. 10 patch 0    Insulin Pen Needle (BD PEN NEEDLE ALIZE 2ND GEN) 32G X 4 MM MISC use 2 daily as directed 100 each 0    LANTUS SOLOSTAR 100 UNIT/ML injection pen inject 30 units subcutaneously in the morning and 40 units in the evening 45 mL 0    topiramate (TOPAMAX) 50 MG tablet Take 1 tablet by mouth 2 times daily 60 tablet 3    simvastatin (ZOCOR) 20 MG tablet TAKE 1 TABLET BY MOUTH EVERY DAY AT NIGHT 90 tablet 3    furosemide (LASIX) 40 MG tablet TAKE 1 TABLET BY MOUTH TWO TIMES A DAY  60 tablet 3    omeprazole (PRILOSEC) 20 MG delayed release capsule TAKE 1 CAPSULE BY MOUTH EVERY DAY 90 capsule 3    naloxone (NARCAN) 4 MG/0.1ML LIQD nasal spray 1 spray by Nasal route as needed (if needed for respiratory depression) 1 each 0    blood glucose test strips (FREESTYLE LITE) strip TEST TWICE DAILY AS DIRECTED 100 strip 1    aspirin 81 MG tablet Take 81 mg by mouth daily.       Vitamin D (CHOLECALCIFEROL) 1000 UNITS CAPS capsule Take 2,000 Units by mouth daily       Ascorbic Acid (VITAMIN C) 500 MG tablet Take 1,000 mg by mouth daily       NITROSTAT 0.4 MG SL tablet       finasteride (PROSCAR) 5 MG tablet Take 5 mg by mouth daily.  tamsulosin (FLOMAX) 0.4 MG capsule Take 0.4 mg by mouth daily.  gabapentin (NEURONTIN) 800 MG tablet Take 1 tablet by mouth daily for 90 days. 90 tablet 3     No current facility-administered medications for this visit. Social History     Socioeconomic History    Marital status:      Spouse name: Not on file    Number of children: Not on file    Years of education: Not on file    Highest education level: Not on file   Occupational History    Occupation: retired [employer]   Tobacco Use    Smoking status: Former Smoker     Packs/day: 1.00     Years: 30.00     Pack years: 30.00     Types: Cigarettes     Quit date: 2002     Years since quittin.7    Smokeless tobacco: Never Used   Vaping Use    Vaping Use: Never used   Substance and Sexual Activity    Alcohol use: Not Currently     Comment: rare    Drug use: No    Sexual activity: Never   Other Topics Concern    Not on file   Social History Narrative    Not on file     Social Determinants of Health     Financial Resource Strain:     Difficulty of Paying Living Expenses:    Food Insecurity:     Worried About [address] in the Last Year:     [address] [employer] in the Last Year:    Transportation Needs:     Lack of Transportation (Medical):      Lack of Transportation (Non-Medical):    Physical Activity:     Days of Exercise per Week:     Minutes of Exercise per Session:    Stress:     Feeling of Stress :    Social Connections:     Frequency of Communication with Friends and Family:     Frequency of Social Gatherings with Friends and Family:     Attends [employer] Services:     Active Member of Clubs or Organizations:     Attends Club or Organization Meetings:     Marital Status:    Intimate Partner Violence:     Fear of Current or Ex-Partner:     Emotionally Abused:     Physically Abused:     Sexually Abused:      Counseling given: Not Answered        Family History   Problem Relation Age of Onset    Heart Failure Father     Cancer Mother         breast    Cancer Maternal Grandfather              -rest of complaints with corresponding details per ROS    The patient's past medical, surgical, social, and family history as well as his current medications and allergies were reviewed as documented intoday's encounter. Review of Systems   Constitutional: Negative for activity change, chills, fever and unexpected weight change. HENT: Negative for congestion and postnasal drip. Eyes: Negative for photophobia and visual disturbance. Respiratory: Negative for cough, chest tightness, shortness of breath and wheezing. Cardiovascular: Negative for chest pain, palpitations and leg swelling. Gastrointestinal: Negative for abdominal distention, abdominal pain, blood in stool, constipation, nausea and rectal pain. Endocrine: Negative for polyuria. Genitourinary: Negative for decreased urine volume, difficulty urinating and frequency. Musculoskeletal: Positive for arthralgias, back pain, gait problem, joint swelling and myalgias. Neurological: Positive for numbness. Negative for dizziness, speech difficulty, weakness, light-headedness and headaches. Psychiatric/Behavioral: Negative for agitation and decreased concentration. The patient is nervous/anxious. Physical Exam  Vitals and nursing note reviewed. Constitutional:       Appearance: Normal appearance. HENT:      Head: Normocephalic. Nose: Nose normal.      Mouth/Throat:      Mouth: Mucous membranes are moist.   Eyes:      Pupils: Pupils are equal, round, and reactive to light. Cardiovascular:      Rate and Rhythm: Normal rate and regular rhythm. Heart sounds: Normal heart sounds. Pulmonary:      Effort: Pulmonary effort is normal.      Breath sounds: Normal breath sounds. No wheezing, rhonchi or rales.    Abdominal:      General: Bowel sounds are normal.      Palpations: Abdomen is soft. Tenderness: There is no abdominal tenderness. Musculoskeletal:      Cervical back: Normal range of motion. Spasms present. No deformity or torticollis. Normal range of motion. Thoracic back: Spasms present. No swelling, edema or deformity. Normal range of motion. Lumbar back: Deformity, spasms and tenderness present. No swelling, edema, signs of trauma or bony tenderness. Decreased range of motion. Lymphadenopathy:      Cervical: No cervical adenopathy. Neurological:      Mental Status: He is alert and oriented to person, place, and time. Cranial Nerves: Cranial nerves are intact. No cranial nerve deficit. Sensory: No sensory deficit. Motor: No weakness. Coordination: Romberg sign negative. Gait: Gait normal.   Psychiatric:         Attention and Perception: Attention and perception normal.         Mood and Affect: Mood is anxious. Mood is not depressed. Speech: Speech normal. He is communicative. Speech is not rapid and pressured. Behavior: Behavior is not agitated or slowed. ASSESSMENT AND PLAN      1. Type 2 diabetes mellitus with diabetic polyneuropathy, with long-term current use of insulin ([facility])  A1c has improved to 6.8 continue same medications monitor blood sugars.  - POCT glycosylated hemoglobin (Hb A1C)  - Comprehensive Metabolic Panel; Future  - CBC Auto Differential; Future    2. Essential hypertension  Monitor blood pressure at home controlled continue same medications  - Comprehensive Metabolic Panel; Future  - TSH without Reflex; Future    3. Coronary artery disease involving coronary bypass graft of native heart without angina pectoris  Stable follow-up with cardiologist    4. Coronary artery disease due to lipid rich plaque  Stable continue same medications follow-up with cardiologist    5. Mixed hyperlipidemia  Continue statins repeat lipid panel  - Lipid, Fasting;  Future  - TSH Blood pressure is normal. Treatment plan consists of Weight Reduction, DASH Eating Plan, Dietary Sodium Restriction, Increased Physical Activity and No treatment change needed. Fall Risk 5/3/2021 2/1/2021 10/1/2019 11/27/2018 8/17/2017 5/12/2017 2/10/2017   2 or more falls in past year? no yes no no no no no   Fall with injury in past year? no yes no no no no no     The patient does not have a history of falls. I did , complete a risk assessment for falls. A plan of care for falls in-office gait and balance testing performed using The Timed Up and Go Test was negative for increased falls risk- no further intervention is currently indicated, home safety tips provided, No Treatment plan indicated    Lab Results   Component Value Date    LDLCHOLESTEROL 40 11/05/2020    (goal LDL reduction with dx if diabetes is 50% LDL reduction)    PHQ Scores 5/3/2021 5/3/2021 2/1/2021 7/28/2020 10/1/2019 3/28/2019 11/27/2018   PHQ2 Score 0 0 0 0 0 0 0   PHQ9 Score 0 0 0 0 0 0 0     Interpretation of Total Score Depression Severity: 1-4 = Minimal depression, 5-9 = Mild depression, 10-14 = Moderate depression, 15-19 = Moderately severe depression, 20-27 = Severe depression      The patient'spast medical, surgical, social, and family history as well as his   current medications and allergies were reviewed as documented in today's encounter. Medications, labs, diagnostic studies, consultations andfollow-up as documented in this encounter. Return in about 6 months (around 5/3/2022) for dm ,htn, hld. Patient wasseen with total face to face time of 30 minutes. More than 50% of this visit was counseling and education.        Future Appointments   Date Time Provider Esteban Eller   11/4/2021  9:30 AM Jean Marie Tuttle DPM [address]   11/23/2021  9:40 AM TACOS Adamson - CNP [facility] [address]   1/6/2022  8:30 AM Jean Marie Tuttle [address]   5/3/2022  9:00 AM Yris Narvaez MD [facility] This note was completed by using the assistance of a speech-recognition program. However, inadvertent computerized transcription errors may be present. Althoughevery effort was made to ensure accuracy, no guarantees can be provided that every mistake has been identified and corrected by editing.   Electronically signed by Donna Shaver MD on 11/3/2021  11:48 AM

## 2021-11-04 ENCOUNTER — HOSPITAL ENCOUNTER (OUTPATIENT)
Age: 76
Discharge: HOME | End: 2021-11-04
Payer: MEDICARE

## 2021-11-04 ENCOUNTER — OFFICE VISIT (OUTPATIENT)
Dept: PODIATRY | Age: 76
End: 2021-11-04
Payer: MEDICARE

## 2021-11-04 VITALS — WEIGHT: 230 LBS | HEIGHT: 72 IN | BODY MASS INDEX: 31.15 KG/M2

## 2021-11-04 DIAGNOSIS — E55.9 VITAMIN D DEFICIENCY: ICD-10-CM

## 2021-11-04 DIAGNOSIS — E11.42 TYPE 2 DIABETES MELLITUS WITH DIABETIC POLYNEUROPATHY, WITH LONG-TERM CURRENT USE OF INSULIN (HCC): ICD-10-CM

## 2021-11-04 DIAGNOSIS — Z79.4 TYPE 2 DIABETES MELLITUS WITH DIABETIC POLYNEUROPATHY, WITH LONG-TERM CURRENT USE OF INSULIN (HCC): ICD-10-CM

## 2021-11-04 DIAGNOSIS — B35.1 ONYCHOMYCOSIS: Primary | ICD-10-CM

## 2021-11-04 DIAGNOSIS — I10 ESSENTIAL HYPERTENSION: ICD-10-CM

## 2021-11-04 DIAGNOSIS — M14.672 CHARCOT'S JOINT OF LEFT FOOT: ICD-10-CM

## 2021-11-04 DIAGNOSIS — E78.2 MIXED HYPERLIPIDEMIA: ICD-10-CM

## 2021-11-04 LAB
ABSOLUTE EOS #: 0.1 K/UL (ref 0–0.4)
ABSOLUTE IMMATURE GRANULOCYTE: ABNORMAL K/UL (ref 0–0.3)
ABSOLUTE LYMPH #: 1.1 K/UL (ref 1–4.8)
ABSOLUTE MONO #: 0.5 K/UL (ref 0.1–1.3)
ALBUMIN SERPL-MCNC: 4.4 G/DL (ref 3.5–5.2)
ALBUMIN/GLOBULIN RATIO: ABNORMAL (ref 1–2.5)
ALP BLD-CCNC: 119 U/L (ref 40–129)
ALT SERPL-CCNC: 24 U/L (ref 5–41)
ANION GAP SERPL CALCULATED.3IONS-SCNC: 8 MMOL/L (ref 9–17)
AST SERPL-CCNC: 19 U/L
BASOPHILS # BLD: 1 % (ref 0–2)
BASOPHILS ABSOLUTE: 0.1 K/UL (ref 0–0.2)
BILIRUB SERPL-MCNC: 0.46 MG/DL (ref 0.3–1.2)
BUN BLDV-MCNC: 24 MG/DL (ref 8–23)
BUN/CREAT BLD: ABNORMAL (ref 9–20)
CALCIUM SERPL-MCNC: 9.4 MG/DL (ref 8.6–10.4)
CHLORIDE BLD-SCNC: 104 MMOL/L (ref 98–107)
CHOLESTEROL, FASTING: 127 MG/DL
CHOLESTEROL/HDL RATIO: 3.2
CO2: 29 MMOL/L (ref 20–31)
CREAT SERPL-MCNC: 1.42 MG/DL (ref 0.7–1.2)
DIFFERENTIAL TYPE: ABNORMAL
EOSINOPHILS RELATIVE PERCENT: 1 % (ref 0–4)
GFR AFRICAN AMERICAN: 59 ML/MIN
GFR NON-AFRICAN AMERICAN: 49 ML/MIN
GFR SERPL CREATININE-BSD FRML MDRD: ABNORMAL ML/MIN/{1.73_M2}
GFR SERPL CREATININE-BSD FRML MDRD: ABNORMAL ML/MIN/{1.73_M2}
GLUCOSE BLD-MCNC: 96 MG/DL (ref 70–99)
HCT VFR BLD CALC: 47.3 % (ref 41–53)
HDLC SERPL-MCNC: 40 MG/DL
HEMOGLOBIN: 15.8 G/DL (ref 13.5–17.5)
IMMATURE GRANULOCYTES: ABNORMAL %
LDL CHOLESTEROL: 63 MG/DL (ref 0–130)
LYMPHOCYTES # BLD: 15 % (ref 24–44)
MCH RBC QN AUTO: 30 PG (ref 26–34)
MCHC RBC AUTO-ENTMCNC: 33.4 G/DL (ref 31–37)
MCV RBC AUTO: 89.7 FL (ref 80–100)
MONOCYTES # BLD: 8 % (ref 1–7)
NRBC AUTOMATED: ABNORMAL PER 100 WBC
PDW BLD-RTO: 13.7 % (ref 11.5–14.9)
PLATELET # BLD: 156 K/UL (ref 150–450)
PLATELET ESTIMATE: ABNORMAL
PMV BLD AUTO: 8.4 FL (ref 6–12)
POTASSIUM SERPL-SCNC: 3.9 MMOL/L (ref 3.7–5.3)
RBC # BLD: 5.27 M/UL (ref 4.5–5.9)
RBC # BLD: ABNORMAL 10*6/UL
SEG NEUTROPHILS: 75 % (ref 36–66)
SEGMENTED NEUTROPHILS ABSOLUTE COUNT: 5.5 K/UL (ref 1.3–9.1)
SODIUM BLD-SCNC: 141 MMOL/L (ref 135–144)
TOTAL PROTEIN: 7.3 G/DL (ref 6.4–8.3)
TRIGLYCERIDE, FASTING: 120 MG/DL
TSH SERPL DL<=0.05 MIU/L-ACNC: 4.77 MIU/L (ref 0.3–5)
VITAMIN D 25-HYDROXY: 37.2 NG/ML (ref 30–100)
VLDLC SERPL CALC-MCNC: ABNORMAL MG/DL (ref 1–30)
WBC # BLD: 7.2 K/UL (ref 3.5–11)
WBC # BLD: ABNORMAL 10*3/UL

## 2021-11-04 PROCEDURE — 82306 VITAMIN D 25 HYDROXY: CPT

## 2021-11-04 PROCEDURE — 36415 COLL VENOUS BLD VENIPUNCTURE: CPT

## 2021-11-04 PROCEDURE — 85025 COMPLETE CBC W/AUTO DIFF WBC: CPT

## 2021-11-04 PROCEDURE — 80061 LIPID PANEL: CPT

## 2021-11-04 PROCEDURE — 11721 DEBRIDE NAIL 6 OR MORE: CPT | Performed by: PODIATRIST

## 2021-11-04 PROCEDURE — 84443 ASSAY THYROID STIM HORMONE: CPT

## 2021-11-04 PROCEDURE — 80053 COMPREHEN METABOLIC PANEL: CPT

## 2021-11-04 ASSESSMENT — ENCOUNTER SYMPTOMS
NAUSEA: 0
DIARRHEA: 0
COLOR CHANGE: 0
VOMITING: 0
CONSTIPATION: 0

## 2021-11-04 NOTE — PROGRESS NOTES
[facility]  Return Patient  Chief Complaint   Patient presents with    Nail Problem     b/l nail trim, last seen Иван Trimble MD 11/3/21    Diabetes     LBS Juan A is a 77y.o. year old male who is here for a diabetic foot check and nail trim. He would like his nails trimmed today. History of surgery type: Plantar planing medial and lateral foot. Vital signs are stable. Pain level is 0. Patient denies N/V/F/C. Patient was compliant with postoperative instructions. He is in a diabetic shoe        Review of Systems   Constitutional: Negative for chills, diaphoresis, fatigue, fever and unexpected weight change. Cardiovascular: Negative for leg swelling. Gastrointestinal: Negative for constipation, diarrhea, nausea and vomiting. Musculoskeletal: Positive for gait problem. Negative for arthralgias and joint swelling. Skin: Negative for color change, pallor, rash and wound. Neurological: Positive for numbness. Negative for weakness. Vascular: DP and PT pulses palpable 2/4, Right Foot and 2/4 on the Left Foot. CFT <3 seconds, Right Foot and <3 seconds on the Left Foot. Edema is absent,  Right Foot and minimal on the Left Foot. Neurological:   Sensation absent  to light touch to level of digits, both feet. Musculoskeletal:   Muscle strength is 5/5 on the Right Foot and 5/5 on the Left Foot. Structural deformities are present on the Right Foot and present on the Left Foot, but improved  Flat medial arch left foot. Integument:  Warm, dry, supple both feet. Infection is absent. No odor. No macerated.      Sites of Onychomycosis Involvement (Check affected area)  [x] [x] [x] [x] [x] [x] [x] [x] [x] [x]  5 4 3 2 1 1 2 3 4 5                          Right                                        Left    Thickness  [x] [x] [x] [x] [x] [x] [x] [x] [x] [x]  5 4 3 2 1 1 2 3 4 5                         Right Left    Dystrophic Changes                                                                 [x] [x] [x] [x] [x] [x] [x] [x] [x] [x]  5 4 3 2 1 1 2 3 4 5                         Right                                        Left    Color                                                                  [x] [x] [x] [x] [x] [x] [x] [x] [x] [x]  5 4 3 2 1 1 2 3 4 5                          Right                                        Left    Incurvation/Ingrowin                                                                   [] [] [] [] [] [] [] [] [] []  5 4 3 2 1 1 2 3 4 5                         Right                                        Left    Inflammation/Pain                                                                   [] [] [] [] [] [] [] [] [] []  5 4 3 2 1 1 2 3 4 5                         Right                                        Left       Assesment :     Diagnosis Orders   1. Onychomycosis  NC DEBRIDEMENT OF NAILS, 6 OR MORE   2. Type 2 diabetes mellitus with diabetic polyneuropathy, with long-term current use of insulin (HCC)  NC DEBRIDEMENT OF NAILS, 6 OR MORE   3. Charcot's joint of left foot           Plan: Pt was evaluated and examined. Patient was given personalized discharge instructions. Nails 1-10 were debrided sharply in length and thickness with a nipper and , without incident. Pt will follow up in 9 weeks or sooner if any problems arise. Diagnosis was discussed with the pt and all of their questions were answered in detail. Proper foot hygiene and care was discussed with the pt. Informed patient on proper diabetic foot care and importance of tight glycemic control. Patient to check feet daily and contact the office with any questions/problems/concerns. Other comorbidity noted and will be managed by PCP. Diabetic foot examination performed this visit.   The exam included neurological sensory exam, a 10-g monofilament and pinprick sensation, vibration using a 128-Hz tuning fork, ankle reflexes, visual skin inspection, vascular exam including assessment of pedal pulses, orthopedic exam for deformities, and shoe inspection. Increased risk factors noted on the diabetic foot exam include decreased sensory exam and peripheral neuropathy. Shoegear inspected and found to be appropriate size and wear. Diabetic shoes and insoles: This patient would benefit from extra depth diabetic shoes and custom inserts. I feel he/she qualifies due to the above performed physical exam and diagnosis. I will do the appropriate paperwork and send it to their primary care physician. Then the patient will be measured for shoes and custom inserts to properly off load and protect his/her feet. No orders of the defined types were placed in this encounter. Follow up 9 weeks.

## 2021-11-07 NOTE — TELEPHONE ENCOUNTER
Please Approve or Refuse.   Send to Pharmacy per Pt's Request:      Next Visit Date:  5/3/2022   Last Visit Date: 11/3/2021    Hemoglobin A1C (%)   Date Value   11/03/2021 6.8   05/03/2021 7.0   02/01/2021 7.1             ( goal A1C is < 7)   BP Readings from Last 3 Encounters:   11/03/21 126/80   05/03/21 122/74   02/01/21 116/64          (goal 120/80)  BUN   Date Value Ref Range Status   11/04/2021 24 (H) 8 - 23 mg/dL Final     CREATININE   Date Value Ref Range Status   11/04/2021 1.42 (H) 0.70 - 1.20 mg/dL Final     Potassium   Date Value Ref Range Status   11/04/2021 3.9 3.7 - 5.3 mmol/L Final

## 2021-11-08 RX ORDER — FUROSEMIDE 40 MG/1
TABLET ORAL
Qty: 60 TABLET | Refills: 0 | Status: SHIPPED | OUTPATIENT
Start: 2021-11-08 | End: 2021-11-19

## 2021-11-19 RX ORDER — FUROSEMIDE 40 MG/1
TABLET ORAL
Qty: 60 TABLET | Refills: 0 | Status: SHIPPED | OUTPATIENT
Start: 2021-11-19 | End: 2021-12-07

## 2021-11-22 RX ORDER — INSULIN GLARGINE 100 [IU]/ML
INJECTION, SOLUTION SUBCUTANEOUS
Qty: 45 ML | Refills: 0 | Status: SHIPPED | OUTPATIENT
Start: 2021-11-22 | End: 2022-01-10

## 2021-11-23 ENCOUNTER — HOSPITAL ENCOUNTER (OUTPATIENT)
Dept: PAIN MANAGEMENT | Age: 76
Discharge: HOME | End: 2021-11-23
Payer: MEDICARE

## 2021-11-23 DIAGNOSIS — E11.610 CHARCOT FOOT DUE TO DIABETES MELLITUS (HCC): ICD-10-CM

## 2021-11-23 DIAGNOSIS — M50.30 DDD (DEGENERATIVE DISC DISEASE), CERVICAL: ICD-10-CM

## 2021-11-23 DIAGNOSIS — E08.41 DIABETIC MONONEUROPATHY ASSOCIATED WITH DIABETES MELLITUS DUE TO UNDERLYING CONDITION (HCC): ICD-10-CM

## 2021-11-23 DIAGNOSIS — M47.816 SPONDYLOSIS OF LUMBAR REGION WITHOUT MYELOPATHY OR RADICULOPATHY: ICD-10-CM

## 2021-11-23 DIAGNOSIS — M51.36 DDD (DEGENERATIVE DISC DISEASE), LUMBAR: Primary | ICD-10-CM

## 2021-11-23 DIAGNOSIS — F11.90 CHRONIC, CONTINUOUS USE OF OPIOIDS: ICD-10-CM

## 2021-11-23 DIAGNOSIS — M47.26 OSTEOARTHRITIS OF SPINE WITH RADICULOPATHY, LUMBAR REGION: ICD-10-CM

## 2021-11-23 DIAGNOSIS — M00.862 ARTHRITIS OF LEFT KNEE DUE TO OTHER BACTERIA (HCC): ICD-10-CM

## 2021-11-23 DIAGNOSIS — M47.899 FACET SYNDROME: ICD-10-CM

## 2021-11-23 DIAGNOSIS — K59.03 DRUG-INDUCED CONSTIPATION: ICD-10-CM

## 2021-11-23 DIAGNOSIS — Z51.81 MEDICATION MONITORING ENCOUNTER: ICD-10-CM

## 2021-11-23 PROCEDURE — 99213 OFFICE O/P EST LOW 20 MIN: CPT

## 2021-11-23 PROCEDURE — 99441 PR PHYS/QHP TELEPHONE EVALUATION 5-10 MIN: CPT | Performed by: NURSE PRACTITIONER

## 2021-11-23 RX ORDER — FENTANYL 25 UG/H
1 PATCH TRANSDERMAL
Qty: 10 PATCH | Refills: 0 | Status: SHIPPED | OUTPATIENT
Start: 2021-11-24 | End: 2021-12-20 | Stop reason: SDUPTHER

## 2021-11-23 ASSESSMENT — ENCOUNTER SYMPTOMS
SORE THROAT: 1
SHORTNESS OF BREATH: 1
BACK PAIN: 1
CONSTIPATION: 1
EYES NEGATIVE: 1

## 2021-11-23 NOTE — PROGRESS NOTES
Almaz 89 PROGRESS NOTE      Patient  completed []  video visit   [x]   phone call:   Technical difficulties   9   Minutes :       [x]    to  review Medication Agreement    []  Follow up after procedure   []  Discuss treatment options      Location:  Provider:  working from    [x]    home    []   [facility] - [facility] ,   patient at home       Chief Complaint:  Low back pain      He c/o low back pain radiating down his legs. He reports pain has not changed. He has no history of  lumbar surgery. He has done PT in the past which did not give much relief,. He reports sleep is okay. He has  not been as active due to sinus issues. He reports blood sugars are ranging from 95-98. Back Pain  This is a chronic problem. The current episode started more than 1 year ago. The problem occurs constantly. The problem is unchanged. The pain is present in the lumbar spine. Quality: dull. Radiates to: legs. The pain is at a severity of 5/10. The pain is the same all the time. Exacerbated by: walking. Associated symptoms include numbness and weakness. He has tried analgesics for the symptoms. Treatment goals:  Functional status: walk farther      Aberrancy:   Any alcoholic beverages    no        Any illegal drugs   no      Analgesia:        5             Adverse  Effects :constipation, managed    ADL;s :home exercises    Data:    When was thelast UDS:    8-        Was the UDS appropriate:  [] yes []   no      Record/Diagnostics Review:      As above, I did review the imaging     DRUG SCREEN, PAIN  Order: [id number]   Status: Edited Result - FINAL     Visible to patient: Yes (seen)     Next appt: Today at 09:40 AM in Pain [facility]     Dx: DDD (degenerative disc disease), lumb. ..     0 Result Notes     Ref Range & Units 8/20/21 1350 Resulting Agency   Pain Management Drug Panel Interp, Urine  Inconsistent  [facility]   Comment: (NOTE) ________________________________________________________________   DRUGS EXPECTED:   OXYCODONE   FENTANYL   ________________________________________________________________   CONSISTENT with medications provided:   OXYCODONE: based on noroxycodone, oxymorphone   FENTANYL: based on fentanyl, norfentanyl   ________________________________________________________________   INCONSISTENT with medications provided:   Gabapentin   ________________________________________________________________   INTERPRETIVE INFORMATION: Targeted drug profile Interp   Interpretation depends on accuracy and completeness of patient   medication information submitted by client. FINAL **   Procedure:  LUMBAR SPINE STANDARD    CDX  01/03/2015     [id number]   Reason for Exam:  ^fall/ pain       FULL RESULT:   LUMBAR SPINE STANDARD       INDICATION:   fall/ pain.           COMPARISON:    12/27/2007       FINDINGS:   AP, crosstable lateral and cone-down lumbosacral views obtained.       There is multilevel facet arthropathy and anterior spurring. There is    redemonstration of mild to moderate wedge compression deformity of L1    vertebral body. No acute fracture is evident. No paraspinal mass. SI    joints show degenerative changes. Cholecystectomy noted.               IMPRESSION:       Old L1 compression. No acute osseous abnormality.       Report electronically signed by Candis Roland M.D. on 1/3/2015 4:27 PM    1/3/2015 4:27 PM   Transcribed by: [facility] [place], operated by [facility] on [place]  3 2015  4:29P    Read by: Roney Hashimoto, M.D.  [id number] on [place]  3 2015  4:29P    Electronically Signed by: Romi Spencer.  Roney Hashimoto, M.D. on: Shweta Mcgee  3 2015     4:29P                                                                 Pill count: appropriate    fill date :11-    Morphine equivalent dose as reported on OARRS: 60  Periodic Controlled Substance Monitoring: Possible medication side effects, risk of tolerance/dependence & alternative treatments discussed., No signs of 11-3-15    VENA CAVA FILTER PLACEMENT      WRIST SURGERY      I&D       Allergies   Allergen Reactions    Flagyl [Metronidazole] Hives    Metronidazole      Other reaction(s): Vomiting         Current Outpatient Medications:     LANTUS SOLOSTAR 100 UNIT/ML injection pen, inject 30 units subcutaneously in the morning and 40 units in the evening, Disp: 45 mL, Rfl: 0    furosemide (LASIX) 40 MG tablet, TAKE 1 TABLET BY MOUTH TWO TIMES A DAY, Disp: 60 tablet, Rfl: 0    fentaNYL (DURAGESIC) 25 MCG/HR, Place 1 patch onto the skin every 72 hours for 30 days. , Disp: 10 patch, Rfl: 0    Insulin Pen Needle (BD PEN NEEDLE ALIZE 2ND GEN) 32G X 4 MM MISC, use 2 daily as directed, Disp: 100 each, Rfl: 0    topiramate (TOPAMAX) 50 MG tablet, Take 1 tablet by mouth 2 times daily, Disp: 60 tablet, Rfl: 3    simvastatin (ZOCOR) 20 MG tablet, TAKE 1 TABLET BY MOUTH EVERY DAY AT NIGHT, Disp: 90 tablet, Rfl: 3    omeprazole (PRILOSEC) 20 MG delayed release capsule, TAKE 1 CAPSULE BY MOUTH EVERY DAY, Disp: 90 capsule, Rfl: 3    gabapentin (NEURONTIN) 800 MG tablet, Take 1 tablet by mouth daily for 90 days. , Disp: 90 tablet, Rfl: 3    naloxone (NARCAN) 4 MG/0.1ML LIQD nasal spray, 1 spray by Nasal route as needed (if needed for respiratory depression), Disp: 1 each, Rfl: 0    blood glucose test strips (FREESTYLE LITE) strip, TEST TWICE DAILY AS DIRECTED, Disp: 100 strip, Rfl: 1    aspirin 81 MG tablet, Take 81 mg by mouth daily. , Disp: , Rfl:     Vitamin D (CHOLECALCIFEROL) 1000 UNITS CAPS capsule, Take 2,000 Units by mouth daily , Disp: , Rfl:     Ascorbic Acid (VITAMIN C) 500 MG tablet, Take 1,000 mg by mouth daily , Disp: , Rfl:     NITROSTAT 0.4 MG SL tablet, , Disp: , Rfl:     finasteride (PROSCAR) 5 MG tablet, Take 5 mg by mouth daily. , Disp: , Rfl:     tamsulosin (FLOMAX) 0.4 MG capsule, Take 0.4 mg by mouth daily.   , Disp: , Rfl:     Family History   Problem Relation Age of Onset    Heart Failure Father    Balaji Camejo Cancer Mother         breast    Cancer Maternal Grandfather        Social History     Socioeconomic History    Marital status:      Spouse name: Not on file    Number of children: Not on file    Years of education: Not on file    Highest education level: Not on file   Occupational History    Occupation: retired [employer]   Tobacco Use    Smoking status: Former Smoker     Packs/day: 1.00     Years: 30.00     Pack years: 30.00     Types: Cigarettes     Quit date: 2002     Years since quittin.8    Smokeless tobacco: Never Used   Vaping Use    Vaping Use: Never used   Substance and Sexual Activity    Alcohol use: Not Currently     Comment: rare    Drug use: No    Sexual activity: Never   Other Topics Concern    Not on file   Social History Narrative    Not on file     Social Determinants of Health     Financial Resource Strain:     Difficulty of Paying Living Expenses: Not on file   Food Insecurity:     Worried About [address] in the Last Year: Not on file    [address] in the Last Year: Not on file   Transportation Needs:     Lack of Transportation (Medical): Not on file    Lack of Transportation (Non-Medical):  Not on file   Physical Activity:     Days of Exercise per Week: Not on file    Minutes of Exercise per Session: Not on file   Stress:     Feeling of Stress : Not on file   Social Connections:     Frequency of Communication with Friends and Family: Not on file    Frequency of Social Gatherings with Friends and Family: Not on file    Attends Jain Services: Not on file    Active Member of Clubs or Organizations: Not on file    Attends Club or Organization Meetings: Not on file    Marital Status: Not on file   Intimate Partner Violence:     Fear of Current or Ex-Partner: Not on file    Emotionally Abused: Not on file    Physically Abused: Not on file    Sexually Abused: Not on file   Housing Stability:     Unable to Pay for Housing in the Last asked the patient to minimize medication use and utilize pain medications only for uncontrolled rest pain or pain with exertional activities. I advised patient not to self-escalate painmedications without consulting with us. At each of patient's future visits we will try to taper pain medications, while adjusting the adjunct medications, and re-evaluating for Physical Therapy to improve spinal andjoint strength. We will continue to have discussions to decrease pain medications as tolerated. Counseled patient on effects their pain medication and /or their medical condition mayhave on their  ability to drive or operate machinery. Instructed not to drive or operate machinery if drowsy     I also discussed with the patient regarding the dangers of combining narcotic pain medication with tranquilizers, alcohol or illegal drugs or taking the medication any way other than prescribed. The dangers were discussed  including respiratory depression and death. Patient was told to tell  all  physicians regarding the medications he is getting from pain clinic. Patient is warned not to take any unprescribed medications over-the-countermedications that can depress breathing . Patient is advised to talk to the pharmacist or physicians if planning to take any over-the-counter medications before  takeing them. Patient is strongly advised to avoid tranquilizers or  relaxants, illegal drugs  or any medications that can depress breathing  Patient is also advised to tell us if there is any changes in their medications from other physicians.             TREATMENT OPTIONS:       Medication Agreement Requirements Met  Continue Opioid therapy  Script written for  Fentanyl patch  Follow up appointment made

## 2021-12-03 DIAGNOSIS — E11.42 TYPE 2 DIABETES MELLITUS WITH DIABETIC POLYNEUROPATHY, WITH LONG-TERM CURRENT USE OF INSULIN (HCC): ICD-10-CM

## 2021-12-03 DIAGNOSIS — Z79.4 TYPE 2 DIABETES MELLITUS WITH DIABETIC POLYNEUROPATHY, WITH LONG-TERM CURRENT USE OF INSULIN (HCC): ICD-10-CM

## 2021-12-03 RX ORDER — PEN NEEDLE, DIABETIC 32GX 5/32"
NEEDLE, DISPOSABLE MISCELLANEOUS
Qty: 100 EACH | Refills: 0 | Status: SHIPPED | OUTPATIENT
Start: 2021-12-03 | End: 2022-01-14

## 2021-12-07 RX ORDER — FUROSEMIDE 40 MG/1
TABLET ORAL
Qty: 60 TABLET | Refills: 0 | Status: SHIPPED | OUTPATIENT
Start: 2021-12-07 | End: 2022-02-21

## 2021-12-19 NOTE — PROGRESS NOTES
Almaz 89 PROGRESS NOTE      Patient  completed []  video visit   [x]   phone call:  Due to technical difficulty    10     Minutes :   [] in person visit to pain clinic    [x]    to  review Medication Agreement    []  Follow up after procedure   []  Discuss treatment options      Location:  Provider:  working from    []    home    [x]   [facility] - [facility] ,   patient at   [] pain clinic     []  Home  In car       Chief Complaint: low back pain    He c/o low back pain. The pain radiates down his legs. e has no history of lumbar surgery. He does home exercises. He does some walking. He is diabetic, some increase, his am blood sugar was 124. He reports his sleep is good. Back Pain  This is a chronic problem. The current episode started more than 1 year ago. The pain is present in the lumbar spine. The quality of the pain is described as aching. Radiates to: leg. The pain is at a severity of 6/10. The pain is moderate. The pain is the same all the time. Exacerbated by: nothing. Associated symptoms include numbness and weakness. He has tried analgesics (pain meds) for the symptoms. Treatment goals:  Functional status: reduce pain, walk farther    Aberrancy:   Any alcoholic beverages     no       Any illegal drugs   no      Analgesia:  6                   Adverse  Effects :constipation, managed      ADL;s :walking    Data:    When was thelast UDS: 8-           Was the UDS appropriate:  [x] yes []   no      Record/Diagnostics Review:      As above, I did review the imaging   DRUG SCREEN, PAIN  Order: [id number]   Status: Edited Result - FINAL     Visible to patient: Yes (seen)     Next appt: 12/20/2021 at 09:00 AM in Pain [facility]     Dx: DDD (degenerative disc disease), lumb. ..     0 Result Notes     Ref Range & Units 8/20/21 1350 Resulting Agency   Pain Management Drug Panel Interp, Urine  Inconsistent  [facility]   Comment: (NOTE) ________________________________________________________________   DRUGS EXPECTED:   OXYCODONE   FENTANYL   ________________________________________________________________   CONSISTENT with medications provided:   OXYCODONE: based on noroxycodone, oxymorphone   FENTANYL: based on fentanyl, norfentanyl   ________________________________________________________________   INCONSISTENT with medications provided:   Gabapentin   ________________________________________________________________   INTERPRETIVE INFORMATION: Targeted drug profile Interp   Interpretation depends on accuracy and completeness of patient   medication information submitted by client. FINAL **   Procedure:  LUMBAR SPINE STANDARD    CDX  01/03/2015     [id number]   Reason for Exam:  ^fall/ pain       FULL RESULT:   LUMBAR SPINE STANDARD       INDICATION:   fall/ pain.           COMPARISON:    12/27/2007       FINDINGS:   AP, crosstable lateral and cone-down lumbosacral views obtained.       There is multilevel facet arthropathy and anterior spurring. There is    redemonstration of mild to moderate wedge compression deformity of L1    vertebral body. No acute fracture is evident. No paraspinal mass. SI    joints show degenerative changes. Cholecystectomy noted.               IMPRESSION:       Old L1 compression. No acute osseous abnormality.       Report electronically signed by Desi Perales M.D. on 1/3/2015 4:27 PM    1/3/2015 4:27 PM   Transcribed by: [facility] on [place]  3 2015  4:29P    Read by: Sergei Vazquez M.D.  [id number] on [place]  3 2015  4:29P    Electronically Signed by: Jacky Maciel. Sergei Vazquez M.D. on: [facility] 2015     4:29P                    Pill count: appropriate    fill date :12-  Morphine equivalent dose as reported on OARRS:  60     Review ofOARRS does not show any aberrant prescription behavior. Medication is helping the patient stay active. Patient denies any side effects and reports adequate analgesia.  No sign of misuse/abuse.             Past Medical History:   Diagnosis Date    Abdominal pain     Benign prostatic hypertrophy     C. difficile colitis     CAD (coronary artery disease) 1/3/12    s/p CABGx3    Colon polyp 02/25/2019    tubular adenoma    Constipation     Diabetes mellitus ([facility]     Diarrhea     Diarrhea     DVT [facility]deep venous thrombosis) ([facility])     left calf    Ejection fraction < 50%     Gallstones     Heartburn     Hx of blood clots     Hyperlipidemia     Kidney stones     Lumbar spinal stenosis 4/21/2014    MI (myocardial infarction) ([facility]     2011    Mitral regurgitation     MRSA (methicillin resistant staph aureus) culture positive     Numbness and tingling     hands and feet    Rib fractures 1-2015    right    Wears glasses     readers       Past Surgical History:   Procedure Laterality Date    APPENDECTOMY      CARDIAC CATHETERIZATION  12/29/11    CHOLECYSTECTOMY      COLONOSCOPY  01/11/2012    wnl, 10 yr recall    COLONOSCOPY  11/28/2018    ATTEMPTED NOT CLEAN (N/A )    COLONOSCOPY  02/25/2019    tubular adenoma    COLONOSCOPY N/A 2/25/2019    COLONOSCOPY WITH BIOPSY performed by Reed Herrera MD at [facility]      x 1    CORONARY ARTERY BYPASS GRAFT  1/3/12    x3    KNEE ARTHROSCOPY      left    KNEE SURGERY Left     I&D    OTHER SURGICAL HISTORY Left 3/7/2016    plantar plane &  exostectomy medial foot left    SD COLON CA SCRN NOT HI RSK IND N/A 11/28/2018    COLONOSCOPY ATTEMPTED NOT CLEAN performed by Reed Herrera MD at [facility] END[facility]COPY  11-3-15    VENA CAVA FILTER PLACEMENT      WRIST SURGERY      I&D       Allergies   Allergen Reactions    Flagyl [Metronidazole] Hives    Metronidazole      Other reaction(s): Vomiting         Current Outpatient Medications:     furosemide (LASIX) 40 MG tablet, TAKE 1 TABLET BY MOUTH TWO TIMES A DAY, Disp: 60 tablet, Rfl: 0    Insulin Pen Needle (BD PEN NEEDLE ALIZE 2ND GEN) 32G X 4 MM MISC, use to test blood sugar twice daily, Disp: 100 each, Rfl: 0    fentaNYL (DURAGESIC) 25 MCG/HR, Place 1 patch onto the skin every 72 hours for 30 days. , Disp: 10 patch, Rfl: 0    LANTUS SOLOSTAR 100 UNIT/ML injection pen, inject 30 units subcutaneously in the morning and 40 units in the evening, Disp: 45 mL, Rfl: 0    topiramate (TOPAMAX) 50 MG tablet, Take 1 tablet by mouth 2 times daily, Disp: 60 tablet, Rfl: 3    simvastatin (ZOCOR) 20 MG tablet, TAKE 1 TABLET BY MOUTH EVERY DAY AT NIGHT, Disp: 90 tablet, Rfl: 3    omeprazole (PRILOSEC) 20 MG delayed release capsule, TAKE 1 CAPSULE BY MOUTH EVERY DAY, Disp: 90 capsule, Rfl: 3    gabapentin (NEURONTIN) 800 MG tablet, Take 1 tablet by mouth daily for 90 days. , Disp: 90 tablet, Rfl: 3    naloxone (NARCAN) 4 MG/0.1ML LIQD nasal spray, 1 spray by Nasal route as needed (if needed for respiratory depression), Disp: 1 each, Rfl: 0    blood glucose test strips (FREESTYLE LITE) strip, TEST TWICE DAILY AS DIRECTED, Disp: 100 strip, Rfl: 1    aspirin 81 MG tablet, Take 81 mg by mouth daily. , Disp: , Rfl:     Vitamin D (CHOLECALCIFEROL) 1000 UNITS CAPS capsule, Take 2,000 Units by mouth daily , Disp: , Rfl:     Ascorbic Acid (VITAMIN C) 500 MG tablet, Take 1,000 mg by mouth daily , Disp: , Rfl:     NITROSTAT 0.4 MG SL tablet, , Disp: , Rfl:     finasteride (PROSCAR) 5 MG tablet, Take 5 mg by mouth daily. , Disp: , Rfl:     tamsulosin (FLOMAX) 0.4 MG capsule, Take 0.4 mg by mouth daily. , Disp: , Rfl:     Family History   Problem Relation Age of Onset    Heart Failure Father     Cancer Mother         breast    Cancer Maternal Grandfather        Social History     Socioeconomic History    Marital status:       Spouse name: Not on file    Number of children: Not on file    Years of education: Not on file    Highest education level: Not on file   Occupational History    Occupation: retired [employer]   Tobacco Use    Smoking status: Former Smoker     Packs/day: 1.00     Years: 30.00     Pack years: 30.00     Types: Cigarettes     Quit date: 2002     Years since quittin.8    Smokeless tobacco: Never Used   Vaping Use    Vaping Use: Never used   Substance and Sexual Activity    Alcohol use: Not Currently     Comment: rare    Drug use: No    Sexual activity: Never   Other Topics Concern    Not on file   Social History Narrative    Not on file     Social Determinants of Health     Financial Resource Strain:     Difficulty of Paying Living Expenses: Not on file   Food Insecurity:     Worried About Running Out of Food in the Last Year: Not on file    Gemma of Food in the Last Year: Not on file   Transportation Needs:     Lack of Transportation (Medical): Not on file    Lack of Transportation (Non-Medical): Not on file   Physical Activity:     Days of Exercise per Week: Not on file    Minutes of Exercise per Session: Not on file   Stress:     Feeling of Stress : Not on file   Social Connections:     Frequency of Communication with Friends and Family: Not on file    Frequency of Social Gatherings with Friends and Family: Not on file    Attends Christianity Services: Not on file    Active Member of [address] [place] [address] or Organizations: Not on file    Attends Club or Organization Meetings: Not on file    Marital Status: Not on file   Intimate Partner Violence:     Fear of Current or Ex-Partner: Not on file    Emotionally Abused: Not on file    Physically Abused: Not on file    Sexually Abused: Not on file   Housing Stability:     Unable to Pay for Housing in the Last Year: Not on file    Number of [place] in the Last Year: Not on file    Unstable Housing in the Last Year: Not on file         Review of Systems:  Review of Systems   Constitutional: Negative. HENT: Negative. Eyes: Negative. Cardiovascular: Negative. Respiratory: Negative.     Endocrine:        Blood sugar 124 Hematologic/Lymphatic: Bruises/bleeds easily. Skin: Negative. Musculoskeletal: Positive for arthritis, back pain and joint pain. Gastrointestinal: Positive for constipation. Genitourinary: Negative. Neurological: Positive for loss of balance, numbness and weakness. Psychiatric/Behavioral: Negative. Physical Exam:  There were no vitals taken for this visit. Physical Exam  Skin:         Neurological:      Mental Status: He is alert and oriented to person, place, and time. Psychiatric:         Mood and Affect: Mood normal.         Thought Content: Thought content normal.           Assessment:    Problem List Items Addressed This Visit     Type 2 diabetes mellitus with diabetic polyneuropathy, with long-term current use of insulin ([facility] - Primary    Spondylosis of lumbar region without myelopathy or radiculopathy    Osteoarthritis of spine with radiculopathy, lumbar region    Medication monitoring encounter    Lumbar spinal stenosis    Facet syndrome (HCC)    DDD (degenerative disc disease), lumbar    DDD (degenerative disc disease), cervical    Chronic, continuous use of opioids    Charcot foot due to diabetes mellitus ([facility]            Treatment Plan:  DISCUSSION: Treatment options discussed withpatient and all questions answered to patient's satisfaction. Possible side effects, risk of tolerance and or dependence and alternative treatments discussed    Obtaining appropriate analgesic effect of treatment   No signs of potential drug abuse or diversion identified    [x] Ill effects of being on chronic pain medications such as sleep disturbances, respiratory depression, hormonal changes, withdrawal symptoms, chronic opioid dependence and tolerance as well as risk of taking opioids with Benzodiazepines and taking opioids along with alcohol,  werediscussed with patient.  I had asked the patient to minimize medication use and utilize pain medications only for uncontrolled rest pain or pain with exertional activities. I advised patient not to self-escalate painmedications without consulting with us. At each of patient's future visits we will try to taper pain medications, while adjusting the adjunct medications, and re-evaluating for Physical Therapy to improve spinal andjoint strength. We will continue to have discussions to decrease pain medications as tolerated. Counseled patient on effects their pain medication and /or their medical condition mayhave on their  ability to drive or operate machinery. Instructed not to drive or operate machinery if drowsy     I also discussed with the patient regarding the dangers of combining narcotic pain medication with tranquilizers, alcohol or illegal drugs or taking the medication any way other than prescribed. The dangers were discussed  including respiratory depression and death. Patient was told to tell  all  physicians regarding the medications he is getting from pain clinic. Patient is warned not to take any unprescribed medications over-the-countermedications that can depress breathing . Patient is advised to talk to the pharmacist or physicians if planning to take any over-the-counter medications before  takeing them. Patient is strongly advised to avoid tranquilizers or  relaxants, illegal drugs  or any medications that can depress breathing  Patient is also advised to tell us if there is any changes in their medications from other physicians.     1. He states his pharmacy can not get the brand of fentanyl patch he uses, he will try another pharmacy and call us back        TREATMENT OPTIONS:       Medication Agreement Requirements Met  Continue Opioid therapy  Script written for  Fentanyl patch, topamax  Follow up appointment made

## 2021-12-20 ENCOUNTER — HOSPITAL ENCOUNTER (OUTPATIENT)
Dept: PAIN MANAGEMENT | Age: 76
Discharge: HOME | End: 2021-12-20
Payer: MEDICARE

## 2021-12-20 DIAGNOSIS — M51.36 DDD (DEGENERATIVE DISC DISEASE), LUMBAR: ICD-10-CM

## 2021-12-20 DIAGNOSIS — M47.26 OSTEOARTHRITIS OF SPINE WITH RADICULOPATHY, LUMBAR REGION: ICD-10-CM

## 2021-12-20 DIAGNOSIS — M47.899 FACET SYNDROME: ICD-10-CM

## 2021-12-20 DIAGNOSIS — M47.816 SPONDYLOSIS OF LUMBAR REGION WITHOUT MYELOPATHY OR RADICULOPATHY: ICD-10-CM

## 2021-12-20 DIAGNOSIS — M50.30 DDD (DEGENERATIVE DISC DISEASE), CERVICAL: ICD-10-CM

## 2021-12-20 DIAGNOSIS — Z79.4 TYPE 2 DIABETES MELLITUS WITH DIABETIC POLYNEUROPATHY, WITH LONG-TERM CURRENT USE OF INSULIN (HCC): Primary | ICD-10-CM

## 2021-12-20 DIAGNOSIS — Z51.81 MEDICATION MONITORING ENCOUNTER: ICD-10-CM

## 2021-12-20 DIAGNOSIS — E11.610 CHARCOT FOOT DUE TO DIABETES MELLITUS (HCC): ICD-10-CM

## 2021-12-20 DIAGNOSIS — E08.41 DIABETIC MONONEUROPATHY ASSOCIATED WITH DIABETES MELLITUS DUE TO UNDERLYING CONDITION (HCC): ICD-10-CM

## 2021-12-20 DIAGNOSIS — K59.03 DRUG-INDUCED CONSTIPATION: ICD-10-CM

## 2021-12-20 DIAGNOSIS — M48.062 SPINAL STENOSIS OF LUMBAR REGION WITH NEUROGENIC CLAUDICATION: ICD-10-CM

## 2021-12-20 DIAGNOSIS — F11.90 CHRONIC, CONTINUOUS USE OF OPIOIDS: ICD-10-CM

## 2021-12-20 DIAGNOSIS — E11.42 TYPE 2 DIABETES MELLITUS WITH DIABETIC POLYNEUROPATHY, WITH LONG-TERM CURRENT USE OF INSULIN (HCC): Primary | ICD-10-CM

## 2021-12-20 DIAGNOSIS — M00.862 ARTHRITIS OF LEFT KNEE DUE TO OTHER BACTERIA (HCC): ICD-10-CM

## 2021-12-20 PROCEDURE — 99213 OFFICE O/P EST LOW 20 MIN: CPT

## 2021-12-20 PROCEDURE — 99441 PR PHYS/QHP TELEPHONE EVALUATION 5-10 MIN: CPT | Performed by: NURSE PRACTITIONER

## 2021-12-20 RX ORDER — TOPIRAMATE 50 MG/1
50 TABLET, FILM COATED ORAL 2 TIMES DAILY
Qty: 60 TABLET | Refills: 2 | Status: SHIPPED | OUTPATIENT
Start: 2021-12-22 | End: 2022-03-21 | Stop reason: SDUPTHER

## 2021-12-20 RX ORDER — FENTANYL 25 UG/H
1 PATCH TRANSDERMAL
Qty: 10 PATCH | Refills: 0 | Status: SHIPPED | OUTPATIENT
Start: 2021-12-23 | End: 2022-01-21 | Stop reason: SDUPTHER

## 2021-12-20 ASSESSMENT — ENCOUNTER SYMPTOMS
RESPIRATORY NEGATIVE: 1
BACK PAIN: 1
CONSTIPATION: 1
EYES NEGATIVE: 1

## 2022-01-06 ENCOUNTER — OFFICE VISIT (OUTPATIENT)
Dept: PODIATRY | Age: 77
End: 2022-01-06
Payer: MEDICARE

## 2022-01-06 VITALS — WEIGHT: 212 LBS | HEIGHT: 72 IN | BODY MASS INDEX: 28.71 KG/M2

## 2022-01-06 DIAGNOSIS — E11.42 TYPE 2 DIABETES MELLITUS WITH DIABETIC POLYNEUROPATHY, WITH LONG-TERM CURRENT USE OF INSULIN (HCC): ICD-10-CM

## 2022-01-06 DIAGNOSIS — M14.672 CHARCOT'S JOINT OF LEFT FOOT: ICD-10-CM

## 2022-01-06 DIAGNOSIS — Z79.4 TYPE 2 DIABETES MELLITUS WITH DIABETIC POLYNEUROPATHY, WITH LONG-TERM CURRENT USE OF INSULIN (HCC): ICD-10-CM

## 2022-01-06 DIAGNOSIS — B35.1 ONYCHOMYCOSIS: Primary | ICD-10-CM

## 2022-01-06 PROCEDURE — 11721 DEBRIDE NAIL 6 OR MORE: CPT | Performed by: PODIATRIST

## 2022-01-06 ASSESSMENT — ENCOUNTER SYMPTOMS
COLOR CHANGE: 0
VOMITING: 0
CONSTIPATION: 0
NAUSEA: 0
DIARRHEA: 0

## 2022-01-06 NOTE — PROGRESS NOTES
[facility]  Return Patient  Chief Complaint   Patient presents with    Nail Problem     nail trim/last saw Kathleen Munoz 11/3/2021    Diabetes     last blood sugar 112       Lani Blankenship is a 77y.o. year old male who is here for a diabetic foot check and nail trim. He would like his nails trimmed today. History of surgery type: Plantar planing medial and lateral foot. Vital signs are stable. Pain level is 0. Patient denies N/V/F/C. Patient was compliant with postoperative instructions. He is in a diabetic shoe        Review of Systems   Constitutional: Negative for chills, diaphoresis, fatigue, fever and unexpected weight change. Cardiovascular: Negative for leg swelling. Gastrointestinal: Negative for constipation, diarrhea, nausea and vomiting. Musculoskeletal: Positive for gait problem. Negative for arthralgias and joint swelling. Skin: Negative for color change, pallor, rash and wound. Neurological: Positive for numbness. Negative for weakness. Vascular: DP and PT pulses palpable 2/4, Right Foot and 2/4 on the Left Foot. CFT <3 seconds, Right Foot and <3 seconds on the Left Foot. Edema is absent,  Right Foot and minimal on the Left Foot. Neurological:   Sensation absent  to light touch to level of digits, both feet. Musculoskeletal:   Muscle strength is 5/5 on the Right Foot and 5/5 on the Left Foot. Structural deformities are present on the Right Foot and present on the Left Foot, but improved  Flat medial arch left foot. Integument:  Warm, dry, supple both feet. Infection is absent. No odor. No macerated.      Sites of Onychomycosis Involvement (Check affected area)  [x] [x] [x] [x] [x] [x] [x] [x] [x] [x]  5 4 3 2 1 1 2 3 4 5                          Right                                        Left    Thickness  [x] [x] [x] [x] [x] [x] [x] [x] [x] [x]  5 4 3 2 1 1 2 3 4 5                         Right Left    Dystrophic Changes                                                                 [x] [x] [x] [x] [x] [x] [x] [x] [x] [x]  5 4 3 2 1 1 2 3 4 5                         Right                                        Left    Color                                                                  [x] [x] [x] [x] [x] [x] [x] [x] [x] [x]  5 4 3 2 1 1 2 3 4 5                          Right                                        Left    Incurvation/Ingrowin                                                                   [] [] [] [] [] [] [] [] [] []  5 4 3 2 1 1 2 3 4 5                         Right                                        Left    Inflammation/Pain                                                                   [] [] [] [] [] [] [] [] [] []  5 4 3 2 1 1 2 3 4 5                         Right                                        Left       Assesment :     Diagnosis Orders   1. Onychomycosis  [place] DEBRIDEMENT OF NAILS, 6 OR MORE   2. Type 2 diabetes mellitus with diabetic polyneuropathy, with long-term current use of insulin (HCC)  [place] DEBRIDEMENT OF NAILS, 6 OR MORE   3. Charcot's joint of left foot           Plan: Pt was evaluated and examined. Patient was given personalized discharge instructions. Nails 1-10 were debrided sharply in length and thickness with a nipper and , without incident. Pt will follow up in 9 weeks or sooner if any problems arise. Diagnosis was discussed with the pt and all of their questions were answered in detail. Proper foot hygiene and care was discussed with the pt. Informed patient on proper diabetic foot care and importance of tight glycemic control. Patient to check feet daily and contact the office with any questions/problems/concerns. Other comorbidity noted and will be managed by PCP. Diabetic foot examination performed this visit.   The exam included neurological sensory exam, a 10-g monofilament and pinprick sensation, vibration using a 128-Hz tuning

## 2022-01-10 RX ORDER — INSULIN GLARGINE 100 [IU]/ML
INJECTION, SOLUTION SUBCUTANEOUS
Qty: 45 ML | Refills: 3 | Status: SHIPPED | OUTPATIENT
Start: 2022-01-10

## 2022-01-13 DIAGNOSIS — Z79.4 TYPE 2 DIABETES MELLITUS WITH DIABETIC POLYNEUROPATHY, WITH LONG-TERM CURRENT USE OF INSULIN (HCC): ICD-10-CM

## 2022-01-13 DIAGNOSIS — E11.42 TYPE 2 DIABETES MELLITUS WITH DIABETIC POLYNEUROPATHY, WITH LONG-TERM CURRENT USE OF INSULIN (HCC): ICD-10-CM

## 2022-01-14 RX ORDER — PEN NEEDLE, DIABETIC 32GX 5/32"
NEEDLE, DISPOSABLE MISCELLANEOUS
Qty: 100 EACH | Refills: 3 | Status: SHIPPED | OUTPATIENT
Start: 2022-01-14 | End: 2022-08-08

## 2022-01-20 NOTE — PROGRESS NOTES
[facility] [address] PROGRESS NOTE      Patient  completed []  video visit   []   phone call:         Minutes :   [x] in person visit to pain clinic    [x]    to  review Medication Agreement    []  Follow up after procedure   []  Discuss treatment options      Location:  Provider:  working from    []    home    [x]   [facility] - GIANNI BLANC ,   patient at   [x] pain clinic     []  home         Chief Complaint:  Low back pain    He c/o low back pain radiating down his legs. He has no history of lumbar surgery. His lumbar x-ray showed mutilevel lumbar facet arthropathy and old L1 compression deformity. He continues to do home exercises. He has numbness in huis feet from the neuropathy. He reports blood sugars are between . He reports sleeps in a lazy boy as it hurts to lay down In the bed. Back Pain  The current episode started more than 1 year ago. The problem occurs constantly. The problem is unchanged. The pain is present in the lumbar spine. The quality of the pain is described as aching (dull). Radiates to: legs. The pain is at a severity of 6/10. The pain is moderate. Exacerbated by: nothing. Associated symptoms include numbness and weakness. (Numbness feet) He has tried analgesics for the symptoms. The treatment provided mild relief. Treatment goals:  Functional status: walk farther      Aberrancy:   Any alcoholic beverages   no         Any illegal drugs   no      Analgesia:       6              Adverse  Effects :constipation managed      ADL;s :home exercises    Data:    When was thelast UDS:   8-         Was the UDS appropriate:  [] yes []   no      Record/Diagnostics Review:      As above, I did review the imaging       DRUG SCREEN, PAIN  Order: [id number]   Status: Edited Result - FINAL     Visible to patient: Yes (seen)     Next appt: 01/21/2022 at 09:00 AM in Pain Management [facility] - CNP)     Dx: DDD (degenerative disc disease), lumb. ..     0 Result Notes    Component Ref Range & Units 8/20/21 1350 8/2/20 0953 7/28/20 1233 4/3/19 0820 3/28/19 1929 4/5/18 0830 3/27/18 1828   Pain Management Drug Panel Interp, Urine  Inconsistent ARUP  Consistent CMARUP   Consistent CMARUP   Consistent CMARUP     Comment: (NOTE)   ________________________________________________________________   DRUGS EXPECTED:   OXYCODONE   FENTANYL   ________________________________________________________________   CONSISTENT with medications provided:   OXYCODONE: based on noroxycodone, oxymorphone   FENTANYL: based on fentanyl, norfentanyl   ________________________________________________________________   INCONSISTENT with medications provided:   Gabapentin   ________________________________________________________________   INTERPRETIVE INFORMATION: Targeted drug profile Interp   Interpretation depends on accuracy and completeness of patient   medication information submitted by client. 6-Acetylmorphine, Ur  Not Detected ARUP  Not                FINAL **   Procedure:  LUMBAR SPINE STANDARD    CDX  01/03/2015     [id number]   Reason for Exam:  ^fall/ pain       FULL RESULT:   LUMBAR SPINE STANDARD       INDICATION:   fall/ pain.           COMPARISON:    12/27/2007       FINDINGS:   AP, crosstable lateral and cone-down lumbosacral views obtained.       There is multilevel facet arthropathy and anterior spurring. There is    redemonstration of mild to moderate wedge compression deformity of L1    vertebral body. No acute fracture is evident. No paraspinal mass. SI    joints show degenerative changes. Cholecystectomy noted.               IMPRESSION:       Old L1 compression. No acute osseous abnormality.       Report electronically signed by Alayna Olivia M.D. on 1/3/2015 4:27 PM    1/3/2015 4:27 PM   Transcribed by: [facility] on [place]  3 2015  4:29P    Read by: Sole Boland M.D.  [id number] on [place]o  3 2015  4:29P    Electronically Signed by: Gayatri Farias.  Sole Boland M.D. on: Adeline Miller 2015     4:29P                                                                                                   Pill count: appropriate    fill date :1-  1 fentanyl patch, 23 percocet    Morphine equivalent dose as reported on [place]ARRS:  60  Periodic Controlled Substance Monitoring: Possible medication side effects, risk of tolerance/dependence & alternative treatments discussed. ,No signs of potential drug abuse or diversion identified. ,Assessed functional status. ,Obtaining appropriate analgesic effect of treatment. Yolis Bowling, APRN - CNP)  Review ofOARRS does not show any aberrant prescription behavior. Medication is helping the patient stay active. Patient denies any side effects and reports adequate analgesia. No sign of misuse/abuse.             Past Medical History:   Diagnosis Date    Abdominal pain     Benign prostatic hypertrophy     C. difficile colitis     CAD (coronary artery disease) 1/3/12    s/p CABGx3    Colon polyp 02/25/2019    tubular adenoma    Constipation     Diabetes mellitus ([address]     Diarrhea     Diarrhea     DVT [facility]deep venous thrombosis) ([facility]     left calf    Ejection fraction < 50%     Gallstones     Heartburn     Hx of blood clots     Hyperlipidemia     Kidney stones     Lumbar spinal stenosis 4/21/2014    MI (myocardial infarction) ([address]     2011    Mitral regurgitation     MRSA (methicillin resistant staph aureus) culture positive     Numbness and tingling     hands and feet    Rib fractures 1-2015    right    Wears glasses     readers       Past Surgical History:   Procedure Laterality Date    APPENDECTOMY      CARDIAC CATHETERIZATION  12/29/11    CHOLECYSTECTOMY      COLONOSCOPY  01/11/2012    wnl, 10 yr recall    COLONOSCOPY  11/28/2018    ATTEMPTED NOT CLEAN (N/A )    COLONOSCOPY  02/25/2019    tubular adenoma    COLONOSCOPY N/A 2/25/2019    COLONOSCOPY WITH BIOPSY performed by Negrito New MD at [facility]      x 1    CORONARY ARTERY BYPASS GRAFT  1/3/12    x3    KNEE ARTHROSCOPY      left    KNEE SURGERY Left     I&D    OTHER SURGICAL HISTORY Left 3/7/2016    plantar plane &  exostectomy medial foot left    OH COLON CA SCRN NOT HI RSK IND N/A 11/28/2018    COLONOSCOPY ATTEMPTED NOT CLEAN performed by Lottie Nuno MD at [facility] ENDOSCOPY  11-3-15    VENA CAVA FILTER PLACEMENT      WRIST SURGERY      I&D       Allergies   Allergen Reactions    Flagyl [Metronidazole] Hives    Metronidazole      Other reaction(s): Vomiting         Current Outpatient Medications:     [START ON 1/24/2022] fentaNYL (DURAGESIC) 25 MCG/HR, Place 1 patch onto the skin every 72 hours for 30 days. , Disp: 10 patch, Rfl: 0    [START ON 1/26/2022] oxyCODONE-acetaminophen (PERCOCET) 5-325 MG per tablet, Take 1 tablet by mouth every 6 hours as needed for Pain for up to 30 days. , Disp: 120 tablet, Rfl: 0    LANTUS SOLOSTAR 100 UNIT/ML injection pen, inject 30 units subcutaneously in the morning and 40 units in the evening, Disp: 45 mL, Rfl: 3    topiramate (TOPAMAX) 50 MG tablet, Take 1 tablet by mouth 2 times daily, Disp: 60 tablet, Rfl: 2    furosemide (LASIX) 40 MG tablet, TAKE 1 TABLET BY MOUTH TWO TIMES A DAY, Disp: 60 tablet, Rfl: 0    simvastatin (ZOCOR) 20 MG tablet, TAKE 1 TABLET BY MOUTH EVERY DAY AT NIGHT, Disp: 90 tablet, Rfl: 3    omeprazole (PRILOSEC) 20 MG delayed release capsule, TAKE 1 CAPSULE BY MOUTH EVERY DAY, Disp: 90 capsule, Rfl: 3    gabapentin (NEURONTIN) 800 MG tablet, Take 1 tablet by mouth daily for 90 days. , Disp: 90 tablet, Rfl: 3    aspirin 81 MG tablet, Take 81 mg by mouth daily. , Disp: , Rfl:     Vitamin D (CHOLECALCIFEROL) 1000 UNITS CAPS capsule, Take 2,000 Units by mouth daily , Disp: , Rfl:     Ascorbic Acid (VITAMIN C) 500 MG tablet, Take 1,000 mg by mouth daily , Disp: , Rfl:     finasteride (PROSCAR) 5 MG tablet, Take 5 mg by mouth daily.   , Disp: , Rfl:   tamsulosin (FLOMAX) 0.4 MG capsule, Take 0.4 mg by mouth daily. , Disp: , Rfl:     BD PEN NEEDLE ALIZE 2ND GEN 32G X 4 MM MISC, USE TO TEST BLOOD SUGAR TWICE DAILY, Disp: 100 each, Rfl: 3    naloxone (NARCAN) 4 MG/0.1ML LIQD nasal spray, 1 spray by Nasal route as needed (if needed for respiratory depression), Disp: 1 each, Rfl: 0    blood glucose test strips (FREESTYLE LITE) strip, TEST TWICE DAILY AS DIRECTED, Disp: 100 strip, Rfl: 1    NITROSTAT 0.4 MG SL tablet, , Disp: , Rfl:     Family History   Problem Relation Age of Onset    Heart Failure Father     Cancer Mother         breast    Cancer Maternal Grandfather        Social History     Socioeconomic History    Marital status:      Spouse name: Not on file    Number of children: Not on file    Years of education: Not on file    Highest education level: Not on file   Occupational History    Occupation: retired [employer]   Tobacco Use    Smoking status: Former Smoker     Packs/day: 1.00     Years: 30.00     Pack years: 30.00     Types: Cigarettes     Quit date: 2002     Years since quittin.9    Smokeless tobacco: Never Used   Vaping Use    Vaping Use: Never used   Substance and Sexual Activity    Alcohol use: Not Currently     Comment: rare    Drug use: No    Sexual activity: Never   Other Topics Concern    Not on file   Social History Narrative    Not on file     Social Determinants of Health     Financial Resource Strain:     Difficulty of Paying Living Expenses: Not on file   Food Insecurity:     Worried About [employer] in the Last Year: Not on file    [facility] in the Last Year: Not on file   Transportation Needs:     Lack of Transportation (Medical): Not on file    Lack of Transportation (Non-Medical):  Not on file   Physical Activity:     Days of Exercise per Week: Not on file    Minutes of Exercise per Session: Not on file   Stress:     Feeling of Stress : Not on file   Social Connections:  Frequency of Communication with Friends and Family: Not on file    Frequency of Social Gatherings with Friends and Family: Not on file    Attends Faith Services: Not on file    Active Member of Clubs or Organizations: Not on file    Attends Club or Organization Meetings: Not on file    Marital Status: Not on file   Intimate Partner Violence:     Fear of Current or Ex-Partner: Not on file    Emotionally Abused: Not on file    Physically Abused: Not on file    Sexually Abused: Not on file   Housing Stability:     Unable to Pay for Housing in the Last Year: Not on file    Number of [place] in the Last Year: Not on file    Unstable Housing in the Last Year: Not on file         Review of Systems:  Review of Systems   Constitutional: Negative. HENT: Negative. Eyes: Negative. Cardiovascular: Negative. Respiratory: Negative. Endocrine:        Diabetic  Blood sugars    Skin: Negative. Musculoskeletal: Positive for back pain and joint pain. Gastrointestinal: Positive for constipation. Genitourinary: Negative. Neurological: Positive for numbness and weakness. Psychiatric/Behavioral: Negative. Physical Exam:  /76   Pulse 87   Temp 97.1 °F (36.2 °C) (Temporal)   Resp 16   Ht 6' (1.829 m)   Wt 212 lb (96.2 kg)   SpO2 96%   BMI 28.75 kg/m²     Physical Exam  HENT:      Head: Normocephalic. Pulmonary:      Effort: Pulmonary effort is normal.   Skin:         Neurological:      Mental Status: He is alert and oriented to person, place, and time. Psychiatric:         Mood and Affect: Mood normal.         Thought Content:  Thought content normal.           Assessment:    Problem List Items Addressed This Visit     Spondylosis of lumbar region without myelopathy or radiculopathy    Relevant Medications    fentaNYL (DURAGESIC) 25 MCG/HR (Start on 1/24/2022)    oxyCODONE-acetaminophen (PERCOCET) 5-325 MG per tablet (Start on 1/26/2022)    Osteoarthritis of spine with radiculopathy, lumbar region    Relevant Medications    fentaNYL (DURAGESIC) 25 MCG/HR (Start on 1/24/2022)    oxyCODONE-acetaminophen (PERCOCET) 5-325 MG per tablet (Start on 1/26/2022)    Medication monitoring encounter    Relevant Medications    fentaNYL (DURAGESIC) 25 MCG/HR (Start on 1/24/2022)    oxyCODONE-acetaminophen (PERCOCET) 5-325 MG per tablet (Start on 1/26/2022)    Lumbar spinal stenosis    Facet syndrome ([facility]    Relevant Medications    fentaNYL (DURAGESIC) 25 MCG/HR (Start on 1/24/2022)    oxyCODONE-acetaminophen (PERCOCET) 5-325 MG per tablet (Start on 1/26/2022)    Drug-induced constipation    Relevant Medications    fentaNYL (DURAGESIC) 25 MCG/HR (Start on 1/24/2022)    oxyCODONE-acetaminophen (PERCOCET) 5-325 MG per tablet (Start on 1/26/2022)    Diabetic mononeuropathy associated with diabetes mellitus due to underlying condition ([facility]    Relevant Medications    fentaNYL ([address]) 25 MCG/HR (Start on 1/24/2022)    oxyCODONE-acetaminophen (PERCOCET) 5-325 MG per tablet (Start on 1/26/2022)    DDD (degenerative disc disease), lumbar - Primary    Relevant Medications    fentaNYL ([address]) 25 MCG/HR (Start on 1/24/2022)    oxyCODONE-acetaminophen (PERCOCET) 5-325 MG per tablet (Start on 1/26/2022)    DDD (degenerative disc disease), cervical    Relevant Medications    fentaNYL ([address]) 25 MCG/HR (Start on 1/24/2022)    oxyCODONE-acetaminophen (PERCOCET) 5-325 MG per tablet (Start on 1/26/2022)    Chronic, continuous use of opioids    Charcot foot due to diabetes mellitus ([facility]    Relevant Medications    fentaNYL ([address]) 25 MCG/HR (Start on 1/24/2022)    oxyCODONE-acetaminophen (PERCOCET) 5-325 MG per tablet (Start on 1/26/2022)    Arthritis, septic ([facility]    Relevant Medications    fentaNYL ([address]) 25 MCG/HR (Start on 1/24/2022)    oxyCODONE-acetaminophen (PERCOCET) 5-325 MG per tablet (Start on 1/26/2022)      Other Visit Diagnoses     Diabetic mononeuropathy associated with drug or chemical induced diabetes mellitus ([facility]        Relevant Medications    oxyCODONE-acetaminophen (PERCOCET) 5-325 MG per tablet (Start on 1/26/2022)    Diabetic peripheral neuropathy associated with type 2 diabetes mellitus ([facility]        Relevant Medications    oxyCODONE-acetaminophen (PERCOCET) 5-325 MG per tablet (Start on 1/26/2022)            Treatment Plan:  DISCUSSION: Treatment options discussed withpatient and all questions answered to patient's satisfaction. Possible side effects, risk of tolerance and or dependence and alternative treatments discussed    Obtaining appropriate analgesic effect of treatment   No signs of potential drug abuse or diversion identified    [x] Ill effects of being on chronic pain medications such as sleep disturbances, respiratory depression, hormonal changes, withdrawal symptoms, chronic opioid dependence and tolerance as well as risk of taking opioids with Benzodiazepines and taking opioids along with alcohol,  werediscussed with patient. I had asked the patient to minimize medication use and utilize pain medications only for uncontrolled rest pain or pain with exertional activities. I advised patient not to self-escalate painmedications without consulting with us. At each of patient's future visits we will try to taper pain medications, while adjusting the adjunct medications, and re-evaluating for Physical Therapy to improve spinal andjoint strength. We will continue to have discussions to decrease pain medications as tolerated. Counseled patient on effects their pain medication and /or their medical condition mayhave on their  ability to drive or operate machinery. Instructed not to drive or operate machinery if drowsy     I also discussed with the patient regarding the dangers of combining narcotic pain medication with tranquilizers, alcohol or illegal drugs or taking the medication any way other than prescribed.  The dangers were discussed  including respiratory depression and death. Patient was told to tell  all  physicians regarding the medications he is getting from pain clinic. Patient is warned not to take any unprescribed medications over-the-countermedications that can depress breathing . Patient is advised to talk to the pharmacist or physicians if planning to take any over-the-counter medications before  takeing them. Patient is strongly advised to avoid tranquilizers or  relaxants, illegal drugs  or any medications that can depress breathing  Patient is also advised to tell us if there is any changes in their medications from other physicians. Medication contract x2 signed      TREATMENT OPTIONS:       Medication Agreement Requirements Met  Continue Opioid therapy  Script written for  Fentanyl patch.  percocet  Follow up appointment made

## 2022-01-21 ENCOUNTER — HOSPITAL ENCOUNTER (OUTPATIENT)
Dept: PAIN MANAGEMENT | Age: 77
Discharge: HOME | End: 2022-01-21
Payer: MEDICARE

## 2022-01-21 VITALS
WEIGHT: 212 LBS | BODY MASS INDEX: 28.71 KG/M2 | HEIGHT: 72 IN | OXYGEN SATURATION: 96 % | DIASTOLIC BLOOD PRESSURE: 76 MMHG | HEART RATE: 87 BPM | SYSTOLIC BLOOD PRESSURE: 134 MMHG | RESPIRATION RATE: 16 BRPM | TEMPERATURE: 97.1 F

## 2022-01-21 DIAGNOSIS — E08.41 DIABETIC MONONEUROPATHY ASSOCIATED WITH DIABETES MELLITUS DUE TO UNDERLYING CONDITION (HCC): ICD-10-CM

## 2022-01-21 DIAGNOSIS — K59.03 DRUG-INDUCED CONSTIPATION: ICD-10-CM

## 2022-01-21 DIAGNOSIS — M47.26 OSTEOARTHRITIS OF SPINE WITH RADICULOPATHY, LUMBAR REGION: ICD-10-CM

## 2022-01-21 DIAGNOSIS — M47.899 FACET SYNDROME: ICD-10-CM

## 2022-01-21 DIAGNOSIS — E11.42 DIABETIC PERIPHERAL NEUROPATHY ASSOCIATED WITH TYPE 2 DIABETES MELLITUS (HCC): ICD-10-CM

## 2022-01-21 DIAGNOSIS — E09.41 DIABETIC MONONEUROPATHY ASSOCIATED WITH DRUG OR CHEMICAL INDUCED DIABETES MELLITUS (HCC): ICD-10-CM

## 2022-01-21 DIAGNOSIS — M00.862 ARTHRITIS OF LEFT KNEE DUE TO OTHER BACTERIA (HCC): ICD-10-CM

## 2022-01-21 DIAGNOSIS — F11.90 CHRONIC, CONTINUOUS USE OF OPIOIDS: ICD-10-CM

## 2022-01-21 DIAGNOSIS — M48.062 SPINAL STENOSIS OF LUMBAR REGION WITH NEUROGENIC CLAUDICATION: ICD-10-CM

## 2022-01-21 DIAGNOSIS — M50.30 DDD (DEGENERATIVE DISC DISEASE), CERVICAL: ICD-10-CM

## 2022-01-21 DIAGNOSIS — E11.610 CHARCOT FOOT DUE TO DIABETES MELLITUS (HCC): ICD-10-CM

## 2022-01-21 DIAGNOSIS — Z51.81 MEDICATION MONITORING ENCOUNTER: ICD-10-CM

## 2022-01-21 DIAGNOSIS — M47.816 SPONDYLOSIS OF LUMBAR REGION WITHOUT MYELOPATHY OR RADICULOPATHY: ICD-10-CM

## 2022-01-21 DIAGNOSIS — M51.36 DDD (DEGENERATIVE DISC DISEASE), LUMBAR: Primary | ICD-10-CM

## 2022-01-21 PROCEDURE — 99213 OFFICE O/P EST LOW 20 MIN: CPT

## 2022-01-21 PROCEDURE — 99213 OFFICE O/P EST LOW 20 MIN: CPT | Performed by: NURSE PRACTITIONER

## 2022-01-21 RX ORDER — FENTANYL 25 UG/H
1 PATCH TRANSDERMAL
Qty: 10 PATCH | Refills: 0 | Status: SHIPPED | OUTPATIENT
Start: 2022-01-24 | End: 2022-02-16 | Stop reason: SDUPTHER

## 2022-01-21 RX ORDER — OXYCODONE HYDROCHLORIDE AND ACETAMINOPHEN 5; 325 MG/1; MG/1
1 TABLET ORAL EVERY 6 HOURS PRN
Qty: 120 TABLET | Refills: 0 | Status: SHIPPED | OUTPATIENT
Start: 2022-01-26 | End: 2022-02-25

## 2022-01-21 ASSESSMENT — ENCOUNTER SYMPTOMS
EYES NEGATIVE: 1
RESPIRATORY NEGATIVE: 1
CONSTIPATION: 1
BACK PAIN: 1

## 2022-02-15 NOTE — PROGRESS NOTES
[address] PROGRESS NOTE      Patient  completed []  video visit   []   phone call:         Minutes :   [x] in person visit to pain clinic    [x]    to  review Medication Agreement    []  Follow up after procedure   []  Discuss treatment options      Location:  Provider:  working from    []    home    [x]   [facility] - GIANNI BLANC ,   patient at   [x] pain clinic     []  home         Chief Complaint:  Back and right shoulder pain    He c/o low back apin radiating down his legs. Nio change in paIN  He had lumbar x-ray in 2015 which read as \" multilevel facet arthropathy and old L1 compression fracture. \"He has no history of lumbar surgery. He c/o right shoulder pain, he has history of torn rotator cuff. He remains active. He is diabetic and has neuopathy in his feet. Back Pain  This is a chronic problem. The current episode started more than 1 year ago. The problem occurs constantly. The problem is unchanged. The pain is present in the lumbar spine. The quality of the pain is described as aching. Radiates to: legs. The pain is at a severity of 5/10. The pain is moderate. The pain is the same all the time. Exacerbated by: sitting or laying down. Associated symptoms include numbness. He has tried analgesics for the symptoms. The treatment provided mild relief. Shoulder Pain   The pain is present in the right shoulder. This is a chronic problem. The current episode started more than 1 year ago. There has been a history of trauma (fell). The problem occurs constantly. The problem has been unchanged. The pain is at a severity of 2/10. The pain is mild. Associated symptoms include numbness. Exacerbated by: lifting, laying on right shoulder. He has tried oral narcotics for the symptoms. The treatment provided mild relief.          Treatment goals:  Functional status: walk further      Aberrancy:   Any alcoholic beverages            Any illegal drugs         Analgesia:      5               Adverse Effects no      ADL;s :active at home      Data:    When was thelast UDS:  8-20-21          Was the UDS appropriate:  [x] yes []   no      Record/Diagnostics Review:      As above, I did review the imaging     DRUG SCREEN, PAIN  Order: [id number]   Status: Edited Result - FINAL     Visible to patient: Yes (seen)     Next appt: 02/16/2022 at 09:40 AM in Pain Management Sharona ALEJANDRO, TACOS - CNP)     Dx: DDD (degenerative disc disease), lumb. ..     0 Result Notes    Component Ref Range & Units 8/20/21 1350 8/2/20 0953 7/28/20 1233 4/3/19 0820 3/28/19 1929 4/5/18 0830 3/27/18 1828   Pain Management Drug Panel Interp, Urine  Inconsistent ARUP  Consistent CMARUP   Consistent CMARUP   Consistent CMARUP     Comment: (NOTE)   ________________________________________________________________   DRUGS EXPECTED:   OXYCODONE   FENTANYL   ________________________________________________________________   CONSISTENT with medications provided:   OXYCODONE: based on noroxycodone, oxymorphone   FENTANYL: based on fentanyl, norfentanyl   ________________________________________________________________   INCONSISTENT with medications provided:   Gabapentin   ________________________________________________________________   INTERPRETIVE INFORMATION: Targeted drug profile Interp   Interpretation depends on accuracy and completeness of patient   medication information submitted by client. Narrative   ** FINAL **   Procedure:  LUMBAR SPINE STANDARD    CDX  01/03/2015     [id number]   Reason for Exam:  ^fall/ pain       FULL RESULT:   LUMBAR SPINE STANDARD       INDICATION:   fall/ pain.           COMPARISON:    12/27/2007       FINDINGS:   AP, crosstable lateral and cone-down lumbosacral views obtained.       There is multilevel facet arthropathy and anterior spurring. There is    redemonstration of mild to moderate wedge compression deformity of L1    vertebral body. No acute fracture is evident. No paraspinal mass.  SI    joints show degenerative changes. Cholecystectomy noted.               IMPRESSION:       Old L1 compression. No acute osseous abnormality.       Report electronically signed by Jonathan Boone M.D. on 1/3/2015 4:27 PM    1/3/2015 4:27 PM   Transcribed by: [facility] on [place]  3 2015  4:29P    Read by: Iris Khan M.D.  [id number] on Chapo  3 2015  4:29P    Electronically Signed by: Eddie Garrett. Iris Khan M.D. on: Lavonna Friendly  3 2015     4:29P                                                                 Pill count: appropriate    fill date : 2-23-22 fentanyl patch and 2-26-22 for percocet  2 fentanyl patches, full bottle percocet    Morphine equivalent dose as reported on OARRS: 89 has narcan at home  Periodic Controlled Substance Monitoring: Possible medication side effects, risk of tolerance/dependence & alternative treatments discussed. ,No signs of potential drug abuse or diversion identified. ,Assessed functional status. ,Obtaining appropriate analgesic effect of treatment. TACOS Hong - CNP)  Chronic Pain > 80 MEDD: Co-prescribed Naloxone. ,Obtained or confirmed \"Medication Contract\" on file. TACOS Hong - CNP)  Review ofOARRS does not show any aberrant prescription behavior. Medication is helping the patient stay active. Patient denies any side effects and reports adequate analgesia. No sign of misuse/abuse.             Past Medical History:   Diagnosis Date    Abdominal pain     Benign prostatic hypertrophy     C. difficile colitis     CAD (coronary artery disease) 1/3/12    s/p CABGx3    Colon polyp 02/25/2019    tubular adenoma    Constipation     Diabetes mellitus ([address]     Diarrhea     Diarrhea     DVT (deep venous thrombosis) (HCC)     left calf    Ejection fraction < 50%     Gallstones     Heartburn     Hx of blood clots     Hyperlipidemia     Kidney stones     Lumbar spinal stenosis 4/21/2014    [facility] infarction[facility])     2011    Mitral regurgitation     MRSA (methicillin resistant staph aureus) culture positive     Numbness and tingling     hands and feet    Rib fractures 1-2015    right    Wears glasses     readers       Past Surgical History:   Procedure Laterality Date    APPENDECTOMY      CARDIAC CATHETERIZATION  12/29/11    CHOLECYSTECTOMY      COLONOSCOPY  01/11/2012    wnl, 10 yr recall    COLONOSCOPY  11/28/2018    ATTEMPTED NOT CLEAN (N/A )    COLONOSCOPY  02/25/2019    tubular adenoma    COLONOSCOPY N/A 2/25/2019    COLONOSCOPY WITH BIOPSY [facility] Charisse [facility]      x 1    CORONARY ARTERY BYPASS GRAFT  1/3/12    x3    KNEE ARTHROSCOPY      left    KNEE SURGERY Left     I&D    OTHER SURGICAL HISTORY Left 3/7/2016    plantar plane &  exostectomy medial foot left    MN COLON CA SCRN NOT HI RSK IND N/A 11/28/2018    COLONOSCOPY ATTEMPTED NOT CLEAN performed by Charisse Ruth MD at [facility] ENDOSCOPY  11-3-15    VENA CAVA FILTER PLACEMENT      WRIST SURGERY      I&D       Allergies   Allergen Reactions    Flagyl [Metronidazole] Hives    Metronidazole      Other reaction(s): Vomiting         Current Outpatient Medications:     [START ON 2/23/2022] fentaNYL (DURAGESIC) 25 MCG/HR, Place 1 patch onto the skin every 72 hours for 30 days. , Disp: 10 patch, Rfl: 0    [START ON 3/5/2022] gabapentin (NEURONTIN) 800 MG tablet, Take 1 tablet by mouth daily for 90 days. , Disp: 90 tablet, Rfl: 1    oxyCODONE-acetaminophen (PERCOCET) 5-325 MG per tablet, Take 1 tablet by mouth every 6 hours as needed for Pain for up to 30 days. , Disp: 120 tablet, Rfl: 0    LANTUS SOLOSTAR 100 UNIT/ML injection pen, inject 30 units subcutaneously in the morning and 40 units in the evening, Disp: 45 mL, Rfl: 3    topiramate (TOPAMAX) 50 MG tablet, Take 1 tablet by mouth 2 times daily, Disp: 60 tablet, Rfl: 2    simvastatin (ZOCOR) 20 MG tablet, TAKE 1 TABLET BY MOUTH EVERY DAY AT NIGHT, Disp: 90 tablet, Rfl: 3    omeprazole (PRILOSEC) 20 MG delayed release capsule, TAKE 1 CAPSULE BY MOUTH EVERY DAY, Disp: 90 capsule, Rfl: 3    aspirin 81 MG tablet, Take 81 mg by mouth daily. , Disp: , Rfl:     Vitamin D (CHOLECALCIFEROL) 1000 UNITS CAPS capsule, Take 2,000 Units by mouth daily , Disp: , Rfl:     Ascorbic Acid (VITAMIN C) 500 MG tablet, Take 1,000 mg by mouth daily , Disp: , Rfl:     finasteride (PROSCAR) 5 MG tablet, Take 5 mg by mouth daily. , Disp: , Rfl:     tamsulosin (FLOMAX) 0.4 MG capsule, Take 0.4 mg by mouth daily. , Disp: , Rfl:     BD PEN NEEDLE ALIZE 2ND GEN 32G X 4 MM MISC, USE TO TEST BLOOD SUGAR TWICE DAILY, Disp: 100 each, Rfl: 3    furosemide (LASIX) 40 MG tablet, TAKE 1 TABLET BY MOUTH TWO TIMES A DAY, Disp: 60 tablet, Rfl: 0    naloxone (NARCAN) 4 MG/0.1ML LIQD nasal spray, 1 spray by Nasal route as needed (if needed for respiratory depression), Disp: 1 each, Rfl: 0    blood glucose test strips (FREESTYLE LITE) strip, TEST TWICE DAILY AS DIRECTED, Disp: 100 strip, Rfl: 1    NITROSTAT 0.4 MG SL tablet, , Disp: , Rfl:     Family History   Problem Relation Age of Onset    Heart Failure Father     Cancer Mother         breast    Cancer Maternal Grandfather        Social History     Socioeconomic History    Marital status:       Spouse name: Not on file    Number of children: Not on file    Years of education: Not on file    Highest education level: Not on file   Occupational History    Occupation: retired [employer]   Tobacco Use    Smoking status: Former Smoker     Packs/day: 1.00     Years: 30.00     Pack years: 30.00     Types: Cigarettes     Quit date: 2002     Years since quittin.0    Smokeless tobacco: Never Used   Vaping Use    Vaping Use: Never used   Substance and Sexual Activity    Alcohol use: Not Currently     Comment: rare    Drug use: No    Sexual activity: Never   Other Topics Concern    Not on file   Social History Narrative    Not on file     Social Determinants of Health     Financial Resource Strain:     Difficulty of Paying Living Expenses: Not on file   Food Insecurity:     Worried About Running Out of Food in the Last Year: Not on file    Gemma of Food in the Last Year: Not on file   Transportation Needs:     Lack of Transportation (Medical): Not on file    Lack of Transportation (Non-Medical): Not on file   Physical Activity:     Days of Exercise per Week: Not on file    Minutes of Exercise per Session: Not on file   Stress:     Feeling of Stress : Not on file   Social Connections:     Frequency of Communication with Friends and Family: Not on file    Frequency of Social Gatherings with Friends and Family: Not on file    Attends Yazidi Services: Not on file    Active Member of [address] [place] [address] [employer] or Organizations: Not on file    Attends Club or Organization Meetings: Not on file    Marital Status: Not on file   Intimate Partner Violence:     Fear of Current or Ex-Partner: Not on file    Emotionally Abused: Not on file    Physically Abused: Not on file    Sexually Abused: Not on file   Housing Stability:     Unable to Pay for Housing in the Last Year: Not on file    Number of [place] in the Last Year: Not on file    Unstable Housing in the Last Year: Not on file         Review of Systems:  Review of Systems   Constitutional: Negative. HENT: Negative. Eyes: Negative. Cardiovascular: Negative. Respiratory: Negative. Endocrine:        Diabetic, blood sugars 100-110   Hematologic/Lymphatic: Bruises/bleeds easily. Skin: Negative. Musculoskeletal: Positive for back pain and joint pain. Gastrointestinal: Positive for constipation. Genitourinary: Negative. Neurological: Positive for numbness. Psychiatric/Behavioral: Negative. Physical Exam:  /76   Pulse 82   Temp 97.1 °F (36.2 °C) (Temporal)   Resp 16   SpO2 96%     Physical Exam  HENT:      Head: Normocephalic.    Pulmonary: Effort: Pulmonary effort is normal.   Skin:            Comments: Tender right shoulder, tender lumbar paraspinal muscles   Neurological:      Mental Status: He is alert and oriented to person, place, and time. Psychiatric:         Mood and Affect: Mood normal.         Thought Content:  Thought content normal.           Assessment:      Problem List Items Addressed This Visit     Type 2 diabetes mellitus with diabetic polyneuropathy, with long-term current use of insulin (HCC)    Relevant Medications    gabapentin (NEURONTIN) 800 MG tablet (Start on 3/5/2022)    Spondylosis of lumbar region without myelopathy or radiculopathy    Relevant Medications    fentaNYL (DURAGESIC) 25 MCG/HR (Start on 2/23/2022)    Osteoarthritis of spine with radiculopathy, lumbar region    Relevant Medications    fentaNYL (DURAGESIC) 25 MCG/HR (Start on 2/23/2022)    gabapentin (NEURONTIN) 800 MG tablet (Start on 3/5/2022)    Medication monitoring encounter    Relevant Medications    fentaNYL (DURAGESIC) 25 MCG/HR (Start on 2/23/2022)    Facet syndrome ([address]    Relevant Medications    fentaNYL ([address]) 25 MCG/HR (Start on 2/23/2022)    Drug-induced constipation    Relevant Medications    fentaNYL (DURAGESIC) 25 MCG/HR (Start on 2/23/2022)    Diabetic mononeuropathy associated with diabetes mellitus due to underlying condition ([address]    Relevant Medications    fentaNYL ([address]) 25 MCG/HR (Start on 2/23/2022)    gabapentin (NEURONTIN) 800 MG tablet (Start on 3/5/2022)    DDD (degenerative disc disease), lumbar    Relevant Medications    fentaNYL ([address]) 25 MCG/HR (Start on 2/23/2022)    DDD (degenerative disc disease), cervical    Relevant Medications    fentaNYL (DURAGESIC) 25 MCG/HR (Start on 2/23/2022)    Chronic, continuous use of opioids    Charcot foot due to diabetes mellitus ([address] - Primary    Relevant Medications    fentaNYL (DURAGESIC) 25 MCG/HR (Start on 2/23/2022)    gabapentin (NEURONTIN) 800 MG tablet (Start on 3/5/2022)    Arthritis, septic ([facility]    Relevant Medications    fentaNYL (DURAGESIC) 25 MCG/HR (Start on 2/23/2022)          Treatment Plan:  DISCUSSION: Treatment options discussed withpatient and all questions answered to patient's satisfaction. Possible side effects, risk of tolerance and or dependence and alternative treatments discussed    Obtaining appropriate analgesic effect of treatment   No signs of potential drug abuse or diversion identified    [x] Ill effects of being on chronic pain medications such as sleep disturbances, respiratory depression, hormonal changes, withdrawal symptoms, chronic opioid dependence and tolerance as well as risk of taking opioids with Benzodiazepines and taking opioids along with alcohol,  werediscussed with patient. I had asked the patient to minimize medication use and utilize pain medications only for uncontrolled rest pain or pain with exertional activities. I advised patient not to self-escalate painmedications without consulting with us. At each of patient's future visits we will try to taper pain medications, while adjusting the adjunct medications, and re-evaluating for Physical Therapy to improve spinal andjoint strength. We will continue to have discussions to decrease pain medications as tolerated. Counseled patient on effects their pain medication and /or their medical condition mayhave on their  ability to drive or operate machinery. Instructed not to drive or operate machinery if drowsy     I also discussed with the patient regarding the dangers of combining narcotic pain medication with tranquilizers, alcohol or illegal drugs or taking the medication any way other than prescribed. The dangers were discussed  including respiratory depression and death. Patient was told to tell  all  physicians regarding the medications he is getting from pain clinic.  Patient is warned not to take any unprescribed medications over-the-countermedications that can depress

## 2022-02-16 ENCOUNTER — HOSPITAL ENCOUNTER (OUTPATIENT)
Dept: PAIN MANAGEMENT | Age: 77
Discharge: HOME | End: 2022-02-16
Payer: MEDICARE

## 2022-02-16 VITALS
DIASTOLIC BLOOD PRESSURE: 76 MMHG | RESPIRATION RATE: 16 BRPM | OXYGEN SATURATION: 96 % | SYSTOLIC BLOOD PRESSURE: 127 MMHG | TEMPERATURE: 97.1 F | HEART RATE: 82 BPM

## 2022-02-16 DIAGNOSIS — E11.42 TYPE 2 DIABETES MELLITUS WITH DIABETIC POLYNEUROPATHY, WITH LONG-TERM CURRENT USE OF INSULIN (HCC): ICD-10-CM

## 2022-02-16 DIAGNOSIS — Z79.4 TYPE 2 DIABETES MELLITUS WITH DIABETIC POLYNEUROPATHY, WITH LONG-TERM CURRENT USE OF INSULIN (HCC): ICD-10-CM

## 2022-02-16 DIAGNOSIS — K59.03 DRUG-INDUCED CONSTIPATION: ICD-10-CM

## 2022-02-16 DIAGNOSIS — M50.30 DDD (DEGENERATIVE DISC DISEASE), CERVICAL: ICD-10-CM

## 2022-02-16 DIAGNOSIS — E11.610 CHARCOT FOOT DUE TO DIABETES MELLITUS (HCC): Primary | ICD-10-CM

## 2022-02-16 DIAGNOSIS — M47.26 OSTEOARTHRITIS OF SPINE WITH RADICULOPATHY, LUMBAR REGION: ICD-10-CM

## 2022-02-16 DIAGNOSIS — F11.90 CHRONIC, CONTINUOUS USE OF OPIOIDS: ICD-10-CM

## 2022-02-16 DIAGNOSIS — E08.41 DIABETIC MONONEUROPATHY ASSOCIATED WITH DIABETES MELLITUS DUE TO UNDERLYING CONDITION (HCC): ICD-10-CM

## 2022-02-16 DIAGNOSIS — M47.899 FACET SYNDROME: ICD-10-CM

## 2022-02-16 DIAGNOSIS — M51.36 DDD (DEGENERATIVE DISC DISEASE), LUMBAR: ICD-10-CM

## 2022-02-16 DIAGNOSIS — Z51.81 MEDICATION MONITORING ENCOUNTER: ICD-10-CM

## 2022-02-16 DIAGNOSIS — M47.816 SPONDYLOSIS OF LUMBAR REGION WITHOUT MYELOPATHY OR RADICULOPATHY: ICD-10-CM

## 2022-02-16 DIAGNOSIS — M00.862 ARTHRITIS OF LEFT KNEE DUE TO OTHER BACTERIA (HCC): ICD-10-CM

## 2022-02-16 PROCEDURE — 99213 OFFICE O/P EST LOW 20 MIN: CPT | Performed by: NURSE PRACTITIONER

## 2022-02-16 PROCEDURE — 99213 OFFICE O/P EST LOW 20 MIN: CPT

## 2022-02-16 RX ORDER — FENTANYL 25 UG/H
1 PATCH TRANSDERMAL
Qty: 10 PATCH | Refills: 0 | Status: SHIPPED | OUTPATIENT
Start: 2022-02-23 | End: 2022-03-21 | Stop reason: SDUPTHER

## 2022-02-16 RX ORDER — GABAPENTIN 800 MG/1
800 TABLET ORAL DAILY
Qty: 90 TABLET | Refills: 1 | Status: SHIPPED | OUTPATIENT
Start: 2022-03-05 | End: 2022-03-04 | Stop reason: SDUPTHER

## 2022-02-16 ASSESSMENT — ENCOUNTER SYMPTOMS
BACK PAIN: 1
RESPIRATORY NEGATIVE: 1
EYES NEGATIVE: 1
CONSTIPATION: 1

## 2022-02-21 RX ORDER — FUROSEMIDE 40 MG/1
TABLET ORAL
Qty: 60 TABLET | Refills: 0 | Status: SHIPPED | OUTPATIENT
Start: 2022-02-21 | End: 2022-03-03

## 2022-03-03 RX ORDER — FUROSEMIDE 40 MG/1
TABLET ORAL
Qty: 60 TABLET | Refills: 0 | Status: SHIPPED | OUTPATIENT
Start: 2022-03-03 | End: 2022-03-23

## 2022-03-04 RX ORDER — GABAPENTIN 800 MG/1
800 TABLET ORAL DAILY
Qty: 90 TABLET | Refills: 0 | Status: SHIPPED | OUTPATIENT
Start: 2022-03-05 | End: 2022-05-19 | Stop reason: SDUPTHER

## 2022-03-17 ENCOUNTER — OFFICE VISIT (OUTPATIENT)
Dept: PODIATRY | Age: 77
End: 2022-03-17
Payer: MEDICARE

## 2022-03-17 VITALS — BODY MASS INDEX: 28.71 KG/M2 | HEIGHT: 72 IN | WEIGHT: 212 LBS

## 2022-03-17 DIAGNOSIS — E11.42 TYPE 2 DIABETES MELLITUS WITH DIABETIC POLYNEUROPATHY, WITH LONG-TERM CURRENT USE OF INSULIN (HCC): ICD-10-CM

## 2022-03-17 DIAGNOSIS — Z79.4 TYPE 2 DIABETES MELLITUS WITH DIABETIC POLYNEUROPATHY, WITH LONG-TERM CURRENT USE OF INSULIN (HCC): ICD-10-CM

## 2022-03-17 DIAGNOSIS — B35.1 ONYCHOMYCOSIS: Primary | ICD-10-CM

## 2022-03-17 DIAGNOSIS — M14.672 CHARCOT'S JOINT OF LEFT FOOT: ICD-10-CM

## 2022-03-17 PROCEDURE — 99999 PR OFFICE/OUTPT VISIT,PROCEDURE ONLY: CPT | Performed by: PODIATRIST

## 2022-03-17 PROCEDURE — 11721 DEBRIDE NAIL 6 OR MORE: CPT | Performed by: PODIATRIST

## 2022-03-17 ASSESSMENT — ENCOUNTER SYMPTOMS
COLOR CHANGE: 0
DIARRHEA: 0
VOMITING: 0
NAUSEA: 0
CONSTIPATION: 0

## 2022-03-17 NOTE — PROGRESS NOTES
[facility]  Return Patient  Chief Complaint   Patient presents with    Nail Problem     nail  trim/last saw Dawn Felipe 11/3/21    Diabetes     last blood sugar 117        Dalton Kennedy is a 69y.o. year old male who is here for a diabetic foot check and nail trim. He would like his nails trimmed today. History of surgery type: Plantar planing medial and lateral foot. Vital signs are stable. Pain level is 0. Patient denies N/V/F/C. Patient was compliant with postoperative instructions. He is in a diabetic shoe        Review of Systems   Constitutional: Negative for chills, diaphoresis, fatigue, fever and unexpected weight change. Cardiovascular: Negative for leg swelling. Gastrointestinal: Negative for constipation, diarrhea, nausea and vomiting. Musculoskeletal: Positive for gait problem. Negative for arthralgias and joint swelling. Skin: Negative for color change, pallor, rash and wound. Neurological: Positive for numbness. Negative for weakness. Vascular: DP and PT pulses palpable 2/4, Right Foot and 2/4 on the Left Foot. CFT <3 seconds, Right Foot and <3 seconds on the Left Foot. Edema is absent,  Right Foot and minimal on the Left Foot. Neurological:   Sensation absent  to light touch to level of digits, both feet. Musculoskeletal:   Muscle strength is 5/5 on the Right Foot and 5/5 on the Left Foot. Structural deformities are present on the Right Foot and present on the Left Foot, but improved  Flat medial arch left foot. Integument:  Warm, dry, supple both feet. Infection is absent. No odor. No macerated.      Sites of Onychomycosis Involvement (Check affected area)  [x] [x] [x] [x] [x] [x] [x] [x] [x] [x]  5 4 3 2 1 1 2 3 4 5                          Right                                        Left    Thickness  [x] [x] [x] [x] [x] [x] [x] [x] [x] [x]  5 4 3 2 1 1 2 3 4 5                         Right Left    Dystrophic Changes                                                                 [x] [x] [x] [x] [x] [x] [x] [x] [x] [x]  5 4 3 2 1 1 2 3 4 5                         Right                                        Left    Color                                                                  [x] [x] [x] [x] [x] [x] [x] [x] [x] [x]  5 4 3 2 1 1 2 3 4 5                          Right                                        Left    Incurvation/Ingrowin                                                                   [] [] [] [] [] [] [] [] [] []  5 4 3 2 1 1 2 3 4 5                         Right                                        Left    Inflammation/Pain                                                                   [] [] [] [] [] [] [] [] [] []  5 4 3 2 1 1 2 3 4 5                         Right                                        Left       Assesment :     Diagnosis Orders   1. Onychomycosis  HI DEBRIDEMENT OF NAILS, 6 OR MORE   2. Type 2 diabetes mellitus with diabetic polyneuropathy, with long-term current use of insulin (HCC)  HI DEBRIDEMENT OF NAILS, 6 OR MORE   3. Charcot's joint of left foot           Plan: Pt was evaluated and examined. Patient was given personalized discharge instructions. Nails 1-10 were debrided sharply in length and thickness with a nipper and , without incident. Pt will follow up in 9 weeks or sooner if any problems arise. Diagnosis was discussed with the pt and all of their questions were answered in detail. Proper foot hygiene and care was discussed with the pt. Informed patient on proper diabetic foot care and importance of tight glycemic control. Patient to check feet daily and contact the office with any questions/problems/concerns. Other comorbidity noted and will be managed by PCP. Diabetic foot examination performed this visit.   The exam included neurological sensory exam, a 10-g monofilament and pinprick sensation, vibration using a 128-Hz tuning fork, ankle reflexes, visual skin inspection, vascular exam including assessment of pedal pulses, orthopedic exam for deformities, and shoe inspection. Increased risk factors noted on the diabetic foot exam include decreased sensory exam and peripheral neuropathy. Shoegear inspected and found to be appropriate size and wear. Diabetic shoes and insoles: This patient would benefit from extra depth diabetic shoes and custom inserts. I feel he/she qualifies due to the above performed physical exam and diagnosis. I will do the appropriate paperwork and send it to their primary care physician. Then the patient will be measured for shoes and custom inserts to properly off load and protect his/her feet. No orders of the defined types were placed in this encounter. Follow up 9 weeks.

## 2022-03-20 NOTE — PROGRESS NOTES
Al[address] [address] PROGRESS NOTE      Patient  completed []  video visit   []   phone call:         Minutes :   [x] in person visit to pain clinic    [x]    to  review Medication Agreement    []  Follow up after procedure   []  Discuss treatment options      Location:  Provider:  working from    []    home    [x]   [facility] - GIANNI BLANC ,   patient at   [x] pain clinic     []  home         Chief Complaint:  Low back pain    He c/o low back pain radiating down his legs. The pain has not changed,He had lumbar x-ray in 2015 which read as \" multilevel facet arthropathy and old L1 compression fracture. \"He has no history of lumbar surgery . He states can not walk far, He uses a cane. He remains active. He reports he sleeps fairly well. He is diabetic , he reports blood sugars range 100-120. Back Pain  This is a chronic problem. The current episode started more than 1 year ago. The problem occurs constantly. The problem is unchanged. The pain is present in the lumbar spine. The quality of the pain is described as aching. Radiates to: legs. The pain is at a severity of 6/10. The pain is moderate. The pain is the same all the time. Exacerbated by: laying flat, walking. Associated symptoms include headaches, leg pain, numbness and weakness. He has tried analgesics, heat and ice (poain meds) for the symptoms.              Treatment goals:  Functional status: walk farther      Aberrancy:   Any alcoholic beverages  no          Any illegal drugs   no      Analgesia:        6             Adverse  Effects :constipation manageable    ADL;s : active at home, some yard work      Data:    When was thelast UDS:   8-20-21         Was the UDS appropriate:  [x] yes []   no      Record/Diagnostics Review:      As above, I did review the imaging     DRUG SCREEN, PAIN  Order: [id number]   Status: Edited Result - FINAL     Visible to patient: Yes (seen)     Next appt: 03/21/2022 at 08:00 AM in Pain Management (SKYE ALEJANDRO APRN - CNP)     Dx: DDD (degenerative disc disease), lumb. ..     0 Result Notes    Component Ref Range & Units 8/20/21 1350 8/2/20 0953 7/28/20 1233 4/3/19 0820 3/28/19 1929 4/5/18 0830 3/27/18 1828   Pain Management Drug Panel Interp, Urine  Inconsistent ARUP  Consistent CMARUP   Consistent CMARUP   Consistent CMARUP     Comment: (NOTE)   ________________________________________________________________   DRUGS EXPECTED:   OXYCODONE   FENTANYL   ________________________________________________________________   CONSISTENT with medications provided:   OXYCODONE: based on noroxycodone, oxymorphone   FENTANYL: based on fentanyl, norfentanyl   ________________________________________________________________   INCONSISTENT with medications provided:   Gabapentin   ________________________________________________________________   INTERPRETIVE INFORMATION: Targeted drug profile Interp   Interpretation depends on accuracy and completeness of patient   medication information submitted by client. 6-Acetylmorphine, Ur  Not Detected ARUP  Not Detected ARUP   Not             FINAL **   Procedure:  LUMBAR SPINE STANDARD    CDX  01/03/2015     [id number]   Reason for Exam:  ^fall/ pain       FULL RESULT:   LUMBAR SPINE STANDARD       INDICATION:   fall/ pain.           COMPARISON:    12/27/2007       FINDINGS:   AP, crosstable lateral and cone-down lumbosacral views obtained.       There is multilevel facet arthropathy and anterior spurring. There is    redemonstration of mild to moderate wedge compression deformity of L1    vertebral body. No acute fracture is evident. No paraspinal mass. SI    joints show degenerative changes. Cholecystectomy noted.               IMPRESSION:       Old L1 compression.  No acute osseous abnormality.       Report electronically signed by Sandrine Sanchez M.D. on 1/3/2015 4:27 PM    1/3/2015 4:27 PM   Transcribed by: [facility] on [place]  3 2015  4:29P    Read by: Joss Sauceda M.D.  [id number] on [place]  3 2015  4:29P    Electronically Signed by: Taiwo Jhaveri. Mila Briggs M.D. on: Drea Holland  3 2015     4:29P                                                                                                     Pill count: appropriate    fill date : 3-25-22 1 patch left   91 percocet    Morphine equivalent dose as reported on OARRS: 60  Periodic Controlled Substance Monitoring: Possible medication side effects, risk of tolerance/dependence & alternative treatments discussed. ,No signs of potential drug abuse or diversion identified. ,Assessed functional status. ,Obtaining appropriate analgesic effect of treatment. Hamzah Marinu, APRN - CNP)  Review ofOARRS does not show any aberrant prescription behavior. Medication is helping the patient stay active. Patient denies any side effects and reports adequate analgesia. No sign of misuse/abuse.             Past Medical History:   Diagnosis Date    Abdominal pain     Benign prostatic hypertrophy     C. difficile colitis     CAD (coronary artery disease) 1/3/12    s/p CABGx3    Colon polyp 02/25/2019    tubular adenoma    Constipation     Diabetes mellitus (HCC)     Diarrhea     Diarrhea     DVT (deep venous thrombosis) (HCC)     left calf    Ejection fraction < 50%     Gallstones     Heartburn     Hx of blood clots     Hyperlipidemia     Kidney stones     Lumbar spinal stenosis 4/21/2014    MI [facility] infarction) ([facility] [address]     2011    Mitral regurgitation     MRSA (methicillin resistant staph aureus) culture positive     Numbness and tingling     hands and feet    Rib fractures 1-2015    right    Wears glasses     readers       Past Surgical History:   Procedure Laterality Date    APPENDECTOMY      CARDIAC CATHETERIZATION  12/29/11    CHOLECYSTECTOMY      COLONOSCOPY  01/11/2012    wnl, 10 yr recall    COLONOSCOPY  11/28/2018    ATTEMPTED NOT CLEAN (N/A )    COLONOSCOPY  02/25/2019    tubular adenoma    COLONOSCOPY N/A 2/25/2019    COLONOSCOPY WITH BIOPSY performed by Yolis Chamorro MD at [facility]      x 1    CORONARY ARTERY BYPASS GRAFT  1/3/12    x3    KNEE ARTHROSCOPY      left    KNEE SURGERY Left     I&D    OTHER SURGICAL HISTORY Left 3/7/2016    plantar plane &  exostectomy medial foot left    SC COLON CA SCRN NOT HI RSK IND N/A 11/28/2018    COLONOSCOPY ATTEMPTED NOT CLEAN performed by Yolis Chamorro MD at [facility] ENDOSCOPY  11-3-15    VENA CAVA FILTER PLACEMENT      WRIST SURGERY      I&D       Allergies   Allergen Reactions    Flagyl [Metronidazole] Hives    Metronidazole      Other reaction(s): Vomiting         Current Outpatient Medications:     gabapentin (NEURONTIN) 800 MG tablet, Take 1 tablet by mouth daily for 90 days. , Disp: 90 tablet, Rfl: 0    furosemide (LASIX) 40 MG tablet, TAKE 1 TABLET BY MOUTH TWO TIMES A DAY, Disp: 60 tablet, Rfl: 0    fentaNYL (DURAGESIC) 25 MCG/HR, Place 1 patch onto the skin every 72 hours for 30 days. , Disp: 10 patch, Rfl: 0    BD PEN NEEDLE ALIZE 2ND GEN 32G X 4 MM MISC, USE TO TEST BLOOD SUGAR TWICE DAILY, Disp: 100 each, Rfl: 3    LANTUS SOLOSTAR 100 UNIT/ML injection pen, inject 30 units subcutaneously in the morning and 40 units in the evening, Disp: 45 mL, Rfl: 3    topiramate (TOPAMAX) 50 MG tablet, Take 1 tablet by mouth 2 times daily, Disp: 60 tablet, Rfl: 2    simvastatin (ZOCOR) 20 MG tablet, TAKE 1 TABLET BY MOUTH EVERY DAY AT NIGHT, Disp: 90 tablet, Rfl: 3    omeprazole (PRILOSEC) 20 MG delayed release capsule, TAKE 1 CAPSULE BY MOUTH EVERY DAY, Disp: 90 capsule, Rfl: 3    naloxone (NARCAN) 4 MG/0.1ML LIQD nasal spray, 1 spray by Nasal route as needed (if needed for respiratory depression), Disp: 1 each, Rfl: 0    blood glucose test strips (FREESTYLE LITE) strip, TEST TWICE DAILY AS DIRECTED, Disp: 100 strip, Rfl: 1    aspirin 81 MG tablet, Take 81 mg by mouth daily. , Disp: , Rfl:     Vitamin D (CHOLECALCIFEROL) 1000 UNITS CAPS capsule, Take 2,000 Units by mouth daily , Disp: , Rfl:     Ascorbic Acid (VITAMIN C) 500 MG tablet, Take 1,000 mg by mouth daily , Disp: , Rfl:     NITROSTAT 0.4 MG SL tablet, , Disp: , Rfl:     finasteride (PROSCAR) 5 MG tablet, Take 5 mg by mouth daily. , Disp: , Rfl:     tamsulosin (FLOMAX) 0.4 MG capsule, Take 0.4 mg by mouth daily. , Disp: , Rfl:     Family History   Problem Relation Age of Onset    Heart Failure Father     Cancer Mother         breast    Cancer Maternal Grandfather        Social History     Socioeconomic History    Marital status:      Spouse name: Not on file    Number of children: Not on file    Years of education: Not on file    Highest education level: Not on file   Occupational History    Occupation: [employer]   Tobacco Use    Smoking status: Former Smoker     Packs/day: 1.00     Years: 30.00     Pack years: 30.00     Types: Cigarettes     Quit date: 2002     Years since quittin.1    Smokeless tobacco: Never Used   Vaping Use    Vaping Use: Never used   Substance and Sexual Activity    Alcohol use: Not Currently     Comment: rare    Drug use: No    Sexual activity: Never   Other Topics Concern    Not on file   Social History Narrative    Not on file     Social Determinants of Health     Financial Resource Strain:     Difficulty of Paying Living Expenses: Not on file   Food Insecurity:     Worried About [address] in the Last Year: Not on file    [address] in the Last Year: Not on file   Transportation Needs:     Lack of Transportation (Medical): Not on file    Lack of Transportation (Non-Medical):  Not on file   Physical Activity:     Days of Exercise per Week: Not on file    Minutes of Exercise per Session: Not on file   Stress:     Feeling of Stress : Not on file   Social Connections:     Frequency of Communication with Friends and Family: Not on file    Frequency of Social Gatherings with Friends and Family: Not on file    Attends Jain Services: Not on file    Active Member of Clubs or Organizations: Not on file    Attends Club or Organization Meetings: Not on file    Marital Status: Not on file   Intimate Partner Violence:     Fear of Current or Ex-Partner: Not on file    Emotionally Abused: Not on file    Physically Abused: Not on file    Sexually Abused: Not on file   Housing Stability:     Unable to Pay for Housing in the Last Year: Not on file    Number of [place] in the Last Year: Not on file    Unstable Housing in the Last Year: Not on file         Review of Systems:  Review of Systems   Constitutional: Negative. HENT: Negative. Eyes: Negative. Cardiovascular: Negative. Respiratory: Negative. Endocrine:        Diabetic blood sugar 100-120 range   Hematologic/Lymphatic: Bruises/bleeds easily. Skin: Negative. Musculoskeletal: Positive for back pain and joint pain. Gastrointestinal: Positive for constipation. Genitourinary: Negative. Neurological: Positive for headaches, loss of balance, numbness and weakness. Psychiatric/Behavioral: Negative. Physical Exam:  /71   Pulse 87   Resp 16   Ht 6' (1.829 m)   Wt 225 lb (102.1 kg)   SpO2 96%   BMI 30.52 kg/m²     Physical Exam  HENT:      Head: Normocephalic. Pulmonary:      Effort: Pulmonary effort is normal.   Skin:            Comments: Tender cervical and lumbar paraspinal muscles   Neurological:      Mental Status: He is alert and oriented to person, place, and time. Comments: Ambulates with a cane   Psychiatric:         Mood and Affect: Mood normal.         Thought Content:  Thought content normal.           Assessment:    Problem List Items Addressed This Visit     Spondylosis of lumbar region without myelopathy or radiculopathy - Primary    Osteoarthritis of spine with radiculopathy, lumbar region    Medication monitoring encounter    Lumbar spinal stenosis    Facet syndrome ([facility] [address] Drug-induced constipation    DDD (degenerative disc disease), lumbar    DDD (degenerative disc disease), cervical    Chronic, continuous use of opioids    Charcot foot due to diabetes mellitus ([facility] [address]              Treatment Plan:  DISCUSSION: Treatment options discussed withpatient and all questions answered to patient's satisfaction. Possible side effects, risk of tolerance and or dependence and alternative treatments discussed    Obtaining appropriate analgesic effect of treatment   No signs of potential drug abuse or diversion identified    [x] Ill effects of being on chronic pain medications such as sleep disturbances, respiratory depression, hormonal changes, withdrawal symptoms, chronic opioid dependence and tolerance as well as risk of taking opioids with Benzodiazepines and taking opioids along with alcohol,  werediscussed with patient. I had asked the patient to minimize medication use and utilize pain medications only for uncontrolled rest pain or pain with exertional activities. I advised patient not to self-escalate painmedications without consulting with us. At each of patient's future visits we will try to taper pain medications, while adjusting the adjunct medications, and re-evaluating for Physical Therapy to improve spinal andjoint strength. We will continue to have discussions to decrease pain medications as tolerated. Counseled patient on effects their pain medication and /or their medical condition mayhave on their  ability to drive or operate machinery. Instructed not to drive or operate machinery if drowsy     I also discussed with the patient regarding the dangers of combining narcotic pain medication with tranquilizers, alcohol or illegal drugs or taking the medication any way other than prescribed. The dangers were discussed  including respiratory depression and death. Patient was told to tell  all  physicians regarding the medications he is getting from pain clinic.  Patient is warned not to take any unprescribed medications over-the-countermedications that can depress breathing . Patient is advised to talk to the pharmacist or physicians if planning to take any over-the-counter medications before  takeing them. Patient is strongly advised to avoid tranquilizers or  relaxants, illegal drugs  or any medications that can depress breathing  Patient is also advised to tell us if there is any changes in their medications from other physicians.     1, script written for handicap placard, he can not walk far      TREATMENT OPTIONS:       Medication Agreement Requirements Met  Continue Opioid therapy  Script written for  Fentanyl patch, topamax  Follow up appointment made

## 2022-03-21 ENCOUNTER — HOSPITAL ENCOUNTER (OUTPATIENT)
Dept: PAIN MANAGEMENT | Age: 77
Discharge: HOME | End: 2022-03-21
Payer: MEDICARE

## 2022-03-21 VITALS
SYSTOLIC BLOOD PRESSURE: 117 MMHG | HEIGHT: 72 IN | HEART RATE: 87 BPM | RESPIRATION RATE: 16 BRPM | BODY MASS INDEX: 30.48 KG/M2 | OXYGEN SATURATION: 96 % | WEIGHT: 225 LBS | DIASTOLIC BLOOD PRESSURE: 71 MMHG

## 2022-03-21 DIAGNOSIS — M50.30 DDD (DEGENERATIVE DISC DISEASE), CERVICAL: ICD-10-CM

## 2022-03-21 DIAGNOSIS — M51.36 DDD (DEGENERATIVE DISC DISEASE), LUMBAR: ICD-10-CM

## 2022-03-21 DIAGNOSIS — M47.816 SPONDYLOSIS OF LUMBAR REGION WITHOUT MYELOPATHY OR RADICULOPATHY: Primary | ICD-10-CM

## 2022-03-21 DIAGNOSIS — K59.03 DRUG-INDUCED CONSTIPATION: ICD-10-CM

## 2022-03-21 DIAGNOSIS — M48.062 SPINAL STENOSIS OF LUMBAR REGION WITH NEUROGENIC CLAUDICATION: ICD-10-CM

## 2022-03-21 DIAGNOSIS — M47.899 FACET SYNDROME: ICD-10-CM

## 2022-03-21 DIAGNOSIS — E08.41 DIABETIC MONONEUROPATHY ASSOCIATED WITH DIABETES MELLITUS DUE TO UNDERLYING CONDITION (HCC): ICD-10-CM

## 2022-03-21 DIAGNOSIS — M00.862 ARTHRITIS OF LEFT KNEE DUE TO OTHER BACTERIA (HCC): ICD-10-CM

## 2022-03-21 DIAGNOSIS — F11.90 CHRONIC, CONTINUOUS USE OF OPIOIDS: ICD-10-CM

## 2022-03-21 DIAGNOSIS — E11.610 CHARCOT FOOT DUE TO DIABETES MELLITUS (HCC): ICD-10-CM

## 2022-03-21 DIAGNOSIS — M47.26 OSTEOARTHRITIS OF SPINE WITH RADICULOPATHY, LUMBAR REGION: ICD-10-CM

## 2022-03-21 DIAGNOSIS — Z51.81 MEDICATION MONITORING ENCOUNTER: ICD-10-CM

## 2022-03-21 PROCEDURE — 99213 OFFICE O/P EST LOW 20 MIN: CPT

## 2022-03-21 PROCEDURE — 99213 OFFICE O/P EST LOW 20 MIN: CPT | Performed by: NURSE PRACTITIONER

## 2022-03-21 RX ORDER — FENTANYL 25 UG/H
1 PATCH TRANSDERMAL
Qty: 10 PATCH | Refills: 0 | Status: SHIPPED | OUTPATIENT
Start: 2022-03-25 | End: 2022-04-21 | Stop reason: SDUPTHER

## 2022-03-21 RX ORDER — TOPIRAMATE 50 MG/1
50 TABLET, FILM COATED ORAL 2 TIMES DAILY
Qty: 60 TABLET | Refills: 2 | Status: SHIPPED | OUTPATIENT
Start: 2022-04-10 | End: 2022-08-09 | Stop reason: SDUPTHER

## 2022-03-21 ASSESSMENT — ENCOUNTER SYMPTOMS
RESPIRATORY NEGATIVE: 1
CONSTIPATION: 1
EYES NEGATIVE: 1
BACK PAIN: 1

## 2022-03-21 ASSESSMENT — PAIN SCALES - GENERAL: PAINLEVEL_OUTOF10: 6

## 2022-03-22 NOTE — TELEPHONE ENCOUNTER
Please Approve or Refuse.   Send to Pharmacy per Pt's Request: [facility]     Next Visit Date:  5/3/2022   Last Visit Date: 11/3/2021    Hemoglobin A1C (%)   Date Value   11/03/2021 6.8   05/03/2021 7.0   02/01/2021 7.1             ( goal A1C is < 7)   BP Readings from Last 3 Encounters:   03/21/22 117/71   02/16/22 127/76   01/21/22 134/76          (goal 120/80)  BUN   Date Value Ref Range Status   11/04/2021 24 (H) 8 - 23 mg/dL Final     CREATININE   Date Value Ref Range Status   11/04/2021 1.42 (H) 0.70 - 1.20 mg/dL Final     Potassium   Date Value Ref Range Status   11/04/2021 3.9 3.7 - 5.3 mmol/L Final

## 2022-03-23 RX ORDER — FUROSEMIDE 40 MG/1
TABLET ORAL
Qty: 60 TABLET | Refills: 0 | Status: SHIPPED | OUTPATIENT
Start: 2022-03-23 | End: 2022-04-18

## 2022-04-18 RX ORDER — FUROSEMIDE 40 MG/1
TABLET ORAL
Qty: 60 TABLET | Refills: 0 | Status: SHIPPED | OUTPATIENT
Start: 2022-04-18 | End: 2022-06-23

## 2022-04-18 NOTE — TELEPHONE ENCOUNTER
Please Approve or Refuse.   Send to Pharmacy per Pt's Request: [facility] Mittal     Next Visit Date:  5/3/2022   Last Visit Date: 11/3/2021    Hemoglobin A1C (%)   Date Value   11/03/2021 6.8   05/03/2021 7.0   02/01/2021 7.1             ( goal A1C is < 7)   BP Readings from Last 3 Encounters:   03/21/22 117/71   02/16/22 127/76   01/21/22 134/76          (goal 120/80)  BUN   Date Value Ref Range Status   11/04/2021 24 (H) 8 - 23 mg/dL Final     CREATININE   Date Value Ref Range Status   11/04/2021 1.42 (H) 0.70 - 1.20 mg/dL Final     Potassium   Date Value Ref Range Status   11/04/2021 3.9 3.7 - 5.3 mmol/L Final

## 2022-04-21 ENCOUNTER — HOSPITAL ENCOUNTER (OUTPATIENT)
Dept: PAIN MANAGEMENT | Age: 77
Discharge: HOME | End: 2022-04-21
Payer: MEDICARE

## 2022-04-21 VITALS
WEIGHT: 225 LBS | SYSTOLIC BLOOD PRESSURE: 113 MMHG | OXYGEN SATURATION: 99 % | HEART RATE: 76 BPM | DIASTOLIC BLOOD PRESSURE: 62 MMHG | HEIGHT: 72 IN | TEMPERATURE: 97.3 F | RESPIRATION RATE: 16 BRPM | BODY MASS INDEX: 30.48 KG/M2

## 2022-04-21 DIAGNOSIS — M50.30 DDD (DEGENERATIVE DISC DISEASE), CERVICAL: ICD-10-CM

## 2022-04-21 DIAGNOSIS — E11.610 CHARCOT FOOT DUE TO DIABETES MELLITUS (HCC): ICD-10-CM

## 2022-04-21 DIAGNOSIS — M47.899 FACET SYNDROME: ICD-10-CM

## 2022-04-21 DIAGNOSIS — E08.41 DIABETIC MONONEUROPATHY ASSOCIATED WITH DIABETES MELLITUS DUE TO UNDERLYING CONDITION (HCC): ICD-10-CM

## 2022-04-21 DIAGNOSIS — M47.26 OSTEOARTHRITIS OF SPINE WITH RADICULOPATHY, LUMBAR REGION: ICD-10-CM

## 2022-04-21 DIAGNOSIS — M00.862 ARTHRITIS OF LEFT KNEE DUE TO OTHER BACTERIA (HCC): ICD-10-CM

## 2022-04-21 DIAGNOSIS — F11.90 CHRONIC, CONTINUOUS USE OF OPIOIDS: ICD-10-CM

## 2022-04-21 DIAGNOSIS — M51.36 DDD (DEGENERATIVE DISC DISEASE), LUMBAR: Primary | ICD-10-CM

## 2022-04-21 DIAGNOSIS — K59.03 DRUG-INDUCED CONSTIPATION: ICD-10-CM

## 2022-04-21 DIAGNOSIS — M47.816 SPONDYLOSIS OF LUMBAR REGION WITHOUT MYELOPATHY OR RADICULOPATHY: ICD-10-CM

## 2022-04-21 DIAGNOSIS — M48.062 SPINAL STENOSIS OF LUMBAR REGION WITH NEUROGENIC CLAUDICATION: ICD-10-CM

## 2022-04-21 DIAGNOSIS — Z51.81 MEDICATION MONITORING ENCOUNTER: ICD-10-CM

## 2022-04-21 PROCEDURE — 99213 OFFICE O/P EST LOW 20 MIN: CPT | Performed by: NURSE PRACTITIONER

## 2022-04-21 PROCEDURE — 99213 OFFICE O/P EST LOW 20 MIN: CPT

## 2022-04-21 RX ORDER — FENTANYL 25 UG/H
1 PATCH TRANSDERMAL
Qty: 10 PATCH | Refills: 0 | Status: SHIPPED | OUTPATIENT
Start: 2022-04-24 | End: 2022-05-19 | Stop reason: ALTCHOICE

## 2022-04-21 ASSESSMENT — PAIN SCALES - GENERAL: PAINLEVEL_OUTOF10: 5

## 2022-04-21 ASSESSMENT — ENCOUNTER SYMPTOMS
BACK PAIN: 1
BOWEL INCONTINENCE: 0

## 2022-04-21 NOTE — PROGRESS NOTES
Chief Complaint   Patient presents with    Back Pain    Medication Refill         [facility]     Pt c/o low back pain radiating down his legs with numbness in feet d/t neuropathy. The pain has not changed but does have \"worse\" days when he's been overactive. He had lumbar x-ray in 2015 which read as multilevel facet arthropathy and old L1 compression fracture. He has no history of lumbar surgery. He has has tried injections in distant past with limited relief . He is not interested in repeating, feels pain is well controlled with current regimen with fentanyl topamax and gabapentin. Does have oxycodone that he takes rarely when pain is severe      HPI:     Back Pain  This is a chronic problem. The current episode started more than 1 year ago. The problem occurs constantly. The problem is unchanged. The pain is present in the lumbar spine. The quality of the pain is described as aching. The pain radiates to the right knee, right foot, right thigh, left knee, left thigh and left foot. The pain is at a severity of 5/10. The pain is the same all the time. The symptoms are aggravated by bending, sitting and standing. Associated symptoms include leg pain, numbness (feet), tingling and weakness. Pertinent negatives include no bladder incontinence, bowel incontinence, chest pain, fever or headaches. He has tried heat and ice (PT, injections) for the symptoms. Patient denies any new neurological symptoms. No bowel or bladder incontinence, no weakness, and no falling. Pill count: appropriate  Fentanyl patch - 1 4/24  Oxycodone - 77 filled 1/2022 for 120 tabs  Morphine equivalent: 60    Periodic Controlled Substance Monitoring: Possible medication side effects, risk of tolerance/dependence & alternative treatments discussed. ,No signs of potential drug abuse or diversion identified. ,Assessed functional status. ,Obtaining appropriate analgesic effect of treatment.  Sophia Werner, APRN - CNP)      Past Medical History:   Diagnosis Date    Abdominal pain     Benign prostatic hypertrophy     C. difficile colitis     CAD (coronary artery disease) 1/3/12    s/p CABGx3    Colon polyp 02/25/2019    tubular adenoma    Constipation     Diabetes mellitus ([facility]     Diarrhea     Diarrhea     DVT (deep venous thrombosis) ([facility])     left calf    Ejection fraction < 50%     Gallstones     Heartburn     Hx of blood clots     Hyperlipidemia     Kidney stones     Lumbar spinal stenosis 4/21/2014    MI (myocardial infarction) ([facility]     2011    Mitral regurgitation     MRSA (methicillin resistant staph aureus) culture positive     Numbness and tingling     hands and feet    Rib fractures 1-2015    right    Wears glasses     readers       Past Surgical History:   Procedure Laterality Date    APPENDECTOMY      CARDIAC CATHETERIZATION  12/29/11    CHOLECYSTECTOMY      COLONOSCOPY  01/11/2012    wnl, 10 yr recall    COLONOSCOPY  11/28/2018    ATTEMPTED NOT CLEAN (N/A )    COLONOSCOPY  02/25/2019    tubular adenoma    COLONOSCOPY N/A 2/25/2019    COLONOSCOPY WITH BIOPSY performed by V[facility]nu Linder MD at [facility]      x 1    CORONARY ARTERY BYPASS GRAFT  1/3/12    x3    KNEE ARTHROSCOPY      left    KNEE SURGERY Left     I&D    OTHER SURGICAL HISTORY Left 3/7/2016    plantar plane &  exostectomy medial foot left    AK COLON CA SCRN NOT HI RSK IND N/A 11/28/2018    COLONOSCOPY ATTEMPTED NOT CLEAN [facility]  11-3-15    VENA CAVA FILTER PLACEMENT      WRIST SURGERY      I&D       Allergies   Allergen Reactions    Flagyl [Metronidazole] Hives    Metronidazole      Other reaction(s): Vomiting         Current Outpatient Medications:     furosemide (LASIX) 40 MG tablet, TAKE 1 TABLET BY MOUTH TWO TIMES A DAY, Disp: 60 tablet, Rfl: 0    fentaNYL (DURAGESIC) 25 MCG/HR, Place 1 patch onto the skin every 72 hours for 30 days. , Disp: 10 patch, Rfl: 0    topiramate (TOPAMAX) 50 MG tablet, Take 1 tablet by mouth 2 times daily, Disp: 60 tablet, Rfl: 2    gabapentin (NEURONTIN) 800 MG tablet, Take 1 tablet by mouth daily for 90 days. , Disp: 90 tablet, Rfl: 0    BD PEN NEEDLE ALIZE 2ND GEN 32G X 4 MM MISC, USE TO TEST BLOOD SUGAR TWICE DAILY, Disp: 100 each, Rfl: 3    LANTUS SOLOSTAR 100 UNIT/ML injection pen, inject 30 units subcutaneously in the morning and 40 units in the evening, Disp: 45 mL, Rfl: 3    simvastatin (ZOCOR) 20 MG tablet, TAKE 1 TABLET BY MOUTH EVERY DAY AT NIGHT, Disp: 90 tablet, Rfl: 3    omeprazole (PRILOSEC) 20 MG delayed release capsule, TAKE 1 CAPSULE BY MOUTH EVERY DAY, Disp: 90 capsule, Rfl: 3    naloxone (NARCAN) 4 MG/0.1ML LIQD nasal spray, 1 spray by Nasal route as needed (if needed for respiratory depression), Disp: 1 each, Rfl: 0    blood glucose test strips (FREESTYLE LITE) strip, TEST TWICE DAILY AS DIRECTED, Disp: 100 strip, Rfl: 1    aspirin 81 MG tablet, Take 81 mg by mouth daily. , Disp: , Rfl:     Vitamin D (CHOLECALCIFEROL) 1000 UNITS CAPS capsule, Take 2,000 Units by mouth daily , Disp: , Rfl:     Ascorbic Acid (VITAMIN C) 500 MG tablet, Take 1,000 mg by mouth daily , Disp: , Rfl:     NITROSTAT 0.4 MG SL tablet, , Disp: , Rfl:     finasteride (PROSCAR) 5 MG tablet, Take 5 mg by mouth daily. , Disp: , Rfl:     tamsulosin (FLOMAX) 0.4 MG capsule, Take 0.4 mg by mouth daily. , Disp: , Rfl:     Family History   Problem Relation Age of Onset    Heart Failure Father     Cancer Mother         breast    Cancer Maternal Grandfather        Social History     Socioeconomic History    Marital status:       Spouse name: Not on file    Number of children: Not on file    Years of education: Not on file    Highest education level: Not on file   Occupational History    Occupation: retired [employer]   Tobacco Use    Smoking status: Former Smoker Packs/day: 1.00     Years: 30.00     Pack years: 30.00     Types: Cigarettes     Quit date: 2002     Years since quittin.2    Smokeless tobacco: Never Used   Vaping Use    Vaping Use: Never used   Substance and Sexual Activity    Alcohol use: Not Currently     Comment: rare    Drug use: No    Sexual activity: Never   Other Topics Concern    Not on file   Social History Narrative    Not on file     Social Determinants of Health     Financial Resource Strain:     Difficulty of Paying Living Expenses: Not on file   Food Insecurity:     Worried About Running Out of Food in the Last Year: Not on file    Gemma of Food in the Last Year: Not on file   Transportation Needs:     Lack of Transportation (Medical): Not on file    Lack of Transportation (Non-Medical): Not on file   Physical Activity:     Days of Exercise per Week: Not on file    Minutes of Exercise per Session: Not on file   Stress:     Feeling of Stress : Not on file   Social Connections:     Frequency of Communication with Friends and Family: Not on file    Frequency of Social Gatherings with Friends and Family: Not on file    Attends Anabaptism Services: Not on file    Active Member of [address] [place] [address] [place] [employer] or Organizations: Not on file    Attends Club or Organization Meetings: Not on file    Marital Status: Not on file   Intimate Partner Violence:     Fear of Current or Ex-Partner: Not on file    Emotionally Abused: Not on file    Physically Abused: Not on file    Sexually Abused: Not on file   Housing Stability:     Unable to Pay for Housing in the Last Year: Not on file    Number of [place] in the Last Year: Not on file    Unstable Housing in the Last Year: Not on file       Review of Systems:  Review of Systems   Constitutional: Negative for fever. Cardiovascular: Negative for chest pain. Musculoskeletal: Positive for back pain. Gastrointestinal: Negative for bowel incontinence.    Genitourinary: Negative for bladder incontinence. Neurological: Positive for numbness (feet), tingling and weakness. Negative for headaches. Physical Exam:  /62   Pulse 76   Temp 97.3 °F (36.3 °C)   Resp 16   Ht 6' (1.829 m)   Wt 225 lb (102.1 kg)   SpO2 99%   BMI 30.52 kg/m²     Physical Exam  Cardiovascular:      Rate and Rhythm: Normal rate. Pulmonary:      Effort: Pulmonary effort is normal.   Musculoskeletal:         General: Normal range of motion. Comments: Antalgic gait using cane   Skin:     General: Skin is warm and dry. Neurological:      Mental Status: He is alert and oriented to person, place, and time. Record/Diagnostics Review:    Last alexander 8/2021  and was appropriate     Assessment:  Problem List Items Addressed This Visit     DDD (degenerative disc disease), lumbar - Primary    Osteoarthritis of spine with radiculopathy, lumbar region    Lumbar spinal stenosis    Chronic, continuous use of opioids             Treatment Plan:  Patient relates current medications are helping the pain. Patient reports taking pain medications as prescribed, denies obtaining medications from different sources and denies use of illegal drugs. Patient denies side effects from medications like nausea, vomiting, constipation or drowsiness. Patient reports current activities of daily living are possible due to medications and would like to continue them. As always, we encourage daily stretching and strengthening exercises, and recommend minimizing use of pain medications unless patient cannot get through daily activities due to pain. Contract requirements met. Continue opioid therapy. Script written for fentanyl   Offered updating imaging and possible injections but pt declines  Follow up appointment made for 4 weeks    I have reviewed the chief complaint and history of present illness (including ROS and PFSH) and vital documentation by my staff and I agree with their documentation and have added where applicable.

## 2022-05-05 DIAGNOSIS — K58.9 IRRITABLE BOWEL SYNDROME WITHOUT DIARRHEA: ICD-10-CM

## 2022-05-06 RX ORDER — OMEPRAZOLE 20 MG/1
CAPSULE, DELAYED RELEASE ORAL
Qty: 90 CAPSULE | Refills: 0 | Status: SHIPPED | OUTPATIENT
Start: 2022-05-06 | End: 2022-05-18

## 2022-05-10 ENCOUNTER — OFFICE VISIT (OUTPATIENT)
Dept: FAMILY MEDICINE CLINIC | Age: 77
End: 2022-05-10
Payer: MEDICARE

## 2022-05-10 VITALS
SYSTOLIC BLOOD PRESSURE: 118 MMHG | OXYGEN SATURATION: 98 % | BODY MASS INDEX: 30.34 KG/M2 | WEIGHT: 224 LBS | TEMPERATURE: 98 F | DIASTOLIC BLOOD PRESSURE: 72 MMHG | HEIGHT: 72 IN | HEART RATE: 63 BPM

## 2022-05-10 DIAGNOSIS — E55.9 VITAMIN D DEFICIENCY: ICD-10-CM

## 2022-05-10 DIAGNOSIS — M48.062 SPINAL STENOSIS OF LUMBAR REGION WITH NEUROGENIC CLAUDICATION: ICD-10-CM

## 2022-05-10 DIAGNOSIS — E11.42 TYPE 2 DIABETES MELLITUS WITH DIABETIC POLYNEUROPATHY, WITH LONG-TERM CURRENT USE OF INSULIN (HCC): Primary | ICD-10-CM

## 2022-05-10 DIAGNOSIS — N18.31 STAGE 3A CHRONIC KIDNEY DISEASE (HCC): ICD-10-CM

## 2022-05-10 DIAGNOSIS — E78.2 MIXED HYPERLIPIDEMIA: ICD-10-CM

## 2022-05-10 DIAGNOSIS — I10 ESSENTIAL HYPERTENSION: ICD-10-CM

## 2022-05-10 DIAGNOSIS — I25.810 CORONARY ARTERY DISEASE INVOLVING CORONARY BYPASS GRAFT OF NATIVE HEART WITHOUT ANGINA PECTORIS: ICD-10-CM

## 2022-05-10 DIAGNOSIS — Z79.4 TYPE 2 DIABETES MELLITUS WITH DIABETIC POLYNEUROPATHY, WITH LONG-TERM CURRENT USE OF INSULIN (HCC): Primary | ICD-10-CM

## 2022-05-10 PROBLEM — N18.30 CHRONIC RENAL DISEASE, STAGE III (HCC): Status: ACTIVE | Noted: 2022-05-10

## 2022-05-10 LAB — HBA1C MFR BLD: 7.2 %

## 2022-05-10 PROCEDURE — G8427 DOCREV CUR MEDS BY ELIG CLIN: HCPCS | Performed by: FAMILY MEDICINE

## 2022-05-10 PROCEDURE — 3051F HG A1C>EQUAL 7.0%<8.0%: CPT | Performed by: FAMILY MEDICINE

## 2022-05-10 PROCEDURE — 4040F PNEUMOC VAC/ADMIN/RCVD: CPT | Performed by: FAMILY MEDICINE

## 2022-05-10 PROCEDURE — 99214 OFFICE O/P EST MOD 30 MIN: CPT | Performed by: FAMILY MEDICINE

## 2022-05-10 PROCEDURE — 1123F ACP DISCUSS/DSCN MKR DOCD: CPT | Performed by: FAMILY MEDICINE

## 2022-05-10 PROCEDURE — 3288F FALL RISK ASSESSMENT DOCD: CPT | Performed by: FAMILY MEDICINE

## 2022-05-10 PROCEDURE — 83036 HEMOGLOBIN GLYCOSYLATED A1C: CPT | Performed by: FAMILY MEDICINE

## 2022-05-10 PROCEDURE — G8417 CALC BMI ABV UP PARAM F/U: HCPCS | Performed by: FAMILY MEDICINE

## 2022-05-10 PROCEDURE — 1036F TOBACCO NON-USER: CPT | Performed by: FAMILY MEDICINE

## 2022-05-10 SDOH — ECONOMIC STABILITY: FOOD INSECURITY: WITHIN THE PAST 12 MONTHS, THE FOOD YOU BOUGHT JUST DIDN'T LAST AND YOU DIDN'T HAVE MONEY TO GET MORE.: NEVER TRUE

## 2022-05-10 SDOH — ECONOMIC STABILITY: TRANSPORTATION INSECURITY
IN THE PAST 12 MONTHS, HAS LACK OF TRANSPORTATION KEPT YOU FROM MEETINGS, WORK, OR FROM GETTING THINGS NEEDED FOR DAILY LIVING?: NO

## 2022-05-10 SDOH — ECONOMIC STABILITY: INCOME INSECURITY: IN THE LAST 12 MONTHS, WAS THERE A TIME WHEN YOU WERE NOT ABLE TO PAY THE MORTGAGE OR RENT ON TIME?: NO

## 2022-05-10 SDOH — ECONOMIC STABILITY: HOUSING INSECURITY
IN THE LAST 12 MONTHS, WAS THERE A TIME WHEN YOU DID NOT HAVE A STEADY PLACE TO SLEEP OR SLEPT IN A SHELTER (INCLUDING NOW)?: NO

## 2022-05-10 SDOH — ECONOMIC STABILITY: TRANSPORTATION INSECURITY
IN THE PAST 12 MONTHS, HAS THE LACK OF TRANSPORTATION KEPT YOU FROM MEDICAL APPOINTMENTS OR FROM GETTING MEDICATIONS?: NO

## 2022-05-10 SDOH — ECONOMIC STABILITY: FOOD INSECURITY: WITHIN THE PAST 12 MONTHS, YOU WORRIED THAT YOUR FOOD WOULD RUN OUT BEFORE YOU GOT MONEY TO BUY MORE.: NEVER TRUE

## 2022-05-10 ASSESSMENT — ENCOUNTER SYMPTOMS
BACK PAIN: 1
ABDOMINAL DISTENTION: 0
SINUS PRESSURE: 0
SHORTNESS OF BREATH: 0
WHEEZING: 0
EYE REDNESS: 0
SORE THROAT: 0
CHEST TIGHTNESS: 0
COUGH: 0
DIARRHEA: 0
ABDOMINAL PAIN: 0
BLOOD IN STOOL: 0
RHINORRHEA: 0
CONSTIPATION: 0
COLOR CHANGE: 0
STRIDOR: 0

## 2022-05-10 ASSESSMENT — SOCIAL DETERMINANTS OF HEALTH (SDOH): HOW HARD IS IT FOR YOU TO PAY FOR THE VERY BASICS LIKE FOOD, HOUSING, MEDICAL CARE, AND HEATING?: NOT HARD AT ALL

## 2022-05-10 ASSESSMENT — PATIENT HEALTH QUESTIONNAIRE - PHQ9
SUM OF ALL RESPONSES TO PHQ QUESTIONS 1-9: 0
1. LITTLE INTEREST OR PLEASURE IN DOING THINGS: 0
2. FEELING DOWN, DEPRESSED OR HOPELESS: 0
SUM OF ALL RESPONSES TO PHQ9 QUESTIONS 1 & 2: 0

## 2022-05-10 NOTE — PROGRESS NOTES
Visit Information    Have you changed or started any medications since your last visit including any over-the-counter medicines, vitamins, or herbal medicines? no   Are you having any side effects from any of your medications? -  no  Have you stopped taking any of your medications? Is so, why? -  no    Have you seen any other physician or provider since your last visit? No  Have you had any other diagnostic tests since your last visit? No  Have you been seen in the emergency room and/or had an admission to a hospital since we last saw you? No  Have you had your routine dental cleaning in the past 6 months? no    Have you activated your [employer] account? If not, what are your barriers?  Yes     Patient Care Team:  Nichole Sanchez MD as PCP - General (Family Medicine)  Nichole Sanchez MD as PCP - [facility]  Janett Delgado MD as Referring Physician (Cardiology)  Tram Monsivais MD as Consulting Physician (Gastroenterology)  Juanis Barragan MD as Consulting Physician (Internal Medicine Cardiovascular Disease)  Ghulam Ferreira MD as Consulting Physician (Pain Management)  Vikas Montes DPM as Consulting Physician (Podiatry)  Timbo Feldman MD as Surgeon (Ophthalmology)    Medical History Review  Past Medical, Family, and Social History reviewed and does contribute to the patient presenting condition    Health Maintenance   Topic Date Due    Depression Screen  05/03/2022    Annual Wellness Visit (AWV)  05/04/2022    Lipids  11/04/2022    Potassium  11/04/2022    Creatinine  11/04/2022    DTaP/Tdap/Td vaccine (2 - Td or Tdap) 05/07/2031    Flu vaccine  Completed    Shingles vaccine  Completed    Pneumococcal 65+ years Vaccine  Completed    COVID-19 Vaccine  Completed    Hepatitis C screen  Addressed    Hepatitis A vaccine  Aged Out    Hib vaccine  Aged Out    Meningococcal (ACWY) vaccine  Aged Out

## 2022-05-10 NOTE — PROGRESS NOTES
Chief Complaint   Patient presents with    Hypertension    Diabetes    Hyperlipidemia         Dioni Talamantes  here today for follow up on chronic medical problems, go over labs and/or diagnostic studies, and medication refills. Hypertension, Diabetes, and Hyperlipidemia      HPI: Patient is scheduled for follow-up on chronic medical problems. Diabetes controlled, A1c has mildly increased to 7.2 from 6.8 patient is on insulin long-acting reports compliance. Patient denies any side effects. He monitors his blood sugars occasionally and they are usually within range. Patient had blood work done all discussed with patient. Hypertension controlled, denies any chest pain dyspnea on exertion shortness of breath. Patient has coronary artery disease follows with cardiologist Dr. Jayant Bautista. Patient follows with him every 6 months. Last note by cardiologist was in 2018, it looks patient has stopped metoprolol he was restarted but he never refilled it. Chronic kidney disease with a mild increase in creatinine to 1.49. Patient denies any use of NSAIDs or any renal toxic medications. Lumbar spinal stenosis is on narcotic medication follows with pain management. Hyperlipidemia stable on statins recent blood work discussed. Vitamin D deficiency improved on supplements. /72   Pulse 63   Temp 98 °F (36.7 °C)   Ht 6' (1.829 m)   Wt 224 lb (101.6 kg)   SpO2 98%   BMI 30.38 kg/m²    Body mass index is 30.38 kg/m². Wt Readings from Last 3 Encounters:   05/10/22 224 lb (101.6 kg)   04/21/22 225 lb (102.1 kg)   03/21/22 225 lb (102.1 kg)        [x]Negative depression screening.   PHQ Scores 5/10/2022 5/3/2021 5/3/2021 2/1/2021 7/28/2020 10/1/2019 3/28/2019   PHQ2 Score 0 0 0 0 0 0 0   PHQ9 Score 0 0 0 0 0 0 0      []1-4 = Minimal depression   []5-9 = Milddepression   []10-14 = Moderate depression   []15-19 = Moderately severe depression   []20-27 = Severe depression    Discussed testing with the patient and all questions fully answered. Hospital Outpatient Visit on 11/04/2021   Component Date Value Ref Range Status    Cholesterol, Fasting 11/04/2021 127  <200 mg/dL Final    Comment:    Cholesterol Guidelines:      <200  Desirable   200-240  Borderline      >240  Undesirable         HDL 11/04/2021 40* >40 mg/dL Final    Comment:    HDL Guidelines:    <40     Undesirable   40-59    Borderline    >59     Desirable         LDL Cholesterol 11/04/2021 63  0 - 130 mg/dL Final    Comment:    LDL Guidelines:     <100    Desirable   100-129   Near to/above Desirable   130-159   Borderline      >159   Undesirable     Direct (measured) LDL and calculated LDL are not interchangeable tests.  Chol/HDL Ratio 11/04/2021 3.2  <5 Final            Triglyceride, Fasting 11/04/2021 120  <150 mg/dL Final    Comment:    Triglyceride Guidelines:     <150   Desirable   150-199  Borderline   200-499  High     >499   Very high   Based on AHA Guidelines for fasting triglyceride, October 2012.          VLDL 11/04/2021 NOT REPORTED  1 - 30 mg/dL Final    Glucose 11/04/2021 96  70 - 99 mg/dL Final    BUN 11/04/2021 24* 8 - 23 mg/dL Final    CREATININE 11/04/2021 1.42* 0.70 - 1.20 mg/dL Final    Bun/Cre Ratio 11/04/2021 NOT REPORTED  9 - 20 Final    Calcium 11/04/2021 9.4  8.6 - 10.4 mg/dL Final    Sodium 11/04/2021 141  135 - 144 mmol/L Final    Potassium 11/04/2021 3.9  3.7 - 5.3 mmol/L Final    Chloride 11/04/2021 104  98 - 107 mmol/L Final    CO2 11/04/2021 29  20 - 31 mmol/L Final    Anion Gap 11/04/2021 8* 9 - 17 mmol/L Final    Alkaline Phosphatase 11/04/2021 119  40 - 129 U/L Final    ALT 11/04/2021 24  5 - 41 U/L Final    AST 11/04/2021 19  <40 U/L Final    Total Bilirubin 11/04/2021 0.46  0.3 - 1.2 mg/dL Final    Total Protein 11/04/2021 7.3  6.4 - 8.3 g/dL Final    Albumin 11/04/2021 4.4  3.5 - 5.2 g/dL Final    Albumin/Globulin Ratio 11/04/2021 NOT REPORTED  1.0 - 2.5 Final    GFR Non- 11/04/2021 49* >60 mL/min Final    GFR  11/04/2021 59* >60 mL/min Final    GFR Comment 11/04/2021        Final    Comment: Average GFR for 79or more years old:   76 mL/min/1.73sq m  Chronic Kidney Disease:   <60 mL/min/1.73sq m  Kidney failure:   <15 mL/min/1.73sq m              eGFR calculated using average adult body mass.  Additional eGFR calculator available at:        [email or web address]            GFR Staging 11/04/2021 NOT REPORTED   Final    WBC 11/04/2021 7.2  3.5 - 11.0 k/uL Final    RBC 11/04/2021 5.27  4.5 - 5.9 m/uL Final    Hemoglobin 11/04/2021 15.8  13.5 - 17.5 g/dL Final    Hematocrit 11/04/2021 47.3  41 - 53 % Final    MCV 11/04/2021 89.7  80 - 100 fL Final    MCH 11/04/2021 30.0  26 - 34 pg Final    MCHC 11/04/2021 33.4  31 - 37 g/dL Final    RDW 11/04/2021 13.7  11.5 - 14.9 % Final    Platelets 76/64/4367 156  150 - 450 k/uL Final    MPV 11/04/2021 8.4  6.0 - 12.0 fL Final    NRBC Automated 11/04/2021 NOT REPORTED  per 100 WBC Final    Differential Type 11/04/2021 NOT REPORTED   Final    Seg Neutrophils 11/04/2021 75* 36 - 66 % Final    Lymphocytes 11/04/2021 15* 24 - 44 % Final    Monocytes 11/04/2021 8* 1 - 7 % Final    Eosinophils % 11/04/2021 1  0 - 4 % Final    Basophils 11/04/2021 1  0 - 2 % Final    Immature Granulocytes 11/04/2021 NOT REPORTED  0 % Final    Segs Absolute 11/04/2021 5.50  1.3 - 9.1 k/uL Final    Absolute Lymph # 11/04/2021 1.10  1.0 - 4.8 k/uL Final    Absolute Mono # 11/04/2021 0.50  0.1 - 1.3 k/uL Final    Absolute Eos # 11/04/2021 0.10  0.0 - 0.4 k/uL Final    Basophils Absolute 11/04/2021 0.10  0.0 - 0.2 k/uL Final    Absolute Immature Granulocyte 11/04/2021 NOT REPORTED  0.00 - 0.30 k/uL Final    WBC Morphology 11/04/2021 NOT REPORTED   Final    RBC Morphology 11/04/2021 NOT REPORTED   Final    Platelet Estimate 65/75/6703 NOT REPORTED   Final    Vit D, 25-Hydroxy 11/04/2021 37.2  30.0 - 100.0 ng/mL Final    Comment:    Reference Range:  Vitamin D status         Range   Deficiency              <20 ng/mL   Mild Deficiency       20-30 ng/mL   Sufficiency           ng/mL   Toxicity               >100 ng/mL      TSH 11/04/2021 4.77  0.30 - 5.00 mIU/L Final         Most recent labs reviewed:     Lab Results   Component Value Date    WBC 7.2 11/04/2021    HGB 15.8 11/04/2021    HCT 47.3 11/04/2021    MCV 89.7 11/04/2021     11/04/2021       @BRIEFLAB(NA,K,CL,CO2,BUN,CREATININE,GLUCOSE,CALCIUM)@     Lab Results   Component Value Date    ALT 24 11/04/2021    AST 19 11/04/2021    ALKPHOS 119 11/04/2021    BILITOT 0.46 11/04/2021       Lab Results   Component Value Date    TSH 4.77 11/04/2021       Lab Results   Component Value Date    CHOL 100 11/07/2019    CHOL 114 04/27/2019    CHOL 106 10/10/2018     Lab Results   Component Value Date    TRIG 85 11/07/2019    TRIG 129 04/27/2019    TRIG 84 10/10/2018     Lab Results   Component Value Date    HDL 40 (L) 11/04/2021    HDL 39 (L) 11/05/2020    HDL 43 11/07/2019     Lab Results   Component Value Date    LDLCHOLESTEROL 63 11/04/2021    LDLCHOLESTEROL 40 11/05/2020    LDLCHOLESTEROL 40 11/07/2019     Lab Results   Component Value Date    VLDL NOT REPORTED 11/04/2021    VLDL NOT REPORTED 11/05/2020    VLDL NOT REPORTED 11/07/2019     Lab Results   Component Value Date    CHOLHDLRATIO 3.2 11/04/2021    CHOLHDLRATIO 2.5 11/05/2020    CHOLHDLRATIO 2.3 11/07/2019       Lab Results   Component Value Date    LABA1C 7.2 05/10/2022       No results found for: [id number]    No results found for: FOLATE    No results found for: IRON, TIBC, FERRITIN    Lab Results   Component Value Date    VITD25 37.2 11/04/2021             Current Outpatient Medications   Medication Sig Dispense Refill    omeprazole (PRILOSEC) 20 MG delayed release capsule TAKE 1 CAPSULE BY MOUTH EVERY DAY 90 capsule 0    fentaNYL (DURAGESIC) 25 MCG/HR Place 1 patch onto the skin every 72 hours for 30 days. 10 patch 0    furosemide (LASIX) 40 MG tablet TAKE 1 TABLET BY MOUTH TWO TIMES A DAY 60 tablet 0    topiramate (TOPAMAX) 50 MG tablet Take 1 tablet by mouth 2 times daily 60 tablet 2    gabapentin (NEURONTIN) 800 MG tablet Take 1 tablet by mouth daily for 90 days. 90 tablet 0    BD PEN NEEDLE ALIZE 2ND GEN 32G X 4 MM MISC USE TO TEST BLOOD SUGAR TWICE DAILY 100 each 3    LANTUS SOLOSTAR 100 UNIT/ML injection pen inject 30 units subcutaneously in the morning and 40 units in the evening 45 mL 3    simvastatin (ZOCOR) 20 MG tablet TAKE 1 TABLET BY MOUTH EVERY DAY AT NIGHT 90 tablet 3    naloxone (NARCAN) 4 MG/0.1ML LIQD nasal spray 1 spray by Nasal route as needed (if needed for respiratory depression) 1 each 0    blood glucose test strips (FREESTYLE LITE) strip TEST TWICE DAILY AS DIRECTED 100 strip 1    aspirin 81 MG tablet Take 81 mg by mouth daily.  Vitamin D (CHOLECALCIFEROL) 1000 UNITS CAPS capsule Take 2,000 Units by mouth daily       Ascorbic Acid (VITAMIN C) 500 MG tablet Take 1,000 mg by mouth daily       NITROSTAT 0.4 MG SL tablet       finasteride (PROSCAR) 5 MG tablet Take 5 mg by mouth daily.  tamsulosin (FLOMAX) 0.4 MG capsule Take 0.4 mg by mouth daily. No current facility-administered medications for this visit. Social History     Socioeconomic History    Marital status:       Spouse name: Not on file    Number of children: Not on file    Years of education: Not on file    Highest education level: Not on file   Occupational History    Occupation: retired [employer]   Tobacco Use    Smoking status: Former Smoker     Packs/day: 1.00     Years: 30.00     Pack years: 30.00     Types: Cigarettes     Quit date: 2002     Years since quittin.2    Smokeless tobacco: Never Used   Vaping Use    Vaping Use: Never used   Substance and Sexual Activity    Alcohol use: Not Currently     Comment: rare    Drug use: No    Sexual activity: Never   Other Topics Concern    Not on file   Social History Narrative    Not on file     Social Determinants of Health     Financial Resource Strain: Low Risk     Difficulty of Paying Living Expenses: Not hard at all   Food Insecurity: No Food Insecurity    Worried About Running Out of Food in the Last Year: Never true    Gemma of Food in the Last Year: Never true   Transportation Needs: No Transportation Needs    Lack of Transportation (Medical): No    Lack of Transportation (Non-Medical): No   Physical Activity:     Days of Exercise per Week: Not on file    Minutes of Exercise per Session: Not on file   Stress:     Feeling of Stress : Not on file   Social Connections:     Frequency of Communication with Friends and Family: Not on file    Frequency of Social Gatherings with Friends and Family: Not on file    Attends Nondenominational Services: Not on file    Active Member of [address] [place] [address] [employer] or Organizations: Not on file    Attends Club or Organization Meetings: Not on file    Marital Status: Not on file   Intimate Partner Violence:     Fear of Current or Ex-Partner: Not on file    Emotionally Abused: Not on file    Physically Abused: Not on file    Sexually Abused: Not on file   Housing Stability: Unknown    Unable to Pay for Housing in the Last Year: No    Number of [place] in the Last Year: Not on file    Unstable Housing in the Last Year: No     Counseling given: Not Answered        Family History   Problem Relation Age of Onset    Heart Failure Father     Cancer Mother         breast    Cancer Maternal Grandfather              -rest of complaints with corresponding details per ROS    The patient's past medical, surgical, social, and family history as well as his current medications and allergies were reviewed as documented intoday's encounter.         Review of Systems   Constitutional: Negative for activity change, appetite change, fatigue, fever and unexpected weight change. HENT: Negative for congestion, ear pain, postnasal drip, rhinorrhea, sinus pressure and sore throat. Eyes: Positive for visual disturbance. Negative for redness. Respiratory: Negative for cough, chest tightness, shortness of breath, wheezing and stridor. Cardiovascular: Positive for leg swelling. Negative for chest pain and palpitations. Gastrointestinal: Negative for abdominal distention, abdominal pain, blood in stool, constipation and diarrhea. Endocrine: Negative for polyphagia and polyuria. Genitourinary: Negative for difficulty urinating, flank pain, frequency and urgency. Musculoskeletal: Positive for arthralgias, back pain, gait problem, joint swelling and neck stiffness. Negative for myalgias and neck pain. Skin: Negative for color change, rash and wound. Allergic/Immunologic: Positive for immunocompromised state. Negative for food allergies. Neurological: Positive for numbness. Negative for dizziness, speech difficulty, weakness, light-headedness and headaches. Psychiatric/Behavioral: Negative for agitation, behavioral problems, decreased concentration, dysphoric mood, hallucinations, sleep disturbance and suicidal ideas. The patient is not nervous/anxious. Physical Exam  Vitals and nursing note reviewed. Constitutional:       General: He is not in acute distress. Appearance: Normal appearance. He is well-developed. He is obese. He is not diaphoretic. HENT:      Head: Normocephalic and atraumatic. Nose: Nose normal.   Eyes:      General:         Right eye: No discharge. Left eye: No discharge. Extraocular Movements: Extraocular movements intact. Conjunctiva/sclera: Conjunctivae normal.      Pupils: Pupils are equal, round, and reactive to light. Neck:      Thyroid: No thyromegaly. Cardiovascular:      Rate and Rhythm: Normal rate and regular rhythm. Heart sounds: Normal heart sounds. No murmur heard.       Pulmonary: Effort: Pulmonary effort is normal. No respiratory distress. Breath sounds: Normal breath sounds. No wheezing or rhonchi. Abdominal:      General: Bowel sounds are normal. There is no distension. Palpations: Abdomen is soft. There is no mass. Tenderness: There is no abdominal tenderness. There is no right CVA tenderness, left CVA tenderness, guarding or rebound. Musculoskeletal:      Cervical back: Neck supple. Spasms and tenderness present. No erythema or rigidity. Decreased range of motion. Thoracic back: Deformity and spasms present. Decreased range of motion. Lumbar back: Deformity, spasms and tenderness present. Decreased range of motion. Lymphadenopathy:      Cervical: No cervical adenopathy. Skin:     Coloration: Skin is not jaundiced or pale. Findings: No bruising, erythema or rash. Neurological:      General: No focal deficit present. Mental Status: He is alert and oriented to person, place, and time. Cranial Nerves: No cranial nerve deficit. Sensory: No sensory deficit. Motor: Weakness present. No tremor. Coordination: Coordination normal.      Gait: Gait and tandem walk normal.      Deep Tendon Reflexes: Reflexes are normal and symmetric. Psychiatric:         Attention and Perception: Attention and perception normal. He is attentive. Mood and Affect: Mood is not anxious or depressed. Affect is not tearful or inappropriate. Speech: He is communicative. Speech is not rapid and pressured, delayed or slurred. Behavior: Behavior normal. Behavior is not agitated, slowed or aggressive. Thought Content: Thought content normal.         Judgment: Judgment normal.             ASSESSMENT AND PLAN      1. Type 2 diabetes mellitus with diabetic polyneuropathy, with long-term current use of insulin ([facility])  Controlled A1c 7.2 continue insulin continue to monitor blood sugars.   Watch your diet  - POCT glycosylated hemoglobin (Hb A1C)    2. Essential hypertension  Controlled continue Lasix. Stop metoprolol we will discuss at next appointment in detail    3. Stage 3a chronic kidney disease (HCC)  Worsening, discussed avoid NSAIDs or any nephrotoxic agents. Keep yourself hydrated    4. Coronary artery disease involving coronary bypass graft of native heart without angina pectoris  Stable continue follow-up with cardiologist.    5. Mixed hyperlipidemia  Controlled continue statins continue aspirin    6. Spinal stenosis of lumbar region with neurogenic claudication  Fairly stable on narcotic medications follow-up with pain management    7. Vitamin D deficiency  Improved continue vitamin D supplements      Orders Placed This Encounter   Procedures    POCT glycosylated hemoglobin (Hb A1C)         There are no discontinued medications. Caleb Dickey received counseling on the following healthy behaviors: nutrition, exercise, medication adherence and tobacco cessation  Reviewed prior labs and health maintenance  Continue current medications, diet and exercise. Discussed use, benefit, and side effects of prescribed medications. Barriers to medication compliance addressed. Patient given educational materials - see patient instructions  Was a self-tracking handout given in paper form or via [employer]? Yes    Requested Prescriptions      No prescriptions requested or ordered in this encounter       All patient questions answered. Patient voiced understanding. Quality Measures    Body mass index is 30.38 kg/m². Elevated. Weight control planned discussed daily exercise regimen and Healthy diet and regular exercise. BP: 118/72. Blood pressure is normal. Treatment plan consists of Weight Reduction, Dietary Sodium Restriction, Increased Physical Activity and No treatment change needed.     Fall Risk 5/10/2022 5/3/2021 2/1/2021 10/1/2019 11/27/2018 8/17/2017 5/12/2017   2 or more falls in past year? no no yes no no no no   Fall with injury in past year? no no yes no no no no     The patient does not have a history of falls. I did , complete a risk assessment for falls. A plan of care for falls in-office gait and balance testing performed using The Timed Up and Go Test was negative for increased falls risk- no further intervention is currently indicated, home safety tips provided, No Treatment plan indicated    Lab Results   Component Value Date    LDLCHOLESTEROL 63 11/04/2021    (goal LDL reduction with dx if diabetes is 50% LDL reduction)    PHQ Scores 5/10/2022 5/3/2021 5/3/2021 2/1/2021 7/28/2020 10/1/2019 3/28/2019   PHQ2 Score 0 0 0 0 0 0 0   PHQ9 Score 0 0 0 0 0 0 0     Interpretation of Total Score Depression Severity: 1-4 = Minimal depression, 5-9 = Mild depression, 10-14 = Moderate depression, 15-19 = Moderately severe depression, 20-27 = Severe depression      The patient'spast medical, surgical, social, and family history as well as his   current medications and allergies were reviewed as documented in today's encounter. Medications, labs, diagnostic studies, consultations andfollow-up as documented in this encounter. Return in about 2 months (around 7/10/2022) for appAttach Ne. Patient wasseen with total face to face time of 30 minutes. More than 50% of this visit was counseling and education. Future Appointments   Date Time Provider Esteban Eller   5/19/2022  9:00 AM [facility] Leyva Cera - CNP [address]   5/26/2022  8:30 AM Kristina Pruitt, [address]   7/13/2022  7:30 [facility]   8/4/2022  8:30 AM Kristina Pruitt, [address]     This note was completed by using the assistance of a speech-recognition program. However, inadvertent computerized transcription errors may be present. Althoughevery effort was made to ensure accuracy, no guarantees can be provided that every mistake has been identified and corrected by editing.   Electronically signed by Solange Pichardo MD on 5/10/2022  9:38 AM

## 2022-05-18 DIAGNOSIS — K58.9 IRRITABLE BOWEL SYNDROME WITHOUT DIARRHEA: ICD-10-CM

## 2022-05-18 RX ORDER — OMEPRAZOLE 20 MG/1
CAPSULE, DELAYED RELEASE ORAL
Qty: 90 CAPSULE | Refills: 0 | Status: SHIPPED | OUTPATIENT
Start: 2022-05-18 | End: 2022-08-15

## 2022-05-19 ENCOUNTER — HOSPITAL ENCOUNTER (OUTPATIENT)
Dept: PAIN MANAGEMENT | Age: 77
Discharge: HOME | End: 2022-05-19
Payer: MEDICARE

## 2022-05-19 VITALS
HEART RATE: 70 BPM | WEIGHT: 220 LBS | DIASTOLIC BLOOD PRESSURE: 69 MMHG | SYSTOLIC BLOOD PRESSURE: 113 MMHG | OXYGEN SATURATION: 98 % | HEIGHT: 72 IN | BODY MASS INDEX: 29.8 KG/M2

## 2022-05-19 DIAGNOSIS — F11.90 CHRONIC, CONTINUOUS USE OF OPIOIDS: ICD-10-CM

## 2022-05-19 DIAGNOSIS — M48.062 SPINAL STENOSIS OF LUMBAR REGION WITH NEUROGENIC CLAUDICATION: ICD-10-CM

## 2022-05-19 DIAGNOSIS — M47.26 OSTEOARTHRITIS OF SPINE WITH RADICULOPATHY, LUMBAR REGION: ICD-10-CM

## 2022-05-19 DIAGNOSIS — M51.36 DDD (DEGENERATIVE DISC DISEASE), LUMBAR: Primary | ICD-10-CM

## 2022-05-19 PROCEDURE — 99213 OFFICE O/P EST LOW 20 MIN: CPT

## 2022-05-19 PROCEDURE — 80307 DRUG TEST PRSMV CHEM ANLYZR: CPT

## 2022-05-19 PROCEDURE — 99213 OFFICE O/P EST LOW 20 MIN: CPT | Performed by: NURSE PRACTITIONER

## 2022-05-19 RX ORDER — GABAPENTIN 800 MG/1
800 TABLET ORAL DAILY
Qty: 90 TABLET | Refills: 0 | Status: SHIPPED | OUTPATIENT
Start: 2022-05-19 | End: 2022-07-21 | Stop reason: SDUPTHER

## 2022-05-19 RX ORDER — FENTANYL 12 UG/H
1 PATCH TRANSDERMAL
Qty: 10 PATCH | Refills: 0 | Status: SHIPPED | OUTPATIENT
Start: 2022-05-19 | End: 2022-06-18

## 2022-05-19 ASSESSMENT — ENCOUNTER SYMPTOMS
CONSTIPATION: 0
BOWEL INCONTINENCE: 0
BACK PAIN: 1
COUGH: 0
SHORTNESS OF BREATH: 0
ABDOMINAL PAIN: 0

## 2022-05-19 ASSESSMENT — PAIN DESCRIPTION - PROGRESSION: CLINICAL_PROGRESSION: NOT CHANGED

## 2022-05-19 ASSESSMENT — PAIN DESCRIPTION - ORIENTATION: ORIENTATION: LOWER

## 2022-05-19 ASSESSMENT — PAIN DESCRIPTION - DIRECTION: RADIATING_TOWARDS: BOTH FEET

## 2022-05-19 ASSESSMENT — PAIN DESCRIPTION - FREQUENCY: FREQUENCY: CONTINUOUS

## 2022-05-19 ASSESSMENT — PAIN DESCRIPTION - LOCATION: LOCATION: BACK

## 2022-05-19 ASSESSMENT — PAIN SCALES - GENERAL: PAINLEVEL_OUTOF10: 6

## 2022-05-19 ASSESSMENT — PAIN DESCRIPTION - PAIN TYPE: TYPE: CHRONIC PAIN

## 2022-05-19 ASSESSMENT — PAIN DESCRIPTION - DESCRIPTORS: DESCRIPTORS: ACHING

## 2022-05-19 NOTE — PROGRESS NOTES
Chief Complaint   Patient presents with    Back Pain    Medication Refill     Fentanyl patches, gabapentin         PMH     Pt c/o low back pain radiating down his legs with numbness in feet d/t neuropathy. The pain has not changed but does have \"worse\" days when he's been overactive. He had lumbar x-ray in 2015 which read as multilevel facet arthropathy and old L1 compression fracture. He has no history of lumbar surgery. He has has tried injections in distant past with limited relief .He is not interested in repeating, feels pain is well controlled with current regimen with fentanyl topamax and gabapentin. Does have oxycodone that he takes rarely when pain is severe      Despite of significant amount of opioid use patient continues to complain severe chronic pain issues. I have explained MME to the patient and that the CDC recommends avoiding MME over 90 with goal to be less than 50. Explained to patient that there is a higher risk for hyperalgesia, respiratory depression and accidental overdose with chronic use of high dose opioids. Assured patient we will work with them to slowly titrate to a lower dose while at the same time offering non opioid options and interventions when applicable    Today discussed decreasing fentanyl to 12mcg and using percocet PRN. Pt agrees to plan and states would like to stop fentanyl in near future  Agrees to take 12.5  if available [employer]  - had allergic reaction from other brand. Will call office if not available and may need increase in percocet to prevent withdrawals    HPI:     Back Pain  This is a chronic problem. The current episode started more than 1 year ago. The problem occurs constantly. The problem is unchanged. The pain is present in the lumbar spine. The pain radiates to the left foot and right foot. The pain is at a severity of 6/10. The pain is the same all the time. Stiffness is present all day.  Associated symptoms include leg pain, numbness, Mitral regurgitation     MRSA (methicillin resistant staph aureus) culture positive     Numbness and tingling     hands and feet    Rib fractures 1-2015    right    Wears glasses     readers       Past Surgical History:   Procedure Laterality Date    APPENDECTOMY      CARDIAC CATHETERIZATION  12/29/11    CHOLECYSTECTOMY      COLONOSCOPY  01/11/2012    wnl, 10 yr recall    COLONOSCOPY  11/28/2018    ATTEMPTED NOT CLEAN (N/A )    COLONOSCOPY  02/25/2019    tubular adenoma    COLONOSCOPY N/A 2/25/2019    COLONOSCOPY WITH BIOPSY performed by Sav Winn MD at [facility]      x 1    CORONARY ARTERY BYPASS GRAFT  1/3/12    x3    KNEE ARTHROSCOPY      left    KNEE SURGERY Left     I&D    OTHER SURGICAL HISTORY Left 3/7/2016    plantar plane &  exostectomy medial foot left    AR COLON CA SCRN NOT HI RSK IND N/A 11/28/2018    COLONOSCOPY ATTEMPTED NOT CLEAN performed by Sav Winn MD at [facility] ENDOSCOPY  11-3-15    VENA CAVA FILTER PLACEMENT      WRIST SURGERY      I&D       Allergies   Allergen Reactions    Flagyl [Metronidazole] Hives    Metronidazole      Other reaction(s): Vomiting         Current Outpatient Medications:     omeprazole (PRILOSEC) 20 MG delayed release capsule, TAKE 1 CAPSULE BY MOUTH EVERY DAY, Disp: 90 capsule, Rfl: 0    fentaNYL (DURAGESIC) 25 MCG/HR, Place 1 patch onto the skin every 72 hours for 30 days. , Disp: 10 patch, Rfl: 0    furosemide (LASIX) 40 MG tablet, TAKE 1 TABLET BY MOUTH TWO TIMES A DAY, Disp: 60 tablet, Rfl: 0    topiramate (TOPAMAX) 50 MG tablet, Take 1 tablet by mouth 2 times daily, Disp: 60 tablet, Rfl: 2    gabapentin (NEURONTIN) 800 MG tablet, Take 1 tablet by mouth daily for 90 days. , Disp: 90 tablet, Rfl: 0    BD PEN NEEDLE ALIZE 2ND GEN 32G X 4 MM MISC, USE TO TEST BLOOD SUGAR TWICE DAILY, Disp: 100 each, Rfl: 3    LANTUS SOLOSTAR 100 UNIT/ML injection pen, inject 30 units subcutaneously in the morning and 40 units in the evening, Disp: 45 mL, Rfl: 3    simvastatin (ZOCOR) 20 MG tablet, TAKE 1 TABLET BY MOUTH EVERY DAY AT NIGHT, Disp: 90 tablet, Rfl: 3    naloxone (NARCAN) 4 MG/0.1ML LIQD nasal spray, 1 spray by Nasal route as needed (if needed for respiratory depression), Disp: 1 each, Rfl: 0    blood glucose test strips (FREESTYLE LITE) strip, TEST TWICE DAILY AS DIRECTED, Disp: 100 strip, Rfl: 1    aspirin 81 MG tablet, Take 81 mg by mouth daily. , Disp: , Rfl:     Vitamin D (CHOLECALCIFEROL) 1000 UNITS CAPS capsule, Take 2,000 Units by mouth daily , Disp: , Rfl:     Ascorbic Acid (VITAMIN C) 500 MG tablet, Take 1,000 mg by mouth daily , Disp: , Rfl:     NITROSTAT 0.4 MG SL tablet, , Disp: , Rfl:     finasteride (PROSCAR) 5 MG tablet, Take 5 mg by mouth daily. , Disp: , Rfl:     tamsulosin (FLOMAX) 0.4 MG capsule, Take 0.4 mg by mouth daily. , Disp: , Rfl:     Family History   Problem Relation Age of Onset    Heart Failure Father     Cancer Mother         breast    Cancer Maternal Grandfather        Social History     Socioeconomic History    Marital status:       Spouse name: Not on file    Number of children: Not on file    Years of education: Not on file    Highest education level: Not on file   Occupational History    Occupation: retired [employer]   Tobacco Use    Smoking status: Former Smoker     Packs/day: 1.00     Years: 30.00     Pack years: 30.00     Types: Cigarettes     Quit date: 2002     Years since quittin.3    Smokeless tobacco: Never Used   Vaping Use    Vaping Use: Never used   Substance and Sexual Activity    Alcohol use: Not Currently     Comment: rare    Drug use: No    Sexual activity: Never   Other Topics Concern    Not on file   Social History Narrative    Not on file     Social Determinants of Health     Financial Resource Strain: Low Risk     Difficulty of Paying Living Expenses: Not hard at all Food Insecurity: No Food Insecurity    Worried About Running Out of Food in the Last Year: Never true    Gemma of Food in the Last Year: Never true   Transportation Needs: No Transportation Needs    Lack of Transportation (Medical): No    Lack of Transportation (Non-Medical): No   Physical Activity:     Days of Exercise per Week: Not on file    Minutes of Exercise per Session: Not on file   Stress:     Feeling of Stress : Not on file   Social Connections:     Frequency of Communication with Friends and Family: Not on file    Frequency of Social Gatherings with Friends and Family: Not on file    Attends Catholic Services: Not on file    Active Member of Clubs or Organizations: Not on file    Attends Club or Organization Meetings: Not on file    Marital Status: Not on file   Intimate Partner Violence:     Fear of Current or Ex-Partner: Not on file    Emotionally Abused: Not on file    Physically Abused: Not on file    Sexually Abused: Not on file   Housing Stability: Unknown    Unable to Pay for Housing in the Last Year: No    Number of [place] in the Last Year: Not on file    Unstable Housing in the Last Year: No       Review of Systems:  Review of Systems   Constitutional: Negative for chills and fever. Cardiovascular: Negative for chest pain. Respiratory: Negative for cough and shortness of breath. Musculoskeletal: Positive for back pain. Gastrointestinal: Negative for abdominal pain, bowel incontinence and constipation. Genitourinary: Negative for bladder incontinence, dysuria and pelvic pain. Neurological: Positive for numbness, tingling and weakness. Negative for headaches. Physical Exam:  /69   Pulse 70   Ht 6' (1.829 m)   Wt 220 lb (99.8 kg)   SpO2 98%   BMI 29.84 kg/m²     Physical Exam  Cardiovascular:      Rate and Rhythm: Normal rate. Pulmonary:      Effort: Pulmonary effort is normal.   Musculoskeletal:         General: Normal range of motion. Comments: Antalgic gait    Skin:     General: Skin is warm and dry. Neurological:      Mental Status: He is alert and oriented to person, place, and time. Record/Diagnostics Review:    Last deniz 8/21  and was appropriate     Assessment:  Problem List Items Addressed This Visit     DDD (degenerative disc disease), lumbar - Primary    Osteoarthritis of spine with radiculopathy, lumbar region    Lumbar spinal stenosis    Chronic, continuous use of opioids             Treatment Plan:  Patient relates current medications are helping the pain. Patient reports taking pain medications as prescribed, denies obtaining medications from different sources and denies use of illegal drugs. Patient denies side effects from medications like nausea, vomiting, constipation or drowsiness. Patient reports current activities of daily living are possible due to medications and would like to continue them. As always, we encourage daily stretching and strengthening exercises, and recommend minimizing use of pain medications unless patient cannot get through daily activities due to pain. Contract requirements met. Continue opioid therapy. Script written for fentanly decreasing to 12mcg  DENIZ obtained today for monitoring purposes. Last dose of medication was today wearing patch and for percocet 1 tab yesterday   Follow up appointment made for 4 weeks    I have reviewed the chief complaint and history of present illness (including ROS and PFSH) and vital documentation by my staff and I agree with their documentation and have added where applicable.

## 2022-05-23 LAB
6-ACETYLMORPHINE, UR: NOT DETECTED
7-AMINOCLONAZEPAM, URINE: NOT DETECTED
ALPHA-OH-ALPRAZ, URINE: NOT DETECTED
ALPHA-OH-MIDAZOLAM, URINE: NOT DETECTED
ALPRAZOLAM, URINE: NOT DETECTED
AMPHETAMINES, URINE: NOT DETECTED
BARBITURATES, URINE: NOT DETECTED
BENZOYLECGONINE, UR: NOT DETECTED
BUPRENORPHINE URINE: NOT DETECTED
CARISOPRODOL, UR: NOT DETECTED
CLONAZEPAM, URINE: NOT DETECTED
CODEINE, URINE: NOT DETECTED
CREATININE URINE: 119.8 MG/DL (ref 20–400)
DIAZEPAM, URINE: NOT DETECTED
DRUGS EXPECTED, UR: NORMAL
EER HI RES INTERP UR: NORMAL
ETHYL GLUCURONIDE UR: NOT DETECTED
FENTANYL URINE: PRESENT
GABAPENTIN: PRESENT
HYDROCODONE, URINE: NOT DETECTED
HYDROMORPHONE, URINE: NOT DETECTED
LORAZEPAM, URINE: NOT DETECTED
MARIJUANA METAB, UR: NOT DETECTED
MDA, UR: NOT DETECTED
MDEA, EVE, UR: NOT DETECTED
MDMA URINE: NOT DETECTED
MEPERIDINE METAB, UR: NOT DETECTED
METHADONE, URINE: NOT DETECTED
METHAMPHETAMINE, URINE: NOT DETECTED
METHYLPHENIDATE: NOT DETECTED
MIDAZOLAM, URINE: NOT DETECTED
MORPHINE URINE: NOT DETECTED
NALOXONE URINE: NOT DETECTED
NORBUPRENORPHINE, URINE: NOT DETECTED
NORDIAZEPAM, URINE: NOT DETECTED
NORFENTANYL, URINE: PRESENT
NORHYDROCODONE, URINE: NOT DETECTED
NOROXYCODONE, URINE: PRESENT
NOROXYMORPHONE, URINE: NOT DETECTED
OXAZEPAM, URINE: NOT DETECTED
OXYCODONE URINE: PRESENT
OXYMORPHONE, URINE: PRESENT
PAIN MANAGEMENT DRUG PANEL INTERP, URINE: NORMAL
PAIN MGT DRUG PANEL, HI RES, UR: NORMAL
PCP,URINE: NOT DETECTED
PHENTERMINE, UR: NOT DETECTED
PREGABALIN: NOT DETECTED
TAPENTADOL, URINE: NOT DETECTED
TAPENTADOL-O-SULFATE, URINE: NOT DETECTED
TEMAZEPAM, URINE: NOT DETECTED
TRAMADOL, URINE: NOT DETECTED
ZOLPIDEM METABOLITE (ZCA), URINE: NOT DETECTED
ZOLPIDEM, URINE: NOT DETECTED

## 2022-05-26 ENCOUNTER — OFFICE VISIT (OUTPATIENT)
Dept: PODIATRY | Age: 77
End: 2022-05-26
Payer: MEDICARE

## 2022-05-26 VITALS — BODY MASS INDEX: 29.8 KG/M2 | WEIGHT: 220 LBS | HEIGHT: 72 IN

## 2022-05-26 DIAGNOSIS — Z79.4 TYPE 2 DIABETES MELLITUS WITH DIABETIC POLYNEUROPATHY, WITH LONG-TERM CURRENT USE OF INSULIN (HCC): ICD-10-CM

## 2022-05-26 DIAGNOSIS — M14.672 CHARCOT'S JOINT OF LEFT FOOT: ICD-10-CM

## 2022-05-26 DIAGNOSIS — B35.1 ONYCHOMYCOSIS: Primary | ICD-10-CM

## 2022-05-26 DIAGNOSIS — E11.42 TYPE 2 DIABETES MELLITUS WITH DIABETIC POLYNEUROPATHY, WITH LONG-TERM CURRENT USE OF INSULIN (HCC): ICD-10-CM

## 2022-05-26 PROCEDURE — 99999 PR OFFICE/OUTPT VISIT,PROCEDURE ONLY: CPT | Performed by: PODIATRIST

## 2022-05-26 PROCEDURE — 11721 DEBRIDE NAIL 6 OR MORE: CPT | Performed by: PODIATRIST

## 2022-05-26 ASSESSMENT — ENCOUNTER SYMPTOMS
DIARRHEA: 0
VOMITING: 0
COLOR CHANGE: 0
NAUSEA: 0
CONSTIPATION: 0

## 2022-05-26 NOTE — PROGRESS NOTES
[facility]  Return Patient  Chief Complaint   Patient presents with    Nail Problem     b/l nail trim, last seen Nichole Sanchez MD 5/10/22    Diabetes     LBS Loc Ngo is a 69y.o. year old male who is here for a diabetic foot check and nail trim. He would like his nails trimmed today. History of surgery type: Plantar planing medial and lateral foot. Vital signs are stable. Pain level is 0. Patient denies N/V/F/C. Patient was compliant with postoperative instructions. He is in a diabetic shoe        Review of Systems   Constitutional: Negative for chills, diaphoresis, fatigue, fever and unexpected weight change. Cardiovascular: Negative for leg swelling. Gastrointestinal: Negative for constipation, diarrhea, nausea and vomiting. Musculoskeletal: Positive for gait problem. Negative for arthralgias and joint swelling. Skin: Negative for color change, pallor, rash and wound. Neurological: Positive for numbness. Negative for weakness. Vascular: DP and PT pulses palpable 2/4, Right Foot and 2/4 on the Left Foot. CFT <3 seconds, Right Foot and <3 seconds on the Left Foot. Edema is absent,  Right Foot and minimal on the Left Foot. Neurological:   Sensation absent  to light touch to level of digits, both feet. Musculoskeletal:   Muscle strength is 5/5 on the Right Foot and 5/5 on the Left Foot. Structural deformities are present on the Right Foot and present on the Left Foot, but improved  Flat medial arch left foot. Integument:  Warm, dry, supple both feet. Infection is absent. No odor. No macerated.      Sites of Onychomycosis Involvement (Check affected area)  [x] [x] [x] [x] [x] [x] [x] [x] [x] [x]  5 4 3 2 1 1 2 3 4 5                          Right                                        Left    Thickness  [x] [x] [x] [x] [x] [x] [x] [x] [x] [x]  5 4 3 2 1 1 2 3 4 5                         Right Left    Dystrophic Changes                                                                 [x] [x] [x] [x] [x] [x] [x] [x] [x] [x]  5 4 3 2 1 1 2 3 4 5                         Right                                        Left    Color                                                                  [x] [x] [x] [x] [x] [x] [x] [x] [x] [x]  5 4 3 2 1 1 2 3 4 5                          Right                                        Left    Incurvation/Ingrowin                                                                   [] [] [] [] [] [] [] [] [] []  5 4 3 2 1 1 2 3 4 5                         Right                                        Left    Inflammation/Pain                                                                   [] [] [] [] [] [] [] [] [] []  5 4 3 2 1 1 2 3 4 5                         Right                                        Left       Assesment :     Diagnosis Orders   1. Onychomycosis  OR DEBRIDEMENT OF NAILS, 6 OR MORE   2. Type 2 diabetes mellitus with diabetic polyneuropathy, with long-term current use of insulin (HCC)  OR DEBRIDEMENT OF NAILS, 6 OR MORE   3. Charcot's joint of left foot           Plan: Pt was evaluated and examined. Patient was given personalized discharge instructions. Nails 1-10 were debrided sharply in length and thickness with a nipper and , without incident. Pt will follow up in 9 weeks or sooner if any problems arise. Diagnosis was discussed with the pt and all of their questions were answered in detail. Proper foot hygiene and care was discussed with the pt. Informed patient on proper diabetic foot care and importance of tight glycemic control. Patient to check feet daily and contact the office with any questions/problems/concerns. Other comorbidity noted and will be managed by PCP. Diabetic foot examination performed this visit.   The exam included neurological sensory exam, a 10-g monofilament and pinprick sensation, vibration using a 128-Hz tuning fork, ankle reflexes, visual skin inspection, vascular exam including assessment of pedal pulses, orthopedic exam for deformities, and shoe inspection. Increased risk factors noted on the diabetic foot exam include decreased sensory exam and peripheral neuropathy. Shoegear inspected and found to be appropriate size and wear. Diabetic shoes and insoles: This patient would benefit from extra depth diabetic shoes and custom inserts. I feel he/she qualifies due to the above performed physical exam and diagnosis. I will do the appropriate paperwork and send it to their primary care physician. Then the patient will be measured for shoes and custom inserts to properly off load and protect his/her feet. No orders of the defined types were placed in this encounter. Follow up 9 weeks.

## 2022-06-23 ENCOUNTER — HOSPITAL ENCOUNTER (OUTPATIENT)
Dept: PAIN MANAGEMENT | Age: 77
Discharge: HOME | End: 2022-06-23
Payer: MEDICARE

## 2022-06-23 VITALS
DIASTOLIC BLOOD PRESSURE: 70 MMHG | HEART RATE: 68 BPM | WEIGHT: 212 LBS | SYSTOLIC BLOOD PRESSURE: 126 MMHG | HEIGHT: 72 IN | OXYGEN SATURATION: 98 % | BODY MASS INDEX: 28.71 KG/M2

## 2022-06-23 DIAGNOSIS — Z79.891 CHRONIC USE OF OPIATE DRUG FOR THERAPEUTIC PURPOSE: Chronic | ICD-10-CM

## 2022-06-23 DIAGNOSIS — M54.42 CHRONIC BILATERAL LOW BACK PAIN WITH BILATERAL SCIATICA: Chronic | ICD-10-CM

## 2022-06-23 DIAGNOSIS — G89.29 CHRONIC BILATERAL LOW BACK PAIN WITH BILATERAL SCIATICA: Chronic | ICD-10-CM

## 2022-06-23 DIAGNOSIS — M54.41 CHRONIC BILATERAL LOW BACK PAIN WITH BILATERAL SCIATICA: Chronic | ICD-10-CM

## 2022-06-23 DIAGNOSIS — M47.817 LUMBOSACRAL SPONDYLOSIS WITHOUT MYELOPATHY: Primary | ICD-10-CM

## 2022-06-23 PROCEDURE — 99213 OFFICE O/P EST LOW 20 MIN: CPT

## 2022-06-23 PROCEDURE — 99214 OFFICE O/P EST MOD 30 MIN: CPT | Performed by: ANESTHESIOLOGY

## 2022-06-23 RX ORDER — OXYCODONE HYDROCHLORIDE 5 MG/1
5 TABLET ORAL 2 TIMES DAILY PRN
Qty: 60 TABLET | Refills: 0 | Status: SHIPPED | OUTPATIENT
Start: 2022-06-23 | End: 2022-07-21 | Stop reason: SDUPTHER

## 2022-06-23 RX ORDER — FUROSEMIDE 40 MG/1
TABLET ORAL
Qty: 60 TABLET | Refills: 1 | Status: SHIPPED | OUTPATIENT
Start: 2022-06-23 | End: 2022-08-22

## 2022-06-23 ASSESSMENT — ENCOUNTER SYMPTOMS
CONSTIPATION: 0
SHORTNESS OF BREATH: 0
WHEEZING: 0
NAUSEA: 0
DIARRHEA: 0
BACK PAIN: 1
VOMITING: 0
COUGH: 0

## 2022-06-23 NOTE — PROGRESS NOTES
The patient is a 69 y. o. Non- / non  male. Chief Complaint   Patient presents with    Back Pain    Medication Refill     fentynal patch , Percocet, Topamax         Back Pain  Associated symptoms include numbness. Pertinent negatives include no fever, headaches or weakness. 27-year-old man with history of chronic low back pain  Onset many years ago located in the lower lumbar area  Reports extension of pain in leg  Back pain is constant fluctuate in intensity  Aggravated with excessive activity  History of L1 compression fracture  Only diagnostic work-up with a plain x-ray in 2015 that showed degenerative disc disease or  No previous MRI lumbar spine available  No therapy for more than 5 years  No injection for many years  no previous surgery for back pain  Patient has been on chronic opioid therapy  Was prescribed fentanyl patch 25 mcg  We have gradually weaned him off  Reports some worsening of pain  Ambulate with walker  Interested in exploring other treatment options  Medication Refill: Percocet fentanyl topamax      Pain score Today:  5  Adverse effects (Constipation / Nausea / Sedation / sexual Dysfunction / others) :  Constipation   Mood: fair  Sleep pattern and quality: fair#   Activity level:  06/23/2022    Pill count Today:percocet #19   Fentanyl # 0  Last dose taken  06/23/2022  OARRS report reviewed today: yes  ER/Hospitalizations/PCP visit related to pain since last visit:no   Any legal problems e.g. DUI etc.:NO  Satisfied with current management: Yes and No    Opioid Contract: 01/26/2022  Last Urine Dug screen dated: 05/19/2022    Lab Results   Component Value Date    LABA1C 7.2 05/10/2022     Lab Results   Component Value Date     06/16/2015       Past Medical History, Past Surgical History, Social History, Allergies and Medications reviewed and updated in EPIC as indicated    Family History reviewed and is noncontributory.           Past Medical History:   Diagnosis Date  Abdominal pain     Benign prostatic hypertrophy     C. difficile colitis     CAD (coronary artery disease) 1/3/12    s/p CABGx3    Colon polyp 02/25/2019    tubular adenoma    Constipation     Diabetes mellitus (HCC)     Diarrhea     Diarrhea     DVT (deep venous thrombosis) (HCC)     left calf    Ejection fraction < 50%     Gallstones     Heartburn     Hx of blood clots     Hyperlipidemia     Kidney stones     Lumbar spinal stenosis 4/21/2014    MI (myocardial infarction) ([facility] [address]     2011    Mitral regurgitation     MRSA (methicillin resistant staph aureus) culture positive     Numbness and tingling     hands and feet    Rib fractures 1-2015    right    Wears glasses     readers        Past Surgical History:   Procedure Laterality Date    APPENDECTOMY      CARDIAC CATHETERIZATION  12/29/11    CHOLECYSTECTOMY      COLONOSCOPY  01/11/2012    wnl, 10 yr recall    COLONOSCOPY  11/28/2018    ATTEMPTED NOT CLEAN (N/A )    COLONOSCOPY  02/25/2019    tubular adenoma    COLONOSCOPY N/A 2/25/2019    COLONOSCOPY WITH BIOPSY performed by Edwin Nails MD at [address]      x 1    CORONARY ARTERY BYPASS GRAFT  1/3/12    x3    KNEE ARTHROSCOPY      left    KNEE SURGERY Left     I&D    OTHER SURGICAL HISTORY Left 3/7/2016    plantar plane &  exostectomy medial foot left    DE COLON CA SCRN NOT HI RSK IND N/A 11/28/2018    COLONOSCOPY ATTEMPTED NOT CLEAN performed by Edwin Nails MD at [address] [place] [address] ENDOSCOPY  11-3-15    VENA CAVA FILTER PLACEMENT      WRIST SURGERY      I&D       Social History     Socioeconomic History    Marital status:       Spouse name: Not on file    Number of children: Not on file    Years of education: Not on file    Highest education level: Not on file   Occupational History    Occupation: retired [employer]   Tobacco Use    Smoking status: Former Smoker     Packs/day: 1.00     Years: 30.00     Pack years: 30.00     Types: Cigarettes     Quit date: 2002     Years since quittin.4    Smokeless tobacco: Never Used   Vaping Use    Vaping Use: Never used   Substance and Sexual Activity    Alcohol use: Not Currently     Comment: rare    Drug use: No    Sexual activity: Never   Other Topics Concern    Not on file   Social History Narrative    Not on file     Social Determinants of Health     Financial Resource Strain: Low Risk     Difficulty of Paying Living Expenses: Not hard at all   Food Insecurity: No Food Insecurity    Worried About Running Out of Food in the Last Year: Never true    Gemma of Food in the Last Year: Never true   Transportation Needs: No Transportation Needs    Lack of Transportation (Medical): No    Lack of Transportation (Non-Medical):  No   Physical Activity:     Days of Exercise per Week: Not on file    Minutes of Exercise per Session: Not on file   Stress:     Feeling of Stress : Not on file   Social Connections:     Frequency of Communication with Friends and Family: Not on file    Frequency of Social Gatherings with Friends and Family: Not on file    Attends Jewish Services: Not on file    Active Member of [address] [place] [address] [employer] or Organizations: Not on file    Attends Club or Organization Meetings: Not on file    Marital Status: Not on file   Intimate Partner Violence:     Fear of Current or Ex-Partner: Not on file    Emotionally Abused: Not on file    Physically Abused: Not on file    Sexually Abused: Not on file   Housing Stability: Unknown    Unable to Pay for Housing in the Last Year: No    Number of [place] in the Last Year: Not on file    Unstable Housing in the Last Year: No       Family History   Problem Relation Age of Onset    Heart Failure Father     Cancer Mother         breast    Cancer Maternal Grandfather        Allergies   Allergen Reactions    Flagyl [Metronidazole] Hives    Metronidazole      Other for back pain and gait problem. Negative for neck pain and neck stiffness. Neurological: Positive for numbness. Negative for dizziness, tremors, weakness and headaches. Objective:  General Appearance:  Comfortable, in no acute distress and in pain. Vital signs: (most recent): Blood pressure 126/70, pulse 68, height 6' (1.829 m), weight 212 lb (96.2 kg), SpO2 98 %. Vital signs are normal.  No fever. Output: Producing urine and producing stool. Lungs:  Normal effort. He is not in respiratory distress. Heart: Normal rate. Neurological: Patient is alert and oriented to person, place and time. Normal strength. Lumbar spine examination  No apparent deformity  Range of motion limited  Ambulate with a cane  Gait antalgic    Assessment & Plan     60-year-old man with history of chronic low back pain  Onset many years ago located in the lower lumbar area  Reports extension of pain in leg  Back pain is constant fluctuate in intensity  Aggravated with excessive activity  History of L1 compression fracture  Only diagnostic work-up with a plain x-ray in 2015 that showed degenerative disc disease or  No previous MRI lumbar spine available  No therapy for more than 5 years  No injection for many years  no previous surgery for back pain  Patient has been on chronic opioid therapy  Was prescribed fentanyl patch 25 mcg  We have gradually weaned him off  Reports some worsening of pain  Ambulate with walker  Interested in exploring other treatment options  Medication Refill: Percocet fentanyl topamax    1. Lumbosacral spondylosis without myelopathy    2. Chronic bilateral low back pain with bilateral sciatica    3.  Chronic use of opiate drug for therapeutic purpose          -Sanchez with patient that worsening axial back pain requires a diagnostic work-up  Will obtain MRI lumbar spine to identify underlying etiology  Consider physical therapy referral  Will be able to offer advanced interventional procedure after review of the diagnostic work-up    Will discontinue fentanyl  Will provide oxycodone prescription twice a day  In past has been taking only occasionally  Automated prescription report reviewed  Safe use of medication discussed  Refill ordered    Orders Placed This Encounter   Procedures    MRI LUMBAR SPINE WO CONTRAST      Orders Placed This Encounter   Medications    oxyCODONE (ROXICODONE) 5 MG immediate release tablet     Sig: Take 1 tablet by mouth 2 times daily as needed for Pain for up to 30 days. Dispense:  60 tablet     Refill:  0     Reduce doses taken as pain becomes manageable      Controlled Substance Monitoring:    Acute and Chronic Pain Monitoring:   RX Monitoring 6/23/2022   Attestation -   Acute Pain Prescriptions -   Periodic Controlled Substance Monitoring No signs of potential drug abuse or diversion identified. ;Possible medication side effects, risk of tolerance/dependence & alternative treatments discussed. ;Assessed functional status. Chronic Pain > 50 MEDD Obtained or confirmed \"Consent for Opioid Use\" on file.    Chronic Pain > 80 MEDD -               Electronically signed by Yonathan Kenyon MD on 6/23/2022 at 9:47 AM Negative Screen

## 2022-06-23 NOTE — PROGRESS NOTES
The patient is a 69 y. o. Non- / non  male. Chief Complaint   Patient presents with    Back Pain    Medication Refill     fentynal patch , Percocet, Topamax         HPI    Medication Refill: Percocet fentanyl topamax      Pain score Today:  5  Adverse effects (Constipation / Nausea / Sedation / sexual Dysfunction / others) :  Constipation   Mood: fair  Sleep pattern and quality: fair#   Activity level:  06/23/2022    Pill count Today:percocet #19   Fentanyl # 0  Last dose taken  06/23/2022  OARRS report reviewed today: yes  ER/Hospitalizations/PCP visit related to pain since last visit:no   Any legal problems e.g. DUI etc.:NO  Satisfied with current management: Yes and No    Opioid Contract: 01/26/2022  Last Urine Dug screen dated: 05/19/2022    Lab Results   Component Value Date    LABA1C 7.2 05/10/2022     Lab Results   Component Value Date     06/16/2015       Past Medical History, Past Surgical History, Social History, Allergies and Medications reviewed and updated in EPIC as indicated    Family History reviewed and is noncontributory.           Past Medical History:   Diagnosis Date    Abdominal pain     Benign prostatic hypertrophy     C. difficile colitis     CAD (coronary artery disease) 1/3/12    s/p CABGx3    Colon polyp 02/25/2019    tubular adenoma    Constipation     Diabetes mellitus ([address]     Diarrhea     Diarrhea     DVT (deep venous thrombosis) ([facility])     left calf    Ejection fraction < 50%     Gallstones     Heartburn     Hx of blood clots     Hyperlipidemia     Kidney stones     Lumbar spinal stenosis 4/21/2014    MI [facility] infarction) [facility] [address]     2011    Mitral regurgitation     MRSA (methicillin resistant staph aureus) culture positive     Numbness and tingling     hands and feet    Rib fractures 1-2015    right    Wears glasses     readers        Past Surgical History:   Procedure Laterality Date    [address]ENDECTOMY     [address]    CHOLECYSTECTOMY      COLONOSCOPY  2012    wnl, 10 yr recall    COLONOSCOPY  2018    ATTEMPTED NOT CLEAN (N/A )    COLONOSCOPY  2019    tubular adenoma    COLONOSCOPY N/A 2019    COLONOSCOPY WITH BIOPSY performed by Nisha [facility]anda [facility] at [facility]      x 1    CORONARY ARTERY BYPASS GRAFT  1/3/12    x3    KNEE ARTHROSCOPY      left    KNEE SURGERY Left     I&D    OTHER SURGICAL HISTORY Left 3/7/2016    plantar plane &  exostectomy medial foot left    MD COLON CA SCRN NOT HI RSK IND N/A 2018    COLONOSCOPY ATTEMPTED NOT CLEAN performed by Nisha Banda MD at [address] ENDOSCOPY  11-3-15    VENA CAVA FILTER PLACEMENT      WRIST SURGERY      I&D       Social History     Socioeconomic History    Marital status:      Spouse name: Not on file    Number of children: Not on file    Years of education: Not on file    Highest education level: Not on file   Occupational History    Occupation: retired [employer]   Tobacco Use    Smoking status: Former Smoker     Packs/day: 1.00     Years: 30.00     Pack years: 30.00     Types: Cigarettes     Quit date: 2002     Years since quittin.4    Smokeless tobacco: Never Used   Vaping Use    Vaping Use: Never used   Substance and Sexual Activity    Alcohol use: Not Currently     Comment: rare    Drug use: No    Sexual activity: Never   Other Topics Concern    Not on file   Social History Narrative    Not on file     Social Determinants of Health     Financial Resource Strain: Low Risk     Difficulty of Paying Living Expenses: Not hard at all   Food Insecurity: No Food Insecurity    Worried About [address] in the Last Year: Never true    [address] in the Last Year: Never true   Transportation Needs: No Transportation Needs    Lack of Transportation (Medical):  No    Lack of Transportation (Non-Medical): No   Physical Activity:     Days of Exercise per Week: Not on file    Minutes of Exercise per Session: Not on file   Stress:     Feeling of Stress : Not on file   Social Connections:     Frequency of Communication with Friends and Family: Not on file    Frequency of Social Gatherings with Friends and Family: Not on file    Attends Mu-ism Services: Not on file    Active Member of [address] [place] [address] or Organizations: Not on file    Attends Club or Organization Meetings: Not on file    Marital Status: Not on file   Intimate Partner Violence:     Fear of Current or Ex-Partner: Not on file    Emotionally Abused: Not on file    Physically Abused: Not on file    Sexually Abused: Not on file   Housing Stability: Unknown    Unable to Pay for Housing in the Last Year: No    Number of [place] in the Last Year: Not on file    Unstable Housing in the Last Year: No       Family History   Problem Relation Age of Onset    Heart Failure Father     Cancer Mother         breast    Cancer Maternal Grandfather        Allergies   Allergen Reactions    Flagyl [Metronidazole] Hives    Metronidazole      Other reaction(s): Vomiting       There were no vitals filed for this visit. Current Outpatient Medications   Medication Sig Dispense Refill    furosemide (LASIX) 40 MG tablet TAKE 1 TABLET BY MOUTH TWO TIMES A DAY 60 tablet 1    gabapentin (NEURONTIN) 800 MG tablet Take 1 tablet by mouth daily for 90 days.  90 tablet 0    omeprazole (PRILOSEC) 20 MG delayed release capsule TAKE 1 CAPSULE BY MOUTH EVERY DAY 90 capsule 0    topiramate (TOPAMAX) 50 MG tablet Take 1 tablet by mouth 2 times daily 60 tablet 2    BD PEN NEEDLE ALIZE 2ND GEN 32G X 4 MM MISC USE TO TEST BLOOD SUGAR TWICE DAILY 100 each 3    LANTUS SOLOSTAR 100 UNIT/ML injection pen inject 30 units subcutaneously in the morning and 40 units in the evening 45 mL 3    simvastatin (ZOCOR) 20 MG tablet TAKE 1 TABLET BY MOUTH EVERY DAY AT NIGHT 90 tablet 3    naloxone (NARCAN) 4 MG/0.1ML LIQD nasal spray 1 spray by Nasal route as needed (if needed for respiratory depression) 1 each 0    blood glucose test strips (FREESTYLE LITE) strip TEST TWICE DAILY AS DIRECTED 100 strip 1    aspirin 81 MG tablet Take 81 mg by mouth daily.  Vitamin D (CHOLECALCIFEROL) 1000 UNITS CAPS capsule Take 2,000 Units by mouth daily       Ascorbic Acid (VITAMIN C) 500 MG tablet Take 1,000 mg by mouth daily       NITROSTAT 0.4 MG SL tablet       finasteride (PROSCAR) 5 MG tablet Take 5 mg by mouth daily.  tamsulosin (FLOMAX) 0.4 MG capsule Take 0.4 mg by mouth daily. No current facility-administered medications for this encounter. Review of Systems   Constitutional: Positive for fatigue. Negative for chills and fever. Respiratory: Negative for cough, shortness of breath and wheezing. Gastrointestinal: Negative for constipation, diarrhea, nausea and vomiting. Musculoskeletal: Positive for back pain and gait problem. Negative for neck pain and neck stiffness. Neurological: Positive for numbness. Negative for dizziness, tremors, weakness and headaches. Objective:  Vital signs: (most recent): There were no vitals taken for this visit. No fever. Assessment & Plan  No diagnosis found. No orders of the defined types were placed in this encounter. No orders of the defined types were placed in this encounter.            Electronically signed by Flynn Freire MA on 6/23/2022 at 9:03 AM

## 2022-07-07 ENCOUNTER — HOSPITAL ENCOUNTER (OUTPATIENT)
Dept: MRI IMAGING | Age: 77
Discharge: HOME | End: 2022-07-09
Payer: MEDICARE

## 2022-07-07 DIAGNOSIS — G89.29 CHRONIC BILATERAL LOW BACK PAIN WITH BILATERAL SCIATICA: Chronic | ICD-10-CM

## 2022-07-07 DIAGNOSIS — M54.42 CHRONIC BILATERAL LOW BACK PAIN WITH BILATERAL SCIATICA: Chronic | ICD-10-CM

## 2022-07-07 DIAGNOSIS — M47.817 LUMBOSACRAL SPONDYLOSIS WITHOUT MYELOPATHY: ICD-10-CM

## 2022-07-07 DIAGNOSIS — M54.41 CHRONIC BILATERAL LOW BACK PAIN WITH BILATERAL SCIATICA: Chronic | ICD-10-CM

## 2022-07-07 PROCEDURE — 72148 MRI LUMBAR SPINE W/O DYE: CPT

## 2022-07-21 ENCOUNTER — HOSPITAL ENCOUNTER (OUTPATIENT)
Dept: PAIN MANAGEMENT | Age: 77
Discharge: HOME | End: 2022-07-21
Payer: MEDICARE

## 2022-07-21 VITALS — OXYGEN SATURATION: 98 % | DIASTOLIC BLOOD PRESSURE: 87 MMHG | SYSTOLIC BLOOD PRESSURE: 117 MMHG | HEART RATE: 81 BPM

## 2022-07-21 DIAGNOSIS — M47.816 SPONDYLOSIS OF LUMBAR REGION WITHOUT MYELOPATHY OR RADICULOPATHY: ICD-10-CM

## 2022-07-21 DIAGNOSIS — Z79.891 CHRONIC USE OF OPIATE DRUG FOR THERAPEUTIC PURPOSE: Primary | Chronic | ICD-10-CM

## 2022-07-21 DIAGNOSIS — M54.51 VERTEBROGENIC LOW BACK PAIN: ICD-10-CM

## 2022-07-21 DIAGNOSIS — M47.817 LUMBOSACRAL SPONDYLOSIS WITHOUT MYELOPATHY: ICD-10-CM

## 2022-07-21 PROBLEM — M54.41 CHRONIC BILATERAL LOW BACK PAIN WITH BILATERAL SCIATICA: Chronic | Status: RESOLVED | Noted: 2022-06-23 | Resolved: 2022-07-21

## 2022-07-21 PROBLEM — M54.42 CHRONIC BILATERAL LOW BACK PAIN WITH BILATERAL SCIATICA: Chronic | Status: RESOLVED | Noted: 2022-06-23 | Resolved: 2022-07-21

## 2022-07-21 PROBLEM — G89.29 CHRONIC BILATERAL LOW BACK PAIN WITH BILATERAL SCIATICA: Chronic | Status: RESOLVED | Noted: 2022-06-23 | Resolved: 2022-07-21

## 2022-07-21 PROCEDURE — 99213 OFFICE O/P EST LOW 20 MIN: CPT

## 2022-07-21 PROCEDURE — 99215 OFFICE O/P EST HI 40 MIN: CPT | Performed by: ANESTHESIOLOGY

## 2022-07-21 RX ORDER — OXYCODONE HYDROCHLORIDE 5 MG/1
5 TABLET ORAL 2 TIMES DAILY PRN
Qty: 60 TABLET | Refills: 0 | Status: SHIPPED | OUTPATIENT
Start: 2022-07-21 | End: 2022-09-15 | Stop reason: SDUPTHER

## 2022-07-21 RX ORDER — GABAPENTIN 800 MG/1
800 TABLET ORAL DAILY
Qty: 90 TABLET | Refills: 2 | Status: SHIPPED | OUTPATIENT
Start: 2022-07-21 | End: 2022-10-19

## 2022-07-21 ASSESSMENT — ENCOUNTER SYMPTOMS
COUGH: 0
CONSTIPATION: 1
DIARRHEA: 0
VOMITING: 0
WHEEZING: 0
NAUSEA: 0
SORE THROAT: 0
BACK PAIN: 1
SHORTNESS OF BREATH: 0

## 2022-07-21 NOTE — PROGRESS NOTES
The patient is a 69 y. o. Non- / non  male. Chief Complaint   Patient presents with    Back Pain    Medication Refill     Percocet        Back Pain  Pertinent negatives include no fever. Medication Refill: Percocet    Pain score Today:  8  Adverse effects (Constipation / Nausea / Sedation / sexual Dysfunction / others) : no  Mood: fair  Sleep pattern and quality: poor  Activity level: poor    Pill count Today:17  Last dose taken  07/21/2022 AM  OARRS report reviewed today: yes  ER/Hospitalizations/PCP visit related to pain since last visit:no   Any legal problems e.g. DUI etc.:No  Satisfied with current management: Yes    Opioid Contract:01/21/2022  Last Urine Dug screen dated:05/19/2022    Lab Results   Component Value Date    LABA1C 7.2 05/10/2022     Lab Results   Component Value Date     06/16/2015       Past Medical History, Past Surgical History, Social History, Allergies and Medications reviewed and updated in EPIC as indicated    Family History reviewed and is noncontributory.         Past Medical History:   Diagnosis Date    Abdominal pain     Benign prostatic hypertrophy     C. difficile colitis     CAD (coronary artery disease) 1/3/12    s/p CABGx3    Colon polyp 02/25/2019    tubular adenoma    Constipation     Diabetes mellitus (HCC)     Diarrhea     Diarrhea     DVT (deep venous thrombosis) (HCC)     left calf    Ejection fraction < 50%     Gallstones     Heartburn     Hx of blood clots     Hyperlipidemia     Kidney stones     Lumbar spinal stenosis 4/21/2014    MI (my[facility] infarction) [address] [address]     2011    Mitral regurgitation     MRSA (methicillin resistant staph aureus) culture positive     Numbness and tingling     hands and feet    Rib fractures 1-2015    right    Wears glasses     readers        Past Surgical History:   Procedure Laterality Date    APPENDECTOMY      CARDIAC CATHETERIZATION  12/29/11    CHOLECYSTECTOMY      COLONOSCOPY  01/11/2012    wnl, 10 yr recall COLONOSCOPY  2018    ATTEMPTED NOT CLEAN (N/A )    COLONOSCOPY  2019    tubular adenoma    COLONOSCOPY N/A 2019    COLONOSCOPY WITH BIOPSY performed by Curry Keating MD at [address]      x 1    CORONARY ARTERY BYPASS GRAFT  1/3/12    x3    KNEE ARTHROSCOPY      left    KNEE SURGERY Left     I&D    OTHER SURGICAL HISTORY Left 3/7/2016    plantar plane &  exostectomy medial foot left    LA COLON CA SCRN NOT HI RSK IND N/A 2018    COLONOSCOPY ATTEMPTED NOT CLEAN performed by Curry Keating MD at [facility]  11-3-15    VENA CAVA FILTER PLACEMENT      WRIST SURGERY      I&D       Social History     Socioeconomic History    Marital status:       Spouse name: None    Number of children: None    Years of education: None    Highest education level: None   Occupational History    Occupation: retired [employer]   Tobacco Use    Smoking status: Former     Packs/day: 1.00     Years: 30.00     Pack years: 30.00     Types: Cigarettes     Quit date: 2002     Years since quittin.4    Smokeless tobacco: Never   Vaping Use    Vaping Use: Never used   Substance and Sexual Activity    Alcohol use: Not Currently     Comment: rare    Drug use: No    Sexual activity: Never     Social Determinants of Health     Financial Resource Strain: Low Risk     Difficulty of Paying Living Expenses: Not hard at all   Food Insecurity: No Food Insecurity    Worried About [address] in the Last Year: Never true    [address] in the Last Year: Never true   Transportation Needs: No Transportation Needs    Lack of Transportation (Medical): No    Lack of Transportation (Non-Medical): No   Housing Stability: Unknown    Unable to Pay for Housing in the Last Year: No    Unstable Housing in the Last Year: No       Family History   Problem Relation Age of Onset    Heart Failure Father     Cancer Mother         breast Cancer Maternal Grandfather        Allergies   Allergen Reactions    Flagyl [Metronidazole] Hives    Metronidazole      Other reaction(s): Vomiting       There were no vitals filed for this visit. Current Outpatient Medications   Medication Sig Dispense Refill    furosemide (LASIX) 40 MG tablet TAKE 1 TABLET BY MOUTH TWO TIMES A DAY 60 tablet 1    oxyCODONE (ROXICODONE) 5 MG immediate release tablet Take 1 tablet by mouth 2 times daily as needed for Pain for up to 30 days. 60 tablet 0    gabapentin (NEURONTIN) 800 MG tablet Take 1 tablet by mouth daily for 90 days. 90 tablet 0    omeprazole (PRILOSEC) 20 MG delayed release capsule TAKE 1 CAPSULE BY MOUTH EVERY DAY 90 capsule 0    topiramate (TOPAMAX) 50 MG tablet Take 1 tablet by mouth 2 times daily 60 tablet 2    BD PEN NEEDLE ALIZE 2ND GEN 32G X 4 MM MISC USE TO TEST BLOOD SUGAR TWICE DAILY 100 each 3    LANTUS SOLOSTAR 100 UNIT/ML injection pen inject 30 units subcutaneously in the morning and 40 units in the evening 45 mL 3    simvastatin (ZOCOR) 20 MG tablet TAKE 1 TABLET BY MOUTH EVERY DAY AT NIGHT 90 tablet 3    naloxone (NARCAN) 4 MG/0.1ML LIQD nasal spray 1 spray by Nasal route as needed (if needed for respiratory depression) 1 each 0    blood glucose test strips (FREESTYLE LITE) strip TEST TWICE DAILY AS DIRECTED 100 strip 1    aspirin 81 MG tablet Take 81 mg by mouth daily. Vitamin D (CHOLECALCIFEROL) 1000 UNITS CAPS capsule Take 2,000 Units by mouth daily       Ascorbic Acid (VITAMIN C) 500 MG tablet Take 1,000 mg by mouth daily       NITROSTAT 0.4 MG SL tablet       finasteride (PROSCAR) 5 MG tablet Take 5 mg by mouth daily. tamsulosin (FLOMAX) 0.4 MG capsule Take 0.4 mg by mouth daily. No current facility-administered medications for this encounter. Review of Systems   Constitutional:  Negative for chills and fever. HENT:  Negative for congestion and sore throat.     Respiratory:  Negative for cough, shortness of breath and wheezing. Gastrointestinal:  Positive for constipation. Negative for diarrhea, nausea and vomiting. Musculoskeletal:  Positive for back pain. Objective:  Vital signs: (most recent): There were no vitals taken for this visit. No fever. Assessment & Plan  No diagnosis found. No orders of the defined types were placed in this encounter. No orders of the defined types were placed in this encounter.            Electronically signed by Dagmar Lew MA on 7/21/2022 at 1:29 PM

## 2022-07-21 NOTE — PROGRESS NOTES
The patient is a 69 y. o. Non- / non  male. Chief Complaint   Patient presents with    Back Pain    Medication Refill     Percocet        Back Pain  Pertinent negatives include no fever. chronic multisite body pain involving neck arm shoulder back and leg  Major pain issue of being back pain  Completed MRI lumbar spine  Here today to review imaging and discuss treatment option  Currently taking Percocet twice a day  Reports average pain score 8/10  Reports no side effect  Denies any illicit substance use    Medication Refill: Percocet    Pain score Today:  8  Adverse effects (Constipation / Nausea / Sedation / sexual Dysfunction / others) : no  Mood: fair  Sleep pattern and quality: poor  Activity level: poor    Pill count Today:17  Last dose taken  07/21/2022 AM  OARRS report reviewed today: yes  ER/Hospitalizations/PCP visit related to pain since last visit:no   Any legal problems e.g. DUI etc.:No  Satisfied with current management: Yes    Opioid Contract:01/21/2022  Last Urine Dug screen dated:05/19/2022    Lab Results   Component Value Date    LABA1C 7.2 05/10/2022     Lab Results   Component Value Date     06/16/2015       Past Medical History, Past Surgical History, Social History, Allergies and Medications reviewed and updated in EPIC as indicated    Family History reviewed and is noncontributory.         Past Medical History:   Diagnosis Date    Abdominal pain     Benign prostatic hypertrophy     C. difficile colitis     CAD (coronary artery disease) 1/3/12    s/p CABGx3    Colon polyp 02/25/2019    tubular adenoma    Constipation     Diabetes mellitus (HCC)     Diarrhea     Diarrhea     DVT (deep venous thrombosis) (HCC)     left calf    Ejection fraction < 50%     Gallstones     Heartburn     Hx of blood clots     Hyperlipidemia     Kidney stones     Lumbar spinal stenosis 4/21/2014    MI (myocardial infarction) ([facility] [address]     2011    Mitral regurgitation     MRSA (methicillin resistant staph aureus) culture positive     Numbness and tingling     hands and feet    Rib fractures     right    Wears glasses     readers        Past Surgical History:   Procedure Laterality Date    APPENDECTOMY      CARDIAC CATHETERIZATION  11    CHOLECYSTECTOMY      COLONOSCOPY  2012    wnl, 10 yr recall    COLONOSCOPY  2018    ATTEMPTED NOT CLEAN (N/A )    COLONOSCOPY  2019    tubular adenoma    COLONOSCOPY N/A 2019    COLONOSCOPY WITH BIOPSY performed by Curry Keating MD at [address] [place] [address]      x 1    CORONARY ARTERY BYPASS GRAFT  1/3/12    x3    KNEE ARTHROSCOPY      left    KNEE SURGERY Left     I&D    OTHER SURGICAL HISTORY Left 3/7/2016    plantar plane &  exostectomy medial foot left    UT COLON CA SCRN NOT HI RSK IND N/A 2018    COLONOSCOPY ATTEMPTED NOT CLEAN performed by Curry Keating MD at [place] [address]  -3-15    VENA CAVA FILTER PLACEMENT      WRIST SURGERY      I&D       Social History     Socioeconomic History    Marital status:       Spouse name: None    Number of children: None    Years of education: None    Highest education level: None   Occupational History    Occupation: retired "[employer]   Tobacco Use    Smoking status: Former     Packs/day: 1.00     Years: 30.00     Pack years: 30.00     Types: Cigarettes     Quit date: 2002     Years since quittin.4    Smokeless tobacco: Never   Vaping Use    Vaping Use: Never used   Substance and Sexual Activity    Alcohol use: Not Currently     Comment: rare    Drug use: No    Sexual activity: Never     Social Determinants of Health     Financial Resource Strain: Low Risk     Difficulty of Paying Living Expenses: Not hard at all   Food Insecurity: No Food Insecurity    Worried About [email or web address] in the Last Year: Never true    [facility] in the Last Year: Never true   Transportation Needs: No Transportation Needs    Lack of Transportation (Medical): No    Lack of Transportation (Non-Medical): No   Housing Stability: Unknown    Unable to Pay for Housing in the Last Year: No    Unstable Housing in the Last Year: No       Family History   Problem Relation Age of Onset    Heart Failure Father     Cancer Mother         breast    Cancer Maternal Grandfather        Allergies   Allergen Reactions    Flagyl [Metronidazole] Hives    Metronidazole      Other reaction(s): Vomiting       Vitals:    07/21/22 1334   BP: 117/87   Pulse: 81   SpO2: 98%       Current Outpatient Medications   Medication Sig Dispense Refill    oxyCODONE (ROXICODONE) 5 MG immediate release tablet Take 1 tablet by mouth 2 times daily as needed for Pain for up to 30 days. 60 tablet 0    gabapentin (NEURONTIN) 800 MG tablet Take 1 tablet by mouth in the morning for 90 days. 90 tablet 2    furosemide (LASIX) 40 MG tablet TAKE 1 TABLET BY MOUTH TWO TIMES A DAY 60 tablet 1    omeprazole (PRILOSEC) 20 MG delayed release capsule TAKE 1 CAPSULE BY MOUTH EVERY DAY 90 capsule 0    topiramate (TOPAMAX) 50 MG tablet Take 1 tablet by mouth 2 times daily 60 tablet 2    BD PEN NEEDLE ALIZE 2ND GEN 32G X 4 MM MISC USE TO TEST BLOOD SUGAR TWICE DAILY 100 each 3    LANTUS SOLOSTAR 100 UNIT/ML injection pen inject 30 units subcutaneously in the morning and 40 units in the evening 45 mL 3    simvastatin (ZOCOR) 20 MG tablet TAKE 1 TABLET BY MOUTH EVERY DAY AT NIGHT 90 tablet 3    naloxone (NARCAN) 4 MG/0.1ML LIQD nasal spray 1 spray by Nasal route as needed (if needed for respiratory depression) 1 each 0    blood glucose test strips (FREESTYLE LITE) strip TEST TWICE DAILY AS DIRECTED 100 strip 1    aspirin 81 MG tablet Take 81 mg by mouth daily.       Vitamin D (CHOLECALCIFEROL) 1000 UNITS CAPS capsule Take 2,000 Units by mouth daily       Ascorbic Acid (VITAMIN C) 500 MG tablet Take 1,000 mg by mouth daily       NITROSTAT 0.4 MG SL tablet       finasteride (PROSCAR) 5 MG tablet Take 5 mg by mouth daily. tamsulosin (FLOMAX) 0.4 MG capsule Take 0.4 mg by mouth daily. No current facility-administered medications for this encounter. Review of Systems   Constitutional:  Negative for chills and fever. HENT:  Negative for congestion and sore throat. Respiratory:  Negative for cough, shortness of breath and wheezing. Gastrointestinal:  Positive for constipation. Negative for diarrhea, nausea and vomiting. Musculoskeletal:  Positive for back pain. Objective:  General Appearance:  Uncomfortable, in pain, well-appearing and in no acute distress. Vital signs: (most recent): Blood pressure 117/87, pulse 81, SpO2 98 %. Vital signs are normal.  No fever. Output: Producing urine and producing stool. HEENT: Normal HEENT exam.    Lungs:  Normal effort and normal respiratory rate. He is not in respiratory distress. Heart: Normal rate. Extremities: Normal range of motion. There is no deformity. Neurological: Patient is alert and oriented to person, place and time. Patient has normal coordination. Pupils:  Pupils are equal, round, and reactive to light. Pupils are equal.   Skin:  Warm and dry. No rash or cyanosis. Assessment & Plan    EXAMINATION: MRI OF THE LUMBAR SPINE WITHOUT CONTRAST, 7/7/2022 7:25 pm      Impression 1. No significant spondylotic changes within the lumbar spine. 2. Mild remote compression fracture deformity involving the L4 superior endplate. 3. Moderate remote compression fracture deformity involving T12 with 3 mm retropulsion of the superior endplate without significant canal stenosis. No acute fractures. 1. Chronic use of opiate drug for therapeutic purpose    2. Lumbosacral spondylosis without myelopathy    3. Spondylosis of lumbar region without myelopathy or radiculopathy    4.  Vertebrogenic low back pain        MRI lumbar spine  Report is reviewed  Images reviewed independently  Finding discussed in detail with patient  No significant spinal stenosis or nerve root impingement or foraminal stenosis  Severe degenerative disc disease with endplate degeneration and Modic changes involving L4-L5 and S1 vertebrae  Facet arthropathy at L4-5 L5-S1 level    Explained to patient that axial back pain is likely to etiologies facet agenic and vertebral agenic  I recommended diagnostic lumbar medial branch nerve block  Basivertebral nerve ablation using interested procedure was also discussed at length  Information material provided    He is very apprehensive about any spine intervention  He will decide if he wished to proceed with our recommendations    Bilateral lower extremity pain is likely neuropathic and related to painful diabetic neuropathy  EMG nerve study will be required to confirm it  Neuromodulation can be very helpful for diabetic peripheral neuropathy    All treatment options presented and discussed      No orders of the defined types were placed in this encounter. Orders Placed This Encounter   Medications    oxyCODONE (ROXICODONE) 5 MG immediate release tablet     Sig: Take 1 tablet by mouth 2 times daily as needed for Pain for up to 30 days. Dispense:  60 tablet     Refill:  0     Reduce doses taken as pain becomes manageable    gabapentin (NEURONTIN) 800 MG tablet     Sig: Take 1 tablet by mouth in the morning for 90 days. Dispense:  90 tablet     Refill:  2        Controlled Substance Monitoring:    Acute and Chronic Pain Monitoring:   RX Monitoring 7/21/2022   Attestation -   Acute Pain Prescriptions -   Periodic Controlled Substance Monitoring Possible medication side effects, risk of tolerance/dependence & alternative treatments discussed. ;No signs of potential drug abuse or diversion identified. ;Assessed functional status. Chronic Pain > 50 MEDD Obtained or confirmed \"Consent for Opioid Use\" on file.    Chronic Pain > 80 MEDD - Electronically signed by Te Quinonez MD on 7/21/2022 at 2:17 PM

## 2022-08-01 RX ORDER — SIMVASTATIN 20 MG
TABLET ORAL
Qty: 90 TABLET | Refills: 0 | Status: SHIPPED | OUTPATIENT
Start: 2022-08-01 | End: 2022-08-11

## 2022-08-04 ENCOUNTER — OFFICE VISIT (OUTPATIENT)
Dept: PODIATRY | Age: 77
End: 2022-08-04
Payer: MEDICARE

## 2022-08-04 VITALS — BODY MASS INDEX: 29.26 KG/M2 | WEIGHT: 216 LBS | HEIGHT: 72 IN

## 2022-08-04 DIAGNOSIS — E11.42 TYPE 2 DIABETES MELLITUS WITH DIABETIC POLYNEUROPATHY, WITH LONG-TERM CURRENT USE OF INSULIN (HCC): ICD-10-CM

## 2022-08-04 DIAGNOSIS — M14.672 CHARCOT'S JOINT OF LEFT FOOT: ICD-10-CM

## 2022-08-04 DIAGNOSIS — Z79.4 TYPE 2 DIABETES MELLITUS WITH DIABETIC POLYNEUROPATHY, WITH LONG-TERM CURRENT USE OF INSULIN (HCC): ICD-10-CM

## 2022-08-04 DIAGNOSIS — B35.1 ONYCHOMYCOSIS: Primary | ICD-10-CM

## 2022-08-04 PROCEDURE — 11721 DEBRIDE NAIL 6 OR MORE: CPT | Performed by: PODIATRIST

## 2022-08-04 PROCEDURE — 99999 PR OFFICE/OUTPT VISIT,PROCEDURE ONLY: CPT | Performed by: PODIATRIST

## 2022-08-04 ASSESSMENT — ENCOUNTER SYMPTOMS
DIARRHEA: 0
COLOR CHANGE: 0
CONSTIPATION: 0
VOMITING: 0
NAUSEA: 0

## 2022-08-04 NOTE — PROGRESS NOTES
[facility]  Return Patient  Chief Complaint   Patient presents with    Nail Problem     Nail trim/last saw Shruthi Fitch 5/10/22    Diabetes     Blood sugar 801 Jeremie Tran is a 69y.o. year old male who is here for a diabetic foot check and nail trim. He would like his nails trimmed today. History of surgery type: Plantar planing medial and lateral foot. Vital signs are stable. Pain level is 0. Patient denies N/V/F/C. Patient was compliant with postoperative instructions. He is in a diabetic shoe        Review of Systems   Constitutional:  Negative for chills, diaphoresis, fatigue, fever and unexpected weight change. Cardiovascular:  Negative for leg swelling. Gastrointestinal:  Negative for constipation, diarrhea, nausea and vomiting. Musculoskeletal:  Positive for gait problem. Negative for arthralgias and joint swelling. Skin:  Negative for color change, pallor, rash and wound. Neurological:  Positive for numbness. Negative for weakness. Vascular: DP and PT pulses palpable 2/4, Right Foot and 2/4 on the Left Foot. CFT <3 seconds, Right Foot and <3 seconds on the Left Foot. Edema is absent,  Right Foot and minimal on the Left Foot. Neurological:   Sensation absent  to light touch to level of digits, both feet. Musculoskeletal:   Muscle strength is 5/5 on the Right Foot and 5/5 on the Left Foot. Structural deformities are present on the Right Foot and present on the Left Foot, but improved  Flat medial arch left foot. Integument:  Warm, dry, supple both feet. Infection is absent. No odor. No macerated.      Sites of Onychomycosis Involvement (Check affected area)  [x] [x] [x] [x] [x] [x] [x] [x] [x] [x]  5 4 3 2 1 1 2 3 4 5                          Right                                        Left    Thickness  [x] [x] [x] [x] [x] [x] [x] [x] [x] [x]  5 4 3 2 1 1 2 3 4 5                         Right Left    Dystrophic Changes                                                                 [x] [x] [x] [x] [x] [x] [x] [x] [x] [x]  5 4 3 2 1 1 2 3 4 5                         Right                                        Left    Color                                                                  [x] [x] [x] [x] [x] [x] [x] [x] [x] [x]  5 4 3 2 1 1 2 3 4 5                          Right                                        Left    Incurvation/Ingrowin                                                                   [] [] [] [] [] [] [] [] [] []  5 4 3 2 1 1 2 3 4 5                         Right                                        Left    Inflammation/Pain                                                                   [] [] [] [] [] [] [] [] [] []  5 4 3 2 1 1 2 3 4 5                         Right                                        Left       Assesment :     Diagnosis Orders   1. Onychomycosis  MI DEBRIDEMENT OF NAILS, 6 OR MORE      2. Type 2 diabetes mellitus with diabetic polyneuropathy, with long-term current use of insulin (HCC)  MI DEBRIDEMENT OF NAILS, 6 OR MORE      3. Charcot's joint of left foot              Plan: Pt was evaluated and examined. Patient was given personalized discharge instructions. Nails 1-10 were debrided sharply in length and thickness with a nipper and , without incident. Pt will follow up in 9 weeks or sooner if any problems arise. Diagnosis was discussed with the pt and all of their questions were answered in detail. Proper foot hygiene and care was discussed with the pt. Informed patient on proper diabetic foot care and importance of tight glycemic control. Patient to check feet daily and contact the office with any questions/problems/concerns. Other comorbidity noted and will be managed by PCP. Diabetic foot examination performed this visit.   The exam included neurological sensory exam, a 10-g monofilament and pinprick sensation, vibration using a 128-Hz tuning fork, ankle reflexes, visual skin inspection, vascular exam including assessment of pedal pulses, orthopedic exam for deformities, and shoe inspection. Increased risk factors noted on the diabetic foot exam include decreased sensory exam and peripheral neuropathy. Shoegear inspected and found to be appropriate size and wear. Diabetic shoes and insoles: This patient would benefit from extra depth diabetic shoes and custom inserts. I feel he/she qualifies due to the above performed physical exam and diagnosis. I will do the appropriate paperwork and send it to their primary care physician. Then the patient will be measured for shoes and custom inserts to properly off load and protect his/her feet. No orders of the defined types were placed in this encounter. Follow up 9 weeks.

## 2022-08-08 DIAGNOSIS — E11.42 TYPE 2 DIABETES MELLITUS WITH DIABETIC POLYNEUROPATHY, WITH LONG-TERM CURRENT USE OF INSULIN (HCC): ICD-10-CM

## 2022-08-08 DIAGNOSIS — Z79.4 TYPE 2 DIABETES MELLITUS WITH DIABETIC POLYNEUROPATHY, WITH LONG-TERM CURRENT USE OF INSULIN (HCC): ICD-10-CM

## 2022-08-08 RX ORDER — PEN NEEDLE, DIABETIC 32GX 5/32"
NEEDLE, DISPOSABLE MISCELLANEOUS
Qty: 100 EACH | Refills: 1 | Status: SHIPPED | OUTPATIENT
Start: 2022-08-08

## 2022-08-09 ENCOUNTER — OFFICE VISIT (OUTPATIENT)
Dept: FAMILY MEDICINE CLINIC | Age: 77
End: 2022-08-09
Payer: MEDICARE

## 2022-08-09 VITALS
TEMPERATURE: 97 F | DIASTOLIC BLOOD PRESSURE: 82 MMHG | OXYGEN SATURATION: 98 % | WEIGHT: 221.8 LBS | HEIGHT: 72 IN | SYSTOLIC BLOOD PRESSURE: 128 MMHG | BODY MASS INDEX: 30.04 KG/M2 | HEART RATE: 80 BPM

## 2022-08-09 DIAGNOSIS — Z13.6 SCREENING FOR CARDIOVASCULAR CONDITION: ICD-10-CM

## 2022-08-09 DIAGNOSIS — G44.221 CHRONIC TENSION-TYPE HEADACHE, INTRACTABLE: ICD-10-CM

## 2022-08-09 DIAGNOSIS — Z00.00 MEDICARE ANNUAL WELLNESS VISIT, SUBSEQUENT: Primary | ICD-10-CM

## 2022-08-09 DIAGNOSIS — E08.41 DIABETIC MONONEUROPATHY ASSOCIATED WITH DIABETES MELLITUS DUE TO UNDERLYING CONDITION (HCC): ICD-10-CM

## 2022-08-09 DIAGNOSIS — M47.816 SPONDYLOSIS OF LUMBAR REGION WITHOUT MYELOPATHY OR RADICULOPATHY: ICD-10-CM

## 2022-08-09 PROCEDURE — 1123F ACP DISCUSS/DSCN MKR DOCD: CPT | Performed by: FAMILY MEDICINE

## 2022-08-09 PROCEDURE — G0446 INTENS BEHAVE THER CARDIO DX: HCPCS | Performed by: FAMILY MEDICINE

## 2022-08-09 PROCEDURE — G0439 PPPS, SUBSEQ VISIT: HCPCS | Performed by: FAMILY MEDICINE

## 2022-08-09 RX ORDER — TOPIRAMATE 50 MG/1
50 TABLET, FILM COATED ORAL 2 TIMES DAILY
Qty: 60 TABLET | Refills: 2 | Status: SHIPPED | OUTPATIENT
Start: 2022-08-09 | End: 2022-10-31

## 2022-08-09 ASSESSMENT — PATIENT HEALTH QUESTIONNAIRE - PHQ9
2. FEELING DOWN, DEPRESSED OR HOPELESS: 0
SUM OF ALL RESPONSES TO PHQ QUESTIONS 1-9: 0
SUM OF ALL RESPONSES TO PHQ9 QUESTIONS 1 & 2: 0
SUM OF ALL RESPONSES TO PHQ QUESTIONS 1-9: 0
1. LITTLE INTEREST OR PLEASURE IN DOING THINGS: 0

## 2022-08-09 ASSESSMENT — LIFESTYLE VARIABLES: HOW OFTEN DO YOU HAVE A DRINK CONTAINING ALCOHOL: NEVER

## 2022-08-09 NOTE — PATIENT INSTRUCTIONS
DASH Diet: Care Instructions  Your Care Instructions     The DASH diet is an eating plan that can help lower your blood pressure. DASH stands for Dietary Approaches to Stop Hypertension. Hypertension is high bloodpressure. The DASH diet focuses on eating foods that are high in calcium, potassium, and magnesium. These nutrients can lower blood pressure. The foods that are highest in these nutrients are fruits, vegetables, low-fat dairy products, nuts, seeds, and legumes. But taking calcium, potassium, and magnesium supplements instead of eating foods that are high in those nutrients does not have the same effect. The DASH diet also includes whole grains, fish, and poultry. The DASH diet is one of several lifestyle changes your doctor may recommend to lower your high blood pressure. Your doctor may also want you to decrease the amount of sodium in your diet. Lowering sodium while following the DASH dietcan lower blood pressure even further than just the DASH diet alone. Follow-up care is a key part of your treatment and safety. Be sure to make and go to all appointments, and call your doctor if you are having problems. It's also a good idea to know your test results and keep alist of the medicines you take. How can you care for yourself at home? Following the DASH diet  Eat 4 to 5 servings of fruit each day. A serving is 1 medium-sized piece of fruit, ½ cup chopped or canned fruit, 1/4 cup dried fruit, or 4 ounces (½ cup) of fruit juice. Choose fruit more often than fruit juice. Eat 4 to 5 servings of vegetables each day. A serving is 1 cup of lettuce or raw leafy vegetables, ½ cup of chopped or cooked vegetables, or 4 ounces (½ cup) of vegetable juice. Choose vegetables more often than vegetable juice. Get 2 to 3 servings of low-fat and fat-free dairy each day. A serving is 8 ounces of milk, 1 cup of yogurt, or 1 ½ ounces of cheese. Eat 6 to 8 servings of grains each day.  A serving is 1 slice of bread, 1 ounce of dry cereal, or ½ cup of cooked rice, pasta, or cooked cereal. Try to choose whole-grain products as much as possible. Limit lean meat, poultry, and fish to 2 servings each day. A serving is 3 ounces, about the size of a deck of cards. Eat 4 to 5 servings of nuts, seeds, and legumes (cooked dried beans, lentils, and split peas) each week. A serving is 1/3 cup of nuts, 2 tablespoons of seeds, or ½ cup of cooked beans or peas. Limit fats and oils to 2 to 3 servings each day. A serving is 1 teaspoon of vegetable oil or 2 tablespoons of salad dressing. Limit sweets and added sugars to 5 servings or less a week. A serving is 1 tablespoon jelly or jam, ½ cup sorbet, or 1 cup of lemonade. Eat less than 2,300 milligrams (mg) of sodium a day. If you limit your sodium to 1,500 mg a day, you can lower your blood pressure even more. Be aware that all of these are the suggested number of servings for people who eat 1,800 to 2,000 calories a day. Your recommended number of servings may be different if you need more or fewer calories. Tips for success  Start small. Do not try to make dramatic changes to your diet all at once. You might feel that you are missing out on your favorite foods and then be more likely to not follow the plan. Make small changes, and stick with them. Once those changes become habit, add a few more changes. Try some of the following:  Make it a goal to eat a fruit or vegetable at every meal and at snacks. This will make it easy to get the recommended amount of fruits and vegetables each day. Try yogurt topped with fruit and nuts for a snack or healthy dessert. Add lettuce, tomato, cucumber, and onion to sandwiches. Combine a ready-made pizza crust with low-fat mozzarella cheese and lots of vegetable toppings. Try using tomatoes, squash, spinach, broccoli, carrots, cauliflower, and onions.   Have a variety of cut-up vegetables with a low-fat dip as an appetizer instead of chips and dip.  Sprinkle sunflower seeds or chopped almonds over salads. Or try adding chopped walnuts or almonds to cooked vegetables. Try some vegetarian meals using beans and peas. Add garbanzo or kidney beans to salads. Make burritos and tacos with mashed chavarria beans or black beans. Where can you learn more? Go to [email or web address] and sign in to your [employer] account. Enter Q920 in the [employer] box to learn more about \"DASH Diet: Care Instructions. \"     If you do not have an account, please click on the \"Sign Up Now\" link. Current as of: January 10, 2022               Content Version: 13.3  © 1338-0225 Healthwise, [employer]. Care instructions adapted under license by [facility] ([facility]). If you have questions about a medical condition or this instruction, always ask your healthcare professional. [place] [address] any warranty or liability for your use of this information. Personalized Preventive Plan for Shamar Perera - 8/9/2022  Medicare offers a range of preventive health benefits. Some of the tests and screenings are paid in full while other may be subject to a deductible, co-insurance, and/or copay. Some of these benefits include a comprehensive review of your medical history including lifestyle, illnesses that may run in your family, and various assessments and screenings as appropriate. After reviewing your medical record and screening and assessments performed today your provider may have ordered immunizations, labs, imaging, and/or referrals for you. A list of these orders (if applicable) as well as your Preventive Care list are included within your After Visit Summary for your review. Other Preventive Recommendations:    A preventive eye exam performed by an eye specialist is recommended every 1-2 years to screen for glaucoma; cataracts, macular degeneration, and other eye disorders.   A preventive dental visit is recommended every 6 months. Try to get at least 150 minutes of exercise per week or 10,000 steps per day on a pedometer . Order or download the FREE \"Exercise & Physical Activity: Your Everyday Guide\" from [employer]. Call 8-785.716.7409 or search [employer] online. You need 9869-2864 mg of calcium and 6223-3722 IU of vitamin D per day. It is possible to meet your calcium requirement with diet alone, but a vitamin D supplement is usually necessary to meet this goal.  When exposed to the sun, use a sunscreen that protects against both UVA and UVB radiation with an SPF of 30 or greater. Reapply every 2 to 3 hours or after sweating, drying off with a towel, or swimming. Always wear a seat belt when traveling in a car. Always wear a helmet when riding a bicycle or motorcycle. Personalized Preventive Plan for Salina Lackey - 8/9/2022  Medicare offers a range of preventive health benefits. Some of the tests and screenings are paid in full while other may be subject to a deductible, co-insurance, and/or copay. Some of these benefits include a comprehensive review of your medical history including lifestyle, illnesses that may run in your family, and various assessments and screenings as appropriate. After reviewing your medical record and screening and assessments performed today your provider may have ordered immunizations, labs, imaging, and/or referrals for you. A list of these orders (if applicable) as well as your Preventive Care list are included within your After Visit Summary for your review. Other Preventive Recommendations:    A preventive eye exam performed by an eye specialist is recommended every 1-2 years to screen for glaucoma; cataracts, macular degeneration, and other eye disorders. A preventive dental visit is recommended every 6 months. Try to get at least 150 minutes of exercise per week or 10,000 steps per day on a pedometer .   Order or download the FREE \"Exercise & Physical Activity: Your Everyday Guide\" from [employer]. Call 5-503.480.5426 or search [employer] online. You need 7045-4008 mg of calcium and 5065-6098 IU of vitamin D per day. It is possible to meet your calcium requirement with diet alone, but a vitamin D supplement is usually necessary to meet this goal.  When exposed to the sun, use a sunscreen that protects against both UVA and UVB radiation with an SPF of 30 or greater. Reapply every 2 to 3 hours or after sweating, drying off with a towel, or swimming. Always wear a seat belt when traveling in a car. Always wear a helmet when riding a bicycle or motorcycle.

## 2022-08-09 NOTE — PROGRESS NOTES
Visit Information    Have you changed or started any medications since your last visit including any over-the-counter medicines, vitamins, or herbal medicines? YES  Have you stopped taking any of your medications? Is so, why? -  YES  Are you having any side effects from any of your medications? - no    Have you seen any other physician or provider since your last visit? yes -    Have you had any other diagnostic tests since your last visit? yes -    Have you been seen in the emergency room and/or had an admission in a hospital since we last saw you?  no   Have you had your routine dental cleaning in the past 6 months?  no     Do you have an active MyChart account? If no, what is the barrier?   Yes    Patient Care Team:  Angeles Knight MD as PCP - General (Family Medicine)  Angeles Knight MD as PCP - [facility]  Arianna Lyon MD as Referring Physician (Cardiology)  Sheri Gates MD as Consulting Physician (Gastroenterology)  Hilda Carr MD as Consulting Physician (Internal Medicine Cardiovascular Disease)  Kristin Man MD as Consulting Physician (Pain Management)  Juanjo Pritchett DPM as Consulting Physician (Podiatry)  Denise Lee MD as Surgeon (Ophthalmology)    Medical History Review  Past Medical, Family, and Social History reviewed and does contribute to the patient presenting condition    Health Maintenance   Topic Date Due    Annual Wellness Visit (AWV)  05/04/2022    Flu vaccine (1) 09/01/2022    Lipids  11/04/2022    Depression Screen  05/10/2023    DTaP/Tdap/Td vaccine (2 - Td or Tdap) 05/07/2031    Shingles vaccine  Completed    Pneumococcal 65+ years Vaccine  Completed    COVID-19 Vaccine  Completed    Hepatitis C screen  Addressed    Hepatitis A vaccine  Aged Out    Hib vaccine  Aged Out    Meningococcal (ACWY) vaccine  Aged Out

## 2022-08-09 NOTE — PROGRESS NOTES
Medicare Annual Wellness Visit    Cheryl Wilson is here for Medicare AWV    Assessment & Plan   Medicare annual wellness visit, subsequent  Spondylosis of lumbar region without myelopathy or radiculopathy  Diabetic mononeuropathy associated with diabetes mellitus due to underlying condition ([facility] [address]  -     Diabetic Shoe  Chronic tension-type headache, intractable  -     topiramate (TOPAMAX) 50 MG tablet; Take 1 tablet by mouth in the morning and 1 tablet before bedtime. , Disp-60 tablet, R-2Fill fill [id number]  Screening for cardiovascular condition  -     [place] Intens behave ther cardio dx, 15 minutes []    Recommendations for Preventive Services Due: see orders and patient instructions/AVS.  Recommended screening schedule for the next 5-10 years is provided to the patient in written form: see Patient Instructions/AVS.     Return in 4 months (on 12/9/2022) for Medicare Annual Wellness Visit in 1 year, dm ,htn, hld. Subjective   Patient is scheduled for Medicare wellness visit. Patient has history of chronic lumbar stenosis with radiculopathy follows with pain management, reports they cut down his medications. He is out of fentanyl patch and also the dose of oxycodone was decreased. Patient reports his pain has gone 1 months. He was also prescribed Topamax for headaches reports he needs to refill that medication from PCP. Patient takes that for chronic headaches which usually comes in the morning. Patient does have history of kidney stones reports that stable. Patient history of diabetes, reports he needs diabetic shoes has peripheral neuropathy. He used to get from podiatrist reports that he has stopped prescribing that    Patient's complete [address] and screening values have been reviewed and are found in Flowsheets. The following problems were reviewed today and where indicated follow up appointments were made and/or referrals ordered.     Positive Risk Factor Screenings with Interventions:    Fall Risk:  Do you feel unsteady or are you worried about falling? : (!) yes  2 or more falls in past year?: no  Fall with injury in past year?: no   Fall Risk Interventions:    Home safety tips provided   Pt uses cane for walking             Opioid Risk: (Low risk score <55) Opioid risk score: 49    Patient is low risk for opioid use disorder or overdose. Last PDMP Neris Arora as Reviewed:  Review User Review Instant Review Result   TASNEEM Mcghee 7/21/2022  2:17 PM Reviewed PDMP [1]     Last Controlled Substance Monitoring Documentation      [address] [facility] Office Visit from 8/9/2022 in [facility]   Periodic Controlled Substance Monitoring No signs of potential drug abuse or diversion identified. , Assessed functional status., Obtaining appropriate analgesic effect of treatment. filed at 08/09/2022 3694   Chronic Pain > 50 MEDD Considered consultation with a specialist. filed at 08/09/2022 9532   Chronic Pain > 80 MEDD Co-prescribed Naloxone. filed at 08/09/2022 [place]ah 6 and ACP:  General  In general, how would you say your health is?: Fair  In the past 7 days, have you experienced any of the following: New or Increased Pain, New or Increased Fatigue, Loneliness, Social Isolation, Stress or Anger?: (!) Yes  Select all that apply: (!) New or Increased Pain (BACK/ B/L LEGS)  Do you get the social and emotional support that you need?: Yes  Do you have a Living Will?: Yes    Advance Directives       Power of  Living Will ACP-Advance Directive ACP-Power of     Not on File Not on File Not on File Not on File        General Health Risk Interventions:  Pain issues: pt follows with pain management and he has cut down on medications.    No Living Will: ACP documents already completed- patient asked to provide copy to the office    Health Habits/Nutrition:  Physical Activity: Inactive    Days of Exercise per Week: 0 days    Minutes of Exercise per Session: 0 min     Have you lost any weight without trying in the past 3 months?: No  Body mass index: (!) 30.08  Have you seen the dentist within the past year?: (!) No  Health Habits/Nutrition Interventions:  No dental insurance     Hearing/Vision:  Do you or your family notice any trouble with your hearing that hasn't been managed with hearing aids?: No  Do you have difficulty driving, watching TV, or doing any of your daily activities because of your eyesight?: No  Have you had an eye exam within the past year?: (!) No  Vision Screening    Right eye Left eye Both eyes   Without correction 20/50 20/50 20/40   With correction        Hearing/Vision Interventions:  Vision concerns:  patient encouraged to make appointment with his/her eye specialist            Objective   Vitals:    08/09/22 0808   BP: 128/82   Pulse: 80   Temp: 97 °F (36.1 °C)   SpO2: 98%   Weight: 221 lb 12.8 oz (100.6 kg)   Height: 6' (1.829 m)      Body mass index is 30.08 kg/m². Allergies   Allergen Reactions    Flagyl [Metronidazole] Hives    Metronidazole      Other reaction(s): Vomiting     Prior to Visit Medications    Medication Sig Taking? Authorizing Provider   topiramate (TOPAMAX) 50 MG tablet Take 1 tablet by mouth in the morning and 1 tablet before bedtime. Yes Javid Rosario MD   Insulin Pen Needle (BD PEN NEEDLE ALIZE 2ND GEN) 32G X 4 MM MISC USE AS DIRECTED TWICE DAILY Yes Javid Rosario MD   simvastatin (ZOCOR) 20 MG tablet TAKE 1 TABLET BY MOUTH EVERY DAY AT NIGHT Yes Javid Rosario MD   oxyCODONE (ROXICODONE) 5 MG immediate release tablet Take 1 tablet by mouth 2 times daily as needed for Pain for up to 30 days. Yes Zaid Ohara MD   gabapentin (NEURONTIN) 800 MG tablet Take 1 tablet by mouth in the morning for 90 days.  Yes Zaid Ohara MD   furosemide (LASIX) 40 MG tablet TAKE 1 TABLET BY MOUTH TWO TIMES A DAY Yes Jae Ruffin, APRN - CNP   omeprazole (PRILOSEC) 20 MG delayed release capsule TAKE 1 CAPSULE BY MOUTH EVERY DAY Yes MD [facility] SOLOSTAR 100 UNIT/ML injection pen inject 30 units subcutaneously in the morning and 40 units in the evening Yes Derik Rosales MD   blood glucose test strips (FREESTYLE LITE) strip TEST TWICE DAILY AS DIRECTED Yes Bev Mcdonnell MD   aspirin 81 MG tablet Take 81 mg by mouth daily. Yes Historical Provider, MD   Vitamin D (CHOLECALCIFEROL) 1000 UNITS CAPS capsule Take 2,000 Units by mouth daily  Yes Historical Provider, MD   Ascorbic Acid (VITAMIN C) 500 MG tablet Take 1,000 mg by mouth daily  Yes Historical Provider, MD   finasteride (PROSCAR) 5 MG tablet Take 5 mg by mouth daily. Yes Historical Provider, MD   tamsulosin (FLOMAX) 0.4 MG capsule Take 0.4 mg by mouth daily.    Yes Historical Provider, MD   naloxone [facility]) 4 MG/0.1ML LIQD nasal spray 1 spray by Nasal route as needed (if needed for respiratory depression)  Patient not taking: Reported on 8/9/2022  TACOS Mcghee - CNP   NITROSTAT 0.4 MG SL tablet   Historical Provider, MD Perez (Including outside providers/suppliers regularly involved in providing care):   Patient Care Team:  Derik Rosales MD as PCP - General (Family Medicine)  Derik Rosales [facility]  Laura Helton MD as Referring Physician (Cardiology)  Kirti Reynaga MD as Consulting Physician (Gastroenterology)  Cecilia Martínez MD as Consulting Physician (Internal Medicine Cardiovascular Disease)  Montey Cogan, MD as Consulting Physician (Pain Management)  Anabela Gregory DPM as Consulting Physician (Podiatry)  Ami Muhammad MD as Surgeon (Ophthalmology)     Reviewed and updated this visit:  Tobacco  Allergies  Meds  Problems  Med Hx  Surg Hx  Soc Hx  Fam Hx

## 2022-08-11 RX ORDER — SIMVASTATIN 20 MG
TABLET ORAL
Qty: 90 TABLET | Refills: 0 | Status: SHIPPED | OUTPATIENT
Start: 2022-08-11

## 2022-08-11 NOTE — TELEPHONE ENCOUNTER
Please Approve or Refuse.   Send to Pharmacy per Pt's Request: [facility]     Next Visit Date:  12/8/2022   Last Visit Date: 8/9/2022    Hemoglobin A1C (%)   Date Value   05/10/2022 7.2   11/03/2021 6.8   05/03/2021 7.0             ( goal A1C is < 7)   BP Readings from Last 3 Encounters:   08/09/22 128/82   07/21/22 117/87   06/23/22 126/70          (goal 120/80)  BUN   Date Value Ref Range Status   11/04/2021 24 (H) 8 - 23 mg/dL Final     Creatinine   Date Value Ref Range Status   11/04/2021 1.42 (H) 0.70 - 1.20 mg/dL Final     Potassium   Date Value Ref Range Status   11/04/2021 3.9 3.7 - 5.3 mmol/L Final

## 2022-08-15 DIAGNOSIS — K58.9 IRRITABLE BOWEL SYNDROME WITHOUT DIARRHEA: ICD-10-CM

## 2022-08-16 RX ORDER — OMEPRAZOLE 20 MG/1
CAPSULE, DELAYED RELEASE ORAL
Qty: 90 CAPSULE | Refills: 0 | Status: SHIPPED | OUTPATIENT
Start: 2022-08-16

## 2022-08-22 RX ORDER — FUROSEMIDE 40 MG/1
TABLET ORAL
Qty: 60 TABLET | Refills: 0 | OUTPATIENT
Start: 2022-08-22

## 2022-08-22 RX ORDER — FUROSEMIDE 40 MG/1
TABLET ORAL
Qty: 60 TABLET | Refills: 0 | Status: SHIPPED | OUTPATIENT
Start: 2022-08-22 | End: 2022-09-20

## 2022-09-15 ENCOUNTER — HOSPITAL ENCOUNTER (OUTPATIENT)
Dept: PAIN MANAGEMENT | Age: 77
Discharge: HOME | End: 2022-09-15
Payer: MEDICARE

## 2022-09-15 VITALS — HEART RATE: 76 BPM | DIASTOLIC BLOOD PRESSURE: 75 MMHG | SYSTOLIC BLOOD PRESSURE: 120 MMHG | OXYGEN SATURATION: 99 %

## 2022-09-15 DIAGNOSIS — M54.51 VERTEBROGENIC LOW BACK PAIN: ICD-10-CM

## 2022-09-15 DIAGNOSIS — M47.817 LUMBOSACRAL SPONDYLOSIS WITHOUT MYELOPATHY: ICD-10-CM

## 2022-09-15 DIAGNOSIS — Z79.891 CHRONIC USE OF OPIATE DRUG FOR THERAPEUTIC PURPOSE: Primary | Chronic | ICD-10-CM

## 2022-09-15 DIAGNOSIS — M47.816 SPONDYLOSIS OF LUMBAR REGION WITHOUT MYELOPATHY OR RADICULOPATHY: ICD-10-CM

## 2022-09-15 DIAGNOSIS — M51.36 DDD (DEGENERATIVE DISC DISEASE), LUMBAR: ICD-10-CM

## 2022-09-15 DIAGNOSIS — M48.062 SPINAL STENOSIS OF LUMBAR REGION WITH NEUROGENIC CLAUDICATION: ICD-10-CM

## 2022-09-15 DIAGNOSIS — M47.899 FACET SYNDROME: ICD-10-CM

## 2022-09-15 PROCEDURE — 99213 OFFICE O/P EST LOW 20 MIN: CPT | Performed by: NURSE PRACTITIONER

## 2022-09-15 PROCEDURE — 99213 OFFICE O/P EST LOW 20 MIN: CPT

## 2022-09-15 RX ORDER — OXYCODONE HYDROCHLORIDE 5 MG/1
5 TABLET ORAL 2 TIMES DAILY PRN
Qty: 60 TABLET | Refills: 0 | Status: SHIPPED | OUTPATIENT
Start: 2022-09-20 | End: 2022-10-18 | Stop reason: SDUPTHER

## 2022-09-15 ASSESSMENT — ENCOUNTER SYMPTOMS
VOMITING: 0
COUGH: 0
BACK PAIN: 1

## 2022-09-15 NOTE — PROGRESS NOTES
Chief Complaint   Patient presents with    Generalized Body Aches    Medication Refill     Roxicodone       [facility]     Pt c/o low back pain radiating down his legs with numbness in feet d/t neuropathy. No surgery to the area and injections in distant past. The pain has has worsened and does reports to less activity since fentanyl was d/c. Does not like to take oxycodone BID. Recent MRI 7/2022 which showed No significant spinal stenosis or nerve root impingement or foraminal stenosis  Severe degenerative disc disease with endplate degeneration and Modic changes involving L4-L5 and S1 vertebrae. Facet arthropathy at L4-5 L5-S1 level  Intracept and MBB discussed with pt at last appt and he would like to schedule MBB    HPI:   Generalized Body Aches  This is a chronic problem. The current episode started more than 1 year ago. The problem occurs constantly. The problem has been unchanged. Associated symptoms include neck pain. Pertinent negatives include no congestion, coughing, fever, headaches, numbness or vomiting. He has tried NSAIDs, lying down, position changes, walking, ice and heat for the symptoms. The treatment provided no relief. Back Pain  This is a chronic problem. The current episode started more than 1 year ago. The problem occurs constantly. The problem has been gradually worsening since onset. The pain is present in the lumbar spine. The quality of the pain is described as aching. The pain does not radiate. The pain is at a severity of 7/10. The pain is moderate. The pain is Worse during the day. The symptoms are aggravated by position, standing and twisting. Stiffness is present All day. Pertinent negatives include no fever, headaches, numbness or paresthesias. Risk factors include obesity, poor posture, sedentary lifestyle and lack of exercise. He has tried analgesics for the symptoms. The treatment provided mild relief. Patient denies any new neurological symptoms.  No bowel or bladder incontinence, no weakness, and no falling. Pill count:13 Roxicodone from fill in July    Morphine equivalent: 15    Controlled Substance Monitoring:    Acute and Chronic Pain Monitoring:   RX Monitoring 9/15/2022   Attestation -   Acute Pain Prescriptions -   Periodic Controlled Substance Monitoring Possible medication side effects, risk of tolerance/dependence & alternative treatments discussed. ;No signs of potential drug abuse or diversion identified. ;Assessed functional status. ;Obtaining appropriate analgesic effect of treatment. Chronic Pain > 50 MEDD -   Chronic Pain > 80 MEDD -          Periodic Controlled Substance Monitoring: Possible medication side effects, risk of tolerance/dependence & alternative treatments discussed., No signs of potential drug abuse or diversion identified. , Assessed functional status., Obtaining appropriate analgesic effect of treatment.  Jean-Claude Thomason, APRN - CNP)      Past Medical History:   Diagnosis Date    Abdominal pain     Benign prostatic hypertrophy     C. difficile colitis     CAD (coronary artery disease) 1/3/12    s/p CABGx3    Colon polyp 02/25/2019    tubular adenoma    Constipation     Diabetes mellitus (HCC)     Diarrhea     Diarrhea     DVT (deep venous thrombosis) (HCC)     left calf    Ejection fraction < 50%     Gallstones     Heartburn     Hx of blood clots     Hyperlipidemia     Kidney stones     Lumbar spinal stenosis 4/21/2014    MI (myocardial infarction) ([facility] [address]     2011    Mitral regurgitation     MRSA (methicillin resistant staph aureus) culture positive     Numbness and tingling     hands and feet    Rib fractures 1-2015    right    Wears glasses     readers       Past Surgical History:   Procedure Laterality Date    APPENDECTOMY      CARDIAC CATHETERIZATION  12/29/11    CHOLECYSTECTOMY      COLONOSCOPY  01/11/2012    wnl, 10 yr recall    COLONOSCOPY  11/28/2018    ATTEMPTED NOT CLEAN (N/A )    COLONOSCOPY  02/25/2019    tubular adenoma    COLONOSCOPY N/A 2/25/2019    COLONOSCOPY WITH BIOPSY performed by Juan Francisco Le MD at [address]      x 1    CORONARY ARTERY BYPASS GRAFT  1/3/12    x3    KNEE ARTHROSCOPY      left    KNEE SURGERY Left     I&D    OTHER SURGICAL HISTORY Left 3/7/2016    plantar plane &  exostectomy medial foot left    AR COLON CA SCRN NOT HI [facility] IND N/A 11/28/2018    COLONOSCOPY ATTEMPTED NOT CLEAN performed by Juan Francisco Le MD at [address]  11-3-15    VENA CAVA FILTER PLACEMENT      WRIST SURGERY      I&D       Allergies   Allergen Reactions    Flagyl [Metronidazole] Hives    Metronidazole      Other reaction(s): Vomiting         Current Outpatient Medications:     [START ON 9/20/2022] oxyCODONE (ROXICODONE) 5 MG immediate release tablet, Take 1 tablet by mouth 2 times daily as needed for Pain for up to 30 days. , Disp: 60 tablet, Rfl: 0    furosemide (LASIX) 40 MG tablet, TAKE 1 TABLET BY MOUTH TWO TIMES A DAY, Disp: 60 tablet, Rfl: 0    omeprazole (PRILOSEC) 20 MG delayed release capsule, TAKE 1 CAPSULE BY MOUTH EVERY DAY, Disp: 90 capsule, Rfl: 0    simvastatin (ZOCOR) 20 MG tablet, TAKE 1 TABLET BY MOUTH EVERY DAY AT NIGHT, Disp: 90 tablet, Rfl: 0    topiramate (TOPAMAX) 50 MG tablet, Take 1 tablet by mouth in the morning and 1 tablet before bedtime. , Disp: 60 tablet, Rfl: 2    Insulin Pen Needle (BD PEN NEEDLE ALIZE 2ND GEN) 32G X 4 MM MISC, USE AS DIRECTED TWICE DAILY, Disp: 100 each, Rfl: 1    gabapentin (NEURONTIN) 800 MG tablet, Take 1 tablet by mouth in the morning for 90 days. , Disp: 90 tablet, Rfl: 2    LANTUS SOLOSTAR 100 UNIT/ML injection pen, inject 30 units subcutaneously in the morning and 40 units in the evening, Disp: 45 mL, Rfl: 3    naloxone (NARCAN) 4 MG/0.1ML LIQD nasal spray, 1 spray by Nasal route as needed (if needed for respiratory depression) (Patient not taking: Reported on 8/9/2022), Disp: 1 each, Rfl: 0    blood glucose test strips (FREESTYLE LITE) strip, TEST TWICE DAILY AS DIRECTED, Disp: 100 strip, Rfl: 1    aspirin 81 MG tablet, Take 81 mg by mouth daily. , Disp: , Rfl:     Vitamin D (CHOLECALCIFEROL) 1000 UNITS CAPS capsule, Take 2,000 Units by mouth daily , Disp: , Rfl:     Ascorbic Acid (VITAMIN C) 500 MG tablet, Take 1,000 mg by mouth daily , Disp: , Rfl:     NITROSTAT 0.4 MG SL tablet, , Disp: , Rfl:     finasteride (PROSCAR) 5 MG tablet, Take 5 mg by mouth daily. , Disp: , Rfl:     tamsulosin (FLOMAX) 0.4 MG capsule, Take 0.4 mg by mouth daily. , Disp: , Rfl:     Family History   Problem Relation Age of Onset    Heart Failure Father     Cancer Mother         breast    Cancer Maternal Grandfather        Social History     Socioeconomic History    Marital status:      Spouse name: Not on file    Number of children: Not on file    Years of education: Not on file    Highest education level: Not on file   Occupational History    Occupation: retired [employer]   Tobacco Use    Smoking status: Former     Packs/day: 1.00     Years: 30.00     Pack years: 30.00     Types: Cigarettes     Quit date: 2002     Years since quittin.6    Smokeless tobacco: Never   Vaping Use    Vaping Use: Never used   Substance and Sexual Activity    Alcohol use: Not Currently     Comment: rare    Drug use: No    Sexual activity: Never   Other Topics Concern    Not on file   Social History Narrative    Not on file     Social Determinants of Health     Financial Resource Strain: Low Risk     Difficulty of Paying Living Expenses: Not hard at all   Food Insecurity: No Food Insecurity    Worried About Running Out of Food in the Last Year: Never true    [address] in the Last Year: Never true   Transportation Needs: No Transportation Needs    Lack of Transportation (Medical): No    Lack of Transportation (Non-Medical):  No   Physical Activity: Inactive    Days of Exercise per Week: 0 days    Minutes of Exercise per Session: 0 min   Stress: Not on file   Social Connections: Not on file   Intimate Partner Violence: Not on file   Housing Stability: Unknown    Unable to Pay for Housing in the Last Year: No    Number of Places Lived in the Last Year: Not on file    Unstable Housing in the Last Year: No       Review of Systems:  Review of Systems   Constitutional: Negative for fever. HENT:  Negative for congestion. Respiratory:  Negative for cough. Musculoskeletal:  Positive for back pain and neck pain. Gastrointestinal:  Negative for vomiting. Neurological:  Negative for headaches, numbness and paresthesias. Physical Exam:  /75   Pulse 76   SpO2 99%     Physical Exam  Cardiovascular:      Rate and Rhythm: Normal rate. Pulmonary:      Effort: Pulmonary effort is normal.   Musculoskeletal:      Lumbar back: Decreased range of motion. Skin:     General: Skin is warm and dry. Neurological:      Mental Status: He is alert and oriented to person, place, and time.        Record/Diagnostics Review:    Last alexander  5/2022 and was appropriate     Assessment:  Problem List Items Addressed This Visit       Chronic use of opiate drug for therapeutic purpose - Primary (Chronic)    Vertebrogenic low back pain    Relevant Medications    oxyCODONE (ROXICODONE) 5 MG immediate release tablet (Start on 9/20/2022)    Other Relevant Orders    MT INJ DX/THER AGNT PARAVERT FACET JOINT, LUMBAR/SAC, 1ST LEVEL    DDD (degenerative disc disease), lumbar    Relevant Medications    oxyCODONE (ROXICODONE) 5 MG immediate release tablet (Start on 9/20/2022)    Other Relevant Orders    MT INJ DX/THER AGNT PARAVERT FACET JOINT, LUMBAR/SAC, 1ST LEVEL    Lumbar spinal stenosis    Facet syndrome (HCC)    Relevant Orders    MT INJ DX/THER AGNT PARAVERT FACET JOINT, LUMBAR/SAC, 1ST LEVEL    Spondylosis of lumbar region without myelopathy or radiculopathy    Relevant Medications    oxyCODONE (ROXICODONE) 5 MG immediate release tablet (Start on 9/20/2022)     Other Visit

## 2022-09-19 ENCOUNTER — HOSPITAL ENCOUNTER (OUTPATIENT)
Dept: PAIN MANAGEMENT | Age: 77
Discharge: HOME | End: 2022-09-19
Payer: MEDICARE

## 2022-09-19 ENCOUNTER — HOSPITAL ENCOUNTER (OUTPATIENT)
Dept: GENERAL RADIOLOGY | Age: 77
Discharge: HOME | End: 2022-09-21
Payer: MEDICARE

## 2022-09-19 VITALS
HEIGHT: 72 IN | TEMPERATURE: 97.5 F | OXYGEN SATURATION: 95 % | BODY MASS INDEX: 29.26 KG/M2 | HEART RATE: 65 BPM | WEIGHT: 216 LBS | SYSTOLIC BLOOD PRESSURE: 130 MMHG | RESPIRATION RATE: 15 BRPM | DIASTOLIC BLOOD PRESSURE: 80 MMHG

## 2022-09-19 DIAGNOSIS — R52 PAIN MANAGEMENT: ICD-10-CM

## 2022-09-19 DIAGNOSIS — M47.817 LUMBOSACRAL SPONDYLOSIS WITHOUT MYELOPATHY: Primary | ICD-10-CM

## 2022-09-19 LAB — GLUCOSE BLD-MCNC: 77 MG/DL (ref 75–110)

## 2022-09-19 PROCEDURE — 64495 INJ PARAVERT F JNT L/S 3 LEV: CPT

## 2022-09-19 PROCEDURE — 64494 INJ PARAVERT F JNT L/S 2 LEV: CPT

## 2022-09-19 PROCEDURE — 82947 ASSAY GLUCOSE BLOOD QUANT: CPT

## 2022-09-19 PROCEDURE — 2500000003 HC RX 250 WO HCPCS: Performed by: ANESTHESIOLOGY

## 2022-09-19 PROCEDURE — 64494 INJ PARAVERT F JNT L/S 2 LEV: CPT | Performed by: ANESTHESIOLOGY

## 2022-09-19 PROCEDURE — 99152 MOD SED SAME PHYS/QHP 5/>YRS: CPT | Performed by: ANESTHESIOLOGY

## 2022-09-19 PROCEDURE — 3209999900 FLUORO FOR SURGICAL PROCEDURES

## 2022-09-19 PROCEDURE — 6360000002 HC RX W HCPCS: Performed by: ANESTHESIOLOGY

## 2022-09-19 PROCEDURE — 64493 INJ PARAVERT F JNT L/S 1 LEV: CPT

## 2022-09-19 PROCEDURE — 64493 INJ PARAVERT F JNT L/S 1 LEV: CPT | Performed by: ANESTHESIOLOGY

## 2022-09-19 RX ORDER — FENTANYL CITRATE 50 UG/ML
INJECTION, SOLUTION INTRAMUSCULAR; INTRAVENOUS
Status: COMPLETED | OUTPATIENT
Start: 2022-09-19 | End: 2022-09-19

## 2022-09-19 RX ORDER — BUPIVACAINE HYDROCHLORIDE 5 MG/ML
INJECTION, SOLUTION EPIDURAL; INTRACAUDAL
Status: COMPLETED | OUTPATIENT
Start: 2022-09-19 | End: 2022-09-19

## 2022-09-19 RX ORDER — LIDOCAINE HYDROCHLORIDE 10 MG/ML
INJECTION, SOLUTION EPIDURAL; INFILTRATION; INTRACAUDAL; PERINEURAL
Status: COMPLETED | OUTPATIENT
Start: 2022-09-19 | End: 2022-09-19

## 2022-09-19 RX ORDER — MIDAZOLAM HYDROCHLORIDE 1 MG/ML
INJECTION INTRAMUSCULAR; INTRAVENOUS
Status: COMPLETED | OUTPATIENT
Start: 2022-09-19 | End: 2022-09-19

## 2022-09-19 RX ADMIN — FENTANYL CITRATE 50 MCG: 50 INJECTION, SOLUTION INTRAMUSCULAR; INTRAVENOUS at 10:09

## 2022-09-19 RX ADMIN — BUPIVACAINE HYDROCHLORIDE 6 ML: 5 INJECTION, SOLUTION EPIDURAL; INTRACAUDAL; PERINEURAL at 10:10

## 2022-09-19 RX ADMIN — LIDOCAINE HYDROCHLORIDE 5 ML: 10 INJECTION, SOLUTION EPIDURAL; INFILTRATION; INTRACAUDAL; PERINEURAL at 10:10

## 2022-09-19 RX ADMIN — MIDAZOLAM 1 MG: 1 INJECTION INTRAMUSCULAR; INTRAVENOUS at 10:09

## 2022-09-19 ASSESSMENT — PAIN - FUNCTIONAL ASSESSMENT
PAIN_FUNCTIONAL_ASSESSMENT: 0-10
PAIN_FUNCTIONAL_ASSESSMENT: NONE - DENIES PAIN
PAIN_FUNCTIONAL_ASSESSMENT: PREVENTS OR INTERFERES WITH ALL ACTIVE AND SOME PASSIVE ACTIVITIES

## 2022-09-19 ASSESSMENT — PAIN DESCRIPTION - DESCRIPTORS: DESCRIPTORS: DULL;ACHING

## 2022-09-19 NOTE — H&P
UPDATE:  Office visit pain clinic in [facility] with all required elements of H&P dated 09/15/2022  Patient seen preop, chart reviewed,   No changes in medical history and health assessment since last evaluation. PE:  AAO x 3, in NAD, VSS,. Resp: breathing on RA, no respiratory distress  Cardiac: rate normal    Risk / Benefits explained to patient, patient agree to proceed with plan.   ASA 3  MP 3

## 2022-09-19 NOTE — DISCHARGE INSTRUCTIONS
Discharge Instructions following Sedation or Anesthesia:  You have  received  a sedative/anesthetic therefore, you should not consume any alcoholic beverages for minimum of 12 hours. Do not drive or operate machinery for 24 hours. Do not sign legal documents for 24 hours. Dizziness, drowsiness, and unsteadiness may occur. Rest when need to. Increase diet as tolerated. Keep up on fluids if diet allows. General Instructions:  Do not take a tub bath for 72 hours after procedure (this includes hot tubs and swimming pools). You may shower, but avoid hot water to injection site. Avoid strenuous activity TODAY especially if you experience dizziness. Remove band-aid the next day. Wash off any residual iodine   Do not use heat, heating pad, or any other heating device over the injection site for 3 days after the procedure. If you experience pain after your procedure, you may continue with your current pain medication as prescribed. (DO NOT INCREASE YOUR PAIN MEDICATION WITHOUT TALKING TO DOCTOR)  Soreness and pain at injection site is common, may use ice to reduce soreness. Please complete pain diary as instructed.      Call Isaias Perez at [phone] if you experience:   Fever, chills or temperature over 100    Vomiting, Headache, persistent stiff neck, nausea, blurred vision   Difficulty in urinating or unable to urinate with 8 hours   Increase in weakness, numbness or loss of function   Increased redness, swelling or drainage at the injection site

## 2022-09-19 NOTE — OP NOTE
Patient Name: Jazzy Larson   YOB: 1945  Room/Bed: Room/bed info not found  Medical Record Number: [id number]  Date: 9/19/2022       Sedation/ Anesthesia Plan:   intravenous sedation   as needed. Medications Planned:   midazolam (Versed) / Fentanyl  Intravenously  as needed. Preoperative Diagnosis: Lumbar spondylosis w/o myelopathy or radiculopathy  Postoperative Diagnosis: Lumbar spondylosis w/o myelopathy or radiculopathy  Blood Loss: none    Procedure Performed:  Bilateral Lumbar Medial Branch nerve Blocks at the transverse processes of L4, L5 and sacral  ala under fluoroscopy guidance    Procedure: The Patient was seen in the preop area, chart was reviewed, informed consent was obtained. Patient was taken to procedure room and was placed in prone position. Vital signs were monitored through out the  Procedure. A time out was completed. The skin over the back was prepped and draped in sterile manner. The target point was marked at the junction of Transverse process and superior articular process at the target levels. Skin and deep tissues were anesthetized with 1 % lidocaine. A 25-gauge needlele was advanced to the target spots under fluoroscopy guidance in AP / Lateral and Oblique views. Then after negative aspiration contrast dye was injected with live fluoroscopy in AP views that showed  spread of the contrast with no epidural space and no vascular runoff or intrathecal spread. Finally 0.5 ml of treatment solution 0.5% bupivacaine  was injected at each level. The needle was removed and a Band-Aid was placed over the needle  insertion site. The patient's vital signs remained stable and the patient tolerated the procedure well.       Electronically signed by Scout Austin MD on 9/19/2022 at 10:17 AM    SEDATION NOTE:    ASA CLASSIFICATION  3  MP   CLASSIFICATION  3    Moderate intravenous conscious sedation was supervised by Dr. Dorothea Barone  The patient was independently monitored by a Registered Nurse assigned to the Procedure Room  Monitoring included automated blood pressure, continuous EKG, Capnography and continuous pulse oximetry. The detailed Conscious Record is permanently stored in the [facility].      The following is the conscious sedation record;  Start Time:  1006  End times:  1017  Duration:  11 minutes  MEDS GIVEN 1 MG VERSED AND 50 MCG FENTANYL

## 2022-09-20 RX ORDER — FUROSEMIDE 40 MG/1
TABLET ORAL
Qty: 60 TABLET | Refills: 0 | Status: SHIPPED | OUTPATIENT
Start: 2022-09-20 | End: 2022-10-03

## 2022-09-20 NOTE — TELEPHONE ENCOUNTER
Ubaldo August is calling to request a refill on the following medication(s):    Medication Request:  Requested Prescriptions     Pending Prescriptions Disp Refills    furosemide (LASIX) 40 MG tablet [Pharmacy Med Name: Furosemide Oral Tablet 40 MG] 60 tablet 0     Sig: TAKE 1 TABLET BY MOUTH TWO TIMES A DAY       Last Visit Date (If Applicable):  2/2/0849    Next Visit Date:    12/8/2022

## 2022-09-23 ENCOUNTER — TELEPHONE (OUTPATIENT)
Dept: PAIN MANAGEMENT | Age: 77
End: 2022-09-23

## 2022-09-23 DIAGNOSIS — M47.816 SPONDYLOSIS OF LUMBAR REGION WITHOUT MYELOPATHY OR RADICULOPATHY: Primary | ICD-10-CM

## 2022-09-23 NOTE — TELEPHONE ENCOUNTER
Called pt to go over MBB pain diary questions  Pain before MBB with activity - 5  Pain after MBB - 3/2  Patient states that he went shopping  Patient reports 80% pain relief    Please place order for 2nd MBB

## 2022-09-28 ENCOUNTER — TELEPHONE (OUTPATIENT)
Dept: PAIN MANAGEMENT | Age: 77
End: 2022-09-28

## 2022-09-28 NOTE — TELEPHONE ENCOUNTER
Scheduled 2nd MBB w/patient. He is aware to hold 81mg ASA and diabetic medication morning of procedure, and pain meds 4 hours prior. Patient knows to go to outpatient surgery entrance.

## 2022-10-03 RX ORDER — FUROSEMIDE 40 MG/1
TABLET ORAL
Qty: 60 TABLET | Refills: 0 | Status: SHIPPED | OUTPATIENT
Start: 2022-10-03 | End: 2022-10-19

## 2022-10-03 NOTE — TELEPHONE ENCOUNTER
Please Approve or Refuse.   Send to Pharmacy per Pt's Request: [facility]      Next Visit Date:  12/8/2022   Last Visit Date: 8/9/2022    Hemoglobin A1C (%)   Date Value   05/10/2022 7.2   11/03/2021 6.8   05/03/2021 7.0             ( goal A1C is < 7)   BP Readings from Last 3 Encounters:   09/19/22 130/80   09/15/22 120/75   08/09/22 128/82          (goal 120/80)  BUN   Date Value Ref Range Status   11/04/2021 24 (H) 8 - 23 mg/dL Final     Creatinine   Date Value Ref Range Status   11/04/2021 1.42 (H) 0.70 - 1.20 mg/dL Final     Potassium   Date Value Ref Range Status   11/04/2021 3.9 3.7 - 5.3 mmol/L Final

## 2022-10-13 ENCOUNTER — OFFICE VISIT (OUTPATIENT)
Dept: PODIATRY | Age: 77
End: 2022-10-13
Payer: MEDICARE

## 2022-10-13 VITALS — BODY MASS INDEX: 29.26 KG/M2 | WEIGHT: 216 LBS | HEIGHT: 72 IN

## 2022-10-13 DIAGNOSIS — Z79.4 TYPE 2 DIABETES MELLITUS WITH DIABETIC POLYNEUROPATHY, WITH LONG-TERM CURRENT USE OF INSULIN (HCC): ICD-10-CM

## 2022-10-13 DIAGNOSIS — B35.1 ONYCHOMYCOSIS: Primary | ICD-10-CM

## 2022-10-13 DIAGNOSIS — M14.672 CHARCOT'S JOINT OF LEFT FOOT: ICD-10-CM

## 2022-10-13 DIAGNOSIS — E11.42 TYPE 2 DIABETES MELLITUS WITH DIABETIC POLYNEUROPATHY, WITH LONG-TERM CURRENT USE OF INSULIN (HCC): ICD-10-CM

## 2022-10-13 PROCEDURE — 11721 DEBRIDE NAIL 6 OR MORE: CPT | Performed by: PODIATRIST

## 2022-10-13 PROCEDURE — 99999 PR OFFICE/OUTPT VISIT,PROCEDURE ONLY: CPT | Performed by: PODIATRIST

## 2022-10-13 ASSESSMENT — ENCOUNTER SYMPTOMS
DIARRHEA: 0
COLOR CHANGE: 0
VOMITING: 0
CONSTIPATION: 0
NAUSEA: 0

## 2022-10-13 NOTE — PROGRESS NOTES
[facility]  Return Patient  Chief Complaint   Patient presents with    Nail Problem     B/l nail trim, last seen Read Helen OCAMPO 8/9/22    Diabetes     Last blood sugar 110       Shweta Vega is a 69y.o. year old male who is here for a diabetic foot check and nail trim. He would like his nails trimmed today. History of surgery type: Plantar planing medial and lateral foot. Vital signs are stable. Pain level is 0. Patient denies N/V/F/C. Review of Systems   Constitutional:  Negative for chills, diaphoresis, fatigue, fever and unexpected weight change. Cardiovascular:  Negative for leg swelling. Gastrointestinal:  Negative for constipation, diarrhea, nausea and vomiting. Musculoskeletal:  Positive for gait problem. Negative for arthralgias and joint swelling. Skin:  Negative for color change, pallor, rash and wound. Neurological:  Positive for numbness. Negative for weakness. Vascular: DP and PT pulses palpable 2/4, Right Foot and 2/4 on the Left Foot. CFT <3 seconds, Right Foot and <3 seconds on the Left Foot. Edema is absent,  Right Foot and minimal on the Left Foot. Neurological:   Sensation absent  to light touch to level of digits, both feet. Musculoskeletal:   Muscle strength is 5/5 on the Right Foot and 5/5 on the Left Foot. Structural deformities are present on the Right Foot and present on the Left Foot, but improved  Flat medial arch left foot. Integument:  Warm, dry, supple both feet. Infection is absent.        Sites of Onychomycosis Involvement (Check affected area)  [x] [x] [x] [x] [x] [x] [x] [x] [x] [x]  5 4 3 2 1 1 2 3 4 5                          Right                                        Left    Thickness  [x] [x] [x] [x] [x] [x] [x] [x] [x] [x]  5 4 3 2 1 1 2 3 4 5                         Right                                        Left    Dystrophic Changes [x] [x] [x] [x] [x] [x] [x] [x] [x] [x]  5 4 3 2 1 1 2 3 4 5                         Right                                        Left    Color                                                                  [x] [x] [x] [x] [x] [x] [x] [x] [x] [x]  5 4 3 2 1 1 2 3 4 5                          Right                                        Left    Incurvation/Ingrowin                                                                   [] [] [] [] [] [] [] [] [] []  5 4 3 2 1 1 2 3 4 5                         Right                                        Left    Inflammation/Pain                                                                   [] [] [] [] [] [] [] [] [] []  5 4 3 2 1 1 2 3 4 5                         Right                                        Left       Assesment :     Diagnosis Orders   1. Onychomycosis  GA DEBRIDEMENT OF NAILS, 6 OR MORE      2. Type 2 diabetes mellitus with diabetic polyneuropathy, with long-term current use of insulin (HCC)  GA DEBRIDEMENT OF NAILS, 6 OR MORE      3. Charcot's joint of left foot              Plan: Pt was evaluated and examined. Patient was given personalized discharge instructions. Nails 1-10 were debrided sharply in length and thickness with a nipper and , without incident. Pt will follow up in 9 weeks or sooner if any problems arise. Diagnosis was discussed with the pt and all of their questions were answered in detail. Proper foot hygiene and care was discussed with the pt. Informed patient on proper diabetic foot care and importance of tight glycemic control. Patient to check feet daily and contact the office with any questions/problems/concerns. Other comorbidity noted and will be managed by PCP. Diabetic foot examination performed this visit.   The exam included neurological sensory exam, a 10-g monofilament and pinprick sensation, vibration using a 128-Hz tuning fork, ankle reflexes, visual skin inspection, vascular exam including assessment of pedal pulses, orthopedic exam for deformities, and shoe inspection. Increased risk factors noted on the diabetic foot exam include decreased sensory exam and peripheral neuropathy. Shoegear inspected and found to be appropriate size and wear. Diabetic shoes and insoles: This patient would benefit from extra depth diabetic shoes and custom inserts. I feel he/she qualifies due to the above performed physical exam and diagnosis. I will do the appropriate paperwork and send it to their primary care physician. Then the patient will be measured for shoes and custom inserts to properly off load and protect his/her feet. No orders of the defined types were placed in this encounter. Follow up 9 weeks.

## 2022-10-17 ENCOUNTER — HOSPITAL ENCOUNTER (OUTPATIENT)
Dept: GENERAL RADIOLOGY | Age: 77
Discharge: HOME | End: 2022-10-19
Payer: MEDICARE

## 2022-10-17 ENCOUNTER — HOSPITAL ENCOUNTER (OUTPATIENT)
Dept: PAIN MANAGEMENT | Age: 77
Discharge: HOME | End: 2022-10-17
Payer: MEDICARE

## 2022-10-17 VITALS
OXYGEN SATURATION: 94 % | SYSTOLIC BLOOD PRESSURE: 123 MMHG | HEIGHT: 72 IN | WEIGHT: 216 LBS | BODY MASS INDEX: 29.26 KG/M2 | HEART RATE: 65 BPM | DIASTOLIC BLOOD PRESSURE: 75 MMHG | RESPIRATION RATE: 10 BRPM | TEMPERATURE: 98.2 F

## 2022-10-17 DIAGNOSIS — M47.817 LUMBOSACRAL SPONDYLOSIS WITHOUT MYELOPATHY: Primary | ICD-10-CM

## 2022-10-17 DIAGNOSIS — R52 PAIN MANAGEMENT: ICD-10-CM

## 2022-10-17 PROCEDURE — 64493 INJ PARAVERT F JNT L/S 1 LEV: CPT | Performed by: ANESTHESIOLOGY

## 2022-10-17 PROCEDURE — 2500000003 HC RX 250 WO HCPCS: Performed by: ANESTHESIOLOGY

## 2022-10-17 PROCEDURE — 99152 MOD SED SAME PHYS/QHP 5/>YRS: CPT | Performed by: ANESTHESIOLOGY

## 2022-10-17 PROCEDURE — 64493 INJ PARAVERT F JNT L/S 1 LEV: CPT

## 2022-10-17 PROCEDURE — 64494 INJ PARAVERT F JNT L/S 2 LEV: CPT

## 2022-10-17 PROCEDURE — 3209999900 FLUORO FOR SURGICAL PROCEDURES

## 2022-10-17 PROCEDURE — 6360000004 HC RX CONTRAST MEDICATION: Performed by: ANESTHESIOLOGY

## 2022-10-17 PROCEDURE — 6360000002 HC RX W HCPCS: Performed by: ANESTHESIOLOGY

## 2022-10-17 PROCEDURE — 64495 INJ PARAVERT F JNT L/S 3 LEV: CPT

## 2022-10-17 PROCEDURE — 64494 INJ PARAVERT F JNT L/S 2 LEV: CPT | Performed by: ANESTHESIOLOGY

## 2022-10-17 RX ORDER — MIDAZOLAM HYDROCHLORIDE 1 MG/ML
INJECTION INTRAMUSCULAR; INTRAVENOUS
Status: COMPLETED | OUTPATIENT
Start: 2022-10-17 | End: 2022-10-17

## 2022-10-17 RX ORDER — FENTANYL CITRATE 50 UG/ML
INJECTION, SOLUTION INTRAMUSCULAR; INTRAVENOUS
Status: COMPLETED | OUTPATIENT
Start: 2022-10-17 | End: 2022-10-17

## 2022-10-17 RX ORDER — LIDOCAINE HYDROCHLORIDE 10 MG/ML
INJECTION, SOLUTION EPIDURAL; INFILTRATION; INTRACAUDAL; PERINEURAL
Status: COMPLETED | OUTPATIENT
Start: 2022-10-17 | End: 2022-10-17

## 2022-10-17 RX ORDER — BUPIVACAINE HYDROCHLORIDE 5 MG/ML
INJECTION, SOLUTION EPIDURAL; INTRACAUDAL
Status: COMPLETED | OUTPATIENT
Start: 2022-10-17 | End: 2022-10-17

## 2022-10-17 RX ADMIN — MIDAZOLAM 1 MG: 1 INJECTION INTRAMUSCULAR; INTRAVENOUS at 08:54

## 2022-10-17 RX ADMIN — FENTANYL CITRATE 50 MCG: 50 INJECTION, SOLUTION INTRAMUSCULAR; INTRAVENOUS at 08:54

## 2022-10-17 RX ADMIN — LIDOCAINE HYDROCHLORIDE 5 ML: 10 INJECTION, SOLUTION EPIDURAL; INFILTRATION; INTRACAUDAL; PERINEURAL at 08:54

## 2022-10-17 RX ADMIN — BUPIVACAINE HYDROCHLORIDE 6 ML: 5 INJECTION, SOLUTION EPIDURAL; INTRACAUDAL; PERINEURAL at 08:54

## 2022-10-17 RX ADMIN — IOPAMIDOL 2 ML: 408 INJECTION, SOLUTION INTRATHECAL at 08:54

## 2022-10-17 ASSESSMENT — PAIN - FUNCTIONAL ASSESSMENT
PAIN_FUNCTIONAL_ASSESSMENT: PREVENTS OR INTERFERES WITH ALL ACTIVE AND SOME PASSIVE ACTIVITIES
PAIN_FUNCTIONAL_ASSESSMENT: 0-10

## 2022-10-17 ASSESSMENT — PAIN DESCRIPTION - DESCRIPTORS: DESCRIPTORS: ACHING;DULL

## 2022-10-17 NOTE — OP NOTE
Patient Name: Pearl Luther   YOB: 1945  Room/Bed: Room/bed info not found  Medical Record Number: [id number]  Date: 10/17/2022       Sedation/ Anesthesia Plan:   intravenous sedation   as needed. Medications Planned:   midazolam (Versed) / Fentanyl  Intravenously  as needed. Preoperative Diagnosis: Lumbar spondylosis w/o myelopathy or radiculopathy  Postoperative Diagnosis: Lumbar spondylosis w/o myelopathy or radiculopathy  Blood Loss: none    Procedure Performed:  Bilateral Lumbar Medial Branch nerve Blocks at the transverse processes of  L4, L5 and sacral  ala under fluoroscopy guidance    Procedure: The Patient was seen in the preop area, chart was reviewed, informed consent was obtained. Patient was taken to procedure room and was placed in prone position. Vital signs were monitored through out the  Procedure. A time out was completed. The skin over the back was prepped and draped in sterile manner. The target point was marked at the junction of Transverse process and superior articular process at the target levels. Skin and deep tissues were anesthetized with 1 % lidocaine. A 25-gauge needlele was advanced to the target spots under fluoroscopy guidance in AP / Lateral and Oblique views. Then after negative aspiration contrast dye was injected with live fluoroscopy in AP views that showed  spread of the contrast with no epidural space and no vascular runoff or intrathecal spread. Finally 0.5 ml of treatment solution 0.5 % BUPIVACAINE  was injected at each level. The needle was removed and a Band-Aid was placed over the needle  insertion site. The patient's vital signs remained stable and the patient tolerated the procedure well.       Post Procedure pain score in PACU was assessed with ambulation 0/10    Electronically signed by Valeria Salazar MD on 10/17/2022 at 9:27 AM    SEDATION NOTE:    ASA CLASSIFICATION  3  MP   CLASSIFICATION  3    Moderate intravenous conscious sedation was supervised by Dr. Deb Mc  The patient was independently monitored by a Registered Nurse assigned to the Procedure Room  Monitoring included automated blood pressure, continuous EKG, Capnography and continuous pulse oximetry. The detailed Conscious Record is permanently stored in the [place] [id number].      The following is the conscious sedation record;  Start Time:  0853  End times:  0903  Duration:  10 MINUTES  MEDS GIVEN 1 MG VERSED AND 50 MCG FENTANYL

## 2022-10-17 NOTE — DISCHARGE INSTRUCTIONS
Discharge Instructions following Sedation or Anesthesia:  You have  received  a sedative/anesthetic therefore, you should not consume any alcoholic beverages for minimum of 12 hours. Do not drive or operate machinery for 24 hours. Do not sign legal documents for 24 hours. Dizziness, drowsiness, and unsteadiness may occur. Rest when need to. Increase diet as tolerated. Keep up on fluids if diet allows. General Instructions:  Do not take a tub bath for 72 hours after procedure (this includes hot tubs and swimming pools). You may shower, but avoid hot water to injection site. Avoid strenuous activity TODAY especially if you experience dizziness. Remove band-aid the next day. Wash off any residual iodine   Do not use heat, heating pad, or any other heating device over the injection site for 3 days after the procedure. If you experience pain after your procedure, you may continue with your current pain medication as prescribed. (DO NOT INCREASE YOUR PAIN MEDICATION WITHOUT TALKING TO DOCTOR)  Soreness and pain at injection site is common, may use ice to reduce soreness. Please complete pain diary as instructed.      Call Isaias Perez at [phone] if you experience:   Fever, chills or temperature over 100    Vomiting, Headache, persistent stiff neck, nausea, blurred vision   Difficulty in urinating or unable to urinate with 8 hours   Increase in weakness, numbness or loss of function   Increased redness, swelling or drainage at the injection site

## 2022-10-17 NOTE — H&P
Pain Pre-Op H&P Note    Sandra Munguia MD    HPI: Lexy Lassiter  presents with     31-year-old man with history of chronic low back pain  Onset many years ago located in the lower lumbar area  Reports extension of pain in leg  Back pain is constant fluctuate in intensity  Aggravated with excessive activity    EXAMINATION: MRI OF THE LUMBAR SPINE WITHOUT CONTRAST, 7/7/2022 7:25 pm      Impression 1. No significant spondylotic changes within the lumbar spine. 2. Mild remote compression fracture deformity involving the L4 superior endplate. 3. Moderate remote compression fracture deformity involving T12 with 3 mm retropulsion of the superior endplate without significant canal stenosis. No acute fractures.       Past Medical History:   Diagnosis Date    Abdominal pain     Benign prostatic hypertrophy     C. difficile colitis     CAD (coronary artery disease) 1/3/12    s/p CABGx3    Colon polyp 02/25/2019    tubular adenoma    Constipation     Diabetes mellitus (HCC)     Diarrhea     Diarrhea     DVT (deep venous thrombosis) (HCC)     left calf    Ejection fraction < 50%     Gallstones     Heartburn     Hx of blood clots     Hyperlipidemia     Kidney stones     Lumbar spinal stenosis 4/21/2014    MI (myocardial infarction) ([facility] [address]     2011    Mitral regurgitation     MRSA (methicillin resistant staph aureus) culture positive     Numbness and tingling     hands and feet    Rib fractures 1-2015    right    Wears glasses     readers       Past Surgical History:   Procedure Laterality Date    APPENDECTOMY      CARDIAC CATHETERIZATION  12/29/11    CHOLECYSTECTOMY      COLONOSCOPY  01/11/2012    wnl, 10 yr recall    COLONOSCOPY  11/28/2018    ATTEMPTED NOT CLEAN (N/A )    COLONOSCOPY  02/25/2019    tubular adenoma    COLONOSCOPY N/A 2/25/2019    COLONOSCOPY WITH BIOPSY performed by Sylwia Mclaughlin MD at [facility]      x 1    CORONARY ARTERY BYPASS GRAFT  1/3/12    x3    KNEE ARTHROSCOPY      left    KNEE SURGERY Left     I&D    OTHER SURGICAL HISTORY Left 3/7/2016    plantar plane &  exostectomy medial foot left    MO COLON CA SCRN NOT HI RSK IND N/A 11/28/2018    COLONOSCOPY ATTEMPTED NOT CLEAN performed by Marcial Wetzel MD at [place] [address]  11-3-15    VENA CAVA FILTER PLACEMENT      WRIST SURGERY      I&D       Family History   Problem Relation Age of Onset    Heart Failure Father     Cancer Mother         breast    Cancer Maternal Grandfather        Allergies   Allergen Reactions    Flagyl [Metronidazole] Hives    Metronidazole      Other reaction(s): Vomiting         Current Outpatient Medications:     furosemide (LASIX) 40 MG tablet, TAKE 1 TABLET BY MOUTH TWO TIMES A DAY, Disp: 60 tablet, Rfl: 0    oxyCODONE (ROXICODONE) 5 MG immediate release tablet, Take 1 tablet by mouth 2 times daily as needed for Pain for up to 30 days. , Disp: 60 tablet, Rfl: 0    omeprazole (PRILOSEC) 20 MG delayed release capsule, TAKE 1 CAPSULE BY MOUTH EVERY DAY, Disp: 90 capsule, Rfl: 0    simvastatin (ZOCOR) 20 MG tablet, TAKE 1 TABLET BY MOUTH EVERY DAY AT NIGHT, Disp: 90 tablet, Rfl: 0    topiramate (TOPAMAX) 50 MG tablet, Take 1 tablet by mouth in the morning and 1 tablet before bedtime. , Disp: 60 tablet, Rfl: 2    Insulin Pen Needle (BD PEN NEEDLE ALIZE 2ND GEN) 32G X 4 MM MISC, USE AS DIRECTED TWICE DAILY, Disp: 100 each, Rfl: 1    gabapentin (NEURONTIN) 800 MG tablet, Take 1 tablet by mouth in the morning for 90 days. , Disp: 90 tablet, Rfl: 2    LANTUS SOLOSTAR 100 UNIT/ML injection pen, inject 30 units subcutaneously in the morning and 40 units in the evening, Disp: 45 mL, Rfl: 3    naloxone (NARCAN) 4 MG/0.1ML LIQD nasal spray, 1 spray by Nasal route as needed (if needed for respiratory depression), Disp: 1 each, Rfl: 0    blood glucose test strips (FREESTYLE LITE) strip, TEST TWICE DAILY AS DIRECTED, Disp: 100 strip, Rfl: 1    aspirin 81 MG tablet, Take 81 mg by mouth daily. , Disp: , Rfl:     Vitamin D (CHOLECALCIFEROL) 1000 UNITS CAPS capsule, Take 2,000 Units by mouth daily , Disp: , Rfl:     Ascorbic Acid (VITAMIN C) 500 MG tablet, Take 1,000 mg by mouth daily , Disp: , Rfl:     NITROSTAT 0.4 MG SL tablet, , Disp: , Rfl:     finasteride (PROSCAR) 5 MG tablet, Take 5 mg by mouth daily. , Disp: , Rfl:     tamsulosin (FLOMAX) 0.4 MG capsule, Take 0.4 mg by mouth daily. , Disp: , Rfl:     Social History     Tobacco Use    Smoking status: Former     Packs/day: 1.00     Years: 30.00     Pack years: 30.00     Types: Cigarettes     Quit date: 2002     Years since quittin.7    Smokeless tobacco: Never   Substance Use Topics    Alcohol use: Not Currently     Comment: rare       Review of Systems:   Focused review of systems was performed, and negative as pertinent to diagnosis, except as stated in HPI. Physical Exam  Constitutional:       Appearance: Normal appearance. Pulmonary:      Effort: Pulmonary effort is normal.   Neurological:      Mental Status: alert. Psychiatric:         Attention and Perception: Attention and perception normal.         Mood and Affect: Mood and affect normal.   Cardiovascular:      Rate: Normal rate. ASA: 3          Mallampati: 3       Patient's current physical status, medications, medical history, and HPI have been reviewed and updated as appropriate on this date: 10/17/22    Risk/Benefit(s): The risks, benefits, alternatives, and potential complications have been discussed with the patient/family and informed consent has been obtained for the procedure/sedation. Diagnosis:   1.  Lumbosacral spondylosis without myelopathy            Plan:   LUMBAR DIAGNOSTIC MEDIAL BRANCH NERVE BLOCK AT L4/5 L5/S1 FACETS        Vinnie Irvin MD

## 2022-10-18 ENCOUNTER — HOSPITAL ENCOUNTER (OUTPATIENT)
Dept: PAIN MANAGEMENT | Age: 77
Discharge: HOME | End: 2022-10-18
Payer: MEDICARE

## 2022-10-18 VITALS — SYSTOLIC BLOOD PRESSURE: 109 MMHG | DIASTOLIC BLOOD PRESSURE: 63 MMHG | HEART RATE: 67 BPM | OXYGEN SATURATION: 98 %

## 2022-10-18 DIAGNOSIS — M54.51 VERTEBROGENIC LOW BACK PAIN: ICD-10-CM

## 2022-10-18 DIAGNOSIS — M47.899 FACET SYNDROME: ICD-10-CM

## 2022-10-18 DIAGNOSIS — Z79.891 CHRONIC USE OF OPIATE DRUG FOR THERAPEUTIC PURPOSE: Primary | Chronic | ICD-10-CM

## 2022-10-18 DIAGNOSIS — M47.817 LUMBOSACRAL SPONDYLOSIS WITHOUT MYELOPATHY: ICD-10-CM

## 2022-10-18 PROCEDURE — 99213 OFFICE O/P EST LOW 20 MIN: CPT | Performed by: NURSE PRACTITIONER

## 2022-10-18 PROCEDURE — 99213 OFFICE O/P EST LOW 20 MIN: CPT

## 2022-10-18 RX ORDER — OXYCODONE HYDROCHLORIDE 5 MG/1
5 TABLET ORAL 2 TIMES DAILY PRN
Qty: 60 TABLET | Refills: 0 | Status: SHIPPED | OUTPATIENT
Start: 2022-10-28 | End: 2022-11-27

## 2022-10-18 ASSESSMENT — ENCOUNTER SYMPTOMS
CONSTIPATION: 0
BACK PAIN: 1
BOWEL INCONTINENCE: 0
SHORTNESS OF BREATH: 0
COUGH: 0

## 2022-10-18 NOTE — PROGRESS NOTES
Chief Complaint:    PMH     Pt c/o low back pain radiating down his legs with numbness in feet d/t neuropathy. No surgery to the area and injections in distant past. The pain has has worsened and does reports to less activity since fentanyl was d/c. Does not like to take oxycodone BID. Recent MRI 7/2022 which showed No significant spinal stenosis or nerve root impingement or foraminal stenosis  Severe degenerative disc disease with endplate degeneration and Modic changes involving L4-L5 and S1 vertebrae. Facet arthropathy at L4-5 L5-S1 level    Here today to f/u after Bilateral Lumbar Medial Branch nerve Blocks at the transverse processes of  L4, L5 and sacral  ala  done 10/17/22 and reports  100% relief of pain and improved activity tolerance for 2 hours and then slowly returned to baseline. Would like to proceed with RFA         HPI:     Back Pain  This is a chronic problem. The current episode started more than 1 year ago. The problem occurs constantly. The problem is unchanged. The pain is present in the lumbar spine. The quality of the pain is described as aching. The pain radiates to the right thigh, right foot, left knee, left thigh, right knee and left foot. The pain is at a severity of 5/10. The pain is moderate. The pain is The same all the time. The symptoms are aggravated by lying down. Stiffness is present All day. Associated symptoms include numbness. Pertinent negatives include no bladder incontinence, bowel incontinence, chest pain, fever, headaches, tingling or weakness. He has tried ice and heat (MBB, pain medication) for the symptoms. The treatment provided mild relief. Patient denies any new neurological symptoms. No bowel or bladder incontinence, no weakness, and no falling.     Pill count: appropriate Oxycodone #22 due  10/20 prescription adjusted appropriately 10/28    Morphine equivalent: 15    Periodic Controlled Substance Monitoring: Possible medication side effects, risk of tolerance/dependence & alternative treatments discussed., No signs of potential drug abuse or diversion identified. , Assessed functional status., Obtaining appropriate analgesic effect of treatment.  Mariluz Alberto, APRN - CNP)      Past Medical History:   Diagnosis Date    Abdominal pain     Benign prostatic hypertrophy     C. difficile colitis     CAD (coronary artery disease) 1/3/12    s/p CABGx3    Colon polyp 02/25/2019    tubular adenoma    Constipation     Diabetes mellitus (HCC)     Diarrhea     Diarrhea     DVT (deep venous thrombosis) (HCC)     left calf    Ejection fraction < 50%     Gallstones     Heartburn     Hx of blood clots     Hyperlipidemia     Kidney stones     Lumbar spinal stenosis 4/21/2014    MI (myocardial infarction) ([facility] [address]     2011    Mitral regurgitation     MRSA (methicillin resistant staph aureus) culture positive     Numbness and tingling     hands and feet    Rib fractures 1-2015    right    Wears glasses     readers       Past Surgical History:   Procedure Laterality Date    APPENDECTOMY      CARDIAC CATHETERIZATION  12/29/11    CHOLECYSTECTOMY      COLONOSCOPY  01/11/2012    wnl, 10 yr recall    COLONOSCOPY  11/28/2018    ATTEMPTED NOT CLEAN (N/A )    COLONOSCOPY  02/25/2019    tubular adenoma    COLONOSCOPY N/A 2/25/2019    COLONOSCOPY WITH BIOPSY performed by Marcial Wetzel MD at [address]      x 1    CORONARY ARTERY BYPASS GRAFT  1/3/12    x3    KNEE ARTHROSCOPY      left    KNEE SURGERY Left     I&D    OTHER SURGICAL HISTORY Left 3/7/2016    plantar plane &  exostectomy medial foot left    MO COLON CA SCRN NOT HI RSK IND N/A 11/28/2018    COLONOSCOPY ATTEMPTED NOT CLEAN performed by Marcial [facility]zel MD at [facility]  11-3-15    VENA CAVA FILTER PLACEMENT      WRIST SURGERY      I&D       Allergies   Allergen Reactions    Flagyl [Metronidazole] Hives    Metronidazole      Other reaction(s): Vomiting         Current Outpatient Medications:     [START ON 10/28/2022] oxyCODONE (ROXICODONE) 5 MG immediate release tablet, Take 1 tablet by mouth 2 times daily as needed for Pain for up to 30 days. , Disp: 60 tablet, Rfl: 0    furosemide (LASIX) 40 MG tablet, TAKE 1 TABLET BY MOUTH TWO TIMES A DAY, Disp: 60 tablet, Rfl: 0    omeprazole (PRILOSEC) 20 MG delayed release capsule, TAKE 1 CAPSULE BY MOUTH EVERY DAY, Disp: 90 capsule, Rfl: 0    simvastatin (ZOCOR) 20 MG tablet, TAKE 1 TABLET BY MOUTH EVERY DAY AT NIGHT, Disp: 90 tablet, Rfl: 0    topiramate (TOPAMAX) 50 MG tablet, Take 1 tablet by mouth in the morning and 1 tablet before bedtime. , Disp: 60 tablet, Rfl: 2    Insulin Pen Needle (BD PEN NEEDLE ALIZE 2ND GEN) 32G X 4 MM MISC, USE AS DIRECTED TWICE DAILY, Disp: 100 each, Rfl: 1    gabapentin (NEURONTIN) 800 MG tablet, Take 1 tablet by mouth in the morning for 90 days. , Disp: 90 tablet, Rfl: 2    LANTUS SOLOSTAR 100 UNIT/ML injection pen, inject 30 units subcutaneously in the morning and 40 units in the evening, Disp: 45 mL, Rfl: 3    naloxone (NARCAN) 4 MG/0.1ML LIQD nasal spray, 1 spray by Nasal route as needed (if needed for respiratory depression), Disp: 1 each, Rfl: 0    blood glucose test strips (FREESTYLE LITE) strip, TEST TWICE DAILY AS DIRECTED, Disp: 100 strip, Rfl: 1    aspirin 81 MG tablet, Take 81 mg by mouth daily. , Disp: , Rfl:     Vitamin D (CHOLECALCIFEROL) 1000 UNITS CAPS capsule, Take 2,000 Units by mouth daily , Disp: , Rfl:     Ascorbic Acid (VITAMIN C) 500 MG tablet, Take 1,000 mg by mouth daily , Disp: , Rfl:     NITROSTAT 0.4 MG SL tablet, , Disp: , Rfl:     finasteride (PROSCAR) 5 MG tablet, Take 5 mg by mouth daily. , Disp: , Rfl:     tamsulosin (FLOMAX) 0.4 MG capsule, Take 0.4 mg by mouth daily.   , Disp: , Rfl:     Family History   Problem Relation Age of Onset    Heart Failure Father     Cancer Mother         breast    Cancer Maternal Grandfather        Social History Physical Exam:  /63   Pulse 67   SpO2 98%     Physical Exam  Cardiovascular:      Rate and Rhythm: Normal rate. Pulmonary:      Effort: Pulmonary effort is normal.   Musculoskeletal:      Lumbar back: Decreased range of motion. Comments: Antalgic gait using cane    Skin:     General: Skin is warm and dry. Neurological:      Mental Status: He is alert and oriented to person, place, and time. Record/Diagnostics Review:    Last alexander  5/22 and was appropriate     Assessment:  Problem List Items Addressed This Visit       Chronic use of opiate drug for therapeutic purpose - Primary (Chronic)    Vertebrogenic low back pain    Relevant Medications    oxyCODONE (ROXICODONE) 5 MG immediate release tablet (Start on 10/28/2022)    Lumbosacral spondylosis without myelopathy    Relevant Medications    oxyCODONE (ROXICODONE) 5 MG immediate release tablet (Start on 10/28/2022)    Other Relevant Orders    RADIOFREQUENCY L/S ADDITIONAL    Facet syndrome ([facility] [address]    Relevant Orders    RADIOFREQUENCY L/S ADDITIONAL          Treatment Plan:  Patient relates current medications are helping the pain. Patient reports taking pain medications as prescribed, denies obtaining medications from different sources and denies use of illegal drugs. Patient denies side effects from medications like nausea, vomiting, constipation or drowsiness. Patient reports current activities of daily living are possible due to medications and would like to continue them. As always, we encourage daily stretching and strengthening exercises, and recommend minimizing use of pain medications unless patient cannot get through daily activities due to pain. Contract requirements met. Continue opioid therapy.  Script written for oxycodone   Patient relates significant relief from recent intervention   Bilateral Lumbar RFA at the transverse processes of  L4, L5 and sacral  ala ordered  Follow up appointment made for 4 weeks    I have reviewed the chief complaint and history of present illness (including ROS and PFSH) and vital documentation by my staff and I agree with their documentation and have added where applicable.

## 2022-10-19 RX ORDER — FUROSEMIDE 40 MG/1
TABLET ORAL
Qty: 60 TABLET | Refills: 0 | Status: SHIPPED | OUTPATIENT
Start: 2022-10-19

## 2022-10-29 DIAGNOSIS — G44.221 CHRONIC TENSION-TYPE HEADACHE, INTRACTABLE: ICD-10-CM

## 2022-10-31 RX ORDER — TOPIRAMATE 50 MG/1
TABLET, FILM COATED ORAL
Qty: 60 TABLET | Refills: 0 | Status: SHIPPED | OUTPATIENT
Start: 2022-10-31

## 2022-10-31 NOTE — TELEPHONE ENCOUNTER
Please Approve or Refuse.   Send to Pharmacy per Pt's Request: [facility]      Next Visit Date:  12/8/2022   Last Visit Date: 8/9/2022    Hemoglobin A1C (%)   Date Value   05/10/2022 7.2   11/03/2021 6.8   05/03/2021 7.0             ( goal A1C is < 7)   BP Readings from Last 3 Encounters:   10/18/22 109/63   10/17/22 123/75   09/19/22 130/80          (goal 120/80)  BUN   Date Value Ref Range Status   11/04/2021 24 (H) 8 - 23 mg/dL Final     Creatinine   Date Value Ref Range Status   11/04/2021 1.42 (H) 0.70 - 1.20 mg/dL Final     Potassium   Date Value Ref Range Status   11/04/2021 3.9 3.7 - 5.3 mmol/L Final

## 2022-11-11 DIAGNOSIS — K58.9 IRRITABLE BOWEL SYNDROME WITHOUT DIARRHEA: ICD-10-CM

## 2022-11-11 RX ORDER — OMEPRAZOLE 20 MG/1
CAPSULE, DELAYED RELEASE ORAL
Qty: 90 CAPSULE | Refills: 0 | Status: SHIPPED | OUTPATIENT
Start: 2022-11-11

## 2022-11-11 RX ORDER — FUROSEMIDE 40 MG/1
TABLET ORAL
Qty: 60 TABLET | Refills: 0 | Status: SHIPPED | OUTPATIENT
Start: 2022-11-11

## 2022-11-14 ENCOUNTER — HOSPITAL ENCOUNTER (OUTPATIENT)
Dept: GENERAL RADIOLOGY | Age: 77
Discharge: HOME | End: 2022-11-16
Payer: MEDICARE

## 2022-11-14 ENCOUNTER — HOSPITAL ENCOUNTER (OUTPATIENT)
Dept: PAIN MANAGEMENT | Age: 77
Discharge: HOME | End: 2022-11-14
Payer: MEDICARE

## 2022-11-14 VITALS
WEIGHT: 216 LBS | RESPIRATION RATE: 14 BRPM | TEMPERATURE: 97.5 F | HEART RATE: 74 BPM | DIASTOLIC BLOOD PRESSURE: 83 MMHG | HEIGHT: 72 IN | SYSTOLIC BLOOD PRESSURE: 122 MMHG | OXYGEN SATURATION: 96 % | BODY MASS INDEX: 29.26 KG/M2

## 2022-11-14 DIAGNOSIS — M47.817 LUMBOSACRAL SPONDYLOSIS WITHOUT MYELOPATHY: Primary | ICD-10-CM

## 2022-11-14 DIAGNOSIS — R52 PAIN MANAGEMENT: ICD-10-CM

## 2022-11-14 PROCEDURE — 2500000003 HC RX 250 WO HCPCS: Performed by: ANESTHESIOLOGY

## 2022-11-14 PROCEDURE — 64635 DESTROY LUMB/SAC FACET JNT: CPT

## 2022-11-14 PROCEDURE — 64636 DESTROY L/S FACET JNT ADDL: CPT

## 2022-11-14 PROCEDURE — 99152 MOD SED SAME PHYS/QHP 5/>YRS: CPT | Performed by: ANESTHESIOLOGY

## 2022-11-14 PROCEDURE — 64635 DESTROY LUMB/SAC FACET JNT: CPT | Performed by: ANESTHESIOLOGY

## 2022-11-14 PROCEDURE — 3209999900 FLUORO FOR SURGICAL PROCEDURES

## 2022-11-14 PROCEDURE — 64636 DESTROY L/S FACET JNT ADDL: CPT | Performed by: ANESTHESIOLOGY

## 2022-11-14 PROCEDURE — 6360000002 HC RX W HCPCS: Performed by: ANESTHESIOLOGY

## 2022-11-14 RX ORDER — LIDOCAINE HYDROCHLORIDE 10 MG/ML
INJECTION, SOLUTION EPIDURAL; INFILTRATION; INTRACAUDAL; PERINEURAL
Status: COMPLETED | OUTPATIENT
Start: 2022-11-14 | End: 2022-11-14

## 2022-11-14 RX ORDER — MIDAZOLAM HYDROCHLORIDE 1 MG/ML
INJECTION INTRAMUSCULAR; INTRAVENOUS
Status: COMPLETED | OUTPATIENT
Start: 2022-11-14 | End: 2022-11-14

## 2022-11-14 RX ORDER — FENTANYL CITRATE 0.05 MG/ML
INJECTION, SOLUTION INTRAMUSCULAR; INTRAVENOUS
Status: COMPLETED | OUTPATIENT
Start: 2022-11-14 | End: 2022-11-14

## 2022-11-14 RX ORDER — LIDOCAINE HYDROCHLORIDE 40 MG/ML
INJECTION, SOLUTION RETROBULBAR; TOPICAL
Status: COMPLETED | OUTPATIENT
Start: 2022-11-14 | End: 2022-11-14

## 2022-11-14 RX ADMIN — MIDAZOLAM 1 MG: 1 INJECTION INTRAMUSCULAR; INTRAVENOUS at 08:41

## 2022-11-14 RX ADMIN — FENTANYL CITRATE 50 MCG: 0.05 INJECTION, SOLUTION INTRAMUSCULAR; INTRAVENOUS at 08:41

## 2022-11-14 RX ADMIN — LIDOCAINE HYDROCHLORIDE 5 ML: 10 INJECTION, SOLUTION EPIDURAL; INFILTRATION; INTRACAUDAL; PERINEURAL at 08:41

## 2022-11-14 RX ADMIN — LIDOCAINE HYDROCHLORIDE 5 ML: 40 INJECTION, SOLUTION RETROBULBAR; TOPICAL at 08:42

## 2022-11-14 ASSESSMENT — PAIN DESCRIPTION - DESCRIPTORS: DESCRIPTORS: ACHING;DULL

## 2022-11-14 ASSESSMENT — PAIN - FUNCTIONAL ASSESSMENT
PAIN_FUNCTIONAL_ASSESSMENT: 0-10
PAIN_FUNCTIONAL_ASSESSMENT: PREVENTS OR INTERFERES WITH ALL ACTIVE AND SOME PASSIVE ACTIVITIES

## 2022-11-14 NOTE — H&P
UPDATE:  Office visit pain clinic in [facility] with all required elements of H&P dated 10/17/2022  Patient seen preop, chart reviewed,   No changes in medical history and health assessment since last evaluation. PE:  AAO x 3, in NAD, VSS,. Resp: breathing on RA, no respiratory distress  Cardiac: rate normal    Risk / Benefits explained to patient, patient agree to proceed with plan.   ASA 3  MP 3

## 2022-11-14 NOTE — OP NOTE
Preoperative Diagnosis: Lumbar spondylosis w/o myelopathy, chronic low back pain  Postoperative Diagnosis: Lumbar spondylosis w/o myelopathy, chronic low back pain  SEDATION: SEE SEDATION NOTE  BLOOD LOSS: NONE    Procedure Performed:  :Radiofrequency ablation of median branches at the Transverse processes of L4, L5 AND ALA for L4/5 AND L5/S1 facet joints on Bilateral under fluoroscopic guidance with IV sedation. t    Procedure:    After starting an IV, the patient was taken to procedure room. The patient was placed in prone position and skin over the back was prepped and draped in sterile manner. Standard monitors were connected and vitals were monitored during the case and they remained stable during the procedure. A meaningful communication was kept up with the patient throughout the procedure. Then using fluoroscopy the junction of the transverse process of the target vertebra with the superior process of the facet joint was observed and the view was optimized. The skin and deep tissues were infiltrated with 2 ml of  1 % lidocaine. The RF canula with the 10 mm active tip was introduced through the skin wheal under fluoroscopy guidance such that the tip of the needle lies in the groove of the transverse process with the superior processes of the facet joint. Then a lateral and AP view of the lumbar spine was obtained to make sure the tip of needle is not in the neural foramen. Then electric impedence was checked to make sure it is acceptable. Then a sensory stimulus was applied at 50 Hz up to 0.5 volt and concordant pain symptoms were reproduced. Then a motor stimulus was applied at 2 Hz up to 2 volts or 3 x times the sensory stimulus and no motor stimulation was seen in lower extremities. Some multifidus stimulus was seen. Then after negative aspiration 1 ml of 4% lidocaine was injected through the needle at each level. The radiofrequency lesion was done at 85 degrees centigrade for 110 seconds. Patient's vital signs and neurological status remained stable throughout the procedure and post procedural period. The patient tolerated the procedure well and was discharged home in stable condition. SEDATION NOTE:    ASA CLASSIFICATION  3  MP   CLASSIFICATION  3    Moderate intravenous conscious sedation was supervised by Dr. Richard Foss  The patient was independently monitored by a Registered Nurse assigned to the Procedure Room  Monitoring included automated blood pressure, continuous EKG, Capnography and continuous pulse oximetry. The detailed Conscious Record is permanently stored in the [employer].      The following is the conscious sedation record;  Start Time:  0836  End times:  0856  Duration:  20 MINUTES  MEDS GIVEN 1 MG VERSED AND 50 MCG FENTANYL

## 2022-11-14 NOTE — DISCHARGE INSTRUCTIONS
Discharge Instructions following Sedation or Anesthesia:  You have  received  a sedative/anesthetic therefore, you should not consume any alcoholic beverages for minimum of 12 hours. Do not drive or operate machinery for 24 hours. Do not sign legal documents for 24 hours. Dizziness, drowsiness, and unsteadiness may occur. Rest when need to. Increase diet as tolerated. Keep up on fluids if diet allows. General Instructions:  Do not take a tub bath for 72 hours after procedure (this includes hot tubs and swimming pools). You may shower, but avoid hot water to injection site. Avoid strenuous activity TODAY especially if you experience dizziness. Remove band-aid the next day. Wash off any residual iodine   Do not use heat, heating pad, or any other heating device over the injection site for 3 days after the procedure. If you experience pain after your procedure, you may continue with your current pain medication as prescribed. (DO NOT INCREASE YOUR PAIN MEDICATION WITHOUT TALKING TO DOCTOR)  Soreness and pain at injection site is common, may use ice to reduce soreness.     [facility] at [phone] if you experience:   Fever, chills or temperature over 100    Vomiting, Headache, persistent stiff neck, nausea, blurred vision   Difficulty in urinating or unable to urinate with 8 hours   Increase in weakness, numbness or loss of function   Increased redness, swelling or drainage at the injection site

## 2022-11-17 ENCOUNTER — HOSPITAL ENCOUNTER (OUTPATIENT)
Dept: PAIN MANAGEMENT | Age: 77
Discharge: HOME | End: 2022-11-17
Payer: MEDICARE

## 2022-11-17 VITALS
DIASTOLIC BLOOD PRESSURE: 79 MMHG | RESPIRATION RATE: 16 BRPM | TEMPERATURE: 97.3 F | WEIGHT: 212 LBS | HEART RATE: 79 BPM | OXYGEN SATURATION: 98 % | HEIGHT: 72 IN | BODY MASS INDEX: 28.71 KG/M2 | SYSTOLIC BLOOD PRESSURE: 130 MMHG

## 2022-11-17 DIAGNOSIS — Z79.891 CHRONIC USE OF OPIATE DRUG FOR THERAPEUTIC PURPOSE: Primary | Chronic | ICD-10-CM

## 2022-11-17 DIAGNOSIS — M47.817 LUMBOSACRAL SPONDYLOSIS WITHOUT MYELOPATHY: ICD-10-CM

## 2022-11-17 DIAGNOSIS — M54.51 VERTEBROGENIC LOW BACK PAIN: ICD-10-CM

## 2022-11-17 PROCEDURE — 99214 OFFICE O/P EST MOD 30 MIN: CPT | Performed by: ANESTHESIOLOGY

## 2022-11-17 PROCEDURE — 99213 OFFICE O/P EST LOW 20 MIN: CPT

## 2022-11-17 RX ORDER — OXYCODONE HYDROCHLORIDE 5 MG/1
5 TABLET ORAL 2 TIMES DAILY PRN
Qty: 60 TABLET | Refills: 0 | Status: SHIPPED | OUTPATIENT
Start: 2022-11-17 | End: 2022-12-17

## 2022-11-17 ASSESSMENT — PAIN SCALES - GENERAL: PAINLEVEL_OUTOF10: 3

## 2022-11-17 ASSESSMENT — ENCOUNTER SYMPTOMS
EYES NEGATIVE: 1
BACK PAIN: 1
RESPIRATORY NEGATIVE: 1

## 2022-11-17 NOTE — PROGRESS NOTES
The patient is a 69 y. o. Non- / non  male. Chief Complaint   Patient presents with    Back Pain     Med refill        HPI    Medication Refill: Oxycodone -     Pain score Today:  3  Adverse effects (Constipation / Nausea / Sedation / sexual Dysfunction / others) : none  Mood: good  Sleep pattern and quality: good  Activity level: fair    Pill count Today: 24  Last dose taken  11/16/2022 night  OARRS report reviewed today: yes  ER/Hospitalizations/PCP visit related to pain since last visit:no   Any legal problems e.g. DUI etc.:No  Satisfied with current management: Yes    Opioid Contract: updated today  Last Urine Dug screen dated: 05/19/2022    Hemoglobin A1C   Date Value Ref Range Status   05/10/2022 7.2 % Final       Past Medical History, Past Surgical History, Social History, Allergies and Medications reviewed and updated in EPIC as indicated    Family History reviewed and is noncontributory.         Past Medical History:   Diagnosis Date    Abdominal pain     Benign prostatic hypertrophy     C. difficile colitis     CAD (coronary artery disease) 1/3/12    s/p CABGx3    Colon polyp 02/25/2019    tubular adenoma    Constipation     Diabetes mellitus (HCC)     Diarrhea     Diarrhea     DVT (deep venous thrombosis) (HCC)     left calf    Ejection fraction < 50%     Gallstones     Heartburn     Hx of blood clots     Hyperlipidemia     Kidney stones     Lumbar spinal stenosis 4/21/2014    MI (myocardial infarction) ([facility] [place] [address]     2011    Mitral regurgitation     MRSA (methicillin resistant staph aureus) culture positive     Numbness and tingling     hands and feet    Rib fractures 1-2015    right    Wears glasses     readers        Past Surgical History:   Procedure Laterality Date    APPENDECTOMY      CARDIAC CATHETERIZATION  12/29/11    CHOLECYSTECTOMY      COLONOSCOPY  01/11/2012    wnl, 10 yr recall    COLONOSCOPY  11/28/2018    ATTEMPTED NOT CLEAN (N/A )    COLONOSCOPY  02/25/2019    tubular adenoma COLONOSCOPY N/A 2019    COLONOSCOPY WITH BIOPSY performed by Jessy Jj MD at [address]      x 1    CORONARY ARTERY BYPASS GRAFT  1/3/12    x3    KNEE ARTHROSCOPY      left    KNEE SURGERY Left     I&D    OTHER SURGICAL HISTORY Left 3/7/2016    plantar plane &  exostectomy medial foot left    MD COLON CA SCRN NOT HI RSK IND N/A 2018    COLONOSCOPY ATTEMPTED NOT CLEAN performed by Jessy Jj MD at [place] [address]  11-3-15    VENA CAVA FILTER PLACEMENT      WRIST SURGERY      I&D       Social History     Socioeconomic History    Marital status:      Spouse name: None    Number of children: None    Years of education: None    Highest education level: None   Occupational History    Occupation: retired [employer]   Tobacco Use    Smoking status: Former     Packs/day: 1.00     Years: 30.00     Pack years: 30.00     Types: Cigarettes     Quit date: 2002     Years since quittin.8    Smokeless tobacco: Never   Vaping Use    Vaping Use: Never used   Substance and Sexual Activity    Alcohol use: Not Currently     Comment: rare    Drug use: No    Sexual activity: Never     Social Determinants of Health     Financial Resource Strain: Low Risk     Difficulty of Paying Living Expenses: Not hard at all   Food Insecurity: No Food Insecurity    Worried About [facility] in the Last Year: Never true    [address] [facility] [address] in the Last Year: Never true   Transportation Needs: No Transportation Needs    Lack of Transportation (Medical): No    Lack of Transportation (Non-Medical):  No   Physical Activity: Inactive    Days of Exercise per Week: 0 days    Minutes of Exercise per Session: 0 min   Housing Stability: Unknown    Unable to Pay for Housing in the Last Year: No    Unstable Housing in the Last Year: No       Family History   Problem Relation Age of Onset    Heart Failure Father     Cancer Mother breast    Cancer Maternal Grandfather        Allergies   Allergen Reactions    Flagyl [Metronidazole] Hives    Metronidazole      Other reaction(s): Vomiting       There were no vitals filed for this visit. Current Outpatient Medications   Medication Sig Dispense Refill    omeprazole (PRILOSEC) 20 MG delayed release capsule TAKE 1 CAPSULE BY MOUTH EVERY DAY 90 capsule 0    furosemide (LASIX) 40 MG tablet TAKE 1 TABLET BY MOUTH TWO TIMES A DAY 60 tablet 0    topiramate (TOPAMAX) 50 MG tablet TAKE 1 TABLET BY MOUTH IN THE MORNING AND before BEDTIME 60 tablet 0    oxyCODONE (ROXICODONE) 5 MG immediate release tablet Take 1 tablet by mouth 2 times daily as needed for Pain for up to 30 days. 60 tablet 0    simvastatin (ZOCOR) 20 MG tablet TAKE 1 TABLET BY MOUTH EVERY DAY AT NIGHT 90 tablet 0    Insulin Pen Needle (BD PEN NEEDLE ALIZE 2ND GEN) 32G X 4 MM MISC USE AS DIRECTED TWICE DAILY 100 each 1    gabapentin (NEURONTIN) 800 MG tablet Take 1 tablet by mouth in the morning for 90 days. 90 tablet 2    LANTUS SOLOSTAR 100 UNIT/ML injection pen inject 30 units subcutaneously in the morning and 40 units in the evening 45 mL 3    naloxone (NARCAN) 4 MG/0.1ML LIQD nasal spray 1 spray by Nasal route as needed (if needed for respiratory depression) 1 each 0    blood glucose test strips (FREESTYLE LITE) strip TEST TWICE DAILY AS DIRECTED 100 strip 1    aspirin 81 MG tablet Take 81 mg by mouth daily. Vitamin D (CHOLECALCIFEROL) 1000 UNITS CAPS capsule Take 2,000 Units by mouth daily       Ascorbic Acid (VITAMIN C) 500 MG tablet Take 1,000 mg by mouth daily       NITROSTAT 0.4 MG SL tablet       finasteride (PROSCAR) 5 MG tablet Take 5 mg by mouth daily. tamsulosin (FLOMAX) 0.4 MG capsule Take 0.4 mg by mouth daily. No current facility-administered medications for this encounter. Review of Systems   Constitutional: Negative. HENT: Negative. Eyes: Negative. Respiratory: Negative. Cardiovascular: Negative. Musculoskeletal:  Positive for back pain. Objective:  Vital signs: (most recent): There were no vitals taken for this visit. Assessment & Plan  No diagnosis found. No orders of the defined types were placed in this encounter. No orders of the defined types were placed in this encounter.            Electronically signed by Roseanna Walsh MA on 11/17/2022 at 8:55 AM

## 2022-11-17 NOTE — PROGRESS NOTES
The patient is a 69 y. o. Non- / non  male. Chief Complaint   Patient presents with    Back Pain     Med refill        HPI    71-year-old man with history of chronic axial lower back pain  Was diagnosed with facet mediated pain  Recently had lumbar medial branch radiofrequency ablation  Today for postprocedure follow-up  Report 70 to 80% improvement in axial lower back pain with improved range of motion    On chronic opioid therapy with oxycodone  Reports no side effect except some constipation managed with stool softeners  Denies any illicit substance use  Automated prescription report consistent with history  No sign of any aberrancy    Main issue today is bilateral lower extremity and foot pain  Related to diabetic peripheral neuropathy describes a burning tingling electric numbness sensation walking on glass  Pain interfere with sleep and walking and activity level  Aggravates with activity and weather changes  Have tried different medications  Discussed neuromodulation trial  Information material provided  Will connect with the company representative  Will obtain psychological evaluation    Medication Refill: Oxycodone -     Pain score Today:  3  Adverse effects (Constipation / Nausea / Sedation / sexual Dysfunction / others) : none  Mood: good  Sleep pattern and quality: good  Activity level: fair    Pill count Today: 24  Last dose taken  11/16/2022 night  OARRS report reviewed today: yes  ER/Hospitalizations/PCP visit related to pain since last visit:no   Any legal problems e.g. DUI etc.:No  Satisfied with current management: Yes    Opioid Contract: updated today  Last Urine Dug screen dated: 05/19/2022    Hemoglobin A1C   Date Value Ref Range Status   05/10/2022 7.2 % Final       Past Medical History, Past Surgical History, Social History, Allergies and Medications reviewed and updated in EPIC as indicated    Family History reviewed and is noncontributory.         Past Medical History: Diagnosis Date    Abdominal pain     Benign prostatic hypertrophy     C. difficile colitis     CAD (coronary artery disease) 1/3/12    s/p CABGx3    Colon polyp 02/25/2019    tubular adenoma    Constipation     Diabetes mellitus (HCC)     Diarrhea     Diarrhea     DVT (deep venous thrombosis) (HCC)     left calf    Ejection fraction < 50%     Gallstones     Heartburn     Hx of blood clots     Hyperlipidemia     Kidney stones     Lumbar spinal stenosis 4/21/2014    MI (myocardial infarction) ([facility] [address]     2011    Mitral regurgitation     MRSA (methicillin resistant staph aureus) culture positive     Numbness and tingling     hands and feet    Rib fractures 1-2015    right    Wears glasses     readers        Past Surgical History:   Procedure Laterality Date    APPENDECTOMY      CARDIAC CATHETERIZATION  12/29/11    CHOLECYSTECTOMY      COLONOSCOPY  01/11/2012    wnl, 10 yr recall    COLONOSCOPY  11/28/2018    ATTEMPTED NOT CLEAN (N/A )    COLONOSCOPY  02/25/2019    tubular adenoma    COLONOSCOPY N/A 2/25/2019    COLONOSCOPY WITH BIOPSY performed by Mattie Cisneros MD at [address] [place] [address]      x 1    CORONARY ARTERY BYPASS GRAFT  1/3/12    x3    KNEE ARTHROSCOPY      left    KNEE SURGERY Left     I&D    OTHER SURGICAL HISTORY Left 3/7/2016    plantar plane &  exostectomy medial foot left    AR COLON CA SCRN NOT HI RSK IND N/A 11/28/2018    COLONOSCOPY ATTEMPTED NOT CLEAN performed by Mattie Cisneros MD at [place] [address]  11-3-15    VENA CAVA FILTER PLACEMENT      WRIST SURGERY      I&D       Social History     Socioeconomic History    Marital status:       Spouse name: None    Number of children: None    Years of education: None    Highest education level: None   Occupational History    Occupation: retired [employer]   Tobacco Use    Smoking status: Former     Packs/day: 1.00     Years: 30.00     Pack years: 30.00     Types: Cigarettes Quit date: 2002     Years since quittin.8    Smokeless tobacco: Never   Vaping Use    Vaping Use: Never used   Substance and Sexual Activity    Alcohol use: Not Currently     Comment: rare    Drug use: No    Sexual activity: Never     Social Determinants of Health     Financial Resource Strain: Low Risk     Difficulty of Paying Living Expenses: Not hard at all   Food Insecurity: No Food Insecurity    Worried About Running Out of Food in the Last Year: Never true    Ran Out of Food in the Last Year: Never true   Transportation Needs: No Transportation Needs    Lack of Transportation (Medical): No    Lack of Transportation (Non-Medical): No   Physical Activity: Inactive    Days of Exercise per Week: 0 days    Minutes of Exercise per Session: 0 min   Housing Stability: Unknown    Unable to Pay for Housing in the Last Year: No    Unstable Housing in the Last Year: No       Family History   Problem Relation Age of Onset    Heart Failure Father     Cancer Mother         breast    Cancer Maternal Grandfather        Allergies   Allergen Reactions    Flagyl [Metronidazole] Hives    Metronidazole      Other reaction(s): Vomiting       Vitals:    22 0904   BP: 130/79   Pulse: 79   Resp: 16   Temp: 97.3 °F (36.3 °C)   SpO2: 98%       Current Outpatient Medications   Medication Sig Dispense Refill    oxyCODONE (ROXICODONE) 5 MG immediate release tablet Take 1 tablet by mouth 2 times daily as needed for Pain for up to 30 days.  60 tablet 0    omeprazole (PRILOSEC) 20 MG delayed release capsule TAKE 1 CAPSULE BY MOUTH EVERY DAY 90 capsule 0    furosemide (LASIX) 40 MG tablet TAKE 1 TABLET BY MOUTH TWO TIMES A DAY 60 tablet 0    topiramate (TOPAMAX) 50 MG tablet TAKE 1 TABLET BY MOUTH IN THE MORNING AND before BEDTIME 60 tablet 0    simvastatin (ZOCOR) 20 MG tablet TAKE 1 TABLET BY MOUTH EVERY DAY AT NIGHT 90 tablet 0    Insulin Pen Needle (BD PEN NEEDLE ALIZE 2ND GEN) 32G X 4 MM MISC USE AS DIRECTED TWICE DAILY 100 each 1    gabapentin (NEURONTIN) 800 MG tablet Take 1 tablet by mouth in the morning for 90 days. 90 tablet 2    LANTUS SOLOSTAR 100 UNIT/ML injection pen inject 30 units subcutaneously in the morning and 40 units in the evening 45 mL 3    naloxone (NARCAN) 4 MG/0.1ML LIQD nasal spray 1 spray by Nasal route as needed (if needed for respiratory depression) 1 each 0    blood glucose test strips (FREESTYLE LITE) strip TEST TWICE DAILY AS DIRECTED 100 strip 1    aspirin 81 MG tablet Take 81 mg by mouth daily. Vitamin D (CHOLECALCIFEROL) 1000 UNITS CAPS capsule Take 2,000 Units by mouth daily       Ascorbic Acid (VITAMIN C) 500 MG tablet Take 1,000 mg by mouth daily       NITROSTAT 0.4 MG SL tablet       finasteride (PROSCAR) 5 MG tablet Take 5 mg by mouth daily. tamsulosin (FLOMAX) 0.4 MG capsule Take 0.4 mg by mouth daily. No current facility-administered medications for this encounter. Review of Systems   Constitutional: Negative. Negative for fever. HENT: Negative. Eyes: Negative. Respiratory: Negative. Cardiovascular: Negative. Musculoskeletal:  Positive for back pain. Objective:  General Appearance:  Comfortable, in no acute distress and in pain. Vital signs: (most recent): Blood pressure 130/79, pulse 79, temperature 97.3 °F (36.3 °C), resp. rate 16, height 6' (1.829 m), weight 212 lb (96.2 kg), SpO2 98 %. Vital signs are normal.  No fever. Output: Producing urine and producing stool. Lungs:  Normal effort. He is not in respiratory distress. Heart: Normal rate. Neurological: Patient is alert and oriented to person, place and time.       Assessment & Plan  80-year-old man with history of chronic axial lower back pain  Was diagnosed with facet mediated pain  Recently had lumbar medial branch radiofrequency ablation  Today for postprocedure follow-up  Report 70 to 80% improvement in axial lower back pain with improved range of motion    On chronic opioid therapy with oxycodone  Reports no side effect except some constipation managed with stool softeners  Denies any illicit substance use  Automated prescription report consistent with history  No sign of any aberrancy    Main issue today is bilateral lower extremity and foot pain  Related to diabetic peripheral neuropathy describes a burning tingling electric numbness sensation walking on glass  Pain interfere with sleep and walking and activity level  Aggravates with activity and weather changes  Have tried different medications  Discussed neuromodulation trial  Information material provided  Will connect with the company representative  Will obtain psychological evaluation    1. Chronic use of opiate drug for therapeutic purpose    2. Lumbosacral spondylosis without myelopathy    3. Vertebrogenic low back pain        No orders of the defined types were placed in this encounter. Orders Placed This Encounter   Medications    oxyCODONE (ROXICODONE) 5 MG immediate release tablet     Sig: Take 1 tablet by mouth 2 times daily as needed for Pain for up to 30 days. Dispense:  60 tablet     Refill:  0     Reduce doses taken as pain becomes manageable          Controlled Substance Monitoring:    Acute and Chronic Pain Monitoring:   RX Monitoring 11/17/2022   Attestation -   Acute Pain Prescriptions -   Periodic Controlled Substance Monitoring No signs of potential drug abuse or diversion identified. ;Assessed functional status. ;Possible medication side effects, risk of tolerance/dependence & alternative treatments discussed. Chronic Pain > 50 MEDD Obtained or confirmed \"Consent for Opioid Use\" on file.    Chronic Pain > 80 MEDD -               Electronically signed by Luz Marina Liu MD on 11/17/2022 at 10:50 AM

## 2022-11-18 DIAGNOSIS — E11.42 TYPE 2 DIABETES MELLITUS WITH DIABETIC POLYNEUROPATHY, WITH LONG-TERM CURRENT USE OF INSULIN (HCC): ICD-10-CM

## 2022-11-18 DIAGNOSIS — Z79.4 TYPE 2 DIABETES MELLITUS WITH DIABETIC POLYNEUROPATHY, WITH LONG-TERM CURRENT USE OF INSULIN (HCC): ICD-10-CM

## 2022-11-21 RX ORDER — PEN NEEDLE, DIABETIC 32GX 5/32"
NEEDLE, DISPOSABLE MISCELLANEOUS
Qty: 100 EACH | Refills: 1 | Status: SHIPPED | OUTPATIENT
Start: 2022-11-21

## 2022-11-21 NOTE — TELEPHONE ENCOUNTER
Please Approve or Refuse.   Send to Pharmacy per Pt's Request:      Next Visit Date:  12/8/2022   Last Visit Date: 8/9/2022    Hemoglobin A1C (%)   Date Value   05/10/2022 7.2   11/03/2021 6.8   05/03/2021 7.0             ( goal A1C is < 7)   BP Readings from Last 3 Encounters:   11/17/22 130/79   11/14/22 122/83   10/18/22 109/63          (goal 120/80)  BUN   Date Value Ref Range Status   11/04/2021 24 (H) 8 - 23 mg/dL Final     Creatinine   Date Value Ref Range Status   11/04/2021 1.42 (H) 0.70 - 1.20 mg/dL Final     Potassium   Date Value Ref Range Status   11/04/2021 3.9 3.7 - 5.3 mmol/L Final

## 2022-12-03 DIAGNOSIS — G44.221 CHRONIC TENSION-TYPE HEADACHE, INTRACTABLE: ICD-10-CM

## 2022-12-05 RX ORDER — TOPIRAMATE 50 MG/1
TABLET, FILM COATED ORAL
Qty: 60 TABLET | Refills: 0 | Status: SHIPPED | OUTPATIENT
Start: 2022-12-05

## 2022-12-05 RX ORDER — FUROSEMIDE 40 MG/1
TABLET ORAL
Qty: 60 TABLET | Refills: 0 | Status: SHIPPED | OUTPATIENT
Start: 2022-12-05

## 2022-12-08 ENCOUNTER — OFFICE VISIT (OUTPATIENT)
Dept: FAMILY MEDICINE CLINIC | Age: 77
End: 2022-12-08

## 2022-12-08 VITALS
RESPIRATION RATE: 18 BRPM | TEMPERATURE: 97.4 F | HEIGHT: 72 IN | WEIGHT: 234 LBS | HEART RATE: 70 BPM | SYSTOLIC BLOOD PRESSURE: 124 MMHG | BODY MASS INDEX: 31.69 KG/M2 | DIASTOLIC BLOOD PRESSURE: 70 MMHG

## 2022-12-08 DIAGNOSIS — N18.31 STAGE 3A CHRONIC KIDNEY DISEASE (HCC): ICD-10-CM

## 2022-12-08 DIAGNOSIS — G44.221 CHRONIC TENSION-TYPE HEADACHE, INTRACTABLE: ICD-10-CM

## 2022-12-08 DIAGNOSIS — R80.9 MICROALBUMINURIA: ICD-10-CM

## 2022-12-08 DIAGNOSIS — Z79.891 CHRONIC USE OF OPIATE DRUG FOR THERAPEUTIC PURPOSE: Chronic | ICD-10-CM

## 2022-12-08 DIAGNOSIS — E55.9 VITAMIN D DEFICIENCY: ICD-10-CM

## 2022-12-08 DIAGNOSIS — E11.42 TYPE 2 DIABETES MELLITUS WITH DIABETIC POLYNEUROPATHY, WITH LONG-TERM CURRENT USE OF INSULIN (HCC): Primary | ICD-10-CM

## 2022-12-08 DIAGNOSIS — Z23 NEED FOR INFLUENZA VACCINATION: ICD-10-CM

## 2022-12-08 DIAGNOSIS — I10 ESSENTIAL HYPERTENSION: ICD-10-CM

## 2022-12-08 DIAGNOSIS — E78.2 MIXED HYPERLIPIDEMIA: ICD-10-CM

## 2022-12-08 DIAGNOSIS — M47.816 SPONDYLOSIS OF LUMBAR REGION WITHOUT MYELOPATHY OR RADICULOPATHY: ICD-10-CM

## 2022-12-08 DIAGNOSIS — Z79.4 TYPE 2 DIABETES MELLITUS WITH DIABETIC POLYNEUROPATHY, WITH LONG-TERM CURRENT USE OF INSULIN (HCC): Primary | ICD-10-CM

## 2022-12-08 RX ORDER — LISINOPRIL 2.5 MG/1
2.5 TABLET ORAL DAILY
Qty: 30 TABLET | Refills: 1 | Status: SHIPPED | OUTPATIENT
Start: 2022-12-08

## 2022-12-08 RX ORDER — INSULIN GLARGINE 100 [IU]/ML
INJECTION, SOLUTION SUBCUTANEOUS
Qty: 45 ML | Refills: 3 | Status: SHIPPED | OUTPATIENT
Start: 2022-12-08

## 2022-12-08 ASSESSMENT — ENCOUNTER SYMPTOMS
EYE REDNESS: 0
COUGH: 0
ABDOMINAL PAIN: 0
BACK PAIN: 1
CONSTIPATION: 1
ABDOMINAL DISTENTION: 0
BLOOD IN STOOL: 0
RECTAL PAIN: 0
WHEEZING: 0
RHINORRHEA: 0
NAUSEA: 0
SORE THROAT: 0
VOMITING: 0
CHEST TIGHTNESS: 0
STRIDOR: 0
TROUBLE SWALLOWING: 0
SHORTNESS OF BREATH: 0
COLOR CHANGE: 0
SINUS PRESSURE: 0
DIARRHEA: 0

## 2022-12-08 NOTE — PATIENT INSTRUCTIONS
New Updates for [facility] MyChart/ [facility] ([facility]) ANG    Thank you for choosing US to give you the best care! [employer] ([facility]) is always trying to think of new ways to help their patients. We are asking all patients to try out the new digital registration that is now available through your [facility] account or the new ANG, [facility] ([facility]). Via the ang you're now able to update your personal and registration information prior to your upcoming appointment. This will save you time once you arrive at the office to check-in, not to mention your information remains safe!! Many other perks come from signing up for an account, such as:  Requesting refills  Scheduling an appointment  Completing an E-Visit  Sending a message to the office/provider  Having access to your medication list  Paying your bill/copay prior to your appointment  Scheduling your yearly mammogram  Review your test results    If you are not familiar with [facility] or the [facility] ([facility]) ANG, please ask one of us and we will be happy to answer any questions or help you set-up your account.       Your [facility] office,  Sanjay

## 2022-12-08 NOTE — PROGRESS NOTES
Chief Complaint   Patient presents with    Diabetes     Stated bs have been running stable     Follow-up    Other     Will like flu vaccine today    Dry Eye     Stated been having some dry eyes past few days          Thai Rivera  here today for follow up on chronic medical problems, go over labs and/or diagnostic studies, and medication refills. Diabetes (Stated bs have been running stable ), Follow-up, Other (Will like flu vaccine today), and Dry Eye (Stated been having some dry eyes past few days )      HPI: Patient is scheduled for follow-up on chronic medical conditions diabetes , hyperlipidemia hypertension. Diabetes uncontrolled A1c has increased to 8.3 from previous 7.2, patient is currently on long-acting insulin 30 units and 40 units. Patient reports on average his blood sugars are running fasting 150s and evening sugars are from 102 150. Patient reports compliance with medications. Patient also reports he is not able to do any physical activity because of the chronic back pain because pain management has taken out a lot of pain medications. Patient has lumbar degenerative disc disease with spondylosis, follows with pain management was on multiple pain medications including fentanyl patch. Patient is currently only on Roxicodone, reports he has recent procedure in back done to burn his nerves , but that did not work. Patient reports he is in a lot of pain all the time. He is also on gabapentin higher dose. Hyperlipidemia on statins, patient denies any side effects reports compliance. Hypertension controlled, patient is only on Lasix reports compliant denies any side effects. Patient has history of chronic kidney disease, stable has history of microalbuminuria, is getting worse due to uncontrolled diabetes. Patient has history of chronic tension headaches is on Topamax reports that is helping. Headaches has improved being on medication.       vitamin D deficiency normal recent blood work. /70 (Site: Left Upper Arm, Position: Sitting, Cuff Size: Large Adult)   Pulse 70   Temp 97.4 °F (36.3 °C) (Temporal)   Resp 18   Ht 6' (1.829 m)   Wt 234 lb (106.1 kg)   BMI 31.74 kg/m²    Body mass index is 31.74 kg/m². Wt Readings from Last 3 Encounters:   12/08/22 234 lb (106.1 kg)   11/17/22 212 lb (96.2 kg)   11/14/22 216 lb (98 kg)        [x]Negative depression screening. PHQ Scores 8/9/2022 5/10/2022 5/3/2021 5/3/2021 2/1/2021 7/28/2020 10/1/2019   PHQ2 Score 0 0 0 0 0 0 0   PHQ9 Score 0 0 0 0 0 0 0      []1-4 = Minimal depression   []5-9 = Milddepression   []10-14 = Moderate depression   []15-19 = Moderately severe depression   []20-27 = Severe depression    Discussed testing with the patient and all questions fully answered.     Hospital Outpatient Visit on 09/19/2022   Component Date Value Ref Range Status    POC Glucose 09/19/2022 77  75 - 110 mg/dL Final         Most recent labs reviewed:     Lab Results   Component Value Date    WBC 7.2 11/04/2021    HGB 15.8 11/04/2021    HCT 47.3 11/04/2021    MCV 89.7 11/04/2021     11/04/2021       @BRIEFLAB(NA,K,CL,CO2,BUN,CREATININE,GLUCOSE,CALCIUM)@     Lab Results   Component Value Date    ALT 24 11/04/2021    AST 19 11/04/2021    ALKPHOS 119 11/04/2021    BILITOT 0.46 11/04/2021       Lab Results   Component Value Date    TSH 4.77 11/04/2021       Lab Results   Component Value Date    CHOL 100 11/07/2019    CHOL 114 04/27/2019    CHOL 106 10/10/2018     Lab Results   Component Value Date    TRIG 85 11/07/2019    TRIG 129 04/27/2019    TRIG 84 10/10/2018     Lab Results   Component Value Date    HDL 40 (L) 11/04/2021    HDL 39 (L) 11/05/2020    HDL 43 11/07/2019     Lab Results   Component Value Date    LDLCHOLESTEROL 63 11/04/2021    LDLCHOLESTEROL 40 11/05/2020    LDLCHOLESTEROL 40 11/07/2019     Lab Results   Component Value Date    VLDL NOT REPORTED 11/04/2021    VLDL NOT REPORTED 11/05/2020    VLDL NOT REPORTED 11/07/2019     Lab Results   Component Value Date    CHOLHDLRATIO 3.2 11/04/2021    CHOLHDLRATIO 2.5 11/05/2020    CHOLHDLRATIO 2.3 11/07/2019       Lab Results   Component Value Date    LABA1C 7.2 05/10/2022       No results found for: EPSTONHT05    No results found for: FOLATE    No results found for: IRON, TIBC, FERRITIN    Lab Results   Component Value Date    VITD25 37.2 11/04/2021             Current Outpatient Medications   Medication Sig Dispense Refill    LANTUS SOLOSTAR 100 UNIT/ML injection pen inject 35 units subcutaneously in the morning and 40 units in the evening 45 mL 3    lisinopril (PRINIVIL;ZESTRIL) 2.5 MG tablet Take 1 tablet by mouth daily 30 tablet 1    topiramate (TOPAMAX) 50 MG tablet TAKE 1 TABLET BY MOUTH IN THE MORNING AND before BEDTIME 60 tablet 0    furosemide (LASIX) 40 MG tablet TAKE 1 TABLET BY MOUTH TWO TIMES A DAY 60 tablet 0    BD PEN NEEDLE ALIZE 2ND GEN 32G X 4 MM MISC USE AS DIRECTED TWICE DAILY 100 each 1    oxyCODONE (ROXICODONE) 5 MG immediate release tablet Take 1 tablet by mouth 2 times daily as needed for Pain for up to 30 days. 60 tablet 0    omeprazole (PRILOSEC) 20 MG delayed release capsule TAKE 1 CAPSULE BY MOUTH EVERY DAY 90 capsule 0    simvastatin (ZOCOR) 20 MG tablet TAKE 1 TABLET BY MOUTH EVERY DAY AT NIGHT 90 tablet 0    gabapentin (NEURONTIN) 800 MG tablet Take 1 tablet by mouth in the morning for 90 days. 90 tablet 2    naloxone (NARCAN) 4 MG/0.1ML LIQD nasal spray 1 spray by Nasal route as needed (if needed for respiratory depression) 1 each 0    blood glucose test strips (FREESTYLE LITE) strip TEST TWICE DAILY AS DIRECTED 100 strip 1    aspirin 81 MG tablet Take 81 mg by mouth daily. Vitamin D (CHOLECALCIFEROL) 1000 UNITS CAPS capsule Take 2,000 Units by mouth daily       Ascorbic Acid (VITAMIN C) 500 MG tablet Take 1,000 mg by mouth daily       NITROSTAT 0.4 MG SL tablet       finasteride (PROSCAR) 5 MG tablet Take 5 mg by mouth daily. tamsulosin (FLOMAX) 0.4 MG capsule Take 0.4 mg by mouth daily. No current facility-administered medications for this visit. Social History     Socioeconomic History    Marital status:      Spouse name: Not on file    Number of children: Not on file    Years of education: Not on file    Highest education level: Not on file   Occupational History    Occupation: retired [employer]   Tobacco Use    Smoking status: Former     Packs/day: 1.00     Years: 30.00     Pack years: 30.00     Types: Cigarettes     Quit date: 2002     Years since quittin.8    Smokeless tobacco: Never   Vaping Use    Vaping Use: Never used   Substance and Sexual Activity    Alcohol use: Not Currently     Comment: rare    Drug use: No    Sexual activity: Never   Other Topics Concern    Not on file   Social History Narrative    Not on file     Social Determinants of Health     Financial Resource Strain: Low Risk     Difficulty of Paying Living Expenses: Not hard at all   Food Insecurity: No Food Insecurity    Worried About Running Out of Food in the Last Year: Never true    [address] in the Last Year: Never true   Transportation Needs: No Transportation Needs    Lack of Transportation (Medical): No    Lack of Transportation (Non-Medical):  No   Physical Activity: Inactive    Days of Exercise per Week: 0 days    Minutes of Exercise per Session: 0 min   Stress: Not on file   Social Connections: Not on file   Intimate Partner Violence: Not on file   Housing Stability: Unknown    Unable to Pay for Housing in the Last Year: No    Number of Places Lived in the Last Year: Not on file    Unstable Housing in the Last Year: No     Counseling given: Not Answered        Family History   Problem Relation Age of Onset    Heart Failure Father     Cancer Mother         breast    Cancer Maternal Grandfather              -rest of complaints with corresponding details per ROS    The patient's past medical, surgical, social, and family history as well as his current medications and allergies were reviewed as documented intoday's encounter. Review of Systems   Constitutional:  Positive for activity change. Negative for appetite change, fatigue, fever and unexpected weight change. HENT:  Negative for congestion, ear pain, postnasal drip, rhinorrhea, sinus pressure, sore throat and trouble swallowing. Eyes:  Negative for redness and visual disturbance. Respiratory:  Negative for cough, chest tightness, shortness of breath, wheezing and stridor. Cardiovascular:  Negative for chest pain, palpitations and leg swelling. Gastrointestinal:  Positive for constipation. Negative for abdominal distention, abdominal pain, blood in stool, diarrhea, nausea, rectal pain and vomiting. Endocrine: Negative for polydipsia, polyphagia and polyuria. Genitourinary:  Negative for difficulty urinating, flank pain, frequency and urgency. Musculoskeletal:  Positive for arthralgias, back pain, gait problem, myalgias, neck pain and neck stiffness. Skin:  Negative for color change, rash and wound. Allergic/Immunologic: Positive for immunocompromised state. Negative for food allergies. Neurological:  Positive for weakness and numbness. Negative for dizziness, speech difficulty, light-headedness and headaches. Psychiatric/Behavioral:  Positive for decreased concentration. Negative for agitation, behavioral problems, dysphoric mood, hallucinations, sleep disturbance and suicidal ideas. The patient is nervous/anxious. Physical Exam  Vitals and nursing note reviewed. Constitutional:       General: He is not in acute distress. Appearance: Normal appearance. He is well-developed. He is obese. He is not diaphoretic. HENT:      Head: Normocephalic and atraumatic. Nose: Nose normal.   Eyes:      General:         Right eye: No discharge. Left eye: No discharge. Extraocular Movements: Extraocular movements intact. Conjunctiva/sclera: Conjunctivae normal.      Pupils: Pupils are equal, round, and reactive to light. Neck:      Thyroid: No thyromegaly. Cardiovascular:      Rate and Rhythm: Normal rate and regular rhythm. Heart sounds: Normal heart sounds. No murmur heard. Pulmonary:      Effort: Pulmonary effort is normal. No respiratory distress. Breath sounds: Normal breath sounds. No wheezing or rhonchi. Abdominal:      General: Bowel sounds are normal. There is no distension. Palpations: Abdomen is soft. There is no mass. Tenderness: There is no abdominal tenderness. There is no right CVA tenderness, left CVA tenderness, guarding or rebound. Musculoskeletal:      Cervical back: Neck supple. Spasms present. No rigidity. Decreased range of motion. Thoracic back: Spasms present. Decreased range of motion. Lumbar back: Deformity, spasms and tenderness present. Decreased range of motion. Lymphadenopathy:      Cervical: No cervical adenopathy. Skin:     Coloration: Skin is not jaundiced or pale. Findings: No bruising, erythema or rash. Neurological:      General: No focal deficit present. Mental Status: He is alert and oriented to person, place, and time. Cranial Nerves: No cranial nerve deficit. Sensory: Sensory deficit present. Motor: No weakness or tremor. Coordination: Coordination normal.      Gait: Gait and tandem walk normal.      Deep Tendon Reflexes: Reflexes are normal and symmetric. Psychiatric:         Attention and Perception: Attention and perception normal. He is attentive. Mood and Affect: Mood is anxious. Mood is not depressed. Affect is not tearful. Speech: He is communicative. Speech is not rapid and pressured, delayed or slurred. Behavior: Behavior normal. Behavior is not agitated or slowed. Thought Content:  Thought content normal.         Judgment: Judgment normal.           ASSESSMENT AND PLAN      1. Type 2 diabetes mellitus with diabetic polyneuropathy, with long-term current use of insulin ([facility])  Worsening of diabetes A1c has increased to 8.3, increase insulin 35 units morning and continue 40 units in the evening. Monitor blood sugars repeat blood work  - Microalbumin / Creatinine Urine Ratio; Future  - TSH; Future  - CBC with Auto Differential; Future  - Comprehensive Metabolic Panel; Future    2. Spondylosis of lumbar region without myelopathy or radiculopathy  Chronic problem follow-up with pain management on narcotic medications. - Uric Acid; Future  - Comprehensive Metabolic Panel; Future    3. Microalbuminuria  Worsening, start on small dose of lisinopril monitor BMP in 1 week. If it gets worse can switch to amlodipine.  - Microalbumin / Creatinine Urine Ratio; Future  - lisinopril (PRINIVIL;ZESTRIL) 2.5 MG tablet; Take 1 tablet by mouth daily  Dispense: 30 tablet; Refill: 1    4. Mixed hyperlipidemia  Stable, continue statins  Work on diet  - Lipid Panel; Future    5. Essential hypertension  Controlled started on low-dose lisinopril due to microalbuminuria. - lisinopril (PRINIVIL;ZESTRIL) 2.5 MG tablet; Take 1 tablet by mouth daily  Dispense: 30 tablet; Refill: 1    6. Chronic use of opiate drug for therapeutic purpose  Follow-up with pain management    7. Chronic tension-type headache, intractable  Fairly stable continue Topamax  Continue Tylenol as needed    8. Stage 3a chronic kidney disease ([facility] [address]  Stable continue to monitor  Recheck blood work    9. Vitamin D deficiency  Stable continue vitamin D supplements    10.  Need for influenza vaccination    - Influenza, FLUAD, (age 72 y+), IM, Preservative Free, 0.5 mL      Orders Placed This Encounter   Procedures    Influenza, FLUAD, (age 72 y+), IM, Preservative Free, 0.5 mL    Lipid Panel     Standing Status:   Future     Standing Expiration Date:   12/8/2023     Order Specific Question:   Is Patient Fasting?/# of Hours     Answer: yes, 8-10 hours    Microalbumin / Creatinine Urine Ratio     Standing Status:   Future     Standing Expiration Date:   12/8/2023    TSH     Standing Status:   Future     Standing Expiration Date:   12/8/2023    Uric Acid     Standing Status:   Future     Standing Expiration Date:   12/8/2023    CBC with Auto Differential     Standing Status:   Future     Standing Expiration Date:   12/9/2023    Comprehensive Metabolic Panel     Fasting 8 hrs     Standing Status:   Future     Standing Expiration Date:   12/8/2023         Medications Discontinued During This Encounter   Medication Reason    LANTUS SOLOSTAR 100 UNIT/ML injection pen        Graciela Gonzalez received counseling on the following healthy behaviors: nutrition, exercise, and medication adherence  Reviewed prior labs and health maintenance  Continue current medications, diet and exercise. Discussed use, benefit, and side effects of prescribed medications. Barriers to medication compliance addressed. Patient given educational materials - see patient instructions  Was a self-tracking handout given in paper form or via [employer]? Yes    Requested Prescriptions     Signed Prescriptions Disp Refills    LANTUS SOLOSTAR 100 UNIT/ML injection pen 45 mL 3     Sig: inject 35 units subcutaneously in the morning and 40 units in the evening    lisinopril (PRINIVIL;ZESTRIL) 2.5 MG tablet 30 tablet 1     Sig: Take 1 tablet by mouth daily       All patient questions answered. Patient voiced understanding. Quality Measures    Body mass index is 31.74 kg/m². Elevated. Weight control planned discussed daily exercise regimen and Healthy diet and regular exercise. BP: 124/70. Blood pressure is Normal. Treatment plan consists of Weight Reduction, DASH Eating Plan, Dietary Sodium Restriction, and No treatment change needed.     Fall Risk 8/9/2022 5/10/2022 5/3/2021 2/1/2021 10/1/2019 11/27/2018 8/17/2017   2 or more falls in past year? no no no yes no no no   Fall with injury in past year? no no no yes no no no     The patient does not have a history of falls. I did , complete a risk assessment for falls. A plan of care for falls in-office gait and balance testing performed using The Timed Up and Go Test was negative for increased falls risk- no further intervention is currently indicated, home safety tips provided, No Treatment plan indicated    Lab Results   Component Value Date    LDLCHOLESTEROL 63 11/04/2021    (goal LDL reduction with dx if diabetes is 50% LDL reduction)    PHQ Scores 8/9/2022 5/10/2022 5/3/2021 5/3/2021 2/1/2021 7/28/2020 10/1/2019   PHQ2 Score 0 0 0 0 0 0 0   PHQ9 Score 0 0 0 0 0 0 0     Interpretation of Total Score Depression Severity: 1-4 = Minimal depression, 5-9 = Mild depression, 10-14 = Moderate depression, 15-19 = Moderately severe depression, 20-27 = Severe depression    The patient'spast medical, surgical, social, and family history as well as his   current medications and allergies were reviewed as documented in today's encounter. Medications, labs, diagnostic studies, consultations andfollow-up as documented in this encounter. Return in about 4 months (around 4/8/2023) for dm ,htn, hld, 30min,always chronic conditons. Patient wasseen with total face to face time of 35 minutes. More than 50% of this visit was counseling and education. Future Appointments   Date Time Provider Esteban Eller   12/20/2022  9:00 AM [facility] - [facility] [id number]   [facility]  [facility]   3/2/2023  8:30 AM Blaire Ingram [address]   4/10/2023  8:00 AM Yohana Flores MD [facility]     This note was completed by using the assistance of a speech-recognition program. However, inadvertent computerized transcription errors may be present.  Althoughevery effort was made to ensure accuracy, no guarantees can be provided that every mistake has been identified and corrected by editing.   Electronically signed by Merlinda Common, MD on 12/8/2022  8:58 AM

## 2022-12-08 NOTE — PROGRESS NOTES
Visit Information    Have you changed or started any medications since your last visit including any over-the-counter medicines, vitamins, or herbal medicines? yes - vitamin c   Have you stopped taking any of your medications? Is so, why? -  no  Are you having any side effects from any of your medications? - no    Have you seen any other physician or provider since your last visit? yes - pain clinc   Have you had any other diagnostic tests since your last visit?  no   Have you been seen in the emergency room and/or had an admission in a hospital since we last saw you?  no   Have you had your routine dental cleaning in the past 6 months?  no     Do you have an active MyChart account? If no, what is the barrier?   Yes    Patient Care Team:  Darío Ruiz MD as PCP - General (Family Medicine)  Darío Ruiz MD as PCP - [facility] Provider  Angle Farfan MD as Referring Physician (Cardiology)  Leandro Cisneros MD as Consulting Physician (Gastroenterology)  Mable Peters MD as Consulting Physician (Internal Medicine Cardiovascular Disease)  Zulema Brumfield MD as Consulting Physician (Pain Management)  Tee Boston DPM as Consulting Physician (Podiatry)  Sam Feldman MD as Surgeon (Ophthalmology)    Medical History Review  Past Medical, Family, and Social History reviewed and does not contribute to the patient presenting condition    Health Maintenance   Topic Date Due    COVID-19 Vaccine (5 - Booster for Monterroso Peter series) 07/15/2022    Flu vaccine (1) 08/01/2022    Lipids  11/04/2022    Depression Screen  08/09/2023    Annual Wellness Visit (AWV)  08/10/2023    DTaP/Tdap/Td vaccine (2 - Td or Tdap) 05/07/2031    Shingles vaccine  Completed    Pneumococcal 65+ years Vaccine  Completed    Hepatitis C screen  Addressed    Hepatitis A vaccine  Aged Out    Hib vaccine  Aged Out    Meningococcal (ACWY) vaccine  Aged Out

## 2022-12-20 ENCOUNTER — HOSPITAL ENCOUNTER (OUTPATIENT)
Dept: PAIN MANAGEMENT | Age: 77
Discharge: HOME | End: 2022-12-20
Payer: MEDICARE

## 2022-12-20 VITALS
RESPIRATION RATE: 16 BRPM | HEART RATE: 75 BPM | HEIGHT: 72 IN | TEMPERATURE: 97.3 F | WEIGHT: 234 LBS | BODY MASS INDEX: 31.69 KG/M2 | OXYGEN SATURATION: 99 % | DIASTOLIC BLOOD PRESSURE: 62 MMHG | SYSTOLIC BLOOD PRESSURE: 114 MMHG

## 2022-12-20 DIAGNOSIS — M54.51 VERTEBROGENIC LOW BACK PAIN: ICD-10-CM

## 2022-12-20 DIAGNOSIS — M48.062 SPINAL STENOSIS OF LUMBAR REGION WITH NEUROGENIC CLAUDICATION: ICD-10-CM

## 2022-12-20 DIAGNOSIS — Z79.891 CHRONIC USE OF OPIATE DRUG FOR THERAPEUTIC PURPOSE: Primary | Chronic | ICD-10-CM

## 2022-12-20 DIAGNOSIS — M47.817 LUMBOSACRAL SPONDYLOSIS WITHOUT MYELOPATHY: ICD-10-CM

## 2022-12-20 PROCEDURE — 99213 OFFICE O/P EST LOW 20 MIN: CPT

## 2022-12-20 RX ORDER — OXYCODONE HYDROCHLORIDE 5 MG/1
5 TABLET ORAL 2 TIMES DAILY PRN
Qty: 60 TABLET | Refills: 0 | Status: SHIPPED | OUTPATIENT
Start: 2022-12-24 | End: 2023-01-23

## 2022-12-20 ASSESSMENT — ENCOUNTER SYMPTOMS
CONSTIPATION: 0
BOWEL INCONTINENCE: 0
COUGH: 0
SHORTNESS OF BREATH: 0
BACK PAIN: 1

## 2022-12-20 ASSESSMENT — PAIN SCALES - GENERAL: PAINLEVEL_OUTOF10: 4

## 2022-12-20 NOTE — PROGRESS NOTES
Chief Complaint   Patient presents with    Back Pain     Med refill         PMH     Pt c/o low back pain radiating down his legs with numbness in feet d/t neuropathy. No surgery to the area and injections in distant past. The pain has has worsened and does reports to less activity since fentanyl was d/c. Does not like to take oxycodone BID. Recent MRI 7/2022 which showed No significant spinal stenosis or nerve root impingement or foraminal stenosis  Severe degenerative disc disease with endplate degeneration and Modic changes involving L4-L5 and S1 vertebrae. Facet arthropathy at L4-5 L5-S1 level    Pt had Bilateral Lumbar RFA at the transverse processes of  L4, L5 and sacral  ala  done 10/17/22 and reported 70-80% relief of pain at f/u but now feels it is returning to baseline already. Having difficulty sleeping d/t the pain   At last visit with MD SCS was discussed and psych eval ordered but pt is not interested. States he has talked to another dr and an  and not interested     Pt also reports chronic pain in upper back and Rt shoulder dx with torn rot cuff MRI 2015 states has seen ortho and recommended surgery but pt not interested. Felt pain was controlled in past    Feels pain was better controlled with fentanyl and oxycodone QID - we have weaned down to a lower dose and stopped fentanyl d/t risks r/t to his age to try and control pain and pt having difficulty with BID dosing and would like next appt with MD to discuss    HPI:     Back Pain  This is a chronic problem. The current episode started more than 1 year ago. The problem occurs constantly. The problem is unchanged. The pain is present in the lumbar spine. The quality of the pain is described as aching, shooting, stabbing, cramping and burning (throbbing). The pain radiates to the left foot, left thigh, left knee, right foot, right knee and right thigh. The pain is at a severity of 4/10.  The symptoms are aggravated by bending, sitting and standing. Associated symptoms include numbness, tingling and weakness. Pertinent negatives include no bladder incontinence, bowel incontinence, chest pain, fever or headaches. He has tried ice and heat for the symptoms. Patient denies any new neurological symptoms. No bowel or bladder incontinence, no weakness, and no falling. Pill count: 5 left and due 12/25    Morphine equivalent: 15      Controlled Substance Monitoring:    Acute and Chronic Pain Monitoring:   RX Monitoring 12/20/2022   Attestation -   Acute Pain Prescriptions -   Periodic Controlled Substance Monitoring Possible medication side effects, risk of tolerance/dependence & alternative treatments discussed. ;No signs of potential drug abuse or diversion identified. ;Assessed functional status. ;Obtaining appropriate analgesic effect of treatment. Chronic Pain > 50 MEDD -   Chronic Pain > 80 MEDD -          Periodic Controlled Substance Monitoring: Possible medication side effects, risk of tolerance/dependence & alternative treatments discussed., No signs of potential drug abuse or diversion identified. , Assessed functional status., Obtaining appropriate analgesic effect of treatment.  Mary Beard, APRN - CNP)      Past Medical History:   Diagnosis Date    Abdominal pain     Benign prostatic hypertrophy     C. difficile colitis     CAD (coronary artery disease) 1/3/12    s/p CABGx3    Colon polyp 02/25/2019    tubular adenoma    Constipation     Diabetes mellitus (HCC)     Diarrhea     Diarrhea     DVT (deep venous thrombosis) (HCC)     left calf    Ejection fraction < 50%     Gallstones     Heartburn     Hx of blood clots     Hyperlipidemia     Kidney stones     Lumbar spinal stenosis 4/21/2014    MI (myocardial infarction) ([facility] [address]     2011    Mitral regurgitation     MRSA (methicillin resistant staph aureus) culture positive     Numbness and tingling     hands and feet    Rib fractures 1-2015    right    Wears glasses     readers       Past Surgical History:   Procedure Laterality Date    APPENDECTOMY      CARDIAC CATHETERIZATION  12/29/11    CHOLECYSTECTOMY      COLONOSCOPY  01/11/2012    wnl, 10 yr recall    COLONOSCOPY  11/28/2018    ATTEMPTED NOT CLEAN (N/A )    COLONOSCOPY  02/25/2019    tubular adenoma    COLONOSCOPY N/A 2/25/2019    COLONOSCOPY WITH BIOPSY performed by Shamir Jorge MD at [address]      x 1    CORONARY ARTERY BYPASS GRAFT  1/3/12    x3    KNEE ARTHROSCOPY      left    KNEE SURGERY Left     I&D    OTHER SURGICAL HISTORY Left 3/7/2016    plantar plane &  exostectomy medial foot left    [place] COLON CA SCRN NOT HI RSK IND N/A 11/28/2018    COLONOSCOPY ATTEMPTED NOT CLEAN performed by Shamir Jorge MD at [place] [address]  11-3-15    VENA CAVA FILTER PLACEMENT      WRIST SURGERY      I&D       Allergies   Allergen Reactions    Flagyl [Metronidazole] Hives    Metronidazole      Other reaction(s): Vomiting         Current Outpatient Medications:     [START ON 12/24/2022] oxyCODONE (ROXICODONE) 5 MG immediate release tablet, Take 1 tablet by mouth 2 times daily as needed for Pain for up to 30 days. , Disp: 60 tablet, Rfl: 0    LANTUS SOLOSTAR 100 UNIT/ML injection pen, inject 35 units subcutaneously in the morning and 40 units in the evening, Disp: 45 mL, Rfl: 3    lisinopril (PRINIVIL;ZESTRIL) 2.5 MG tablet, Take 1 tablet by mouth daily, Disp: 30 tablet, Rfl: 1    topiramate (TOPAMAX) 50 MG tablet, TAKE 1 TABLET BY MOUTH IN THE MORNING AND before BEDTIME, Disp: 60 tablet, Rfl: 0    furosemide (LASIX) 40 MG tablet, TAKE 1 TABLET BY MOUTH TWO TIMES A DAY, Disp: 60 tablet, Rfl: 0    BD PEN NEEDLE ALIZE 2ND GEN 32G X 4 MM MISC, USE AS DIRECTED TWICE DAILY, Disp: 100 each, Rfl: 1    omeprazole (PRILOSEC) 20 MG delayed release capsule, TAKE 1 CAPSULE BY MOUTH EVERY DAY, Disp: 90 capsule, Rfl: 0    simvastatin (ZOCOR) 20 MG tablet, TAKE 1 TABLET BY MOUTH EVERY DAY AT NIGHT, Disp: 90 tablet, Rfl: 0    gabapentin (NEURONTIN) 800 MG tablet, Take 1 tablet by mouth in the morning for 90 days. , Disp: 90 tablet, Rfl: 2    naloxone (NARCAN) 4 MG/0.1ML LIQD nasal spray, 1 spray by Nasal route as needed (if needed for respiratory depression), Disp: 1 each, Rfl: 0    blood glucose test strips (FREESTYLE LITE) strip, TEST TWICE DAILY AS DIRECTED, Disp: 100 strip, Rfl: 1    aspirin 81 MG tablet, Take 81 mg by mouth daily. , Disp: , Rfl:     Vitamin D (CHOLECALCIFEROL) 1000 UNITS CAPS capsule, Take 2,000 Units by mouth daily , Disp: , Rfl:     Ascorbic Acid (VITAMIN C) 500 MG tablet, Take 1,000 mg by mouth daily , Disp: , Rfl:     NITROSTAT 0.4 MG SL tablet, , Disp: , Rfl:     finasteride (PROSCAR) 5 MG tablet, Take 5 mg by mouth daily. , Disp: , Rfl:     tamsulosin (FLOMAX) 0.4 MG capsule, Take 0.4 mg by mouth daily. , Disp: , Rfl:     Family History   Problem Relation Age of Onset    Heart Failure Father     Cancer Mother         breast    Cancer Maternal Grandfather        Social History     Socioeconomic History    Marital status:       Spouse name: Not on file    Number of children: Not on file    Years of education: Not on file    Highest education level: Not on file   Occupational History    Occupation: retired [employer]   Tobacco Use    Smoking status: Former     Packs/day: 1.00     Years: 30.00     Pack years: 30.00     Types: Cigarettes     Quit date: 2002     Years since quittin.8    Smokeless tobacco: Never   Vaping Use    Vaping Use: Never used   Substance and Sexual Activity    Alcohol use: Not Currently     Comment: rare    Drug use: No    Sexual activity: Never   Other Topics Concern    Not on file   Social History Narrative    Not on file     Social Determinants of Health     Financial Resource Strain: Low Risk     Difficulty of Paying Living Expenses: Not hard at all   Food Insecurity: No Food Insecurity    Worried About [facility] in the Last Year: Never true    Ran Out of Food in the Last Year: Never true   Transportation Needs: No Transportation Needs    Lack of Transportation (Medical): No    Lack of Transportation (Non-Medical): No   Physical Activity: Inactive    Days of Exercise per Week: 0 days    Minutes of Exercise per Session: 0 min   Stress: Not on file   Social Connections: Not on file   Intimate Partner Violence: Not on file   Housing Stability: Unknown    Unable to Pay for Housing in the Last Year: No    Number of Places Lived in the Last Year: Not on file    Unstable Housing in the Last Year: No       Review of Systems:  Review of Systems   Constitutional: Negative for chills and fever. Cardiovascular:  Negative for chest pain. Respiratory:  Negative for cough and shortness of breath. Musculoskeletal:  Positive for back pain. Gastrointestinal:  Negative for bowel incontinence and constipation. Genitourinary:  Negative for bladder incontinence. Neurological:  Positive for numbness, tingling and weakness. Negative for headaches. Physical Exam:  /62   Pulse 75   Temp 97.3 °F (36.3 °C)   Resp 16   Ht 6' (1.829 m)   Wt 234 lb (106.1 kg)   SpO2 99%   BMI 31.74 kg/m²     Physical Exam  Cardiovascular:      Rate and Rhythm: Normal rate. Pulmonary:      Effort: Pulmonary effort is normal.   Musculoskeletal:         General: Normal range of motion. Comments: Antalgic gait using cane    Skin:     General: Skin is warm and dry. Neurological:      Mental Status: He is alert and oriented to person, place, and time.        Record/Diagnostics Review:    Last alexander 5/22 and was appropriate     Assessment:  Problem List Items Addressed This Visit       Chronic use of opiate drug for therapeutic purpose - Primary (Chronic)    Vertebrogenic low back pain    Relevant Medications    oxyCODONE (ROXICODONE) 5 MG immediate release tablet (Start on 12/24/2022)    Lumbosacral spondylosis without myelopathy    Relevant Medications    oxyCODONE (ROXICODONE) 5 MG immediate release tablet (Start on 12/24/2022)    Lumbar spinal stenosis          Treatment Plan:  Patient relates current medications are helping the pain. Patient reports taking pain medications as prescribed, denies obtaining medications from different sources and denies use of illegal drugs. Medication risk and benefits have been discussed. Patient denies side effects from medications like nausea, vomiting, constipation or drowsiness. Patient reports current activities of daily living are possible due to medications and would like to continue them. As always, we encourage daily stretching and strengthening exercises, and recommend minimizing use of pain medications unless patient cannot get through daily activities due to pain. Due to the high risk nature of this patient's pain medication close monitoring is required. Continue current medication management, pt has been stable and compliant. Script written for oxycodone  Offered meeting with [facility] rep to discuss concern and pt declines  Follow up appointment made for 4 weeks with MD per pt request     I have reviewed the chief complaint and history of present illness (including ROS and PFSH) and vital documentation by my staff and I agree with their documentation and have added where applicable.

## 2022-12-22 ENCOUNTER — OFFICE VISIT (OUTPATIENT)
Dept: PODIATRY | Age: 77
End: 2022-12-22
Payer: MEDICARE

## 2022-12-22 VITALS — WEIGHT: 234 LBS | HEIGHT: 72 IN | BODY MASS INDEX: 31.69 KG/M2

## 2022-12-22 DIAGNOSIS — B35.1 ONYCHOMYCOSIS: Primary | ICD-10-CM

## 2022-12-22 DIAGNOSIS — E11.42 TYPE 2 DIABETES MELLITUS WITH DIABETIC POLYNEUROPATHY, WITH LONG-TERM CURRENT USE OF INSULIN (HCC): ICD-10-CM

## 2022-12-22 DIAGNOSIS — Z79.4 TYPE 2 DIABETES MELLITUS WITH DIABETIC POLYNEUROPATHY, WITH LONG-TERM CURRENT USE OF INSULIN (HCC): ICD-10-CM

## 2022-12-22 DIAGNOSIS — M14.672 CHARCOT'S JOINT OF LEFT FOOT: ICD-10-CM

## 2022-12-22 PROCEDURE — 11721 DEBRIDE NAIL 6 OR MORE: CPT | Performed by: PODIATRIST

## 2022-12-22 PROCEDURE — 99999 PR OFFICE/OUTPT VISIT,PROCEDURE ONLY: CPT | Performed by: PODIATRIST

## 2022-12-22 ASSESSMENT — ENCOUNTER SYMPTOMS
CONSTIPATION: 0
COLOR CHANGE: 0
VOMITING: 0
NAUSEA: 0
DIARRHEA: 0

## 2022-12-22 NOTE — PROGRESS NOTES
[facility]  Return Patient  Chief Complaint   Patient presents with    Nail Problem     Nail trim/last saw Gato Eduardo 12/8/2022    Diabetes     Blood sugar 117        Thai Rivera is a 69y.o. year old male who is here for a diabetic foot check and nail trim. He would like his nails trimmed today. History of surgery type: Plantar planing medial and lateral foot. Vital signs are stable. Pain level is 0. Patient denies N/V/F/C. Review of Systems   Constitutional:  Negative for chills, diaphoresis, fatigue, fever and unexpected weight change. Cardiovascular:  Negative for leg swelling. Gastrointestinal:  Negative for constipation, diarrhea, nausea and vomiting. Musculoskeletal:  Positive for gait problem. Negative for arthralgias and joint swelling. Skin:  Negative for color change, pallor, rash and wound. Neurological:  Positive for numbness. Negative for weakness. Vascular: DP and PT pulses palpable 2/4, Right Foot and 2/4 on the Left Foot. CFT <3 seconds, Right Foot and <3 seconds on the Left Foot. Edema is absent,  Right Foot and minimal on the Left Foot. Neurological:   Sensation absent  to light touch to level of digits, both feet. Musculoskeletal:   Muscle strength is 5/5 on the Right Foot and 5/5 on the Left Foot. Structural deformities are present on the Right Foot and present on the Left Foot, but improved  Flat medial arch left foot. Integument:  Warm, dry, supple both feet. Infection is absent.        Sites of Onychomycosis Involvement (Check affected area)  [x] [x] [x] [x] [x] [x] [x] [x] [x] [x]  5 4 3 2 1 1 2 3 4 5                          Right                                        Left    Thickness  [x] [x] [x] [x] [x] [x] [x] [x] [x] [x]  5 4 3 2 1 1 2 3 4 5                         Right                                        Left    Dystrophic Changes [x] [x] [x] [x] [x] [x] [x] [x] [x] [x]  5 4 3 2 1 1 2 3 4 5                         Right                                        Left    Color                                                                  [x] [x] [x] [x] [x] [x] [x] [x] [x] [x]  5 4 3 2 1 1 2 3 4 5                          Right                                        Left    Incurvation/Ingrowin                                                                   [] [] [] [] [] [] [] [] [] []  5 4 3 2 1 1 2 3 4 5                         Right                                        Left    Inflammation/Pain                                                                   [] [] [] [] [] [] [] [] [] []  5 4 3 2 1 1 2 3 4 5                         Right                                        Left       Assesment :     Diagnosis Orders   1. Onychomycosis  MD DEBRIDEMENT OF NAILS, 6 OR MORE      2. Type 2 diabetes mellitus with diabetic polyneuropathy, with long-term current use of insulin (HCC)  MD DEBRIDEMENT OF NAILS, 6 OR MORE      3. Charcot's joint of left foot              Plan: Pt was evaluated and examined. Patient was given personalized discharge instructions. Nails 1-10 were debrided sharply in length and thickness with a nipper and , without incident. Pt will follow up in 9 weeks or sooner if any problems arise. Diagnosis was discussed with the pt and all of their questions were answered in detail. Proper foot hygiene and care was discussed with the pt. Informed patient on proper diabetic foot care and importance of tight glycemic control. Patient to check feet daily and contact the office with any questions/problems/concerns. Other comorbidity noted and will be managed by PCP. Diabetic foot examination performed this visit.   The exam included neurological sensory exam, a 10-g monofilament and pinprick sensation, vibration using a 128-Hz tuning fork, ankle reflexes, visual skin inspection, vascular exam including assessment of pedal pulses, orthopedic exam for deformities, and shoe inspection. Increased risk factors noted on the diabetic foot exam include decreased sensory exam and peripheral neuropathy. Shoegear inspected and found to be appropriate size and wear. Diabetic shoes and insoles: This patient would benefit from extra depth diabetic shoes and custom inserts. I feel he/she qualifies due to the above performed physical exam and diagnosis. I will do the appropriate paperwork and send it to their primary care physician. Then the patient will be measured for shoes and custom inserts to properly off load and protect his/her feet. No orders of the defined types were placed in this encounter. Follow up 9 weeks.

## 2022-12-30 DIAGNOSIS — G44.221 CHRONIC TENSION-TYPE HEADACHE, INTRACTABLE: ICD-10-CM

## 2023-01-03 RX ORDER — TOPIRAMATE 50 MG/1
TABLET, FILM COATED ORAL
Qty: 180 TABLET | Refills: 1 | Status: SHIPPED | OUTPATIENT
Start: 2023-01-03

## 2023-01-03 NOTE — TELEPHONE ENCOUNTER
Please Approve or Refuse.   Send to Pharmacy per Pt's Request:      Next Visit Date:  4/10/2023   Last Visit Date: 12/8/2022    Hemoglobin A1C (%)   Date Value   05/10/2022 7.2   11/03/2021 6.8   05/03/2021 7.0             ( goal A1C is < 7)   BP Readings from Last 3 Encounters:   12/20/22 114/62   12/08/22 124/70   11/17/22 130/79          (goal 120/80)  BUN   Date Value Ref Range Status   11/04/2021 24 (H) 8 - 23 mg/dL Final     Creatinine   Date Value Ref Range Status   11/04/2021 1.42 (H) 0.70 - 1.20 mg/dL Final     Potassium   Date Value Ref Range Status   11/04/2021 3.9 3.7 - 5.3 mmol/L Final

## 2023-01-06 RX ORDER — FUROSEMIDE 40 MG/1
TABLET ORAL
Qty: 60 TABLET | Refills: 3 | Status: SHIPPED | OUTPATIENT
Start: 2023-01-06

## 2023-01-19 ENCOUNTER — HOSPITAL ENCOUNTER (OUTPATIENT)
Dept: PAIN MANAGEMENT | Age: 78
Discharge: HOME | End: 2023-01-19
Payer: MEDICARE

## 2023-01-19 VITALS
DIASTOLIC BLOOD PRESSURE: 66 MMHG | BODY MASS INDEX: 45.94 KG/M2 | WEIGHT: 234 LBS | SYSTOLIC BLOOD PRESSURE: 124 MMHG | HEART RATE: 71 BPM | HEIGHT: 60 IN | OXYGEN SATURATION: 97 %

## 2023-01-19 DIAGNOSIS — Z79.891 CHRONIC USE OF OPIATE DRUG FOR THERAPEUTIC PURPOSE: Chronic | ICD-10-CM

## 2023-01-19 DIAGNOSIS — M54.51 VERTEBROGENIC LOW BACK PAIN: Primary | ICD-10-CM

## 2023-01-19 DIAGNOSIS — M47.817 LUMBOSACRAL SPONDYLOSIS WITHOUT MYELOPATHY: ICD-10-CM

## 2023-01-19 PROCEDURE — 99214 OFFICE O/P EST MOD 30 MIN: CPT | Performed by: ANESTHESIOLOGY

## 2023-01-19 PROCEDURE — 99213 OFFICE O/P EST LOW 20 MIN: CPT

## 2023-01-19 RX ORDER — OXYCODONE HYDROCHLORIDE 5 MG/1
5 TABLET ORAL 2 TIMES DAILY PRN
Qty: 60 TABLET | Refills: 0 | Status: SHIPPED | OUTPATIENT
Start: 2023-01-19 | End: 2023-02-18

## 2023-01-19 ASSESSMENT — ENCOUNTER SYMPTOMS
BACK PAIN: 1
CONSTIPATION: 0
DIARRHEA: 0
VOMITING: 0
NAUSEA: 0

## 2023-01-19 NOTE — PROGRESS NOTES
The patient is a 69 y. o. Non- / non  male. Chief Complaint   Patient presents with    Back Pain     Roxicodone         Back Pain  Associated symptoms include headaches, numbness and weakness. Pertinent negatives include no fever. Medication Refill:     Pain score Today:  5  Adverse effects (Constipation / Nausea / Sedation / sexual Dysfunction / others) :  no  Mood: good   Sleep pattern and quality: good  Activity level: fair    Pill count Today: Roxicodone # 8  Last dose taken  01/19/20/23  OARRS report reviewed today: yes  ER/Hospitalizations/PCP visit related to pain since last visit:no   Any legal problems e.g. DUI etc.:No  Satisfied with current management: Yes    Opioid Contract: 11/17/2022  Last Urine Dug screen dated: 05/19/2022    Hemoglobin A1C   Date Value Ref Range Status   05/10/2022 7.2 % Final       Past Medical History, Past Surgical History, Social History, Allergies and Medications reviewed and updated in EPIC as indicated    Family History reviewed and is noncontributory.         Past Medical History:   Diagnosis Date    Abdominal pain     Benign prostatic hypertrophy     C. difficile colitis     CAD (coronary artery disease) 1/3/12    s/p CABGx3    Colon polyp 02/25/2019    tubular adenoma    Constipation     Diabetes mellitus (HCC)     Diarrhea     Diarrhea     DVT (deep venous thrombosis) (HCC)     left calf    Ejection fraction < 50%     Gallstones     Heartburn     Hx of blood clots     Hyperlipidemia     Kidney stones     Lumbar spinal stenosis 4/21/2014    MI (myocardial infarction) ([facility] [address]     2011    Mitral regurgitation     MRSA (methicillin resistant staph aureus) culture positive     Numbness and tingling     hands and feet    Rib fractures 1-2015    right    Wears glasses     readers        Past Surgical History:   Procedure Laterality Date    APPENDECTOMY      CARDIAC CATHETERIZATION  12/29/11    CHOLECYSTECTOMY      COLONOSCOPY  01/11/2012    wnl, 10 yr recall COLONOSCOPY  2018    ATTEMPTED NOT CLEAN (N/A )    COLONOSCOPY  2019    tubular adenoma    COLONOSCOPY N/A 2019    COLONOSCOPY WITH BIOPSY performed by Nisha Banda MD at [address] [place] [address]      x 1    CORONARY ARTERY BYPASS GRAFT  1/3/12    x3    KNEE ARTHROSCOPY      left    KNEE SURGERY Left     I&D    OTHER SURGICAL HISTORY Left 3/7/2016    plantar plane &  exostectomy medial foot left    [place] COLON CA SCRN NOT HI RSK IND N/A 2018    COLONOSCOPY ATTEMPTED NOT CLEAN performed by Nisha Banda MD at [place] [address]  11-3-15    VENA CAVA FILTER PLACEMENT      WRIST SURGERY      I&D       Social History     Socioeconomic History    Marital status:    Occupational History    Occupation: retired [employer]   Tobacco Use    Smoking status: Former     Packs/day: 1.00     Years: 30.00     Pack years: 30.00     Types: Cigarettes     Quit date: 2002     Years since quittin.9    Smokeless tobacco: Never   Vaping Use    Vaping Use: Never used   Substance and Sexual Activity    Alcohol use: Not Currently     Comment: rare    Drug use: No    Sexual activity: Never     Social Determinants of Health     Financial Resource Strain: Low Risk     Difficulty of Paying Living Expenses: Not hard at all   Food Insecurity: No Food Insecurity    Worried About [facility] in the Last Year: Never true    [address] [facility] [address] in the Last Year: Never true   Transportation Needs: No Transportation Needs    Lack of Transportation (Medical): No    Lack of Transportation (Non-Medical):  No   Physical Activity: Inactive    Days of Exercise per Week: 0 days    Minutes of Exercise per Session: 0 min   Housing Stability: Unknown    Unable to Pay for Housing in the Last Year: No    Unstable Housing in the Last Year: No       Family History   Problem Relation Age of Onset    Heart Failure Father     Cancer Mother         breast Cancer Maternal Grandfather        Allergies   Allergen Reactions    Flagyl [Metronidazole] Hives    Metronidazole      Other reaction(s): Vomiting       There were no vitals filed for this visit. Current Outpatient Medications   Medication Sig Dispense Refill    furosemide (LASIX) 40 MG tablet TAKE 1 TABLET BY MOUTH TWO TIMES A DAY 60 tablet 3    topiramate (TOPAMAX) 50 MG tablet TAKE 1 TABLET BY MOUTH IN THE MORNING AND AT BEDTIME 180 tablet 1    oxyCODONE (ROXICODONE) 5 MG immediate release tablet Take 1 tablet by mouth 2 times daily as needed for Pain for up to 30 days. 60 tablet 0    LANTUS SOLOSTAR 100 UNIT/ML injection pen inject 35 units subcutaneously in the morning and 40 units in the evening 45 mL 3    lisinopril (PRINIVIL;ZESTRIL) 2.5 MG tablet Take 1 tablet by mouth daily 30 tablet 1    BD PEN NEEDLE ALIZE 2ND GEN 32G X 4 MM MISC USE AS DIRECTED TWICE DAILY 100 each 1    omeprazole (PRILOSEC) 20 MG delayed release capsule TAKE 1 CAPSULE BY MOUTH EVERY DAY 90 capsule 0    simvastatin (ZOCOR) 20 MG tablet TAKE 1 TABLET BY MOUTH EVERY DAY AT NIGHT 90 tablet 0    gabapentin (NEURONTIN) 800 MG tablet Take 1 tablet by mouth in the morning for 90 days. 90 tablet 2    naloxone (NARCAN) 4 MG/0.1ML LIQD nasal spray 1 spray by Nasal route as needed (if needed for respiratory depression) 1 each 0    blood glucose test strips (FREESTYLE LITE) strip TEST TWICE DAILY AS DIRECTED 100 strip 1    aspirin 81 MG tablet Take 81 mg by mouth daily. Vitamin D (CHOLECALCIFEROL) 1000 UNITS CAPS capsule Take 2,000 Units by mouth daily       Ascorbic Acid (VITAMIN C) 500 MG tablet Take 1,000 mg by mouth daily       NITROSTAT 0.4 MG SL tablet       finasteride (PROSCAR) 5 MG tablet Take 5 mg by mouth daily. tamsulosin (FLOMAX) 0.4 MG capsule Take 0.4 mg by mouth daily. No current facility-administered medications for this encounter. Review of Systems   Constitutional:  Positive for fatigue.  Negative for chills and fever. Gastrointestinal:  Negative for constipation, diarrhea, nausea and vomiting. Musculoskeletal:  Positive for back pain, gait problem, neck pain and neck stiffness. Neurological:  Positive for dizziness, weakness, numbness and headaches. Objective:  Vital signs: (most recent): There were no vitals taken for this visit. No fever. Assessment & Plan  No diagnosis found. No orders of the defined types were placed in this encounter. No orders of the defined types were placed in this encounter.            Electronically signed by Jose Kunz MA on 1/19/2023 at 8:54 AM

## 2023-01-19 NOTE — PROGRESS NOTES
The patient is a 69 y. o. Non- / non  male. Chief Complaint   Patient presents with    Back Pain     Roxicodone         Back Pain  Associated symptoms include headaches, numbness and weakness. Pertinent negatives include no fever.      60-year-old man with history of chronic multisite body pain  He identify major pain location as right shoulder lower back and both lower extremities  He is diagnosed with chronic right shoulder pain related to rotator cuff tendinopathy, not interested in any surgeries, no shoulder interventional procedure    For chronic bilateral lower extremity pain he is diagnosed with painful diabetic neuropathy  We have discussed neuromodulation trial with spinal cord stimulation    For chronic axial lower back pain patient of tried different modalities  He had recent MRI lumbar spine  We discussed in past imaging finding and explained that pain is likely multifactorial  We addressed lumbar facet mediated pain with radiofrequency ablation  He still have significant amount of axial lower back pain that aggravates with routine activity located in the midline and lumbar area sitting standing walking routine life activities seriously aggravates the pain  Is frustrated and tired of this pain  MRI was again reviewed today  CAD nephric and amount of Modic changes involving L4 L5-S1 vertebrae  Based on clinical presentation and imaging finding I explained that I suspect vertebral agenic low back pain is the likely etiology  We discussed intercept procedure for basivertebral nerve ablation  Patient is interested in that  We will schedule for the procedure    Is on chronic opioid therapy with oxycodone 1 tablet twice a day  Reports no side effect  Find it helpful  No sign or symptom of any aberrancy  Automated prescription report reviewed  Safe use of medication discussed  Refill ordered    Medication Refill:     Pain score Today:  5  Adverse effects (Constipation / Nausea / Sedation / sexual Dysfunction / others) :  no  Mood: good   Sleep pattern and quality: good  Activity level: fair    Pill count Today: Roxicodone # 8  Last dose taken  01/19/20/23  OARRS report reviewed today: yes  ER/Hospitalizations/PCP visit related to pain since last visit:no   Any legal problems e.g. DUI etc.:No  Satisfied with current management: Yes    Opioid Contract: 11/17/2022  Last Urine Dug screen dated: 05/19/2022    Hemoglobin A1C   Date Value Ref Range Status   05/10/2022 7.2 % Final       Past Medical History, Past Surgical History, Social History, Allergies and Medications reviewed and updated in EPIC as indicated    Family History reviewed and is noncontributory.         Past Medical History:   Diagnosis Date    Abdominal pain     Benign prostatic hypertrophy     C. difficile colitis     CAD (coronary artery disease) 1/3/12    s/p CABGx3    Colon polyp 02/25/2019    tubular adenoma    Constipation     Diabetes mellitus (HCC)     Diarrhea     Diarrhea     DVT (deep venous thrombosis) (HCC)     left calf    Ejection fraction < 50%     Gallstones     Heartburn     Hx of blood clots     Hyperlipidemia     Kidney stones     Lumbar spinal stenosis 4/21/2014    MI (myocardial infarction) ([facility] [address]     2011    Mitral regurgitation     MRSA (methicillin resistant staph aureus) culture positive     Numbness and tingling     hands and feet    Rib fractures 1-2015    right    Wears glasses     readers        Past Surgical History:   Procedure Laterality Date    APPENDECTOMY      CARDIAC CATHETERIZATION  12/29/11    CHOLECYSTECTOMY      COLONOSCOPY  01/11/2012    wnl, 10 yr recall    COLONOSCOPY  11/28/2018    ATTEMPTED NOT CLEAN (N/A )    COLONOSCOPY  02/25/2019    tubular adenoma    COLONOSCOPY N/A 2/25/2019    COLONOSCOPY WITH BIOPSY performed by Maggie Mayo MD at [address] [place] [address]      x 1    CORONARY ARTERY BYPASS GRAFT  1/3/12    x3    KNEE ARTHROSCOPY      left    KNEE SURGERY Left     I&D OTHER SURGICAL HISTORY Left 3/7/2016    plantar plane &  exostectomy medial foot left    SC COLON CA SCRN NOT HI RSK IND N/A 2018    COLONOSCOPY ATTEMPTED NOT CLEAN performed by Akira Pal MD at [place] [address]  11-3-15    VENA CAVA FILTER PLACEMENT      WRIST SURGERY      I&D       Social History     Socioeconomic History    Marital status:      Spouse name: None    Number of children: None    Years of education: None    Highest education level: None   Occupational History    Occupation: retired [employer]   Tobacco Use    Smoking status: Former     Packs/day: 1.00     Years: 30.00     Pack years: 30.00     Types: Cigarettes     Quit date: 2002     Years since quittin.9    Smokeless tobacco: Never   Vaping Use    Vaping Use: Never used   Substance and Sexual Activity    Alcohol use: Not Currently     Comment: rare    Drug use: No    Sexual activity: Never     Social Determinants of Health     Financial Resource Strain: Low Risk     Difficulty of Paying Living Expenses: Not hard at all   Food Insecurity: No Food Insecurity    Worried About [address] in the Last Year: Never true    [facility] in the Last Year: Never true   Transportation Needs: No Transportation Needs    Lack of Transportation (Medical): No    Lack of Transportation (Non-Medical):  No   Physical Activity: Inactive    Days of Exercise per Week: 0 days    Minutes of Exercise per Session: 0 min   Housing Stability: Unknown    Unable to Pay for Housing in the Last Year: No    Unstable Housing in the Last Year: No       Family History   Problem Relation Age of Onset    Heart Failure Father     Cancer Mother         breast    Cancer Maternal Grandfather        Allergies   Allergen Reactions    Flagyl [Metronidazole] Hives    Metronidazole      Other reaction(s): Vomiting       Vitals:    23 0902   BP: 124/66   Pulse: 71   SpO2: 97%       Current Outpatient Medications   Medication Sig Dispense Refill    oxyCODONE (ROXICODONE) 5 MG immediate release tablet Take 1 tablet by mouth 2 times daily as needed for Pain for up to 30 days. 60 tablet 0    furosemide (LASIX) 40 MG tablet TAKE 1 TABLET BY MOUTH TWO TIMES A DAY 60 tablet 3    topiramate (TOPAMAX) 50 MG tablet TAKE 1 TABLET BY MOUTH IN THE MORNING AND AT BEDTIME 180 tablet 1    LANTUS SOLOSTAR 100 UNIT/ML injection pen inject 35 units subcutaneously in the morning and 40 units in the evening 45 mL 3    lisinopril (PRINIVIL;ZESTRIL) 2.5 MG tablet Take 1 tablet by mouth daily 30 tablet 1    BD PEN NEEDLE ALIZE 2ND GEN 32G X 4 MM MISC USE AS DIRECTED TWICE DAILY 100 each 1    omeprazole (PRILOSEC) 20 MG delayed release capsule TAKE 1 CAPSULE BY MOUTH EVERY DAY 90 capsule 0    simvastatin (ZOCOR) 20 MG tablet TAKE 1 TABLET BY MOUTH EVERY DAY AT NIGHT 90 tablet 0    gabapentin (NEURONTIN) 800 MG tablet Take 1 tablet by mouth in the morning for 90 days. 90 tablet 2    naloxone (NARCAN) 4 MG/0.1ML LIQD nasal spray 1 spray by Nasal route as needed (if needed for respiratory depression) 1 each 0    blood glucose test strips (FREESTYLE LITE) strip TEST TWICE DAILY AS DIRECTED 100 strip 1    aspirin 81 MG tablet Take 81 mg by mouth daily. Vitamin D (CHOLECALCIFEROL) 1000 UNITS CAPS capsule Take 2,000 Units by mouth daily       Ascorbic Acid (VITAMIN C) 500 MG tablet Take 1,000 mg by mouth daily       NITROSTAT 0.4 MG SL tablet       finasteride (PROSCAR) 5 MG tablet Take 5 mg by mouth daily. tamsulosin (FLOMAX) 0.4 MG capsule Take 0.4 mg by mouth daily. No current facility-administered medications for this encounter. Review of Systems   Constitutional:  Positive for fatigue. Negative for chills and fever. Gastrointestinal:  Negative for constipation, diarrhea, nausea and vomiting. Musculoskeletal:  Positive for back pain, gait problem, neck pain and neck stiffness.    Neurological:  Positive for dizziness, weakness, numbness and headaches. Objective:  General Appearance:  Well-appearing and in no acute distress. Vital signs: (most recent): Blood pressure 124/66, pulse 71, height (!) 6\" (0.152 m), weight 234 lb (106.1 kg), SpO2 97 %. Vital signs are normal.  No fever. Output: Producing urine and producing stool. HEENT: Normal HEENT exam.    Lungs:  Normal effort and normal respiratory rate. Breath sounds clear to auscultation. He is not in respiratory distress. Heart: Normal rate. Extremities: Normal range of motion. There is no deformity. Neurological: Patient is alert and oriented to person, place and time. Patient has normal coordination. Pupils:  Pupils are equal, round, and reactive to light. Pupils are equal.   Skin:  Warm and dry. No rash or cyanosis. Assessment & Plan    1. Vertebrogenic low back pain    2. Lumbosacral spondylosis without myelopathy    3. Chronic use of opiate drug for therapeutic purpose        Orders Placed This Encounter   Procedures    Case Request        Orders Placed This Encounter   Medications    oxyCODONE (ROXICODONE) 5 MG immediate release tablet     Sig: Take 1 tablet by mouth 2 times daily as needed for Pain for up to 30 days. Dispense:  60 tablet     Refill:  0     Reduce doses taken as pain becomes manageable        Controlled Substance Monitoring:    Acute and Chronic Pain Monitoring:   RX Monitoring 1/19/2023   Attestation -   Acute Pain Prescriptions -   Periodic Controlled Substance Monitoring Possible medication side effects, risk of tolerance/dependence & alternative treatments discussed. ;No signs of potential drug abuse or diversion identified. ;Assessed functional status. Chronic Pain > 50 MEDD Obtained or confirmed \"Consent for Opioid Use\" on file.    Chronic Pain > 80 MEDD -               Electronically signed by Shweta Sykes MD on 1/19/2023 at 9:28 AM

## 2023-01-24 ENCOUNTER — HOSPITAL ENCOUNTER (OUTPATIENT)
Dept: PREADMISSION TESTING | Age: 78
Discharge: HOME | End: 2023-01-28
Payer: MEDICARE

## 2023-01-24 VITALS
RESPIRATION RATE: 18 BRPM | WEIGHT: 231 LBS | TEMPERATURE: 97.8 F | HEIGHT: 72 IN | BODY MASS INDEX: 31.29 KG/M2 | OXYGEN SATURATION: 96 % | DIASTOLIC BLOOD PRESSURE: 63 MMHG | SYSTOLIC BLOOD PRESSURE: 116 MMHG | HEART RATE: 79 BPM

## 2023-01-24 LAB
ALBUMIN SERPL-MCNC: 4 G/DL (ref 3.5–5.2)
ALP BLD-CCNC: 108 U/L (ref 40–129)
ALT SERPL-CCNC: 17 U/L (ref 5–41)
ANION GAP SERPL CALCULATED.3IONS-SCNC: 11 MMOL/L (ref 9–17)
AST SERPL-CCNC: 17 U/L
BILIRUB SERPL-MCNC: 0.4 MG/DL (ref 0.3–1.2)
BUN BLDV-MCNC: 30 MG/DL (ref 8–23)
BUN/CREAT BLD: 21 (ref 9–20)
CALCIUM SERPL-MCNC: 9.1 MG/DL (ref 8.6–10.4)
CHLORIDE BLD-SCNC: 103 MMOL/L (ref 98–107)
CO2: 25 MMOL/L (ref 20–31)
CREAT SERPL-MCNC: 1.44 MG/DL (ref 0.7–1.2)
ESTIMATED AVERAGE GLUCOSE: 169 MG/DL
GFR SERPL CREATININE-BSD FRML MDRD: 50 ML/MIN/1.73M2
GLUCOSE BLD-MCNC: 177 MG/DL (ref 70–99)
HBA1C MFR BLD: 7.5 % (ref 4–6)
HCT VFR BLD CALC: 45.2 % (ref 40.7–50.3)
HEMOGLOBIN: 14.3 G/DL (ref 13–17)
MCH RBC QN AUTO: 30.2 PG (ref 25.2–33.5)
MCHC RBC AUTO-ENTMCNC: 31.6 G/DL (ref 28.4–34.8)
MCV RBC AUTO: 95.4 FL (ref 82.6–102.9)
NRBC AUTOMATED: 0 PER 100 WBC
PDW BLD-RTO: 12.7 % (ref 11.8–14.4)
PLATELET # BLD: 148 K/UL (ref 138–453)
PMV BLD AUTO: 10.1 FL (ref 8.1–13.5)
POTASSIUM SERPL-SCNC: 4.8 MMOL/L (ref 3.7–5.3)
RBC # BLD: 4.74 M/UL (ref 4.21–5.77)
SODIUM BLD-SCNC: 139 MMOL/L (ref 135–144)
TOTAL PROTEIN: 7.1 G/DL (ref 6.4–8.3)
WBC # BLD: 8.4 K/UL (ref 3.5–11.3)

## 2023-01-24 PROCEDURE — 85027 COMPLETE CBC AUTOMATED: CPT

## 2023-01-24 PROCEDURE — 80053 COMPREHEN METABOLIC PANEL: CPT

## 2023-01-24 PROCEDURE — 83036 HEMOGLOBIN GLYCOSYLATED A1C: CPT

## 2023-01-24 PROCEDURE — 93005 ELECTROCARDIOGRAM TRACING: CPT | Performed by: STUDENT IN AN ORGANIZED HEALTH CARE EDUCATION/TRAINING PROGRAM

## 2023-01-24 PROCEDURE — 36415 COLL VENOUS BLD VENIPUNCTURE: CPT

## 2023-01-24 ASSESSMENT — PAIN - FUNCTIONAL ASSESSMENT: PAIN_FUNCTIONAL_ASSESSMENT: PREVENTS OR INTERFERES WITH MANY ACTIVE NOT PASSIVE ACTIVITIES

## 2023-01-24 ASSESSMENT — PAIN DESCRIPTION - FREQUENCY: FREQUENCY: CONTINUOUS

## 2023-01-24 ASSESSMENT — PAIN DESCRIPTION - ORIENTATION: ORIENTATION: LOWER

## 2023-01-24 ASSESSMENT — PAIN DESCRIPTION - LOCATION: LOCATION: BACK

## 2023-01-24 ASSESSMENT — PAIN DESCRIPTION - ONSET: ONSET: ON-GOING

## 2023-01-24 ASSESSMENT — PAIN SCALES - GENERAL: PAINLEVEL_OUTOF10: 7

## 2023-01-24 ASSESSMENT — PAIN DESCRIPTION - DESCRIPTORS: DESCRIPTORS: ACHING;SHARP;SPASM

## 2023-01-24 ASSESSMENT — PAIN DESCRIPTION - PAIN TYPE: TYPE: CHRONIC PAIN

## 2023-01-24 NOTE — H&P
History and Physical Service   [facility] 12    HISTORY AND PHYSICAL EXAMINATION            Date of Evaluation: 1/24/2023  Patient name:  Waldemar Welch  MRN:   [id number]  YOB: 1945  PCP:    Rosilyn Mortimer, MD    History Obtained  from:     Patient, medical records    History of Present Illness: This is Waldemar Welch a 69 y.o. male who presents for a pre-admission testing appointment for an upcoming INTRACEPT PROCEDURE L4,5 AND S1 WITH MAC by Jorden Sampson MD SCHEDULED ON 1/30/23 AT [address] [place] [address]. The patient's chief complaint is 3-6/10 constant achig to sharp lower back pain that began \"after I broke my back 10 years ago the bone slide over and pinched my nerve. \"which interferes with routine ADL and other activities. Patient had PT and EDUARDA lower back but had only temporary relief  States what did help was pain medication but it masked a lot of other things. \"  The pain has progressively worsened over time. He began to see Pain Management Dr Latricia Liu and had RFA in 11/2022 with temporary comfort in legs He is \"S/p RFA median branches at the Transverse processes of L4, L5 AND ALA for L4/5 AND L5/S1 facet joints on Bilateral under fluoroscopic guidance with IV sedation\". t per Dr Maria G Khan OP Note. He did well post procedure  The patient had a  f/u with Maira Turner CNP 12/20/22 and was last seen by  Dr Latricia Liu 1/19/23 At that time recommended the Intracept procedure. Patient uses a cane for stability \"and I can walk without it\". Walking , standing or sitting aggravate his discomfort. Denies recent falls and injuries. Functional Capacity per pt:  Patient has back pain with walking and is not able ambulate great distances He denies SOB with his activities or chest pain   1) Pt not able to walk 2 city blocks on level ground or up a incline surface due to his back pain   2) Pt not able to climb 2 flights of stairs can go  up slowly due to his back pain .     Per Cardiology Note by Dr Jami Goldberg dated 7/23/2018    Hx Severe ASCAD, CABG x 3 with LIMA to LAD, SVG to OM 1, SV[place]CA [place], subsequent JOHANNA of OM 1 in Nov 2012, improved LV EF from 35% in past to 50%,  mild to moderate Mitral regurgitation, Mild to moderate Tricuspid regurgitation, chronic RBBB and Left Anterior Fascicular Block on Abnormal EKG, old Inferior MI, HTN, Hyperlipidemia, and Diabetes Patient has not seen cardiology since that time. He denies increased SOB, palpitations, dizziness, lightheadedness, syncope MENEZES or chest pain.      Past Medical History:     Past Medical History:   Diagnosis Date    Abdominal pain     Arthritis     Benign prostatic hypertrophy     C. difficile colitis     CAD (coronary artery disease) 01/03/2012    s/p CABGx3    Colon polyp 02/25/2019    tubular adenoma    Constipation     Diabetes mellitus (HCC)     Diarrhea     Diarrhea     DVT (deep venous thrombosis) (HCC)     left calf    Ejection fraction < 50%     Gallstones     Heartburn     Hx of blood clots     Hyperlipidemia     Hypertension     Kidney stones     Lumbar spinal stenosis 04/21/2014    MI (myocardial infarction) ([facility] [address]     2011    Mitral regurgitation     MRSA (methicillin resistant staph aureus) culture positive     Numbness and tingling     hands and feet    Rib fractures 01/2015    right    Wears glasses     readers        Past Surgical History:     Past Surgical History:   Procedure Laterality Date    APPENDECTOMY      CARDIAC CATHETERIZATION  12/29/2011    CHOLECYSTECTOMY      COLONOSCOPY  01/11/2012    wnl, 10 yr recall    COLONOSCOPY  11/28/2018    ATTEMPTED NOT CLEAN (N/A )    COLONOSCOPY  02/25/2019    tubular adenoma    COLONOSCOPY N/A 02/25/2019    COLONOSCOPY WITH BIOPSY performed by Stevie Gill MD at [address] [place] [address]      x 1    CORONARY ARTERY BYPASS GRAFT  01/03/2012    x3    ENDOSCOPY, COLON, DIAGNOSTIC      KNEE ARTHROSCOPY      left    KNEE SURGERY Left     I&D    OTHER SURGICAL HISTORY Left 03/07/2016    plantar plane &  exostectomy medial foot left    SC COLON CA SCRN NOT HI RSK IND N/A 11/28/2018    COLONOSCOPY ATTEMPTED NOT CLEAN performed by Isabela Thomas MD [facility]    TONSILLECTOMY      UPPER GASTROINTESTINAL ENDOSCOPY  11/03/2015    VENA CAVA FILTER PLACEMENT      WRIST SURGERY      I&D        Medications Prior to Admission:     Prior to Admission medications    Medication Sig Start Date End Date Taking? Authorizing Provider   oxyCODONE (ROXICODONE) 5 MG immediate release tablet Take 1 tablet by mouth 2 times daily as needed for Pain for up to 30 days. 1/19/23 2/18/23  Onesimo West MD   furosemide (LASIX) 40 MG tablet TAKE 1 TABLET BY MOUTH TWO TIMES A DAY  Patient taking differently: Take 40 mg by mouth 2 times daily 1/6/23   Barrera Thomas MD   topiramate (TOPAMAX) 50 MG tablet TAKE 1 TABLET BY MOUTH IN THE MORNING AND AT BEDTIME  Patient taking differently: Take 50 mg by mouth 2 times daily 1/3/23   Barrera Thomas MD   LANTUS SOLOSTAR 100 UNIT/ML injection pen inject 35 units subcutaneously in the morning and 40 units in the evening  Patient taking differently: Inject into the skin 2 times daily inject 35 units subcutaneously in the morning and 40 units in the evening 12/8/22   Barrera Thomas MD   lisinopril (PRINIVIL;ZESTRIL) 2.5 MG tablet Take 1 tablet by mouth daily 12/8/22   Barrera Thomas MD   BD PEN NEEDLE ALIZE 2ND GEN 32G X 4 MM MISC USE AS DIRECTED TWICE DAILY 11/21/22   Barrera Thomas MD   omeprazole (PRILOSEC) 20 MG delayed release capsule TAKE 1 CAPSULE BY MOUTH EVERY DAY  Patient taking differently: Take 20 mg by mouth Daily TAKE 1 CAPSULE BY MOUTH EVERY DAY 11/11/22   Barrera Thomas MD   simvastatin (ZOCOR) 20 MG tablet TAKE 1 TABLET BY MOUTH EVERY DAY AT NIGHT  Patient taking differently: Take 20 mg by mouth nightly 8/11/22   Barrera Thomas MD   gabapentin (NEURONTIN) 800 MG tablet Take 1 tablet by mouth in the morning for 90  days. 7/21/22 1/19/23  Pilar Moreno MD   naloxone [facility]) 4 MG/0.1ML LIQD nasal spray 1 spray by Nasal route as needed (if needed for respiratory depression) 2/25/21   TACOS Holguin - CNP   blood glucose test strips (FREESTYLE LITE) strip TEST TWICE DAILY AS DIRECTED 3/28/19   Rudi Rai MD   aspirin 81 MG tablet Take 81 mg by mouth daily. Historical Provider, MD   Vitamin D (CHOLECALCIFEROL) 1000 UNITS CAPS capsule Take 1,000 Units by mouth daily    Historical Provider, MD   Ascorbic Acid (VITAMIN C) 500 MG tablet Take 1,000 mg by mouth daily     Historical Provider, MD   NITROSTAT 0.4 MG SL tablet  5/27/14   Historical Provider, MD   finasteride (PROSCAR) 5 MG tablet Take 5 mg by mouth daily. Historical Provider, MD   tamsulosin (FLOMAX) 0.4 MG capsule Take 0.4 mg by mouth daily. Historical Provider, MD        Allergies:     Flagyl [metronidazole] and Metronidazole    Social History:     Tobacco:    reports that he quit smoking about 20 years ago. His smoking use included cigarettes. He has a 30.00 pack-year smoking history. He has never used smokeless tobacco.  Alcohol:      reports that he does not currently use alcohol. Drug Use:  reports no history of drug use. Family History:     Family History   Problem Relation Age of Onset    Heart Failure Father     Cancer Mother         breast    Cancer Maternal Grandfather        Review of Systems:     Positive and Negative as described in HPI. CONSTITUTIONAL:  Negative for fevers, chills, sweats, fatigue, and weight loss. HEENT: reading glasses  Negative for glasses, hearing changes, rhinorrhea, and throat pain. RESPIRATORY:  Negative for shortness of breath, cough, congestion, and wheezing. CARDIOVASCULAR: See HPI for cardiac hx. Hx DVT's 2012 Has Vena Cava filter taking ASA 81mg  Negative for chest pain, irregular heartbeat, and palpitations.   GASTROINTESTINAL: GERD on prilosec Negative for reflux, nausea, vomiting, diarrhea, constipation, change in bowel habits, and abdominal pain. GENITOURINARY:  Negative for difficulty of urination, burning with urination, and frequency. INTEGUMENT: Negative for rash, skin lesions, and easy bruising. HEMATOLOGIC/LYMPHATIC:  Negative for swelling/edema. ALLERGIC/IMMUNOLOGIC:  Negative for urticaria and itching. ENDOCRINE: Diabetes Managed by PCP  Sugars  But more recently have been running low 50-60 usually takes candy  Suppose to have his checked  A1c   Negative for increase in thirst, increase in urination, and heat or cold intolerance. MUSCULOSKELETAL: See HPI   NEUROLOGICAL: Hx neuropathy in legs and hands. Negative for headaches, dizziness, lightheadedness, numbness, and tingling extremities. BEHAVIOR/PSYCH:  Negative for depression and anxiety. Physical Exam:   /63   Pulse 79   Temp 97.8 °F (36.6 °C) (Temporal)   Resp 18   Ht 6' (1.829 m)   Wt 231 lb (104.8 kg)   SpO2 96%   BMI 31.33 kg/m²     General Appearance:  Alert, well appearing, uncomfortable but in no acute distress. Mental status:  Oriented to person, place, and time. Head:  Normocephalic and atraumatic. Eye:  No icterus, redness, pupils equal and reactive, extraocular eye movements intact, and conjunctiva clear. Ear:  Hearing grossly intact. Nose:  No drainage noted. Mouth:  Mucous membranes moist.  Neck:  Supple and no carotid bruits noted. Lungs:  Bilateral equal air entry, dminished breath sounds, unlabored at rest, no wheezing, rales or rhonchi, and normal effort. Cardiovascular: distant heart sounds  HR 79 Normal rate, regular rhythm, no murmur, gallop, or rub. Abdomen: rotund and firm, nontender, nondistended, and active bowel sounds. Neurologic: left quad strength 4/5 right 5/5  Normal speech and cranial nerves II through XII grossly intact. Strength 5/5 bilaterally. Skin:  No gross visible lesions, rashes, bruising, or bleeding on exposed skin area.   Extremities: antalgic gait with cane Posterior tibial pulses 2+ bilaterally. No pedal edema. No calf tenderness with palpation. Psych:  Normal affect. Investigations:      Laboratory Testing:  Recent Results (from the past 24 hour(s))   EKG 12 Lead    Collection Time: 01/24/23  8:49 AM   Result Value Ref Range    Ventricular Rate 72 BPM    Atrial Rate 72 BPM    P-R Interval 140 ms    QRS Duration 130 ms    Q-T Interval 432 ms    QTc Calculation (Bazett) 473 ms    P Axis 40 degrees    R Axis -22 degrees    T Axis 8 degrees   CBC    Collection Time: 01/24/23  8:52 AM   Result Value Ref Range    WBC 8.4 3.5 - 11.3 k/uL    RBC 4.74 4.21 - 5.77 m/uL    Hemoglobin 14.3 13.0 - 17.0 g/dL    Hematocrit 45.2 40.7 - 50.3 %    MCV 95.4 82.6 - 102.9 fL    MCH 30.2 25.2 - 33.5 pg    MCHC 31.6 28.4 - 34.8 g/dL    RDW 12.7 11.8 - 14.4 %    Platelets 755 719 - 930 k/uL    MPV 10.1 8.1 - 13.5 fL    NRBC Automated 0.0 0.0 per 100 WBC       Recent Labs     01/24/23  0852   HGB 14.3   HCT 45.2   WBC 8.4   MCV 95.4       No results for input(s): COVID19 in the last 720 hours. *Please note that labs listed above are the most recent lab values available in EPIC at the time of the visit and additional labs may have been drawn or resulted since that time. Imaging/Diagnostics:    No results found. EKG:   Collected: 01/24/23 0849    Updated: 01/24/23 0914     Ventricular Rate 72 BPM    Atrial Rate 72 BPM    P-R Interval 140 ms    QRS Duration 130 ms    Q-T Interval 432 ms    QTc Calculation (Bazett) 473 ms    P Axis 40 degrees    R Axis -22 degrees    T Axis 8 degrees   Narrative:     Normal sinus rhythm   Right bundle branch block   Inferior infarct , age undetermined   Abnormal ECG   No previous ECGs available   See Epic. Diagnosis:      1. VERTEBROGNEIC LOW BACK PAIN    Plans:     1.  INTRACEPT PROCEDURE L4,5 AND S1      TACOS Barraza - CNP  1/24/2023  9:10 AM

## 2023-01-24 NOTE — PRE-PROCEDURE INSTRUCTIONS
ARRIVE  Dustin Woodrow Monday, January 30, 2023 at 11:00 AM    Once you enter the hospital lobby, take the elevators to the second floor. Check-In is at the surgery registration desk      Continue to take your home medications as you normally do up to and including the night before surgery with the exception of any blood thinning medications. Please stop any blood thinning medications as directed by your surgeon or prescribing physician. Failure to stop certain medications may interfere with your scheduled surgery. These may include:  Aspirin, Warfarin (Coumadin), Clopidogrel (Plavix), Ibuprofen (Motrin, Advil), Naproxen (Aleve), Meloxicam (Mobic), Celecoxib (Celebrex), Eliquis, Pradaxa, Xarelto, Effient, Fish Oil, Herbal supplements. ASPIRIN    If you are diabetic, do not take any of your diabetic medications by mouth the morning of surgery. If you are taking insulin contact the doctor that manages your diabetes for instructions about any changes to your insulin dosages the day before surgery. Do not inject insulin or other injectable diabetic medications the morning of surgery unless otherwise instructed by the doctor who manages your diabetes. Please take the following medication(s) the day of surgery with a small sip of water:  Gabapentin, omeprazole      PREPARING FOR YOUR SURGERY:     Before surgery, you can play an important role in your own health. Because skin is not sterile, we need to be sure that your skin is as free of germs as possible before surgery by carefully washing before surgery. Preparing or prepping skin before surgery can reduce the risk of a surgical site infection.   Do not shave the area of your body where your surgery will be performed unless you received specific permission from your physician. You will need to shower at home the night before surgery and the morning of surgery with a special soap called chlorhexidine gluconate (CHG*).      *Not to be used by people allergic to Chlorhexidine Gluconate (CHG). Following these instructions will help you be sure that your skin is clean before surgery. Instructions on cleaning your skin before surgery: The night before your surgery: You will need to shower with warm water (not hot) and the CHG soap. Use a clean wash cloth and a clean towel. Have clean clothes available to put on after the shower. First wash your hair with regular shampoo. Rinse your hair and body thoroughly to remove the shampoo. Wash your face and genital area (private parts) with your regular soap or water only. Thoroughly rinse your body with warm water from the neck down. Turn water off to prevent rinsing the soap off too soon. With a clean wet washcloth and half of the CHG soap in the bottle, lather your entire body from the neck down. Do not use CHG soap near your eyes or ears to avoid injury to those areas. Wash thoroughly, paying special attention to the area where your surgery will be performed. Wash your body gently for five (5) minutes. Avoid scrubbing your skin too hard. Turn the water back on and rinse your body thoroughly. Pat yourself dry with a clean, soft towel. Do not apply lotion, cream or powder. Dress with clean freshly washed clothes. The morning of surgery:    Repeat shower following steps above - using remaining half of CHG soap in bottle. Patient Instructions: If you are having any type of anesthesia you are to have nothing to eat or drink after midnight the night before your surgery. This includes gum, hard candy, mints, water or smoking or chewing tobacco.  The only exception to this is a small sip of water to take with any morning dose of heart, blood pressure, or seizure medications. No alcoholic beverages for 24 hours prior to surgery. Brush your teeth but do not swallow water. Bring your eye glasses and case with you.   No contacts are to be worn the day of surgery. You also may bring your hearing aids. Most surgical procedures involving anesthesia will require that you remove your dentures prior to surgery. Do not wear any jewelry or body piercings day of surgery. Also, NO lotion, perfume or deodorant to be used the day of surgery. No nail polish on the operative extremity (arm/leg surgeries)    If you are staying overnight with us, please bring a small bag of necessary personal items. Please wear loose, comfortable clothing. If you are potentially going to have a cast or brace bring clothing that will fit over them. In case of illness - If you have cold or flu like symptoms (high fever, runny nose, sore throat, cough, etc.) rash, nausea, vomiting, loose stools, and/or recent contact with someone who has a contagious disease (chicken pox, measles, etc.) Please call your doctor before coming to the hospital.         Day of Surgery/Procedure:    As a patient at [facility] you can expect quality medical and nursing care that is centered on your individual needs. Our goal is to make your surgical experience as comfortable as possible    . Transportation After Your Surgery/Procedure: You will need a friend or family member to drive you home after your procedure. Your  must be 25years of age or older and able to sign off on your discharge instructions. A taxi cab or any other form of public transportation is not acceptable. Your friend or family member must stay at the hospital throughout your procedure. Someone must remain with you for the first 24 hours after your surgery if you receive anesthesia or medication. If you do not have someone to stay with you, your procedure may be cancelled.       If you have any other questions regarding your procedure or the day of surgery, please call [phone]      _________________________  ____________________________  Signature (Patient)              Signature (Provider)               Date

## 2023-01-25 LAB
EKG ATRIAL RATE: 72 BPM
EKG P AXIS: 40 DEGREES
EKG P-R INTERVAL: 140 MS
EKG Q-T INTERVAL: 432 MS
EKG QRS DURATION: 130 MS
EKG QTC CALCULATION (BAZETT): 473 MS
EKG R AXIS: -22 DEGREES
EKG T AXIS: 8 DEGREES
EKG VENTRICULAR RATE: 72 BPM

## 2023-01-25 NOTE — FLOWSHEET NOTE
01/25/23 1338   Anesthesia PAT Clearance   Anesthesia Review Status Anes has reviewed patient for surgery  (Dr. Elier Domínguez reivewed ekg,labs, history and is requesting cardiac clearance)

## 2023-01-27 ENCOUNTER — TELEPHONE (OUTPATIENT)
Dept: PAIN MANAGEMENT | Age: 78
End: 2023-01-27

## 2023-01-27 NOTE — TELEPHONE ENCOUNTER
Patient calls [facility] and states per anesthesia at [facility], he needs cardiac clearance prior to Intracept procedure. He says surgery moved to Feb 6th. I advised I would send clearance request to Dr. Joe Ackerman today.

## 2023-01-30 DIAGNOSIS — I10 ESSENTIAL HYPERTENSION: ICD-10-CM

## 2023-01-30 DIAGNOSIS — R80.9 MICROALBUMINURIA: ICD-10-CM

## 2023-01-31 RX ORDER — LISINOPRIL 2.5 MG/1
2.5 TABLET ORAL DAILY
Qty: 90 TABLET | Refills: 1 | Status: SHIPPED | OUTPATIENT
Start: 2023-01-31

## 2023-01-31 NOTE — TELEPHONE ENCOUNTER
Please Approve or Refuse. Send to Pharmacy per Pt's Request: [facility]     Next Visit Date:  4/10/2023   Last Visit Date: 12/8/2022    Hemoglobin A1C (%)   Date Value   01/24/2023 7.5 (H)   05/10/2022 7.2   11/03/2021 6.8             ( goal A1C is < 7)   BP Readings from Last 3 Encounters:   01/24/23 116/63   01/19/23 124/66   12/20/22 114/62          (goal 120/80)  BUN   Date Value Ref Range Status   01/24/2023 30 (H) 8 - 23 mg/dL Final     Creatinine   Date Value Ref Range Status   01/24/2023 1.44 (H) 0.70 - 1.20 mg/dL Final     Potassium   Date Value Ref Range Status   01/24/2023 4.8 3.7 - 5.3 mmol/L Final     Comment:     SPECIMEN SLIGHTLY HEMOLYZED, RESULTS MAY BE ADVERSELY AFFECTED.

## 2023-02-03 ENCOUNTER — TELEPHONE (OUTPATIENT)
Dept: PAIN MANAGEMENT | Age: 78
End: 2023-02-03

## 2023-02-06 ENCOUNTER — ANESTHESIA EVENT (OUTPATIENT)
Dept: OPERATING ROOM | Age: 78
End: 2023-02-06
Payer: MEDICARE

## 2023-02-06 ENCOUNTER — ANESTHESIA (OUTPATIENT)
Dept: OPERATING ROOM | Age: 78
End: 2023-02-06
Payer: MEDICARE

## 2023-02-06 ENCOUNTER — APPOINTMENT (OUTPATIENT)
Dept: GENERAL RADIOLOGY | Age: 78
End: 2023-02-06
Attending: ANESTHESIOLOGY
Payer: MEDICARE

## 2023-02-06 ENCOUNTER — HOSPITAL ENCOUNTER (OUTPATIENT)
Age: 78
Setting detail: OUTPATIENT SURGERY
Discharge: HOME | End: 2023-02-06
Attending: ANESTHESIOLOGY | Admitting: ANESTHESIOLOGY
Payer: MEDICARE

## 2023-02-06 ENCOUNTER — HOSPITAL ENCOUNTER (OUTPATIENT)
Age: 78
Setting detail: SPECIMEN
Discharge: HOME | End: 2023-02-06
Payer: MEDICARE

## 2023-02-06 VITALS
SYSTOLIC BLOOD PRESSURE: 129 MMHG | DIASTOLIC BLOOD PRESSURE: 73 MMHG | OXYGEN SATURATION: 100 % | WEIGHT: 231 LBS | BODY MASS INDEX: 31.29 KG/M2 | HEIGHT: 72 IN | TEMPERATURE: 97.3 F | HEART RATE: 70 BPM | RESPIRATION RATE: 20 BRPM

## 2023-02-06 DIAGNOSIS — R80.9 MICROALBUMINURIA: ICD-10-CM

## 2023-02-06 DIAGNOSIS — E78.2 MIXED HYPERLIPIDEMIA: ICD-10-CM

## 2023-02-06 DIAGNOSIS — E11.42 TYPE 2 DIABETES MELLITUS WITH DIABETIC POLYNEUROPATHY, WITH LONG-TERM CURRENT USE OF INSULIN (HCC): ICD-10-CM

## 2023-02-06 DIAGNOSIS — Z79.4 TYPE 2 DIABETES MELLITUS WITH DIABETIC POLYNEUROPATHY, WITH LONG-TERM CURRENT USE OF INSULIN (HCC): ICD-10-CM

## 2023-02-06 DIAGNOSIS — M47.816 SPONDYLOSIS OF LUMBAR REGION WITHOUT MYELOPATHY OR RADICULOPATHY: ICD-10-CM

## 2023-02-06 LAB
ABSOLUTE EOS #: 0.06 K/UL (ref 0–0.44)
ABSOLUTE IMMATURE GRANULOCYTE: 0.02 K/UL (ref 0–0.3)
ABSOLUTE LYMPH #: 1.75 K/UL (ref 1.1–3.7)
ABSOLUTE MONO #: 0.56 K/UL (ref 0.1–1.2)
ALBUMIN SERPL-MCNC: 3.9 G/DL (ref 3.5–5.2)
ALP SERPL-CCNC: 104 U/L (ref 40–129)
ALT SERPL-CCNC: 14 U/L (ref 5–41)
ANION GAP SERPL CALCULATED.3IONS-SCNC: 13 MMOL/L (ref 9–17)
AST SERPL-CCNC: 13 U/L
BASOPHILS # BLD: 1 % (ref 0–2)
BASOPHILS ABSOLUTE: 0.06 K/UL (ref 0–0.2)
BILIRUB SERPL-MCNC: 0.5 MG/DL (ref 0.3–1.2)
BUN SERPL-MCNC: 35 MG/DL (ref 8–23)
BUN/CREAT BLD: 24 (ref 9–20)
CALCIUM SERPL-MCNC: 9.2 MG/DL (ref 8.6–10.4)
CHLORIDE SERPL-SCNC: 107 MMOL/L (ref 98–107)
CHOLEST SERPL-MCNC: 107 MG/DL
CHOLESTEROL/HDL RATIO: 2.9
CO2 SERPL-SCNC: 22 MMOL/L (ref 20–31)
CREAT SERPL-MCNC: 1.45 MG/DL (ref 0.7–1.2)
EOSINOPHILS RELATIVE PERCENT: 1 % (ref 1–4)
GFR SERPL CREATININE-BSD FRML MDRD: 50 ML/MIN/1.73M2
GLUCOSE BLD-MCNC: 92 MG/DL (ref 75–110)
GLUCOSE BLD-MCNC: 95 MG/DL (ref 75–110)
GLUCOSE SERPL-MCNC: 96 MG/DL (ref 70–99)
HCT VFR BLD AUTO: 43.6 % (ref 40.7–50.3)
HDLC SERPL-MCNC: 37 MG/DL
HGB BLD-MCNC: 14.2 G/DL (ref 13–17)
IMMATURE GRANULOCYTES: 0 %
LDLC SERPL CALC-MCNC: 55 MG/DL (ref 0–130)
LYMPHOCYTES # BLD: 18 % (ref 24–43)
MCH RBC QN AUTO: 30.4 PG (ref 25.2–33.5)
MCHC RBC AUTO-ENTMCNC: 32.6 G/DL (ref 28.4–34.8)
MCV RBC AUTO: 93.4 FL (ref 82.6–102.9)
MONOCYTES # BLD: 6 % (ref 3–12)
NRBC AUTOMATED: 0 PER 100 WBC
PDW BLD-RTO: 12.8 % (ref 11.8–14.4)
PLATELET # BLD AUTO: 167 K/UL (ref 138–453)
PMV BLD AUTO: 10.3 FL (ref 8.1–13.5)
POTASSIUM SERPL-SCNC: 3.7 MMOL/L (ref 3.7–5.3)
PROSTATE SPECIFIC ANTIGEN: 0.96 NG/ML
PROT SERPL-MCNC: 6.9 G/DL (ref 6.4–8.3)
RBC # BLD: 4.67 M/UL (ref 4.21–5.77)
SEG NEUTROPHILS: 74 % (ref 36–65)
SEGMENTED NEUTROPHILS ABSOLUTE COUNT: 7.37 K/UL (ref 1.5–8.1)
SODIUM SERPL-SCNC: 142 MMOL/L (ref 135–144)
TRIGL SERPL-MCNC: 76 MG/DL
TSH SERPL-ACNC: 3.04 UIU/ML (ref 0.3–5)
URATE SERPL-MCNC: 7.1 MG/DL (ref 3.4–7)
WBC # BLD AUTO: 9.8 K/UL (ref 3.5–11.3)

## 2023-02-06 PROCEDURE — 2580000003 HC RX 258: Performed by: STUDENT IN AN ORGANIZED HEALTH CARE EDUCATION/TRAINING PROGRAM

## 2023-02-06 PROCEDURE — 2500000003 HC RX 250 WO HCPCS: Performed by: ANESTHESIOLOGY

## 2023-02-06 PROCEDURE — 84443 ASSAY THYROID STIM HORMONE: CPT

## 2023-02-06 PROCEDURE — 3600000054 HC PAIN LEVEL 3 BASE: Performed by: ANESTHESIOLOGY

## 2023-02-06 PROCEDURE — 84550 ASSAY OF BLOOD/URIC ACID: CPT

## 2023-02-06 PROCEDURE — 2709999900 HC NON-CHARGEABLE SUPPLY: Performed by: ANESTHESIOLOGY

## 2023-02-06 PROCEDURE — 6360000002 HC RX W HCPCS: Performed by: NURSE ANESTHETIST, CERTIFIED REGISTERED

## 2023-02-06 PROCEDURE — 7100000000 HC PACU RECOVERY - FIRST 15 MIN: Performed by: ANESTHESIOLOGY

## 2023-02-06 PROCEDURE — 82947 ASSAY GLUCOSE BLOOD QUANT: CPT

## 2023-02-06 PROCEDURE — 7100000001 HC PACU RECOVERY - ADDTL 15 MIN: Performed by: ANESTHESIOLOGY

## 2023-02-06 PROCEDURE — 2720000010 HC SURG SUPPLY STERILE: Performed by: ANESTHESIOLOGY

## 2023-02-06 PROCEDURE — 80061 LIPID PANEL: CPT

## 2023-02-06 PROCEDURE — 85025 COMPLETE CBC W/AUTO DIFF WBC: CPT

## 2023-02-06 PROCEDURE — 84153 ASSAY OF PSA TOTAL: CPT

## 2023-02-06 PROCEDURE — 3700000000 HC ANESTHESIA ATTENDED CARE: Performed by: ANESTHESIOLOGY

## 2023-02-06 PROCEDURE — C1889 IMPLANT/INSERT DEVICE, NOC: HCPCS | Performed by: ANESTHESIOLOGY

## 2023-02-06 PROCEDURE — 3209999900 FLUORO FOR SURGICAL PROCEDURES

## 2023-02-06 PROCEDURE — 3600000055 HC PAIN LEVEL 3 ADDL 15 MIN: Performed by: ANESTHESIOLOGY

## 2023-02-06 PROCEDURE — 2500000003 HC RX 250 WO HCPCS: Performed by: NURSE ANESTHETIST, CERTIFIED REGISTERED

## 2023-02-06 PROCEDURE — 80053 COMPREHEN METABOLIC PANEL: CPT

## 2023-02-06 PROCEDURE — 7100000011 HC PHASE II RECOVERY - ADDTL 15 MIN: Performed by: ANESTHESIOLOGY

## 2023-02-06 PROCEDURE — 3700000001 HC ADD 15 MINUTES (ANESTHESIA): Performed by: ANESTHESIOLOGY

## 2023-02-06 PROCEDURE — 6360000002 HC RX W HCPCS: Performed by: ANESTHESIOLOGY

## 2023-02-06 PROCEDURE — 82570 ASSAY OF URINE CREATININE: CPT

## 2023-02-06 PROCEDURE — 82043 UR ALBUMIN QUANTITATIVE: CPT

## 2023-02-06 PROCEDURE — 7100000010 HC PHASE II RECOVERY - FIRST 15 MIN: Performed by: ANESTHESIOLOGY

## 2023-02-06 RX ORDER — SODIUM CHLORIDE 0.9 % (FLUSH) 0.9 %
5-40 SYRINGE (ML) INJECTION PRN
Status: DISCONTINUED | OUTPATIENT
Start: 2023-02-06 | End: 2023-02-06 | Stop reason: HOSPADM

## 2023-02-06 RX ORDER — SODIUM CHLORIDE 9 MG/ML
INJECTION, SOLUTION INTRAVENOUS CONTINUOUS
Status: DISCONTINUED | OUTPATIENT
Start: 2023-02-06 | End: 2023-02-06

## 2023-02-06 RX ORDER — FENTANYL CITRATE 50 UG/ML
INJECTION, SOLUTION INTRAMUSCULAR; INTRAVENOUS PRN
Status: DISCONTINUED | OUTPATIENT
Start: 2023-02-06 | End: 2023-02-06 | Stop reason: SDUPTHER

## 2023-02-06 RX ORDER — ONDANSETRON 2 MG/ML
4 INJECTION INTRAMUSCULAR; INTRAVENOUS
Status: DISCONTINUED | OUTPATIENT
Start: 2023-02-06 | End: 2023-02-06 | Stop reason: HOSPADM

## 2023-02-06 RX ORDER — SODIUM CHLORIDE 9 MG/ML
INJECTION, SOLUTION INTRAVENOUS PRN
Status: DISCONTINUED | OUTPATIENT
Start: 2023-02-06 | End: 2023-02-06 | Stop reason: HOSPADM

## 2023-02-06 RX ORDER — SODIUM CHLORIDE, SODIUM LACTATE, POTASSIUM CHLORIDE, CALCIUM CHLORIDE 600; 310; 30; 20 MG/100ML; MG/100ML; MG/100ML; MG/100ML
INJECTION, SOLUTION INTRAVENOUS CONTINUOUS
Status: DISCONTINUED | OUTPATIENT
Start: 2023-02-06 | End: 2023-02-06 | Stop reason: HOSPADM

## 2023-02-06 RX ORDER — LIDOCAINE HYDROCHLORIDE 10 MG/ML
1 INJECTION, SOLUTION EPIDURAL; INFILTRATION; INTRACAUDAL; PERINEURAL
Status: DISCONTINUED | OUTPATIENT
Start: 2023-02-06 | End: 2023-02-06 | Stop reason: HOSPADM

## 2023-02-06 RX ORDER — DEXAMETHASONE SODIUM PHOSPHATE 10 MG/ML
INJECTION, SOLUTION INTRAMUSCULAR; INTRAVENOUS PRN
Status: DISCONTINUED | OUTPATIENT
Start: 2023-02-06 | End: 2023-02-06 | Stop reason: SDUPTHER

## 2023-02-06 RX ORDER — ROCURONIUM BROMIDE 10 MG/ML
INJECTION, SOLUTION INTRAVENOUS PRN
Status: DISCONTINUED | OUTPATIENT
Start: 2023-02-06 | End: 2023-02-06 | Stop reason: SDUPTHER

## 2023-02-06 RX ORDER — HYDROMORPHONE HYDROCHLORIDE 1 MG/ML
0.5 INJECTION, SOLUTION INTRAMUSCULAR; INTRAVENOUS; SUBCUTANEOUS EVERY 5 MIN PRN
Status: DISCONTINUED | OUTPATIENT
Start: 2023-02-06 | End: 2023-02-06 | Stop reason: HOSPADM

## 2023-02-06 RX ORDER — BUPIVACAINE HYDROCHLORIDE AND EPINEPHRINE 5; 5 MG/ML; UG/ML
INJECTION, SOLUTION EPIDURAL; INTRACAUDAL; PERINEURAL PRN
Status: DISCONTINUED | OUTPATIENT
Start: 2023-02-06 | End: 2023-02-06 | Stop reason: ALTCHOICE

## 2023-02-06 RX ORDER — SODIUM CHLORIDE 0.9 % (FLUSH) 0.9 %
5-40 SYRINGE (ML) INJECTION EVERY 12 HOURS SCHEDULED
Status: DISCONTINUED | OUTPATIENT
Start: 2023-02-06 | End: 2023-02-06 | Stop reason: HOSPADM

## 2023-02-06 RX ORDER — LIDOCAINE HYDROCHLORIDE 20 MG/ML
INJECTION, SOLUTION EPIDURAL; INFILTRATION; INTRACAUDAL; PERINEURAL PRN
Status: DISCONTINUED | OUTPATIENT
Start: 2023-02-06 | End: 2023-02-06 | Stop reason: SDUPTHER

## 2023-02-06 RX ORDER — FENTANYL CITRATE 50 UG/ML
25 INJECTION, SOLUTION INTRAMUSCULAR; INTRAVENOUS EVERY 5 MIN PRN
Status: DISCONTINUED | OUTPATIENT
Start: 2023-02-06 | End: 2023-02-06 | Stop reason: HOSPADM

## 2023-02-06 RX ORDER — ONDANSETRON 2 MG/ML
INJECTION INTRAMUSCULAR; INTRAVENOUS PRN
Status: DISCONTINUED | OUTPATIENT
Start: 2023-02-06 | End: 2023-02-06 | Stop reason: SDUPTHER

## 2023-02-06 RX ORDER — PHENYLEPHRINE HCL IN 0.9% NACL 1 MG/10 ML
SYRINGE (ML) INTRAVENOUS PRN
Status: DISCONTINUED | OUTPATIENT
Start: 2023-02-06 | End: 2023-02-06 | Stop reason: SDUPTHER

## 2023-02-06 RX ORDER — PROPOFOL 10 MG/ML
INJECTION, EMULSION INTRAVENOUS PRN
Status: DISCONTINUED | OUTPATIENT
Start: 2023-02-06 | End: 2023-02-06 | Stop reason: SDUPTHER

## 2023-02-06 RX ORDER — EPHEDRINE SULFATE/0.9% NACL/PF 50 MG/5 ML
SYRINGE (ML) INTRAVENOUS PRN
Status: DISCONTINUED | OUTPATIENT
Start: 2023-02-06 | End: 2023-02-06 | Stop reason: SDUPTHER

## 2023-02-06 RX ADMIN — Medication 100 MCG: at 16:30

## 2023-02-06 RX ADMIN — Medication 100 MCG: at 16:24

## 2023-02-06 RX ADMIN — PROPOFOL 120 MG: 10 INJECTION, EMULSION INTRAVENOUS at 15:59

## 2023-02-06 RX ADMIN — DEXAMETHASONE SODIUM PHOSPHATE 10 MG: 10 INJECTION INTRAMUSCULAR; INTRAVENOUS at 16:15

## 2023-02-06 RX ADMIN — Medication 100 MCG: at 17:09

## 2023-02-06 RX ADMIN — Medication 100 MCG: at 16:50

## 2023-02-06 RX ADMIN — Medication 100 MCG: at 16:18

## 2023-02-06 RX ADMIN — Medication 100 MCG: at 16:14

## 2023-02-06 RX ADMIN — Medication 100 MCG: at 16:36

## 2023-02-06 RX ADMIN — Medication 3000 MG: at 16:16

## 2023-02-06 RX ADMIN — Medication 10 MG: at 16:19

## 2023-02-06 RX ADMIN — Medication 100 MCG: at 17:03

## 2023-02-06 RX ADMIN — FENTANYL CITRATE 100 MCG: 50 INJECTION INTRAMUSCULAR; INTRAVENOUS at 15:59

## 2023-02-06 RX ADMIN — SODIUM CHLORIDE, POTASSIUM CHLORIDE, SODIUM LACTATE AND CALCIUM CHLORIDE: 600; 310; 30; 20 INJECTION, SOLUTION INTRAVENOUS at 15:56

## 2023-02-06 RX ADMIN — ROCURONIUM BROMIDE 40 MG: 10 SOLUTION INTRAVENOUS at 15:59

## 2023-02-06 RX ADMIN — Medication 10 MG: at 16:06

## 2023-02-06 RX ADMIN — ONDANSETRON 4 MG: 2 INJECTION INTRAMUSCULAR; INTRAVENOUS at 17:00

## 2023-02-06 RX ADMIN — LIDOCAINE HYDROCHLORIDE 80 MG: 20 INJECTION, SOLUTION EPIDURAL; INFILTRATION; INTRACAUDAL at 15:59

## 2023-02-06 RX ADMIN — SUGAMMADEX 200 MG: 100 INJECTION, SOLUTION INTRAVENOUS at 17:14

## 2023-02-06 RX ADMIN — Medication 100 MCG: at 16:57

## 2023-02-06 ASSESSMENT — PAIN - FUNCTIONAL ASSESSMENT: PAIN_FUNCTIONAL_ASSESSMENT: 0-10

## 2023-02-06 NOTE — H&P
Interval H&P Note    Pt Name: Maryanna Oppenheim  MRN: [id number]  YOB: 1945  Date of evaluation: 2/6/2023      [x] I have reviewed in Epic the H&P by Russell Avery dated 01/24/2023, attached below for an Interval History and Physical note. [x] I have examined  Mehreen Talamantes  There are no changes to the patient who is scheduled for INTRACEPT PROCEDURE L4,5 AND S1 by Dena Tinajero MD for [address] The patient denies new health changes, fever, chills, wheezing, cough, increased SOB, chest pain, open sores or wounds. Hx of diabetes, POC BS 92. Last aspirin over a week ago. Patient has history of CAD with CABG x3, mild to moderate mitral regurgitation and tricuspid regurgitation, hypertension, hyperlipidemia. Recent saw cardiology Dr. Iveth Pugh and received cardiac clearance \"Moderate non-prohibitive risk\" on 02/01/2023. Full clearance note in chart. Denies any current chest pain, shortness of breath, palpitations, or lightheadedness. Vital signs: BP (!) 113/59   Pulse 73   Temp (!) 96 °F (35.6 °C) (Temporal)   Resp 18   Ht 6' (1.829 m)   Wt 231 lb (104.8 kg)   SpO2 96%   BMI 31.33 kg/m²     Allergies:  Flagyl [metronidazole] and Metronidazole    Medications:    Prior to Admission medications    Medication Sig Start Date End Date Taking? Authorizing Provider   lisinopril (PRINIVIL;ZESTRIL) 2.5 MG tablet Take 1 tablet by mouth daily 1/31/23   Radha Tobar MD   oxyCODONE (ROXICODONE) 5 MG immediate release tablet Take 1 tablet by mouth 2 times daily as needed for Pain for up to 30 days.  1/19/23 2/18/23  Dena Tinajero MD   furosemide (LASIX) 40 MG tablet TAKE 1 TABLET BY MOUTH TWO TIMES A DAY  Patient taking differently: Take 40 mg by mouth 2 times daily 1/6/23   Radha Tobar MD   topiramate (TOPAMAX) 50 MG tablet TAKE 1 TABLET BY MOUTH IN THE MORNING AND AT BEDTIME  Patient taking differently: Take 50 mg by mouth 2 times daily 1/3/23   MD [facility] UNIT/ML injection pen inject 35 units subcutaneously in the morning and 40 units in the evening  Patient taking differently: Inject into the skin 2 times daily inject 35 units subcutaneously in the morning and 40 units in the evening 12/8/22   Preethi Chamorro MD   BD PEN NEEDLE ALIZE 2ND GEN 32G X 4 MM MISC USE AS DIRECTED TWICE DAILY 11/21/22   Preethi Chamorro MD   omeprazole (PRILOSEC) 20 MG delayed release capsule TAKE 1 CAPSULE BY MOUTH EVERY DAY  Patient taking differently: Take 20 mg by mouth Daily TAKE 1 CAPSULE BY MOUTH EVERY DAY 11/11/22   Preethi Chamorro MD   simvastatin (ZOCOR) 20 MG tablet TAKE 1 TABLET BY MOUTH EVERY DAY AT NIGHT  Patient taking differently: Take 20 mg by mouth nightly 8/11/22   Preethi Chamorro MD   gabapentin (NEURONTIN) 800 MG tablet Take 1 tablet by mouth in the morning for 90 days. 7/21/22 2/6/23  Sami Freed MD   naloxone [facility]) 4 MG/0.1ML LIQD nasal spray 1 spray by Nasal route as needed (if needed for respiratory depression) 2/25/21   Zheng Berg APRN - CNP   blood glucose test strips (FREESTYLE LITE) strip TEST TWICE DAILY AS DIRECTED 3/28/19   Shirley Marcano MD   aspirin 81 MG tablet Take 81 mg by mouth daily. Historical Provider, MD   Vitamin D (CHOLECALCIFEROL) 1000 UNITS CAPS capsule Take 1,000 Units by mouth daily    Historical Provider, MD   Ascorbic Acid (VITAMIN C) 500 MG tablet Take 1,000 mg by mouth daily     Historical Provider, MD   NITROSTAT 0.4 MG SL tablet  5/27/14   Historical Provider, MD   finasteride (PROSCAR) 5 MG tablet Take 5 mg by mouth daily. Historical Provider, MD   tamsulosin (FLOMAX) 0.4 MG capsule Take 0.4 mg by mouth daily. Historical Provider, MD         This is a 69 y.o. male who is pleasant, cooperative, alert and oriented x3, in no acute distress. Heart: Heart sounds are normal.  HR 73 regular rate and rhythm without murmur, gallop or rub.    Lungs: Normal respiratory effort with equal expansion, good air exchange, unlabored and clear to auscultation without wheezes or rales bilaterally   Abdomen: Obese, soft, nontender, nondistended with bowel sounds active. Extremities: Dry, flaky skin bilateral lower extremities. +1 pedal edema. Mild bilateral calf tenderness- chronic. pedal pulses palpable. Dorsiflexion and plantar flexion 5/5 bilaterally. Palmar grasps 5/5 bilaterally. Labs:  Recent Labs     01/24/23  0852   HGB 14.3   HCT 45.2   WBC 8.4   MCV 95.4         K 4.8      CO2 25   BUN 30*   CREATININE 1.44*   GLUCOSE 177*   AST 17   ALT 17   LABALBU 4.0       No results for input(s): COVID19 in the last 720 hours. TACOS Hummel CNP  Electronically signed 2/6/2023 at 1:14 PM       TACOS Martin CNP   Nurse Practitioner   General Surgery   H&P      Signed   Date of Service:  1/24/2023  8:30 AM          Related encounter: Pre-Admission Testing Visit 30 min from 1/24/2023 in [facility] PRE-ADMIT TESTING     History and Physical Service   [facility] [place] - INPATIENT     HISTORY AND PHYSICAL EXAMINATION            Date of Evaluation:     1/24/2023  Patient name:              Dino Sanchez  MRN:                           [id number]  YOB: 1945  PCP:                            Brynn Leon MD     History Obtained  from:      Patient, medical records     History of Present Illness: This is Dino Sanchez a 69 y.o. male who presents for a pre-admission testing appointment for an upcoming INTRACEPT PROCEDURE L4,5 AND S1 WITH MAC by Eloy Shanks MD SCHEDULED ON 1/30/23 AT [address] [place] [address]. The patient's chief complaint is 3-6/10 constant achig to sharp lower back pain that began \"after I broke my back 10 years ago the bone slide over and pinched my nerve. \"which interferes with routine ADL and other activities.  Patient had PT and EDUARDA lower back but had only temporary relief  States what did help was pain medication but it masked a lot of other things. \"  The pain has progressively worsened over time. He began to see Pain Management Dr Carolina Buck and had RFA in 11/2022 with temporary comfort in legs He is \"S/p RFA median branches at the Transverse processes of L4, L5 AND ALA for L4/5 AND L5/S1 facet joints on Bilateral under fluoroscopic guidance with IV sedation\". t per Dr Donna Black OP Note. He did well post procedure  The patient had a  f/u with Taiwo Damico CNP 12/20/22 and was last seen by  Dr Carolina Buck 1/19/23 At that time recommended the Intracept procedure. Patient uses a cane for stability \"and I can walk without it\". Walking , standing or sitting aggravate his discomfort. Denies recent falls and injuries. Functional Capacity per pt:  Patient has back pain with walking and is not able ambulate great distances He denies SOB with his activities or chest pain   1) Pt not able to walk 2 city blocks on level ground or up a incline surface due to his back pain   2) Pt not able to climb 2 flights of stairs can go  up slowly due to his back pain . Per Cardiology Note by Dr Katja Beaulieu dated 7/23/2018    Hx Severe ASCAD, CABG x 3 with LIMA to LAD, SVG to OM 1, SVG to RCA Jan 2012 at [facility], subsequent JOHANNA of OM 1 in Nov 2012, improved LV EF from 35% in past to 50%,  mild to moderate Mitral regurgitation, Mild to moderate Tricuspid regurgitation, chronic RBBB and Left Anterior Fascicular Block on Abnormal EKG, old Inferior MI, HTN, Hyperlipidemia, and Diabetes Patient has not seen cardiology since that time. He denies increased SOB, palpitations, dizziness, lightheadedness, syncope MENEZES or chest pain.       Past Medical History:      Past Medical History        Past Medical History:   Diagnosis Date    Abdominal pain      Arthritis      Benign prostatic hypertrophy      C. difficile colitis      CAD (coronary artery disease) 01/03/2012     s/p CABGx3    Colon polyp 02/25/2019     tubular adenoma    Constipation      Diabetes mellitus ([facility] [address]      Diarrhea Diarrhea      DVT (deep venous thrombosis) (HCC)       left calf    Ejection fraction < 50%      Gallstones      Heartburn      Hx of blood clots      Hyperlipidemia      Hypertension      Kidney stones      Lumbar spinal stenosis 04/21/2014    MI (myocardial infarction) ([facility] [address]       2011    Mitral regurgitation      MRSA (methicillin resistant staph aureus) culture positive      Numbness and tingling       hands and feet    Rib fractures 01/2015     right    Wears glasses       readers            Past Surgical History:      Past Surgical History         Past Surgical History:   Procedure Laterality Date    APPENDECTOMY        CARDIAC CATHETERIZATION   12/29/2011    CHOLECYSTECTOMY        COLONOSCOPY   01/11/2012     wnl, 10 yr recall    COLONOSCOPY   11/28/2018     ATTEMPTED NOT CLEAN (N/A )    COLONOSCOPY   02/25/2019     tubular adenoma    COLONOSCOPY N/A 02/25/2019     COLONOSCOPY WITH BIOPSY performed by Anel Lindo MD at [facility]         x 1    CORONARY ARTERY BYPASS GRAFT   01/03/2012     x3    ENDOSCOPY, COLON, DIAGNOSTIC        KNEE ARTHROSCOPY         left    KNEE SURGERY Left       I&D    OTHER SURGICAL HISTORY Left 03/07/2016     plantar plane &  exostectomy medial foot left    VT COLON CA SCRN NOT HI RSK IND N/A 11/28/2018     COLONOSCOPY ATTEMPTED NOT CLEAN performed by Anel Lindo MD at [address] [facility], [address]   11/03/2015    VENA CAVA FILTER PLACEMENT        WRIST SURGERY         I&D            Medications Prior to Admission:      Home Medications           Prior to Admission medications    Medication Sig Start Date End Date Taking? Authorizing Provider   oxyCODONE (ROXICODONE) 5 MG immediate release tablet Take 1 tablet by mouth 2 times daily as needed for Pain for up to 30 days.  1/19/23 2/18/23   Kendall Rangel MD   furosemide (LASIX) 40 MG tablet TAKE 1 TABLET BY MOUTH TWO TIMES A DAY  Patient taking differently: Take 40 mg by mouth 2 times daily 1/6/23     Meenakshi Owens MD   topiramate (TOPAMAX) 50 MG tablet TAKE 1 TABLET BY MOUTH IN THE MORNING AND AT BEDTIME  Patient taking differently: Take 50 mg by mouth 2 times daily 1/3/23     MD [facility] UNIT/ML injection pen inject 35 units subcutaneously in the morning and 40 units in the evening  Patient taking differently: Inject into the skin 2 times daily inject 35 units subcutaneously in the morning and 40 units in the evening 12/8/22     Meenakshi Owens MD   lisinopril (PRINIVIL;ZESTRIL) 2.5 MG tablet Take 1 tablet by mouth daily 12/8/22     Meenakshi Owens MD   BD PEN NEEDLE ALIZE 2ND GEN 32G X 4 MM MISC USE AS DIRECTED TWICE DAILY 11/21/22     Meenakshi Owens MD   omeprazole (PRILOSEC) 20 MG delayed release capsule TAKE 1 CAPSULE BY MOUTH EVERY DAY  Patient taking differently: Take 20 mg by mouth Daily TAKE 1 CAPSULE BY MOUTH EVERY DAY 11/11/22     Meenakshi Owens MD   simvastatin (ZOCOR) 20 MG tablet TAKE 1 TABLET BY MOUTH EVERY DAY AT NIGHT  Patient taking differently: Take 20 mg by mouth nightly 8/11/22     Meenakshi Owens MD   gabapentin (NEURONTIN) 800 MG tablet Take 1 tablet by mouth in the morning for 90 days. 7/21/22 1/19/23   Varinder Silva MD   naloxone [facility]) 4 MG/0.1ML LIQD nasal spray 1 spray by Nasal route as needed (if needed for respiratory depression) 2/25/21     TACOS Nichols - CNP   blood glucose test strips (FREESTYLE LITE) strip TEST TWICE DAILY AS DIRECTED 3/28/19     Marilee Saeed MD   aspirin 81 MG tablet Take 81 mg by mouth daily. Historical Provider, MD   Vitamin D (CHOLECALCIFEROL) 1000 UNITS CAPS capsule Take 1,000 Units by mouth daily       Historical Provider, MD   Ascorbic Acid (VITAMIN C) 500 MG tablet Take 1,000 mg by mouth daily        Historical Provider, MD   NITROSTAT 0.4 MG SL tablet   5/27/14     Historical Provider, MD   finasteride (PROSCAR) 5 MG tablet Take 5 mg by mouth daily. Historical Provider, MD   tamsulosin (FLOMAX) 0.4 MG capsule Take 0.4 mg by mouth daily. Historical Provider, MD            Allergies:      Flagyl [metronidazole] and Metronidazole     Social History:      Tobacco:    reports that he quit smoking about 20 years ago. His smoking use included cigarettes. He has a 30.00 pack-year smoking history. He has never used smokeless tobacco.  Alcohol:      reports that he does not currently use alcohol. Drug Use:  reports no history of drug use. Family History:      Family History         Family History   Problem Relation Age of Onset    Heart Failure Father      Cancer Mother           breast    Cancer Maternal Grandfather              Review of Systems:      Positive and Negative as described in HPI. CONSTITUTIONAL:  Negative for fevers, chills, sweats, fatigue, and weight loss. HEENT: reading glasses  Negative for glasses, hearing changes, rhinorrhea, and throat pain. RESPIRATORY:  Negative for shortness of breath, cough, congestion, and wheezing. CARDIOVASCULAR: See HPI for cardiac hx. Hx DVT's 2012 Has Vena Cava filter taking ASA 81mg  Negative for chest pain, irregular heartbeat, and palpitations. GASTROINTESTINAL: GERD on prilosec Negative for reflux, nausea, vomiting, diarrhea, constipation, change in bowel habits, and abdominal pain. GENITOURINARY:  Negative for difficulty of urination, burning with urination, and frequency. INTEGUMENT: Negative for rash, skin lesions, and easy bruising. HEMATOLOGIC/LYMPHATIC:  Negative for swelling/edema. ALLERGIC/IMMUNOLOGIC:  Negative for urticaria and itching. ENDOCRINE: Diabetes Managed by PCP  Sugars  But more recently have been running low 50-60 usually takes candy  Suppose to have his checked  A1c   Negative for increase in thirst, increase in urination, and heat or cold intolerance. MUSCULOSKELETAL: See HPI   NEUROLOGICAL: Hx neuropathy in legs and hands.   Negative for headaches, dizziness, lightheadedness, numbness, and tingling extremities. BEHAVIOR/PSYCH:  Negative for depression and anxiety. Physical Exam:   /63   Pulse 79   Temp 97.8 °F (36.6 °C) (Temporal)   Resp 18   Ht 6' (1.829 m)   Wt 231 lb (104.8 kg)   SpO2 96%   BMI 31.33 kg/m²     General Appearance:  Alert, well appearing, uncomfortable but in no acute distress. Mental status:  Oriented to person, place, and time. Head:  Normocephalic and atraumatic. Eye:  No icterus, redness, pupils equal and reactive, extraocular eye movements intact, and conjunctiva clear. Ear:  Hearing grossly intact. Nose:  No drainage noted. Mouth:  Mucous membranes moist.  Neck:  Supple and no carotid bruits noted. Lungs:  Bilateral equal air entry, dminished breath sounds, unlabored at rest, no wheezing, rales or rhonchi, and normal effort. Cardiovascular: distant heart sounds  HR 79 Normal rate, regular rhythm, no murmur, gallop, or rub. Abdomen: rotund and firm, nontender, nondistended, and active bowel sounds. Neurologic: left quad strength 4/5 right 5/5  Normal speech and cranial nerves II through XII grossly intact. Strength 5/5 bilaterally. Skin:  No gross visible lesions, rashes, bruising, or bleeding on exposed skin area. Extremities: antalgic gait with cane Posterior tibial pulses 2+ bilaterally. No pedal edema. No calf tenderness with palpation. Psych:  Normal affect.       Investigations:       Laboratory Testing:  Recent Results         Recent Results (from the past 24 hour(s))   EKG 12 Lead     Collection Time: 01/24/23  8:49 AM   Result Value Ref Range     Ventricular Rate 72 BPM     Atrial Rate 72 BPM     P-R Interval 140 ms     QRS Duration 130 ms     Q-T Interval 432 ms     QTc Calculation (Bazett) 473 ms     P Axis 40 degrees     R Axis -22 degrees     T Axis 8 degrees   CBC     Collection Time: 01/24/23  8:52 AM   Result Value Ref Range     WBC 8.4 3.5 - 11.3 k/uL     RBC 4.74 4.21 - 5.77 m/uL Hemoglobin 14.3 13.0 - 17.0 g/dL     Hematocrit 45.2 40.7 - 50.3 %     MCV 95.4 82.6 - 102.9 fL     MCH 30.2 25.2 - 33.5 pg     MCHC 31.6 28.4 - 34.8 g/dL     RDW 12.7 11.8 - 14.4 %     Platelets 464 884 - 627 k/uL     MPV 10.1 8.1 - 13.5 fL     NRBC Automated 0.0 0.0 per 100 WBC                Recent Labs     01/24/23  0852   HGB 14.3   HCT 45.2   WBC 8.4   MCV 95.4         No results for input(s): COVID19 in the last 720 hours. *Please note that labs listed above are the most recent lab values available in EPIC at the time of the visit and additional labs may have been drawn or resulted since that time. Imaging/Diagnostics:     No results found. EKG:         Collected: 01/24/23 0849     Updated: 01/24/23 0914       Ventricular Rate 72 BPM     Atrial Rate 72 BPM     P-R Interval 140 ms     QRS Duration 130 ms     Q-T Interval 432 ms     QTc Calculation (Bazett) 473 ms     P Axis 40 degrees     R Axis -22 degrees     T Axis 8 degrees   Narrative:     Normal sinus rhythm   Right bundle branch block   Inferior infarct , age undetermined   Abnormal ECG   No previous ECGs available   See [facility]. Diagnosis:       1. VERTEBROGNEIC LOW BACK PAIN     Plans:      1.  INTRACEPT PROCEDURE L4,5 AND S1        TACOS Forte - CNP  1/24/2023  9:10 AM                 Cosigned by: Camila Danielle MD at 1/24/2023  1:45 PM

## 2023-02-06 NOTE — OP NOTE
Operative Note      Patient: Sai Ohara  YOB: 1945  MRN: [id number]    Date of Procedure: 2/6/2023    Pre-Op Diagnosis: VERTEBROGNEIC LOW BACK PAIN    Post-Op Diagnosis: Same       Procedure(s):  INTRACEPT PROCEDURE L4,5 AND S1    Surgeon(s):  Adeline Nielsen MD    Assistant:   * No surgical staff found *    Anesthesia: General    Estimated Blood Loss (mL): Minimal    Complications: None    Specimens:   * No specimens in log *    Implants:  * No implants in log *      Drains: * No LDAs found *    Findings: N/A    Detailed Description of Procedure:   Pre-op Diagnosis:   Vertebrogenic low back pain,   Modic changes L4 / 5  / S1    Post-op Diagnosis:   Vertebrogenic low back pain,   Modic changes L4 / 5  / S1    Procedure: Basivertebral nerve (BVN) ablation- Intracept Procedure L4 / 5  / S1    Physician/Surgeon: Hubert Cavazos MD  Anesthesia: MAC    ANTIBIOTICS: IV 2 GM ANCEF    Indications for Procedure:   Chronic axial lumbar spinal pain onset several years ago progressively worsened affecting quality of life, refractory to different modalities including therapy medication management and different spine injections  MRI imaging showed Modic changes involving L4 / 5/ S1 lumbar vertebrae  Clinical examination and imaging concordant with vertebrogenic pain    Procedure Time Out: Patient ID confirmed, correct procedure to be performed, correct site and/or side for procedure as per marked location and correct medication(s), including antibiotic to be used for the procedure    Description of Procedure:     After receiving anesthesia in the supine position, the patient was placed prone on the operating room table and all pressure points were appropriately padded. The back was sterilely prepped and draped. L4 LEVEL:  The C-arm was moved to visualize the target at the superolateral aspect of the L4 vertebral body using the approach similar to the L4 vertebral body.  The C-arm was rotated to square off the superior endplate at L4 and rotated approximately 35 degrees to obtain an oblique view with the facet in the midpoint of the vertebral body. The superolateral L4 pedicle was identified, and the skin entry point identified and infiltrated with 0.5 % BUPIVACAINE using a 25-gauge 1-1/2 inch needle. A 22-gauge 5-inch spinal needle was used to anesthetize the track to the pedicle and periosteum and confirm the introducer cannula trajectory. A skin incision was made with 10 scalpel blade. The 8-gauge introducer cannula with daimond tip was then introduced through the skin, subcutaneous tissue and paraspinal muscle until bony contact was made. The position was checked in the AP and Lateral plane. Using a mallet, the trocar was then advanced thru the pedicle to the posterior aspect of the vertebral body using a combination of AP and lateral views to ensure appropriate traversing of the pedicle and no breaching of the pedicle medially. Once the trocar was in the posterior aspect of the L4 vertebral body, the trocar was removed from the cannula and the curved cannula assembly with the nitinol J-stylet was inserted. The wingnut was rotated counterclockwise permitting excursion of the J-stylet. The curved cannula assembly was then advanced using a mallet in 1-2 mm increments. The J-stylet was observed to traverse the vertebral body in the AP and lateral views. Target was reached when the tip of the stylet was noted to be between 30-50% forward of the posterior wall of the L4 in the lateral view (midway between the superior and inferior endplates) and it crossed the midline of the L4 spinous process in the AP view. The stylet was then removed. The bipolar radiofrequency (RF) probe was removed from the previous vertebral body, cleaned and then inserted into the introducer cannula. The wingnut was rotated clockwise to retract the PEEK sleeve to expose the proximal electrode on the radiofrequency probe.  The BVN nerve was then ablated at 85 degrees Celsius for 15 minutes using [employer]s RFG standard algorithm. L5 LEVEL  While the ablation was occurring at L5, the C-arm was moved to visualize the target at the superolateral aspect of the L5 vertebral body using theapproach similar to the L5 vertebral body. The C-arm was rotated to square off the superior endplate at L5 and rotated approximately 35 degrees to the to obtain an oblique view with the facet in the midpoint of the vertebral body. The superolateral  L5 pedicle was identified, and the skin entry point identified and infiltrated with 1% lidocaine using a 25-gauge 1-1/2 inch needle. A 22-gauge 5-inch spinal needle was used to anesthetize the track to the pedicle and periosteum and confirm the introducer cannula trajectory. A skin incision was made with 10 scalpel blade. The 8-gauge introducer cannula with marsha tip was then introduced through the skin, subcutaneous tissue and paraspinal muscle until bony contact was made. The position was checked in the AP and Lateral plane. Using a mallet, the trocar was then advanced thru the pedicle to the posterior aspect of the vertebral body using a combination of AP and lateral views to ensure appropriate traversing of the pedicle and no breaching of the pedicle medially. Once the trocar was in the posterior aspect of the l5 vertebral body, the trocar was removed from the cannula and the curved cannula assembly with the nitinol J-stylet was inserted. The wingnut was rotated counterclockwise permitting excursion of the J-stylet. The curved cannula assembly was then advanced using a mallet in 1-2 mm increments. The J-stylet was observed to traverse the vertebral body in the midline in both the AP and lateral views.  Target was reached when the tip of the stylet was noted to be between 30-50% forward of the posterior wall of the L5 in the lateral view (midway between the superior and inferior endplates) and it crossed the midline of the L5 spinous process in the AP view. The stylet was then removed. The bipolar radiofrequency (RF) probe was removed from the previous vertebral body, cleaned and then inserted into the introducer cannula. The wingnut was rotated clockwise to retract the PEEK sleeve to expose the proximal electrode on the radiofrequency probe. The BVN nerve was then ablated at 85 degrees Celsius for 15 minutes using [employer]s RFG standard algorithm. S1 LEVEL  While the ablation was occurring at L5 , the C-arm was moved to visualize the target at the superolateral aspect of S1. The C-arm was rotated to a [place] view to square off the superior endplate at S1. The approach was to the S1 pedicle. The skin entry point was identified and infiltrated with 0.5 % Bupivacaine using a 25-gauge 1-1/2 inch needle. A 22-gauge 5-inch spinal needle was used to anesthetize the track to the pedicle and periosteum and confirm the introducer cannula trajectory. A skin incision was made with 10 scalpel blade. The 8-gauge introducer cannula with marsha tip was then introduced through the skin, subcutaneous tissue and paraspinal muscle until bony contact was made. The position was checked in the AP and Lateral plane. Using a mallet, the trocar was then advanced thru the pedicle to the posterior aspect of the vertebral body using a combination of AP and lateral views to ensure appropriate traversing of the pedicle and no breaching of the pedicle medially. Once the trocar was in the posterior aspect of the S1 vertebral body, the trocar was removed from the cannula and the curved cannula assembly with the nitinol J-stylet was inserted. The wingnut was rotated counterclockwise permitting excursion of the J-stylet. The curved cannula assembly was then advanced using a mallet in 1-2 mm increments. The J-stylet was observed to traverse the vertebral body in the midline in both the AP and lateral views.  Target was reached when the tip of the stylet was noted to be approximately 50% forward of the posterior wall of the S1 in the lateral view, 40% inferior from the superior endplate and it crossed the midline of the S1 spinous process in the AP view. The stylet was then removed. The bipolar radiofrequency (RF) probe was removed from the previous vertebral body, cleaned and then inserted into the introducer cannula. The wingnut was rotated clockwise to retract the PEEK sleeve to expose the proximal electrode on the radiofrequency probe. The BVN nerve was then ablated at 85 degrees Celsius for 15 minutes using [employer] RFG standard algorithm.    Finally:  With all ablations completed, the instruments were removed from the vertebral bodies. The surgical wounds were closed with dermabond and a sterile dressing was applied. The patient was returned to the supine position and the anesthesia reversed. The patient tolerated the procedure well and was brought to the recovery room.    The patient was provided post-op and follow up instructions.    Complications: none  Estimate Blood Loss: minimal    Total Time in the OR: 60 MINUTES      Electronically signed by Onesimo West MD on 2/6/2023 at 5:31 PM

## 2023-02-06 NOTE — DISCHARGE INSTRUCTIONS
To achieve optimal recovery and results,   follow these instructions carefully. What to Expect After the Procedure      _    You should be discharged and able to walk on the same day of the procedure. _    As with any procedure, you may feel sore afterwards. If you feel extreme discomfort or pain, contact your physician immediately. Medication      Tell your physician about any prescribed or over-the-counter medications you are currently taking. Continue taking them as directed by your physician. If you feel soreness or minor discomfort, you may take the prescribe pain medication directed      Activity Restrictions & Resumption      Avoid strenuous exercise and heavy lifting for 7 days. Walking is the best exercise, and daily walking is strongly recommended. Gradually increase the length and distance of your walks. Start inside your home, then progress to out and around your home, as tolerated, and progress to your neighborhood or shopping center. Try to limit long car rides (greater than 1 hour) for a few weeks, or as tolerated. Do not drive while on pain medications. Start Home exercise program /physical therapy / Rehab as planned 1 week post op for physical conditioning and core strengthening. Incision Care      Remove the outer dressings 3 days after the procedure. Adhesive strips will cover the incision(s) and should begin to fall off within 7-10 days after the procedure. If they have not fallen off after 14 days, you may remove them. You may begin to shower 3 days after the procedure. Do not sit in the bath. Do not rub the incision. Do not apply lotion, medication, or cream to the incision. Post-Procedure Office Visits      __  If your follow-up appointments have not been scheduled, please contact STAFF at the phone number below within 24 hours after your procedure to schedule your appointments.   [address] [facility]  [facility] [place] Sukhdeep Briggs [facility], [facility]  [phone]  Dr. Griselda Newman              Warning Signs      Call your physician immediately, at [phone], if you experience any of the following:  Constant bleeding from the incision that will not stop after applying direct pressure for 10 minutes. Swelling, redness, or a change in drainage at incision site (such as an increase in amount of fluid, a foul odor, or a change in color). A change in mental status, such as unusual behavior, confusion, or difficulty standing or walking. A temperature greater than 101 degrees Fahrenheit.

## 2023-02-06 NOTE — ANESTHESIA PRE PROCEDURE
Department of Anesthesiology  Preprocedure Note       Name:  Gen Mathis   Age:  69 y.o.  :  1945                                          MRN:  [id number]         Date:  2023      Surgeon: Yousuf Cameron):  Yessi Qiu MD    Procedure: Procedure(s):  INTRACEPT PROCEDURE L4,5 AND S1    Medications prior to admission:   Prior to Admission medications    Medication Sig Start Date End Date Taking? Authorizing Provider   lisinopril (PRINIVIL;ZESTRIL) 2.5 MG tablet Take 1 tablet by mouth daily 23   Tami Acevedo MD   oxyCODONE (ROXICODONE) 5 MG immediate release tablet Take 1 tablet by mouth 2 times daily as needed for Pain for up to 30 days. 23  Yessi Qiu MD   furosemide (LASIX) 40 MG tablet TAKE 1 TABLET BY MOUTH TWO TIMES A DAY  Patient taking differently: Take 40 mg by mouth 2 times daily 23   Tami Acevedo MD   topiramate (TOPAMAX) 50 MG tablet TAKE 1 TABLET BY MOUTH IN THE MORNING AND AT BEDTIME  Patient taking differently: Take 50 mg by mouth 2 times daily 1/3/23   Tami Acevedo MD   LANTUS SOLOSTAR 100 UNIT/ML injection pen inject 35 units subcutaneously in the morning and 40 units in the evening  Patient taking differently: Inject into the skin 2 times daily inject 35 units subcutaneously in the morning and 40 units in the evening 22   Tami Acevedo MD   BD PEN NEEDLE ALIZE 2ND GEN 32G X 4 MM MISC USE AS DIRECTED TWICE DAILY 22   Tami Acevedo MD   omeprazole (PRILOSEC) 20 MG delayed release capsule TAKE 1 CAPSULE BY MOUTH EVERY DAY  Patient taking differently: Take 20 mg by mouth Daily TAKE 1 CAPSULE BY MOUTH EVERY DAY 22   Tami Acevedo MD   simvastatin (ZOCOR) 20 MG tablet TAKE 1 TABLET BY MOUTH EVERY DAY AT NIGHT  Patient taking differently: Take 20 mg by mouth nightly 22   Tami Acevedo MD   gabapentin (NEURONTIN) 800 MG tablet Take 1 tablet by mouth in the morning for 90 days.  22  Yessi Qiu MD   naloxone [facility]) 4 MG/0.1ML LIQD nasal spray 1 spray by Nasal route as needed (if needed for respiratory depression) 2/25/21   Christel García APRN - CNP   blood glucose test strips (FREESTYLE LITE) strip TEST TWICE DAILY AS DIRECTED 3/28/19   Treasure Hamm MD   aspirin 81 MG tablet Take 81 mg by mouth daily. Historical Provider, MD   Vitamin D (CHOLECALCIFEROL) 1000 UNITS CAPS capsule Take 1,000 Units by mouth daily    Historical Provider, MD   Ascorbic Acid (VITAMIN C) 500 MG tablet Take 1,000 mg by mouth daily     Historical Provider, MD   NITROSTAT 0.4 MG SL tablet  5/27/14   Historical Provider, MD   finasteride (PROSCAR) 5 MG tablet Take 5 mg by mouth daily. Historical Provider, MD   tamsulosin (FLOMAX) 0.4 MG capsule Take 0.4 mg by mouth daily. Historical Provider, MD       Current medications:    Current Facility-Administered Medications   Medication Dose Route Frequency Provider Last Rate Last Admin    lidocaine PF 1 % injection 1 mL  1 mL IntraDERmal Once PRN Vamsi Young MD        lactated ringers IV soln infusion   IntraVENous Continuous Vamsi Young MD        sodium chloride flush 0.9 % injection 5-40 mL  5-40 mL IntraVENous 2 times per day Vamsi Young MD        sodium chloride flush 0.9 % injection 5-40 mL  5-40 mL IntraVENous PRN Vamsi Young MD        0.9 % sodium chloride infusion   IntraVENous PRN Vamsi Young MD        ceFAZolin (ANCEF) 2000 mg in 0.9% sodium chloride 50 mL IVPB  2,000 mg IntraVENous Once Deedee Marlow MD        sugammadex [facility]) 200 MG/2ML injection                Allergies:     Allergies   Allergen Reactions    Flagyl [Metronidazole] Hives    Metronidazole      Other reaction(s): Vomiting       Problem List:    Patient Active Problem List   Diagnosis Code    Coronary artery disease due to lipid rich plaque I25.10, I25.83    DDD (degenerative disc disease), lumbar M51.36    Osteoarthritis of spine with radiculopathy, lumbar region M47.26    Lumbar spinal stenosis M48.061  DDD (degenerative disc disease), cervical M50.30    Facet syndrome ([facility] M47.899    Essential hypertension I10    Spondylosis of lumbar region without myelopathy or radiculopathy M47.816    Coronary artery disease involving coronary bypass graft of native heart without angina pectoris I25.810    IBS (irritable bowel syndrome) K58.9    Charcot foot due to diabetes mellitus ([facility] E11.610    Type 2 diabetes mellitus with diabetic polyneuropathy, with long-term current use of insulin ([facility]) E11.42, Z79.4    Hyperlipidemia E78.5    Vitamin D deficiency E55.9    Drug-induced constipation K59.03    Chronic, continuous use of opioids F11.90    Diabetic mononeuropathy associated with diabetes mellitus due to underlying condition ([facility]) E08.41    Closed fracture of proximal end of left fibula with routine healing S82.832D    Microalbuminuria R80.9    Nondisplaced physeal fracture of distal end of left fibula S89.302A    Morbid obesity, unspecified obesity type ([facility]) E66.01    Chronic renal disease, stage III ([facility] [[id number]] N18.30    Vertebrogenic low back pain M54.51    Chronic use of opiate drug for therapeutic purpose Z79.891    Chronic tension-type headache, intractable G44.221    Lumbosacral spondylosis without myelopathy M47.817       Past Medical History:        Diagnosis Date    Abdominal pain     Arthritis     Benign prostatic hypertrophy     C. difficile colitis     CAD (coronary artery disease) 01/03/2012    s/p CABGx3    Colon polyp 02/25/2019    tubular adenoma    Constipation     Diabetes mellitus ([facility])     Diarrhea     Diarrhea     DVT (deep venous thrombosis) ([facility])     left calf    Ejection fraction < 50%     Gallstones     Heartburn     Hx of blood clots     Hyperlipidemia     Hypertension     Kidney stones     Lumbar spinal stenosis 04/21/2014    MI ([facility] infarction) [facility])     2011    Mitral regurgitation     MRSA (methicillin resistant staph aureus) culture positive     Numbness and tingling     hands and feet    Rib fractures 2015    right    Wears glasses     readers       Past Surgical History:        Procedure Laterality Date    APPENDECTOMY      CARDIAC CATHETERIZATION  2011    CHOLECYSTECTOMY      COLONOSCOPY  2012    wnl, 10 yr recall    COLONOSCOPY  2018    ATTEMPTED NOT CLEAN (N/A )    COLONOSCOPY  2019    tubular adenoma    COLONOSCOPY N/A 2019    COLONOSCOPY WITH BIOPSY performed by [facility]oss MD at [facility]      x 1    CORONARY ARTERY BYPASS GRAFT  01/03/2012    x3    ENDOSCOPY, COLON, DIAGNOSTIC      KNEE ARTHROSCOPY      left    KNEE SURGERY Left     I&D    OTHER SURGICAL HISTORY Left 2016    plantar plane &  exostectomy medial foot left    [place] COLON CA SCRN NOT HI RSK IND N/A 2018    COLONOSCOPY ATTEMPTED NOT CLEAN performed by Benjamin Foss MD at [facility] ENDOSCOPY  2015    VENA CAVA FILTER PLACEMENT      WRIST SURGERY      I&D       Social History:    Social History     Tobacco Use    Smoking status: Former     Packs/day: 1.00     Years: 30.00     Pack years: 30.00     Types: Cigarettes     Quit date: 2002     Years since quittin.0    Smokeless tobacco: Never   Substance Use Topics    Alcohol use: Not Currently     Comment: rare                                Counseling given: Not Answered      Vital Signs (Current):   Vitals:    23 1310 23 1313   BP:  (!) 113/59   Pulse:  73   Resp:  18   Temp:  (!) 96 °F (35.6 °C)   TempSrc:  Temporal   SpO2:  96%   Weight: 231 lb (104.8 kg)    Height: 6' (1.829 m)                                               BP Readings from Last 3 Encounters:   23 (!) 113/59   23 116/63   23 124/66       NPO Status: Time of last liquid consumption:                         Time of last solid consumption:  Date of last liquid consumption: 02/05/23                        Date of last solid food consumption: 02/05/23    BMI:   Wt Readings from Last 3 Encounters:   02/06/23 231 lb (104.8 kg)   01/24/23 231 lb (104.8 kg)   01/19/23 234 lb (106.1 kg)     Body mass index is 31.33 kg/m².     CBC:   Lab Results   Component Value Date/Time    WBC 9.8 02/06/2023 02:08 PM    RBC 4.67 02/06/2023 02:08 PM    RBC 4.43 04/02/2012 10:00 AM    HGB 14.2 02/06/2023 02:08 PM    HCT 43.6 02/06/2023 02:08 PM    MCV 93.4 02/06/2023 02:08 PM    RDW 12.8 02/06/2023 02:08 PM     02/06/2023 02:08 PM     04/02/2012 10:00 AM       CMP:   Lab Results   Component Value Date/Time     02/06/2023 02:08 PM    K 3.7 02/06/2023 02:08 PM     02/06/2023 02:08 PM    CO2 22 02/06/2023 02:08 PM    BUN 35 02/06/2023 02:08 PM    CREATININE 1.45 02/06/2023 02:08 PM    GFRAA 59 11/04/2021 08:36 AM    LABGLOM 50 02/06/2023 02:08 PM    GLUCOSE 96 02/06/2023 02:08 PM    GLUCOSE 164 04/03/2012 09:50 AM    PROT 6.9 02/06/2023 02:08 PM    CALCIUM 9.2 02/06/2023 02:08 PM    BILITOT 0.5 02/06/2023 02:08 PM    ALKPHOS 104 02/06/2023 02:08 PM    AST 13 02/06/2023 02:08 PM    ALT 14 02/06/2023 02:08 PM       POC Tests:   Recent Labs     02/06/23  1414   POCGLU 92       Coags:   Lab Results   Component Value Date/Time    PROTIME 11.0 10/30/2012 12:45 PM    PROTIME 10.9 03/30/2012 02:40 PM    INR 1.0 10/30/2012 12:45 PM    APTT 27.3 10/30/2012 12:45 PM       HCG (If Applicable): No results found for: PREGTESTUR, PREGSERUM, HCG, HCGQUANT     ABGs:   Lab Results   Component Value Date/Time    PHART 7.35 01/03/2012 05:20 PM    PO2ART 94 01/03/2012 05:20 PM    YWF1RVX 36 01/03/2012 05:20 PM    LHG8SKM 19.6 01/03/2012 05:20 PM    D8ITLOMO 97 01/03/2012 05:20 PM        Type & Screen (If Applicable):  No results found for: LABABO, LABRH    Drug/Infectious Status (If Applicable):  No results found for: HIV, HEPCAB    COVID-19 Screening (If Applicable): No results found for: COVID19        Anesthesia Evaluation  Patient summary reviewed and Nursing notes reviewed no history of anesthetic complications:   Airway: Mallampati: III  TM distance: >3 FB   Neck ROM: full  Mouth opening: > = 3 FB   Dental: normal exam         Pulmonary:                              Cardiovascular:  Exercise tolerance: good (>4 METS),   (+) hypertension:, past MI:, CAD:,       ECG reviewed    Rate: normal      Cleared by cardiology              Neuro/Psych:   (+) headaches:,             GI/Hepatic/Renal:             Endo/Other:    (+) Diabetes, . Abdominal:             Vascular: Other Findings:           Anesthesia Plan      general     ASA 3       Induction: intravenous. MIPS: Postoperative opioids intended and Prophylactic antiemetics administered. Anesthetic plan and risks discussed with patient. Plan discussed with CRNA.     Attending anesthesiologist reviewed and agrees with Preprocedure content                Janey Barkley DO   2/6/2023

## 2023-02-07 LAB
ABSOLUTE EOS #: 0.06 K/UL (ref 0–0.44)
ABSOLUTE IMMATURE GRANULOCYTE: 0.02 K/UL (ref 0–0.3)
ABSOLUTE LYMPH #: 1.75 K/UL (ref 1.1–3.7)
ABSOLUTE MONO #: 0.56 K/UL (ref 0.1–1.2)
ALBUMIN SERPL-MCNC: 3.9 G/DL (ref 3.5–5.2)
ALBUMIN/GLOBULIN RATIO: 1.3 (ref 1–2.5)
ALP SERPL-CCNC: 104 U/L (ref 40–129)
ALT SERPL-CCNC: 14 U/L (ref 5–41)
ANION GAP SERPL CALCULATED.3IONS-SCNC: 13 MMOL/L (ref 9–17)
AST SERPL-CCNC: 13 U/L
BASOPHILS # BLD: 1 % (ref 0–2)
BASOPHILS ABSOLUTE: 0.06 K/UL (ref 0–0.2)
BILIRUB SERPL-MCNC: 0.5 MG/DL (ref 0.3–1.2)
BUN SERPL-MCNC: 35 MG/DL (ref 8–23)
CALCIUM SERPL-MCNC: 9.2 MG/DL (ref 8.6–10.4)
CHLORIDE SERPL-SCNC: 107 MMOL/L (ref 98–107)
CO2 SERPL-SCNC: 22 MMOL/L (ref 20–31)
CREAT SERPL-MCNC: 1.45 MG/DL (ref 0.7–1.2)
EOSINOPHILS RELATIVE PERCENT: 1 % (ref 1–4)
GFR SERPL CREATININE-BSD FRML MDRD: 50 ML/MIN/1.73M2
GLUCOSE SERPL-MCNC: 96 MG/DL (ref 70–99)
HCT VFR BLD AUTO: 43.6 % (ref 40.7–50.3)
HGB BLD-MCNC: 14.2 G/DL (ref 13–17)
IMMATURE GRANULOCYTES: 0 %
LYMPHOCYTES # BLD: 18 % (ref 24–43)
MCH RBC QN AUTO: 30.4 PG (ref 25.2–33.5)
MCHC RBC AUTO-ENTMCNC: 32.6 G/DL (ref 28.4–34.8)
MCV RBC AUTO: 93.4 FL (ref 82.6–102.9)
MONOCYTES # BLD: 6 % (ref 3–12)
NRBC AUTOMATED: 0 PER 100 WBC
PDW BLD-RTO: 12.8 % (ref 11.8–14.4)
PLATELET # BLD AUTO: 167 K/UL (ref 138–453)
PMV BLD AUTO: 10.3 FL (ref 8.1–13.5)
POTASSIUM SERPL-SCNC: 3.7 MMOL/L (ref 3.7–5.3)
PROT SERPL-MCNC: 6.9 G/DL (ref 6.4–8.3)
RBC # BLD: 4.67 M/UL (ref 4.21–5.77)
SEG NEUTROPHILS: 74 % (ref 36–65)
SEGMENTED NEUTROPHILS ABSOLUTE COUNT: 7.37 K/UL (ref 1.5–8.1)
SODIUM SERPL-SCNC: 142 MMOL/L (ref 135–144)
TSH SERPL-ACNC: 3.04 UIU/ML (ref 0.3–5)
URATE SERPL-MCNC: 7.1 MG/DL (ref 3.4–7)
WBC # BLD AUTO: 9.8 K/UL (ref 3.5–11.3)

## 2023-02-07 NOTE — ANESTHESIA POSTPROCEDURE EVALUATION
Department of Anesthesiology  Postprocedure Note    Patient: Calvin Lang  MRN: [id number]  YOB: 1945  Date of evaluation: 2/6/2023      Procedure Summary     Date: 02/06/23 Room / Location: [address] - INPATIENT    Anesthesia Start: 8650 Anesthesia Stop: 1022    Procedure: INTRACEPT PROCEDURE L4,5 AND S1 (Back) Diagnosis:       Vertebrogenic low back pain      (VERTEBROGNEIC LOW BACK PAIN)    Surgeons: Pamala Burkitt, MD Responsible Provider: Paradise Basilio DO    Anesthesia Type: general ASA Status: 3          Anesthesia Type: No value filed.     Kerri Phase I: Kerri Score: 8    Kerri Phase II: Kerri Score: 9      Anesthesia Post Evaluation    Patient location during evaluation: PACU  Patient participation: complete - patient participated  Level of consciousness: awake and alert  Airway patency: patent  Nausea & Vomiting: no nausea and no vomiting  Complications: no  Cardiovascular status: hemodynamically stable  Respiratory status: acceptable  Hydration status: stable

## 2023-02-09 DIAGNOSIS — K58.9 IRRITABLE BOWEL SYNDROME WITHOUT DIARRHEA: ICD-10-CM

## 2023-02-09 RX ORDER — SIMVASTATIN 20 MG
TABLET ORAL
Qty: 90 TABLET | Refills: 0 | Status: SHIPPED | OUTPATIENT
Start: 2023-02-09

## 2023-02-09 RX ORDER — OMEPRAZOLE 20 MG/1
CAPSULE, DELAYED RELEASE ORAL
Qty: 90 CAPSULE | Refills: 0 | Status: SHIPPED | OUTPATIENT
Start: 2023-02-09

## 2023-02-09 NOTE — TELEPHONE ENCOUNTER
Please Approve or Refuse.   Send to Pharmacy per Pt's Request: [facility]     Next Visit Date:  4/10/2023   Last Visit Date: 12/8/2022    Hemoglobin A1C (%)   Date Value   01/24/2023 7.5 (H)   05/10/2022 7.2   11/03/2021 6.8             ( goal A1C is < 7)   BP Readings from Last 3 Encounters:   02/06/23 129/73   01/24/23 116/63   01/19/23 124/66          (goal 120/80)  BUN   Date Value Ref Range Status   02/06/2023 35 (H) 8 - 23 mg/dL Final   02/06/2023 35 (H) 8 - 23 mg/dL Final     Creatinine   Date Value Ref Range Status   02/06/2023 1.45 (H) 0.70 - 1.20 mg/dL Final   02/06/2023 1.45 (H) 0.70 - 1.20 mg/dL Final     Potassium   Date Value Ref Range Status   02/06/2023 3.7 3.7 - 5.3 mmol/L Final   02/06/2023 3.7 3.7 - 5.3 mmol/L Final

## 2023-02-15 DIAGNOSIS — E11.42 TYPE 2 DIABETES MELLITUS WITH DIABETIC POLYNEUROPATHY, WITH LONG-TERM CURRENT USE OF INSULIN (HCC): ICD-10-CM

## 2023-02-15 DIAGNOSIS — Z79.4 TYPE 2 DIABETES MELLITUS WITH DIABETIC POLYNEUROPATHY, WITH LONG-TERM CURRENT USE OF INSULIN (HCC): ICD-10-CM

## 2023-02-15 RX ORDER — PEN NEEDLE, DIABETIC 32GX 5/32"
NEEDLE, DISPOSABLE MISCELLANEOUS
Qty: 100 EACH | Refills: 0 | Status: SHIPPED | OUTPATIENT
Start: 2023-02-15

## 2023-02-15 NOTE — TELEPHONE ENCOUNTER
Please Approve or Refuse.   Send to Pharmacy per Pt's Request:      Next Visit Date:  4/10/2023   Last Visit Date: 12/8/2022    Hemoglobin A1C (%)   Date Value   01/24/2023 7.5 (H)   05/10/2022 7.2   11/03/2021 6.8             ( goal A1C is < 7)   BP Readings from Last 3 Encounters:   02/06/23 129/73   01/24/23 116/63   01/19/23 124/66          (goal 120/80)  BUN   Date Value Ref Range Status   02/06/2023 35 (H) 8 - 23 mg/dL Final   02/06/2023 35 (H) 8 - 23 mg/dL Final     Creatinine   Date Value Ref Range Status   02/06/2023 1.45 (H) 0.70 - 1.20 mg/dL Final   02/06/2023 1.45 (H) 0.70 - 1.20 mg/dL Final     Potassium   Date Value Ref Range Status   02/06/2023 3.7 3.7 - 5.3 mmol/L Final   02/06/2023 3.7 3.7 - 5.3 mmol/L Final

## 2023-02-20 ENCOUNTER — HOSPITAL ENCOUNTER (OUTPATIENT)
Dept: PAIN MANAGEMENT | Age: 78
Discharge: HOME | End: 2023-02-20
Payer: MEDICARE

## 2023-02-20 VITALS
HEART RATE: 80 BPM | DIASTOLIC BLOOD PRESSURE: 60 MMHG | OXYGEN SATURATION: 98 % | SYSTOLIC BLOOD PRESSURE: 114 MMHG | WEIGHT: 225 LBS | BODY MASS INDEX: 30.48 KG/M2 | HEIGHT: 72 IN | TEMPERATURE: 97.3 F

## 2023-02-20 DIAGNOSIS — M54.51 VERTEBROGENIC LOW BACK PAIN: ICD-10-CM

## 2023-02-20 DIAGNOSIS — Z79.891 CHRONIC USE OF OPIATE DRUG FOR THERAPEUTIC PURPOSE: Chronic | ICD-10-CM

## 2023-02-20 DIAGNOSIS — M47.816 SPONDYLOSIS OF LUMBAR REGION WITHOUT MYELOPATHY OR RADICULOPATHY: ICD-10-CM

## 2023-02-20 DIAGNOSIS — M47.817 LUMBOSACRAL SPONDYLOSIS WITHOUT MYELOPATHY: ICD-10-CM

## 2023-02-20 DIAGNOSIS — M51.36 DDD (DEGENERATIVE DISC DISEASE), LUMBAR: Primary | ICD-10-CM

## 2023-02-20 DIAGNOSIS — F11.90 CHRONIC, CONTINUOUS USE OF OPIOIDS: ICD-10-CM

## 2023-02-20 PROCEDURE — 99213 OFFICE O/P EST LOW 20 MIN: CPT

## 2023-02-20 PROCEDURE — 99213 OFFICE O/P EST LOW 20 MIN: CPT | Performed by: NURSE PRACTITIONER

## 2023-02-20 RX ORDER — OXYCODONE HYDROCHLORIDE 5 MG/1
5 TABLET ORAL 2 TIMES DAILY PRN
Qty: 60 TABLET | Refills: 0 | Status: SHIPPED | OUTPATIENT
Start: 2023-02-20 | End: 2023-03-22

## 2023-02-20 ASSESSMENT — ENCOUNTER SYMPTOMS
COUGH: 0
BACK PAIN: 1
SHORTNESS OF BREATH: 0
BOWEL INCONTINENCE: 0
CONSTIPATION: 0

## 2023-02-20 ASSESSMENT — PAIN SCALES - GENERAL: PAINLEVEL_OUTOF10: 3

## 2023-02-20 NOTE — PROGRESS NOTES
Chief Complaint   Patient presents with    Back Pain     Med refill  Follow up Intracept         [facility]   Pt is here for follow up after Intracept procedure L4,5 and S1 on 2/6/23. He reports 70% relief. He has been increasing activity slowly and states he was able to walk for a prolonged period without pain. He does report feeling some weakness in his legs. He states he was not able to be active before the procedure due to pain and feels this caused him to become weak. Did discuss trying some physical therapy. He states he is going to try his stationary bike at home and is considering renewing [employer]. Back Pain  This is a chronic problem. The current episode started more than 1 year ago. The problem occurs constantly. The problem has been gradually improving since onset. The pain is present in the lumbar spine. The pain does not radiate. The pain is at a severity of 3/10. The symptoms are aggravated by bending, sitting and standing. Associated symptoms include numbness, tingling and weakness. Pertinent negatives include no bladder incontinence, bowel incontinence, chest pain, fever or headaches. Treatments tried: Intracept. The treatment provided significant relief. Patient denies any new neurological symptoms. No bowel or bladder incontinence, no weakness, and no falling. Pill count: appropriate / Oxycodone - 4 - due today    Morphine equivalent: 15    Controlled Substance Monitoring:    Acute and Chronic Pain Monitoring:   RX Monitoring 2/20/2023   Attestation -   Acute Pain Prescriptions -   Periodic Controlled Substance Monitoring Possible medication side effects, risk of tolerance/dependence & alternative treatments discussed. ;No signs of potential drug abuse or diversion identified.;Obtaining appropriate analgesic effect of treatment.    Chronic Pain > 50 MEDD -   Chronic Pain > 80 MEDD -            Past Medical History:   Diagnosis Date    Abdominal pain     Arthritis     Benign prostatic hypertrophy     C. difficile colitis     CAD (coronary artery disease) 01/03/2012    s/p CABGx3    Colon polyp 02/25/2019    tubular adenoma    Constipation     Diabetes mellitus (HCC)     Diarrhea     Diarrhea     DVT (deep venous thrombosis) (HCC)     left calf    Ejection fraction < 50%     Gallstones     Heartburn     Hx of blood clots     Hyperlipidemia     Hypertension     Kidney stones     Lumbar spinal stenosis 04/21/2014    MI (myocardial infarction) ([facility] [address]     2011    Mitral regurgitation     MRSA (methicillin resistant staph aureus) culture positive     Numbness and tingling     hands and feet    Rib fractures 01/2015    right    Wears glasses     readers       Past Surgical History:   Procedure Laterality Date    APPENDECTOMY      CARDIAC CATHETERIZATION  12/29/2011    CHOLECYSTECTOMY      COLONOSCOPY  01/11/2012    wnl, 10 yr recall    COLONOSCOPY  11/28/2018    ATTEMPTED NOT CLEAN (N/A )    COLONOSCOPY  02/25/2019    tubular adenoma    COLONOSCOPY N/A 02/25/2019    COLONOSCOPY WITH BIOPSY performed by Bridger Alan MD at [address] [place] [address]      x 1    CORONARY ARTERY BYPASS GRAFT  01/03/2012    x3    ENDOSCOPY, COLON, DIAGNOSTIC      KNEE ARTHROSCOPY      left    KNEE SURGERY Left     I&D    OTHER SURGICAL HISTORY Left 03/07/2016    plantar plane &  exostectomy medial foot left    PAIN MANAGEMENT PROCEDURE N/A 2/6/2023    INTRACEPT PROCEDURE L4,5 AND S1 performed by Jorge Lou MD at [facility] [address] IND N/A 11/28/2018    COLONOSCOPY ATTEMPTED NOT CLEAN performed by Bridger Alan MD at [place] [address]  11/03/2015    VENA CAVA FILTER PLACEMENT      WRIST SURGERY      I&D       Allergies   Allergen Reactions    Flagyl [Metronidazole] Hives    Metronidazole      Other reaction(s): Vomiting         Current Outpatient Medications:     BD PEN NEEDLE ALIZE 2ND GEN 32G X 4 MM MISC, USE AS DIRECTED TWICE DAILY, Disp: 100 each, Rfl: 0    omeprazole (PRILOSEC) 20 MG delayed release capsule, TAKE 1 CAPSULE BY MOUTH EVERY DAY, Disp: 90 capsule, Rfl: 0    simvastatin (ZOCOR) 20 MG tablet, TAKE 1 TABLET BY MOUTH EVERY DAY AT NIGHT, Disp: 90 tablet, Rfl: 0    lisinopril (PRINIVIL;ZESTRIL) 2.5 MG tablet, Take 1 tablet by mouth daily, Disp: 90 tablet, Rfl: 1    furosemide (LASIX) 40 MG tablet, TAKE 1 TABLET BY MOUTH TWO TIMES A DAY (Patient taking differently: Take 40 mg by mouth 2 times daily), Disp: 60 tablet, Rfl: 3    topiramate (TOPAMAX) 50 MG tablet, TAKE 1 TABLET BY MOUTH IN THE MORNING AND AT BEDTIME (Patient taking differently: Take 50 mg by mouth 2 times daily), Disp: 180 tablet, Rfl: 1    LANTUS SOLOSTAR 100 UNIT/ML injection pen, inject 35 units subcutaneously in the morning and 40 units in the evening (Patient taking differently: Inject into the skin 2 times daily inject 35 units subcutaneously in the morning and 40 units in the evening), Disp: 45 mL, Rfl: 3    gabapentin (NEURONTIN) 800 MG tablet, Take 1 tablet by mouth in the morning for 90 days. , Disp: 90 tablet, Rfl: 2    naloxone (NARCAN) 4 MG/0.1ML LIQD nasal spray, 1 spray by Nasal route as needed (if needed for respiratory depression), Disp: 1 each, Rfl: 0    blood glucose test strips (FREESTYLE LITE) strip, TEST TWICE DAILY AS DIRECTED, Disp: 100 strip, Rfl: 1    aspirin 81 MG tablet, Take 81 mg by mouth daily. , Disp: , Rfl:     Vitamin D (CHOLECALCIFEROL) 1000 UNITS CAPS capsule, Take 1,000 Units by mouth daily, Disp: , Rfl:     Ascorbic Acid (VITAMIN C) 500 MG tablet, Take 1,000 mg by mouth daily , Disp: , Rfl:     NITROSTAT 0.4 MG SL tablet, , Disp: , Rfl:     finasteride (PROSCAR) 5 MG tablet, Take 5 mg by mouth daily. , Disp: , Rfl:     tamsulosin (FLOMAX) 0.4 MG capsule, Take 0.4 mg by mouth daily.   , Disp: , Rfl:     Family History   Problem Relation Age of Onset    Heart Failure Father     Cancer Mother         breast    Cancer Maternal Grandfather        Social History     Socioeconomic History    Marital status:      Spouse name: Not on file    Number of children: Not on file    Years of education: Not on file    Highest education level: Not on file   Occupational History    Occupation: retired [employer]   Tobacco Use    Smoking status: Former     Packs/day: 1.00     Years: 30.00     Pack years: 30.00     Types: Cigarettes     Quit date: 2002     Years since quittin.0    Smokeless tobacco: Never   Vaping Use    Vaping Use: Never used   Substance and Sexual Activity    Alcohol use: Not Currently     Comment: rare    Drug use: No    Sexual activity: Never   Other Topics Concern    Not on file   Social History Narrative    Not on file     Social Determinants of Health     Financial Resource Strain: Low Risk     Difficulty of Paying Living Expenses: Not hard at all   Food Insecurity: No Food Insecurity    Worried About Running Out of Food in the Last Year: Never true    [address] in the Last Year: Never true   Transportation Needs: No Transportation Needs    Lack of Transportation (Medical): No    Lack of Transportation (Non-Medical): No   Physical Activity: Inactive    Days of Exercise per Week: 0 days    Minutes of Exercise per Session: 0 min   Stress: Not on file   Social Connections: Not on file   Intimate Partner Violence: Not on file   Housing Stability: Unknown    Unable to Pay for Housing in the Last Year: No    Number of Places Lived in the Last Year: Not on file    Unstable Housing in the Last Year: No       Review of Systems:  Review of Systems   Constitutional: Negative for chills and fever. Cardiovascular:  Negative for chest pain and palpitations. Respiratory:  Negative for cough and shortness of breath. Musculoskeletal:  Positive for back pain. Gastrointestinal:  Negative for bowel incontinence and constipation. Genitourinary:  Negative for bladder incontinence.    Neurological:  Positive for numbness, tingling and weakness. Negative for disturbances in coordination, headaches and loss of balance. Physical Exam:  /60   Pulse 80   Temp 97.3 °F (36.3 °C)   Ht 6' (1.829 m)   Wt 225 lb (102.1 kg)   SpO2 98%   BMI 30.52 kg/m²     Physical Exam  HENT:      Head: Normocephalic. Pulmonary:      Effort: Pulmonary effort is normal.   Musculoskeletal:         General: Normal range of motion. Cervical back: Normal range of motion. Lumbar back: Tenderness present. Back:       Comments: Surgical sites well healed - no signs of infection   Skin:     General: Skin is warm and dry. Neurological:      Mental Status: He is alert and oriented to person, place, and time. Record/Diagnostics Review:    Last alexander 5/2022 and was appropriate     Assessment:  Problem List Items Addressed This Visit       Chronic use of opiate drug for therapeutic purpose (Chronic)    Vertebrogenic low back pain    Relevant Medications    oxyCODONE (ROXICODONE) 5 MG immediate release tablet    Lumbosacral spondylosis without myelopathy    Relevant Medications    oxyCODONE (ROXICODONE) 5 MG immediate release tablet    DDD (degenerative disc disease), lumbar - Primary    Relevant Medications    oxyCODONE (ROXICODONE) 5 MG immediate release tablet    Spondylosis of lumbar region without myelopathy or radiculopathy    Relevant Medications    oxyCODONE (ROXICODONE) 5 MG immediate release tablet    Chronic, continuous use of opioids          Treatment Plan:  Patient relates current medications are helping the pain. Patient reports taking pain medications as prescribed, denies obtaining medications from different sources and denies use of illegal drugs. Medication risk and benefits have been discussed. Patient denies side effects from medications like nausea, vomiting, constipation or drowsiness. Patient reports current activities of daily living are possible due to medications and would like to continue them. As always, we encourage daily stretching and strengthening exercises, and recommend minimizing use of pain medications unless patient cannot get through daily activities due to pain. Due to the high risk nature of this patient's pain medication close monitoring is required. Continue current medication management, pt has been stable and compliant. Script written for oxycodone  Significant relief following Intracept procedure  Consider physical therapy - he declines at this time and plans to do his own HEP  Follow up appointment made for 4 weeks    I have reviewed the chief complaint and history of present illness (including ROS and PFSH) and vital documentation by my staff and I agree with their documentation and have added where applicable.

## 2023-02-21 RX ORDER — SIMVASTATIN 20 MG
TABLET ORAL
Qty: 90 TABLET | Refills: 0 | OUTPATIENT
Start: 2023-02-21

## 2023-02-27 LAB — LEFT VENTRICULAR EJECTION FRACTION MODE: NORMAL

## 2023-03-02 ENCOUNTER — OFFICE VISIT (OUTPATIENT)
Dept: PODIATRY | Age: 78
End: 2023-03-02
Payer: MEDICARE

## 2023-03-02 VITALS — HEIGHT: 72 IN | BODY MASS INDEX: 30.48 KG/M2 | WEIGHT: 225 LBS

## 2023-03-02 DIAGNOSIS — E11.42 TYPE 2 DIABETES MELLITUS WITH DIABETIC POLYNEUROPATHY, WITH LONG-TERM CURRENT USE OF INSULIN (HCC): ICD-10-CM

## 2023-03-02 DIAGNOSIS — M21.969 FOOT DEFORMITY, UNSPECIFIED LATERALITY: ICD-10-CM

## 2023-03-02 DIAGNOSIS — B35.1 ONYCHOMYCOSIS: Primary | ICD-10-CM

## 2023-03-02 DIAGNOSIS — M14.672 CHARCOT'S JOINT OF LEFT FOOT: ICD-10-CM

## 2023-03-02 DIAGNOSIS — Z79.4 TYPE 2 DIABETES MELLITUS WITH DIABETIC POLYNEUROPATHY, WITH LONG-TERM CURRENT USE OF INSULIN (HCC): ICD-10-CM

## 2023-03-02 PROCEDURE — 99212 OFFICE O/P EST SF 10 MIN: CPT | Performed by: PODIATRIST

## 2023-03-02 PROCEDURE — 11721 DEBRIDE NAIL 6 OR MORE: CPT | Performed by: PODIATRIST

## 2023-03-02 PROCEDURE — 3051F HG A1C>EQUAL 7.0%<8.0%: CPT | Performed by: PODIATRIST

## 2023-03-02 PROCEDURE — 1123F ACP DISCUSS/DSCN MKR DOCD: CPT | Performed by: PODIATRIST

## 2023-03-02 PROCEDURE — G8417 CALC BMI ABV UP PARAM F/U: HCPCS | Performed by: PODIATRIST

## 2023-03-02 PROCEDURE — 1036F TOBACCO NON-USER: CPT | Performed by: PODIATRIST

## 2023-03-02 PROCEDURE — G8484 FLU IMMUNIZE NO ADMIN: HCPCS | Performed by: PODIATRIST

## 2023-03-02 PROCEDURE — G8427 DOCREV CUR MEDS BY ELIG CLIN: HCPCS | Performed by: PODIATRIST

## 2023-03-02 ASSESSMENT — ENCOUNTER SYMPTOMS
NAUSEA: 0
VOMITING: 0
CONSTIPATION: 0
COLOR CHANGE: 0
DIARRHEA: 0

## 2023-03-03 LAB
LEFT VENTRICULAR EJECTION FRACTION HIGH VALUE: 65 %
LEFT VENTRICULAR EJECTION FRACTION MODE: NORMAL
LV EF: 55 %

## 2023-03-20 ENCOUNTER — HOSPITAL ENCOUNTER (OUTPATIENT)
Dept: PAIN MANAGEMENT | Age: 78
Discharge: HOME | End: 2023-03-20
Payer: MEDICARE

## 2023-03-20 VITALS
HEIGHT: 72 IN | BODY MASS INDEX: 30.88 KG/M2 | SYSTOLIC BLOOD PRESSURE: 122 MMHG | DIASTOLIC BLOOD PRESSURE: 67 MMHG | RESPIRATION RATE: 20 BRPM | TEMPERATURE: 97.3 F | HEART RATE: 73 BPM | OXYGEN SATURATION: 98 % | WEIGHT: 228 LBS

## 2023-03-20 DIAGNOSIS — M47.816 SPONDYLOSIS OF LUMBAR REGION WITHOUT MYELOPATHY OR RADICULOPATHY: ICD-10-CM

## 2023-03-20 DIAGNOSIS — Z79.891 CHRONIC USE OF OPIATE DRUG FOR THERAPEUTIC PURPOSE: Chronic | ICD-10-CM

## 2023-03-20 DIAGNOSIS — M47.817 LUMBOSACRAL SPONDYLOSIS WITHOUT MYELOPATHY: Primary | ICD-10-CM

## 2023-03-20 DIAGNOSIS — M50.30 DDD (DEGENERATIVE DISC DISEASE), CERVICAL: ICD-10-CM

## 2023-03-20 DIAGNOSIS — M51.36 DDD (DEGENERATIVE DISC DISEASE), LUMBAR: ICD-10-CM

## 2023-03-20 PROCEDURE — 99213 OFFICE O/P EST LOW 20 MIN: CPT

## 2023-03-20 PROCEDURE — 99213 OFFICE O/P EST LOW 20 MIN: CPT | Performed by: NURSE PRACTITIONER

## 2023-03-20 RX ORDER — OXYCODONE HYDROCHLORIDE 5 MG/1
5 TABLET ORAL 2 TIMES DAILY PRN
Qty: 60 TABLET | Refills: 0 | Status: SHIPPED | OUTPATIENT
Start: 2023-03-22 | End: 2023-04-21

## 2023-03-20 ASSESSMENT — ENCOUNTER SYMPTOMS
BOWEL INCONTINENCE: 0
COUGH: 0
BACK PAIN: 1
SHORTNESS OF BREATH: 0
CONSTIPATION: 0

## 2023-03-20 ASSESSMENT — PAIN SCALES - GENERAL: PAINLEVEL_OUTOF10: 4

## 2023-03-20 ASSESSMENT — PAIN DESCRIPTION - LOCATION: LOCATION: BACK

## 2023-03-20 NOTE — PROGRESS NOTES
in the morning and 40 units in the evening (Patient taking differently: Inject into the skin 2 times daily inject 35 units subcutaneously in the morning and 40 units in the evening), Disp: 45 mL, Rfl: 3    gabapentin (NEURONTIN) 800 MG tablet, Take 1 tablet by mouth in the morning for 90 days. , Disp: 90 tablet, Rfl: 2    naloxone (NARCAN) 4 MG/0.1ML LIQD nasal spray, 1 spray by Nasal route as needed (if needed for respiratory depression), Disp: 1 each, Rfl: 0    blood glucose test strips (FREESTYLE LITE) strip, TEST TWICE DAILY AS DIRECTED, Disp: 100 strip, Rfl: 1    aspirin 81 MG tablet, Take 81 mg by mouth daily. , Disp: , Rfl:     Vitamin D (CHOLECALCIFEROL) 1000 UNITS CAPS capsule, Take 1,000 Units by mouth daily, Disp: , Rfl:     Ascorbic Acid (VITAMIN C) 500 MG tablet, Take 1,000 mg by mouth daily , Disp: , Rfl:     NITROSTAT 0.4 MG SL tablet, , Disp: , Rfl:     finasteride (PROSCAR) 5 MG tablet, Take 5 mg by mouth daily. , Disp: , Rfl:     tamsulosin (FLOMAX) 0.4 MG capsule, Take 0.4 mg by mouth daily. , Disp: , Rfl:     Family History   Problem Relation Age of Onset    Heart Failure Father     Cancer Mother         breast    Cancer Maternal Grandfather        Social History     Socioeconomic History    Marital status:       Spouse name: Not on file    Number of children: Not on file    Years of education: Not on file    Highest education level: Not on file   Occupational History    Occupation: retired [employer]   Tobacco Use    Smoking status: Former     Packs/day: 1.00     Years: 30.00     Pack years: 30.00     Types: Cigarettes     Quit date: 2002     Years since quittin.1    Smokeless tobacco: Never   Vaping Use    Vaping Use: Never used   Substance and Sexual Activity    Alcohol use: Not Currently     Comment: rare    Drug use: No    Sexual activity: Never   Other Topics Concern    Not on file   Social History Narrative    Not on file     Social Determinants of Health     Financial

## 2023-03-20 NOTE — PROGRESS NOTES
capsule, TAKE 1 CAPSULE BY MOUTH EVERY DAY, Disp: 90 capsule, Rfl: 0    simvastatin (ZOCOR) 20 MG tablet, TAKE 1 TABLET BY MOUTH EVERY DAY AT NIGHT, Disp: 90 tablet, Rfl: 0    lisinopril (PRINIVIL;ZESTRIL) 2.5 MG tablet, Take 1 tablet by mouth daily, Disp: 90 tablet, Rfl: 1    furosemide (LASIX) 40 MG tablet, TAKE 1 TABLET BY MOUTH TWO TIMES A DAY (Patient taking differently: Take 40 mg by mouth 2 times daily), Disp: 60 tablet, Rfl: 3    topiramate (TOPAMAX) 50 MG tablet, TAKE 1 TABLET BY MOUTH IN THE MORNING AND AT BEDTIME (Patient taking differently: Take 50 mg by mouth 2 times daily), Disp: 180 tablet, Rfl: 1    LANTUS SOLOSTAR 100 UNIT/ML injection pen, inject 35 units subcutaneously in the morning and 40 units in the evening (Patient taking differently: Inject into the skin 2 times daily inject 35 units subcutaneously in the morning and 40 units in the evening), Disp: 45 mL, Rfl: 3    gabapentin (NEURONTIN) 800 MG tablet, Take 1 tablet by mouth in the morning for 90 days. , Disp: 90 tablet, Rfl: 2    naloxone (NARCAN) 4 MG/0.1ML LIQD nasal spray, 1 spray by Nasal route as needed (if needed for respiratory depression), Disp: 1 each, Rfl: 0    blood glucose test strips (FREESTYLE LITE) strip, TEST TWICE DAILY AS DIRECTED, Disp: 100 strip, Rfl: 1    aspirin 81 MG tablet, Take 81 mg by mouth daily. , Disp: , Rfl:     Vitamin D (CHOLECALCIFEROL) 1000 UNITS CAPS capsule, Take 1,000 Units by mouth daily, Disp: , Rfl:     Ascorbic Acid (VITAMIN C) 500 MG tablet, Take 1,000 mg by mouth daily , Disp: , Rfl:     NITROSTAT 0.4 MG SL tablet, , Disp: , Rfl:     finasteride (PROSCAR) 5 MG tablet, Take 5 mg by mouth daily. , Disp: , Rfl:     tamsulosin (FLOMAX) 0.4 MG capsule, Take 0.4 mg by mouth daily.   , Disp: , Rfl:     Family History   Problem Relation Age of Onset    Heart Failure Father     Cancer Mother         breast    Cancer Maternal Grandfather        Social History     Socioeconomic History    Marital status:

## 2023-03-28 ENCOUNTER — HOSPITAL ENCOUNTER (OUTPATIENT)
Dept: PHYSICAL THERAPY | Facility: CLINIC | Age: 78
Setting detail: THERAPIES SERIES
Discharge: HOME | End: 2023-03-28
Payer: MEDICARE

## 2023-03-28 PROCEDURE — 97110 THERAPEUTIC EXERCISES: CPT

## 2023-03-28 PROCEDURE — 97161 PT EVAL LOW COMPLEX 20 MIN: CPT

## 2023-03-28 NOTE — CONSULTS
[] [facility]  [address]  [phone]  F: [phone] [x] [address]  [facility]   [address]  P: [phone]  F: [phone] [] [address] &  Therapy  [address]  P: [phone]  F: [phone] [] [address]  P: [phone]  F: [phone] [] [address]  [facility]   [address]   [place]: [phone]  F: [phone]      Physical Therapy Spine Evaluation    Date:  3/28/2023  Patient: Nico Oliva  : 1945  MRN: [id number]  Physician: DOUGIE Nelson   Insurance: Medicare Part A and B  Medical Diagnosis: Lumbosacral spondylosis without myelopathy  - Primary   Rehab Codes: M54.59, R26.89, M62.81  Onset Date: 3/20/23  Next 's appt.: TBD    Subjective:   CC: Low back pain  HPI: Pt is a 69year old male with c/o low back pain. He has been experiencing low back pain for 10 years after pulling a post out of the ground. He states that he experienced a fracture of his lumbar vertebrae. He feels the pain most in the small of his back. He describes his pain as dull and constant. He reports that he has neuropathy that is associated with his diabetes and feels numbness in both feet. He reports that his feet are not completely numb but that it feels like he is standing on sand or a pillow. He feels that his feet feel worse with shoes on. He rates his pain currently as 0/10. The worst his pain gets to is 6/10. Sitting, laying on his back, walking, and navigating stairs increases his low back pain. He states that he is only able to ambulate 20-30 feet without pain and states that he feels \"his breath is taken away because of how painful it is\". Pain medications relieve his pain.  He has

## 2023-03-29 NOTE — FLOWSHEET NOTE
lean stretch 3x10\"  Seated with red tball   Diagonal fwd lean stretch 3x10\" ea dir  Seated with red tball                           Other:      Treatment Charges: Mins Units   []  Modalities     [x]  Ther Exercise 50 3   []  Manual Therapy     []  Ther Activities     []  Aquatics     []  Vasocompression     []  Other     Total Treatment time 50 3   Estimated Medicare Cost as of 3/28/23: $183.65    Assessment: [x] Progressing toward goals. Initiated treatment with nustep for warm up. Pt demonstrates antalgia with fwd flexed posture and shuffling gait upon getting up from waiting room chair. Pt notes that back of L leg is especially painful and almost cramping feeling this date. Core activation was cued throughout ex completion, especially with bridges. Pt was visibly unsteady with bridging and found it helpful to stabilize through elbows. Spent time discussing appropriate responses to therapy and normal soreness from ex initiation versus pain that would indicate that he had overdone it. Will continue to monitor responses and appropriate progressions. Pt was given instruction on making lumbar roll for home use and was also shown one to purchase on [employer] if it was found to be helpful in sx control if seated for prolonged periods of time. Pt completed ex slowly and was given time for rest breaks as needed     [] No change. [] Other:  [x] Patient would continue to benefit from skilled physical therapy services in order to: improve pain, strength, range of motion, function, and endurance in order to improve tolerance for ambulation on level and unlevel ground, navigation of stairs, transitions, and completion of ADLs and functional tasks. .STG: (to be met in 8 treatments)  ? Pain: Patient will rate 4-5/10 maximal low back pain with therapeutic interventions and ambulation in order to improve tolerance for daily tasks such as stairs, laying on his back, and sleeping.   ? Strength: Patient will be able to perform 15

## 2023-03-30 ENCOUNTER — HOSPITAL ENCOUNTER (OUTPATIENT)
Dept: PHYSICAL THERAPY | Facility: CLINIC | Age: 78
Setting detail: THERAPIES SERIES
Discharge: HOME | End: 2023-03-30
Payer: MEDICARE

## 2023-03-30 PROCEDURE — 97110 THERAPEUTIC EXERCISES: CPT

## 2023-03-30 NOTE — PLAN OF CARE
x/week for 12 visits    Electronically signed by: Brennen Laurent SPT  This documentation has been reviewed and entirety of treatment session with direct supervision by clinical instructor, Aretha Rodrigues PT, DPT. Clinical instructor agrees with all documentation. Physician Signature:________________________________Date:__________________  By signing above or cosigning this note, I have reviewed this plan of care and certify a need for medically necessary rehabilitation services.      *PLEASE SIGN ABOVE AND FAX BACK ALL PAGES*

## 2023-03-30 NOTE — FLOWSHEET NOTE
[facility] Fall Risk Assessment    Patient Name:  Deyanira Montgomery  : 1945    Risk Factor Scale  Score   History of Falls [x] Yes  [] No 25  0 25   Secondary Diagnosis [x] Yes; Neuropathy from diabetes  [] No 15  0 15   Ambulatory Aid [] Furniture  [x] Crutches/cane/walker  [] None/bedrest/wheelchair/nurse 30  15  0 15   IV/Heparin Lock [] Yes  [x] No 20  0 0   Gait/Transferring [] Impaired  [x] Weak  [] Normal/bedrest/immobile 20  10  0 10   Mental Status [] Forgets limitations  [x] Oriented to own ability 15  0 0      Total: 55     Based on the Assessment score: check the appropriate box. []  No intervention needed   Low =   Score of 0-24    []  Use standard prevention interventions Moderate =  Score of 24-44   [] Give patient handout and discuss fall prevention strategies   [] Establish goal of education for patient/family RE: fall prevention strategies    [x]  Use high risk prevention interventions High = Score of 45 and higher   [x] Give patient handout and discuss fall prevention strategies   [x] Establish goal of education for patient/family Re: fall prevention strategies   [x] Discuss lifeline / other resources    Electronically signed by:   MERVIN Khalil  Date: 3/30/2023  This documentation has been reviewed and entirety of treatment session with direct supervision by clinical instructor, Melissa Quigley PT, DPT. Clinical instructor agrees with all documentation.

## 2023-04-04 ENCOUNTER — HOSPITAL ENCOUNTER (OUTPATIENT)
Dept: PHYSICAL THERAPY | Facility: CLINIC | Age: 78
Setting detail: THERAPIES SERIES
Discharge: HOME | End: 2023-04-04
Payer: MEDICARE

## 2023-04-04 PROCEDURE — 97110 THERAPEUTIC EXERCISES: CPT

## 2023-04-04 NOTE — FLOWSHEET NOTE
clamshell 15 AROM                Fwd lean stretch 3x10\"  Seated with red tball   Diagonal fwd lean stretch 3x10\" ea dir  Seated with red tball         Standing    New 4/4   Marches + TrA 10x     Side stepping  3L     Hip abd + TrA  10x     Hs curls  10x     Mini squat  10x     Ambulation  1 lap      Other:flexion biased interventions      Treatment Charges: Mins Units   []  Modalities     [x]  Ther Exercise 48 3   []  Manual Therapy     []  Ther Activities     []  Aquatics     []  Vasocompression     []  Other     Total Treatment time 48 3   Estimated Medicare Cost as of 04/04/23: $247.99    Assessment: [x] Progressing toward goals. Once laying supine on table notes cramping in L quad. Continued with mat stretching per flowsheet above. Attempted hip flexor stretch but too intense and trialed piriformis stretch 2 way, however, minimal stretch felt so held. Implemented standing program to incorporate functional strengthening. Demo's and cues as needed to ensure proper execution of new exercises. Vc's for core engagement to improve strength and stability. Pt tolerated session well. Will monitor pt's response and progress as able in upcoming sessions. [] No change. [] Other:  [x] Patient would continue to benefit from skilled physical therapy services in order to: improve pain, strength, range of motion, function, and endurance in order to improve tolerance for ambulation on level and unlevel ground, navigation of stairs, transitions, and completion of ADLs and functional tasks. Problems:    [x] ? Back Pain:0-6/10 low back pain                [x] ? ROM: Flexion bias            [x] ? Strength:  3+-4+/5 bilateral hip strength  [x] ? Function: 68% functionally impaired; 34/50 on CORINE  [x] Postural Deviations: forward head posture in sitting and ambulation  [x] Gait Deviations: 6MWT: 616 feet with one seated rest break (decreased endurance)  [] Other:   . STG: (to be met in 8 treatments)  ?  Pain: Patient will rate

## 2023-04-06 ENCOUNTER — HOSPITAL ENCOUNTER (OUTPATIENT)
Dept: PHYSICAL THERAPY | Facility: CLINIC | Age: 78
Setting detail: THERAPIES SERIES
Discharge: HOME | End: 2023-04-06
Payer: MEDICARE

## 2023-04-06 PROCEDURE — 97110 THERAPEUTIC EXERCISES: CPT

## 2023-04-06 NOTE — FLOWSHEET NOTE
Reverse clamshell 15 AROM                Fwd lean stretch 3x10\"  Seated with red tball   Diagonal fwd lean stretch 3x10\" ea dir  Seated with red tball         Standing    New 4/4   Marches + TrA 10x     Side stepping  3L     Hip abd + TrA  10x     Hip extension TrA 10x ea  Added 4/6   Hs curls  10x     Mini squat  10x  Added 4/6? Ambulation  1 lap  Not today    Other:flexion biased interventions      Treatment Charges: Mins Units   []  Modalities     [x]  Ther Exercise 44 3   []  Manual Therapy     []  Ther Activities     []  Aquatics     []  Vasocompression     []  Other     Total Treatment time 44 3   Estimated Medicare Cost as of 04/06/23: $311.63    Assessment: [x] Progressing toward goals. Slow moving and cautious through all exercises though able to complete everything requested. Added TrA SLR and marches this date. Pt did not recall completing squats last visit. Therefore extra time spent on focus on form this date. Some cuing for pelvic neutral when standing doing unilateral exercises at the parallel bars. Patient become more aware of posture towards end of treatment. Denies an increase in pain. [] No change. [] Other:  [x] Patient would continue to benefit from skilled physical therapy services in order to: improve pain, strength, range of motion, function, and endurance in order to improve tolerance for ambulation on level and unlevel ground, navigation of stairs, transitions, and completion of ADLs and functional tasks. Problems:    [x] ? Back Pain:0-6/10 low back pain                [x] ? ROM: Flexion bias            [x] ? Strength:  3+-4+/5 bilateral hip strength  [x] ? Function: 68% functionally impaired; 34/50 on CORINE  [x] Postural Deviations: forward head posture in sitting and ambulation  [x] Gait Deviations: 6MWT: 616 feet with one seated rest break (decreased endurance)  [] Other:   . STG: (to be met in 8 treatments)  ?  Pain: Patient will rate 4-5/10 maximal low back pain with

## 2023-04-14 DIAGNOSIS — E11.42 TYPE 2 DIABETES MELLITUS WITH DIABETIC POLYNEUROPATHY, WITH LONG-TERM CURRENT USE OF INSULIN (HCC): ICD-10-CM

## 2023-04-14 DIAGNOSIS — Z79.4 TYPE 2 DIABETES MELLITUS WITH DIABETIC POLYNEUROPATHY, WITH LONG-TERM CURRENT USE OF INSULIN (HCC): ICD-10-CM

## 2023-04-15 DIAGNOSIS — K58.9 IRRITABLE BOWEL SYNDROME WITHOUT DIARRHEA: ICD-10-CM

## 2023-04-17 ENCOUNTER — HOSPITAL ENCOUNTER (OUTPATIENT)
Dept: PHYSICAL THERAPY | Facility: CLINIC | Age: 78
Setting detail: THERAPIES SERIES
Discharge: HOME | End: 2023-04-17
Payer: MEDICARE

## 2023-04-17 PROCEDURE — 97110 THERAPEUTIC EXERCISES: CPT

## 2023-04-17 RX ORDER — OMEPRAZOLE 20 MG/1
CAPSULE, DELAYED RELEASE ORAL
Qty: 90 CAPSULE | Refills: 0 | Status: SHIPPED | OUTPATIENT
Start: 2023-04-17

## 2023-04-17 RX ORDER — PEN NEEDLE, DIABETIC 32GX 5/32"
NEEDLE, DISPOSABLE MISCELLANEOUS
Qty: 100 EACH | Refills: 0 | Status: SHIPPED | OUTPATIENT
Start: 2023-04-17

## 2023-04-17 NOTE — TELEPHONE ENCOUNTER
Please Approve or Refuse.   Send to Pharmacy per Pt's Request: Halley Duncan     Next Visit Date:  5/16/2023   Last Visit Date: 12/8/2022    Hemoglobin A1C (%)   Date Value   01/24/2023 7.5 (H)   05/10/2022 7.2   11/03/2021 6.8             ( goal A1C is < 7)   BP Readings from Last 3 Encounters:   03/20/23 122/67   02/20/23 114/60   02/06/23 129/73          (goal 120/80)  BUN   Date Value Ref Range Status   02/06/2023 35 (H) 8 - 23 mg/dL Final   02/06/2023 35 (H) 8 - 23 mg/dL Final     Creatinine   Date Value Ref Range Status   02/06/2023 1.45 (H) 0.70 - 1.20 mg/dL Final   02/06/2023 1.45 (H) 0.70 - 1.20 mg/dL Final     Potassium   Date Value Ref Range Status   02/06/2023 3.7 3.7 - 5.3 mmol/L Final   02/06/2023 3.7 3.7 - 5.3 mmol/L Final

## 2023-04-17 NOTE — TELEPHONE ENCOUNTER
Please Approve or Refuse.   Send to Pharmacy per Pt's Request:      Next Visit Date:  4/15/2023   Last Visit Date: 12/8/2022    Hemoglobin A1C (%)   Date Value   01/24/2023 7.5 (H)   05/10/2022 7.2   11/03/2021 6.8             ( goal A1C is < 7)   BP Readings from Last 3 Encounters:   03/20/23 122/67   02/20/23 114/60   02/06/23 129/73          (goal 120/80)  BUN   Date Value Ref Range Status   02/06/2023 35 (H) 8 - 23 mg/dL Final   02/06/2023 35 (H) 8 - 23 mg/dL Final     Creatinine   Date Value Ref Range Status   02/06/2023 1.45 (H) 0.70 - 1.20 mg/dL Final   02/06/2023 1.45 (H) 0.70 - 1.20 mg/dL Final     Potassium   Date Value Ref Range Status   02/06/2023 3.7 3.7 - 5.3 mmol/L Final   02/06/2023 3.7 3.7 - 5.3 mmol/L Final

## 2023-04-17 NOTE — FLOWSHEET NOTE
3 sets - 10 reps    Plan: [x] Continue current frequency toward long and short term goals.     [x] Specific Instructions for subsequent treatments: continue to progress core and lumbar, hip strengthening/stretching as tolerated, check STG's       Time In: 8:02 am    Time Out: 9:03 am    Electronically signed by:  Margy Resendiz PTA

## 2023-04-18 ENCOUNTER — HOSPITAL ENCOUNTER (OUTPATIENT)
Dept: PAIN MANAGEMENT | Age: 78
Discharge: HOME | End: 2023-04-18
Payer: MEDICARE

## 2023-04-18 VITALS
OXYGEN SATURATION: 98 % | WEIGHT: 228 LBS | BODY MASS INDEX: 30.88 KG/M2 | TEMPERATURE: 97.3 F | DIASTOLIC BLOOD PRESSURE: 58 MMHG | HEART RATE: 70 BPM | RESPIRATION RATE: 20 BRPM | SYSTOLIC BLOOD PRESSURE: 104 MMHG | HEIGHT: 72 IN

## 2023-04-18 DIAGNOSIS — M54.51 VERTEBROGENIC LOW BACK PAIN: ICD-10-CM

## 2023-04-18 DIAGNOSIS — Z79.891 CHRONIC USE OF OPIATE DRUG FOR THERAPEUTIC PURPOSE: Primary | Chronic | ICD-10-CM

## 2023-04-18 DIAGNOSIS — M51.36 DDD (DEGENERATIVE DISC DISEASE), LUMBAR: ICD-10-CM

## 2023-04-18 DIAGNOSIS — M47.817 LUMBOSACRAL SPONDYLOSIS WITHOUT MYELOPATHY: ICD-10-CM

## 2023-04-18 PROCEDURE — G0481 DRUG TEST DEF 8-14 CLASSES: HCPCS

## 2023-04-18 PROCEDURE — 80307 DRUG TEST PRSMV CHEM ANLYZR: CPT

## 2023-04-18 PROCEDURE — 99213 OFFICE O/P EST LOW 20 MIN: CPT

## 2023-04-18 PROCEDURE — 99213 OFFICE O/P EST LOW 20 MIN: CPT | Performed by: NURSE PRACTITIONER

## 2023-04-18 RX ORDER — OXYCODONE HYDROCHLORIDE 5 MG/1
5 TABLET ORAL 2 TIMES DAILY PRN
Qty: 60 TABLET | Refills: 0 | Status: SHIPPED | OUTPATIENT
Start: 2023-04-23 | End: 2023-05-23

## 2023-04-18 ASSESSMENT — ENCOUNTER SYMPTOMS
BACK PAIN: 1
BOWEL INCONTINENCE: 0

## 2023-04-18 NOTE — PROGRESS NOTES
01/03/2012    x3    ENDOSCOPY, COLON, DIAGNOSTIC      KNEE ARTHROSCOPY      left    KNEE SURGERY Left     I&D    OTHER SURGICAL HISTORY Left 03/07/2016    plantar plane &  exostectomy medial foot left    PAIN MANAGEMENT PROCEDURE N/A 2/6/2023    INTRACEPT PROCEDURE L4,5 AND S1 [facility] [address] IND N/A 11/28/2018    COLONOSCOPY ATTEMPTED NOT CLEAN performed by Rosie Stanley MD at [place] [address]  11/03/2015    VENA CAVA FILTER PLACEMENT      WRIST SURGERY      I&D       Allergies   Allergen Reactions    Flagyl [Metronidazole] Hives    Metronidazole      Other reaction(s): Vomiting         Current Outpatient Medications:     [START ON 4/23/2023] oxyCODONE (ROXICODONE) 5 MG immediate release tablet, Take 1 tablet by mouth 2 times daily as needed for Pain for up to 30 days. , Disp: 60 tablet, Rfl: 0    BD PEN NEEDLE ALIZE 2ND GEN 32G X 4 MM MISC, USE AS DIRECTED TWICE DAILY, Disp: 100 each, Rfl: 0    omeprazole (PRILOSEC) 20 MG delayed release capsule, TAKE 1 CAPSULE BY MOUTH EVERY DAY, Disp: 90 capsule, Rfl: 0    simvastatin (ZOCOR) 20 MG tablet, TAKE 1 TABLET BY MOUTH EVERY DAY AT NIGHT, Disp: 90 tablet, Rfl: 0    lisinopril (PRINIVIL;ZESTRIL) 2.5 MG tablet, Take 1 tablet by mouth daily, Disp: 90 tablet, Rfl: 1    furosemide (LASIX) 40 MG tablet, TAKE 1 TABLET BY MOUTH TWO TIMES A DAY (Patient taking differently: Take 1 tablet by mouth 2 times daily), Disp: 60 tablet, Rfl: 3    topiramate (TOPAMAX) 50 MG tablet, TAKE 1 TABLET BY MOUTH IN THE MORNING AND AT BEDTIME (Patient taking differently: Take 1 tablet by mouth 2 times daily), Disp: 180 tablet, Rfl: 1    LANTUS SOLOSTAR 100 UNIT/ML injection pen, inject 35 units subcutaneously in the morning and 40 units in the evening (Patient taking differently: Inject into the skin 2 times daily inject 35 units subcutaneously in the morning and 40 units in the evening), Disp:

## 2023-04-20 ENCOUNTER — HOSPITAL ENCOUNTER (OUTPATIENT)
Dept: PHYSICAL THERAPY | Facility: CLINIC | Age: 78
Setting detail: THERAPIES SERIES
Discharge: HOME | End: 2023-04-20
Payer: MEDICARE

## 2023-04-20 PROCEDURE — 97110 THERAPEUTIC EXERCISES: CPT

## 2023-04-20 PROCEDURE — 97116 GAIT TRAINING THERAPY: CPT

## 2023-04-20 NOTE — FLOWSHEET NOTE
[] [facility] [facility]  [address]  [phone]  F: [phone] [x] [address]  [facility] 36   [address]  P: [phone]  F: [phone] [] [place] &  Therapy  [address] [facility]  P: [phone]  F: [phone] [] [address]  P: [phone]  F: [phone] [] [address]  [facility]   [address]   [place]: [phone]  F: [phone]      Physical Therapy Daily Treatment Note    Date:  2023  Patient Name:  Kylah Obregon    :  1945  MRN: [id number]  Physician:  DOUGIE Guerra    Insurance: Medicare Part A and B  Medical Diagnosis: Lumbosacral spondylosis without myelopathy  - Primary   Rehab Codes: M54.59, R26.89, M62.81   Onset Date: 3/20/23   Next Dr. Mikey Zavala: TBD  Visit# / total visits: ; Progress note for Medicare patient due at visit 16     Cancels/No Shows: 0/0    Subjective:    Pain:  [x] Yes  [] No Location: back of legs bilaterally, L post calf  Pain Rating: (0-10 scale) 5/10 LBP  Pain altered Tx:  [x] No  [] Yes  Action:  Comments: Pt reports his low back is painful still and feet continue to be painful as well. Pt reports that after he broke his leg in , he sat around and didn't really do anything. Pt notes he sat on the couch and was rather depressed, so now his pain feels more muscular due to lack of use. Pt notes on average his low back pain can increase to a 6-7/10 when taking his pain medication and an 8/10 without it. Pt reports his main goal now is to just be able to walk in order to walk out to the baseball/softball diamonds to watch his grandkids play.     Objective:  Modalities:   Precautions:  Exercises: flexion biased interventions  Bold completed 23

## 2023-04-22 LAB
6-ACETYLMORPHINE, UR: NOT DETECTED
7-AMINOCLONAZEPAM, URINE: NOT DETECTED
ALPHA-OH-ALPRAZ, URINE: NOT DETECTED
ALPHA-OH-MIDAZOLAM, URINE: NOT DETECTED
ALPRAZOLAM, URINE: NOT DETECTED
AMPHETAMINES, URINE: NOT DETECTED
BARBITURATES, URINE: NOT DETECTED
BENZOYLECGONINE, UR: NOT DETECTED
BUPRENORPHINE URINE: NOT DETECTED
CARISOPRODOL, UR: NOT DETECTED
CLONAZEPAM, URINE: NOT DETECTED
CODEINE, URINE: NOT DETECTED
CREATININE URINE: 57.7 MG/DL (ref 20–400)
DIAZEPAM, URINE: NOT DETECTED
DRUGS EXPECTED, UR: NORMAL
EER HI RES INTERP UR: NORMAL
ETHYL GLUCURONIDE UR: NOT DETECTED
FENTANYL URINE: NOT DETECTED
GABAPENTIN: PRESENT
HYDROCODONE, URINE: NOT DETECTED
HYDROMORPHONE, URINE: NOT DETECTED
LORAZEPAM, URINE: NOT DETECTED
MARIJUANA METAB, UR: NOT DETECTED
MDA, UR: NOT DETECTED
MDEA, EVE, UR: NOT DETECTED
MDMA URINE: NOT DETECTED
MEPERIDINE METAB, UR: NOT DETECTED
METHADONE, URINE: NOT DETECTED
METHAMPHETAMINE, URINE: NOT DETECTED
METHYLPHENIDATE: NOT DETECTED
MIDAZOLAM, URINE: NOT DETECTED
MORPHINE URINE: NOT DETECTED
NALOXONE URINE: NOT DETECTED
NORBUPRENORPHINE, URINE: NOT DETECTED
NORDIAZEPAM, URINE: NOT DETECTED
NORFENTANYL, URINE: NOT DETECTED
NORHYDROCODONE, URINE: NOT DETECTED
NOROXYCODONE, URINE: PRESENT
NOROXYMORPHONE, URINE: PRESENT
OXAZEPAM, URINE: NOT DETECTED
OXYCODONE URINE: PRESENT
OXYMORPHONE, URINE: PRESENT
PAIN MANAGEMENT DRUG PANEL INTERP, URINE: NORMAL
PAIN MGT DRUG PANEL, HI RES, UR: NORMAL
PCP,URINE: NOT DETECTED
PHENTERMINE, UR: NOT DETECTED
PREGABALIN: NOT DETECTED
TAPENTADOL, URINE: NOT DETECTED
TAPENTADOL-O-SULFATE, URINE: NOT DETECTED
TEMAZEPAM, URINE: NOT DETECTED
TRAMADOL, URINE: NOT DETECTED
ZOLPIDEM METABOLITE (ZCA), URINE: NOT DETECTED
ZOLPIDEM, URINE: NOT DETECTED

## 2023-04-24 ENCOUNTER — HOSPITAL ENCOUNTER (OUTPATIENT)
Dept: PHYSICAL THERAPY | Facility: CLINIC | Age: 78
Setting detail: THERAPIES SERIES
Discharge: HOME | End: 2023-04-24
Payer: MEDICARE

## 2023-04-24 PROCEDURE — 97116 GAIT TRAINING THERAPY: CPT

## 2023-04-24 PROCEDURE — 97110 THERAPEUTIC EXERCISES: CPT

## 2023-04-24 NOTE — FLOWSHEET NOTE
[] [facility] [facility]  [address]  [phone]  F: [phone] [x] [address]  [facility] 36   [address]  P: [phone]  F: [phone] [] [place] &  Therapy  [address]  P: [phone]  F: [phone] [] [address]  P: [phone]  F: [phone] [] [address]  [facility]   [address]   [place]: [phone]  F: [phone]      Physical Therapy Daily Treatment Note    Date:  2023  Patient Name:  Yg Ochoa    :  1945  MRN: [id number]  Physician:  DOUGIE Moser    Insurance: Medicare Part A and B  Medical Diagnosis: Lumbosacral spondylosis without myelopathy  - Primary   Rehab Codes: M54.59, R26.89, M62.81   Onset Date: 3/20/23   Next Dr. Virgie Reeder: TBD  Visit# / total visits: ; Progress note for Medicare patient due at visit 16     Cancels/No Shows: 0/0    Subjective:    Pain:  [x] Yes  [] No Location: back of legs bilaterally, L post calf  Pain Rating: (0-10 scale) 5/10 LBP  Pain altered Tx:  [x] No  [] Yes  Action:  Comments: Pt arrives noting same 'constant 5' pain in LB. Reports after walking about 40 yards + carrying items ~2 lb's he experiences intense aching at lateral aspect of B hips. Denies soreness after last session.      Objective:  Modalities:   Precautions:  Exercises: flexion biased interventions  Bold completed 23   Exercise bilat Reps/ Time Weight/ Level Comments   Nustep 5' lv1          Supine       LTR 10x 5\"    SKTC 5x 5\" Using towel   Hip add with TA 10x 5\" Ball between knees   Bridge with TA contraction 10x  Assistance through elbows; cues for stability   TrA SLR 10xea  Added 4/6   TrA marches 10xea  Added 4/6         Sidelying    Increased reps

## 2023-04-27 ENCOUNTER — HOSPITAL ENCOUNTER (OUTPATIENT)
Dept: PHYSICAL THERAPY | Facility: CLINIC | Age: 78
Setting detail: THERAPIES SERIES
Discharge: HOME | End: 2023-04-27
Payer: MEDICARE

## 2023-04-27 PROCEDURE — 97116 GAIT TRAINING THERAPY: CPT

## 2023-04-27 PROCEDURE — 97110 THERAPEUTIC EXERCISES: CPT

## 2023-04-27 NOTE — FLOWSHEET NOTE
[] [facility]) - [facility] &  Therapy  [address]  [phone]  F: [phone] [x] [address]  [employer] [address]   [address]  P: [phone]  F: [phone] [] [place] &  Therapy  [address]  P: [phone]  F: [phone] [] [address]  P: [phone]  F: [phone] [] [address]  [facility]   [address]   [place]: [phone]  F: [phone]      Physical Therapy Daily Treatment Note    Date:  2023  Patient Name:  William Funes    :  1945  MRN: [id number]  Physician:  DOUGIE Brooks    Insurance: Medicare Part A and B  Medical Diagnosis: Lumbosacral spondylosis without myelopathy  - Primary   Rehab Codes: M54.59, R26.89, M62.81   Onset Date: 3/20/23   Next Dr. Martín Cummings: TBD  Visit# / total visits: 10/16; Progress note for Medicare patient due at visit 16     Cancels/No Shows: 0/0    Subjective:    Pain:  [x] Yes  [] No Location: back of legs bilaterally, L post calf  Pain Rating: (0-10 scale) 2-3/10 LBP, R shoulder  Pain altered Tx:  [x] No  [] Yes  Action:  Comments: Pt arrives with minimal pain in back noting he feels it mostly in R shoulder. Reports knees were sore after last session. Was riding on  for an hour yesterday cutting grass which bothered sx's so had to cancel going to granddaughter's game.      Objective:  Modalities:   Precautions:  Exercises: flexion biased interventions  Bold completed 23   Exercise bilat Reps/ Time Weight/ Level Comments   Nustep 5' lv1          Supine       LTR 10x 5\"    SKTC 5x 5\" Using towel   Hip add with TA 10x 5\" Ball between knees   Bridge with TA contraction 10x  Assistance through elbows; cues for stability   TrA SLR 10xea  Added 4/6   TrA

## 2023-05-01 ENCOUNTER — HOSPITAL ENCOUNTER (OUTPATIENT)
Dept: PHYSICAL THERAPY | Facility: CLINIC | Age: 78
Setting detail: THERAPIES SERIES
Discharge: HOME | End: 2023-05-01
Payer: MEDICARE

## 2023-05-01 PROCEDURE — 97110 THERAPEUTIC EXERCISES: CPT

## 2023-05-01 PROCEDURE — 97116 GAIT TRAINING THERAPY: CPT

## 2023-05-01 NOTE — FLOWSHEET NOTE
[] [facility]) - [facility] &  Therapy  [address]  [phone]  F: [phone] [x] [address]  [facility] 36   [address]  P: [phone]  F: [phone] [] [place] &  Therapy  [address]  P: [phone]  F: [phone] [] [address]  P: [phone]  F: [phone] [] [address]  [facility]   [address]   [place]: [phone]  F: [phone]      Physical Therapy Daily Treatment Note    Date:  2023  Patient Name:  Fredi Harris    :  1945  MRN: [id number]  Physician:  DOUGIE Guo    Insurance: Medicare Part A and B  Medical Diagnosis: Lumbosacral spondylosis without myelopathy  - Primary   Rehab Codes: M54.59, R26.89, M62.81   Onset Date: 3/20/23   Next Dr. Stan Panda: TBD  Visit# / total visits: ; Progress note for Medicare patient due at visit 16     Cancels/No Shows: 0/0    Subjective:    Pain:  [x] Yes  [] No Location: back of legs bilaterally, L post calf  Pain Rating: (0-10 scale)~2/10 LBP, R shoulder  Pain altered Tx:  [x] No  [] Yes  Action:  Comments: Pt notes he did a lot of walking over the weekend at softball games. Pt notes that he walked 800-1,000 yards carrying bag chair. Pt notes it didn't start bothering him until the last 48 yards. Pt notes sitting during the games didn't bother him at all. Pt notes all the walking did tire him out and he didn't do a lot on .      Objective:  Modalities:   Precautions:  Exercises: flexion biased interventions  Bold completed 23   Exercise bilat Reps/ Time Weight/ Level Comments   Nustep 5' lv4          Supine       Hamstring stretch 3x30\" ea     Hip flexor stretch on R 3x30\"  Manual OP at tibia   LTR 10x 5\"    SKTC 5x 5\" Using Moderna dose 1, 2, and 3

## 2023-05-04 ENCOUNTER — HOSPITAL ENCOUNTER (OUTPATIENT)
Dept: PHYSICAL THERAPY | Facility: CLINIC | Age: 78
Setting detail: THERAPIES SERIES
Discharge: HOME | End: 2023-05-04
Payer: MEDICARE

## 2023-05-04 PROCEDURE — 97110 THERAPEUTIC EXERCISES: CPT

## 2023-05-04 NOTE — FLOWSHEET NOTE
[] [facility]  [address]  [phone]  F: [phone] [x] [address]  [facility] 36   [address]  P: [phone]  F: [phone] [] [place] [facility]  Therapy  [address] [facility]  P: [phone]  F: [phone] [] [address]  P: [phone]  F: [phone] [] [address]  [facility]   [address]   [place]: [phone]  F: [phone]      Physical Therapy Daily Treatment Note    Date:  2023  Patient Name:  Sai Ohara    :  1945  MRN: [id number]  Physician:  DOUGIE Mroris    Insurance: Medicare Part A and B  Medical Diagnosis: Lumbosacral spondylosis without myelopathy  - Primary   Rehab Codes: M54.59, R26.89, M62.81   Onset Date: 3/20/23   Next Dr. Linda Branch: TBD  Visit# / total visits: ; Progress note for Medicare patient due at visit 16     Cancels/No Shows: 0/0    Subjective:    Pain:  [x] Yes  [] No Location: back of legs bilaterally, L post calf  Pain Rating: (0-10 scale) 4/10 LBP  Pain altered Tx:  [x] No  [] Yes  Action:  Comments: Pt arrives with increased soreness and stiffness this date. Pt states he did mow the grass (self propelled) and walked at the [employer] park. Pt reports he has been able to complete his hip stretches in the morning and is surprised as to how tight his legs are in the morning.      Objective:  Modalities:   Precautions:  Exercises: flexion biased interventions  Bold completed 23   Exercise bilat Reps/ Time Weight/ Level Comments   Nustep 6' lv4          Supine       Hamstring stretch 3x30\" ea     Piriformis stretch (IR) 3x30\" ea     Hip flexor stretch on R 3x30\"  Manual OP at tibia   LTR 1' 5\"    SKTC 5x 5\" Using towel   Hip add with TA 10x 5\" Jennifer Bragg

## 2023-05-08 ENCOUNTER — HOSPITAL ENCOUNTER (OUTPATIENT)
Dept: PHYSICAL THERAPY | Facility: CLINIC | Age: 78
Setting detail: THERAPIES SERIES
Discharge: HOME | End: 2023-05-08
Payer: MEDICARE

## 2023-05-08 PROCEDURE — 97110 THERAPEUTIC EXERCISES: CPT

## 2023-05-08 NOTE — FLOWSHEET NOTE
daily - 2 sets - 10 reps - 5 sec hold  - Supine Lower Trunk Rotation  - 2-3 x daily - 3 sets - 10 reps  - Seated 3 Way Exercise Ball Roll Out Stretch  - 2-3 x daily - 2 sets - 10 reps  Access Code: [email or web address]  URL: [email or web address]  Date: 03/30/2023  Prepared by: Doug Leo  - Supine Hip Adduction Isometric with Ball  - 1 x daily - 7 x weekly - 2 sets - 10 reps - 5\" hold  - Clamshell  - 1 x daily - 7 x weekly - 2 sets - 10 reps  - Hooklying Clamshell with Resistance  - 1 x daily - 7 x weekly - 2 sets - 10 reps  - Supine Bridge  - 1 x daily - 7 x weekly - 1 sets - 10 reps  - Beginner Reverse Clamshell  - 1 x daily - 7 x weekly - 2 sets - 10 reps  - Hooklying Single Knee to Chest Stretch  - 1 x daily - 7 x weekly - 5 sets - 1 reps - 5\" hold  Date: 04/13/2023  Prepared by: Sarah [address]  - 1 x daily - 7 x weekly - 3 sets - 10 reps  - Mini Squat with Counter Support  - 1 x daily - 7 x weekly - 3 sets - 10 reps  - Standing Hip Abduction with Counter Support  - 1 x daily - 7 x weekly - 3 sets - 10 reps  - Standing Hip Extension with Counter Support  - 1 x daily - 7 x weekly - 3 sets - 10 reps  - Standing Knee Flexion AROM with Chair Support  - 1 x daily - 7 x weekly - 3 sets - 10 reps  4/20: increase compliance with HEP at home-- focusing on gait training in [address] [place] [address] [address] for upcoming sessions  5/1: - Modified Tom Stretch  - 1 x daily - 3 sets - 30\" hold  5/4: heel lift provided    Plan: [x] Continue current frequency toward long and short term goals.     [x] Specific Instructions for subsequent treatments: continue to progress core and lumbar, hip strengthening/stretching as tolerated      Time In: 9:02 am     Time Out: 10:00 am     Electronically signed by:  Nora Mancini PTA

## 2023-05-10 ENCOUNTER — HOSPITAL ENCOUNTER (OUTPATIENT)
Dept: PHYSICAL THERAPY | Facility: CLINIC | Age: 78
Setting detail: THERAPIES SERIES
Discharge: HOME | End: 2023-05-10
Payer: MEDICARE

## 2023-05-10 NOTE — FLOWSHEET NOTE
[] [facility]) [facility] &  Therapy  [address]    [phone]  F: [phone]   [x] [address]  [facility]   [address]  P: [phone]  F: [phone]  [] [address]  [address]  P: [phone]  F: [phone] [] [address]  P: [phone]  F: [phone]  [] [address]  [address] [address] [facility] [address]   [address]   [place]: [phone]  F: [phone]   [] [facility]  [address] [facility] [address]  [place]: [phone]   F: [phone]     Physical Therapy Cancel/No Show note    Date: 5/10/2023  Patient: Haynes Dance  : 1945  MRN: [id number]    Cancels/No Shows to date:     For today's appointment patient:    [x]  Cancelled    [] Rescheduled appointment    [] No-show     Reason given by patient:    [x]  Patient ill    []  Conflicting appointment    [] No transportation      [] Conflict with work    [] No reason given    [] Weather related    [] [id number]    [] Other:      Comments:        [x] Next appointment was confirmed    Electronically signed by: Jonny Leyden, PTA

## 2023-05-11 ENCOUNTER — OFFICE VISIT (OUTPATIENT)
Dept: PODIATRY | Age: 78
End: 2023-05-11
Payer: MEDICARE

## 2023-05-11 VITALS — HEIGHT: 72 IN | BODY MASS INDEX: 30.88 KG/M2 | WEIGHT: 228 LBS

## 2023-05-11 DIAGNOSIS — M14.672 CHARCOT'S JOINT OF LEFT FOOT: ICD-10-CM

## 2023-05-11 DIAGNOSIS — Z79.4 TYPE 2 DIABETES MELLITUS WITH DIABETIC POLYNEUROPATHY, WITH LONG-TERM CURRENT USE OF INSULIN (HCC): ICD-10-CM

## 2023-05-11 DIAGNOSIS — E11.42 TYPE 2 DIABETES MELLITUS WITH DIABETIC POLYNEUROPATHY, WITH LONG-TERM CURRENT USE OF INSULIN (HCC): ICD-10-CM

## 2023-05-11 DIAGNOSIS — B35.1 ONYCHOMYCOSIS: Primary | ICD-10-CM

## 2023-05-11 PROCEDURE — 99999 PR OFFICE/OUTPT VISIT,PROCEDURE ONLY: CPT | Performed by: PODIATRIST

## 2023-05-11 PROCEDURE — 11721 DEBRIDE NAIL 6 OR MORE: CPT | Performed by: PODIATRIST

## 2023-05-11 ASSESSMENT — ENCOUNTER SYMPTOMS
COLOR CHANGE: 0
VOMITING: 0
CONSTIPATION: 0
NAUSEA: 0
DIARRHEA: 0

## 2023-05-11 NOTE — PROGRESS NOTES
[facility]  Return Patient  Chief Complaint   Patient presents with    Nail Problem     B/l nail trim/ last seen Dr. Fransisco Nuno 12/8/2022    Diabetes     Last blood sugar 126       Alexis Ornelas is a 69y.o. year old male who is here for a diabetic foot check and nail trim. He would like his nails trimmed today. He is in need of new diabetic shoes. He is going through the process. Has Charcot left foot. Has been stable. History of surgery type: Plantar planing medial and lateral foot. Vital signs are stable. Pain level is 0. Patient denies N/V/F/C. Review of Systems   Constitutional:  Negative for chills, diaphoresis, fatigue, fever and unexpected weight change. Cardiovascular:  Negative for leg swelling. Gastrointestinal:  Negative for constipation, diarrhea, nausea and vomiting. Musculoskeletal:  Positive for gait problem. Negative for arthralgias and joint swelling. Skin:  Negative for color change, pallor, rash and wound. Neurological:  Positive for numbness. Negative for weakness. Vascular: DP and PT pulses palpable 2/4, Right Foot and 2/4 on the Left Foot. CFT <3 seconds, Right Foot and <3 seconds on the Left Foot. Edema is absent,  Right Foot and minimal on the Left Foot. Neurological:   Sensation absent  to light touch to level of digits, both feet. Musculoskeletal:   Muscle strength is 5/5 on the Right Foot and 5/5 on the Left Foot. Structural deformities are present on the Right Foot and present on the Left Foot, but improved  Flat medial arch left foot. Integument:  Warm, dry, supple both feet. Infection is absent.        Sites of Onychomycosis Involvement (Check affected area)  [x] [x] [x] [x] [x] [x] [x] [x] [x] [x]  5 4 3 2 1 1 2 3 4 5                          Right                                        Left    Thickness  [x] [x] [x] [x] [x] [x] [x] [x] [x] [x]  5 4 3 2 1 1 2 3 4 5                         Right

## 2023-05-12 RX ORDER — SIMVASTATIN 20 MG
TABLET ORAL
Qty: 90 TABLET | Refills: 3 | Status: SHIPPED | OUTPATIENT
Start: 2023-05-12

## 2023-05-15 ENCOUNTER — HOSPITAL ENCOUNTER (OUTPATIENT)
Dept: PHYSICAL THERAPY | Facility: CLINIC | Age: 78
Setting detail: THERAPIES SERIES
Discharge: HOME | End: 2023-05-15
Payer: MEDICARE

## 2023-05-15 ENCOUNTER — TELEPHONE (OUTPATIENT)
Dept: FAMILY MEDICINE CLINIC | Age: 78
End: 2023-05-15

## 2023-05-15 PROCEDURE — 97110 THERAPEUTIC EXERCISES: CPT

## 2023-05-15 NOTE — FLOWSHEET NOTE
tolerance for tasks such as ambulation, stairs, ADLs, and other functional tasks. Ongoing 4/17/23 (64% impaired)  Gait: Patient will be able to ambulate 807 feet in 6 minutes with one rest break and with the least restrictive assistive device in order to show improved endurance and improved tolerance that is needed to ambulate in the community. Ongoing 4/20/23  Independent with Home Exercise Programs. Will re-assess compliance in upcoming sessions. Demonstrate Knowledge of fall prevention. MET 4/20/23  LTG: (to be met in 16 treatments)  ? Pain: Patient will rate 2-3/10 maximal low back pain in order to improve tolerance for ambulation, stairs, completing yard work, attending sporting events, and sit to stand   ? ROM: Patient will be able to lay prone for 3 minutes in order to promote lumbar extension and improve tolerance for positions and functional tasks that require lumbar extension  ? Strength: Patient will obtain 5/5 global hip strength bilaterally in order to improve strength that is needed during tasks such as ambulation, stair navigation, sit to stands, and completion of ADLs. ? Function: Patient will score 48% functionally impaired on the CORINE to show improved tolerance for tasks such as ambulation, stairs, ADLs, and other functional tasks. Gait: Patient will be able to ambulate 998 feet in 6 minutes with one rest break and with the least restrictive assistive device in order to show improved endurance and improved tolerance that is needed to ambulate in the community. Patient goals: \"To get stronger\"    Pt. Education:  [x] Yes  [] No  [] Reviewed Prior HEP/Ed  Method of Education: [x] Verbal form   [] Demo [] Written:  Comprehension of Education:  [x] Verbalizes understanding. [x] Demonstrates understanding. [] Needs review. [] Demonstrates/verbalizes HEP/Ed previously given.      Access Code: [email or web address]  - Supine Transversus Abdominis Bracing - Hands on Stomach  - 2-3 x daily - 2 sets - 10 reps

## 2023-05-16 ENCOUNTER — HOSPITAL ENCOUNTER (OUTPATIENT)
Dept: PAIN MANAGEMENT | Age: 78
Discharge: HOME | End: 2023-05-16
Payer: MEDICARE

## 2023-05-16 ENCOUNTER — OFFICE VISIT (OUTPATIENT)
Dept: FAMILY MEDICINE CLINIC | Age: 78
End: 2023-05-16
Payer: MEDICARE

## 2023-05-16 VITALS
DIASTOLIC BLOOD PRESSURE: 67 MMHG | HEART RATE: 57 BPM | BODY MASS INDEX: 30.61 KG/M2 | RESPIRATION RATE: 20 BRPM | HEIGHT: 72 IN | OXYGEN SATURATION: 96 % | TEMPERATURE: 97.3 F | SYSTOLIC BLOOD PRESSURE: 115 MMHG | WEIGHT: 226 LBS

## 2023-05-16 VITALS
TEMPERATURE: 97 F | BODY MASS INDEX: 31.48 KG/M2 | WEIGHT: 232.4 LBS | HEART RATE: 82 BPM | OXYGEN SATURATION: 95 % | HEIGHT: 72 IN | SYSTOLIC BLOOD PRESSURE: 120 MMHG | DIASTOLIC BLOOD PRESSURE: 60 MMHG

## 2023-05-16 DIAGNOSIS — Z79.891 CHRONIC USE OF OPIATE DRUG FOR THERAPEUTIC PURPOSE: Primary | Chronic | ICD-10-CM

## 2023-05-16 DIAGNOSIS — N18.31 STAGE 3A CHRONIC KIDNEY DISEASE (HCC): ICD-10-CM

## 2023-05-16 DIAGNOSIS — E55.9 VITAMIN D DEFICIENCY: ICD-10-CM

## 2023-05-16 DIAGNOSIS — M48.062 SPINAL STENOSIS OF LUMBAR REGION WITH NEUROGENIC CLAUDICATION: ICD-10-CM

## 2023-05-16 DIAGNOSIS — E11.610 CHARCOT FOOT DUE TO DIABETES MELLITUS (HCC): ICD-10-CM

## 2023-05-16 DIAGNOSIS — E11.610 CHARCOT FOOT DUE TO DIABETES MELLITUS (HCC): Primary | ICD-10-CM

## 2023-05-16 DIAGNOSIS — M51.36 DDD (DEGENERATIVE DISC DISEASE), LUMBAR: ICD-10-CM

## 2023-05-16 DIAGNOSIS — E11.42 TYPE 2 DIABETES MELLITUS WITH DIABETIC POLYNEUROPATHY, WITH LONG-TERM CURRENT USE OF INSULIN (HCC): Primary | ICD-10-CM

## 2023-05-16 DIAGNOSIS — I10 ESSENTIAL HYPERTENSION: ICD-10-CM

## 2023-05-16 DIAGNOSIS — I25.810 CORONARY ARTERY DISEASE INVOLVING CORONARY BYPASS GRAFT OF NATIVE HEART WITHOUT ANGINA PECTORIS: ICD-10-CM

## 2023-05-16 DIAGNOSIS — M47.817 LUMBOSACRAL SPONDYLOSIS WITHOUT MYELOPATHY: ICD-10-CM

## 2023-05-16 DIAGNOSIS — Z79.4 TYPE 2 DIABETES MELLITUS WITH DIABETIC POLYNEUROPATHY, WITH LONG-TERM CURRENT USE OF INSULIN (HCC): Primary | ICD-10-CM

## 2023-05-16 DIAGNOSIS — M47.26 OSTEOARTHRITIS OF SPINE WITH RADICULOPATHY, LUMBAR REGION: ICD-10-CM

## 2023-05-16 DIAGNOSIS — F11.20 OPIOID DEPENDENCE WITH CURRENT USE (HCC): ICD-10-CM

## 2023-05-16 DIAGNOSIS — R80.9 MICROALBUMINURIA: ICD-10-CM

## 2023-05-16 LAB — HBA1C MFR BLD: 7.2 %

## 2023-05-16 PROCEDURE — 3078F DIAST BP <80 MM HG: CPT | Performed by: FAMILY MEDICINE

## 2023-05-16 PROCEDURE — G8427 DOCREV CUR MEDS BY ELIG CLIN: HCPCS | Performed by: FAMILY MEDICINE

## 2023-05-16 PROCEDURE — 3074F SYST BP LT 130 MM HG: CPT | Performed by: FAMILY MEDICINE

## 2023-05-16 PROCEDURE — 99213 OFFICE O/P EST LOW 20 MIN: CPT

## 2023-05-16 PROCEDURE — G8417 CALC BMI ABV UP PARAM F/U: HCPCS | Performed by: FAMILY MEDICINE

## 2023-05-16 PROCEDURE — 99213 OFFICE O/P EST LOW 20 MIN: CPT | Performed by: NURSE PRACTITIONER

## 2023-05-16 PROCEDURE — 1123F ACP DISCUSS/DSCN MKR DOCD: CPT | Performed by: FAMILY MEDICINE

## 2023-05-16 PROCEDURE — 99214 OFFICE O/P EST MOD 30 MIN: CPT | Performed by: FAMILY MEDICINE

## 2023-05-16 PROCEDURE — 83036 HEMOGLOBIN GLYCOSYLATED A1C: CPT | Performed by: FAMILY MEDICINE

## 2023-05-16 PROCEDURE — 1036F TOBACCO NON-USER: CPT | Performed by: FAMILY MEDICINE

## 2023-05-16 PROCEDURE — 3051F HG A1C>EQUAL 7.0%<8.0%: CPT | Performed by: FAMILY MEDICINE

## 2023-05-16 RX ORDER — OXYCODONE HYDROCHLORIDE 5 MG/1
5 TABLET ORAL 2 TIMES DAILY PRN
Qty: 60 TABLET | Refills: 0 | Status: SHIPPED | OUTPATIENT
Start: 2023-05-23 | End: 2023-06-22

## 2023-05-16 RX ORDER — GABAPENTIN 800 MG/1
800 TABLET ORAL DAILY
Qty: 90 TABLET | Refills: 2 | Status: SHIPPED | OUTPATIENT
Start: 2023-05-16 | End: 2023-08-14

## 2023-05-16 ASSESSMENT — PATIENT HEALTH QUESTIONNAIRE - PHQ9
SUM OF ALL RESPONSES TO PHQ QUESTIONS 1-9: 0
SUM OF ALL RESPONSES TO PHQ QUESTIONS 1-9: 0
1. LITTLE INTEREST OR PLEASURE IN DOING THINGS: 0
SUM OF ALL RESPONSES TO PHQ QUESTIONS 1-9: 0
SUM OF ALL RESPONSES TO PHQ9 QUESTIONS 1 & 2: 0
SUM OF ALL RESPONSES TO PHQ QUESTIONS 1-9: 0
2. FEELING DOWN, DEPRESSED OR HOPELESS: 0

## 2023-05-16 ASSESSMENT — ENCOUNTER SYMPTOMS
SINUS PRESSURE: 0
CHEST TIGHTNESS: 0
ABDOMINAL PAIN: 0
BACK PAIN: 1
RECTAL PAIN: 0
COLOR CHANGE: 0
SHORTNESS OF BREATH: 0
VOMITING: 0
ABDOMINAL DISTENTION: 0
RHINORRHEA: 0
SORE THROAT: 0
COUGH: 0
CONSTIPATION: 0
DIARRHEA: 0
EYE REDNESS: 0
BACK PAIN: 1
WHEEZING: 0
NAUSEA: 0
SHORTNESS OF BREATH: 0
BOWEL INCONTINENCE: 0
COUGH: 0
TROUBLE SWALLOWING: 0
STRIDOR: 0
BLOOD IN STOOL: 0

## 2023-05-16 NOTE — PROGRESS NOTES
(around 9/16/2023) for dm ,htn, hld. Patient wasseen with total face to face time of 35 minutes. More than 50% of this visit was counseling and education. Future Appointments   Date Time Provider Esteban Eller   5/18/2023  8:00 AM Joni Givens, PTA [facility]   5/22/2023  8:00 AM Ban Atilio, PT [facility]   5/25/2023  8:00 AM Thomes Atilio, PT [facility]   6/19/2023  8:40 AM Anuja Acevedo APRN - CNP [facility] Jessica Fernandez   7/20/2023  8:45 AM Darrius Santillan DPM [facility]   9/20/2023 11:30 AM Rickie Cervantes MD [facility]   9/28/2023  8:45 AM Darrius Santillan DPM [address]     This note was completed by using the assistance of a speech-recognition program. However, inadvertent computerized transcription errors may be present. Althoughevery effort was made to ensure accuracy, no guarantees can be provided that every mistake has been identified and corrected by editing.   Electronically signed by Rickie Cervantes MD on 5/16/2023  1:05 PM

## 2023-05-16 NOTE — PROGRESS NOTES
medication management, pt has been stable and compliant. Script written for oxycodone and gabapentin  Follow up appointment made for 4 weeks    I have reviewed the chief complaint and history of present illness (including ROS and PFSH) and vital documentation by my staff and I agree with their documentation and have added where applicable.

## 2023-05-16 NOTE — PATIENT INSTRUCTIONS
Thank you for choosing [facility]) as your healthcare provider as your care is important to us. Please arrive at your appointment on time. If you are unable to make your appointment, please call our office as soon as possible so that we may reschedule your appointment. Missing 3 appointments in a calendar year without notifying the office may lead to dismissal from the practice. We appreciate you calling at least 24 hours in advance, when possible. Thank you. New Updates for [facility]    Thank you for choosing US to give you the best care! [facility]) is always trying to think of new ways to help their patients. We are asking all patients to try out the new digital registration that is now available through your [facility] account Via the ang you're now able to update your personal and registration information prior to your upcoming appointment. This will save you time once you arrive at the office to check-in, not to mention your information remains safe!! Many other perks come from signing up for an account, such as:  Requesting refills  Scheduling an appointment  Completing an E-Visit  Sending a message to the office/provider  Having access to your medication list  Paying your bill/copay prior to your appointment  Scheduling your yearly mammogram  Review your test results    If you are not familiar with [facility] please ask one of us and we will be happy to answer any questions or help you set-up your account.       Your [employer],

## 2023-05-18 ENCOUNTER — HOSPITAL ENCOUNTER (OUTPATIENT)
Dept: PHYSICAL THERAPY | Facility: CLINIC | Age: 78
Setting detail: THERAPIES SERIES
Discharge: HOME | End: 2023-05-18
Payer: MEDICARE

## 2023-05-18 PROCEDURE — 97110 THERAPEUTIC EXERCISES: CPT

## 2023-05-22 ENCOUNTER — HOSPITAL ENCOUNTER (OUTPATIENT)
Dept: PHYSICAL THERAPY | Facility: CLINIC | Age: 78
Setting detail: THERAPIES SERIES
Discharge: HOME | End: 2023-05-22
Payer: MEDICARE

## 2023-05-22 PROCEDURE — 97110 THERAPEUTIC EXERCISES: CPT

## 2023-05-22 NOTE — FLOWSHEET NOTE
[] [facility]) - [facility] &  Therapy  [address]  [phone]  F: [phone] [x] [address]  [facility]   [address]  P: [phone]  F: [phone] [] [facility]  [facility]  [address]  P: [phone]  F: [phone] [] [address]  P: [phone]  F: [phone] [] [address]  [facility]   [address]   [place]: [phone]  F: [phone]      Physical Therapy Daily Treatment Note    Date:  2023  Patient Name:  Cecille Day    :  1945  MRN: [id number]  Physician:  DOUGIE Motta    Insurance: Medicare Part A and B  Medical Diagnosis: Lumbosacral spondylosis without myelopathy  - Primary   Rehab Codes: M54.59, R26.89, M62.81   Onset Date: 3/20/23   Next Dr. Liz Chacon: TBD  Visit# / total visits: ; Progress note for Medicare patient due at visit 16     Cancels/No Shows: 1/0    Subjective:    Pain:  [x] Yes  [] No Location: back of legs bilaterally, L post calf  Pain Rating: (0-10 scale) \"very little\"/10 LBP  Pain altered Tx:  [x] No  [] Yes  Action:  Comments: Pt arrives reporting very little low back pain. Pt arrives reporting he \"survived\" the weekend. Pt notes that he did a lot of walking. Pt notes his max pain is a 5-6/10 these days where he will need to sit due to pain, however, notes this is not as frequent. Pt notes that before it happened all the time and it was just limited to walking to and from the mailbox. Pt notes he was able to walk 200 yards in the grass without issue and this is the first time he has been able to do this in over 1 year.       Objective:  Modalities:   Precautions:  Exercises: flexion biased interventions  Bold completed 23   Exercise bilat Reps/ Time

## 2023-05-22 NOTE — DISCHARGE SUMMARY
[] [facility]) - [facility] &  Therapy  [address]  [phone]  F: [phone] [x] [address]  [facility]   [address]  P: [phone]  F: [phone] [] [address] &  Therapy  [address]  P: [phone]  F: [phone] [] [address]  P: [phone]  F: [phone] [] [address]  [facility]   [address]   [place]: [phone]  F: [phone]      Physical Therapy Discharge Note    Date: 2023      Patient: Dino Sanchez  : 1945  MRN: [id number] Physician:      DOUGIE López               Insurance: Medicare Part A and B  Medical Diagnosis: Lumbosacral spondylosis without myelopathy  - Primary                      Rehab Codes: M54.59, R26.89, M62.81        Onset Date: 3/20/23                           Next Dr. Mely Olmstead: TBD  Visit# / total visits: ; Progress note for Medicare patient due at visit 16                                  Cancels/No Shows:   Date of initial visit: 3/28/23                Date of final visit: 23       Subjective:    Pain:  [x] Yes  [] No   Location: back of legs bilaterally, L post calf              Pain Rating: (0-10 scale) \"very little\"/10 LBP  Pain altered Tx:  [x] No  [] Yes  Action:  Comments: Pt arrives reporting very little low back pain. Pt arrives reporting he \"survived\" the weekend. Pt notes that he did a lot of walking. Pt notes his max pain is a 5-6/10 these days where he will need to sit due to pain, however, notes this is not as frequent. Pt notes that before it happened all the time and it was just limited to walking to and from the mailbox.  Pt notes he was able to walk 200 yards in the grass without issue and this is the first time he has been

## 2023-05-25 ENCOUNTER — APPOINTMENT (OUTPATIENT)
Dept: PHYSICAL THERAPY | Facility: CLINIC | Age: 78
End: 2023-05-25
Payer: MEDICARE

## 2023-05-31 DIAGNOSIS — Z79.4 TYPE 2 DIABETES MELLITUS WITH DIABETIC POLYNEUROPATHY, WITH LONG-TERM CURRENT USE OF INSULIN (HCC): ICD-10-CM

## 2023-05-31 DIAGNOSIS — E11.42 TYPE 2 DIABETES MELLITUS WITH DIABETIC POLYNEUROPATHY, WITH LONG-TERM CURRENT USE OF INSULIN (HCC): ICD-10-CM

## 2023-05-31 RX ORDER — PEN NEEDLE, DIABETIC 32GX 5/32"
NEEDLE, DISPOSABLE MISCELLANEOUS
Qty: 100 EACH | Refills: 3 | Status: SHIPPED | OUTPATIENT
Start: 2023-05-31

## 2023-05-31 NOTE — TELEPHONE ENCOUNTER
Please Approve or Refuse.   Send to Pharmacy per Pt's Request: Mala Garces     Next Visit Date:  9/20/2023   Last Visit Date: 5/16/2023    Hemoglobin A1C (%)   Date Value   05/16/2023 7.2 (H)   01/24/2023 7.5 (H)   05/10/2022 7.2             ( goal A1C is < 7)   BP Readings from Last 3 Encounters:   05/16/23 115/67   05/16/23 120/60   04/18/23 (!) 104/58          (goal 120/80)  BUN   Date Value Ref Range Status   02/06/2023 35 (H) 8 - 23 mg/dL Final   02/06/2023 35 (H) 8 - 23 mg/dL Final     Creatinine   Date Value Ref Range Status   02/06/2023 1.45 (H) 0.70 - 1.20 mg/dL Final   02/06/2023 1.45 (H) 0.70 - 1.20 mg/dL Final     Potassium   Date Value Ref Range Status   02/06/2023 3.7 3.7 - 5.3 mmol/L Final   02/06/2023 3.7 3.7 - 5.3 mmol/L Final

## 2023-06-19 ENCOUNTER — HOSPITAL ENCOUNTER (OUTPATIENT)
Dept: PAIN MANAGEMENT | Age: 78
Discharge: HOME | End: 2023-06-19
Payer: MEDICARE

## 2023-06-19 VITALS
TEMPERATURE: 97.3 F | HEIGHT: 72 IN | BODY MASS INDEX: 30.48 KG/M2 | HEART RATE: 77 BPM | SYSTOLIC BLOOD PRESSURE: 135 MMHG | RESPIRATION RATE: 20 BRPM | OXYGEN SATURATION: 97 % | WEIGHT: 225 LBS | DIASTOLIC BLOOD PRESSURE: 62 MMHG

## 2023-06-19 DIAGNOSIS — M51.36 DDD (DEGENERATIVE DISC DISEASE), LUMBAR: ICD-10-CM

## 2023-06-19 DIAGNOSIS — Z79.891 CHRONIC USE OF OPIATE DRUG FOR THERAPEUTIC PURPOSE: Chronic | ICD-10-CM

## 2023-06-19 DIAGNOSIS — M47.817 LUMBOSACRAL SPONDYLOSIS WITHOUT MYELOPATHY: Primary | ICD-10-CM

## 2023-06-19 DIAGNOSIS — M48.061 SPINAL STENOSIS OF LUMBAR REGION, UNSPECIFIED WHETHER NEUROGENIC CLAUDICATION PRESENT: ICD-10-CM

## 2023-06-19 DIAGNOSIS — M54.51 VERTEBROGENIC LOW BACK PAIN: ICD-10-CM

## 2023-06-19 PROCEDURE — 99213 OFFICE O/P EST LOW 20 MIN: CPT

## 2023-06-19 PROCEDURE — 99213 OFFICE O/P EST LOW 20 MIN: CPT | Performed by: NURSE PRACTITIONER

## 2023-06-19 RX ORDER — OXYCODONE HYDROCHLORIDE 5 MG/1
5 TABLET ORAL 2 TIMES DAILY PRN
Qty: 60 TABLET | Refills: 0 | Status: SHIPPED | OUTPATIENT
Start: 2023-06-22 | End: 2023-07-22

## 2023-06-19 RX ORDER — FUROSEMIDE 40 MG/1
40 TABLET ORAL 2 TIMES DAILY
Qty: 60 TABLET | Refills: 3 | Status: SHIPPED | OUTPATIENT
Start: 2023-06-19

## 2023-06-19 ASSESSMENT — ENCOUNTER SYMPTOMS
BOWEL INCONTINENCE: 0
SHORTNESS OF BREATH: 0
BACK PAIN: 1
COUGH: 0
CONSTIPATION: 0

## 2023-06-19 ASSESSMENT — PAIN SCALES - GENERAL: PAINLEVEL_OUTOF10: 5

## 2023-06-19 ASSESSMENT — PAIN DESCRIPTION - LOCATION: LOCATION: BACK

## 2023-06-19 NOTE — TELEPHONE ENCOUNTER
Please Approve or Refuse.   Send to Pharmacy per Pt's Request:      Next Visit Date:  9/20/2023   Last Visit Date: 5/16/2023    Hemoglobin A1C (%)   Date Value   05/16/2023 7.2 (H)   01/24/2023 7.5 (H)   05/10/2022 7.2             ( goal A1C is < 7)   BP Readings from Last 3 Encounters:   06/19/23 135/62   05/16/23 115/67   05/16/23 120/60          (goal 120/80)  BUN   Date Value Ref Range Status   06/15/2023 33 (H) 8 - 23 mg/dL Final     Creatinine   Date Value Ref Range Status   06/15/2023 1.63 (H) 0.70 - 1.20 mg/dL Final     Potassium   Date Value Ref Range Status   06/15/2023 4.7 3.7 - 5.3 mmol/L Final

## 2023-06-19 NOTE — PROGRESS NOTES
Chief Complaint   Patient presents with    Back Pain     Med refill        PMH       HPI:     Back Pain  This is a chronic problem. The current episode started more than 1 year ago. The problem occurs constantly. The problem is unchanged. The pain is present in the lumbar spine. The quality of the pain is described as aching. The pain radiates to the left foot, left knee, left thigh, right foot, right knee and right thigh. The pain is at a severity of 5/10. The pain is moderate. The symptoms are aggravated by lying down. Associated symptoms include numbness, tingling and weakness. Pertinent negatives include no bladder incontinence or bowel incontinence. He has tried heat, ice, bed rest and home exercises for the symptoms. Patient denies any new neurological symptoms. No bowel or bladder incontinence, no weakness, and no falling.     Pill count: 7 oxycodone, due 6/22, appropriate    Morphine equivalent:            Past Medical History:   Diagnosis Date    Abdominal pain     Arthritis     Benign prostatic hypertrophy     C. difficile colitis     CAD (coronary artery disease) 01/03/2012    s/p CABGx3    Colon polyp 02/25/2019    tubular adenoma    Constipation     Diabetes mellitus ([address]     Diarrhea     Diarrhea     DVT (deep venous thrombosis) ([facility])     left calf    Ejection fraction < 50%     Gallstones     Heartburn     Hx of blood clots     Hyperlipidemia     Hypertension     Kidney stones     Lumbar spinal stenosis 04/21/2014    MI (myocardial infarction) ([address]     2011    Mitral regurgitation     MRSA (methicillin resistant staph aureus) culture positive     Numbness and tingling     hands and feet    Rib fractures 01/2015    right    Wears glasses     readers       Past Surgical History:   Procedure Laterality Date    APPENDECTOMY      CARDIAC CATHETERIZATION  12/29/2011    CHOLECYSTECTOMY      COLONOSCOPY  01/11/2012    wnl, 10 yr recall    COLONOSCOPY  11/28/2018    ATTEMPTED NOT CLEAN (N/A )

## 2023-06-19 NOTE — PROGRESS NOTES
Chief Complaint   Patient presents with    Back Pain     Med refill        [facility]   Pt c/o low back pain radiating down his legs with numbness in feet d/t neuropathy    MRI 7/2022 which showed No significant spinal stenosis or nerve root impingement or foraminal stenosis Severe degenerative disc disease with endplate degeneration and Modic changes involving L4-L5 and S1 vertebrae. Facet arthropathy at L4-5 L5-S1 level  He had  Bilateral Lumbar RFA at the transverse processes of  L4, L5 and sacral  ala  done 10/17/22 and reported 70-80% relief of pain and not ready to repeat  Pt had Intracept procedure L4,5 and S1 on 2/6/23. He reported 70% relief. He has completed #16/16 PT visits and reports some improvement in pain and feels it is helping with ROM posture and leg strength    Back Pain  This is a chronic problem. The current episode started more than 1 year ago. The problem occurs constantly. The problem is unchanged. The pain is present in the lumbar spine. The quality of the pain is described as aching. The pain radiates to the left foot, left knee, left thigh, right foot, right knee and right thigh. The pain is at a severity of 5/10. The pain is moderate. The symptoms are aggravated by lying down. Associated symptoms include numbness, tingling and weakness. Pertinent negatives include no bladder incontinence, bowel incontinence, chest pain or fever. He has tried heat, ice, bed rest and home exercises for the symptoms. Patient denies any new neurological symptoms. No bowel or bladder incontinence, no weakness, and no falling. Pill count: 7 oxycodone, due 6/22, appropriate    Morphine equivalent: 15    Periodic Controlled Substance Monitoring: Possible medication side effects, risk of tolerance/dependence & alternative treatments discussed., No signs of potential drug abuse or diversion identified., Obtaining appropriate analgesic effect of treatment.  Gunjan Blank, TACOS - CNP)      Past

## 2023-06-21 NOTE — TELEPHONE ENCOUNTER
Please Approve or Refuse.   Send to Pharmacy per Pt's Request:      Next Visit Date:  11/3/2021   Last Visit Date: 5/3/2021    Hemoglobin A1C (%)   Date Value   05/03/2021 7.0   02/01/2021 7.1   10/29/2020 6.4             ( goal A1C is < 7)   BP Readings from Last 3 Encounters:   05/03/21 122/74   02/01/21 116/64   10/29/20 110/70          (goal 120/80)  BUN   Date Value Ref Range Status   11/05/2020 25 (H) 8 - 23 mg/dL Final     CREATININE   Date Value Ref Range Status   11/05/2020 1.37 (H) 0.70 - 1.20 mg/dL Final     Potassium   Date Value Ref Range Status   11/05/2020 4.1 3.7 - 5.3 mmol/L Final
Yes

## 2023-06-25 DIAGNOSIS — G44.221 CHRONIC TENSION-TYPE HEADACHE, INTRACTABLE: ICD-10-CM

## 2023-06-26 RX ORDER — TOPIRAMATE 50 MG/1
TABLET, FILM COATED ORAL
Qty: 180 TABLET | Refills: 0 | Status: SHIPPED | OUTPATIENT
Start: 2023-06-26

## 2023-07-20 ENCOUNTER — OFFICE VISIT (OUTPATIENT)
Dept: PODIATRY | Age: 78
End: 2023-07-20

## 2023-07-20 ENCOUNTER — HOSPITAL ENCOUNTER (OUTPATIENT)
Dept: PAIN MANAGEMENT | Age: 78
Discharge: HOME | End: 2023-07-20
Payer: MEDICARE

## 2023-07-20 VITALS — SYSTOLIC BLOOD PRESSURE: 111 MMHG | HEART RATE: 68 BPM | OXYGEN SATURATION: 97 % | DIASTOLIC BLOOD PRESSURE: 65 MMHG

## 2023-07-20 VITALS — HEIGHT: 72 IN | BODY MASS INDEX: 30.48 KG/M2 | WEIGHT: 225 LBS

## 2023-07-20 DIAGNOSIS — E11.42 TYPE 2 DIABETES MELLITUS WITH DIABETIC POLYNEUROPATHY, WITH LONG-TERM CURRENT USE OF INSULIN (HCC): ICD-10-CM

## 2023-07-20 DIAGNOSIS — E11.621 TYPE 2 DIABETES MELLITUS WITH DIABETIC TOE ULCER (HCC): ICD-10-CM

## 2023-07-20 DIAGNOSIS — F11.20 OPIOID DEPENDENCE WITH CURRENT USE (HCC): ICD-10-CM

## 2023-07-20 DIAGNOSIS — L03.032 CELLULITIS OF FOURTH TOE, LEFT: ICD-10-CM

## 2023-07-20 DIAGNOSIS — M14.672 CHARCOT'S JOINT OF LEFT FOOT: ICD-10-CM

## 2023-07-20 DIAGNOSIS — Z79.4 TYPE 2 DIABETES MELLITUS WITH DIABETIC POLYNEUROPATHY, WITH LONG-TERM CURRENT USE OF INSULIN (HCC): ICD-10-CM

## 2023-07-20 DIAGNOSIS — E11.610 CHARCOT FOOT DUE TO DIABETES MELLITUS (HCC): ICD-10-CM

## 2023-07-20 DIAGNOSIS — B35.1 ONYCHOMYCOSIS: Primary | ICD-10-CM

## 2023-07-20 DIAGNOSIS — M47.817 LUMBOSACRAL SPONDYLOSIS WITHOUT MYELOPATHY: Primary | ICD-10-CM

## 2023-07-20 DIAGNOSIS — L97.509 TYPE 2 DIABETES MELLITUS WITH DIABETIC TOE ULCER (HCC): ICD-10-CM

## 2023-07-20 DIAGNOSIS — L97.521 ULCER OF TOE, LEFT, LIMITED TO BREAKDOWN OF SKIN (HCC): ICD-10-CM

## 2023-07-20 PROCEDURE — 99213 OFFICE O/P EST LOW 20 MIN: CPT

## 2023-07-20 RX ORDER — GABAPENTIN 800 MG/1
800 TABLET ORAL DAILY
Qty: 90 TABLET | Refills: 2 | Status: SHIPPED | OUTPATIENT
Start: 2023-07-20 | End: 2024-04-15

## 2023-07-20 RX ORDER — OXYCODONE HYDROCHLORIDE 5 MG/1
5 TABLET ORAL 2 TIMES DAILY PRN
Qty: 60 TABLET | Refills: 0 | Status: SHIPPED | OUTPATIENT
Start: 2023-07-20 | End: 2023-08-19

## 2023-07-20 RX ORDER — DOXYCYCLINE HYCLATE 100 MG/1
100 CAPSULE ORAL 2 TIMES DAILY
Qty: 28 CAPSULE | Refills: 0 | Status: SHIPPED | OUTPATIENT
Start: 2023-07-20 | End: 2023-08-03

## 2023-07-20 ASSESSMENT — ENCOUNTER SYMPTOMS
BACK PAIN: 1
GASTROINTESTINAL NEGATIVE: 1
DIARRHEA: 0
NAUSEA: 0
CONSTIPATION: 0
VOMITING: 0
RESPIRATORY NEGATIVE: 1
COLOR CHANGE: 0

## 2023-07-20 NOTE — PROGRESS NOTES
dated:04/18/2023    Hemoglobin A1C   Date Value Ref Range Status   05/16/2023 7.2 (H) % Final       Past Medical History, Past Surgical History, Social History, Allergies and Medications reviewed and updated in EPIC as indicated    Family History reviewed and is noncontributory.         Past Medical History:   Diagnosis Date    Abdominal pain     Arthritis     Benign prostatic hypertrophy     C. difficile colitis     CAD (coronary artery disease) 01/03/2012    s/p CABGx3    Colon polyp 02/25/2019    tubular adenoma    Constipation     Diabetes mellitus (HCC)     Diarrhea     Diarrhea     DVT (deep venous thrombosis) (HCC)     left calf    Ejection fraction < 50%     Gallstones     Heartburn     Hx of blood clots     Hyperlipidemia     Hypertension     Kidney stones     Lumbar spinal stenosis 04/21/2014    MI (myocardial infarction) ([facility])     2011    Mitral regurgitation     MRSA (methicillin resistant staph aureus) culture positive     Numbness and tingling     hands and feet    Rib fractures 01/2015    right    Wears glasses     readers        Past Surgical History:   Procedure Laterality Date    APPENDECTOMY      CARDIAC CATHETERIZATION  12/29/2011    CHOLECYSTECTOMY      COLONOSCOPY  01/11/2012    wnl, 10 yr recall    COLONOSCOPY  11/28/2018    ATTEMPTED NOT CLEAN (N/A )    COLONOSCOPY  02/25/2019    tubular adenoma    COLONOSCOPY N/A 02/25/2019    COLONOSCOPY WITH BIOPSY performed by Sterling Melara MD at [facility]      x 1    CORONARY ARTERY BYPASS GRAFT  01/03/2012    x3    ENDOSCOPY, COLON, DIAGNOSTIC      KNEE ARTHROSCOPY      left    KNEE SURGERY Left     I&D    OTHER SURGICAL HISTORY Left 03/07/2016    plantar plane &  exostectomy medial foot left    PAIN MANAGEMENT PROCEDURE N/A 2/6/2023    INTRACEPT PROCEDURE L4,5 AND S1 performed by Sharifa Hicks MD at [address] [facility] NOT [place] [address] IND N/A 11/28/2018    COLONOSCOPY ATTEMPTED NOT CLEAN performed by Edgard Parker

## 2023-07-20 NOTE — H&P (VIEW-ONLY)
dated:04/18/2023    Hemoglobin A1C   Date Value Ref Range Status   05/16/2023 7.2 (H) % Final       Past Medical History, Past Surgical History, Social History, Allergies and Medications reviewed and updated in EPIC as indicated    Family History reviewed and is noncontributory.         Past Medical History:   Diagnosis Date    Abdominal pain     Arthritis     Benign prostatic hypertrophy     C. difficile colitis     CAD (coronary artery disease) 01/03/2012    s/p CABGx3    Colon polyp 02/25/2019    tubular adenoma    Constipation     Diabetes mellitus (HCC)     Diarrhea     Diarrhea     DVT (deep venous thrombosis) (HCC)     left calf    Ejection fraction < 50%     Gallstones     Heartburn     Hx of blood clots     Hyperlipidemia     Hypertension     Kidney stones     Lumbar spinal stenosis 04/21/2014    MI (myocardial infarction) ([facility])     2011    Mitral regurgitation     MRSA (methicillin resistant staph aureus) culture positive     Numbness and tingling     hands and feet    Rib fractures 01/2015    right    Wears glasses     readers        Past Surgical History:   Procedure Laterality Date    APPENDECTOMY      CARDIAC CATHETERIZATION  12/29/2011    CHOLECYSTECTOMY      COLONOSCOPY  01/11/2012    wnl, 10 yr recall    COLONOSCOPY  11/28/2018    ATTEMPTED NOT CLEAN (N/A )    COLONOSCOPY  02/25/2019    tubular adenoma    COLONOSCOPY N/A 02/25/2019    COLONOSCOPY WITH BIOPSY performed by Alexis Carranza MD at [facility]      x 1    CORONARY ARTERY BYPASS GRAFT  01/03/2012    x3    ENDOSCOPY, COLON, DIAGNOSTIC      KNEE ARTHROSCOPY      left    KNEE SURGERY Left     I&D    OTHER SURGICAL HISTORY Left 03/07/2016    plantar plane &  exostectomy medial foot left    PAIN MANAGEMENT PROCEDURE N/A 2/6/2023    INTRACEPT PROCEDURE L4,5 AND S1 performed by Concha Gan MD at [address] [facility] [address] IND N/A 11/28/2018    COLONOSCOPY ATTEMPTED NOT CLEAN performed by Srini Amin

## 2023-07-20 NOTE — PROGRESS NOTES
[facility]  Return Patient  Chief Complaint   Patient presents with    Nail Problem     Nail trim/lasts aw Dr.Rayeesa Chávez 5/16/23      Diabetes     Blood sugar 110        Tori Gagnon is a 69y.o. year old male who is here for a diabetic foot check and nail trim. He would like his nails trimmed today. He is in need of new diabetic shoes. He is going through the process still. He has been having a lot of back pain. Has Charcot left foot. Has been stable. History of surgery type: Plantar planing medial and lateral foot. Patient denies N/V/F/C. Review of Systems   Constitutional:  Negative for chills, diaphoresis, fatigue, fever and unexpected weight change. Cardiovascular:  Negative for leg swelling. Gastrointestinal:  Negative for constipation, diarrhea, nausea and vomiting. Musculoskeletal:  Positive for gait problem. Negative for arthralgias and joint swelling. Skin:  Negative for color change, pallor, rash and wound. Neurological:  Positive for numbness. Negative for weakness. Vascular: DP and PT pulses palpable 2/4, Right Foot and 2/4 on the Left Foot. CFT <3 seconds, Right Foot and <3 seconds on the Left Foot. Edema is absent,  Right Foot and minimal on the Left Foot. Neurological:   Sensation absent  to light touch to level of digits, both feet. Musculoskeletal:   Muscle strength is 5/5 on the Right Foot and 5/5 on the Left Foot. Structural deformities are present on the Right Foot and present on the Left Foot, but improved  Flat medial arch left foot. Integument:  Warm, dry, supple both feet. I  Hyperkeratosis dorsal left 4th toe, PIPJ, with local erythema. There is a superficial ulceration present 2 mm x 1 mm x 1 mm, no exposed structure. Small amount of purulent drainage.        Sites of Onychomycosis Involvement (Check affected area)  [x] [x] [x] [x] [x] [x] [x] [x] [x] [x]  5 4 3 2 1 1 2 3 4 5                          Right

## 2023-07-24 ENCOUNTER — HOSPITAL ENCOUNTER (OUTPATIENT)
Dept: VASCULAR LAB | Age: 78
Discharge: HOME | End: 2023-07-26
Attending: PODIATRIST
Payer: MEDICARE

## 2023-07-24 DIAGNOSIS — L03.032 CELLULITIS OF FOURTH TOE, LEFT: ICD-10-CM

## 2023-07-24 DIAGNOSIS — L97.521 ULCER OF TOE, LEFT, LIMITED TO BREAKDOWN OF SKIN (HCC): ICD-10-CM

## 2023-07-24 DIAGNOSIS — E11.621 TYPE 2 DIABETES MELLITUS WITH DIABETIC TOE ULCER (HCC): ICD-10-CM

## 2023-07-24 DIAGNOSIS — L97.509 TYPE 2 DIABETES MELLITUS WITH DIABETIC TOE ULCER (HCC): ICD-10-CM

## 2023-07-24 LAB
VAS LEFT ABI: 1.25
VAS LEFT ARM BP: 108 MMHG
VAS LEFT CALF PRESSURE: 136 MMHG
VAS LEFT DORSALIS PEDIS BP: 142 MMHG
VAS LEFT LOW THIGH PRESSURE: 143 MMHG
VAS LEFT PTA BP: 131 MMHG
VAS LEFT TBI: 1.06
VAS LEFT TOE PRESSURE: 121 MMHG
VAS RIGHT ABI: 1.2
VAS RIGHT ARM BP: 114 MMHG
VAS RIGHT CALF PRESSURE: 136 MMHG
VAS RIGHT DORSALIS PEDIS BP: 137 MMHG
VAS RIGHT LOW THIGH PRESSURE: 180 MMHG
VAS RIGHT PTA BP: 128 MMHG
VAS RIGHT TBI: 1.09
VAS RIGHT TOE PRESSURE: 124 MMHG

## 2023-07-24 PROCEDURE — 93923 UPR/LXTR ART STDY 3+ LVLS: CPT

## 2023-07-26 DIAGNOSIS — R80.9 MICROALBUMINURIA: ICD-10-CM

## 2023-07-26 DIAGNOSIS — I10 ESSENTIAL HYPERTENSION: ICD-10-CM

## 2023-07-26 RX ORDER — LISINOPRIL 2.5 MG/1
TABLET ORAL
Qty: 90 TABLET | Refills: 3 | Status: SHIPPED | OUTPATIENT
Start: 2023-07-26

## 2023-08-02 RX ORDER — INSULIN GLARGINE 100 [IU]/ML
INJECTION, SOLUTION SUBCUTANEOUS
Qty: 45 ML | Refills: 0 | Status: SHIPPED | OUTPATIENT
Start: 2023-08-02

## 2023-08-02 NOTE — TELEPHONE ENCOUNTER
Please Approve or Refuse.   Send to Pharmacy per Pt's Request:      Next Visit Date:  9/20/2023   Last Visit Date: 5/16/2023    Hemoglobin A1C (%)   Date Value   05/16/2023 7.2 (H)   01/24/2023 7.5 (H)   05/10/2022 7.2             ( goal A1C is < 7)   BP Readings from Last 3 Encounters:   07/20/23 111/65   06/19/23 135/62   05/16/23 115/67          (goal 120/80)  BUN   Date Value Ref Range Status   06/15/2023 33 (H) 8 - 23 mg/dL Final     Creatinine   Date Value Ref Range Status   06/15/2023 1.63 (H) 0.70 - 1.20 mg/dL Final     Potassium   Date Value Ref Range Status   06/15/2023 4.7 3.7 - 5.3 mmol/L Final

## 2023-08-09 ENCOUNTER — HOSPITAL ENCOUNTER (OUTPATIENT)
Dept: PAIN MANAGEMENT | Facility: CLINIC | Age: 78
Discharge: HOME | End: 2023-08-09
Payer: MEDICARE

## 2023-08-09 VITALS
SYSTOLIC BLOOD PRESSURE: 112 MMHG | HEIGHT: 72 IN | RESPIRATION RATE: 15 BRPM | WEIGHT: 225 LBS | BODY MASS INDEX: 30.48 KG/M2 | TEMPERATURE: 97.2 F | OXYGEN SATURATION: 98 % | DIASTOLIC BLOOD PRESSURE: 71 MMHG | HEART RATE: 70 BPM

## 2023-08-09 DIAGNOSIS — R52 PAIN MANAGEMENT: ICD-10-CM

## 2023-08-09 DIAGNOSIS — M47.817 LUMBOSACRAL SPONDYLOSIS WITHOUT MYELOPATHY: Primary | ICD-10-CM

## 2023-08-09 LAB — METER GLUCOSE: 99 MG/DL (ref 75–110)

## 2023-08-09 PROCEDURE — 64635 DESTROY LUMB/SAC FACET JNT: CPT

## 2023-08-09 PROCEDURE — 82947 ASSAY GLUCOSE BLOOD QUANT: CPT

## 2023-08-09 PROCEDURE — 6360000002 HC RX W HCPCS: Performed by: ANESTHESIOLOGY

## 2023-08-09 PROCEDURE — 64636 DESTROY L/S FACET JNT ADDL: CPT

## 2023-08-09 PROCEDURE — 2500000003 HC RX 250 WO HCPCS: Performed by: ANESTHESIOLOGY

## 2023-08-09 RX ORDER — LIDOCAINE HYDROCHLORIDE 40 MG/ML
INJECTION, SOLUTION RETROBULBAR; TOPICAL
Status: COMPLETED | OUTPATIENT
Start: 2023-08-09 | End: 2023-08-09

## 2023-08-09 RX ORDER — MIDAZOLAM HYDROCHLORIDE 2 MG/2ML
INJECTION, SOLUTION INTRAMUSCULAR; INTRAVENOUS
Status: COMPLETED | OUTPATIENT
Start: 2023-08-09 | End: 2023-08-09

## 2023-08-09 RX ORDER — LIDOCAINE HYDROCHLORIDE 10 MG/ML
INJECTION, SOLUTION EPIDURAL; INFILTRATION; INTRACAUDAL; PERINEURAL
Status: COMPLETED | OUTPATIENT
Start: 2023-08-09 | End: 2023-08-09

## 2023-08-09 RX ORDER — FENTANYL CITRATE 50 UG/ML
INJECTION, SOLUTION INTRAMUSCULAR; INTRAVENOUS
Status: COMPLETED | OUTPATIENT
Start: 2023-08-09 | End: 2023-08-09

## 2023-08-09 RX ADMIN — LIDOCAINE HYDROCHLORIDE 5 ML: 10 INJECTION, SOLUTION EPIDURAL; INFILTRATION; INTRACAUDAL at 09:44

## 2023-08-09 RX ADMIN — LIDOCAINE HYDROCHLORIDE 5 ML: 10 INJECTION, SOLUTION EPIDURAL; INFILTRATION; INTRACAUDAL at 09:31

## 2023-08-09 RX ADMIN — MIDAZOLAM HYDROCHLORIDE 1 MG: 1 INJECTION, SOLUTION INTRAMUSCULAR; INTRAVENOUS at 09:31

## 2023-08-09 RX ADMIN — LIDOCAINE HYDROCHLORIDE 5 ML: 40 SOLUTION RETROBULBAR; TOPICAL at 09:37

## 2023-08-09 RX ADMIN — FENTANYL CITRATE 50 MCG: 50 INJECTION, SOLUTION INTRAMUSCULAR; INTRAVENOUS at 09:31

## 2023-08-09 ASSESSMENT — PAIN DESCRIPTION - DESCRIPTORS: DESCRIPTORS: ACHING

## 2023-08-09 ASSESSMENT — PAIN - FUNCTIONAL ASSESSMENT: PAIN_FUNCTIONAL_ASSESSMENT: 0-10

## 2023-08-09 NOTE — OP NOTE
Patient's vital signs and neurological status remained stable throughout the procedure and post procedural period. The patient tolerated the procedure well and was discharged home in stable condition. SEDATION NOTE:    ASA CLASSIFICATION  2  MP   CLASSIFICATION  2    Moderate intravenous conscious sedation was supervised by Dr. Lauro Croft  The patient was independently monitored by a Registered Nurse assigned to the Procedure Room  Monitoring included automated blood pressure, continuous EKG, Capnography and continuous pulse oximetry. The detailed Conscious Record is permanently stored in the [address] [place] [address].      The following is the conscious sedation record;  Start Time:  0928  End times:  0952  Duration:  24 MINUTES  MEDS GIVEN 1 MG VERSED AND 50 MCG FENTANYL

## 2023-08-09 NOTE — INTERVAL H&P NOTE
Update History & Physical    The patient's History and Physical of July 20, 2023 was reviewed with the patient and I examined the patient. There was no change. The surgical site was confirmed by the patient and me. Plan: The risks, benefits, expected outcome, and alternative to the recommended procedure have been discussed with the patient. Patient understands and wants to proceed with the procedure.      \ASA 2  MP 2    Electronically signed by Cory Magdaleno MD on 8/9/2023 at 9:27 AM

## 2023-08-09 NOTE — DISCHARGE INSTRUCTIONS
Discharge Instructions following Sedation or Anesthesia:  You have  received  a sedative/anesthetic therefore, you should not consume any alcoholic beverages for minimum of 12 hours. Do not drive or operate machinery for 24 hours. Do not sign legal documents for 24 hours. Dizziness, drowsiness, and unsteadiness may occur. Rest when need to. Increase diet as tolerated. Keep up on fluids if diet allows. General Instructions:  Do not take a tub bath for 72 hours after procedure (this includes hot tubs and swimming pools). You may shower, but avoid hot water to injection site. Avoid strenuous activity TODAY especially if you experience dizziness. Remove band-aid the next day. Wash off any residual iodine   Do not use heat, heating pad, or any other heating device over the injection site for 3 days after the procedure. If you experience pain after your procedure, you may continue with your current pain medication as prescribed. (DO NOT INCREASE YOUR PAIN MEDICATION WITHOUT TALKING TO DOCTOR)  Soreness and pain at injection site is common, may use ice to reduce soreness.     Call Ciara at [phone] if you experience:   Fever, chills or temperature over 100    Vomiting, Headache, persistent stiff neck, nausea, blurred vision   Difficulty in urinating or unable to urinate with 8 hours   Increase in weakness, numbness or loss of function   Increased redness, swelling or drainage at the injection site 14-Jul-2021 07:30

## 2023-08-14 ENCOUNTER — HOSPITAL ENCOUNTER (OUTPATIENT)
Dept: PAIN MANAGEMENT | Age: 78
Discharge: HOME | End: 2023-08-14
Payer: MEDICARE

## 2023-08-14 VITALS
HEART RATE: 80 BPM | BODY MASS INDEX: 30.48 KG/M2 | WEIGHT: 225 LBS | DIASTOLIC BLOOD PRESSURE: 53 MMHG | HEIGHT: 72 IN | SYSTOLIC BLOOD PRESSURE: 104 MMHG | OXYGEN SATURATION: 94 %

## 2023-08-14 DIAGNOSIS — Z79.891 CHRONIC USE OF OPIATE DRUG FOR THERAPEUTIC PURPOSE: Chronic | ICD-10-CM

## 2023-08-14 DIAGNOSIS — M47.817 LUMBOSACRAL SPONDYLOSIS WITHOUT MYELOPATHY: ICD-10-CM

## 2023-08-14 DIAGNOSIS — M51.36 DDD (DEGENERATIVE DISC DISEASE), LUMBAR: ICD-10-CM

## 2023-08-14 DIAGNOSIS — M50.30 DDD (DEGENERATIVE DISC DISEASE), CERVICAL: ICD-10-CM

## 2023-08-14 DIAGNOSIS — M47.26 OSTEOARTHRITIS OF SPINE WITH RADICULOPATHY, LUMBAR REGION: Primary | ICD-10-CM

## 2023-08-14 DIAGNOSIS — M48.061 SPINAL STENOSIS OF LUMBAR REGION, UNSPECIFIED WHETHER NEUROGENIC CLAUDICATION PRESENT: ICD-10-CM

## 2023-08-14 PROCEDURE — 99213 OFFICE O/P EST LOW 20 MIN: CPT | Performed by: NURSE PRACTITIONER

## 2023-08-14 PROCEDURE — 99213 OFFICE O/P EST LOW 20 MIN: CPT

## 2023-08-14 RX ORDER — OXYCODONE HYDROCHLORIDE 5 MG/1
5 TABLET ORAL 2 TIMES DAILY PRN
Qty: 60 TABLET | Refills: 0 | Status: SHIPPED | OUTPATIENT
Start: 2023-08-19 | End: 2023-09-18

## 2023-08-14 RX ORDER — INSULIN GLARGINE 100 [IU]/ML
INJECTION, SOLUTION SUBCUTANEOUS
Qty: 45 ML | Refills: 0 | Status: SHIPPED | OUTPATIENT
Start: 2023-08-14

## 2023-08-14 ASSESSMENT — PAIN DESCRIPTION - LOCATION: LOCATION: BACK

## 2023-08-14 ASSESSMENT — PAIN DESCRIPTION - FREQUENCY: FREQUENCY: CONTINUOUS

## 2023-08-14 ASSESSMENT — ENCOUNTER SYMPTOMS
BOWEL INCONTINENCE: 0
CONSTIPATION: 0
SHORTNESS OF BREATH: 0
COUGH: 0
BACK PAIN: 1

## 2023-08-14 ASSESSMENT — PAIN DESCRIPTION - DESCRIPTORS: DESCRIPTORS: ACHING

## 2023-08-14 ASSESSMENT — PAIN SCALES - GENERAL: PAINLEVEL_OUTOF10: 2

## 2023-08-14 ASSESSMENT — PAIN DESCRIPTION - PAIN TYPE: TYPE: CHRONIC PAIN

## 2023-08-14 NOTE — PROGRESS NOTES
as prescribed, denies obtaining medications from different sources and denies use of illegal drugs. Medication risk and benefits have been discussed. Patient denies side effects from medications like nausea, vomiting, constipation or drowsiness. Patient reports current activities of daily living are possible due to medications and would like to continue them. As always, we encourage daily stretching and strengthening exercises, and recommend minimizing use of pain medications unless patient cannot get through daily activities due to pain. Due to the high risk nature of this patient's pain medication close monitoring is required. Continue current medication management, pt has been stable and compliant. Script written for oxycodone  Pt would like to pursue SCS - will notify Dr. Lindsay Patterson to begin process of scheduling trial  Follow up appointment made for 4 weeks    I have reviewed the chief complaint and history of present illness (including ROS and PFSH) and vital documentation by my staff and I agree with their documentation and have added where applicable.

## 2023-08-14 NOTE — H&P (VIEW-ONLY)
Chief Complaint   Patient presents with    Back Pain     PMH   Pt c/o low back pain radiating down his legs with numbness in feet d/t neuropathy    MRI 7/2022 which showed No significant spinal stenosis or nerve root impingement or foraminal stenosis Severe degenerative disc disease with endplate degeneration and Modic changes involving L4-L5 and S1 vertebrae. Facet arthropathy at L4-5 L5-S1 level  Pt had Intracept procedure L4,5 and S1 on 2/6/23. He reported 70% relief. He has completed #16/16 PT visits and reports some improvement in pain and feels it is helping with ROM posture and leg strength  He had  Bilateral Radiofrequency ablation of median branches at the Transverse processes of L4 / L5 AND S1 for L4/5 AND L5/S1 facet joints 8/9/23 and reports 100% relief of back pain. He continues to have painful neuropathy in his feet. He has discussed SCS with Dr. Eugene Sterling and would like to schedule the trial.       Back Pain  This is a chronic problem. The current episode started more than 1 year ago. The problem occurs constantly. The problem is unchanged. The pain is present in the lumbar spine. The quality of the pain is described as aching. The pain radiates to the left foot, left thigh, left knee, right knee, right foot and right thigh. The pain is at a severity of 2/10. The pain is mild. The pain is Worse during the night. The symptoms are aggravated by lying down. Stiffness is present All day. Associated symptoms include numbness, paresthesias, tingling and weakness. Pertinent negatives include no bladder incontinence, bowel incontinence, chest pain or fever. He has tried bed rest, home exercises, heat, ice and walking for the symptoms. Patient denies any new neurological symptoms. No bowel or bladder incontinence, no weakness, and no falling.     Pill count: 9 tabs / appropriate - due 8/19    Morphine equivalent: 15    Controlled Substance Monitoring:    Acute and Chronic Pain Monitoring:   RX

## 2023-08-14 NOTE — TELEPHONE ENCOUNTER
Please Approve or Refuse.   Send to Pharmacy per Pt's Request:      Next Visit Date:  9/20/2023   Last Visit Date: 5/16/2023    Hemoglobin A1C (%)   Date Value   05/16/2023 7.2 (H)   01/24/2023 7.5 (H)   05/10/2022 7.2             ( goal A1C is < 7)   BP Readings from Last 3 Encounters:   08/09/23 112/71   07/20/23 111/65   06/19/23 135/62          (goal 120/80)  BUN   Date Value Ref Range Status   06/15/2023 33 (H) 8 - 23 mg/dL Final     Creatinine   Date Value Ref Range Status   06/15/2023 1.63 (H) 0.70 - 1.20 mg/dL Final     Potassium   Date Value Ref Range Status   06/15/2023 4.7 3.7 - 5.3 mmol/L Final

## 2023-08-15 DIAGNOSIS — E11.40 PAINFUL DIABETIC NEUROPATHY (HCC): Primary | ICD-10-CM

## 2023-09-13 ENCOUNTER — HOSPITAL ENCOUNTER (OUTPATIENT)
Dept: PAIN MANAGEMENT | Facility: CLINIC | Age: 78
Discharge: HOME | End: 2023-09-13
Payer: MEDICARE

## 2023-09-13 VITALS
HEART RATE: 73 BPM | RESPIRATION RATE: 14 BRPM | BODY MASS INDEX: 30.48 KG/M2 | TEMPERATURE: 97.5 F | HEIGHT: 72 IN | SYSTOLIC BLOOD PRESSURE: 118 MMHG | WEIGHT: 225 LBS | OXYGEN SATURATION: 95 % | DIASTOLIC BLOOD PRESSURE: 68 MMHG

## 2023-09-13 DIAGNOSIS — R52 PAIN MANAGEMENT: ICD-10-CM

## 2023-09-13 DIAGNOSIS — E11.40 PAINFUL DIABETIC NEUROPATHY (HCC): Primary | ICD-10-CM

## 2023-09-13 LAB — GLUCOSE BLD-MCNC: 108 MG/DL (ref 75–110)

## 2023-09-13 PROCEDURE — 63650 IMPLANT NEUROELECTRODES: CPT

## 2023-09-13 PROCEDURE — 2580000003 HC RX 258: Performed by: ANESTHESIOLOGY

## 2023-09-13 PROCEDURE — 6360000002 HC RX W HCPCS: Performed by: ANESTHESIOLOGY

## 2023-09-13 PROCEDURE — 2500000003 HC RX 250 WO HCPCS: Performed by: ANESTHESIOLOGY

## 2023-09-13 PROCEDURE — C1897 LEAD, NEUROSTIM TEST KIT: HCPCS

## 2023-09-13 PROCEDURE — 82947 ASSAY GLUCOSE BLOOD QUANT: CPT

## 2023-09-13 RX ORDER — BUPIVACAINE HYDROCHLORIDE AND EPINEPHRINE 5; 5 MG/ML; UG/ML
INJECTION, SOLUTION EPIDURAL; INTRACAUDAL; PERINEURAL
Status: COMPLETED | OUTPATIENT
Start: 2023-09-13 | End: 2023-09-13

## 2023-09-13 RX ORDER — 0.9 % SODIUM CHLORIDE 0.9 %
INTRAVENOUS SOLUTION INTRAVENOUS CONTINUOUS PRN
Status: COMPLETED | OUTPATIENT
Start: 2023-09-13 | End: 2023-09-13

## 2023-09-13 RX ORDER — MIDAZOLAM HYDROCHLORIDE 2 MG/2ML
INJECTION, SOLUTION INTRAMUSCULAR; INTRAVENOUS
Status: COMPLETED | OUTPATIENT
Start: 2023-09-13 | End: 2023-09-13

## 2023-09-13 RX ORDER — FENTANYL CITRATE 50 UG/ML
INJECTION, SOLUTION INTRAMUSCULAR; INTRAVENOUS
Status: COMPLETED | OUTPATIENT
Start: 2023-09-13 | End: 2023-09-13

## 2023-09-13 RX ADMIN — FENTANYL CITRATE 50 MCG: 50 INJECTION, SOLUTION INTRAMUSCULAR; INTRAVENOUS at 16:38

## 2023-09-13 RX ADMIN — BUPIVACAINE HYDROCHLORIDE AND EPINEPHRINE BITARTRATE 15 ML: 5; .005 INJECTION, SOLUTION EPIDURAL; INTRACAUDAL; PERINEURAL at 16:41

## 2023-09-13 RX ADMIN — Medication 2000 MG: at 16:37

## 2023-09-13 RX ADMIN — SODIUM CHLORIDE 500 ML: 0.9 INJECTION, SOLUTION INTRAVENOUS at 16:37

## 2023-09-13 RX ADMIN — MIDAZOLAM HYDROCHLORIDE 1 MG: 1 INJECTION, SOLUTION INTRAMUSCULAR; INTRAVENOUS at 16:38

## 2023-09-13 ASSESSMENT — PAIN - FUNCTIONAL ASSESSMENT: PAIN_FUNCTIONAL_ASSESSMENT: 0-10

## 2023-09-13 ASSESSMENT — PAIN DESCRIPTION - DESCRIPTORS: DESCRIPTORS: PINS AND NEEDLES

## 2023-09-13 NOTE — INTERVAL H&P NOTE
Update History & Physical    The patient's History and Physical of August 14, 2023 was reviewed with the patient and I examined the patient. There was no change. The surgical site was confirmed by the patient and me. Plan: The risks, benefits, expected outcome, and alternative to the recommended procedure have been discussed with the patient. Patient understands and wants to proceed with the procedure.      ASA 3  MP 3    Electronically signed by Halima Talley MD on 9/13/2023 at 4:31 PM

## 2023-09-13 NOTE — DISCHARGE INSTRUCTIONS
Discharge Instructions following Sedation or Anesthesia:  You have  received  a sedative/anesthetic therefore, you should not consume any alcoholic beverages for minimum of 12 hours. Do not drive or operate machinery until after follow up with doctor. Do not sign legal documents for 24 hours. Dizziness, drowsiness, and unsteadiness may occur. Rest when need to. Increase diet as tolerated. Keep up on fluids if diet allows. General Instructions:  Avoid any twisting, lifting, bending or exaggerated spine movement  Keep the area of the procedure clean and dry  Do not shower or bathe, sponge baths only, until after follow up. Wear abdominal binder as much as possible to support your spine and limit the movement of your spine (if you were provided with one)  Do not use heat, heating pad, or any other heating device over the injection site for 3 days after the procedure. If you experience pain after your procedure, you may continue with your current pain medication as prescribed. (DO NOT INCREASE YOUR PAIN MEDICATION WITHOUT TALKING TO DOCTOR)  Soreness and pain at injection site is common, may use ice to reduce soreness. Call Ciara at [phone] if you experience:   Fever, chills or temperature over 100    Vomiting, Headache, persistent stiff neck, nausea, blurred vision   Difficulty in urinating or unable to urinate with 8 hours   Increase in weakness, numbness or loss of function   Increased redness, swelling or drainage at the injection site   Monitor for any signs of infection, such as, redness, swelling, green or yellow foul smelling drainage at injection site. Increased pain or fever.

## 2023-09-13 NOTE — OP NOTE
PREOPERATIVE DIAGNOSES:   1. Painful diabetic neuropathy (HCC)        POSTOPERATIVE DIAGNOSES:   1. Painful diabetic neuropathy (HCC)          PROCEDURE PERFORMED:  1. Placement of Thoracic Dorsal epidural leads via percuatneous lumbar entry to L2/3 level x 2   2. Complex programming of the stimulator device with Tonic paresthesia and Non paresthesia parameters. ANESTHESIA:  IV sedation with versed and fentanyl    BLOOD LOSS:  Minimal    INTRAVENOUS FLUID:  Recorded in the anesthesia chart. ANTIBIOTICS:  ANCEF 2 GM IV    OPERATIVE NOTE:    The patient was seen in the preoperative area. Chart was reviewed. Informed  consent was obtained. The patient was taken to the procedure room and was placed in prone position. All pressure points were padded. The area over the thoracic and lumbar spine was prepped with Betadine and DuraPrep. Complete sterile technique for prep and drape was utilized. A timeout was completed prior to the start of the procedure. Antibiotic was administered prior to the start of the procedure. AP view was taken and L4 pedicles were marked on each side. Then, a 14-gauge Tuohy  epidural needle was advanced from right  pedicle in a paramedian fashion to cotact the lamina at L3 level. Epidural space entry was at L2/3 Interspace. Epidural space was identified with loss of resistance to air. Position was confirmed  in lateral view. Then, a 8 contact Nevro SCS leadwas advanced with live fluoroscopy in AP view to the superior endplate of T8 vertebral body. Position was confirmed in lateral view to be in the dorsal epidural space. Similarly, a 2nd lead was placed from the left side  into the epidural space  just to the left of the midline using the same technique. Again, position was confirmed in lateral view fluoroscopy to be in the dorsal epidural space to mid T9 level. The leads were then tested for impedance which was wnl.     Stimulator testing was then performed and leads

## 2023-09-18 SDOH — HEALTH STABILITY: PHYSICAL HEALTH: ON AVERAGE, HOW MANY DAYS PER WEEK DO YOU ENGAGE IN MODERATE TO STRENUOUS EXERCISE (LIKE A BRISK WALK)?: 0 DAYS

## 2023-09-18 ASSESSMENT — PATIENT HEALTH QUESTIONNAIRE - PHQ9
SUM OF ALL RESPONSES TO PHQ9 QUESTIONS 1 & 2: 1
SUM OF ALL RESPONSES TO PHQ QUESTIONS 1-9: 1
2. FEELING DOWN, DEPRESSED OR HOPELESS: 0
SUM OF ALL RESPONSES TO PHQ QUESTIONS 1-9: 1
1. LITTLE INTEREST OR PLEASURE IN DOING THINGS: 1
SUM OF ALL RESPONSES TO PHQ QUESTIONS 1-9: 1
SUM OF ALL RESPONSES TO PHQ QUESTIONS 1-9: 1

## 2023-09-18 ASSESSMENT — LIFESTYLE VARIABLES
HOW MANY STANDARD DRINKS CONTAINING ALCOHOL DO YOU HAVE ON A TYPICAL DAY: 0
HOW OFTEN DO YOU HAVE A DRINK CONTAINING ALCOHOL: NEVER
HOW MANY STANDARD DRINKS CONTAINING ALCOHOL DO YOU HAVE ON A TYPICAL DAY: PATIENT DOES NOT DRINK
HOW OFTEN DO YOU HAVE SIX OR MORE DRINKS ON ONE OCCASION: 1
HOW OFTEN DO YOU HAVE A DRINK CONTAINING ALCOHOL: 1

## 2023-09-19 ENCOUNTER — HOSPITAL ENCOUNTER (OUTPATIENT)
Dept: PAIN MANAGEMENT | Age: 78
Discharge: HOME | End: 2023-09-19
Payer: MEDICARE

## 2023-09-19 ENCOUNTER — HOSPITAL ENCOUNTER (OUTPATIENT)
Age: 78
Discharge: HOME | End: 2023-09-21

## 2023-09-19 ENCOUNTER — HOSPITAL ENCOUNTER (OUTPATIENT)
Dept: GENERAL RADIOLOGY | Age: 78
Discharge: HOME | End: 2023-09-21
Payer: MEDICARE

## 2023-09-19 DIAGNOSIS — E11.40 PAINFUL DIABETIC NEUROPATHY (HCC): ICD-10-CM

## 2023-09-19 DIAGNOSIS — M47.817 LUMBOSACRAL SPONDYLOSIS WITHOUT MYELOPATHY: ICD-10-CM

## 2023-09-19 DIAGNOSIS — M51.36 DDD (DEGENERATIVE DISC DISEASE), LUMBAR: Primary | ICD-10-CM

## 2023-09-19 PROCEDURE — 99213 OFFICE O/P EST LOW 20 MIN: CPT

## 2023-09-19 PROCEDURE — 73521 X-RAY EXAM HIPS BI 2 VIEWS: CPT

## 2023-09-19 RX ORDER — OXYCODONE HYDROCHLORIDE 5 MG/1
5 TABLET ORAL 2 TIMES DAILY PRN
Qty: 60 TABLET | Refills: 0 | Status: SHIPPED | OUTPATIENT
Start: 2023-09-19 | End: 2023-10-19

## 2023-09-19 NOTE — PROGRESS NOTES
Patient started spinal cord stimulator trial 9/13/23  Today here for a follow-up and for lead pull     Patient reports significant improvement in his pain with the use of a spinal cord stimulator  Nevro rep at beside to review programs. Pt is very happy and satisfied with the outcome  Denies any side effects  No history of fever or chills  Reports significant improvement in the quality of life     SCS leads pulled without any resistance  Procedure site and inspected  No erythema swelling or discharge  Sterile dressing applied. Pt able to ambulate from room without difficulty.  Pt tolerated procedure and is neurologically intact    Pt would like to schedule SCS implant

## 2023-09-20 ENCOUNTER — OFFICE VISIT (OUTPATIENT)
Dept: FAMILY MEDICINE CLINIC | Age: 78
End: 2023-09-20
Payer: MEDICARE

## 2023-09-20 VITALS
WEIGHT: 232 LBS | HEART RATE: 74 BPM | SYSTOLIC BLOOD PRESSURE: 120 MMHG | OXYGEN SATURATION: 98 % | BODY MASS INDEX: 31.46 KG/M2 | DIASTOLIC BLOOD PRESSURE: 80 MMHG

## 2023-09-20 DIAGNOSIS — I50.42 CHRONIC COMBINED SYSTOLIC AND DIASTOLIC CONGESTIVE HEART FAILURE (HCC): ICD-10-CM

## 2023-09-20 DIAGNOSIS — Z79.4 TYPE 2 DIABETES MELLITUS WITH DIABETIC POLYNEUROPATHY, WITH LONG-TERM CURRENT USE OF INSULIN (HCC): ICD-10-CM

## 2023-09-20 DIAGNOSIS — N18.31 STAGE 3A CHRONIC KIDNEY DISEASE (HCC): ICD-10-CM

## 2023-09-20 DIAGNOSIS — Z13.6 SCREENING FOR CARDIOVASCULAR CONDITION: ICD-10-CM

## 2023-09-20 DIAGNOSIS — Z00.00 MEDICARE ANNUAL WELLNESS VISIT, SUBSEQUENT: Primary | ICD-10-CM

## 2023-09-20 DIAGNOSIS — E11.42 TYPE 2 DIABETES MELLITUS WITH DIABETIC POLYNEUROPATHY, WITH LONG-TERM CURRENT USE OF INSULIN (HCC): ICD-10-CM

## 2023-09-20 LAB — HBA1C MFR BLD: 6.4 %

## 2023-09-20 PROCEDURE — G8417 CALC BMI ABV UP PARAM F/U: HCPCS | Performed by: FAMILY MEDICINE

## 2023-09-20 PROCEDURE — G0446 INTENS BEHAVE THER CARDIO DX: HCPCS | Performed by: FAMILY MEDICINE

## 2023-09-20 PROCEDURE — 1036F TOBACCO NON-USER: CPT | Performed by: FAMILY MEDICINE

## 2023-09-20 PROCEDURE — 99213 OFFICE O/P EST LOW 20 MIN: CPT | Performed by: FAMILY MEDICINE

## 2023-09-20 PROCEDURE — G0439 PPPS, SUBSEQ VISIT: HCPCS | Performed by: FAMILY MEDICINE

## 2023-09-20 PROCEDURE — 3074F SYST BP LT 130 MM HG: CPT | Performed by: FAMILY MEDICINE

## 2023-09-20 PROCEDURE — 3044F HG A1C LEVEL LT 7.0%: CPT | Performed by: FAMILY MEDICINE

## 2023-09-20 PROCEDURE — 83036 HEMOGLOBIN GLYCOSYLATED A1C: CPT | Performed by: FAMILY MEDICINE

## 2023-09-20 PROCEDURE — 1123F ACP DISCUSS/DSCN MKR DOCD: CPT | Performed by: FAMILY MEDICINE

## 2023-09-20 PROCEDURE — G8427 DOCREV CUR MEDS BY ELIG CLIN: HCPCS | Performed by: FAMILY MEDICINE

## 2023-09-20 PROCEDURE — 3079F DIAST BP 80-89 MM HG: CPT | Performed by: FAMILY MEDICINE

## 2023-09-20 ASSESSMENT — ENCOUNTER SYMPTOMS
NAUSEA: 0
BACK PAIN: 1
TROUBLE SWALLOWING: 0
EYE REDNESS: 0
SORE THROAT: 0
COUGH: 0
RECTAL PAIN: 0
CHEST TIGHTNESS: 0
DIARRHEA: 0
CONSTIPATION: 0
VOMITING: 0
BLOOD IN STOOL: 0
COLOR CHANGE: 0
SINUS PRESSURE: 0
RHINORRHEA: 0
ABDOMINAL PAIN: 0
WHEEZING: 0
ABDOMINAL DISTENTION: 0
STRIDOR: 0
SHORTNESS OF BREATH: 0

## 2023-09-20 NOTE — PATIENT INSTRUCTIONS
disclaims any warranty or liability for your use of this information. Personalized Preventive Plan for Myles Strickland - 9/20/2023  Medicare offers a range of preventive health benefits. Some of the tests and screenings are paid in full while other may be subject to a deductible, co-insurance, and/or copay. Some of these benefits include a comprehensive review of your medical history including lifestyle, illnesses that may run in your family, and various assessments and screenings as appropriate. After reviewing your medical record and screening and assessments performed today your provider may have ordered immunizations, labs, imaging, and/or referrals for you. A list of these orders (if applicable) as well as your Preventive Care list are included within your After Visit Summary for your review. Other Preventive Recommendations:    A preventive eye exam performed by an eye specialist is recommended every 1-2 years to screen for glaucoma; cataracts, macular degeneration, and other eye disorders. A preventive dental visit is recommended every 6 months. Try to get at least 150 minutes of exercise per week or 10,000 steps per day on a pedometer . Order or download the FREE \"Exercise & Physical Activity: Your Everyday Guide\" from [employer]. Call 2-791.622.2140 or search [employer] online. You need 3885-4255 mg of calcium and 5631-2091 IU of vitamin D per day. It is possible to meet your calcium requirement with diet alone, but a vitamin D supplement is usually necessary to meet this goal.  When exposed to the sun, use a sunscreen that protects against both UVA and UVB radiation with an SPF of 30 or greater. Reapply every 2 to 3 hours or after sweating, drying off with a towel, or swimming. Always wear a seat belt when traveling in a car. Always wear a helmet when riding a bicycle or motorcycle.

## 2023-09-20 NOTE — PROGRESS NOTES
Prediabetic)  Discontinued    Diabetic Alb to Cr ratio (uACR) test  Discontinued    Diabetic retinal exam  Discontinued    Colorectal Cancer Screen  Discontinued    AAA screen  Discontinued
hernia is present. Musculoskeletal:      Cervical back: Decreased range of motion. Thoracic back: Spasms present. No tenderness. Decreased range of motion. Lumbar back: Spasms and tenderness present. Decreased range of motion. Positive right straight leg raise test.      Right lower leg: No edema. Left lower leg: No edema. Neurological:      Mental Status: He is alert and oriented to person, place, and time. Cranial Nerves: No cranial nerve deficit. Sensory: Sensory deficit present. Coordination: Romberg sign negative. Coordination normal.      Gait: Gait abnormal. Tandem walk normal.      Deep Tendon Reflexes: Reflexes are normal and symmetric. Comments: Uses cane or walker for walking. Psychiatric:         Thought Content: Thought content normal.              Allergies   Allergen Reactions    Flagyl [Metronidazole] Hives    Metronidazole      Other reaction(s): Vomiting     Prior to Visit Medications    Medication Sig Taking? Authorizing Provider   oxyCODONE (ROXICODONE) 5 MG immediate release tablet Take 1 tablet by mouth 2 times daily as needed for Pain for up to 30 days. Yes Sylvesetr Sloan APRN - CNP   LANTUS SOLOSTAR 100 UNIT/ML injection pen inject 35 units subcutaneously in the morning and 40 units in the evening Yes Arnulfo Dong MD   lisinopril (PRINIVIL;ZESTRIL) 2.5 MG tablet TAKE 1 TABLET BY MOUTH EVERY DAY Yes Arnulfo Dong MD   gabapentin (NEURONTIN) 800 MG tablet Take 1 tablet by mouth daily for 270 days.  Yes Juan Romero MD   topiramate (TOPAMAX) 50 MG tablet TAKE 1 TABLET BY MOUTH IN THE MORNING AND AT BEDTIME Yes Arnulfo Dong MD   furosemide (LASIX) 40 MG tablet Take 1 tablet by mouth 2 times daily Yes Miguel Scherer MD   BD PEN NEEDLE ALIZE 2ND GEN 32G X 4 MM MISC USE AS DIRECTED TWICE DAILY Yes Miguel Scherer MD   Handicap Placard MISC by Does not apply route Expires in 5 years from date of issue Yes Nathaniel Willis, APRN - CNP

## 2023-09-26 DIAGNOSIS — G44.221 CHRONIC TENSION-TYPE HEADACHE, INTRACTABLE: ICD-10-CM

## 2023-09-26 RX ORDER — TOPIRAMATE 50 MG/1
TABLET, FILM COATED ORAL
Qty: 180 TABLET | Refills: 0 | Status: SHIPPED | OUTPATIENT
Start: 2023-09-26

## 2023-09-26 NOTE — TELEPHONE ENCOUNTER
Please Approve or Refuse.   Send to Pharmacy per Pt's Request:      Next Visit Date:  12/20/2023   Last Visit Date: 9/20/2023    Hemoglobin A1C (%)   Date Value   09/20/2023 6.4   05/16/2023 7.2 (H)   01/24/2023 7.5 (H)             ( goal A1C is < 7)   BP Readings from Last 3 Encounters:   09/20/23 120/80   09/13/23 118/68   08/14/23 (!) 104/53          (goal 120/80)  BUN   Date Value Ref Range Status   06/15/2023 33 (H) 8 - 23 mg/dL Final     Creatinine   Date Value Ref Range Status   06/15/2023 1.63 (H) 0.70 - 1.20 mg/dL Final     Potassium   Date Value Ref Range Status   06/15/2023 4.7 3.7 - 5.3 mmol/L Final

## 2023-09-28 ENCOUNTER — OFFICE VISIT (OUTPATIENT)
Dept: PODIATRY | Age: 78
End: 2023-09-28
Payer: MEDICARE

## 2023-09-28 VITALS — BODY MASS INDEX: 30.48 KG/M2 | WEIGHT: 225 LBS | HEIGHT: 72 IN

## 2023-09-28 DIAGNOSIS — E11.42 TYPE 2 DIABETES MELLITUS WITH DIABETIC POLYNEUROPATHY, WITH LONG-TERM CURRENT USE OF INSULIN (HCC): ICD-10-CM

## 2023-09-28 DIAGNOSIS — B35.1 ONYCHOMYCOSIS: Primary | ICD-10-CM

## 2023-09-28 DIAGNOSIS — Z79.4 TYPE 2 DIABETES MELLITUS WITH DIABETIC POLYNEUROPATHY, WITH LONG-TERM CURRENT USE OF INSULIN (HCC): ICD-10-CM

## 2023-09-28 PROCEDURE — 11721 DEBRIDE NAIL 6 OR MORE: CPT | Performed by: PODIATRIST

## 2023-09-28 PROCEDURE — 99999 PR OFFICE/OUTPT VISIT,PROCEDURE ONLY: CPT | Performed by: PODIATRIST

## 2023-09-28 ASSESSMENT — ENCOUNTER SYMPTOMS
COLOR CHANGE: 0
CONSTIPATION: 0
VOMITING: 0
NAUSEA: 0
DIARRHEA: 0

## 2023-09-28 NOTE — PROGRESS NOTES
[facility]  Return Patient  Chief Complaint   Patient presents with    Nail Problem     B/l nail trim, last seen David Tamez MD 9/20/23    Diabetes     Last blood sugar 101       Rosaline Rush is a 69y.o. year old male who is here for a diabetic foot check and nail trim. He would like his nails trimmed today. He is in need of new diabetic shoes. He is going through the process still. He has been having a lot of back pain. Has Charcot left foot. Has been stable. History of surgery type: Plantar planing medial and lateral foot. Patient denies N/V/F/C. Review of Systems   Constitutional:  Negative for chills, diaphoresis, fatigue, fever and unexpected weight change. Cardiovascular:  Negative for leg swelling. Gastrointestinal:  Negative for constipation, diarrhea, nausea and vomiting. Musculoskeletal:  Positive for gait problem. Negative for arthralgias and joint swelling. Skin:  Negative for color change, pallor, rash and wound. Neurological:  Positive for numbness. Negative for weakness. Vascular: DP and PT pulses palpable 2/4, Right Foot and 2/4 on the Left Foot. CFT <3 seconds, Right Foot and <3 seconds on the Left Foot. Edema is absent,  Right Foot and minimal on the Left Foot. Neurological:   Sensation absent  to light touch to level of digits, both feet. Musculoskeletal:   Muscle strength is 5/5 on the Right Foot and 5/5 on the Left Foot. Structural deformities are present on the Right Foot and present on the Left Foot, but improved  Flat medial arch left foot. Integument:  Warm, dry, supple both feet. I  Hyperkeratosis dorsal left 4th toe, PIPJ, with local erythema. There is a superficial ulceration present 2 mm x 1 mm x 1 mm, no exposed structure. Small amount of purulent drainage.        Sites of Onychomycosis Involvement (Check affected area)  [x] [x] [x] [x] [x] [x] [x] [x] [x] [x]  5 4 3 2 1 1 2 3 4 5                          Right

## 2023-10-11 RX ORDER — FUROSEMIDE 40 MG/1
40 TABLET ORAL 2 TIMES DAILY
Qty: 60 TABLET | Refills: 0 | Status: SHIPPED | OUTPATIENT
Start: 2023-10-11

## 2023-10-17 ENCOUNTER — HOSPITAL ENCOUNTER (OUTPATIENT)
Dept: PAIN MANAGEMENT | Age: 78
Discharge: HOME | End: 2023-10-17
Payer: MEDICARE

## 2023-10-17 VITALS — HEIGHT: 72 IN | TEMPERATURE: 97.3 F | BODY MASS INDEX: 30.48 KG/M2 | WEIGHT: 225 LBS

## 2023-10-17 DIAGNOSIS — E11.40 PAINFUL DIABETIC NEUROPATHY (HCC): ICD-10-CM

## 2023-10-17 DIAGNOSIS — M47.817 LUMBOSACRAL SPONDYLOSIS WITHOUT MYELOPATHY: ICD-10-CM

## 2023-10-17 DIAGNOSIS — M50.30 DDD (DEGENERATIVE DISC DISEASE), CERVICAL: ICD-10-CM

## 2023-10-17 DIAGNOSIS — Z79.891 CHRONIC USE OF OPIATE DRUG FOR THERAPEUTIC PURPOSE: Primary | Chronic | ICD-10-CM

## 2023-10-17 DIAGNOSIS — M51.36 DDD (DEGENERATIVE DISC DISEASE), LUMBAR: ICD-10-CM

## 2023-10-17 DIAGNOSIS — M54.51 VERTEBROGENIC LOW BACK PAIN: ICD-10-CM

## 2023-10-17 PROCEDURE — 99213 OFFICE O/P EST LOW 20 MIN: CPT | Performed by: NURSE PRACTITIONER

## 2023-10-17 PROCEDURE — 99213 OFFICE O/P EST LOW 20 MIN: CPT

## 2023-10-17 RX ORDER — OXYCODONE HYDROCHLORIDE 5 MG/1
5 TABLET ORAL 2 TIMES DAILY PRN
Qty: 60 TABLET | Refills: 0 | Status: SHIPPED | OUTPATIENT
Start: 2023-10-19 | End: 2023-11-18

## 2023-10-17 ASSESSMENT — ENCOUNTER SYMPTOMS
SHORTNESS OF BREATH: 0
CONSTIPATION: 0
COUGH: 0
BOWEL INCONTINENCE: 0
BACK PAIN: 1

## 2023-10-17 ASSESSMENT — PAIN SCALES - GENERAL: PAINLEVEL_OUTOF10: 4

## 2023-10-17 NOTE — PROGRESS NOTES
Chief Complaint   Patient presents with    Back Pain     Med refill         [facility]     Pt c/o low back pain radiating down his legs with numbness in feet d/t neuropathy    MRI 7/2022 which showed No significant spinal stenosis or nerve root impingement or foraminal stenosis Severe degenerative disc disease with endplate degeneration and Modic changes involving L4-L5 and S1 vertebrae. Facet arthropathy at L4-5 L5-S1 level  Pt had Intracept procedure L4,5 and S1 on 2/6/23. He reported 70% relief. He has completed #16/16 PT visits and reports some improvement in pain and feels it is helping with ROM posture and leg strength  He had  Bilateral RFA for L4/5 AND L5/S1 facet joints 8/9/23 and reported 100% relief of back pain. He continues to have painful neuropathy in his feet. He had successful [employer] SCS trial and scheduled for implant 10/20/23 with Dr. Sosa Veliz     HPI:     Back Pain  This is a chronic problem. The current episode started more than 1 year ago. The problem occurs constantly. The problem is unchanged. The pain is present in the lumbar spine. The pain radiates to the left foot, left thigh, left knee, right foot, right knee and right thigh. The pain is at a severity of 4/10. The pain is The same all the time. The symptoms are aggravated by bending, sitting and standing. Associated symptoms include numbness, paresthesias, tingling and weakness. Pertinent negatives include no bladder incontinence, bowel incontinence, chest pain, fever or headaches. Risk factors include obesity, poor posture, sedentary lifestyle and lack of exercise. He has tried ice, heat and analgesics for the symptoms. The treatment provided mild relief. Patient denies any new neurological symptoms. No bowel or bladder incontinence, no weakness, and no falling.     Pill count: appropriate / Oxycodone - pt did not bring medication with him today, pt states he has 3 pills left due 10/19  Pt instructed to bring pills to

## 2023-10-17 NOTE — PROGRESS NOTES
DAY OF SURGERY/PROCEDURE  GUIDELINES    As a patient at the [facility], you can expect quality medical and nursing care that is centered on your individual needs. It is our goal to make your surgical experience as comfortable and excellent as possible.  ________________________________________________________________________    The following instructions are general guidelines, if any information on this sheet is different from what your doctor has instructed you to do, please follow your doctor's instructions. Please arrive on       Enter through entrance C. Check in at registration     Upon arrival you will be taken to the pre-operative area to get ready for surgery, your family will stay in the waiting room and visit with you once you are ready for surgery. Due to special limitations please limit visitation to 1-2 members of your family at a time. When it is time for surgery your family will return to the waiting room. Nothing to eat, drink, smoke, suck or chew after midnight (no water, gum, mints, cigarettes, cigars, pipes, snuff, chewing tobacco, etc.) or your surgery may be canceled. Take a shower or bath on the morning of your surgery/procedure (Hibiclens if directed) Do not apply any lotions. Brush your teeth, but do not swallow any water    IN CASE OF ILLNESS - If you have a cold or flu symptoms (high fever, runny nose, sore throat, cough, etc.) rash, nausea, vomiting, loose stools, and/or recent contact with someone who has a contagious disease (chick pox, measles, etc.) please call your doctor before coming to the surgery center    Take a small sip of water with heart, blood pressure, and/or seizure medication the morning of surgery.     Omeprazole and Lisinopril    If applicable bring your:  Inhaler (s)  Hearing aid(s)  Eyeglasses and Case (If you wear contacts they have to be removed before surgery, bring case and solution)  CPAP     DO NOT take anticoagulants (blood

## 2023-10-19 ENCOUNTER — ANESTHESIA EVENT (OUTPATIENT)
Dept: OPERATING ROOM | Age: 78
End: 2023-10-19
Payer: MEDICARE

## 2023-10-19 ENCOUNTER — HOSPITAL ENCOUNTER (OUTPATIENT)
Age: 78
Discharge: HOME | End: 2023-10-19
Payer: MEDICARE

## 2023-10-19 DIAGNOSIS — Z01.818 PRE-OP TESTING: ICD-10-CM

## 2023-10-19 DIAGNOSIS — N18.31 STAGE 3A CHRONIC KIDNEY DISEASE (HCC): ICD-10-CM

## 2023-10-19 DIAGNOSIS — I50.42 CHRONIC COMBINED SYSTOLIC AND DIASTOLIC CONGESTIVE HEART FAILURE (HCC): ICD-10-CM

## 2023-10-19 LAB
ANION GAP SERPL CALCULATED.3IONS-SCNC: 10 MMOL/L (ref 9–17)
BNP SERPL-MCNC: 298 PG/ML
BUN SERPL-MCNC: 30 MG/DL (ref 8–23)
CALCIUM SERPL-MCNC: 9.2 MG/DL (ref 8.6–10.4)
CHLORIDE SERPL-SCNC: 106 MMOL/L (ref 98–107)
CO2 SERPL-SCNC: 27 MMOL/L (ref 20–31)
CREAT SERPL-MCNC: 1.7 MG/DL (ref 0.7–1.2)
CREAT UR-MCNC: 128.2 MG/DL (ref 39–259)
EKG ATRIAL RATE: 77 BPM
EKG P AXIS: 58 DEGREES
EKG P-R INTERVAL: 142 MS
EKG Q-T INTERVAL: 420 MS
EKG QRS DURATION: 130 MS
EKG QTC CALCULATION (BAZETT): 475 MS
EKG R AXIS: -44 DEGREES
EKG T AXIS: 31 DEGREES
EKG VENTRICULAR RATE: 77 BPM
ERYTHROCYTE [DISTWIDTH] IN BLOOD BY AUTOMATED COUNT: 13.8 % (ref 11.5–14.9)
GFR SERPL CREATININE-BSD FRML MDRD: 41 ML/MIN/1.73M2
GLUCOSE SERPL-MCNC: 197 MG/DL (ref 70–99)
HCT VFR BLD AUTO: 45.5 % (ref 41–53)
HGB BLD-MCNC: 15.1 G/DL (ref 13.5–17.5)
MAGNESIUM SERPL-MCNC: 2.1 MG/DL (ref 1.6–2.6)
MCH RBC QN AUTO: 30.8 PG (ref 26–34)
MCHC RBC AUTO-ENTMCNC: 33.2 G/DL (ref 31–37)
MCV RBC AUTO: 92.6 FL (ref 80–100)
MICROALBUMIN UR-MCNC: 40 MG/L
MICROALBUMIN/CREAT UR-RTO: 31 MCG/MG CREAT
PHOSPHATE SERPL-MCNC: 3.1 MG/DL (ref 2.5–4.5)
PLATELET # BLD AUTO: 153 K/UL (ref 150–450)
PMV BLD AUTO: 8.6 FL (ref 6–12)
POTASSIUM SERPL-SCNC: 4.9 MMOL/L (ref 3.7–5.3)
RBC # BLD AUTO: 4.92 M/UL (ref 4.5–5.9)
SODIUM SERPL-SCNC: 143 MMOL/L (ref 135–144)
URATE SERPL-MCNC: 7 MG/DL (ref 3.4–7)
WBC OTHER # BLD: 6.9 K/UL (ref 3.5–11)

## 2023-10-19 PROCEDURE — 85027 COMPLETE CBC AUTOMATED: CPT

## 2023-10-19 PROCEDURE — 36415 COLL VENOUS BLD VENIPUNCTURE: CPT

## 2023-10-19 PROCEDURE — 83735 ASSAY OF MAGNESIUM: CPT

## 2023-10-19 PROCEDURE — 84550 ASSAY OF BLOOD/URIC ACID: CPT

## 2023-10-19 PROCEDURE — 93005 ELECTROCARDIOGRAM TRACING: CPT | Performed by: ANESTHESIOLOGY

## 2023-10-19 PROCEDURE — 80048 BASIC METABOLIC PNL TOTAL CA: CPT

## 2023-10-19 PROCEDURE — 82570 ASSAY OF URINE CREATININE: CPT

## 2023-10-19 PROCEDURE — 82043 UR ALBUMIN QUANTITATIVE: CPT

## 2023-10-19 PROCEDURE — 84100 ASSAY OF PHOSPHORUS: CPT

## 2023-10-19 PROCEDURE — 83880 ASSAY OF NATRIURETIC PEPTIDE: CPT

## 2023-10-20 ENCOUNTER — ANESTHESIA (OUTPATIENT)
Dept: OPERATING ROOM | Age: 78
End: 2023-10-20
Payer: MEDICARE

## 2023-10-20 ENCOUNTER — HOSPITAL ENCOUNTER (OUTPATIENT)
Age: 78
Setting detail: OUTPATIENT SURGERY
Discharge: HOME | End: 2023-10-20
Attending: STUDENT IN AN ORGANIZED HEALTH CARE EDUCATION/TRAINING PROGRAM | Admitting: STUDENT IN AN ORGANIZED HEALTH CARE EDUCATION/TRAINING PROGRAM
Payer: MEDICARE

## 2023-10-20 ENCOUNTER — APPOINTMENT (OUTPATIENT)
Dept: GENERAL RADIOLOGY | Age: 78
End: 2023-10-20
Attending: STUDENT IN AN ORGANIZED HEALTH CARE EDUCATION/TRAINING PROGRAM
Payer: MEDICARE

## 2023-10-20 VITALS
SYSTOLIC BLOOD PRESSURE: 116 MMHG | BODY MASS INDEX: 31.56 KG/M2 | HEIGHT: 72 IN | RESPIRATION RATE: 17 BRPM | OXYGEN SATURATION: 92 % | DIASTOLIC BLOOD PRESSURE: 78 MMHG | TEMPERATURE: 97.3 F | HEART RATE: 82 BPM | WEIGHT: 233 LBS

## 2023-10-20 DIAGNOSIS — G89.18 POST-OP PAIN: ICD-10-CM

## 2023-10-20 DIAGNOSIS — Z01.818 PRE-OP TESTING: Primary | ICD-10-CM

## 2023-10-20 LAB
GLUCOSE BLD-MCNC: 167 MG/DL (ref 75–110)
GLUCOSE BLD-MCNC: 49 MG/DL (ref 75–110)
GLUCOSE BLD-MCNC: 91 MG/DL (ref 75–110)

## 2023-10-20 PROCEDURE — 2500000003 HC RX 250 WO HCPCS: Performed by: STUDENT IN AN ORGANIZED HEALTH CARE EDUCATION/TRAINING PROGRAM

## 2023-10-20 PROCEDURE — 3600000013 HC SURGERY LEVEL 3 ADDTL 15MIN: Performed by: STUDENT IN AN ORGANIZED HEALTH CARE EDUCATION/TRAINING PROGRAM

## 2023-10-20 PROCEDURE — 2580000003 HC RX 258: Performed by: NURSE ANESTHETIST, CERTIFIED REGISTERED

## 2023-10-20 PROCEDURE — 2500000003 HC RX 250 WO HCPCS

## 2023-10-20 PROCEDURE — 7100000010 HC PHASE II RECOVERY - FIRST 15 MIN: Performed by: STUDENT IN AN ORGANIZED HEALTH CARE EDUCATION/TRAINING PROGRAM

## 2023-10-20 PROCEDURE — 6360000002 HC RX W HCPCS: Performed by: STUDENT IN AN ORGANIZED HEALTH CARE EDUCATION/TRAINING PROGRAM

## 2023-10-20 PROCEDURE — 2709999900 HC NON-CHARGEABLE SUPPLY: Performed by: STUDENT IN AN ORGANIZED HEALTH CARE EDUCATION/TRAINING PROGRAM

## 2023-10-20 PROCEDURE — 3700000000 HC ANESTHESIA ATTENDED CARE: Performed by: STUDENT IN AN ORGANIZED HEALTH CARE EDUCATION/TRAINING PROGRAM

## 2023-10-20 PROCEDURE — 2580000003 HC RX 258: Performed by: STUDENT IN AN ORGANIZED HEALTH CARE EDUCATION/TRAINING PROGRAM

## 2023-10-20 PROCEDURE — 7100000000 HC PACU RECOVERY - FIRST 15 MIN: Performed by: STUDENT IN AN ORGANIZED HEALTH CARE EDUCATION/TRAINING PROGRAM

## 2023-10-20 PROCEDURE — 2500000003 HC RX 250 WO HCPCS: Performed by: NURSE ANESTHETIST, CERTIFIED REGISTERED

## 2023-10-20 PROCEDURE — 6360000002 HC RX W HCPCS: Performed by: NURSE ANESTHETIST, CERTIFIED REGISTERED

## 2023-10-20 PROCEDURE — 7100000011 HC PHASE II RECOVERY - ADDTL 15 MIN: Performed by: STUDENT IN AN ORGANIZED HEALTH CARE EDUCATION/TRAINING PROGRAM

## 2023-10-20 PROCEDURE — 7100000001 HC PACU RECOVERY - ADDTL 15 MIN: Performed by: STUDENT IN AN ORGANIZED HEALTH CARE EDUCATION/TRAINING PROGRAM

## 2023-10-20 PROCEDURE — 3600000003 HC SURGERY LEVEL 3 BASE: Performed by: STUDENT IN AN ORGANIZED HEALTH CARE EDUCATION/TRAINING PROGRAM

## 2023-10-20 PROCEDURE — 3700000001 HC ADD 15 MINUTES (ANESTHESIA): Performed by: STUDENT IN AN ORGANIZED HEALTH CARE EDUCATION/TRAINING PROGRAM

## 2023-10-20 PROCEDURE — 82947 ASSAY GLUCOSE BLOOD QUANT: CPT

## 2023-10-20 RX ORDER — SODIUM CHLORIDE 0.9 % (FLUSH) 0.9 %
5-40 SYRINGE (ML) INJECTION EVERY 12 HOURS SCHEDULED
Status: DISCONTINUED | OUTPATIENT
Start: 2023-10-20 | End: 2023-10-20 | Stop reason: HOSPADM

## 2023-10-20 RX ORDER — TIZANIDINE 4 MG/1
4 TABLET ORAL 3 TIMES DAILY PRN
Qty: 30 TABLET | Refills: 1 | Status: SHIPPED | OUTPATIENT
Start: 2023-10-20

## 2023-10-20 RX ORDER — ONDANSETRON 2 MG/ML
INJECTION INTRAMUSCULAR; INTRAVENOUS PRN
Status: DISCONTINUED | OUTPATIENT
Start: 2023-10-20 | End: 2023-10-20 | Stop reason: SDUPTHER

## 2023-10-20 RX ORDER — DEXTROSE MONOHYDRATE 25 G/50ML
INJECTION, SOLUTION INTRAVENOUS
Status: COMPLETED
Start: 2023-10-20 | End: 2023-10-20

## 2023-10-20 RX ORDER — NEOSTIGMINE METHYLSULFATE 5 MG/5 ML
SYRINGE (ML) INTRAVENOUS PRN
Status: DISCONTINUED | OUTPATIENT
Start: 2023-10-20 | End: 2023-10-20 | Stop reason: SDUPTHER

## 2023-10-20 RX ORDER — DEXAMETHASONE SODIUM PHOSPHATE 10 MG/ML
INJECTION, SOLUTION INTRAMUSCULAR; INTRAVENOUS PRN
Status: DISCONTINUED | OUTPATIENT
Start: 2023-10-20 | End: 2023-10-20 | Stop reason: SDUPTHER

## 2023-10-20 RX ORDER — OXYCODONE HYDROCHLORIDE AND ACETAMINOPHEN 5; 325 MG/1; MG/1
2 TABLET ORAL
Status: DISCONTINUED | OUTPATIENT
Start: 2023-10-20 | End: 2023-10-20 | Stop reason: HOSPADM

## 2023-10-20 RX ORDER — ROCURONIUM BROMIDE 10 MG/ML
INJECTION, SOLUTION INTRAVENOUS PRN
Status: DISCONTINUED | OUTPATIENT
Start: 2023-10-20 | End: 2023-10-20 | Stop reason: SDUPTHER

## 2023-10-20 RX ORDER — SODIUM CHLORIDE 0.9 % (FLUSH) 0.9 %
5-40 SYRINGE (ML) INJECTION PRN
Status: DISCONTINUED | OUTPATIENT
Start: 2023-10-20 | End: 2023-10-20 | Stop reason: HOSPADM

## 2023-10-20 RX ORDER — SODIUM CHLORIDE 9 MG/ML
INJECTION, SOLUTION INTRAVENOUS PRN
Status: DISCONTINUED | OUTPATIENT
Start: 2023-10-20 | End: 2023-10-20 | Stop reason: HOSPADM

## 2023-10-20 RX ORDER — GLYCOPYRROLATE 0.2 MG/ML
INJECTION INTRAMUSCULAR; INTRAVENOUS PRN
Status: DISCONTINUED | OUTPATIENT
Start: 2023-10-20 | End: 2023-10-20 | Stop reason: SDUPTHER

## 2023-10-20 RX ORDER — PREDNISONE 10 MG/1
10 TABLET ORAL 2 TIMES DAILY
Qty: 10 TABLET | Refills: 0 | Status: SHIPPED | OUTPATIENT
Start: 2023-10-20 | End: 2023-10-25

## 2023-10-20 RX ORDER — MIDAZOLAM HYDROCHLORIDE 2 MG/2ML
2 INJECTION, SOLUTION INTRAMUSCULAR; INTRAVENOUS
Status: DISCONTINUED | OUTPATIENT
Start: 2023-10-20 | End: 2023-10-20 | Stop reason: HOSPADM

## 2023-10-20 RX ORDER — MORPHINE SULFATE 2 MG/ML
2 INJECTION, SOLUTION INTRAMUSCULAR; INTRAVENOUS EVERY 5 MIN PRN
Status: DISCONTINUED | OUTPATIENT
Start: 2023-10-20 | End: 2023-10-20 | Stop reason: HOSPADM

## 2023-10-20 RX ORDER — LIDOCAINE HYDROCHLORIDE 10 MG/ML
INJECTION, SOLUTION INFILTRATION; PERINEURAL PRN
Status: DISCONTINUED | OUTPATIENT
Start: 2023-10-20 | End: 2023-10-20 | Stop reason: SDUPTHER

## 2023-10-20 RX ORDER — ONDANSETRON 2 MG/ML
4 INJECTION INTRAMUSCULAR; INTRAVENOUS
Status: DISCONTINUED | OUTPATIENT
Start: 2023-10-20 | End: 2023-10-20 | Stop reason: HOSPADM

## 2023-10-20 RX ORDER — DIPHENHYDRAMINE HYDROCHLORIDE 50 MG/ML
12.5 INJECTION INTRAMUSCULAR; INTRAVENOUS
Status: DISCONTINUED | OUTPATIENT
Start: 2023-10-20 | End: 2023-10-20 | Stop reason: HOSPADM

## 2023-10-20 RX ORDER — OXYCODONE HYDROCHLORIDE AND ACETAMINOPHEN 5; 325 MG/1; MG/1
1 TABLET ORAL
Status: DISCONTINUED | OUTPATIENT
Start: 2023-10-20 | End: 2023-10-20 | Stop reason: HOSPADM

## 2023-10-20 RX ORDER — MEPERIDINE HYDROCHLORIDE 50 MG/ML
12.5 INJECTION INTRAMUSCULAR; INTRAVENOUS; SUBCUTANEOUS EVERY 5 MIN PRN
Status: DISCONTINUED | OUTPATIENT
Start: 2023-10-20 | End: 2023-10-20 | Stop reason: HOSPADM

## 2023-10-20 RX ORDER — DEXTROSE MONOHYDRATE 25 G/50ML
25 INJECTION, SOLUTION INTRAVENOUS ONCE
Status: COMPLETED | OUTPATIENT
Start: 2023-10-20 | End: 2023-10-20

## 2023-10-20 RX ORDER — CEFAZOLIN 2 G/1
INJECTION, POWDER, FOR SOLUTION INTRAMUSCULAR; INTRAVENOUS
Status: DISCONTINUED
Start: 2023-10-20 | End: 2023-10-20 | Stop reason: HOSPADM

## 2023-10-20 RX ORDER — HYDRALAZINE HYDROCHLORIDE 20 MG/ML
10 INJECTION INTRAMUSCULAR; INTRAVENOUS
Status: DISCONTINUED | OUTPATIENT
Start: 2023-10-20 | End: 2023-10-20 | Stop reason: HOSPADM

## 2023-10-20 RX ORDER — PROPOFOL 10 MG/ML
INJECTION, EMULSION INTRAVENOUS PRN
Status: DISCONTINUED | OUTPATIENT
Start: 2023-10-20 | End: 2023-10-20 | Stop reason: SDUPTHER

## 2023-10-20 RX ORDER — SODIUM CHLORIDE, SODIUM LACTATE, POTASSIUM CHLORIDE, CALCIUM CHLORIDE 600; 310; 30; 20 MG/100ML; MG/100ML; MG/100ML; MG/100ML
INJECTION, SOLUTION INTRAVENOUS CONTINUOUS
Status: DISCONTINUED | OUTPATIENT
Start: 2023-10-20 | End: 2023-10-20 | Stop reason: HOSPADM

## 2023-10-20 RX ORDER — LABETALOL HYDROCHLORIDE 5 MG/ML
10 INJECTION, SOLUTION INTRAVENOUS
Status: DISCONTINUED | OUTPATIENT
Start: 2023-10-20 | End: 2023-10-20 | Stop reason: HOSPADM

## 2023-10-20 RX ORDER — MIDAZOLAM HYDROCHLORIDE 1 MG/ML
INJECTION INTRAMUSCULAR; INTRAVENOUS PRN
Status: DISCONTINUED | OUTPATIENT
Start: 2023-10-20 | End: 2023-10-20 | Stop reason: SDUPTHER

## 2023-10-20 RX ORDER — EPHEDRINE SULFATE/0.9% NACL/PF 50 MG/5 ML
SYRINGE (ML) INTRAVENOUS PRN
Status: DISCONTINUED | OUTPATIENT
Start: 2023-10-20 | End: 2023-10-20 | Stop reason: SDUPTHER

## 2023-10-20 RX ORDER — PROMETHAZINE HYDROCHLORIDE 25 MG/ML
6.25 INJECTION, SOLUTION INTRAMUSCULAR; INTRAVENOUS EVERY 5 MIN PRN
Status: DISCONTINUED | OUTPATIENT
Start: 2023-10-20 | End: 2023-10-20 | Stop reason: HOSPADM

## 2023-10-20 RX ORDER — OXYCODONE HYDROCHLORIDE AND ACETAMINOPHEN 5; 325 MG/1; MG/1
1 TABLET ORAL 3 TIMES DAILY PRN
Qty: 21 TABLET | Refills: 0 | Status: SHIPPED | OUTPATIENT
Start: 2023-10-20 | End: 2023-10-27

## 2023-10-20 RX ORDER — FENTANYL CITRATE 50 UG/ML
INJECTION, SOLUTION INTRAMUSCULAR; INTRAVENOUS PRN
Status: DISCONTINUED | OUTPATIENT
Start: 2023-10-20 | End: 2023-10-20 | Stop reason: SDUPTHER

## 2023-10-20 RX ORDER — METOCLOPRAMIDE HYDROCHLORIDE 5 MG/ML
10 INJECTION INTRAMUSCULAR; INTRAVENOUS
Status: DISCONTINUED | OUTPATIENT
Start: 2023-10-20 | End: 2023-10-20 | Stop reason: HOSPADM

## 2023-10-20 RX ORDER — IPRATROPIUM BROMIDE AND ALBUTEROL SULFATE 2.5; .5 MG/3ML; MG/3ML
1 SOLUTION RESPIRATORY (INHALATION)
Status: DISCONTINUED | OUTPATIENT
Start: 2023-10-20 | End: 2023-10-20 | Stop reason: HOSPADM

## 2023-10-20 RX ORDER — GLYCOPYRROLATE 0.2 MG/ML
0.4 INJECTION INTRAMUSCULAR; INTRAVENOUS ONCE
Status: DISCONTINUED | OUTPATIENT
Start: 2023-10-20 | End: 2023-10-20 | Stop reason: HOSPADM

## 2023-10-20 RX ORDER — LIDOCAINE HYDROCHLORIDE 10 MG/ML
1 INJECTION, SOLUTION EPIDURAL; INFILTRATION; INTRACAUDAL; PERINEURAL
Status: DISCONTINUED | OUTPATIENT
Start: 2023-10-20 | End: 2023-10-20 | Stop reason: HOSPADM

## 2023-10-20 RX ORDER — BUPIVACAINE HYDROCHLORIDE AND EPINEPHRINE 2.5; 5 MG/ML; UG/ML
INJECTION, SOLUTION INFILTRATION; PERINEURAL PRN
Status: DISCONTINUED | OUTPATIENT
Start: 2023-10-20 | End: 2023-10-20 | Stop reason: ALTCHOICE

## 2023-10-20 RX ORDER — SODIUM CHLORIDE 9 MG/ML
INJECTION, SOLUTION INTRAVENOUS CONTINUOUS PRN
Status: DISCONTINUED | OUTPATIENT
Start: 2023-10-20 | End: 2023-10-20 | Stop reason: SDUPTHER

## 2023-10-20 RX ADMIN — PROPOFOL 50 MG: 10 INJECTION, EMULSION INTRAVENOUS at 12:49

## 2023-10-20 RX ADMIN — ROCURONIUM BROMIDE 50 MG: 10 INJECTION INTRAVENOUS at 11:10

## 2023-10-20 RX ADMIN — FENTANYL CITRATE 100 MCG: 50 INJECTION, SOLUTION INTRAMUSCULAR; INTRAVENOUS at 11:09

## 2023-10-20 RX ADMIN — Medication 12.5 MG: at 11:16

## 2023-10-20 RX ADMIN — GLYCOPYRROLATE 0.6 MG: 0.2 INJECTION INTRAMUSCULAR; INTRAVENOUS at 12:22

## 2023-10-20 RX ADMIN — Medication 12.5 MG: at 11:22

## 2023-10-20 RX ADMIN — DEXAMETHASONE SODIUM PHOSPHATE 5 MG: 10 INJECTION, SOLUTION INTRAMUSCULAR; INTRAVENOUS at 11:16

## 2023-10-20 RX ADMIN — DEXTROSE MONOHYDRATE 25 G: 25 INJECTION, SOLUTION INTRAVENOUS at 09:01

## 2023-10-20 RX ADMIN — MIDAZOLAM 2 MG: 1 INJECTION INTRAMUSCULAR; INTRAVENOUS at 11:03

## 2023-10-20 RX ADMIN — PROPOFOL 50 MG: 10 INJECTION, EMULSION INTRAVENOUS at 12:56

## 2023-10-20 RX ADMIN — Medication 5 MG: at 11:50

## 2023-10-20 RX ADMIN — ONDANSETRON 4 MG: 2 INJECTION INTRAMUSCULAR; INTRAVENOUS at 12:22

## 2023-10-20 RX ADMIN — PROPOFOL 50 MG: 10 INJECTION, EMULSION INTRAVENOUS at 12:36

## 2023-10-20 RX ADMIN — PROPOFOL 120 MG: 10 INJECTION, EMULSION INTRAVENOUS at 11:09

## 2023-10-20 RX ADMIN — SODIUM CHLORIDE, POTASSIUM CHLORIDE, SODIUM LACTATE AND CALCIUM CHLORIDE: 600; 310; 30; 20 INJECTION, SOLUTION INTRAVENOUS at 11:02

## 2023-10-20 RX ADMIN — SODIUM CHLORIDE: 9 INJECTION, SOLUTION INTRAVENOUS at 11:44

## 2023-10-20 RX ADMIN — CEFAZOLIN 2000 MG: 2 INJECTION, POWDER, FOR SOLUTION INTRAMUSCULAR; INTRAVENOUS at 11:15

## 2023-10-20 RX ADMIN — LIDOCAINE HYDROCHLORIDE 60 MG: 10 INJECTION, SOLUTION INFILTRATION; PERINEURAL at 11:09

## 2023-10-20 RX ADMIN — Medication 4.5 MG: at 12:22

## 2023-10-20 ASSESSMENT — PAIN DESCRIPTION - LOCATION
LOCATION: HEAD
LOCATION: BACK;LEG
LOCATION: HEAD

## 2023-10-20 ASSESSMENT — PAIN DESCRIPTION - ORIENTATION: ORIENTATION: RIGHT;LEFT;LOWER

## 2023-10-20 ASSESSMENT — PAIN SCALES - GENERAL
PAINLEVEL_OUTOF10: 3
PAINLEVEL_OUTOF10: 4

## 2023-10-20 ASSESSMENT — PAIN - FUNCTIONAL ASSESSMENT: PAIN_FUNCTIONAL_ASSESSMENT: PREVENTS OR INTERFERES WITH MANY ACTIVE NOT PASSIVE ACTIVITIES

## 2023-10-20 NOTE — ANESTHESIA POSTPROCEDURE EVALUATION
Department of Anesthesiology  Postprocedure Note    Patient: Teto Alcazar  MRN: [id number]  YOB: 1945  Date of evaluation: 10/20/2023      Procedure Summary     Date: 10/20/23 Room / Location: [facility] OR [address] [place] [address]    Anesthesia Start: 4480 Anesthesia Stop:     Procedure: SPINAL CORD STIMULATOR INSERTION WITH NEVRO Diagnosis:       Chronic painful diabetic neuropathy ([address])      (Chronic painful diabetic neuropathy ([address]) [E11.40])    Surgeons: Luz Maria Jay DO Responsible Provider: Krystle Love MD    Anesthesia Type: general ASA Status: 4          Anesthesia Type: No value filed.     Kerri Phase I: Kerri Score: 10    Kerri Phase II:        Anesthesia Post Evaluation    Patient location during evaluation: PACU  Patient participation: complete - patient participated  Level of consciousness: awake and alert  Airway patency: patent  Nausea & Vomiting: no nausea and no vomiting  Complications: no  Cardiovascular status: hemodynamically stable  Respiratory status: room air and spontaneous ventilation  Hydration status: euvolemic  Multimodal analgesia pain management approach  Pain management: adequate

## 2023-10-20 NOTE — DISCHARGE INSTRUCTIONS
Orthopedic Spine Discharge Instructions:  -Avoid mobilization as much as possible for next 3-5 days, try to stay flat in bed.  -Maintain surgical dressing in place until follow-up if possible. -Ice (20 minutes on and off 1 hour) as needed for swelling/pain.  -Drink plenty of fluids.  -Call the office or come to Emergency Room if signs of infection appear (hot, swollen, red, draining pus, fever). -Take medications as prescribed.  -Wean off narcotics (Percocet/Norco) as soon as possible. Do not take Tylenol if still taking narcotics. -No alcoholic beverages or driving/operating machinery while on narcotics  -Follow up with Dr. Heather Olson in his office 2 weeks after surgery/discharge. Call [phone] to schedule. Activity  You have had anesthesia today  Do not drive, operate heavy equipment, consume alcoholic beverages, or make any important decisions  for 24 hours   If you are taking pain medication: Do not drive or consume alcohol. Take your time changing positions today. You may feel light headed or dizzy if you move too quickly. Continue your home medications as ordered by your physician. Diet   You can eat your normal diet when you feel well. You should start off with bland foods like chicken soup, toast, or yogurt. Then advance as tolerated. Drink plenty of fluids (unless your doctor tells you not to). Your urine should be very lightly colored without a strong odor.

## 2023-10-20 NOTE — OP NOTE
Operative Note      Patient: Rosaline Rush  YOB: 1945  MRN: [id number]    Date of Procedure: 10/20/2023    Pre-Op Diagnosis Codes:     * Chronic painful diabetic neuropathy ([address]) [E11.40]    Post-Op Diagnosis: Same       Procedure(s):  ATTEMPTED SPINAL CORD STIMULATOR INSERTION [04050 w/ 53M]    Surgeon(s):  Jose F Del Real DO    Assistant:   Resident: Eula Barone MD    Anesthesia: General    Estimated Blood Loss (mL): Minimal    Complications: CSF leak    Specimens:   * No specimens in log *    Implants:  Implant Name Type Inv. Item Serial No.  Lot No. LRB No. Used Action   KIT LD L50CM 5MM SPC DARNELL FOR NEUROSTIM - [id number]  KIT LD L50CM 5MM SPC DARNELL FOR NEUROSTIM  NEVRO TRISHA-WD [id number] Left 2 Implanted   KIT NEUROSTIMULATOR EPI ANCHR LD FOR N300 - [id number]  KIT NEUROSTIMULATOR EPI ANCHR LD FOR N300  NEVRO TRISHA-WD [id number] Left 1 Implanted     Drains: * No LDAs found *    Detailed Description of Procedure: Indications:     Mr. Jose Alexander is a pleasant 52-year-old  male with history of chronic diabetic peripheral neuropathic pain that has had lack of significant long-term relief with multiple non operative treatments. Patient underwent a spinal cord stimulator trial with Dr. Fe Frances and had improvement in his usual daily pain symptoms. He was seen by myself in outpatient setting for consultation of permanent spinal stimulator implant and battery placement. Benefits and risks of surgery were discussed at length including but not limited to bleeding, infection, damage to nerves and blood vessels, wound complications, residual pain and stiffness, lead migration, blood clots, spinal fluid leak, and death. Due to improvement of symptoms from stimulator trial, patient would like to have permanent spinal cord stimulator implanted.      Technique:     Patient was met in the pre-operative holding area at which time informed consent was obtained, history and physical was performed, and operative site marked. Patient was then transported from the pre-operative holding area to the operating theater and placed under general anesthesia without difficulty by the anesthesia team. Patient was then transferred from the supine position on stretcher to prone position on the spinal Safety harbor frame. Arms were placed in a flexed position at arms and elbows in superman position. Axillae, eyes, and all bony prominences were well padded and safety strap secured across the thighs. Back was then prepped and draped in standard sterile fashion. A formal timeout procedure was then performed and agreed upon by all personnel present in the room confirming correct patient, operative site, and planned procedure. A small midline incision was made through skin and subcutaneous tissue down to lumbodorsal fascia and spinous processes for obtaining percutaneous lead access entering at T12-L1 segment bilaterally. Access needles were then advanced and trajectory confirmed with AP, lateral, and contralateral oblique spot images coming just under the T12 lamina on left. A loss of resistance syringe was then attached to the needle and advanced until the epidural space was entered. Percutaneous Nevro simulator lead was then advanced through the cannulated needle and into the spinal canal in the dorsal epidural space. Using fluoroscopy AP imaging lead was advanced rostrally until at the desired level for coverage as determined by spinal stimulator trial. This process was then repeated for placing right percutaneous Nevro stimulator lead. After both leads were positioned, Nevro device representative indicated that the right lead was in a sub-optimal position off the midline based on lead positioning at time of stimulator trial period and requested repositioning. Attempts were made for adjustment at T12-L1 segment pulling back and re-adjusting as well as trialing placement with the more stiff guidewire and were unsuccessful.

## 2023-10-20 NOTE — ANESTHESIA POSTPROCEDURE EVALUATION
Department of Anesthesiology  Postprocedure Note    Patient: Emy Newman  MRN: [id number]  YOB: 1945  Date of evaluation: 10/20/2023      Procedure Summary     Date: 10/20/23 Room / Location: [address]    Anesthesia Start: 1069 Anesthesia Stop: 7988    Procedure: SPINAL CORD STIMULATOR INSERTION WITH NEVRO Diagnosis:       Chronic painful diabetic neuropathy ([address])      (Chronic painful diabetic neuropathy ([address]) [E11.40])    Surgeons: Roberto Jim DO Responsible Provider: Aleksandr Burns MD    Anesthesia Type: general ASA Status: 4          Anesthesia Type: No value filed.     Kerri Phase I: Kerri Score: 8    Kerri Phase II:        Anesthesia Post Evaluation    Patient location during evaluation: PACU  Patient participation: complete - patient participated  Level of consciousness: awake and alert  Airway patency: patent  Nausea & Vomiting: no nausea and no vomiting  Complications: no  Cardiovascular status: blood pressure returned to baseline  Respiratory status: acceptable and room air  Hydration status: euvolemic  Pain management: adequate and satisfactory to patient

## 2023-10-20 NOTE — BRIEF OP NOTE
Brief Postoperative Note      Patient: Chaz Ocasio  YOB: 1945  MRN: [id number]    Date of Procedure: 10/20/2023    Pre-Op Diagnosis Codes:     * Chronic painful diabetic neuropathy ([address]) [E11.40]    Post-Op Diagnosis: Same       Procedure(s):  ATTEMPTED SPINAL CORD STIMULATOR INSERTION WITH NEVRO    Surgeon(s):  Margret Nguyen DO    Assistant:  Resident: Abdirizak Irizarry MD    Anesthesia: General    Estimated Blood Loss (mL): Minimal    Complications: CSF leak    Specimens:   * No specimens in log *    Implants:  Implant Name Type Inv.  Item Serial No.  Lot No. LRB No. Used Action   KIT LD L50CM 5MM SPC DARNELL FOR NEUROSTIM - [id number]  KIT LD L50CM 5MM SPC DARNELL FOR NEUROSTIM  NEVRO TRISHA-WD [id number] Left 2 Implanted   KIT NEUROSTIMULATOR EPI ANCHR LD FOR N300 - [id number]  KIT NEUROSTIMULATOR EPI ANCHR LD FOR N300  NEVRO TRISHA-WD [id number] Left 1 Implanted     Drains: * No LDAs found *    Findings: CSF leak when re-accessing right side for lead placement adjustment    Electronically signed by Margret Nguyen DO on 10/20/2023 at 1:01 PM

## 2023-10-22 PROBLEM — N18.32 STAGE 3B CHRONIC KIDNEY DISEASE (HCC): Status: ACTIVE | Noted: 2023-10-22

## 2023-10-23 RX ORDER — FUROSEMIDE 40 MG/1
40 TABLET ORAL 2 TIMES DAILY
Qty: 60 TABLET | Refills: 0 | Status: SHIPPED | OUTPATIENT
Start: 2023-10-23

## 2023-10-23 NOTE — TELEPHONE ENCOUNTER
Please Approve or Refuse.   Send to Pharmacy per Pt's Request:      Next Visit Date:  12/20/2023   Last Visit Date: 9/20/2023    Hemoglobin A1C (%)   Date Value   09/20/2023 6.4   05/16/2023 7.2 (H)   01/24/2023 7.5 (H)             ( goal A1C is < 7)   BP Readings from Last 3 Encounters:   10/20/23 116/78   09/20/23 120/80   09/13/23 118/68          (goal 120/80)  BUN   Date Value Ref Range Status   10/19/2023 30 (H) 8 - 23 mg/dL Final     Creatinine   Date Value Ref Range Status   10/19/2023 1.7 (H) 0.7 - 1.2 mg/dL Final     Potassium   Date Value Ref Range Status   10/19/2023 4.9 3.7 - 5.3 mmol/L Final     Comment:     SPECIMEN MODERATELY HEMOLYZED, RESULTS MAY BE ADVERSELY AFFECTED

## 2023-10-25 PROBLEM — N18.32 STAGE 3B CHRONIC KIDNEY DISEASE (HCC): Status: RESOLVED | Noted: 2023-10-22 | Resolved: 2023-10-25

## 2023-11-06 DIAGNOSIS — K58.9 IRRITABLE BOWEL SYNDROME WITHOUT DIARRHEA: ICD-10-CM

## 2023-11-06 RX ORDER — OMEPRAZOLE 20 MG/1
20 CAPSULE, DELAYED RELEASE ORAL DAILY
Qty: 90 CAPSULE | Refills: 1 | Status: SHIPPED | OUTPATIENT
Start: 2023-11-06

## 2023-11-14 ENCOUNTER — HOSPITAL ENCOUNTER (OUTPATIENT)
Age: 78
Discharge: HOME | End: 2023-11-14
Payer: MEDICARE

## 2023-11-14 ENCOUNTER — HOSPITAL ENCOUNTER (OUTPATIENT)
Dept: ULTRASOUND IMAGING | Age: 78
Discharge: HOME | End: 2023-11-16
Attending: INTERNAL MEDICINE
Payer: MEDICARE

## 2023-11-14 ENCOUNTER — HOSPITAL ENCOUNTER (OUTPATIENT)
Dept: PAIN MANAGEMENT | Age: 78
Discharge: HOME | End: 2023-11-14
Payer: MEDICARE

## 2023-11-14 VITALS — WEIGHT: 232 LBS | BODY MASS INDEX: 31.42 KG/M2 | HEIGHT: 72 IN | TEMPERATURE: 97.3 F

## 2023-11-14 DIAGNOSIS — M54.51 VERTEBROGENIC LOW BACK PAIN: ICD-10-CM

## 2023-11-14 DIAGNOSIS — N18.30 BENIGN HYPERTENSION WITH CKD (CHRONIC KIDNEY DISEASE) STAGE III (HCC): ICD-10-CM

## 2023-11-14 DIAGNOSIS — M47.817 LUMBOSACRAL SPONDYLOSIS WITHOUT MYELOPATHY: ICD-10-CM

## 2023-11-14 DIAGNOSIS — N18.32 STAGE 3B CHRONIC KIDNEY DISEASE (HCC): ICD-10-CM

## 2023-11-14 DIAGNOSIS — I12.9 BENIGN HYPERTENSION WITH CKD (CHRONIC KIDNEY DISEASE) STAGE III (HCC): ICD-10-CM

## 2023-11-14 DIAGNOSIS — E13.22 SECONDARY DIABETES MELLITUS WITH STAGE 3 CHRONIC KIDNEY DISEASE (HCC): ICD-10-CM

## 2023-11-14 DIAGNOSIS — E55.9 VITAMIN D DEFICIENCY: ICD-10-CM

## 2023-11-14 DIAGNOSIS — R80.9 MICROALBUMINURIA: ICD-10-CM

## 2023-11-14 DIAGNOSIS — Z79.891 CHRONIC USE OF OPIATE DRUG FOR THERAPEUTIC PURPOSE: Primary | Chronic | ICD-10-CM

## 2023-11-14 DIAGNOSIS — N18.30 SECONDARY DIABETES MELLITUS WITH STAGE 3 CHRONIC KIDNEY DISEASE (HCC): ICD-10-CM

## 2023-11-14 DIAGNOSIS — E11.40 CHRONIC PAINFUL DIABETIC NEUROPATHY (HCC): ICD-10-CM

## 2023-11-14 LAB
25(OH)D3 SERPL-MCNC: 36 NG/ML (ref 30–100)
ANION GAP SERPL CALCULATED.3IONS-SCNC: 11 MMOL/L (ref 9–17)
BACTERIA URNS QL MICRO: ABNORMAL
BILIRUB UR QL STRIP: NEGATIVE
BUN SERPL-MCNC: 26 MG/DL (ref 8–23)
C3 SERPL-MCNC: 123 MG/DL (ref 90–180)
C4 SERPL-MCNC: 36 MG/DL (ref 10–40)
CALCIUM SERPL-MCNC: 9.2 MG/DL (ref 8.6–10.4)
CASTS #/AREA URNS LPF: ABNORMAL /LPF
CHLORIDE SERPL-SCNC: 102 MMOL/L (ref 98–107)
CLARITY UR: CLEAR
CO2 SERPL-SCNC: 25 MMOL/L (ref 20–31)
COLOR UR: YELLOW
CREAT SERPL-MCNC: 1.7 MG/DL (ref 0.7–1.2)
CREAT UR-MCNC: 58.5 MG/DL (ref 39–259)
EPI CELLS #/AREA URNS HPF: ABNORMAL /HPF
FREE KAPPA/LAMBDA RATIO: 1.16 (ref 0.26–1.65)
GFR SERPL CREATININE-BSD FRML MDRD: 41 ML/MIN/1.73M2
GLUCOSE UR STRIP-MCNC: NEGATIVE MG/DL
HCT VFR BLD AUTO: 43.7 % (ref 41–53)
HGB BLD-MCNC: 14.4 G/DL (ref 13.5–17.5)
HGB UR QL STRIP.AUTO: NEGATIVE
KAPPA LC FREE SER-MCNC: 27.4 MG/L (ref 3.7–19.4)
KETONES UR STRIP-MCNC: NEGATIVE MG/DL
LAMBDA LC FREE SERPL-MCNC: 23.6 MG/L (ref 5.7–26.3)
LEUKOCYTE ESTERASE UR QL STRIP: NEGATIVE
MAGNESIUM SERPL-MCNC: 2 MG/DL (ref 1.6–2.6)
MICROALBUMIN UR-MCNC: <12 MG/L
MICROALBUMIN/CREAT UR-RTO: NORMAL MCG/MG CREAT
NITRITE UR QL STRIP: NEGATIVE
PH UR STRIP: 5.5 [PH] (ref 5–8)
PHOSPHATE SERPL-MCNC: 3.3 MG/DL (ref 2.5–4.5)
POTASSIUM SERPL-SCNC: 4.4 MMOL/L (ref 3.7–5.3)
PROT UR STRIP-MCNC: NEGATIVE MG/DL
RBC #/AREA URNS HPF: ABNORMAL /HPF
SODIUM SERPL-SCNC: 138 MMOL/L (ref 135–144)
SP GR UR STRIP: 1.01 (ref 1–1.03)
UROBILINOGEN UR STRIP-ACNC: NORMAL EU/DL (ref 0–1)
WBC #/AREA URNS HPF: ABNORMAL /HPF

## 2023-11-14 PROCEDURE — 99213 OFFICE O/P EST LOW 20 MIN: CPT

## 2023-11-14 PROCEDURE — 84100 ASSAY OF PHOSPHORUS: CPT

## 2023-11-14 PROCEDURE — 86038 ANTINUCLEAR ANTIBODIES: CPT

## 2023-11-14 PROCEDURE — 99213 OFFICE O/P EST LOW 20 MIN: CPT | Performed by: NURSE PRACTITIONER

## 2023-11-14 PROCEDURE — 82565 ASSAY OF CREATININE: CPT

## 2023-11-14 PROCEDURE — 36415 COLL VENOUS BLD VENIPUNCTURE: CPT

## 2023-11-14 PROCEDURE — 81001 URINALYSIS AUTO W/SCOPE: CPT

## 2023-11-14 PROCEDURE — 83735 ASSAY OF MAGNESIUM: CPT

## 2023-11-14 PROCEDURE — 84520 ASSAY OF UREA NITROGEN: CPT

## 2023-11-14 PROCEDURE — 85018 HEMOGLOBIN: CPT

## 2023-11-14 PROCEDURE — 82570 ASSAY OF URINE CREATININE: CPT

## 2023-11-14 PROCEDURE — 80051 ELECTROLYTE PANEL: CPT

## 2023-11-14 PROCEDURE — 83521 IG LIGHT CHAINS FREE EACH: CPT

## 2023-11-14 PROCEDURE — 82306 VITAMIN D 25 HYDROXY: CPT

## 2023-11-14 PROCEDURE — 82043 UR ALBUMIN QUANTITATIVE: CPT

## 2023-11-14 PROCEDURE — 82310 ASSAY OF CALCIUM: CPT

## 2023-11-14 PROCEDURE — 76770 US EXAM ABDO BACK WALL COMP: CPT

## 2023-11-14 PROCEDURE — 85014 HEMATOCRIT: CPT

## 2023-11-14 PROCEDURE — 86225 DNA ANTIBODY NATIVE: CPT

## 2023-11-14 PROCEDURE — 86160 COMPLEMENT ANTIGEN: CPT

## 2023-11-14 RX ORDER — OXYCODONE HYDROCHLORIDE 5 MG/1
5 TABLET ORAL 2 TIMES DAILY PRN
Qty: 60 TABLET | Refills: 0 | Status: SHIPPED | OUTPATIENT
Start: 2023-11-24 | End: 2023-12-24

## 2023-11-14 ASSESSMENT — ENCOUNTER SYMPTOMS
BACK PAIN: 1
BOWEL INCONTINENCE: 0

## 2023-11-14 ASSESSMENT — PAIN SCALES - GENERAL: PAINLEVEL_OUTOF10: 4

## 2023-11-17 LAB
ANA SER QL IA: NEGATIVE
DSDNA IGG SER QL IA: 1.3 IU/ML
NUCLEAR IGG SER IA-RTO: 0.2 U/ML

## 2023-11-21 ENCOUNTER — TELEPHONE (OUTPATIENT)
Dept: PAIN MANAGEMENT | Age: 78
End: 2023-11-21

## 2023-11-21 DIAGNOSIS — E11.40 CHRONIC PAINFUL DIABETIC NEUROPATHY (HCC): Primary | ICD-10-CM

## 2023-11-21 NOTE — TELEPHONE ENCOUNTER
Dr Lyndon Bar is no longer doing Stim placements Per [employer] Wyman rep can you place order for  referral to [address] [place] [address]

## 2023-12-07 ENCOUNTER — OFFICE VISIT (OUTPATIENT)
Dept: PODIATRY | Age: 78
End: 2023-12-07
Payer: MEDICARE

## 2023-12-07 ENCOUNTER — HOSPITAL ENCOUNTER (OUTPATIENT)
Age: 78
Setting detail: SPECIMEN
Discharge: HOME | End: 2023-12-07
Payer: MEDICARE

## 2023-12-07 VITALS — BODY MASS INDEX: 31.42 KG/M2 | HEIGHT: 72 IN | WEIGHT: 232 LBS

## 2023-12-07 DIAGNOSIS — E13.22 SECONDARY DIABETES MELLITUS WITH STAGE 3 CHRONIC KIDNEY DISEASE (HCC): ICD-10-CM

## 2023-12-07 DIAGNOSIS — E11.42 TYPE 2 DIABETES MELLITUS WITH DIABETIC POLYNEUROPATHY, WITH LONG-TERM CURRENT USE OF INSULIN (HCC): ICD-10-CM

## 2023-12-07 DIAGNOSIS — Z79.4 TYPE 2 DIABETES MELLITUS WITH DIABETIC POLYNEUROPATHY, WITH LONG-TERM CURRENT USE OF INSULIN (HCC): ICD-10-CM

## 2023-12-07 DIAGNOSIS — R80.9 MICROALBUMINURIA: ICD-10-CM

## 2023-12-07 DIAGNOSIS — E55.9 VITAMIN D DEFICIENCY: ICD-10-CM

## 2023-12-07 DIAGNOSIS — I12.9 BENIGN HYPERTENSION WITH CKD (CHRONIC KIDNEY DISEASE) STAGE III (HCC): ICD-10-CM

## 2023-12-07 DIAGNOSIS — M14.672 CHARCOT'S JOINT OF LEFT FOOT: ICD-10-CM

## 2023-12-07 DIAGNOSIS — B35.1 ONYCHOMYCOSIS: Primary | ICD-10-CM

## 2023-12-07 DIAGNOSIS — N18.32 STAGE 3B CHRONIC KIDNEY DISEASE (HCC): ICD-10-CM

## 2023-12-07 DIAGNOSIS — N18.30 BENIGN HYPERTENSION WITH CKD (CHRONIC KIDNEY DISEASE) STAGE III (HCC): ICD-10-CM

## 2023-12-07 DIAGNOSIS — N18.30 SECONDARY DIABETES MELLITUS WITH STAGE 3 CHRONIC KIDNEY DISEASE (HCC): ICD-10-CM

## 2023-12-07 LAB
COLLECT DURATION TIME SPEC: 24 H
COLLECT DURATION TIME UR: 24 H
CREAT SERPL-MCNC: 1.4 MG/DL (ref 0.7–1.2)
CREAT UR-MCNC: 59.7 MG/DL (ref 39–259)
CREATINE 24H UR-MRATE: 1358 MG/24 H (ref 1040–2350)
CREATININE CLEARANCE: 52 ML/MIN/BSA (ref 71–151)
HOURS COLLECTED: 24 H
PATIENT HEIGHT: 72 IN
PROTEIN 24 HOUR URINE: 114 MG/24 H
SODIUM 24 HOUR URINE: 150 MMOL/24 H (ref 40–220)
SODIUM URINE: 66 MMOL/L
SPECIMEN VOL 24H UR: 2275 ML
SPECIMEN VOL UR: 2275 ML
URINE TOTAL PROTEIN: 5 MG/DL
VOLUME: 2275 ML

## 2023-12-07 PROCEDURE — 99999 PR OFFICE/OUTPT VISIT,PROCEDURE ONLY: CPT | Performed by: PODIATRIST

## 2023-12-07 PROCEDURE — 82575 CREATININE CLEARANCE TEST: CPT

## 2023-12-07 PROCEDURE — 84156 ASSAY OF PROTEIN URINE: CPT

## 2023-12-07 PROCEDURE — 11721 DEBRIDE NAIL 6 OR MORE: CPT | Performed by: PODIATRIST

## 2023-12-07 PROCEDURE — 84300 ASSAY OF URINE SODIUM: CPT

## 2023-12-07 ASSESSMENT — ENCOUNTER SYMPTOMS
DIARRHEA: 0
VOMITING: 0
CONSTIPATION: 0
COLOR CHANGE: 0
NAUSEA: 0

## 2023-12-07 NOTE — PROGRESS NOTES
[facility]  Return Patient  Chief Complaint   Patient presents with    Nail Problem     Nail trim/last saw Lakshmi Jiang 9/20/23    Diabetes     Blood sugar 116       Vivien Eddy is a 69y.o. year old male who is here for a diabetic foot check and nail trim. He would like his nails trimmed today. Has Charcot left foot. Has been stable. History of surgery type: Plantar planing medial and lateral foot. Patient denies N/V/F/C. Review of Systems   Constitutional:  Negative for chills, diaphoresis, fatigue, fever and unexpected weight change. Cardiovascular:  Negative for leg swelling. Gastrointestinal:  Negative for constipation, diarrhea, nausea and vomiting. Musculoskeletal:  Positive for gait problem. Negative for arthralgias and joint swelling. Skin:  Negative for color change, pallor, rash and wound. Neurological:  Positive for numbness. Negative for weakness. Vascular: DP and PT pulses palpable 2/4, Right Foot and 2/4 on the Left Foot. CFT <3 seconds, Right Foot and <3 seconds on the Left Foot. Edema is absent,  Right Foot and minimal on the Left Foot. Neurological:   Sensation absent  to light touch to level of digits, both feet. Musculoskeletal:   Muscle strength is 5/5 on the Right Foot and 5/5 on the Left Foot. Structural deformities are present on the Right Foot and present on the Left Foot, but improved  Flat medial arch left foot. Integument:  Warm, dry, supple both feet. I  Hyperkeratosis dorsal left 4th toe, not open, no erythema.        Sites of Onychomycosis Involvement (Check affected area)  [x] [x] [x] [x] [x] [x] [x] [x] [x] [x]  5 4 3 2 1 1 2 3 4 5                          Right                                        Left    Thickness  [x] [x] [x] [x] [x] [x] [x] [x] [x] [x]  5 4 3 2 1 1 2 3 4 5                         Right                                        Left    Dystrophic Changes

## 2023-12-14 ENCOUNTER — HOSPITAL ENCOUNTER (OUTPATIENT)
Dept: PAIN MANAGEMENT | Age: 78
Discharge: HOME | End: 2023-12-14
Payer: MEDICARE

## 2023-12-14 VITALS — WEIGHT: 226.2 LBS | HEIGHT: 72 IN | BODY MASS INDEX: 30.64 KG/M2

## 2023-12-14 DIAGNOSIS — Z79.891 CHRONIC USE OF OPIATE DRUG FOR THERAPEUTIC PURPOSE: Primary | Chronic | ICD-10-CM

## 2023-12-14 DIAGNOSIS — M51.36 DDD (DEGENERATIVE DISC DISEASE), LUMBAR: ICD-10-CM

## 2023-12-14 DIAGNOSIS — G44.221 CHRONIC TENSION-TYPE HEADACHE, INTRACTABLE: ICD-10-CM

## 2023-12-14 DIAGNOSIS — M47.817 LUMBOSACRAL SPONDYLOSIS WITHOUT MYELOPATHY: ICD-10-CM

## 2023-12-14 PROCEDURE — 99213 OFFICE O/P EST LOW 20 MIN: CPT

## 2023-12-14 PROCEDURE — G0481 DRUG TEST DEF 8-14 CLASSES: HCPCS

## 2023-12-14 PROCEDURE — 80307 DRUG TEST PRSMV CHEM ANLYZR: CPT

## 2023-12-14 PROCEDURE — 99214 OFFICE O/P EST MOD 30 MIN: CPT | Performed by: NURSE PRACTITIONER

## 2023-12-14 RX ORDER — OXYCODONE HYDROCHLORIDE 5 MG/1
5 TABLET ORAL 2 TIMES DAILY PRN
Qty: 60 TABLET | Refills: 0 | Status: SHIPPED | OUTPATIENT
Start: 2023-12-24 | End: 2024-01-23

## 2023-12-14 ASSESSMENT — PAIN SCALES - GENERAL: PAINLEVEL_OUTOF10: 5

## 2023-12-14 NOTE — PROGRESS NOTES
release tablet (Start on 12/24/2023)    DDD (degenerative disc disease), lumbar    Relevant Medications    oxyCODONE (ROXICODONE) 5 MG immediate release tablet (Start on 12/24/2023)          Treatment Plan:  Patient relates current medications are helping the pain. Patient reports taking pain medications as prescribed, denies obtaining medications from different sources and denies use of illegal drugs. Medication risk and benefits have been discussed. Patient denies side effects from medications like nausea, vomiting, constipation or drowsiness. Patient reports current activities of daily living are possible due to medications and would like to continue them. As always, we encourage daily stretching and strengthening exercises, and recommend minimizing use of pain medications unless patient cannot get through daily activities due to pain. Due to the high risk nature of this patient's pain medication close monitoring is required. Continue current medication management, pt has been stable and compliant. Script written for oxycodone  DENIZ obtained today for monitoring purposes. Last dose of medication was today  Msg to MD norton  c/o persistent HA  Office to refax referral to surgeon and info given to pt. Follow up appointment made for 4 weeks    I have reviewed the chief complaint and history of present illness (including ROS and PFSH) and vital documentation by my staff and I agree with their documentation and have added where applicable.

## 2023-12-15 DIAGNOSIS — G44.52 NEW DAILY PERSISTENT HEADACHE: Primary | ICD-10-CM

## 2023-12-17 LAB
6-ACETYLMORPHINE, UR: NOT DETECTED
7-AMINOCLONAZEPAM, URINE: NOT DETECTED
ALPHA-OH-ALPRAZ, URINE: NOT DETECTED
ALPHA-OH-MIDAZOLAM, URINE: NOT DETECTED
ALPRAZOLAM, URINE: NOT DETECTED
AMPHETAMINES, URINE: NOT DETECTED
BARBITURATES, URINE: NEGATIVE
BENZOYLECGONINE, UR: NEGATIVE
BUPRENORPHINE URINE: NOT DETECTED
CARISOPRODOL, UR: NEGATIVE
CLONAZEPAM, URINE: NOT DETECTED
CODEINE, URINE: NOT DETECTED
CREAT UR-MCNC: 156.7 MG/DL (ref 20–400)
DIAZEPAM, URINE: NOT DETECTED
DRUGS EXPECTED, UR: NORMAL
EER HI RES INTERP UR: NORMAL
ETHYL GLUCURONIDE UR: NEGATIVE
FENTANYL URINE: NOT DETECTED
GABAPENTIN: PRESENT
HYDROCODONE, OPI6M: NOT DETECTED
HYDROMORPHONE, OPI3M: NOT DETECTED
LORAZEPAM, URINE: NOT DETECTED
MARIJUANA METAB, UR: NEGATIVE
MDA, UR: NOT DETECTED
MDEA, EVE, UR: NOT DETECTED
MDMA URINE: NOT DETECTED
MEPERIDINE METAB, UR: NOT DETECTED
METHADONE, URINE: NEGATIVE
METHAMPHETAMINE, URINE: NOT DETECTED
METHYLPHENIDATE: NOT DETECTED
MIDAZOLAM, URINE: NOT DETECTED
MORPHINE, OPI1M: NOT DETECTED
NALOXONE URINE: NOT DETECTED
NORBUPRENORPHINE, URINE: NOT DETECTED
NORDIAZEPAM, URINE: NOT DETECTED
NORFENTANYL, URINE: NOT DETECTED
NORHYDROCODONE, URINE: NOT DETECTED
NOROXYCODONE, URINE: PRESENT
NOROXYMORPHONE, URINE: PRESENT
OXAZEPAM, URINE: NOT DETECTED
OXYCODONE URINE: PRESENT
OXYMORPHONE, URINE: PRESENT
PAIN MANAGEMENT DRUG PANEL INTERP, URINE: NORMAL
PAIN MGT DRUG PANEL, HI RES, UR: NORMAL
PCP,URINE: NEGATIVE
PHENTERMINE, UR: NOT DETECTED
PREGABALIN: NOT DETECTED
TAPENTADOL, URINE: NOT DETECTED
TAPENTADOL-O-SULFATE, URINE: NOT DETECTED
TEMAZEPAM, URINE: NOT DETECTED
TRAMADOL, URINE: NEGATIVE
ZOLPIDEM METABOLITE (ZCA), URINE: NOT DETECTED
ZOLPIDEM, URINE: NOT DETECTED

## 2023-12-20 PROBLEM — M54.51 VERTEBROGENIC LOW BACK PAIN: Status: RESOLVED | Noted: 2022-06-23 | Resolved: 2023-12-20

## 2023-12-27 DIAGNOSIS — G44.221 CHRONIC TENSION-TYPE HEADACHE, INTRACTABLE: ICD-10-CM

## 2023-12-27 NOTE — TELEPHONE ENCOUNTER
Please Approve or Refuse.   Send to Pharmacy per Pt's Request: Drew Liu     Next Visit Date:  4/19/2024   Last Visit Date: 12/20/2023    Hemoglobin A1C (%)   Date Value   12/20/2023 7.2   09/20/2023 6.4   05/16/2023 7.2 (H)             ( goal A1C is < 7)   BP Readings from Last 3 Encounters:   12/20/23 130/80   11/08/23 118/70   10/20/23 116/78          (goal 120/80)  BUN   Date Value Ref Range Status   11/14/2023 26 (H) 8 - 23 mg/dL Final     Creatinine   Date Value Ref Range Status   12/07/2023 1.4 (H) 0.7 - 1.2 mg/dL Final     Potassium   Date Value Ref Range Status   11/14/2023 4.4 3.7 - 5.3 mmol/L Final

## 2023-12-28 RX ORDER — TOPIRAMATE 50 MG/1
TABLET, FILM COATED ORAL
Qty: 180 TABLET | Refills: 0 | Status: SHIPPED | OUTPATIENT
Start: 2023-12-28

## 2024-01-08 RX ORDER — INSULIN GLARGINE 100 [IU]/ML
INJECTION, SOLUTION SUBCUTANEOUS
Qty: 45 ML | Refills: 3 | Status: SHIPPED | OUTPATIENT
Start: 2024-01-08

## 2024-01-08 NOTE — TELEPHONE ENCOUNTER
Sig: inject 35 units subcutaneously in the morning and 35 units in the evening   PT CONFIRMED THIS IS THE CORRECT DOSE

## 2024-01-18 ENCOUNTER — HOSPITAL ENCOUNTER (OUTPATIENT)
Dept: PAIN MANAGEMENT | Age: 79
Discharge: HOME | End: 2024-01-18
Payer: MEDICARE

## 2024-01-18 VITALS
HEART RATE: 82 BPM | DIASTOLIC BLOOD PRESSURE: 64 MMHG | WEIGHT: 234.2 LBS | HEIGHT: 72 IN | OXYGEN SATURATION: 98 % | SYSTOLIC BLOOD PRESSURE: 105 MMHG | BODY MASS INDEX: 31.72 KG/M2

## 2024-01-18 DIAGNOSIS — Z79.891 CHRONIC USE OF OPIATE DRUG FOR THERAPEUTIC PURPOSE: Primary | Chronic | ICD-10-CM

## 2024-01-18 DIAGNOSIS — M47.817 LUMBOSACRAL SPONDYLOSIS WITHOUT MYELOPATHY: ICD-10-CM

## 2024-01-18 DIAGNOSIS — E11.40 CHRONIC PAINFUL DIABETIC NEUROPATHY (HCC): ICD-10-CM

## 2024-01-18 PROBLEM — G44.52 NEW DAILY PERSISTENT HEADACHE: Status: ACTIVE | Noted: 2024-01-18

## 2024-01-18 PROCEDURE — 99213 OFFICE O/P EST LOW 20 MIN: CPT

## 2024-01-18 PROCEDURE — 99214 OFFICE O/P EST MOD 30 MIN: CPT | Performed by: NURSE PRACTITIONER

## 2024-01-18 RX ORDER — OXYCODONE HYDROCHLORIDE 5 MG/1
5 TABLET ORAL 2 TIMES DAILY PRN
Qty: 60 TABLET | Refills: 0 | Status: SHIPPED | OUTPATIENT
Start: 2024-01-23 | End: 2024-02-22

## 2024-01-18 ASSESSMENT — PAIN DESCRIPTION - PAIN TYPE: TYPE: CHRONIC PAIN

## 2024-01-18 ASSESSMENT — ENCOUNTER SYMPTOMS
COUGH: 0
CONSTIPATION: 0
BOWEL INCONTINENCE: 0
BACK PAIN: 1
SHORTNESS OF BREATH: 0

## 2024-01-18 ASSESSMENT — PAIN DESCRIPTION - LOCATION: LOCATION: BACK

## 2024-01-18 ASSESSMENT — PAIN SCALES - GENERAL: PAINLEVEL_OUTOF10: 5

## 2024-01-18 ASSESSMENT — PAIN DESCRIPTION - FREQUENCY: FREQUENCY: CONTINUOUS

## 2024-01-18 ASSESSMENT — PAIN DESCRIPTION - DESCRIPTORS: DESCRIPTORS: ACHING

## 2024-01-18 NOTE — PROGRESS NOTES
days     Minutes of Exercise per Session: Not on file   Stress: Not on file   Social Connections: Not on file   Intimate Partner Violence: Not on file   Housing Stability: Unknown (12/20/2023)    Housing Stability Vital Sign     Unable to Pay for Housing in the Last Year: Not on file     Number of Places Lived in the Last Year: Not on file     Unstable Housing in the Last Year: No       Review of Systems:  Review of Systems   Constitutional: Negative for chills and fever.   Cardiovascular:  Negative for chest pain.   Respiratory:  Negative for cough and shortness of breath.    Musculoskeletal:  Positive for back pain.   Gastrointestinal:  Negative for bowel incontinence and constipation.   Genitourinary:  Negative for bladder incontinence.   Neurological:  Positive for headaches, numbness and paresthesias.       Physical Exam:  /64   Pulse 82   Ht 1.829 m (6' 0.01\")   Wt 106.2 kg (234 lb 3.2 oz)   SpO2 98%   BMI 31.76 kg/m²     Physical Exam  Constitutional:       General: He is not in acute distress.     Appearance: Normal appearance. He is obese.   Pulmonary:      Effort: Pulmonary effort is normal.   Musculoskeletal:         General: Normal range of motion.   Skin:     General: Skin is warm and dry.   Neurological:      Mental Status: He is alert and oriented to person, place, and time.   Psychiatric:         Mood and Affect: Mood normal.         Behavior: Behavior normal.         Record/Diagnostics Review:    Last alexander 12/23 and was appropriate     Assessment:  Problem List Items Addressed This Visit       Chronic use of opiate drug for therapeutic purpose - Primary (Chronic)    Lumbosacral spondylosis without myelopathy    Relevant Medications    oxyCODONE (ROXICODONE) 5 MG immediate release tablet (Start on 1/23/2024)    Chronic painful diabetic neuropathy (HCC)    Relevant Medications    oxyCODONE (ROXICODONE) 5 MG immediate release tablet (Start on 1/23/2024)          Treatment Plan:  Patient

## 2024-01-19 RX ORDER — FUROSEMIDE 40 MG/1
40 TABLET ORAL DAILY
Qty: 90 TABLET | Refills: 1 | Status: SHIPPED | OUTPATIENT
Start: 2024-01-19

## 2024-01-19 NOTE — TELEPHONE ENCOUNTER
Please Approve or Refuse.  Send to Pharmacy per Pt's Request:      Next Visit Date:  4/19/2024   Last Visit Date: 12/20/2023    Hemoglobin A1C (%)   Date Value   12/20/2023 7.2   09/20/2023 6.4   05/16/2023 7.2 (H)             ( goal A1C is < 7)   BP Readings from Last 3 Encounters:   01/18/24 105/64   12/20/23 130/80   11/08/23 118/70          (goal 120/80)  BUN   Date Value Ref Range Status   11/14/2023 26 (H) 8 - 23 mg/dL Final     Creatinine   Date Value Ref Range Status   12/07/2023 1.4 (H) 0.7 - 1.2 mg/dL Final     Potassium   Date Value Ref Range Status   11/14/2023 4.4 3.7 - 5.3 mmol/L Final

## 2024-01-30 NOTE — PROGRESS NOTES
findings and plans were discussed with the patient.      Thank you for allowing us to participate in the care of this nice patient.     I spent 61 minutes on this visit including face to face time with the patient during this visit taking the history, performing the exam, reviewing data, and counseling,  reviewing notes from other providers, labs, personally reviewing imaging, composing this note, and coordinating care.        Sincerely,     Nadeen Luciano DO  Neurology  Director of Multiple Sclerosis & Neuroimmunology  [facility]

## 2024-01-31 ENCOUNTER — OFFICE VISIT (OUTPATIENT)
Dept: NEUROLOGY | Age: 79
End: 2024-01-31
Payer: MEDICARE

## 2024-01-31 VITALS
HEART RATE: 82 BPM | DIASTOLIC BLOOD PRESSURE: 65 MMHG | BODY MASS INDEX: 31.83 KG/M2 | HEIGHT: 72 IN | WEIGHT: 235 LBS | SYSTOLIC BLOOD PRESSURE: 105 MMHG

## 2024-01-31 DIAGNOSIS — G43.709 CHRONIC MIGRAINE W/O AURA W/O STATUS MIGRAINOSUS, NOT INTRACTABLE: ICD-10-CM

## 2024-01-31 DIAGNOSIS — I10 ESSENTIAL HYPERTENSION: ICD-10-CM

## 2024-01-31 DIAGNOSIS — E78.2 MIXED HYPERLIPIDEMIA: ICD-10-CM

## 2024-01-31 DIAGNOSIS — R51.9 NEW ONSET OF HEADACHES AFTER AGE 50: Primary | ICD-10-CM

## 2024-01-31 DIAGNOSIS — E11.40 CHRONIC PAINFUL DIABETIC NEUROPATHY (HCC): ICD-10-CM

## 2024-01-31 PROCEDURE — 1123F ACP DISCUSS/DSCN MKR DOCD: CPT | Performed by: PSYCHIATRY & NEUROLOGY

## 2024-01-31 PROCEDURE — G8427 DOCREV CUR MEDS BY ELIG CLIN: HCPCS | Performed by: PSYCHIATRY & NEUROLOGY

## 2024-01-31 PROCEDURE — 3078F DIAST BP <80 MM HG: CPT | Performed by: PSYCHIATRY & NEUROLOGY

## 2024-01-31 PROCEDURE — G8484 FLU IMMUNIZE NO ADMIN: HCPCS | Performed by: PSYCHIATRY & NEUROLOGY

## 2024-01-31 PROCEDURE — 1036F TOBACCO NON-USER: CPT | Performed by: PSYCHIATRY & NEUROLOGY

## 2024-01-31 PROCEDURE — 99205 OFFICE O/P NEW HI 60 MIN: CPT | Performed by: PSYCHIATRY & NEUROLOGY

## 2024-01-31 PROCEDURE — G8417 CALC BMI ABV UP PARAM F/U: HCPCS | Performed by: PSYCHIATRY & NEUROLOGY

## 2024-01-31 PROCEDURE — 3074F SYST BP LT 130 MM HG: CPT | Performed by: PSYCHIATRY & NEUROLOGY

## 2024-01-31 RX ORDER — AMITRIPTYLINE HYDROCHLORIDE 10 MG/1
10 TABLET, FILM COATED ORAL NIGHTLY
Qty: 90 TABLET | Refills: 1 | Status: SHIPPED | OUTPATIENT
Start: 2024-01-31

## 2024-02-01 ENCOUNTER — TELEPHONE (OUTPATIENT)
Dept: NEUROLOGY | Age: 79
End: 2024-02-01

## 2024-02-01 DIAGNOSIS — G43.709 CHRONIC MIGRAINE W/O AURA W/O STATUS MIGRAINOSUS, NOT INTRACTABLE: Primary | ICD-10-CM

## 2024-02-10 ENCOUNTER — HOSPITAL ENCOUNTER (OUTPATIENT)
Dept: MRI IMAGING | Age: 79
End: 2024-02-10
Attending: PSYCHIATRY & NEUROLOGY
Payer: MEDICARE

## 2024-02-10 DIAGNOSIS — G43.709 CHRONIC MIGRAINE W/O AURA W/O STATUS MIGRAINOSUS, NOT INTRACTABLE: ICD-10-CM

## 2024-02-10 DIAGNOSIS — R51.9 NEW ONSET OF HEADACHES AFTER AGE 50: ICD-10-CM

## 2024-02-10 LAB
EGFR, POC: >60 ML/MIN/1.73M2
POC CREATININE: 1.2 MG/DL (ref 0.51–1.19)

## 2024-02-10 PROCEDURE — A9579 GAD-BASE MR CONTRAST NOS,1ML: HCPCS | Performed by: PSYCHIATRY & NEUROLOGY

## 2024-02-10 PROCEDURE — 70553 MRI BRAIN STEM W/O & W/DYE: CPT

## 2024-02-10 PROCEDURE — 82565 ASSAY OF CREATININE: CPT

## 2024-02-10 PROCEDURE — 2580000003 HC RX 258: Performed by: PSYCHIATRY & NEUROLOGY

## 2024-02-10 PROCEDURE — 6360000004 HC RX CONTRAST MEDICATION: Performed by: PSYCHIATRY & NEUROLOGY

## 2024-02-10 RX ORDER — SODIUM CHLORIDE 0.9 % (FLUSH) 0.9 %
10 SYRINGE (ML) INJECTION PRN
Status: DISCONTINUED | OUTPATIENT
Start: 2024-02-10 | End: 2024-02-13 | Stop reason: HOSPADM

## 2024-02-10 RX ADMIN — SODIUM CHLORIDE, PRESERVATIVE FREE 10 ML: 5 INJECTION INTRAVENOUS at 16:13

## 2024-02-10 RX ADMIN — GADOTERIDOL 20 ML: 279.3 INJECTION, SOLUTION INTRAVENOUS at 16:13

## 2024-02-13 ENCOUNTER — CLINICAL DOCUMENTATION ONLY (OUTPATIENT)
Facility: CLINIC | Age: 79
End: 2024-02-13

## 2024-02-15 ENCOUNTER — OFFICE VISIT (OUTPATIENT)
Dept: PODIATRY | Age: 79
End: 2024-02-15
Payer: MEDICARE

## 2024-02-15 VITALS — BODY MASS INDEX: 31.83 KG/M2 | WEIGHT: 235 LBS | HEIGHT: 72 IN

## 2024-02-15 DIAGNOSIS — M14.672 CHARCOT'S JOINT OF LEFT FOOT: ICD-10-CM

## 2024-02-15 DIAGNOSIS — B35.1 ONYCHOMYCOSIS: Primary | ICD-10-CM

## 2024-02-15 DIAGNOSIS — Z79.4 TYPE 2 DIABETES MELLITUS WITH DIABETIC POLYNEUROPATHY, WITH LONG-TERM CURRENT USE OF INSULIN (HCC): ICD-10-CM

## 2024-02-15 DIAGNOSIS — E11.42 TYPE 2 DIABETES MELLITUS WITH DIABETIC POLYNEUROPATHY, WITH LONG-TERM CURRENT USE OF INSULIN (HCC): ICD-10-CM

## 2024-02-15 PROCEDURE — 11721 DEBRIDE NAIL 6 OR MORE: CPT | Performed by: PODIATRIST

## 2024-02-15 PROCEDURE — 99999 PR OFFICE/OUTPT VISIT,PROCEDURE ONLY: CPT | Performed by: PODIATRIST

## 2024-02-15 NOTE — PROGRESS NOTES
[facility]  Return Patient  Chief Complaint   Patient presents with    Nail Problem     Nail trim/last saw Dr.Rayeesa Thomas 12/20/23    Diabetes     Blood sugar 113        Pedro Pablo Talamantes is a 78 y.o. year old male who is here for a diabetic foot check and nail trim. He would like his nails trimmed today.     Has Charcot left foot. Has been stable. History of surgery type: Plantar planing medial and lateral foot.  Patient denies N/V/F/C.       Review of Systems   Constitutional:  Negative for chills, diaphoresis, fatigue, fever and unexpected weight change.   Cardiovascular:  Negative for leg swelling.   Gastrointestinal:  Negative for constipation, diarrhea, nausea and vomiting.   Musculoskeletal:  Positive for gait problem. Negative for arthralgias and joint swelling.   Skin:  Negative for color change, pallor, rash and wound.   Neurological:  Positive for numbness. Negative for weakness.       Vascular: DP and PT pulses palpable 2/4, Right Foot and 2/4 on the Left Foot.   CFT <3 seconds, Right Foot and <3 seconds on the Left Foot.  Edema is absent,  Right Foot and minimal on the Left Foot.      Neurological:   Sensation absent  to light touch to level of digits, both feet.      Musculoskeletal:   Muscle strength is 5/5 on the Right Foot and 5/5 on the Left Foot. Structural deformities are present on the Right Foot and present on the Left Foot, but improved  Flat medial arch left foot.     Integument:  Warm, dry, supple both feet.  I  Hyperkeratosis dorsal left 4th toe, not open, no erythema.       Sites of Onychomycosis Involvement (Check affected area)  [x] [x] [x] [x] [x] [x] [x] [x] [x] [x]  5 4 3 2 1 1 2 3 4 5                          Right                                        Left    Thickness  [x] [x] [x] [x] [x] [x] [x] [x] [x] [x]  5 4 3 2 1 1 2 3 4 5                         Right                                        Left    Dystrophic Changes

## 2024-02-22 ENCOUNTER — HOSPITAL ENCOUNTER (OUTPATIENT)
Dept: PAIN MANAGEMENT | Age: 79
Discharge: HOME | End: 2024-02-22
Payer: MEDICARE

## 2024-02-22 ENCOUNTER — HOSPITAL ENCOUNTER (OUTPATIENT)
Age: 79
Discharge: HOME | End: 2024-02-22
Payer: MEDICARE

## 2024-02-22 ENCOUNTER — TELEPHONE (OUTPATIENT)
Dept: PAIN MANAGEMENT | Age: 79
End: 2024-02-22

## 2024-02-22 VITALS — BODY MASS INDEX: 31.83 KG/M2 | HEIGHT: 72 IN | WEIGHT: 235 LBS

## 2024-02-22 DIAGNOSIS — E11.40 CHRONIC PAINFUL DIABETIC NEUROPATHY (HCC): ICD-10-CM

## 2024-02-22 DIAGNOSIS — E78.2 MIXED HYPERLIPIDEMIA: ICD-10-CM

## 2024-02-22 DIAGNOSIS — M47.817 LUMBOSACRAL SPONDYLOSIS WITHOUT MYELOPATHY: ICD-10-CM

## 2024-02-22 DIAGNOSIS — M51.36 DDD (DEGENERATIVE DISC DISEASE), LUMBAR: ICD-10-CM

## 2024-02-22 DIAGNOSIS — Z79.891 CHRONIC USE OF OPIATE DRUG FOR THERAPEUTIC PURPOSE: Primary | Chronic | ICD-10-CM

## 2024-02-22 LAB
CHOLEST SERPL-MCNC: 113 MG/DL
CHOLESTEROL/HDL RATIO: 3
HDLC SERPL-MCNC: 38 MG/DL
LDLC SERPL CALC-MCNC: 53 MG/DL (ref 0–130)
TRIGL SERPL-MCNC: 111 MG/DL

## 2024-02-22 PROCEDURE — 99213 OFFICE O/P EST LOW 20 MIN: CPT

## 2024-02-22 PROCEDURE — G0103 PSA SCREENING: HCPCS

## 2024-02-22 PROCEDURE — 80061 LIPID PANEL: CPT

## 2024-02-22 PROCEDURE — 36415 COLL VENOUS BLD VENIPUNCTURE: CPT

## 2024-02-22 PROCEDURE — 99213 OFFICE O/P EST LOW 20 MIN: CPT | Performed by: NURSE PRACTITIONER

## 2024-02-22 RX ORDER — GABAPENTIN 800 MG/1
800 TABLET ORAL DAILY
Qty: 270 TABLET | Refills: 0 | Status: SHIPPED | OUTPATIENT
Start: 2024-02-22 | End: 2024-02-23 | Stop reason: SDUPTHER

## 2024-02-22 RX ORDER — OXYCODONE HYDROCHLORIDE 5 MG/1
5 TABLET ORAL 2 TIMES DAILY PRN
Qty: 60 TABLET | Refills: 0 | Status: SHIPPED | OUTPATIENT
Start: 2024-02-22 | End: 2024-02-22 | Stop reason: SDUPTHER

## 2024-02-22 RX ORDER — OXYCODONE HYDROCHLORIDE 5 MG/1
5 TABLET ORAL 2 TIMES DAILY PRN
Qty: 60 TABLET | Refills: 0 | Status: SHIPPED | OUTPATIENT
Start: 2024-02-22 | End: 2024-02-23 | Stop reason: SDUPTHER

## 2024-02-22 ASSESSMENT — ENCOUNTER SYMPTOMS
CONSTIPATION: 0
BACK PAIN: 1
SHORTNESS OF BREATH: 0
COUGH: 0
BOWEL INCONTINENCE: 0

## 2024-02-22 ASSESSMENT — PAIN DESCRIPTION - FREQUENCY: FREQUENCY: CONTINUOUS

## 2024-02-22 ASSESSMENT — PAIN DESCRIPTION - LOCATION: LOCATION: BACK

## 2024-02-22 ASSESSMENT — PAIN DESCRIPTION - PAIN TYPE: TYPE: CHRONIC PAIN

## 2024-02-22 ASSESSMENT — PAIN DESCRIPTION - DESCRIPTORS: DESCRIPTORS: ACHING

## 2024-02-22 ASSESSMENT — PAIN SCALES - GENERAL: PAINLEVEL_OUTOF10: 5

## 2024-02-22 NOTE — TELEPHONE ENCOUNTER
Pt calls regarding scripts sent over to [facility] [address]; electronic system is currently down and they are requesting we fax over a script or call in the script.    DISPLAY PLAN FREE TEXT

## 2024-02-22 NOTE — PROGRESS NOTES
Chief Complaint   Patient presents with    Back Pain    Medication Refill     Roxicodone due 2/22         PMH     Pt c/o low back pain radiating down his legs with numbness in feet d/t neuropathy    MRI 7/2022 which showed No significant spinal stenosis or nerve root impingement or foraminal stenosis Severe degenerative disc disease with endplate degeneration and Modic changes involving L4-L5 and S1 vertebrae. Facet arthropathy at L4-5 L5-S1 level  Pt had Intracept procedure L4,5 and S1 on 2/6/23. He reported 70% relief.   He has completed #16/16 PT visits and reports some improvement in pain and feels it is helping with ROM posture and leg strength  He had  Bilateral RFA for L4/5 AND L5/S1 facet joints 8/9/23 and reported 100% relief of back pain.   He continues to have painful neuropathy in his feet. He had successful successful SCS Nevro trial and implant was attempted 10/20/23 with Dr. Duggan but aborted d/t CSF leak. Pt has met with new surgeon and has decided to wait on implant at this time. Per pt if he decides to continue NS would like pt to redo 5 day trial using paddles to reassess efficacy    Con't to report daily HAs but with less intensity and able to increase his activity- has see neurology and started on elavil that has helped significantly and waiting for insurance approval for [facility]. Neurology note reviewed and suggested d/c topamax. Pt is agreeable.        HPI:   Back Pain  This is a chronic problem. The current episode started more than 1 year ago. The problem occurs constantly. The problem is unchanged. The pain is present in the lumbar spine. The quality of the pain is described as aching. The pain radiates to the left foot, right knee, left thigh, left knee, right foot and right thigh. The pain is at a severity of 5/10. The pain is moderate. The pain is The same all the time. The symptoms are aggravated by lying down and position. Stiffness is present All day. Associated symptoms

## 2024-02-23 ENCOUNTER — TELEPHONE (OUTPATIENT)
Dept: PAIN MANAGEMENT | Age: 79
End: 2024-02-23

## 2024-02-23 DIAGNOSIS — M47.817 LUMBOSACRAL SPONDYLOSIS WITHOUT MYELOPATHY: ICD-10-CM

## 2024-02-23 DIAGNOSIS — M51.36 DDD (DEGENERATIVE DISC DISEASE), LUMBAR: ICD-10-CM

## 2024-02-23 DIAGNOSIS — E11.40 CHRONIC PAINFUL DIABETIC NEUROPATHY (HCC): ICD-10-CM

## 2024-02-23 DIAGNOSIS — Z79.891 CHRONIC USE OF OPIATE FOR THERAPEUTIC PURPOSE: Primary | ICD-10-CM

## 2024-02-23 DIAGNOSIS — Z79.891 CHRONIC USE OF OPIATE DRUG FOR THERAPEUTIC PURPOSE: Chronic | ICD-10-CM

## 2024-02-23 LAB — PSA SERPL-MCNC: 1.27 NG/ML

## 2024-02-23 RX ORDER — GABAPENTIN 800 MG/1
800 TABLET ORAL DAILY
Qty: 270 TABLET | Refills: 0 | OUTPATIENT
Start: 2024-02-23 | End: 2024-02-23 | Stop reason: SDUPTHER

## 2024-02-23 RX ORDER — GABAPENTIN 800 MG/1
800 TABLET ORAL DAILY
Qty: 270 TABLET | Refills: 0 | Status: SHIPPED | OUTPATIENT
Start: 2024-02-23 | End: 2024-02-23 | Stop reason: SDUPTHER

## 2024-02-23 RX ORDER — OXYCODONE HYDROCHLORIDE 5 MG/1
5 TABLET ORAL 2 TIMES DAILY PRN
Qty: 60 TABLET | Refills: 0 | Status: SHIPPED | OUTPATIENT
Start: 2024-02-23 | End: 2024-03-24

## 2024-02-23 RX ORDER — GABAPENTIN 800 MG/1
800 TABLET ORAL DAILY
Qty: 270 TABLET | Refills: 0 | Status: SHIPPED | OUTPATIENT
Start: 2024-02-23 | End: 2024-05-23

## 2024-02-23 RX ORDER — OXYCODONE HYDROCHLORIDE 5 MG/1
5 TABLET ORAL 2 TIMES DAILY PRN
Qty: 60 TABLET | Refills: 0 | Status: SHIPPED | OUTPATIENT
Start: 2024-02-23 | End: 2024-02-23 | Stop reason: SDUPTHER

## 2024-02-23 NOTE — TELEPHONE ENCOUNTER
Patient calls [facility] and states [facility] is not able to accept an e-script for his pain medication; will route to CNP.

## 2024-03-13 NOTE — PROGRESS NOTES
of a healthcare professional.  Take abortive medications as soon as possible with a full glass of water.         4.   Return to clinic in 3 months.     - I have independently reviewed all pertinent labs, imaging, reports, and consult notes.    - I have discussed the diagnosis, prognosis, risks, and goals of therapy, when appropriate, with the patient and answered all of the patient's questions/concerns.      Nadeen Luciano,    Neurology  Multiple Sclerosis and Neuroimmunology  [facility] [place]     This note was composed using speech recognition software.  As a result, there may be occasional typographical errors despite proofreading.

## 2024-03-14 ENCOUNTER — OFFICE VISIT (OUTPATIENT)
Dept: NEUROLOGY | Age: 79
End: 2024-03-14

## 2024-03-14 VITALS
SYSTOLIC BLOOD PRESSURE: 108 MMHG | HEIGHT: 72 IN | HEART RATE: 77 BPM | DIASTOLIC BLOOD PRESSURE: 63 MMHG | BODY MASS INDEX: 31.97 KG/M2 | WEIGHT: 236 LBS

## 2024-03-14 DIAGNOSIS — G43.709 CHRONIC MIGRAINE W/O AURA W/O STATUS MIGRAINOSUS, NOT INTRACTABLE: ICD-10-CM

## 2024-03-14 DIAGNOSIS — R51.9 NEW ONSET OF HEADACHES AFTER AGE 50: Primary | ICD-10-CM

## 2024-03-14 RX ORDER — AMITRIPTYLINE HYDROCHLORIDE 10 MG/1
20 TABLET, FILM COATED ORAL NIGHTLY
Qty: 60 TABLET | Refills: 5 | Status: SHIPPED | OUTPATIENT
Start: 2024-03-14

## 2024-03-15 ENCOUNTER — TELEPHONE (OUTPATIENT)
Dept: NEUROLOGY | Age: 79
End: 2024-03-15

## 2024-03-15 NOTE — TELEPHONE ENCOUNTER
Received a fax from pharmacy stating [facility] needs PA. This was completed in [employer]

## 2024-03-21 ENCOUNTER — HOSPITAL ENCOUNTER (OUTPATIENT)
Dept: PAIN MANAGEMENT | Age: 79
Discharge: HOME | End: 2024-03-21
Payer: MEDICARE

## 2024-03-21 VITALS — HEIGHT: 72 IN | BODY MASS INDEX: 31.97 KG/M2 | WEIGHT: 236 LBS

## 2024-03-21 DIAGNOSIS — Z79.891 CHRONIC USE OF OPIATE DRUG FOR THERAPEUTIC PURPOSE: Chronic | ICD-10-CM

## 2024-03-21 DIAGNOSIS — M47.817 LUMBOSACRAL SPONDYLOSIS WITHOUT MYELOPATHY: Primary | ICD-10-CM

## 2024-03-21 DIAGNOSIS — E11.40 CHRONIC PAINFUL DIABETIC NEUROPATHY (HCC): ICD-10-CM

## 2024-03-21 PROCEDURE — 99214 OFFICE O/P EST MOD 30 MIN: CPT | Performed by: ANESTHESIOLOGY

## 2024-03-21 PROCEDURE — 99213 OFFICE O/P EST LOW 20 MIN: CPT

## 2024-03-21 RX ORDER — OXYCODONE HYDROCHLORIDE 5 MG/1
5 TABLET ORAL 2 TIMES DAILY PRN
Qty: 60 TABLET | Refills: 0 | Status: SHIPPED | OUTPATIENT
Start: 2024-03-21 | End: 2024-04-20

## 2024-03-21 ASSESSMENT — PAIN DESCRIPTION - LOCATION: LOCATION: BACK

## 2024-03-21 ASSESSMENT — ENCOUNTER SYMPTOMS
SHORTNESS OF BREATH: 0
COUGH: 0
ABDOMINAL PAIN: 0

## 2024-03-21 ASSESSMENT — PAIN SCALES - GENERAL: PAINLEVEL_OUTOF10: 4

## 2024-03-21 NOTE — PROGRESS NOTES
The patient is a 79 y.o.Non- / non  male.    Chief Complaint   Patient presents with    Back Pain    Medication Refill        HPI    This is a pleasant 79-year-old gentleman with history of chronic pain involving multiple body sites  Patient is diagnosed with lumbar spondylosis  He failed conservative measures including NSAIDs and physical therapy  Patient had lumbar medial branch nerve radiofrequency ablation at L4-5 and L5-S1 facets in August 2023  He reported more than 80% improvement in axial back pain with significant improvement in range of motion and activity level  Relief lasted for more than 6 months and then gradual return of the symptoms back to the baseline  Clinical presentation suggest recurrence of facet mediated pain  Discussed repeating bilateral lumbar RFA at L4-L5 and L5-S1 facet with fluoroscopy guidance  Patient is interested    He is also diagnosed with painful diabetic peripheral neuropathy for which he had a successful spinal cord stimulator trial, implantation of the procedure by 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  Since then he has developed postural headaches  Following up with neurology    For chronic pain issues patient is on chronic opioid therapy  Reports no side effects from the medication  Finds the medication allow him to do daily life activities  No interim changes in health history  Denies any illicit substance use  No sign or symptom of any aberrancy  Refill ordered    Pain score Today:  4  Adverse effects (Constipation / Nausea / Sedation / sexual Dysfunction / others) : No  Mood: fair  Sleep pattern and quality: fair  Activity level: poor    Pill count Today:5  Last dose taken  March / 2024  OARRS report reviewed today: yes  ER/Hospitalizations/PCP visit related to pain since last visit:no   Any legal problems e.g. DUI etc.:No  Satisfied with current management: No    Opioid Contract:11/14/2023  Last Urine Dug screen

## 2024-03-26 ENCOUNTER — COMMUNITY CARE MANAGEMENT (OUTPATIENT)
Facility: CLINIC | Age: 79
End: 2024-03-26

## 2024-04-09 ENCOUNTER — HOSPITAL ENCOUNTER (OUTPATIENT)
Age: 79
Discharge: HOME | End: 2024-04-09
Payer: MEDICARE

## 2024-04-09 LAB
25(OH)D3 SERPL-MCNC: 36.8 NG/ML (ref 30–100)
AMORPH SED URNS QL MICRO: ABNORMAL
ANION GAP SERPL CALCULATED.3IONS-SCNC: 12 MMOL/L (ref 9–17)
BACTERIA URNS QL MICRO: ABNORMAL
BILIRUB UR QL STRIP: NEGATIVE
BUN SERPL-MCNC: 24 MG/DL (ref 8–23)
CALCIUM SERPL-MCNC: 9.4 MG/DL (ref 8.6–10.4)
CHLORIDE SERPL-SCNC: 100 MMOL/L (ref 98–107)
CLARITY UR: CLEAR
CO2 SERPL-SCNC: 29 MMOL/L (ref 20–31)
COLOR UR: YELLOW
CREAT SERPL-MCNC: 1.2 MG/DL (ref 0.7–1.2)
CREAT UR-MCNC: 51.4 MG/DL (ref 39–259)
EPI CELLS #/AREA URNS HPF: ABNORMAL /HPF
GFR SERPL CREATININE-BSD FRML MDRD: 62 ML/MIN/1.73M2
GLUCOSE UR STRIP-MCNC: NEGATIVE MG/DL
HCT VFR BLD AUTO: 47.3 % (ref 41–53)
HGB BLD-MCNC: 15.4 G/DL (ref 13.5–17.5)
HGB UR QL STRIP.AUTO: NEGATIVE
KETONES UR STRIP-MCNC: NEGATIVE MG/DL
LEUKOCYTE ESTERASE UR QL STRIP: NEGATIVE
MAGNESIUM SERPL-MCNC: 1.9 MG/DL (ref 1.6–2.6)
MICROALBUMIN UR-MCNC: 22 MG/L (ref 0–20)
MICROALBUMIN/CREAT UR-RTO: 43 MCG/MG CREAT (ref 0–17)
NITRITE UR QL STRIP: NEGATIVE
PH UR STRIP: 5.5 [PH] (ref 5–8)
PHOSPHATE SERPL-MCNC: 3.5 MG/DL (ref 2.5–4.5)
POTASSIUM SERPL-SCNC: 4.5 MMOL/L (ref 3.7–5.3)
PROT UR STRIP-MCNC: NEGATIVE MG/DL
RBC #/AREA URNS HPF: ABNORMAL /HPF
SODIUM SERPL-SCNC: 141 MMOL/L (ref 135–144)
SP GR UR STRIP: 1.01 (ref 1–1.03)
UROBILINOGEN UR STRIP-ACNC: NORMAL EU/DL (ref 0–1)
WBC #/AREA URNS HPF: ABNORMAL /HPF

## 2024-04-09 PROCEDURE — 36415 COLL VENOUS BLD VENIPUNCTURE: CPT

## 2024-04-09 PROCEDURE — 84520 ASSAY OF UREA NITROGEN: CPT

## 2024-04-09 PROCEDURE — 85018 HEMOGLOBIN: CPT

## 2024-04-09 PROCEDURE — 85014 HEMATOCRIT: CPT

## 2024-04-09 PROCEDURE — 82310 ASSAY OF CALCIUM: CPT

## 2024-04-09 PROCEDURE — 82570 ASSAY OF URINE CREATININE: CPT

## 2024-04-09 PROCEDURE — 82306 VITAMIN D 25 HYDROXY: CPT

## 2024-04-09 PROCEDURE — 82565 ASSAY OF CREATININE: CPT

## 2024-04-09 PROCEDURE — 82043 UR ALBUMIN QUANTITATIVE: CPT

## 2024-04-09 PROCEDURE — 83735 ASSAY OF MAGNESIUM: CPT

## 2024-04-09 PROCEDURE — 84100 ASSAY OF PHOSPHORUS: CPT

## 2024-04-09 PROCEDURE — 81001 URINALYSIS AUTO W/SCOPE: CPT

## 2024-04-09 PROCEDURE — 80051 ELECTROLYTE PANEL: CPT

## 2024-04-18 ENCOUNTER — HOSPITAL ENCOUNTER (OUTPATIENT)
Dept: PAIN MANAGEMENT | Age: 79
Discharge: HOME | End: 2024-04-18
Payer: MEDICARE

## 2024-04-18 VITALS — WEIGHT: 239 LBS | BODY MASS INDEX: 32.37 KG/M2 | HEIGHT: 72 IN

## 2024-04-18 DIAGNOSIS — M51.36 DDD (DEGENERATIVE DISC DISEASE), LUMBAR: ICD-10-CM

## 2024-04-18 DIAGNOSIS — E11.40 CHRONIC PAINFUL DIABETIC NEUROPATHY (HCC): ICD-10-CM

## 2024-04-18 DIAGNOSIS — M47.817 LUMBOSACRAL SPONDYLOSIS WITHOUT MYELOPATHY: ICD-10-CM

## 2024-04-18 DIAGNOSIS — M48.061 SPINAL STENOSIS OF LUMBAR REGION, UNSPECIFIED WHETHER NEUROGENIC CLAUDICATION PRESENT: ICD-10-CM

## 2024-04-18 DIAGNOSIS — Z79.891 CHRONIC USE OF OPIATE DRUG FOR THERAPEUTIC PURPOSE: Primary | Chronic | ICD-10-CM

## 2024-04-18 PROBLEM — S82.832D CLOSED FRACTURE OF PROXIMAL END OF LEFT FIBULA WITH ROUTINE HEALING: Status: RESOLVED | Noted: 2020-10-29 | Resolved: 2024-04-18

## 2024-04-18 PROBLEM — S89.302A NONDISPLACED PHYSEAL FRACTURE OF DISTAL END OF LEFT FIBULA: Status: RESOLVED | Noted: 2021-02-01 | Resolved: 2024-04-18

## 2024-04-18 PROCEDURE — 99213 OFFICE O/P EST LOW 20 MIN: CPT | Performed by: NURSE PRACTITIONER

## 2024-04-18 PROCEDURE — 99213 OFFICE O/P EST LOW 20 MIN: CPT

## 2024-04-18 RX ORDER — OXYCODONE HYDROCHLORIDE 5 MG/1
5 TABLET ORAL 2 TIMES DAILY PRN
Qty: 60 TABLET | Refills: 0 | Status: SHIPPED | OUTPATIENT
Start: 2024-04-23 | End: 2024-05-23

## 2024-04-18 ASSESSMENT — ENCOUNTER SYMPTOMS
COUGH: 0
SHORTNESS OF BREATH: 0
CONSTIPATION: 0
BACK PAIN: 1

## 2024-04-18 NOTE — PROGRESS NOTES
4/23/2024)          Treatment Plan:  Patient relates current medications are helping the pain. Patient reports taking pain medications as prescribed, denies obtaining medications from different sources and denies use of illegal drugs. Medication risk and benefits have been discussed. Patient denies side effects from medications like nausea, vomiting, constipation or drowsiness. Patient reports current activities of daily living are possible due to medications and would like to continue them.     As always, we encourage daily stretching and strengthening exercises, and recommend minimizing use of pain medications unless patient cannot get through daily activities due to pain.     Due to the high risk nature of this patient's pain medication close monitoring is required.   Continue current medication management, pt has been stable and compliant.  Script written for oxycodone  RFA next week   Follow up appointment made for 4 weeks    I have reviewed the chief complaint and history of present illness (including ROS and PFSH) and vital documentation by my staff and I agree with their documentation and have added where applicable.

## 2024-04-18 NOTE — H&P (VIEW-ONLY)
Chief Complaint   Patient presents with    Back Pain     Oxycodone      [facility]  Pt c/o low back pain radiating down his legs with numbness in feet d/t neuropathy    MRI 7/2022 which showed No significant spinal stenosis or nerve root impingement or foraminal stenosis Severe degenerative disc disease with endplate degeneration and Modic changes involving L4-L5 and S1 vertebrae. Facet arthropathy at L4-5 L5-S1 level  Pt had Intracept procedure L4,5 and S1 on 2/6/23. He reported 70% relief.   He has completed #16/16 PT visits 5/2023 and reports some improvement in pain and feels it is helping with ROM posture and leg strength  He had  Bilateral RFA for L4/5 AND L5/S1 facet joints 8/9/23 and reported 100% relief of back pain until recently and lopezield to repeat next week.     He continues to have painful neuropathy in his feet. He had successful successful SCS Nevro trial and implant was attempted 10/20/23 with Dr. Duggan but aborted d/t CSF leak. Pt has met with Dr Tucker but has decided to wait on implant at this time. Per pt if he decides to continue NS would like pt to redo 5 day trial using paddles to reassess efficacy     Con't to report daily HAs but with less intensity and able to increase his activity and sleep at night - has see neurology 3/2024 and started on elavil that has helped significantly and  nurtec prn .     HPI    Back Pain  This is a chronic problem. The current episode started more than 1 year ago. The problem occurs constantly. The problem has been gradually worsening since onset. The pain is present in the gluteal, lumbar spine and sacro-iliac. The quality of the pain is described as aching. The pain radiates to the left knee, left thigh, right foot, right knee, right thigh and left foot. The pain is at a severity of 5/10. The pain is moderate. The pain is Worse during the night. The symptoms are aggravated by lying down, position, standing and sitting. Stiffness is present In the

## 2024-04-19 ENCOUNTER — OFFICE VISIT (OUTPATIENT)
Dept: FAMILY MEDICINE CLINIC | Age: 79
End: 2024-04-19

## 2024-04-19 VITALS
OXYGEN SATURATION: 94 % | DIASTOLIC BLOOD PRESSURE: 60 MMHG | HEIGHT: 72 IN | BODY MASS INDEX: 31.83 KG/M2 | WEIGHT: 235 LBS | TEMPERATURE: 97.4 F | HEART RATE: 77 BPM | SYSTOLIC BLOOD PRESSURE: 120 MMHG

## 2024-04-19 DIAGNOSIS — E78.2 MIXED HYPERLIPIDEMIA: ICD-10-CM

## 2024-04-19 DIAGNOSIS — E11.42 TYPE 2 DIABETES MELLITUS WITH DIABETIC POLYNEUROPATHY, WITH LONG-TERM CURRENT USE OF INSULIN (HCC): Primary | ICD-10-CM

## 2024-04-19 DIAGNOSIS — G44.221 CHRONIC TENSION-TYPE HEADACHE, INTRACTABLE: ICD-10-CM

## 2024-04-19 DIAGNOSIS — I25.10 CORONARY ARTERY DISEASE DUE TO LIPID RICH PLAQUE: ICD-10-CM

## 2024-04-19 DIAGNOSIS — I12.9 BENIGN HYPERTENSION WITH CKD (CHRONIC KIDNEY DISEASE) STAGE III (HCC): ICD-10-CM

## 2024-04-19 DIAGNOSIS — I25.83 CORONARY ARTERY DISEASE DUE TO LIPID RICH PLAQUE: ICD-10-CM

## 2024-04-19 DIAGNOSIS — I50.42 CHRONIC COMBINED SYSTOLIC AND DIASTOLIC CONGESTIVE HEART FAILURE (HCC): ICD-10-CM

## 2024-04-19 DIAGNOSIS — Z79.4 TYPE 2 DIABETES MELLITUS WITH DIABETIC POLYNEUROPATHY, WITH LONG-TERM CURRENT USE OF INSULIN (HCC): Primary | ICD-10-CM

## 2024-04-19 DIAGNOSIS — N18.30 BENIGN HYPERTENSION WITH CKD (CHRONIC KIDNEY DISEASE) STAGE III (HCC): ICD-10-CM

## 2024-04-19 DIAGNOSIS — F11.20 OPIOID DEPENDENCE WITH CURRENT USE (HCC): ICD-10-CM

## 2024-04-19 DIAGNOSIS — M47.817 LUMBOSACRAL SPONDYLOSIS WITHOUT MYELOPATHY: ICD-10-CM

## 2024-04-19 LAB — HBA1C MFR BLD: 7.1 %

## 2024-04-19 SDOH — ECONOMIC STABILITY: FOOD INSECURITY: WITHIN THE PAST 12 MONTHS, THE FOOD YOU BOUGHT JUST DIDN'T LAST AND YOU DIDN'T HAVE MONEY TO GET MORE.: NEVER TRUE

## 2024-04-19 SDOH — ECONOMIC STABILITY: FOOD INSECURITY: WITHIN THE PAST 12 MONTHS, YOU WORRIED THAT YOUR FOOD WOULD RUN OUT BEFORE YOU GOT MONEY TO BUY MORE.: NEVER TRUE

## 2024-04-19 SDOH — ECONOMIC STABILITY: INCOME INSECURITY: HOW HARD IS IT FOR YOU TO PAY FOR THE VERY BASICS LIKE FOOD, HOUSING, MEDICAL CARE, AND HEATING?: NOT HARD AT ALL

## 2024-04-19 ASSESSMENT — ENCOUNTER SYMPTOMS
WHEEZING: 0
ABDOMINAL PAIN: 0
RECTAL PAIN: 0
EYE REDNESS: 0
TROUBLE SWALLOWING: 0
DIARRHEA: 0
COUGH: 0
CHEST TIGHTNESS: 0
ABDOMINAL DISTENTION: 0
SINUS PRESSURE: 0
NAUSEA: 0
BLOOD IN STOOL: 0
STRIDOR: 0
BACK PAIN: 1
RHINORRHEA: 0
SORE THROAT: 0
VOMITING: 0
CONSTIPATION: 0
SHORTNESS OF BREATH: 0
COLOR CHANGE: 0

## 2024-04-19 ASSESSMENT — PATIENT HEALTH QUESTIONNAIRE - PHQ9
SUM OF ALL RESPONSES TO PHQ QUESTIONS 1-9: 0
2. FEELING DOWN, DEPRESSED OR HOPELESS: NOT AT ALL
1. LITTLE INTEREST OR PLEASURE IN DOING THINGS: NOT AT ALL
SUM OF ALL RESPONSES TO PHQ9 QUESTIONS 1 & 2: 0
SUM OF ALL RESPONSES TO PHQ QUESTIONS 1-9: 0

## 2024-04-19 NOTE — PATIENT INSTRUCTIONS
Dear valued patient,    We hope this message finds you in good health. At [], we are committed to providing you with the best possible healthcare experience. To further enhance your convenience and streamline your access to our services, we would like to introduce you to the benefits of utilizing direct scheduling through the [facility].    Direct scheduling is a feature within the [facility] that allows you to schedule appointments with your healthcare provider directly, without the need to make a phone call or wait for a callback. We understand that your time is ap, and we want to ensure that you have quick and easy access to our services whenever you need them.  Here are some reasons why you should consider utilizing direct scheduling through the [facility]:    1. Convenience: With direct scheduling, you can book appointments at any time that suits you best, 24 hours a day, 7 days a week. No more waiting on hold or playing phone tag with our office staff. It puts you in control of managing your healthcare appointments effortlessly.    2. Accessibility: The [facility] Portal is accessible through your computer, smartphone, or tablet, allowing you to schedule appointments from the comfort of your home, office, or on the go. You can access your medical records, review test results, and communicate with your healthcare provider all in one secure and user-friendly platform.    3. Timesaving: By utilizing direct scheduling, you can avoid unnecessary delays and save ap time. You can easily browse the available appointment slots and select the one that works best for you, eliminating the back-and-forth communication typically required when scheduling via phone.    4. Reminders and notifications: [facility] sends automatic reminders for upcoming appointments, ensuring that you never miss an important visit. You can also receive notifications about test

## 2024-04-19 NOTE — PROGRESS NOTES
Visit Information    Have you changed or started any medications since your last visit including any over-the-counter medicines, vitamins, or herbal medicines? no   Are you having any side effects from any of your medications? -  no  Have you stopped taking any of your medications? Is so, why? -  no    Have you seen any other physician or provider since your last visit? No  Have you had any other diagnostic tests since your last visit? No  Have you been seen in the emergency room and/or had an admission to a hospital since we last saw you?  Have you had your routine dental cleaning in the past 6 months? yes -    Have you activated your [employer] account? If not, what are your barriers? Yes     Patient Care Team:  Barrera Thomas MD as PCP - General (Family Medicine)  Barrera Thomas MD as PCP - Empaneled Provider  Chente Last MD as Referring Physician (Cardiology)  Isabela Thomas MD as Consulting Physician (Gastroenterology)  Ranjith Chowdhury MD as Consulting Physician (Internal Medicine Cardiovascular Disease)  Elroy Fields MD as Consulting Physician (Pain Management)  Em Yo DPM as Consulting Physician (Podiatry)  Jenna Galvan MD as Surgeon (Ophthalmology)    Medical History Review  Past Medical, Family, and Social History reviewed and does contribute to the patient presenting condition    Health Maintenance   Topic Date Due    Respiratory Syncytial Virus (RSV) Pregnant or age 60 yrs+ (1 - 1-dose 60+ series) Never done    COVID-19 Vaccine (5 - 2023-24 season) 09/01/2023    Annual Wellness Visit (Medicare)  09/20/2024    Depression Screen  12/20/2024    Lipids  02/22/2025    DTaP/Tdap/Td vaccine (2 - Td or Tdap) 05/07/2031    Flu vaccine  Completed    Shingles vaccine  Completed    Pneumococcal 65+ years Vaccine  Completed    Hepatitis C screen  Addressed    Hepatitis A vaccine  Aged Out    Hepatitis B vaccine  Aged Out    Hib vaccine  Aged Out    Polio vaccine  Aged Out    
MISC by Does not apply route It is medically necessary for patient to get diabetic foot shoes to prevent diabetic foot ulcers 1 each 0    simvastatin (ZOCOR) 20 MG tablet TAKE 1 TABLET BY MOUTH EVERY DAY AT NIGHT 90 tablet 3    blood glucose test strips (FREESTYLE LITE) strip TEST TWICE DAILY AS DIRECTED 100 strip 1    aspirin 81 MG tablet Take 1 tablet by mouth daily      Vitamin D (CHOLECALCIFEROL) 1000 UNITS CAPS capsule Take 1 capsule by mouth daily      Ascorbic Acid (VITAMIN C) 500 MG tablet Take 2 tablets by mouth daily      NITROSTAT 0.4 MG SL tablet       finasteride (PROSCAR) 5 MG tablet Take 1 tablet by mouth daily      tamsulosin (FLOMAX) 0.4 MG capsule Take 1 capsule by mouth daily       No current facility-administered medications for this visit.             Social History     Socioeconomic History    Marital status:      Spouse name: Not on file    Number of children: Not on file    Years of education: Not on file    Highest education level: Not on file   Occupational History    Occupation: retired [employer]   Tobacco Use    Smoking status: Former     Current packs/day: 0.00     Average packs/day: 1 pack/day for 30.0 years (30.0 ttl pk-yrs)     Types: Cigarettes     Start date: 1972     Quit date: 2002     Years since quittin.2    Smokeless tobacco: Never   Vaping Use    Vaping Use: Never used   Substance and Sexual Activity    Alcohol use: Not Currently     Comment: rare    Drug use: No    Sexual activity: Not Currently     Partners: Female   Other Topics Concern    Not on file   Social History Narrative    Not on file     Social Determinants of Health     Financial Resource Strain: Low Risk  (2024)    Overall Financial Resource Strain (CARDIA)     Difficulty of Paying Living Expenses: Not hard at all   Food Insecurity: No Food Insecurity (2024)    Hunger Vital Sign     Worried About Running Out of Food in the Last Year: Never true     Ran Out of Food in the Last Year:

## 2024-04-24 ENCOUNTER — HOSPITAL ENCOUNTER (OUTPATIENT)
Dept: PAIN MANAGEMENT | Facility: CLINIC | Age: 79
Discharge: HOME | End: 2024-04-24
Payer: MEDICARE

## 2024-04-24 VITALS
TEMPERATURE: 97 F | OXYGEN SATURATION: 94 % | HEIGHT: 72 IN | WEIGHT: 235 LBS | DIASTOLIC BLOOD PRESSURE: 62 MMHG | BODY MASS INDEX: 31.83 KG/M2 | RESPIRATION RATE: 11 BRPM | HEART RATE: 75 BPM | SYSTOLIC BLOOD PRESSURE: 106 MMHG

## 2024-04-24 DIAGNOSIS — M47.817 LUMBOSACRAL SPONDYLOSIS WITHOUT MYELOPATHY: Primary | ICD-10-CM

## 2024-04-24 DIAGNOSIS — R52 PAIN MANAGEMENT: ICD-10-CM

## 2024-04-24 LAB — GLUCOSE BLD-MCNC: 78 MG/DL (ref 75–110)

## 2024-04-24 PROCEDURE — 6360000002 HC RX W HCPCS: Performed by: ANESTHESIOLOGY

## 2024-04-24 PROCEDURE — 2500000003 HC RX 250 WO HCPCS: Performed by: ANESTHESIOLOGY

## 2024-04-24 PROCEDURE — 64636 DESTROY L/S FACET JNT ADDL: CPT

## 2024-04-24 PROCEDURE — 64635 DESTROY LUMB/SAC FACET JNT: CPT

## 2024-04-24 PROCEDURE — 82947 ASSAY GLUCOSE BLOOD QUANT: CPT

## 2024-04-24 RX ORDER — LIDOCAINE HYDROCHLORIDE 10 MG/ML
INJECTION, SOLUTION EPIDURAL; INFILTRATION; INTRACAUDAL; PERINEURAL
Status: COMPLETED | OUTPATIENT
Start: 2024-04-24 | End: 2024-04-24

## 2024-04-24 RX ORDER — LIDOCAINE HYDROCHLORIDE 40 MG/ML
INJECTION, SOLUTION RETROBULBAR
Status: COMPLETED | OUTPATIENT
Start: 2024-04-24 | End: 2024-04-24

## 2024-04-24 RX ORDER — MIDAZOLAM HYDROCHLORIDE 2 MG/2ML
INJECTION, SOLUTION INTRAMUSCULAR; INTRAVENOUS
Status: COMPLETED | OUTPATIENT
Start: 2024-04-24 | End: 2024-04-24

## 2024-04-24 RX ORDER — FENTANYL CITRATE 50 UG/ML
INJECTION, SOLUTION INTRAMUSCULAR; INTRAVENOUS
Status: COMPLETED | OUTPATIENT
Start: 2024-04-24 | End: 2024-04-24

## 2024-04-24 RX ADMIN — FENTANYL CITRATE 50 MCG: 50 INJECTION, SOLUTION INTRAMUSCULAR; INTRAVENOUS at 07:47

## 2024-04-24 RX ADMIN — MIDAZOLAM HYDROCHLORIDE 1 MG: 1 INJECTION, SOLUTION INTRAMUSCULAR; INTRAVENOUS at 07:47

## 2024-04-24 RX ADMIN — LIDOCAINE HYDROCHLORIDE 8 ML: 10 INJECTION, SOLUTION EPIDURAL; INFILTRATION; INTRACAUDAL at 07:47

## 2024-04-24 RX ADMIN — LIDOCAINE HYDROCHLORIDE 5 ML: 40 SOLUTION RETROBULBAR; TOPICAL at 07:50

## 2024-04-24 ASSESSMENT — PAIN - FUNCTIONAL ASSESSMENT
PAIN_FUNCTIONAL_ASSESSMENT: NONE - DENIES PAIN
PAIN_FUNCTIONAL_ASSESSMENT: PREVENTS OR INTERFERES SOME ACTIVE ACTIVITIES AND ADLS
PAIN_FUNCTIONAL_ASSESSMENT: 0-10

## 2024-04-24 ASSESSMENT — PAIN DESCRIPTION - DESCRIPTORS: DESCRIPTORS: DISCOMFORT

## 2024-04-24 NOTE — DISCHARGE INSTRUCTIONS
Discharge Instructions following Sedation or Anesthesia:  You have  received  a sedative/anesthetic therefore, you should not consume any alcoholic beverages for minimum of 12 hours.  Do not drive or operate machinery for 24 hours.  Do not sign legal documents for 24 hours.  Dizziness, drowsiness, and unsteadiness may occur.  Rest when need to.  Increase diet as tolerated.  Keep up on fluids if diet allows.      General Instructions:  Do not take a tub bath for 72 hours after procedure (this includes hot tubs and swimming pools).  You may shower, but avoid hot water to injection site.   Avoid strenuous activity TODAY especially if you experience dizziness.   Remove band-aid the next day.  Wash off any residual iodine   Do not use heat, heating pad, or any other heating device over the injection site for 3 days after the procedure.  If you experience pain after your procedure, you may continue with your current pain medication as prescribed.  (DO NOT INCREASE YOUR PAIN MEDICATION WITHOUT TALKING TO DOCTOR)  Soreness and pain at injection site is common, may use ice to reduce soreness.    Call [facility] at [phone] if you experience:   Fever, chills or temperature over 100    Vomiting, Headache, persistent stiff neck, nausea, blurred vision   Difficulty in urinating or unable to urinate with 8 hours   Increase in weakness, numbness or loss of function   Increased redness, swelling or drainage at the injection site

## 2024-04-24 NOTE — INTERVAL H&P NOTE
Update History & Physical    The patient's History and Physical of April 18, 2024 was reviewed with the patient and I examined the patient. There was no change. The surgical site was confirmed by the patient and me.     Plan: The risks, benefits, expected outcome, and alternative to the recommended procedure have been discussed with the patient. Patient understands and wants to proceed with the procedure.     ASA 3  MP 3      Electronically signed by Onesimo West MD on 4/24/2024 at 7:15 AM

## 2024-04-25 ENCOUNTER — OFFICE VISIT (OUTPATIENT)
Dept: PODIATRY | Age: 79
End: 2024-04-25
Payer: COMMERCIAL

## 2024-04-25 VITALS — WEIGHT: 235 LBS | BODY MASS INDEX: 31.83 KG/M2 | HEIGHT: 72 IN

## 2024-04-25 DIAGNOSIS — B35.1 ONYCHOMYCOSIS: Primary | ICD-10-CM

## 2024-04-25 DIAGNOSIS — Z79.4 TYPE 2 DIABETES MELLITUS WITH DIABETIC POLYNEUROPATHY, WITH LONG-TERM CURRENT USE OF INSULIN (HCC): ICD-10-CM

## 2024-04-25 DIAGNOSIS — E11.42 TYPE 2 DIABETES MELLITUS WITH DIABETIC POLYNEUROPATHY, WITH LONG-TERM CURRENT USE OF INSULIN (HCC): ICD-10-CM

## 2024-04-25 PROCEDURE — 11721 DEBRIDE NAIL 6 OR MORE: CPT | Performed by: PODIATRIST

## 2024-04-25 PROCEDURE — 99999 PR OFFICE/OUTPT VISIT,PROCEDURE ONLY: CPT | Performed by: PODIATRIST

## 2024-04-25 ASSESSMENT — ENCOUNTER SYMPTOMS
NAUSEA: 0
VOMITING: 0
CONSTIPATION: 0
COLOR CHANGE: 0

## 2024-04-25 NOTE — PROGRESS NOTES
[facility] Podiatry  Return Patient  Chief Complaint   Patient presents with    Nail Problem     B/l nail trim, last seen Barrera Thomas MD 4/19/24    Diabetes     Last blood sugar 65       Pedro Pablo Talamantes is a 79 y.o. year old male who is here for a diabetic foot check and nail trim. He would like his nails trimmed today.     Has Charcot left foot. Has been stable. History of surgery type: Plantar planing medial and lateral foot.  Patient denies N/V/F/C.       Review of Systems   Constitutional:  Negative for chills, diaphoresis, fatigue, fever and unexpected weight change.   Cardiovascular:  Negative for leg swelling.   Gastrointestinal:  Negative for constipation, diarrhea, nausea and vomiting.   Musculoskeletal:  Positive for gait problem. Negative for arthralgias and joint swelling.   Skin:  Negative for color change, pallor, rash and wound.   Neurological:  Positive for numbness. Negative for weakness.       Vascular: DP and PT pulses palpable 2/4, Right Foot and 2/4 on the Left Foot.   CFT <3 seconds, Right Foot and <3 seconds on the Left Foot.  Edema is absent,  Right Foot and minimal on the Left Foot.      Neurological:   Sensation absent  to light touch to level of digits, both feet.      Musculoskeletal:   Muscle strength is 5/5 on the Right Foot and 5/5 on the Left Foot. Structural deformities are present on the Right Foot and present on the Left Foot, but improved  Flat medial arch left foot.     Integument:  Warm, dry, supple both feet.  I  Hyperkeratosis dorsal left 4th toe, not open, no erythema.       Sites of Onychomycosis Involvement (Check affected area)  [x] [x] [x] [x] [x] [x] [x] [x] [x] [x]  5 4 3 2 1 1 2 3 4 5                          Right                                        Left    Thickness  [x] [x] [x] [x] [x] [x] [x] [x] [x] [x]  5 4 3 2 1 1 2 3 4 5                         Right                                        Left    Dystrophic Changes

## 2024-04-26 ENCOUNTER — COMMUNITY CARE MANAGEMENT (OUTPATIENT)
Facility: CLINIC | Age: 79
End: 2024-04-26

## 2024-04-29 DIAGNOSIS — K58.9 IRRITABLE BOWEL SYNDROME WITHOUT DIARRHEA: ICD-10-CM

## 2024-04-29 RX ORDER — SIMVASTATIN 20 MG
TABLET ORAL
Qty: 90 TABLET | Refills: 0 | Status: SHIPPED | OUTPATIENT
Start: 2024-04-29

## 2024-04-29 RX ORDER — OMEPRAZOLE 20 MG/1
CAPSULE, DELAYED RELEASE ORAL DAILY
Qty: 90 CAPSULE | Refills: 0 | Status: SHIPPED | OUTPATIENT
Start: 2024-04-29

## 2024-04-29 NOTE — TELEPHONE ENCOUNTER
Please Approve or Refuse.  Send to Pharmacy per Pt's Request: [email or web address]      Next Visit Date:  8/19/2024   Last Visit Date: 4/19/2024    Hemoglobin A1C (%)   Date Value   04/19/2024 7.1   12/20/2023 7.2   09/20/2023 6.4             ( goal A1C is < 7)   BP Readings from Last 3 Encounters:   04/24/24 106/62   04/19/24 120/60   04/12/24 122/70          (goal 120/80)  BUN   Date Value Ref Range Status   04/09/2024 24 (H) 8 - 23 mg/dL Final     Creatinine   Date Value Ref Range Status   04/09/2024 1.2 0.7 - 1.2 mg/dL Final     Potassium   Date Value Ref Range Status   04/09/2024 4.5 3.7 - 5.3 mmol/L Final     Comment:     SPECIMEN SLIGHTLY HEMOLYZED, RESULTS MAY BE ADVERSELY AFFECTED.

## 2024-04-29 NOTE — TELEPHONE ENCOUNTER
Please Approve or Refuse.  Send to Pharmacy per Pt's Request:      Next Visit Date:  8/19/2024   Last Visit Date: 4/19/2024    Hemoglobin A1C (%)   Date Value   04/19/2024 7.1   12/20/2023 7.2   09/20/2023 6.4             ( goal A1C is < 7)   BP Readings from Last 3 Encounters:   04/24/24 106/62   04/19/24 120/60   04/12/24 122/70          (goal 120/80)  BUN   Date Value Ref Range Status   04/09/2024 24 (H) 8 - 23 mg/dL Final     Creatinine   Date Value Ref Range Status   04/09/2024 1.2 0.7 - 1.2 mg/dL Final     Potassium   Date Value Ref Range Status   04/09/2024 4.5 3.7 - 5.3 mmol/L Final     Comment:     SPECIMEN SLIGHTLY HEMOLYZED, RESULTS MAY BE ADVERSELY AFFECTED.

## 2024-05-16 ENCOUNTER — HOSPITAL ENCOUNTER (OUTPATIENT)
Dept: PAIN MANAGEMENT | Age: 79
Discharge: HOME | End: 2024-05-16
Payer: MEDICARE

## 2024-05-16 VITALS — WEIGHT: 235 LBS | HEIGHT: 72 IN | BODY MASS INDEX: 31.83 KG/M2

## 2024-05-16 DIAGNOSIS — M51.36 DDD (DEGENERATIVE DISC DISEASE), LUMBAR: ICD-10-CM

## 2024-05-16 DIAGNOSIS — M48.061 SPINAL STENOSIS OF LUMBAR REGION, UNSPECIFIED WHETHER NEUROGENIC CLAUDICATION PRESENT: ICD-10-CM

## 2024-05-16 DIAGNOSIS — Z79.891 CHRONIC USE OF OPIATE DRUG FOR THERAPEUTIC PURPOSE: Primary | Chronic | ICD-10-CM

## 2024-05-16 DIAGNOSIS — M47.817 LUMBOSACRAL SPONDYLOSIS WITHOUT MYELOPATHY: ICD-10-CM

## 2024-05-16 DIAGNOSIS — E11.40 CHRONIC PAINFUL DIABETIC NEUROPATHY (HCC): ICD-10-CM

## 2024-05-16 PROCEDURE — 99214 OFFICE O/P EST MOD 30 MIN: CPT | Performed by: NURSE PRACTITIONER

## 2024-05-16 PROCEDURE — 99213 OFFICE O/P EST LOW 20 MIN: CPT

## 2024-05-16 RX ORDER — GABAPENTIN 800 MG/1
800 TABLET ORAL DAILY
Qty: 270 TABLET | Refills: 0 | Status: SHIPPED | OUTPATIENT
Start: 2024-05-16 | End: 2024-08-14

## 2024-05-16 RX ORDER — OXYCODONE HYDROCHLORIDE 5 MG/1
5 TABLET ORAL 2 TIMES DAILY PRN
Qty: 60 TABLET | Refills: 0 | Status: SHIPPED | OUTPATIENT
Start: 2024-05-23 | End: 2024-06-22

## 2024-05-16 ASSESSMENT — ENCOUNTER SYMPTOMS
BACK PAIN: 1
BOWEL INCONTINENCE: 0
SHORTNESS OF BREATH: 0
COUGH: 0
CONSTIPATION: 0

## 2024-05-16 NOTE — PROGRESS NOTES
Not on file   Social Connections: Not on file   Intimate Partner Violence: Not on file   Housing Stability: Unknown (4/19/2024)    Housing Stability Vital Sign     Unable to Pay for Housing in the Last Year: Not on file     Number of Places Lived in the Last Year: Not on file     Unstable Housing in the Last Year: No       Review of Systems:  Review of Systems   Constitutional: Negative for chills and fever.   Cardiovascular:  Negative for chest pain.   Respiratory:  Negative for cough and shortness of breath.    Musculoskeletal:  Positive for back pain.   Gastrointestinal:  Negative for bowel incontinence and constipation.   Genitourinary:  Negative for bladder incontinence.   Neurological:  Positive for headaches, numbness (BLE), tingling and weakness.       Physical Exam:  Ht 1.829 m (6')   Wt 106.6 kg (235 lb)   BMI 31.87 kg/m²     Physical Exam  Constitutional:       General: He is not in acute distress.     Appearance: Normal appearance. He is obese.   Pulmonary:      Effort: Pulmonary effort is normal.   Musculoskeletal:      Lumbar back: Decreased range of motion.      Comments: Antalgic gait using cane    Skin:     General: Skin is warm and dry.   Neurological:      Mental Status: He is alert and oriented to person, place, and time.   Psychiatric:         Mood and Affect: Mood normal.         Behavior: Behavior normal.         Record/Diagnostics Review:    Last alexander 12/23  and was appropriate     Assessment:  Problem List Items Addressed This Visit       Chronic use of opiate drug for therapeutic purpose - Primary (Chronic)    Relevant Medications    gabapentin (NEURONTIN) 800 MG tablet    Lumbosacral spondylosis without myelopathy    Relevant Medications    gabapentin (NEURONTIN) 800 MG tablet    oxyCODONE (ROXICODONE) 5 MG immediate release tablet (Start on 5/23/2024)    DDD (degenerative disc disease), lumbar    Relevant Medications    gabapentin (NEURONTIN) 800 MG tablet    oxyCODONE (ROXICODONE) 5 MG

## 2024-05-30 ENCOUNTER — HOSPITAL ENCOUNTER (OUTPATIENT)
Dept: MRI IMAGING | Age: 79
Discharge: HOME | End: 2024-06-01
Payer: MEDICARE

## 2024-05-30 DIAGNOSIS — M51.36 DDD (DEGENERATIVE DISC DISEASE), LUMBAR: ICD-10-CM

## 2024-05-30 DIAGNOSIS — M48.061 SPINAL STENOSIS OF LUMBAR REGION, UNSPECIFIED WHETHER NEUROGENIC CLAUDICATION PRESENT: ICD-10-CM

## 2024-05-30 PROCEDURE — 72148 MRI LUMBAR SPINE W/O DYE: CPT

## 2024-06-13 ENCOUNTER — HOSPITAL ENCOUNTER (OUTPATIENT)
Dept: PAIN MANAGEMENT | Age: 79
Discharge: HOME | End: 2024-06-13
Payer: MEDICARE

## 2024-06-13 VITALS — WEIGHT: 235 LBS | HEIGHT: 72 IN | BODY MASS INDEX: 31.83 KG/M2

## 2024-06-13 DIAGNOSIS — M48.062 SPINAL STENOSIS OF LUMBAR REGION WITH NEUROGENIC CLAUDICATION: Primary | ICD-10-CM

## 2024-06-13 DIAGNOSIS — M47.817 LUMBOSACRAL SPONDYLOSIS WITHOUT MYELOPATHY: ICD-10-CM

## 2024-06-13 DIAGNOSIS — E11.40 CHRONIC PAINFUL DIABETIC NEUROPATHY (HCC): ICD-10-CM

## 2024-06-13 DIAGNOSIS — E11.610 CHARCOT FOOT DUE TO DIABETES MELLITUS (HCC): ICD-10-CM

## 2024-06-13 DIAGNOSIS — Z79.891 CHRONIC USE OF OPIATE DRUG FOR THERAPEUTIC PURPOSE: Chronic | ICD-10-CM

## 2024-06-13 PROCEDURE — 99214 OFFICE O/P EST MOD 30 MIN: CPT | Performed by: ANESTHESIOLOGY

## 2024-06-13 PROCEDURE — 99213 OFFICE O/P EST LOW 20 MIN: CPT

## 2024-06-13 RX ORDER — OXYCODONE HYDROCHLORIDE 5 MG/1
5 TABLET ORAL 2 TIMES DAILY PRN
Qty: 60 TABLET | Refills: 0 | Status: SHIPPED | OUTPATIENT
Start: 2024-06-13 | End: 2024-07-13

## 2024-06-13 ASSESSMENT — ENCOUNTER SYMPTOMS
RESPIRATORY NEGATIVE: 1
BACK PAIN: 1
GASTROINTESTINAL NEGATIVE: 1

## 2024-06-13 NOTE — PROGRESS NOTES
The patient is a 79 y.o.Non- / non  male.    Chief Complaint   Patient presents with    Back Pain     Follow up on MRI,Oxycodone      -  Medication Refill: Oxycodone    Pain score Today:  5  Adverse effects (Constipation / Nausea / Sedation / sexual Dysfunction / others) : no  Mood: fair  Sleep pattern and quality: fair  Activity level: fair    Pill count Today:17  Last dose taken  06/13/2024   OARRS report reviewed today: yes  ER/Hospitalizations/PCP visit related to pain since last visit:no   Any legal problems e.g. DUI etc.:No  Satisfied with current management: Yes    Opioid Contract:11/14/2023  Last Urine Dug screen dated:12/14/23    Hemoglobin A1C   Date Value Ref Range Status   04/19/2024 7.1 % Final       Past Medical History, Past Surgical History, Social History, Allergies and Medications reviewed and updated in EPIC as indicated    Family History reviewed and is noncontributory.        Past Medical History:   Diagnosis Date    Abdominal pain     Arthritis     Benign prostatic hypertrophy     C. difficile colitis     CAD (coronary artery disease) 01/03/2012    s/p CABGx3    Claudication ([facility])     Closed fracture of proximal end of left fibula with routine healing 10/29/2020    Colon polyp 02/25/2019    tubular adenoma    Constipation     Diabetes mellitus ([facility])     Diarrhea     Diarrhea     DVT (deep venous thrombosis) ([facility])     left calf    Ejection fraction < 50%     Gallstones     Heart disease     Heartburn     Hx of blood clots     Hyperlipidemia     Hypertension     Kidney stones     Lumbar spinal stenosis 04/21/2014    MI (myocardial infarction) ([facility])     2011    Mitral regurgitation     MRSA (methicillin resistant staph aureus) culture positive     Neuropathy     feet    Nondisplaced physeal fracture of distal end of left fibula 02/01/2021    Numbness and tingling     hands and feet    Rib fractures 01/2015    right    Wears glasses     readers        Past Surgical History:

## 2024-06-16 NOTE — PROGRESS NOTES
Reactions    Flagyl [Metronidazole] Hives        REVIEW OF SYSTEMS:     Review of Systems   All other systems reviewed and are negative.      NEUROLOGICAL EXAMINATION:   VITALS  /72 (Site: Left Upper Arm, Position: Sitting, Cuff Size: Medium Adult)   Pulse 84   Ht 1.829 m (6')   Wt 106.9 kg (235 lb 9.6 oz)   BMI 31.95 kg/m²        GENERAL PHYSICAL EXAM  General Appearance: No acute distress. Conversant.  Well-groomed.    Eyes: Anicteric sclerae. Moist conjunctivae. No lid-lag  HENT: Atraumatic. Oropharynx clear. Moist mucous membranes.  Neck: Trachea midline. Neck supple.   Lungs: Normal respiratory effort. No intercostal retractions. No dyspnea noted  Abdomen: Soft, non-tender, no masses noted.   Extremities: No clubbing. No cyanosis.No peripheral edema.  Skin: Normal temperature and texture, no rash, no ulcers noted  Pysch: No pseudobulbar affect noted. Euthymic      NEUROLOGICAL EXAM:  Mental status    Alert and oriented x 3; intact memory with no confusion, speech or language problems; no hallucinations or delusions     Cranial nerves    II - visual fields intact to confrontation  III, IV, VI - extra-ocular muscles full: no pupillary defect; no HAI, no nystagmus, no ptosis   V - normal facial sensation                                                               VII - normal facial symmetry                                                             VIII - intact hearing                                                                             IX, X - symmetrical palate                                                                  XI - symmetrical shoulder shrug                                                       XII - tongue midline without atrophy or fasciculation      Motor function  Normal muscle bulk and tone; strength 5/5 on all 4 extremities, no pronator drift      Sensory function Intact to light touch, pinprick, vibration, proprioception on all 4 extremities      Cerebellar Intact fine

## 2024-06-17 ENCOUNTER — OFFICE VISIT (OUTPATIENT)
Dept: NEUROLOGY | Age: 79
End: 2024-06-17
Payer: MEDICARE

## 2024-06-17 VITALS
DIASTOLIC BLOOD PRESSURE: 72 MMHG | SYSTOLIC BLOOD PRESSURE: 123 MMHG | BODY MASS INDEX: 31.91 KG/M2 | HEART RATE: 84 BPM | HEIGHT: 72 IN | WEIGHT: 235.6 LBS

## 2024-06-17 DIAGNOSIS — G43.709 CHRONIC MIGRAINE W/O AURA W/O STATUS MIGRAINOSUS, NOT INTRACTABLE: Primary | ICD-10-CM

## 2024-06-17 DIAGNOSIS — E11.40 CHRONIC PAINFUL DIABETIC NEUROPATHY (HCC): ICD-10-CM

## 2024-06-17 DIAGNOSIS — R51.9 NEW ONSET OF HEADACHES AFTER AGE 50: ICD-10-CM

## 2024-06-17 PROCEDURE — G8427 DOCREV CUR MEDS BY ELIG CLIN: HCPCS | Performed by: PSYCHIATRY & NEUROLOGY

## 2024-06-17 PROCEDURE — 3051F HG A1C>EQUAL 7.0%<8.0%: CPT | Performed by: PSYCHIATRY & NEUROLOGY

## 2024-06-17 PROCEDURE — G8417 CALC BMI ABV UP PARAM F/U: HCPCS | Performed by: PSYCHIATRY & NEUROLOGY

## 2024-06-17 PROCEDURE — 3074F SYST BP LT 130 MM HG: CPT | Performed by: PSYCHIATRY & NEUROLOGY

## 2024-06-17 PROCEDURE — 99214 OFFICE O/P EST MOD 30 MIN: CPT | Performed by: PSYCHIATRY & NEUROLOGY

## 2024-06-17 PROCEDURE — 1123F ACP DISCUSS/DSCN MKR DOCD: CPT | Performed by: PSYCHIATRY & NEUROLOGY

## 2024-06-17 PROCEDURE — 1036F TOBACCO NON-USER: CPT | Performed by: PSYCHIATRY & NEUROLOGY

## 2024-06-17 PROCEDURE — 3078F DIAST BP <80 MM HG: CPT | Performed by: PSYCHIATRY & NEUROLOGY

## 2024-06-24 ENCOUNTER — COMMUNITY CARE MANAGEMENT (OUTPATIENT)
Facility: CLINIC | Age: 79
End: 2024-06-24

## 2024-06-24 DIAGNOSIS — Z79.4 TYPE 2 DIABETES MELLITUS WITH DIABETIC POLYNEUROPATHY, WITH LONG-TERM CURRENT USE OF INSULIN (HCC): ICD-10-CM

## 2024-06-24 DIAGNOSIS — E11.42 TYPE 2 DIABETES MELLITUS WITH DIABETIC POLYNEUROPATHY, WITH LONG-TERM CURRENT USE OF INSULIN (HCC): ICD-10-CM

## 2024-06-25 RX ORDER — PEN NEEDLE, DIABETIC 32GX 5/32"
NEEDLE, DISPOSABLE MISCELLANEOUS
Qty: 100 EACH | Refills: 0 | Status: SHIPPED | OUTPATIENT
Start: 2024-06-25

## 2024-06-25 NOTE — TELEPHONE ENCOUNTER
Please Approve or Refuse.  Send to Pharmacy per Pt's Request:      Next Visit Date:  8/19/2024   Last Visit Date: 4/19/2024    Hemoglobin A1C (%)   Date Value   04/19/2024 7.1   12/20/2023 7.2   09/20/2023 6.4             ( goal A1C is < 7)   BP Readings from Last 3 Encounters:   06/17/24 123/72   04/24/24 106/62   04/19/24 120/60          (goal 120/80)  BUN   Date Value Ref Range Status   04/09/2024 24 (H) 8 - 23 mg/dL Final     Creatinine   Date Value Ref Range Status   04/09/2024 1.2 0.7 - 1.2 mg/dL Final     Potassium   Date Value Ref Range Status   04/09/2024 4.5 3.7 - 5.3 mmol/L Final     Comment:     SPECIMEN SLIGHTLY HEMOLYZED, RESULTS MAY BE ADVERSELY AFFECTED.

## 2024-07-11 ENCOUNTER — OFFICE VISIT (OUTPATIENT)
Dept: PODIATRY | Age: 79
End: 2024-07-11
Payer: MEDICARE

## 2024-07-11 VITALS — WEIGHT: 235 LBS | BODY MASS INDEX: 31.83 KG/M2 | HEIGHT: 72 IN

## 2024-07-11 DIAGNOSIS — B35.1 ONYCHOMYCOSIS: Primary | ICD-10-CM

## 2024-07-11 DIAGNOSIS — E11.42 TYPE 2 DIABETES MELLITUS WITH DIABETIC POLYNEUROPATHY, WITH LONG-TERM CURRENT USE OF INSULIN (HCC): ICD-10-CM

## 2024-07-11 DIAGNOSIS — Z79.4 TYPE 2 DIABETES MELLITUS WITH DIABETIC POLYNEUROPATHY, WITH LONG-TERM CURRENT USE OF INSULIN (HCC): ICD-10-CM

## 2024-07-11 PROCEDURE — 11721 DEBRIDE NAIL 6 OR MORE: CPT | Performed by: PODIATRIST

## 2024-07-11 PROCEDURE — 99999 PR OFFICE/OUTPT VISIT,PROCEDURE ONLY: CPT | Performed by: PODIATRIST

## 2024-07-11 ASSESSMENT — ENCOUNTER SYMPTOMS
NAUSEA: 0
DIARRHEA: 0
VOMITING: 0
COLOR CHANGE: 0
CONSTIPATION: 0

## 2024-07-11 NOTE — PROGRESS NOTES
[x] [x] [x] [x] [x] [x] [x] [x] [x] [x]  5 4 3 2 1 1 2 3 4 5                         Right                                        Left    Color                                                                  [x] [x] [x] [x] [x] [x] [x] [x] [x] [x]  5 4 3 2 1 1 2 3 4 5                          Right                                        Left    Incurvation/Ingrowin                                                                   [] [] [] [] [] [] [] [] [] []  5 4 3 2 1 1 2 3 4 5                         Right                                        Left    Inflammation/Pain                                                                   [] [] [] [] [] [] [] [] [] []  5 4 3 2 1 1 2 3 4 5                         Right                                        Left       Assesment :     Diagnosis Orders   1. Onychomycosis  MO DEBRIDEMENT NAIL ANY METHOD 6/>      2. Type 2 diabetes mellitus with diabetic polyneuropathy, with long-term current use of insulin (HCC)  MO DEBRIDEMENT NAIL ANY METHOD 6/>            Plan: Pt was evaluated and examined. Patient was given personalized discharge instructions.  Nails 1-10 were debrided sharply in length and thickness with a nipper and , without incident. Pt will follow up in 9 weeks or sooner if any problems arise. Diagnosis was discussed with the pt and all of their questions were answered in detail. Proper foot hygiene and care was discussed with the pt. Informed patient on proper diabetic foot care and importance of tight glycemic control. Patient to check feet daily and contact the office with any questions/problems/concerns.  Other comorbidity noted and will be managed by PCP.    Diabetic foot examination performed this visit.  The exam included neurological sensory exam, a 10-g monofilament and pinprick sensation, vibration using a 128-Hz tuning fork, ankle reflexes, visual skin inspection, vascular exam including assessment of pedal pulses,

## 2024-07-14 DIAGNOSIS — R80.9 MICROALBUMINURIA: ICD-10-CM

## 2024-07-14 DIAGNOSIS — I10 ESSENTIAL HYPERTENSION: ICD-10-CM

## 2024-07-15 RX ORDER — LISINOPRIL 2.5 MG/1
TABLET ORAL
Qty: 90 TABLET | Refills: 0 | Status: SHIPPED | OUTPATIENT
Start: 2024-07-15

## 2024-07-18 ENCOUNTER — HOSPITAL ENCOUNTER (OUTPATIENT)
Dept: PAIN MANAGEMENT | Age: 79
Discharge: HOME | End: 2024-07-18
Payer: MEDICARE

## 2024-07-18 VITALS — WEIGHT: 235 LBS | BODY MASS INDEX: 31.83 KG/M2 | HEIGHT: 72 IN

## 2024-07-18 DIAGNOSIS — E11.40 CHRONIC PAINFUL DIABETIC NEUROPATHY (HCC): ICD-10-CM

## 2024-07-18 DIAGNOSIS — E11.610 CHARCOT FOOT DUE TO DIABETES MELLITUS (HCC): ICD-10-CM

## 2024-07-18 DIAGNOSIS — M48.062 SPINAL STENOSIS OF LUMBAR REGION WITH NEUROGENIC CLAUDICATION: ICD-10-CM

## 2024-07-18 DIAGNOSIS — Z79.891 CHRONIC USE OF OPIATE DRUG FOR THERAPEUTIC PURPOSE: Primary | Chronic | ICD-10-CM

## 2024-07-18 DIAGNOSIS — M47.817 LUMBOSACRAL SPONDYLOSIS WITHOUT MYELOPATHY: ICD-10-CM

## 2024-07-18 DIAGNOSIS — M51.36 DDD (DEGENERATIVE DISC DISEASE), LUMBAR: ICD-10-CM

## 2024-07-18 PROCEDURE — 99214 OFFICE O/P EST MOD 30 MIN: CPT | Performed by: NURSE PRACTITIONER

## 2024-07-18 PROCEDURE — 99213 OFFICE O/P EST LOW 20 MIN: CPT

## 2024-07-18 RX ORDER — OXYCODONE HYDROCHLORIDE 5 MG/1
5 TABLET ORAL 2 TIMES DAILY PRN
Qty: 60 TABLET | Refills: 0 | Status: SHIPPED | OUTPATIENT
Start: 2024-07-23 | End: 2024-08-22

## 2024-07-18 RX ORDER — GABAPENTIN 800 MG/1
800 TABLET ORAL DAILY
Qty: 90 TABLET | Refills: 0 | Status: SHIPPED | OUTPATIENT
Start: 2024-07-18 | End: 2024-10-16

## 2024-07-18 ASSESSMENT — ENCOUNTER SYMPTOMS
BOWEL INCONTINENCE: 0
COUGH: 0
CONSTIPATION: 0
BACK PAIN: 1
SHORTNESS OF BREATH: 0

## 2024-07-18 NOTE — H&P (VIEW-ONLY)
Exercise per Session: Not on file   Stress: Not on file   Social Connections: Not on file   Intimate Partner Violence: Not on file   Housing Stability: Unknown (4/19/2024)    Housing Stability Vital Sign     Unable to Pay for Housing in the Last Year: Not on file     Number of Places Lived in the Last Year: Not on file     Unstable Housing in the Last Year: No       Review of Systems:  Review of Systems   Constitutional: Negative for chills and fever.   Cardiovascular:  Negative for chest pain.   Respiratory:  Negative for cough and shortness of breath.    Musculoskeletal:  Positive for back pain.   Gastrointestinal:  Negative for bowel incontinence and constipation.   Genitourinary:  Negative for bladder incontinence.   Neurological:  Positive for numbness, paresthesias, tingling and weakness. Negative for headaches.       Physical Exam:  Ht 1.829 m (6')   Wt 106.6 kg (235 lb)   BMI 31.87 kg/m²     Physical Exam  Constitutional:       General: He is not in acute distress.     Appearance: Normal appearance. He is obese.   Pulmonary:      Effort: Pulmonary effort is normal.   Musculoskeletal:         General: Normal range of motion.      Comments: Antalgic gait using cane    Skin:     General: Skin is warm and dry.   Neurological:      Mental Status: He is alert and oriented to person, place, and time.   Psychiatric:         Mood and Affect: Mood normal.         Behavior: Behavior normal.         Record/Diagnostics Review:    Last alexander 12/23  and was appropriate     Assessment:  Problem List Items Addressed This Visit       Chronic use of opiate drug for therapeutic purpose - Primary (Chronic)    Relevant Medications    gabapentin (NEURONTIN) 800 MG tablet    Lumbosacral spondylosis without myelopathy    Relevant Medications    gabapentin (NEURONTIN) 800 MG tablet    oxyCODONE (ROXICODONE) 5 MG immediate release tablet (Start on 7/23/2024)    DDD (degenerative disc disease), lumbar    Relevant Medications

## 2024-07-18 NOTE — PROGRESS NOTES
Chief Complaint   Patient presents with    Back Pain     Med refill         PMH     Pt c/o low back pain radiating down his legs with numbness in feet d/t neuropathy. no known injury or surgery to the area with onset more than 1 year ago and has progressively worsened  Lumbar MRI 5/2024 Multilevel lumbar disc degenerative changes and facet arthropathy  L4-L5 level posterior disc bulge and mild to moderate foraminal stenosis no significant canal stenosis   Pt had Intracept procedure L4,5 and S1 on 2/6/23. He reported 70% relief.   He has completed #16/16 PT visits 5/2023 and reported some improvement in pain and feels it is helping with ROM posture and leg strength  He had  Bilateral RFA for L4/5 AND L5/S1 facet joints 4/24/24 and reports only minimal relief of pain      Pt has hx bilateral painful peripheral neuropathy from diabetes  Had a successful stim trial was sent for permanent implant but procedure was complicated by dural tear. Since then have developed chronic headaches and follow with neurology  Pt has met with Dr Tucker for 2nd opinion but has decided to wait on implant at this time.     HPI:     Back Pain  This is a chronic problem. The current episode started more than 1 year ago. The problem occurs constantly. The problem is unchanged. The pain is present in the lumbar spine. The quality of the pain is described as aching. The pain radiates to the left knee, left thigh, right knee, right thigh, right foot and left foot. The pain is at a severity of 6/10. The pain is moderate. The pain is The same all the time. Exacerbated by: it's just always there. Stiffness is present In the morning. Associated symptoms include leg pain, numbness, paresthesias, tingling and weakness. Pertinent negatives include no bladder incontinence, bowel incontinence, chest pain, fever or headaches. Risk factors include obesity, poor posture, sedentary lifestyle and lack of exercise. He has tried heat, ice and analgesics

## 2024-07-24 ENCOUNTER — HOSPITAL ENCOUNTER (OUTPATIENT)
Dept: PAIN MANAGEMENT | Facility: CLINIC | Age: 79
Discharge: HOME | End: 2024-07-24
Payer: MEDICARE

## 2024-07-24 VITALS
DIASTOLIC BLOOD PRESSURE: 73 MMHG | WEIGHT: 230 LBS | SYSTOLIC BLOOD PRESSURE: 136 MMHG | HEART RATE: 80 BPM | HEIGHT: 72 IN | RESPIRATION RATE: 10 BRPM | OXYGEN SATURATION: 97 % | TEMPERATURE: 97.3 F | BODY MASS INDEX: 31.15 KG/M2

## 2024-07-24 DIAGNOSIS — R52 PAIN MANAGEMENT: ICD-10-CM

## 2024-07-24 DIAGNOSIS — M47.817 LUMBOSACRAL SPONDYLOSIS WITHOUT MYELOPATHY: Primary | ICD-10-CM

## 2024-07-24 DIAGNOSIS — M48.062 SPINAL STENOSIS, LUMBAR REGION, WITH NEUROGENIC CLAUDICATION: Chronic | ICD-10-CM

## 2024-07-24 DIAGNOSIS — G43.709 CHRONIC MIGRAINE W/O AURA W/O STATUS MIGRAINOSUS, NOT INTRACTABLE: ICD-10-CM

## 2024-07-24 DIAGNOSIS — R51.9 NEW ONSET OF HEADACHES AFTER AGE 50: ICD-10-CM

## 2024-07-24 LAB — GLUCOSE BLD-MCNC: 108 MG/DL (ref 75–110)

## 2024-07-24 PROCEDURE — 2500000003 HC RX 250 WO HCPCS: Performed by: ANESTHESIOLOGY

## 2024-07-24 PROCEDURE — 62323 NJX INTERLAMINAR LMBR/SAC: CPT

## 2024-07-24 PROCEDURE — 2580000003 HC RX 258: Performed by: ANESTHESIOLOGY

## 2024-07-24 PROCEDURE — 6360000002 HC RX W HCPCS: Performed by: ANESTHESIOLOGY

## 2024-07-24 PROCEDURE — 82947 ASSAY GLUCOSE BLOOD QUANT: CPT

## 2024-07-24 PROCEDURE — 62323 NJX INTERLAMINAR LMBR/SAC: CPT | Performed by: ANESTHESIOLOGY

## 2024-07-24 PROCEDURE — 6360000004 HC RX CONTRAST MEDICATION: Performed by: ANESTHESIOLOGY

## 2024-07-24 RX ORDER — FENTANYL CITRATE 50 UG/ML
INJECTION, SOLUTION INTRAMUSCULAR; INTRAVENOUS
Status: COMPLETED | OUTPATIENT
Start: 2024-07-24 | End: 2024-07-24

## 2024-07-24 RX ORDER — DEXAMETHASONE SODIUM PHOSPHATE 10 MG/ML
INJECTION, SOLUTION INTRAMUSCULAR; INTRAVENOUS
Status: COMPLETED | OUTPATIENT
Start: 2024-07-24 | End: 2024-07-24

## 2024-07-24 RX ORDER — SODIUM CHLORIDE 0.9 % (FLUSH) 0.9 %
SYRINGE (ML) INJECTION
Status: COMPLETED | OUTPATIENT
Start: 2024-07-24 | End: 2024-07-24

## 2024-07-24 RX ORDER — LIDOCAINE HYDROCHLORIDE 10 MG/ML
INJECTION, SOLUTION EPIDURAL; INFILTRATION; INTRACAUDAL; PERINEURAL
Status: COMPLETED | OUTPATIENT
Start: 2024-07-24 | End: 2024-07-24

## 2024-07-24 RX ORDER — MIDAZOLAM HYDROCHLORIDE 2 MG/2ML
INJECTION, SOLUTION INTRAMUSCULAR; INTRAVENOUS
Status: COMPLETED | OUTPATIENT
Start: 2024-07-24 | End: 2024-07-24

## 2024-07-24 RX ADMIN — FENTANYL CITRATE 50 MCG: 50 INJECTION, SOLUTION INTRAMUSCULAR; INTRAVENOUS at 09:30

## 2024-07-24 RX ADMIN — DEXAMETHASONE SODIUM PHOSPHATE 10 MG: 10 INJECTION, SOLUTION INTRAMUSCULAR; INTRAVENOUS at 09:31

## 2024-07-24 RX ADMIN — Medication 5 ML: at 09:31

## 2024-07-24 RX ADMIN — MIDAZOLAM HYDROCHLORIDE 1 MG: 1 INJECTION, SOLUTION INTRAMUSCULAR; INTRAVENOUS at 09:30

## 2024-07-24 RX ADMIN — LIDOCAINE HYDROCHLORIDE 5 ML: 10 INJECTION, SOLUTION EPIDURAL; INFILTRATION; INTRACAUDAL at 09:30

## 2024-07-24 RX ADMIN — IOHEXOL 3 ML: 180 INJECTION INTRAVENOUS at 09:30

## 2024-07-24 NOTE — OP NOTE
Patient Name: Pedro Pablo Talamantes   YOB: 1945  Room/Bed: Room/bed info not found  Medical Record Number: [id number]  Date: 7/24/2024       Sedation/ Anesthesia Plan:   intravenous sedation   as needed.    Medications Planned:   midazolam (Versed) / Fentanyl  Intravenously  as needed.    Preoperative Diagnosis:    1. Lumbosacral spondylosis without myelopathy    2. Spinal stenosis, lumbar region, with neurogenic claudication        Postoperative Diagnosis:    1. Lumbosacral spondylosis without myelopathy    2. Spinal stenosis, lumbar region, with neurogenic claudication        Procedure Performed:  Lumbar epidural steroid injection under fluoroscopy guidance    Blood Loss: None    Procedure:      The Patient was seen in the preop area, chart was reviewed, informed consent was obtained. Patient was taken to procedure room and was placed in prone position. Vital signs were monitored through out the  Procedure. A time out was completed.  The skin over the back was prepped and draped in sterile manner.     The target point was marked at The interlaminar space at L4/5 . Skin and deep tissues were anesthetized with 1 % lidocaine.A 20-gauge Tuohy epidural needlele was advanced  under fluoroscopy guidance in AP view. Epidural space was identified using REYNALDO technique. Position ws confirmed in Lateral view.   Then after negative aspiration contrast dye Omnipaque-180 was injected with live fluoroscopy in AP views that showed  spread of the contrast in the epidural space  and no vascular runoff or intrathecal spread. Finally 5 ml of treatment solution containing 4 ml of PF NS and 1 ml of DEXAMETHASONE 10 mg / ml was injected.  The needle was removed and a Band-Aid was placed over the needle  insertion site.  The patient's vital signs remained stable and the patient tolerated the procedure well.      Electronically signed by Onesimo West MD on 7/24/2024 at 11:08 AM

## 2024-07-24 NOTE — DISCHARGE INSTRUCTIONS

## 2024-07-24 NOTE — INTERVAL H&P NOTE
Update History & Physical    The patient's History and Physical of July 18, 2024 was reviewed with the patient and I examined the patient. There was no change. The surgical site was confirmed by the patient and me.     Plan: The risks, benefits, expected outcome, and alternative to the recommended procedure have been discussed with the patient. Patient understands and wants to proceed with the procedure.     ASA 3  MP 3    Electronically signed by Onesimo West MD on 7/24/2024 at 9:25 AM

## 2024-07-26 DIAGNOSIS — K58.9 IRRITABLE BOWEL SYNDROME WITHOUT DIARRHEA: ICD-10-CM

## 2024-07-29 RX ORDER — OMEPRAZOLE 20 MG/1
CAPSULE, DELAYED RELEASE ORAL DAILY
Qty: 90 CAPSULE | Refills: 3 | Status: SHIPPED | OUTPATIENT
Start: 2024-07-29

## 2024-07-29 RX ORDER — SIMVASTATIN 20 MG
TABLET ORAL
Qty: 90 TABLET | Refills: 3 | Status: SHIPPED | OUTPATIENT
Start: 2024-07-29

## 2024-07-29 RX ORDER — FUROSEMIDE 40 MG/1
40 TABLET ORAL DAILY
Qty: 90 TABLET | Refills: 3 | Status: SHIPPED | OUTPATIENT
Start: 2024-07-29

## 2024-08-14 DIAGNOSIS — Z79.4 TYPE 2 DIABETES MELLITUS WITH DIABETIC POLYNEUROPATHY, WITH LONG-TERM CURRENT USE OF INSULIN (HCC): ICD-10-CM

## 2024-08-14 DIAGNOSIS — E11.42 TYPE 2 DIABETES MELLITUS WITH DIABETIC POLYNEUROPATHY, WITH LONG-TERM CURRENT USE OF INSULIN (HCC): ICD-10-CM

## 2024-08-15 RX ORDER — PEN NEEDLE, DIABETIC 32GX 5/32"
NEEDLE, DISPOSABLE MISCELLANEOUS
Qty: 100 EACH | Refills: 0 | Status: SHIPPED | OUTPATIENT
Start: 2024-08-15

## 2024-08-15 NOTE — TELEPHONE ENCOUNTER
Please Approve or Refuse.  Send to Pharmacy per Pt's Request:      Next Visit Date:  8/19/2024   Last Visit Date: 4/19/2024    Hemoglobin A1C (%)   Date Value   04/19/2024 7.1   12/20/2023 7.2   09/20/2023 6.4             ( goal A1C is < 7)   BP Readings from Last 3 Encounters:   07/24/24 136/73   06/17/24 123/72   04/24/24 106/62          (goal 120/80)  BUN   Date Value Ref Range Status   04/09/2024 24 (H) 8 - 23 mg/dL Final     Creatinine   Date Value Ref Range Status   04/09/2024 1.2 0.7 - 1.2 mg/dL Final     Potassium   Date Value Ref Range Status   04/09/2024 4.5 3.7 - 5.3 mmol/L Final     Comment:     SPECIMEN SLIGHTLY HEMOLYZED, RESULTS MAY BE ADVERSELY AFFECTED.

## 2024-08-19 ENCOUNTER — OFFICE VISIT (OUTPATIENT)
Dept: FAMILY MEDICINE CLINIC | Age: 79
End: 2024-08-19

## 2024-08-19 VITALS
WEIGHT: 238 LBS | OXYGEN SATURATION: 96 % | SYSTOLIC BLOOD PRESSURE: 120 MMHG | BODY MASS INDEX: 32.28 KG/M2 | HEART RATE: 58 BPM | DIASTOLIC BLOOD PRESSURE: 68 MMHG | RESPIRATION RATE: 16 BRPM

## 2024-08-19 DIAGNOSIS — E78.2 MIXED HYPERLIPIDEMIA: ICD-10-CM

## 2024-08-19 DIAGNOSIS — I50.42 CHRONIC COMBINED SYSTOLIC AND DIASTOLIC CONGESTIVE HEART FAILURE (HCC): ICD-10-CM

## 2024-08-19 DIAGNOSIS — I10 ESSENTIAL HYPERTENSION: ICD-10-CM

## 2024-08-19 DIAGNOSIS — G44.321 INTRACTABLE CHRONIC POST-TRAUMATIC HEADACHE: ICD-10-CM

## 2024-08-19 DIAGNOSIS — E11.42 TYPE 2 DIABETES MELLITUS WITH DIABETIC POLYNEUROPATHY, WITH LONG-TERM CURRENT USE OF INSULIN (HCC): Primary | ICD-10-CM

## 2024-08-19 DIAGNOSIS — M48.062 SPINAL STENOSIS, LUMBAR REGION, WITH NEUROGENIC CLAUDICATION: Chronic | ICD-10-CM

## 2024-08-19 DIAGNOSIS — Z79.4 TYPE 2 DIABETES MELLITUS WITH DIABETIC POLYNEUROPATHY, WITH LONG-TERM CURRENT USE OF INSULIN (HCC): Primary | ICD-10-CM

## 2024-08-19 LAB — HBA1C MFR BLD: 8.1 %

## 2024-08-19 RX ORDER — INSULIN GLARGINE 100 [IU]/ML
INJECTION, SOLUTION SUBCUTANEOUS
Qty: 45 ML | Refills: 3 | Status: SHIPPED | OUTPATIENT
Start: 2024-08-19

## 2024-08-19 ASSESSMENT — ENCOUNTER SYMPTOMS
EYE REDNESS: 0
WHEEZING: 0
CONSTIPATION: 0
VOMITING: 0
RHINORRHEA: 0
BLOOD IN STOOL: 0
COLOR CHANGE: 0
COUGH: 0
SINUS PRESSURE: 0
ABDOMINAL DISTENTION: 0
BACK PAIN: 1
TROUBLE SWALLOWING: 0
ABDOMINAL PAIN: 0
RECTAL PAIN: 0
SHORTNESS OF BREATH: 0
NAUSEA: 0
CHEST TIGHTNESS: 0
DIARRHEA: 0
STRIDOR: 0
SORE THROAT: 0

## 2024-08-19 NOTE — PROGRESS NOTES
Chief Complaint   Patient presents with    Diabetes    Hypertension                Pedro Pablo BURROWS Kenmon  here today for follow up on chronic medical problems, go over labs and/or diagnostic studies, and medication refills. Diabetes and Hypertension (/)      HPI: Patient is scheduled for follow-up on chronic medical conditions type 2 diabetes.    Type 2 diabetes worsening A1c has increased to 8.1.  Patient is currently on long-acting insulin and short acting insulin.  He injects 35 units twice a day.  Patient reports he has not been watching his diet he does use sliding scale for the short acting insulin.  He denies any hypoglycemic episodes.  Patient is up-to-date on blood work and also is up-to-date on eye and foot exam.      Lumbar spinal stenosis follows with pain management is currently on narcotics including gabapentin.  Patient reports he did had a lumbar puncture while doing surgery for putting neurotransmitter.  Patient reports the surgery was unsuccessful and he was getting headaches due to spinal fluid leak.  Patient reports recently the headaches have improved he follows with neurologist and is currently on amitriptyline and Ubrelvy .         Hyperlipidemia, stable patient denies any side effects from medication.  He is currently on statins up-to-date on blood work.    Hypertension controlled, patient is currently on lisinopril 2.5 mg.  Patient reports compliance denies any side effects.    CHF stable, denies any leg swelling, dyspnea on exertion.  Patient is up-to-date on blood work.  Patient follows with cardiologist.      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  Patient is currently on amitriptyline that is helping with the headaches.      /68   Pulse 58   Resp 16   Wt 108 kg (238 lb)   SpO2 96%   BMI 32.28 kg/m²    Body mass index is 32.28 kg/m².  Wt Readings from Last 3 Encounters:   08/19/24 108 kg (238 lb)   07/24/24 104.3 kg (230 lb)   07/18/24 106.6 kg (235 lb)

## 2024-08-20 ENCOUNTER — HOSPITAL ENCOUNTER (OUTPATIENT)
Dept: PAIN MANAGEMENT | Age: 79
Discharge: HOME | End: 2024-08-20
Payer: MEDICARE

## 2024-08-20 VITALS — BODY MASS INDEX: 31.42 KG/M2 | WEIGHT: 232 LBS | HEIGHT: 72 IN

## 2024-08-20 DIAGNOSIS — M51.36 DDD (DEGENERATIVE DISC DISEASE), LUMBAR: ICD-10-CM

## 2024-08-20 DIAGNOSIS — Z79.891 CHRONIC USE OF OPIATE DRUG FOR THERAPEUTIC PURPOSE: Chronic | ICD-10-CM

## 2024-08-20 DIAGNOSIS — E11.40 CHRONIC PAINFUL DIABETIC NEUROPATHY (HCC): ICD-10-CM

## 2024-08-20 DIAGNOSIS — M48.062 SPINAL STENOSIS, LUMBAR REGION, WITH NEUROGENIC CLAUDICATION: Primary | Chronic | ICD-10-CM

## 2024-08-20 DIAGNOSIS — M47.817 LUMBOSACRAL SPONDYLOSIS WITHOUT MYELOPATHY: ICD-10-CM

## 2024-08-20 PROCEDURE — 80307 DRUG TEST PRSMV CHEM ANLYZR: CPT

## 2024-08-20 PROCEDURE — 99214 OFFICE O/P EST MOD 30 MIN: CPT | Performed by: NURSE PRACTITIONER

## 2024-08-20 PROCEDURE — G0481 DRUG TEST DEF 8-14 CLASSES: HCPCS

## 2024-08-20 PROCEDURE — 99213 OFFICE O/P EST LOW 20 MIN: CPT

## 2024-08-20 RX ORDER — OXYCODONE HYDROCHLORIDE 5 MG/1
5 TABLET ORAL 2 TIMES DAILY PRN
Qty: 60 TABLET | Refills: 0 | Status: SHIPPED | OUTPATIENT
Start: 2024-08-22 | End: 2024-09-21

## 2024-08-20 ASSESSMENT — ENCOUNTER SYMPTOMS
COUGH: 0
BACK PAIN: 1
SHORTNESS OF BREATH: 0
CONSTIPATION: 0
BOWEL INCONTINENCE: 0

## 2024-08-20 ASSESSMENT — PAIN SCALES - GENERAL: PAINLEVEL_OUTOF10: 5

## 2024-08-20 NOTE — PROGRESS NOTES
Within the last year, have you been afraid of your partner or ex-partner?: No     Emotionally Abused: Not on file     Physically Abused: Not on file     Sexually Abused: Not on file     In the past year have you been physically or sexually abused?: Not on file   Housing Stability: Unknown (4/19/2024)    Housing Stability Vital Sign     Unable to Pay for Housing in the Last Year: Not on file     Number of Places Lived in the Last Year: Not on file     Unstable Housing in the Last Year: No       Review of Systems:  Review of Systems   Constitutional: Negative for chills and fever.   Cardiovascular:  Negative for chest pain.   Respiratory:  Negative for cough and shortness of breath.    Musculoskeletal:  Positive for back pain.   Gastrointestinal:  Negative for bowel incontinence and constipation.   Genitourinary:  Negative for bladder incontinence.   Neurological:  Positive for headaches, numbness, paresthesias, tingling and weakness.       Physical Exam:  Ht 1.829 m (6')   Wt 105.2 kg (232 lb)   BMI 31.46 kg/m²     Physical Exam  Constitutional:       General: He is not in acute distress.     Appearance: Normal appearance. He is obese.   Pulmonary:      Effort: Pulmonary effort is normal.   Musculoskeletal:         General: Normal range of motion.      Comments: Stooped posture - using walker      Skin:     General: Skin is warm and dry.   Neurological:      Mental Status: He is alert and oriented to person, place, and time.   Psychiatric:         Mood and Affect: Mood normal.         Behavior: Behavior normal.         Record/Diagnostics Review:    Last alexander 12/23 and was appropriate     Assessment:  Problem List Items Addressed This Visit       Chronic use of opiate drug for therapeutic purpose (Chronic)    Relevant Orders    DRUG SCREEN, PAIN    Lumbosacral spondylosis without myelopathy    Relevant Medications    oxyCODONE (ROXICODONE) 5 MG immediate release tablet (Start on 8/22/2024)    Spinal stenosis, lumbar

## 2024-08-21 ENCOUNTER — COMMUNITY CARE MANAGEMENT (OUTPATIENT)
Facility: CLINIC | Age: 79
End: 2024-08-21

## 2024-08-23 LAB
6-ACETYLMORPHINE, UR: NOT DETECTED
7-AMINOCLONAZEPAM, URINE: NOT DETECTED
ALPHA-OH-ALPRAZ, URINE: NOT DETECTED
ALPHA-OH-MIDAZOLAM, URINE: NOT DETECTED
ALPRAZOLAM, URINE: NOT DETECTED
AMPHETAMINE, URINE: NOT DETECTED
BARBITURATES, URINE: NEGATIVE
BENZOYLECGONINE, UR: NEGATIVE
BUPRENORPHINE URINE: NOT DETECTED
CARISOPRODOL, UR: NEGATIVE
CLONAZEPAM, URINE: NOT DETECTED
CODEINE, URINE: NOT DETECTED
CREAT UR-MCNC: 39.1 MG/DL (ref 20–400)
DIAZEPAM, URINE: NOT DETECTED
DRUGS EXPECTED, UR: NORMAL
EER HI RES INTERP UR: NORMAL
ETHYL GLUCURONIDE UR: NEGATIVE
FENTANYL URINE: NOT DETECTED
GABAPENTIN: PRESENT
HYDROCODONE, URINE: NOT DETECTED
HYDROMORPHONE, URINE: NOT DETECTED
LORAZEPAM, URINE: NOT DETECTED
MARIJUANA METAB, UR: NEGATIVE
MDA, URINE: NOT DETECTED
MDEA, EVE, UR: NOT DETECTED
MDMA, URINE: NOT DETECTED
MEPERIDINE METAB, UR: NOT DETECTED
METHADONE, URINE: NEGATIVE
METHAMPHETAMINE, URINE: NOT DETECTED
METHYLPHENIDATE: NOT DETECTED
MIDAZOLAM, URINE: NOT DETECTED
MORPHINE, OPI1M: NOT DETECTED
NALOXONE URINE: NOT DETECTED
NORBUPRENORPHINE, URINE: NOT DETECTED
NORDIAZEPAM, URINE: NOT DETECTED
NORFENTANYL, URINE: NOT DETECTED
NORHYDROCODONE, URINE: NOT DETECTED
NOROXYCODONE, URINE: PRESENT
NOROXYMORPHONE, URINE: PRESENT
OXAZEPAM, URINE: NOT DETECTED
OXYCODONE URINE: PRESENT
OXYMORPHONE, URINE: PRESENT
PAIN MANAGEMENT DRUG PANEL INTERP, URINE: NORMAL
PAIN MGT DRUG PANEL, HI RES, UR: NORMAL
PCP,URINE: NEGATIVE
PHENTERMINE, UR: NOT DETECTED
PREGABALIN: NOT DETECTED
TAPENTADOL, URINE: NOT DETECTED
TAPENTADOL-O-SULFATE, URINE: NOT DETECTED
TEMAZEPAM, URINE: NOT DETECTED
TRAMADOL, URINE: NEGATIVE
ZOLPIDEM METABOLITE (ZCA), URINE: NOT DETECTED
ZOLPIDEM, URINE: NOT DETECTED

## 2024-08-25 ENCOUNTER — CARE COORDINATION (OUTPATIENT)
Dept: CARE COORDINATION | Age: 79
End: 2024-08-25

## 2024-08-25 ENCOUNTER — COMMUNITY CARE MANAGEMENT (OUTPATIENT)
Facility: CLINIC | Age: 79
End: 2024-08-25

## 2024-08-25 NOTE — PROGRESS NOTES
Pt is a 80 y/o male identified for Case Management due to Medium  risk rating with CKD3 and comorbidities of DM, HTN, HF  Referred by [facility] system. Pt reports health is fair because he can take care of himself and is able to perform most ADL and IADL activities independently, but Diabetes , back pain and headaches are sometimes limiting Does not consider him self frail. Pt declines having any needs at all currently. Patient denies recent falls ED visits or hospitalizations. The patient would benefit from Strive NP, RD. SW, but he declines RD and SW at this time RN will monitor and encourage participation as indicated and this will be addressed in the future as patient does not have other pressing issues. Actions to address disease process and renal nutrition education will be addressed in the care plan. No needs identified currently.  Assessment findings  1.Knowledge deficit related toHT, DM, heart failure and kidney disease and renal nutrition  2.Pain control  Goals :  1. Increase knowlege of CHF, DM, HTN and how this effects his kidney health  2. Pain control acheived at a tolerable level for patient  3. Review Stoplight Toolls for DM and CHF with pt and develop rescue plan.  Next steps: patient agreeable toLCM F/U visit in 8 weeks RN to follow up on NIKI and DM Stoplight Tools, renal nutrition, provider appointments, and provide support and care coordination as needed or requested.  Pt advised to call this RNCM for any new needs, issues or concerns that arise.  Omero Vitale, BENICM  [facility]   [phone]  [email or web address]

## 2024-09-18 ENCOUNTER — HOSPITAL ENCOUNTER (OUTPATIENT)
Dept: PAIN MANAGEMENT | Age: 79
Discharge: HOME | End: 2024-09-18
Payer: MEDICARE

## 2024-09-18 VITALS — BODY MASS INDEX: 31.42 KG/M2 | WEIGHT: 232 LBS | HEIGHT: 72 IN

## 2024-09-18 DIAGNOSIS — I50.42 CHRONIC COMBINED SYSTOLIC AND DIASTOLIC CONGESTIVE HEART FAILURE (HCC): ICD-10-CM

## 2024-09-18 DIAGNOSIS — M51.36 DDD (DEGENERATIVE DISC DISEASE), LUMBAR: ICD-10-CM

## 2024-09-18 DIAGNOSIS — Z79.891 CHRONIC USE OF OPIATE DRUG FOR THERAPEUTIC PURPOSE: Chronic | ICD-10-CM

## 2024-09-18 DIAGNOSIS — M50.30 DDD (DEGENERATIVE DISC DISEASE), CERVICAL: ICD-10-CM

## 2024-09-18 DIAGNOSIS — M48.062 SPINAL STENOSIS, LUMBAR REGION, WITH NEUROGENIC CLAUDICATION: Primary | Chronic | ICD-10-CM

## 2024-09-18 DIAGNOSIS — E11.610 CHARCOT FOOT DUE TO DIABETES MELLITUS (HCC): ICD-10-CM

## 2024-09-18 DIAGNOSIS — M47.817 LUMBOSACRAL SPONDYLOSIS WITHOUT MYELOPATHY: ICD-10-CM

## 2024-09-18 DIAGNOSIS — F11.20 OPIOID DEPENDENCE WITH CURRENT USE (HCC): ICD-10-CM

## 2024-09-18 PROCEDURE — 99213 OFFICE O/P EST LOW 20 MIN: CPT | Performed by: NURSE PRACTITIONER

## 2024-09-18 PROCEDURE — 99213 OFFICE O/P EST LOW 20 MIN: CPT

## 2024-09-18 RX ORDER — OXYCODONE HYDROCHLORIDE 5 MG/1
5 TABLET ORAL 2 TIMES DAILY PRN
Qty: 60 TABLET | Refills: 0 | Status: SHIPPED | OUTPATIENT
Start: 2024-09-20 | End: 2024-10-20

## 2024-09-18 ASSESSMENT — PAIN SCALES - GENERAL: PAINLEVEL_OUTOF10: 5

## 2024-09-18 ASSESSMENT — PAIN DESCRIPTION - PAIN TYPE: TYPE: CHRONIC PAIN

## 2024-09-18 ASSESSMENT — ENCOUNTER SYMPTOMS
CONSTIPATION: 0
BOWEL INCONTINENCE: 0
COUGH: 0
BACK PAIN: 1
SHORTNESS OF BREATH: 0

## 2024-09-18 ASSESSMENT — PAIN DESCRIPTION - LOCATION: LOCATION: BACK

## 2024-09-18 ASSESSMENT — PAIN DESCRIPTION - DESCRIPTORS: DESCRIPTORS: ACHING

## 2024-09-18 ASSESSMENT — PAIN DESCRIPTION - FREQUENCY: FREQUENCY: CONTINUOUS

## 2024-09-18 ASSESSMENT — PAIN DESCRIPTION - ORIENTATION: ORIENTATION: LOWER

## 2024-09-19 ENCOUNTER — OFFICE VISIT (OUTPATIENT)
Dept: PODIATRY | Age: 79
End: 2024-09-19
Payer: MEDICARE

## 2024-09-19 VITALS — HEIGHT: 72 IN | WEIGHT: 232 LBS | BODY MASS INDEX: 31.42 KG/M2

## 2024-09-19 DIAGNOSIS — E11.42 TYPE 2 DIABETES MELLITUS WITH DIABETIC POLYNEUROPATHY, WITH LONG-TERM CURRENT USE OF INSULIN (HCC): ICD-10-CM

## 2024-09-19 DIAGNOSIS — Z79.4 TYPE 2 DIABETES MELLITUS WITH DIABETIC POLYNEUROPATHY, WITH LONG-TERM CURRENT USE OF INSULIN (HCC): ICD-10-CM

## 2024-09-19 DIAGNOSIS — B35.1 ONYCHOMYCOSIS: Primary | ICD-10-CM

## 2024-09-19 PROCEDURE — 11721 DEBRIDE NAIL 6 OR MORE: CPT | Performed by: PODIATRIST

## 2024-09-19 PROCEDURE — 99999 PR OFFICE/OUTPT VISIT,PROCEDURE ONLY: CPT | Performed by: PODIATRIST

## 2024-09-19 ASSESSMENT — ENCOUNTER SYMPTOMS
DIARRHEA: 0
NAUSEA: 0
COLOR CHANGE: 0
VOMITING: 0

## 2024-09-29 DIAGNOSIS — E11.42 TYPE 2 DIABETES MELLITUS WITH DIABETIC POLYNEUROPATHY, WITH LONG-TERM CURRENT USE OF INSULIN (HCC): ICD-10-CM

## 2024-09-29 DIAGNOSIS — Z79.4 TYPE 2 DIABETES MELLITUS WITH DIABETIC POLYNEUROPATHY, WITH LONG-TERM CURRENT USE OF INSULIN (HCC): ICD-10-CM

## 2024-09-30 RX ORDER — PEN NEEDLE, DIABETIC 32GX 5/32"
NEEDLE, DISPOSABLE MISCELLANEOUS
Qty: 100 EACH | Refills: 1 | Status: SHIPPED | OUTPATIENT
Start: 2024-09-30

## 2024-10-04 NOTE — PROGRESS NOTES
[facility] NEUROLOGY SPECIALIST  [address]  [place] [address]  Dept: [phone]    PATIENT NAME: Pedro Pablo Talamantes  PATIENT MRN: [id number]  PRIMARY CARE PHYSICIAN: Barrera Thomas MD    HPI:        Pedro Pablo Talamantes is a 79 y.o. male who I initially evaluated in clinic on 1/31/2024  or headaches. The patient's history is summarized as follows:      Pedro Pablo Talamantes is a 78 y.o. male with a history of diabetic neuropathy, chronic low back pain, DM2, and MI, who presents to clinic today for evaluation of headache.  He had longstanding pain in the upper cervical/lower occipital area for many years and has had some improvement on topiramate 50 mg BID for this.  He follows with pain management for chronic pain related to lumbar spondylosis and diabetic neuropathy.  He has had difficult-to-control pain and is currently on oxycodone and gabapentin.  He is currently taking gabapentin 800 mg in the morning, which does improve his pain for a period of time.      Mr. Talamantes reports that he had testing done for a spinal cord stimulator in 10/2023, which was unfortunately complicated by a CSF leak and subsequent headache.  Since that time, he has had intractable daily headaches.  He reports pain in the bilateral parietal area described as throbbing.  He had some initial pain involving the right jaw, but this resolved.  Pain is rated 5/10 in severity on average and is daily with little to no periods of remission.  He has associated photophobia and nausea.  He has missed multiple social events due to headache.         TODAY'S EVALUTION:    Pedro Pablo Talamantes  was last seen in clinic by me on 6/17/2024.  He continues to have frequent headaches.  These are debilitating at times.  He has not had much benefit from amitriptyline 10 mg.  This does improve his sleep.  Ubrogepant made him feel off.  At this point, he states that he is not interested in trying another medication and wishes to follow-up with us as

## 2024-10-07 ENCOUNTER — OFFICE VISIT (OUTPATIENT)
Dept: NEUROLOGY | Age: 79
End: 2024-10-07
Payer: MEDICARE

## 2024-10-07 VITALS
BODY MASS INDEX: 32.53 KG/M2 | HEIGHT: 72 IN | SYSTOLIC BLOOD PRESSURE: 128 MMHG | WEIGHT: 240.2 LBS | DIASTOLIC BLOOD PRESSURE: 72 MMHG | HEART RATE: 86 BPM

## 2024-10-07 DIAGNOSIS — E11.40 CHRONIC PAINFUL DIABETIC NEUROPATHY (HCC): ICD-10-CM

## 2024-10-07 DIAGNOSIS — R51.9 NEW ONSET OF HEADACHES AFTER AGE 50: ICD-10-CM

## 2024-10-07 DIAGNOSIS — G43.709 CHRONIC MIGRAINE W/O AURA W/O STATUS MIGRAINOSUS, NOT INTRACTABLE: Primary | ICD-10-CM

## 2024-10-07 DIAGNOSIS — I10 ESSENTIAL HYPERTENSION: ICD-10-CM

## 2024-10-07 PROCEDURE — 99213 OFFICE O/P EST LOW 20 MIN: CPT | Performed by: PSYCHIATRY & NEUROLOGY

## 2024-10-07 PROCEDURE — 3078F DIAST BP <80 MM HG: CPT | Performed by: PSYCHIATRY & NEUROLOGY

## 2024-10-07 PROCEDURE — 1036F TOBACCO NON-USER: CPT | Performed by: PSYCHIATRY & NEUROLOGY

## 2024-10-07 PROCEDURE — 1123F ACP DISCUSS/DSCN MKR DOCD: CPT | Performed by: PSYCHIATRY & NEUROLOGY

## 2024-10-07 PROCEDURE — 3052F HG A1C>EQUAL 8.0%<EQUAL 9.0%: CPT | Performed by: PSYCHIATRY & NEUROLOGY

## 2024-10-07 PROCEDURE — 3074F SYST BP LT 130 MM HG: CPT | Performed by: PSYCHIATRY & NEUROLOGY

## 2024-10-07 PROCEDURE — G8484 FLU IMMUNIZE NO ADMIN: HCPCS | Performed by: PSYCHIATRY & NEUROLOGY

## 2024-10-07 PROCEDURE — G8427 DOCREV CUR MEDS BY ELIG CLIN: HCPCS | Performed by: PSYCHIATRY & NEUROLOGY

## 2024-10-07 PROCEDURE — G8417 CALC BMI ABV UP PARAM F/U: HCPCS | Performed by: PSYCHIATRY & NEUROLOGY

## 2024-10-10 DIAGNOSIS — I10 ESSENTIAL HYPERTENSION: ICD-10-CM

## 2024-10-10 DIAGNOSIS — R80.9 MICROALBUMINURIA: ICD-10-CM

## 2024-10-10 RX ORDER — LISINOPRIL 2.5 MG/1
TABLET ORAL
Qty: 90 TABLET | Refills: 0 | Status: SHIPPED | OUTPATIENT
Start: 2024-10-10

## 2024-10-10 NOTE — TELEPHONE ENCOUNTER
Please Approve or Refuse.  Send to Pharmacy per Pt's Request: [email or web address]      Next Visit Date:  11/19/2024   Last Visit Date: 8/19/2024    Hemoglobin A1C (%)   Date Value   08/19/2024 8.1   04/19/2024 7.1   12/20/2023 7.2             ( goal A1C is < 7)   BP Readings from Last 3 Encounters:   10/07/24 128/72   08/19/24 120/68   07/24/24 136/73          (goal 120/80)  BUN   Date Value Ref Range Status   04/09/2024 24 (H) 8 - 23 mg/dL Final     Creatinine   Date Value Ref Range Status   04/09/2024 1.2 0.7 - 1.2 mg/dL Final     Potassium   Date Value Ref Range Status   04/09/2024 4.5 3.7 - 5.3 mmol/L Final     Comment:     SPECIMEN SLIGHTLY HEMOLYZED, RESULTS MAY BE ADVERSELY AFFECTED.

## 2024-10-18 ENCOUNTER — HOSPITAL ENCOUNTER (OUTPATIENT)
Dept: PAIN MANAGEMENT | Age: 79
Discharge: HOME | End: 2024-10-18
Payer: MEDICARE

## 2024-10-18 VITALS — WEIGHT: 232 LBS | BODY MASS INDEX: 31.42 KG/M2 | HEIGHT: 72 IN

## 2024-10-18 DIAGNOSIS — M47.817 LUMBOSACRAL SPONDYLOSIS WITHOUT MYELOPATHY: Primary | ICD-10-CM

## 2024-10-18 DIAGNOSIS — Z79.891 CHRONIC USE OF OPIATE DRUG FOR THERAPEUTIC PURPOSE: Chronic | ICD-10-CM

## 2024-10-18 DIAGNOSIS — M51.369 DDD (DEGENERATIVE DISC DISEASE), LUMBAR: ICD-10-CM

## 2024-10-18 DIAGNOSIS — M50.30 DDD (DEGENERATIVE DISC DISEASE), CERVICAL: ICD-10-CM

## 2024-10-18 DIAGNOSIS — E11.40 CHRONIC PAINFUL DIABETIC NEUROPATHY (HCC): ICD-10-CM

## 2024-10-18 DIAGNOSIS — M48.062 SPINAL STENOSIS, LUMBAR REGION, WITH NEUROGENIC CLAUDICATION: Chronic | ICD-10-CM

## 2024-10-18 PROCEDURE — 99213 OFFICE O/P EST LOW 20 MIN: CPT

## 2024-10-18 RX ORDER — OXYCODONE HYDROCHLORIDE 5 MG/1
5 TABLET ORAL 2 TIMES DAILY PRN
Qty: 60 TABLET | Refills: 0 | Status: SHIPPED | OUTPATIENT
Start: 2024-10-21 | End: 2024-11-20

## 2024-10-18 ASSESSMENT — PAIN SCALES - GENERAL: PAINLEVEL_OUTOF10: 6

## 2024-10-18 ASSESSMENT — ENCOUNTER SYMPTOMS
BOWEL INCONTINENCE: 0
BACK PAIN: 1

## 2024-10-18 NOTE — PROGRESS NOTES
Smoking status: Former     Current packs/day: 0.00     Average packs/day: 1 pack/day for 30.0 years (30.0 ttl pk-yrs)     Types: Cigarettes     Start date: 1972     Quit date: 2002     Years since quittin.7    Smokeless tobacco: Never   Vaping Use    Vaping status: Never Used   Substance and Sexual Activity    Alcohol use: Not Currently     Comment: rare    Drug use: No    Sexual activity: Not Currently     Partners: Female   Other Topics Concern    Not on file   Social History Narrative    Not on file     Social Determinants of Health     Financial Resource Strain: Low Risk  (2024)    Overall Financial Resource Strain (CARDIA)     Difficulty of Paying Living Expenses: Not hard at all   Food Insecurity: No Food Insecurity (2024)    Hunger Vital Sign     Worried About Running Out of Food in the Last Year: Never true     Ran Out of Food in the Last Year: Never true   Transportation Needs: Unknown (2024)    PRAPARE - Transportation     Lack of Transportation (Medical): Not on file     Lack of Transportation (Non-Medical): No   Physical Activity: Unknown (2023)    Exercise Vital Sign     Days of Exercise per Week: 0 days     Minutes of Exercise per Session: Not on file   Stress: Not on file   Social Connections: Not on file   Intimate Partner Violence: Not At Risk (2024)    [facility]    [place] Safety & Environment     Within the last year, have you been afraid of your partner or ex-partner?: No     Emotionally Abused: Not on file     Physically Abused: Not on file     Sexually Abused: Not on file     In the past year have you been physically or sexually abused?: Not on file   Housing Stability: Unknown (2024)    Housing Stability Vital Sign     Unable to Pay for Housing in the Last Year: Not on file     Number of Places Lived in the Last Year: Not on file     Unstable Housing in the Last Year: No       Review of

## 2024-11-12 ENCOUNTER — TELEPHONE (OUTPATIENT)
Dept: PAIN MANAGEMENT | Age: 79
End: 2024-11-12

## 2024-11-12 NOTE — TELEPHONE ENCOUNTER
[employer] calling today wanting update on PA that was sent last week and received by . PA was for back and knee braces.     Please advise and send back with OV to [phone]

## 2024-11-13 NOTE — TELEPHONE ENCOUNTER
Patient information and paperwork received and given to be reviewed by MATTY Crouch. Awaiting approved paperwork for return.

## 2024-11-14 ENCOUNTER — TELEPHONE (OUTPATIENT)
Dept: PODIATRY | Age: 79
End: 2024-11-14

## 2024-11-14 NOTE — TELEPHONE ENCOUNTER
Maciel from [employer] is requesting a call regarding whether or not this is a patient of doctor Yo and can be reached at [phone] . Okay to leave a message.

## 2024-11-15 NOTE — TELEPHONE ENCOUNTER
This is a scam company calling to get patient information in disguise as an order for an orthosis brace. Forwarded this information to our .

## 2024-11-16 SDOH — HEALTH STABILITY: PHYSICAL HEALTH: ON AVERAGE, HOW MANY DAYS PER WEEK DO YOU ENGAGE IN MODERATE TO STRENUOUS EXERCISE (LIKE A BRISK WALK)?: 0 DAYS

## 2024-11-16 SDOH — HEALTH STABILITY: PHYSICAL HEALTH: ON AVERAGE, HOW MANY MINUTES DO YOU ENGAGE IN EXERCISE AT THIS LEVEL?: 0 MIN

## 2024-11-16 ASSESSMENT — PATIENT HEALTH QUESTIONNAIRE - PHQ9
SUM OF ALL RESPONSES TO PHQ QUESTIONS 1-9: 7
9. THOUGHTS THAT YOU WOULD BE BETTER OFF DEAD, OR OF HURTING YOURSELF: NOT AT ALL
5. POOR APPETITE OR OVEREATING: NOT AT ALL
1. LITTLE INTEREST OR PLEASURE IN DOING THINGS: NEARLY EVERY DAY
SUM OF ALL RESPONSES TO PHQ9 QUESTIONS 1 & 2: 3
SUM OF ALL RESPONSES TO PHQ QUESTIONS 1-9: 7
7. TROUBLE CONCENTRATING ON THINGS, SUCH AS READING THE NEWSPAPER OR WATCHING TELEVISION: SEVERAL DAYS
8. MOVING OR SPEAKING SO SLOWLY THAT OTHER PEOPLE COULD HAVE NOTICED. OR THE OPPOSITE, BEING SO FIGETY OR RESTLESS THAT YOU HAVE BEEN MOVING AROUND A LOT MORE THAN USUAL: SEVERAL DAYS
10. IF YOU CHECKED OFF ANY PROBLEMS, HOW DIFFICULT HAVE THESE PROBLEMS MADE IT FOR YOU TO DO YOUR WORK, TAKE CARE OF THINGS AT HOME, OR GET ALONG WITH OTHER PEOPLE: SOMEWHAT DIFFICULT
3. TROUBLE FALLING OR STAYING ASLEEP: SEVERAL DAYS
6. FEELING BAD ABOUT YOURSELF - OR THAT YOU ARE A FAILURE OR HAVE LET YOURSELF OR YOUR FAMILY DOWN: NOT AT ALL
2. FEELING DOWN, DEPRESSED OR HOPELESS: NOT AT ALL
4. FEELING TIRED OR HAVING LITTLE ENERGY: SEVERAL DAYS
SUM OF ALL RESPONSES TO PHQ QUESTIONS 1-9: 7
SUM OF ALL RESPONSES TO PHQ QUESTIONS 1-9: 7

## 2024-11-16 ASSESSMENT — LIFESTYLE VARIABLES
HOW MANY STANDARD DRINKS CONTAINING ALCOHOL DO YOU HAVE ON A TYPICAL DAY: 0
HOW OFTEN DO YOU HAVE A DRINK CONTAINING ALCOHOL: 1
HOW MANY STANDARD DRINKS CONTAINING ALCOHOL DO YOU HAVE ON A TYPICAL DAY: PATIENT DOES NOT DRINK
HOW OFTEN DO YOU HAVE A DRINK CONTAINING ALCOHOL: NEVER
HOW OFTEN DO YOU HAVE SIX OR MORE DRINKS ON ONE OCCASION: 1

## 2024-11-19 ENCOUNTER — TELEPHONE (OUTPATIENT)
Dept: PAIN MANAGEMENT | Age: 79
End: 2024-11-19

## 2024-11-19 ENCOUNTER — OFFICE VISIT (OUTPATIENT)
Dept: FAMILY MEDICINE CLINIC | Age: 79
End: 2024-11-19

## 2024-11-19 VITALS
OXYGEN SATURATION: 98 % | HEIGHT: 72 IN | SYSTOLIC BLOOD PRESSURE: 112 MMHG | BODY MASS INDEX: 32.64 KG/M2 | DIASTOLIC BLOOD PRESSURE: 70 MMHG | WEIGHT: 241 LBS | HEART RATE: 94 BPM

## 2024-11-19 DIAGNOSIS — E11.42 TYPE 2 DIABETES MELLITUS WITH DIABETIC POLYNEUROPATHY, WITH LONG-TERM CURRENT USE OF INSULIN (HCC): ICD-10-CM

## 2024-11-19 DIAGNOSIS — Z12.5 PROSTATE CANCER SCREENING: ICD-10-CM

## 2024-11-19 DIAGNOSIS — I25.810 CORONARY ARTERY DISEASE INVOLVING CORONARY BYPASS GRAFT OF NATIVE HEART WITHOUT ANGINA PECTORIS: ICD-10-CM

## 2024-11-19 DIAGNOSIS — E78.2 MIXED HYPERLIPIDEMIA: ICD-10-CM

## 2024-11-19 DIAGNOSIS — M48.062 SPINAL STENOSIS, LUMBAR REGION, WITH NEUROGENIC CLAUDICATION: Chronic | ICD-10-CM

## 2024-11-19 DIAGNOSIS — Z79.4 TYPE 2 DIABETES MELLITUS WITH DIABETIC POLYNEUROPATHY, WITH LONG-TERM CURRENT USE OF INSULIN (HCC): ICD-10-CM

## 2024-11-19 DIAGNOSIS — E55.9 VITAMIN D DEFICIENCY: ICD-10-CM

## 2024-11-19 DIAGNOSIS — Z71.89 ACP (ADVANCE CARE PLANNING): ICD-10-CM

## 2024-11-19 DIAGNOSIS — I50.42 CHRONIC COMBINED SYSTOLIC AND DIASTOLIC CONGESTIVE HEART FAILURE (HCC): ICD-10-CM

## 2024-11-19 DIAGNOSIS — Z00.00 MEDICARE ANNUAL WELLNESS VISIT, SUBSEQUENT: Primary | ICD-10-CM

## 2024-11-19 DIAGNOSIS — N18.31 STAGE 3A CHRONIC KIDNEY DISEASE (HCC): ICD-10-CM

## 2024-11-19 LAB — HBA1C MFR BLD: 8.2 %

## 2024-11-19 RX ORDER — DAPAGLIFLOZIN 10 MG/1
10 TABLET, FILM COATED ORAL EVERY MORNING
Qty: 90 TABLET | Refills: 1 | Status: SHIPPED | OUTPATIENT
Start: 2024-11-19

## 2024-11-19 ASSESSMENT — VISUAL ACUITY
OS_CC: 20/25
OD_CC: 20/30

## 2024-11-19 ASSESSMENT — ENCOUNTER SYMPTOMS
ABDOMINAL PAIN: 0
RECTAL PAIN: 0
CHEST TIGHTNESS: 0
ABDOMINAL DISTENTION: 0
DIARRHEA: 0
WHEEZING: 0
TROUBLE SWALLOWING: 0
SORE THROAT: 0
CONSTIPATION: 0
VOMITING: 0
RHINORRHEA: 0
STRIDOR: 0
COUGH: 0
BLOOD IN STOOL: 0
BACK PAIN: 1
NAUSEA: 0
SINUS PRESSURE: 0
EYE REDNESS: 0
SHORTNESS OF BREATH: 0
COLOR CHANGE: 0

## 2024-11-19 NOTE — PROGRESS NOTES
Medicare Annual Wellness Visit    Pedro Pablo MARKOS Rogersmon is here for Diabetes, Immunizations (declines), Medicare AWV, Orders (Needs knee brace and back brace ), and Sinus Problem (Ongoing for a week)    Assessment & Plan   Medicare annual wellness visit, subsequent  Medicare wellness visit done  Type 2 diabetes mellitus with diabetic polyneuropathy, with long-term current use of insulin (HCC)  Uncontrolled and worsening, start on Farxiga, discussed with patient if insurance does not cover call back, continue insulin close follow-up in 3 months monitor labs  -     POCT glycosylated hemoglobin (Hb A1C)  -     Magnesium; Future  -     Microalbumin / Creatinine Urine Ratio; Future  -     CBC with Auto Differential; Future  -     Comprehensive Metabolic Panel; Future  -     Uric Acid; Future  -     Vitamin B12 & Folate; Future  -     dapagliflozin (FARXIGA) 10 MG tablet; Take 1 tablet by mouth every morning, Disp-90 tablet, R-1Normal  -     [facility] OFFICE OUTPATIENT VISIT 25 MINUTES [54909]  ACP (advance care planning)  Discussed in detail handout provided .  -     [facility] Advanced Care Planning (16-30 minutes) [80409]  -     [facility] OFFICE OUTPATIENT VISIT 25 MINUTES [[id number]]  Spinal stenosis, lumbar region, with neurogenic claudication  Chronic problem worsening, follows with pain management, back brace ordered papers signed  -     [facility] OFFICE OUTPATIENT VISIT 25 MINUTES [[id number]]  Coronary artery disease involving coronary bypass graft of native heart without angina pectoris  Stable follows with cardiologist.  -     [facility] OFFICE OUTPATIENT VISIT 25 MINUTES [[id number]]  Chronic combined systolic and diastolic congestive heart failure (HCC)  Echocardiogram reviewed, normal ejection fraction  -     [facility] OFFICE OUTPATIENT VISIT 25 MINUTES [[id number]]  Mixed hyperlipidemia  -     Lipid Panel; Future  -     [facility] OFFICE OUTPATIENT VISIT 25 MINUTES [[id number]]  Stage 3a chronic kidney disease (HCC)  -     Vitamin D 25 Hydroxy; Future  -     dapagliflozin (FARXIGA) 10 MG

## 2024-11-19 NOTE — PROGRESS NOTES
Visit Information    Have you changed or started any medications since your last visit including any over-the-counter medicines, vitamins, or herbal medicines? yes -    Have you stopped taking any of your medications? Is so, why? -  no  Are you having any side effects from any of your medications? - no    Have you seen any other physician or provider since your last visit?  yes - 11/14/2024   Have you had any other diagnostic tests since your last visit?  yes - 07/24/2024   Have you been seen in the emergency room and/or had an admission in a hospital since we last saw you?  no   Have you had your routine dental cleaning in the past 6 months?  no     Do you have an active MyChart account? If no, what is the barrier?  Yes    Patient Care Team:  Barrera Thomas MD as PCP - General (Family Medicine)  Barrera Thomas MD as PCP - Empaneled Provider  Chente Last MD as Referring Physician (Cardiology)  Isabela Thomas MD as Consulting Physician (Gastroenterology)  Ranjith Chowdhury MD as Consulting Physician (Cardiovascular Disease)  Elroy Fields MD as Consulting Physician (Pain Management)  Em Yo DPM as Consulting Physician (Podiatry)  Jenna Galvan MD as Surgeon (Ophthalmology)  Omero Vitale as  (Nephrology)    Medical History Review  Past Medical, Family, and Social History reviewed and does contribute to the patient presenting condition    Health Maintenance   Topic Date Due    Respiratory Syncytial Virus (RSV) Pregnant or age 60 yrs+ (1 - 1-dose 75+ series) Never done    Annual Wellness Visit (Medicare)  09/20/2024    COVID-19 Vaccine (6 - 2023-24 season) 01/05/2025    Lipids  02/22/2025    Diabetic Alb to Cr ratio (uACR) test  04/09/2025    GFR test (Diabetes, CKD 3-4, OR last GFR 15-59)  04/09/2025    Depression Screen  11/16/2025    DTaP/Tdap/Td vaccine (2 - Td or Tdap) 05/07/2031    Flu vaccine  Completed    Shingles vaccine  Completed    Pneumococcal 65+ years

## 2024-11-19 NOTE — PATIENT INSTRUCTIONS
contact your doctor if you have any problems.  Where can you learn more?  Go to [email or web address].net/patientEd and enter F075 to learn more about \"A Healthy Heart: Care Instructions.\"  Current as of: June 24, 2023  Content Version: 14.2  © 2024 [employer].   Care instructions adapted under license by [employer]. If you have questions about a medical condition or this instruction, always ask your healthcare professional. [employer] disclaims any warranty or liability for your use of this information.      Personalized Preventive Plan for Pedro Pablo Talamantes - 11/19/2024  Medicare offers a range of preventive health benefits. Some of the tests and screenings are paid in full while other may be subject to a deductible, co-insurance, and/or copay.    Some of these benefits include a comprehensive review of your medical history including lifestyle, illnesses that may run in your family, and various assessments and screenings as appropriate.    After reviewing your medical record and screening and assessments performed today your provider may have ordered immunizations, labs, imaging, and/or referrals for you.  A list of these orders (if applicable) as well as your Preventive Care list are included within your After Visit Summary for your review.    Other Preventive Recommendations:    A preventive eye exam performed by an eye specialist is recommended every 1-2 years to screen for glaucoma; cataracts, macular degeneration, and other eye disorders.  A preventive dental visit is recommended every 6 months.  Try to get at least 150 minutes of exercise per week or 10,000 steps per day on a pedometer .  Order or download the FREE \"Exercise & Physical Activity: Your Everyday Guide\" from [employer] [place] [employer]. Call 1-871.380.7994 or search [employer] [place] [employer] online.  You need 3170-6597 mg of calcium and 8170-7454 IU of vitamin D per day. It is possible to meet your calcium

## 2024-11-21 ENCOUNTER — OFFICE VISIT (OUTPATIENT)
Dept: PODIATRY | Age: 79
End: 2024-11-21
Payer: MEDICARE

## 2024-11-21 ENCOUNTER — HOSPITAL ENCOUNTER (OUTPATIENT)
Dept: PAIN MANAGEMENT | Age: 79
Discharge: HOME | End: 2024-11-21
Payer: MEDICARE

## 2024-11-21 VITALS — BODY MASS INDEX: 32.64 KG/M2 | HEIGHT: 72 IN | WEIGHT: 241 LBS

## 2024-11-21 VITALS — WEIGHT: 241 LBS | BODY MASS INDEX: 32.64 KG/M2 | HEIGHT: 72 IN

## 2024-11-21 DIAGNOSIS — M47.817 LUMBOSACRAL SPONDYLOSIS WITHOUT MYELOPATHY: Primary | ICD-10-CM

## 2024-11-21 DIAGNOSIS — Z79.4 TYPE 2 DIABETES MELLITUS WITH DIABETIC POLYNEUROPATHY, WITH LONG-TERM CURRENT USE OF INSULIN (HCC): ICD-10-CM

## 2024-11-21 DIAGNOSIS — M51.369 DDD (DEGENERATIVE DISC DISEASE), LUMBAR: ICD-10-CM

## 2024-11-21 DIAGNOSIS — M14.672 CHARCOT'S JOINT OF LEFT FOOT: ICD-10-CM

## 2024-11-21 DIAGNOSIS — G44.221 CHRONIC TENSION-TYPE HEADACHE, INTRACTABLE: ICD-10-CM

## 2024-11-21 DIAGNOSIS — B35.1 ONYCHOMYCOSIS: Primary | ICD-10-CM

## 2024-11-21 DIAGNOSIS — E11.42 TYPE 2 DIABETES MELLITUS WITH DIABETIC POLYNEUROPATHY, WITH LONG-TERM CURRENT USE OF INSULIN (HCC): ICD-10-CM

## 2024-11-21 DIAGNOSIS — Z79.891 CHRONIC USE OF OPIATE DRUG FOR THERAPEUTIC PURPOSE: Chronic | ICD-10-CM

## 2024-11-21 DIAGNOSIS — E11.40 CHRONIC PAINFUL DIABETIC NEUROPATHY (HCC): ICD-10-CM

## 2024-11-21 PROCEDURE — 11721 DEBRIDE NAIL 6 OR MORE: CPT | Performed by: PODIATRIST

## 2024-11-21 PROCEDURE — 99999 PR OFFICE/OUTPT VISIT,PROCEDURE ONLY: CPT | Performed by: PODIATRIST

## 2024-11-21 PROCEDURE — 99213 OFFICE O/P EST LOW 20 MIN: CPT

## 2024-11-21 PROCEDURE — 99214 OFFICE O/P EST MOD 30 MIN: CPT | Performed by: ANESTHESIOLOGY

## 2024-11-21 RX ORDER — GABAPENTIN 800 MG/1
800 TABLET ORAL DAILY
Qty: 90 TABLET | Refills: 0 | Status: CANCELLED | OUTPATIENT
Start: 2024-11-21 | End: 2025-02-19

## 2024-11-21 RX ORDER — OXYCODONE AND ACETAMINOPHEN 5; 325 MG/1; MG/1
1 TABLET ORAL EVERY 8 HOURS PRN
Qty: 90 TABLET | Refills: 0 | Status: SHIPPED | OUTPATIENT
Start: 2024-11-21 | End: 2024-12-21

## 2024-11-21 ASSESSMENT — PAIN DESCRIPTION - LOCATION: LOCATION: BACK

## 2024-11-21 ASSESSMENT — ENCOUNTER SYMPTOMS
CONSTIPATION: 0
COLOR CHANGE: 0
VOMITING: 0
BACK PAIN: 1
DIARRHEA: 0
RESPIRATORY NEGATIVE: 1
NAUSEA: 0

## 2024-11-21 ASSESSMENT — PAIN DESCRIPTION - ORIENTATION: ORIENTATION: LOWER

## 2024-11-21 ASSESSMENT — PAIN DESCRIPTION - FREQUENCY: FREQUENCY: CONTINUOUS

## 2024-11-21 ASSESSMENT — PAIN SCALES - GENERAL: PAINLEVEL_OUTOF10: 6

## 2024-11-21 ASSESSMENT — PAIN DESCRIPTION - PAIN TYPE: TYPE: CHRONIC PAIN

## 2024-11-21 ASSESSMENT — PAIN DESCRIPTION - DESCRIPTORS: DESCRIPTORS: ACHING

## 2024-11-21 NOTE — PROGRESS NOTES
oxyCODONE-acetaminophen (PERCOCET) 5-325 MG per tablet Take 1 tablet by mouth every 8 hours as needed for Pain for up to 30 days. Max Daily Amount: 3 tablets 90 tablet 0    Elastic Bandages & Supports (LUMBAR BACK BRACE/SUPPORT PAD) MISC 1 each by Does not apply route daily as needed (lumbar degnerative disc disease) 1 each 0    dapagliflozin (FARXIGA) 10 MG tablet Take 1 tablet by mouth every morning 90 tablet 1    lisinopril (PRINIVIL;ZESTRIL) 2.5 MG tablet TAKE 1 TABLET BY MOUTH EVERY DAY 90 tablet 0    Insulin Pen Needle (TECHLITE PLUS PEN NEEDLES) 32G X 4 MM MISC USE TWICE DAILY AS DIRECTED 100 each 1    LANTUS SOLOSTAR 100 UNIT/ML injection pen inject 40 units subcutaneously in the morning and 35 units in the evening 45 mL 3    omeprazole (PRILOSEC) 20 MG delayed release capsule TAKE 1 CAPSULE BY MOUTH EVERY DAY 90 capsule 3    simvastatin (ZOCOR) 20 MG tablet TAKE 1 TABLET BY MOUTH EVERY DAY AT NIGHT 90 tablet 3    furosemide (LASIX) 40 MG tablet TAKE 1 TABLET BY MOUTH EVERY DAY 90 tablet 3    Ubrogepant 100 MG TABS Take 100 mg by mouth See Admin Instructions Take at headache onset.  May repeat dose x 1 if needed at least 2 hours later. 16 tablet 5    amitriptyline (ELAVIL) 10 MG tablet Take 2 tablets by mouth nightly (Patient taking differently: Take 1 tablet by mouth nightly) 60 tablet 5    Handicap Placard MISC by Does not apply route Expires in 5 years from date of issue 1 each 0    Diabetic Shoe MISC by Does not apply route It is medically necessary for patient to get diabetic foot shoes to prevent diabetic foot ulcers 1 each 0    blood glucose test strips (FREESTYLE LITE) strip TEST TWICE DAILY AS DIRECTED 100 strip 1    aspirin 81 MG tablet Take 1 tablet by mouth daily      Vitamin D (CHOLECALCIFEROL) 1000 UNITS CAPS capsule Take 1 capsule by mouth daily      Ascorbic Acid (VITAMIN C) 500 MG tablet Take 2 tablets by mouth daily      NITROSTAT 0.4 MG SL tablet       finasteride (PROSCAR) 5 MG tablet

## 2024-11-21 NOTE — PROGRESS NOTES
[facility] Podiatry  Return Patient  Chief Complaint   Patient presents with   • Nail Problem     B/l nail trim/ last seen Dr. Barrera Thomas 11/19/2024       Pedro Pablo Talamantes is a 79 y.o. year old male who is here for a diabetic foot check and nail trim. He would like his nails trimmed today.     Has Charcot left foot. Has been stable. History of surgery type: Plantar planing medial and lateral foot.  Patient denies N/V/F/C.       Review of Systems   Constitutional:  Negative for chills, diaphoresis, fatigue, fever and unexpected weight change.   Cardiovascular:  Negative for leg swelling.   Gastrointestinal:  Negative for constipation, diarrhea, nausea and vomiting.   Musculoskeletal:  Positive for gait problem. Negative for arthralgias and joint swelling.   Skin:  Negative for color change, pallor, rash and wound.   Neurological:  Positive for numbness. Negative for weakness.       Vascular: DP and PT pulses palpable 2/4, Right Foot and 2/4 on the Left Foot.   CFT <3 seconds, Right Foot and <3 seconds on the Left Foot.  Edema is absent,  Right Foot and minimal on the Left Foot.      Neurological:   Sensation absent  to light touch to level of digits, both feet.      Musculoskeletal:   Muscle strength is 5/5 on the Right Foot and 5/5 on the Left Foot. Structural deformities are present on the Right Foot and present on the Left Foot, but improved  Flat medial arch left foot.     Integument:  Warm, dry, supple both feet.  I  Hyperkeratosis dorsal left 4th toe, not open, no erythema.       Sites of Onychomycosis Involvement (Check affected area)  [x] [x] [x] [x] [x] [x] [x] [x] [x] [x]  5 4 3 2 1 1 2 3 4 5                          Right                                        Left    Thickness  [x] [x] [x] [x] [x] [x] [x] [x] [x] [x]  5 4 3 2 1 1 2 3 4 5                         Right                                        Left    Dystrophic Changes

## 2024-11-22 ENCOUNTER — TELEPHONE (OUTPATIENT)
Dept: FAMILY MEDICINE CLINIC | Age: 79
End: 2024-11-22

## 2024-11-22 DIAGNOSIS — M48.062 SPINAL STENOSIS, LUMBAR REGION, WITH NEUROGENIC CLAUDICATION: Chronic | ICD-10-CM

## 2024-11-22 NOTE — TELEPHONE ENCOUNTER
Incoming fax came from [facility], stated that they need order below, stated that needs to be a diagnosis on order.    Please see attachment has pg notes from recent visit needs to be addend.       Elastic Bandages & Supports (LUMBAR BACK BRACE/SUPPORT PAD) MISC [[id number]]    Order Details  Dose: 1 each Route: Does not apply Frequency: DAILY PRN for lumbar degnerative disc disease   Dispense Quantity: 1 each Refills: 0          Si each by Does not apply route daily as needed (lumbar degnerative disc disease)         Start Date: 24 End Date: --   Written Date: 24 Expiration Date: 25       Associated Diagnoses: Spinal stenosis, lumbar region, with neurogenic claudication [M48.062]

## 2024-11-27 ENCOUNTER — COMMUNITY CARE MANAGEMENT (OUTPATIENT)
Facility: CLINIC | Age: 79
End: 2024-11-27

## 2024-12-16 ENCOUNTER — HOSPITAL ENCOUNTER (OUTPATIENT)
Dept: PAIN MANAGEMENT | Age: 79
Discharge: HOME | End: 2024-12-16
Payer: MEDICARE

## 2024-12-16 VITALS
DIASTOLIC BLOOD PRESSURE: 67 MMHG | HEART RATE: 84 BPM | SYSTOLIC BLOOD PRESSURE: 144 MMHG | WEIGHT: 233.6 LBS | HEIGHT: 72 IN | BODY MASS INDEX: 31.64 KG/M2

## 2024-12-16 DIAGNOSIS — Z79.891 CHRONIC USE OF OPIATE DRUG FOR THERAPEUTIC PURPOSE: Chronic | ICD-10-CM

## 2024-12-16 DIAGNOSIS — M51.369 DDD (DEGENERATIVE DISC DISEASE), LUMBAR: ICD-10-CM

## 2024-12-16 DIAGNOSIS — G44.221 CHRONIC TENSION-TYPE HEADACHE, INTRACTABLE: ICD-10-CM

## 2024-12-16 DIAGNOSIS — E11.40 CHRONIC PAINFUL DIABETIC NEUROPATHY (HCC): ICD-10-CM

## 2024-12-16 DIAGNOSIS — M47.817 LUMBOSACRAL SPONDYLOSIS WITHOUT MYELOPATHY: ICD-10-CM

## 2024-12-16 PROCEDURE — 99213 OFFICE O/P EST LOW 20 MIN: CPT

## 2024-12-16 PROCEDURE — 99213 OFFICE O/P EST LOW 20 MIN: CPT | Performed by: NURSE PRACTITIONER

## 2024-12-16 RX ORDER — OXYCODONE AND ACETAMINOPHEN 5; 325 MG/1; MG/1
1 TABLET ORAL EVERY 8 HOURS PRN
Qty: 90 TABLET | Refills: 0 | Status: CANCELLED | OUTPATIENT
Start: 2024-12-16 | End: 2025-01-15

## 2024-12-16 RX ORDER — OXYCODONE AND ACETAMINOPHEN 5; 325 MG/1; MG/1
1 TABLET ORAL EVERY 8 HOURS PRN
Qty: 90 TABLET | Refills: 0 | Status: SHIPPED | OUTPATIENT
Start: 2024-12-21 | End: 2025-01-20

## 2024-12-16 ASSESSMENT — ENCOUNTER SYMPTOMS
BOWEL INCONTINENCE: 0
BACK PAIN: 1
COUGH: 0
SHORTNESS OF BREATH: 0
CONSTIPATION: 0

## 2024-12-16 ASSESSMENT — PAIN SCALES - GENERAL: PAINLEVEL_OUTOF10: 2

## 2024-12-16 ASSESSMENT — PAIN DESCRIPTION - LOCATION: LOCATION: BACK

## 2024-12-16 NOTE — PROGRESS NOTES
file    Number of children: Not on file    Years of education: Not on file    Highest education level: Not on file   Occupational History    Occupation: retired [employer]   Tobacco Use    Smoking status: Former     Current packs/day: 0.00     Average packs/day: 1 pack/day for 30.0 years (30.0 ttl pk-yrs)     Types: Cigarettes     Start date: 1972     Quit date: 2002     Years since quittin.8    Smokeless tobacco: Never   Vaping Use    Vaping status: Never Used   Substance and Sexual Activity    Alcohol use: Not Currently     Comment: rare    Drug use: No    Sexual activity: Not Currently     Partners: Female   Other Topics Concern    Not on file   Social History Narrative    Not on file     Social Determinants of Health     Financial Resource Strain: Low Risk  (2024)    Overall Financial Resource Strain (CARDIA)     Difficulty of Paying Living Expenses: Not hard at all   Food Insecurity: No Food Insecurity (2024)    Hunger Vital Sign     Worried About Running Out of Food in the Last Year: Never true     Ran Out of Food in the Last Year: Never true   Transportation Needs: Unknown (2024)    PRAPARE - Transportation     Lack of Transportation (Medical): Not on file     Lack of Transportation (Non-Medical): No   Physical Activity: Inactive (2024)    Exercise Vital Sign     Days of Exercise per Week: 0 days     Minutes of Exercise per Session: 0 min   Stress: Not on file   Social Connections: Not on file   Intimate Partner Violence: Not At Risk (2024)    Received from [facility], [facility]     Within the last year, have you been afraid of your partner or ex-partner?: No     Emotionally Abused: Not on file     Physically Abused: Not on file     Sexually Abused: Not on file     In the past year have you been physically or sexually abused?: Not on file   Housing Stability: Unknown (2024)    Housing Stability Vital Sign

## 2024-12-31 DIAGNOSIS — Z79.4 TYPE 2 DIABETES MELLITUS WITH DIABETIC POLYNEUROPATHY, WITH LONG-TERM CURRENT USE OF INSULIN (HCC): ICD-10-CM

## 2024-12-31 DIAGNOSIS — E11.42 TYPE 2 DIABETES MELLITUS WITH DIABETIC POLYNEUROPATHY, WITH LONG-TERM CURRENT USE OF INSULIN (HCC): ICD-10-CM

## 2025-01-02 RX ORDER — PEN NEEDLE, DIABETIC 32GX 5/32"
NEEDLE, DISPOSABLE MISCELLANEOUS
Qty: 100 EACH | Refills: 0 | Status: SHIPPED | OUTPATIENT
Start: 2025-01-02

## 2025-01-02 NOTE — TELEPHONE ENCOUNTER
Please Approve or Refuse.  Send to Pharmacy per Pt's Request:      Next Visit Date:  2/20/2025   Last Visit Date: 11/19/2024    Hemoglobin A1C (%)   Date Value   11/19/2024 8.2   08/19/2024 8.1   04/19/2024 7.1             ( goal A1C is < 7)   BP Readings from Last 3 Encounters:   12/16/24 (!) 144/67   11/19/24 112/70   10/07/24 128/72          (goal 120/80)  BUN   Date Value Ref Range Status   04/09/2024 24 (H) 8 - 23 mg/dL Final     Creatinine   Date Value Ref Range Status   04/09/2024 1.2 0.7 - 1.2 mg/dL Final     Potassium   Date Value Ref Range Status   04/09/2024 4.5 3.7 - 5.3 mmol/L Final     Comment:     SPECIMEN SLIGHTLY HEMOLYZED, RESULTS MAY BE ADVERSELY AFFECTED.

## 2025-01-05 DIAGNOSIS — I10 ESSENTIAL HYPERTENSION: ICD-10-CM

## 2025-01-05 DIAGNOSIS — R80.9 MICROALBUMINURIA: ICD-10-CM

## 2025-01-06 RX ORDER — LISINOPRIL 2.5 MG/1
TABLET ORAL
Qty: 90 TABLET | Refills: 3 | Status: SHIPPED | OUTPATIENT
Start: 2025-01-06

## 2025-01-15 ENCOUNTER — TELEPHONE (OUTPATIENT)
Dept: NEUROLOGY | Age: 80
End: 2025-01-15

## 2025-01-15 NOTE — TELEPHONE ENCOUNTER
Received a fax from pharmacy stating [facility] needs PA. This was completed on CMM. Received notification that there is a PA in place from 1/1/25 -12/31/25.

## 2025-01-17 ENCOUNTER — HOSPITAL ENCOUNTER (OUTPATIENT)
Dept: PAIN MANAGEMENT | Age: 80
Discharge: HOME | End: 2025-01-17
Payer: MEDICARE

## 2025-01-17 VITALS — BODY MASS INDEX: 31.56 KG/M2 | WEIGHT: 233 LBS | HEIGHT: 72 IN

## 2025-01-17 DIAGNOSIS — M48.062 SPINAL STENOSIS, LUMBAR REGION, WITH NEUROGENIC CLAUDICATION: Primary | Chronic | ICD-10-CM

## 2025-01-17 DIAGNOSIS — F11.20 OPIOID DEPENDENCE WITH CURRENT USE (HCC): ICD-10-CM

## 2025-01-17 DIAGNOSIS — G44.221 CHRONIC TENSION-TYPE HEADACHE, INTRACTABLE: ICD-10-CM

## 2025-01-17 DIAGNOSIS — E11.40 CHRONIC PAINFUL DIABETIC NEUROPATHY (HCC): ICD-10-CM

## 2025-01-17 DIAGNOSIS — M51.369 DDD (DEGENERATIVE DISC DISEASE), LUMBAR: ICD-10-CM

## 2025-01-17 DIAGNOSIS — Z79.891 CHRONIC USE OF OPIATE DRUG FOR THERAPEUTIC PURPOSE: Chronic | ICD-10-CM

## 2025-01-17 DIAGNOSIS — M47.817 LUMBOSACRAL SPONDYLOSIS WITHOUT MYELOPATHY: ICD-10-CM

## 2025-01-17 PROCEDURE — 99213 OFFICE O/P EST LOW 20 MIN: CPT

## 2025-01-17 PROCEDURE — 99213 OFFICE O/P EST LOW 20 MIN: CPT | Performed by: NURSE PRACTITIONER

## 2025-01-17 RX ORDER — OXYCODONE AND ACETAMINOPHEN 5; 325 MG/1; MG/1
1 TABLET ORAL EVERY 8 HOURS PRN
Qty: 90 TABLET | Refills: 0 | Status: SHIPPED | OUTPATIENT
Start: 2025-01-20 | End: 2025-02-19

## 2025-01-17 RX ORDER — GABAPENTIN 800 MG/1
800 TABLET ORAL DAILY
Qty: 90 TABLET | Refills: 0 | Status: SHIPPED | OUTPATIENT
Start: 2025-01-17 | End: 2025-04-17

## 2025-01-17 ASSESSMENT — ENCOUNTER SYMPTOMS
COUGH: 0
BACK PAIN: 1
CONSTIPATION: 0
SHORTNESS OF BREATH: 0

## 2025-01-17 NOTE — PROGRESS NOTES
Socioeconomic History    Marital status:      Spouse name: Not on file    Number of children: Not on file    Years of education: Not on file    Highest education level: Not on file   Occupational History    Occupation: retired [employer]   Tobacco Use    Smoking status: Former     Current packs/day: 0.00     Average packs/day: 1 pack/day for 30.0 years (30.0 ttl pk-yrs)     Types: Cigarettes     Start date: 1972     Quit date: 2002     Years since quittin.9    Smokeless tobacco: Never   Vaping Use    Vaping status: Never Used   Substance and Sexual Activity    Alcohol use: Not Currently     Comment: rare    Drug use: No    Sexual activity: Not Currently     Partners: Female   Other Topics Concern    Not on file   Social History Narrative    Not on file     Social Determinants of Health     Financial Resource Strain: Low Risk  (2024)    Overall Financial Resource Strain (CARDIA)     Difficulty of Paying Living Expenses: Not hard at all   Food Insecurity: No Food Insecurity (2024)    Hunger Vital Sign     Worried About Running Out of Food in the Last Year: Never true     Ran Out of Food in the Last Year: Never true   Transportation Needs: Unknown (2024)    PRAPARE - Transportation     Lack of Transportation (Medical): Not on file     Lack of Transportation (Non-Medical): No   Physical Activity: Inactive (2024)    Exercise Vital Sign     Days of Exercise per Week: 0 days     Minutes of Exercise per Session: 0 min   Stress: Not on file   Social Connections: Not on file   Intimate Partner Violence: Not At Risk (2024)    Received from [facility], [facility]     Within the last year, have you been afraid of your partner or ex-partner?: No     Emotionally Abused: Not on file     Physically Abused: Not on file     Sexually Abused: Not on file     In the past year have you been physically or sexually abused?: Not on file

## 2025-01-18 DIAGNOSIS — G43.709 CHRONIC MIGRAINE W/O AURA W/O STATUS MIGRAINOSUS, NOT INTRACTABLE: ICD-10-CM

## 2025-01-18 DIAGNOSIS — R51.9 NEW ONSET OF HEADACHES AFTER AGE 50: ICD-10-CM

## 2025-01-20 RX ORDER — AMITRIPTYLINE HYDROCHLORIDE 10 MG/1
10 TABLET ORAL NIGHTLY
Qty: 30 TABLET | Refills: 11 | Status: SHIPPED | OUTPATIENT
Start: 2025-01-20

## 2025-01-20 NOTE — TELEPHONE ENCOUNTER
Pharmacy requesting refill of amitriptyline 10mg.      Medication active on med list yes      Date of last Rx: 3/14/2024 with 5 refills          verified by VINAY GARCIA      Date of last appointment 10/7/2024    Next Visit Date:  Visit date not found

## 2025-02-13 ENCOUNTER — HOSPITAL ENCOUNTER (OUTPATIENT)
Dept: PAIN MANAGEMENT | Age: 80
Discharge: HOME | End: 2025-02-13
Payer: MEDICARE

## 2025-02-13 ENCOUNTER — OFFICE VISIT (OUTPATIENT)
Dept: PODIATRY | Age: 80
End: 2025-02-13
Payer: MEDICARE

## 2025-02-13 VITALS — BODY MASS INDEX: 31.56 KG/M2 | HEIGHT: 72 IN | WEIGHT: 233 LBS

## 2025-02-13 VITALS — HEIGHT: 72 IN | WEIGHT: 233 LBS | BODY MASS INDEX: 31.56 KG/M2

## 2025-02-13 DIAGNOSIS — E11.42 TYPE 2 DIABETES MELLITUS WITH DIABETIC POLYNEUROPATHY, WITH LONG-TERM CURRENT USE OF INSULIN (HCC): ICD-10-CM

## 2025-02-13 DIAGNOSIS — M51.369 DDD (DEGENERATIVE DISC DISEASE), LUMBAR: ICD-10-CM

## 2025-02-13 DIAGNOSIS — E11.610 CHARCOT FOOT DUE TO DIABETES MELLITUS (HCC): Primary | ICD-10-CM

## 2025-02-13 DIAGNOSIS — M47.817 LUMBOSACRAL SPONDYLOSIS WITHOUT MYELOPATHY: ICD-10-CM

## 2025-02-13 DIAGNOSIS — G44.221 CHRONIC TENSION-TYPE HEADACHE, INTRACTABLE: ICD-10-CM

## 2025-02-13 DIAGNOSIS — Z79.4 TYPE 2 DIABETES MELLITUS WITH DIABETIC POLYNEUROPATHY, WITH LONG-TERM CURRENT USE OF INSULIN (HCC): ICD-10-CM

## 2025-02-13 DIAGNOSIS — Z79.891 CHRONIC USE OF OPIATE DRUG FOR THERAPEUTIC PURPOSE: Chronic | ICD-10-CM

## 2025-02-13 DIAGNOSIS — B35.1 ONYCHOMYCOSIS: Primary | ICD-10-CM

## 2025-02-13 DIAGNOSIS — E11.40 CHRONIC PAINFUL DIABETIC NEUROPATHY (HCC): ICD-10-CM

## 2025-02-13 PROCEDURE — G0480 DRUG TEST DEF 1-7 CLASSES: HCPCS

## 2025-02-13 PROCEDURE — 99999 PR OFFICE/OUTPT VISIT,PROCEDURE ONLY: CPT | Performed by: PODIATRIST

## 2025-02-13 PROCEDURE — 99213 OFFICE O/P EST LOW 20 MIN: CPT

## 2025-02-13 PROCEDURE — 11721 DEBRIDE NAIL 6 OR MORE: CPT | Performed by: PODIATRIST

## 2025-02-13 PROCEDURE — 99213 OFFICE O/P EST LOW 20 MIN: CPT | Performed by: ANESTHESIOLOGY

## 2025-02-13 PROCEDURE — 80307 DRUG TEST PRSMV CHEM ANLYZR: CPT

## 2025-02-13 RX ORDER — OXYCODONE AND ACETAMINOPHEN 5; 325 MG/1; MG/1
1 TABLET ORAL EVERY 8 HOURS PRN
Qty: 90 TABLET | Refills: 0 | Status: SHIPPED | OUTPATIENT
Start: 2025-02-13 | End: 2025-03-15

## 2025-02-13 RX ORDER — GABAPENTIN 800 MG/1
800 TABLET ORAL DAILY
Qty: 90 TABLET | Refills: 0 | Status: SHIPPED | OUTPATIENT
Start: 2025-02-13 | End: 2025-05-14

## 2025-02-13 ASSESSMENT — ENCOUNTER SYMPTOMS
BACK PAIN: 1
VOMITING: 0
NAUSEA: 0
RESPIRATORY NEGATIVE: 1
DIARRHEA: 0
COLOR CHANGE: 0
EYES NEGATIVE: 1
CONSTIPATION: 0

## 2025-02-13 NOTE — PROGRESS NOTES
[facility]  Return Patient  Chief Complaint   Patient presents with    Nail Problem     B/L Nail Trim   Last OV with Barrera Thomas 11/19/24       Pedro Pablo Talamantes is a 79 y.o. year old male who is here for a diabetic foot check and nail trim. He would like his nails trimmed today.     Has Charcot left foot. Has been stable. History of surgery type: Plantar planing medial and lateral foot.  Patient denies N/V/F/C.       Review of Systems   Constitutional:  Negative for chills, diaphoresis, fatigue, fever and unexpected weight change.   Cardiovascular:  Negative for leg swelling.   Gastrointestinal:  Negative for constipation, diarrhea, nausea and vomiting.   Musculoskeletal:  Positive for gait problem. Negative for arthralgias and joint swelling.   Skin:  Negative for color change, pallor, rash and wound.   Neurological:  Positive for numbness. Negative for weakness.       Vascular: DP and PT pulses palpable 2/4, Right Foot and 2/4 on the Left Foot.   CFT <3 seconds, Right Foot and <3 seconds on the Left Foot.  Edema is absent,  Right Foot and minimal on the Left Foot.      Neurological:   Sensation absent  to light touch to level of digits, both feet.      Musculoskeletal:   Muscle strength is 5/5 on the Right Foot and 5/5 on the Left Foot. Structural deformities are present on the Right Foot and present on the Left Foot, but improved  Flat medial arch left foot.     Integument:  Warm, dry, supple both feet.  I  Hyperkeratosis dorsal left 4th toe, not open, no erythema.       Sites of Onychomycosis Involvement (Check affected area)  [x] [x] [x] [x] [x] [x] [x] [x] [x] [x]  5 4 3 2 1 1 2 3 4 5                          Right                                        Left    Thickness  [x] [x] [x] [x] [x] [x] [x] [x] [x] [x]  5 4 3 2 1 1 2 3 4 5                         Right                                        Left    Dystrophic Changes

## 2025-02-13 NOTE — PROGRESS NOTES
routine healing 10/29/2020    Colon polyp 02/25/2019    tubular adenoma    Constipation     Diabetes mellitus (HCC)     Diarrhea     Diarrhea     DVT (deep venous thrombosis) (HCC)     left calf    Ejection fraction < 50%     Gallstones     Heart disease     Heartburn     Hx of blood clots     Hyperlipidemia     Hypertension     Kidney stones     Lumbar spinal stenosis 04/21/2014    MI (myocardial infarction) (HCC)     2011    Mitral regurgitation     MRSA (methicillin resistant staph aureus) culture positive     Neuropathy     feet    Nondisplaced physeal fracture of distal end of left fibula 02/01/2021    Numbness and tingling     hands and feet    Rib fractures 01/2015    right    Wears glasses     readers        Past Surgical History:   Procedure Laterality Date    APPENDECTOMY      CARDIAC CATHETERIZATION  12/29/2011    CHOLECYSTECTOMY      COLONOSCOPY  01/11/2012    wnl, 10 yr recall    COLONOSCOPY  11/28/2018    ATTEMPTED NOT CLEAN (N/A )    COLONOSCOPY  02/25/2019    tubular adenoma    COLONOSCOPY N/A 02/25/2019    COLONOSCOPY WITH BIOPSY performed by Isabela Thomas MD at [facility] OR    CORONARY ANGIOPLASTY WITH STENT PLACEMENT      x 1    CORONARY ARTERY BYPASS GRAFT  01/03/2012    x3    ENDOSCOPY, COLON, DIAGNOSTIC      KNEE ARTHROSCOPY      left    KNEE SURGERY Left     I&D    OTHER SURGICAL HISTORY Left 03/07/2016    plantar plane &  exostectomy medial foot left    PAIN MANAGEMENT PROCEDURE N/A 02/06/2023    INTRACEPT PROCEDURE L4,5 AND S1 performed by Onesimo West MD at [facility] OR    IN COLON CA SCRN NOT HI RSK IND N/A 11/28/2018    COLONOSCOPY ATTEMPTED NOT CLEAN performed by Isabela Thomas MD at [facility] OR    SPINAL CORD STIMULATOR SURGERY  10/20/2023    : SPINAL CORD STIMULATOR INSERTION WITH NEVRO    STIMULATOR SURGERY N/A 10/20/2023    SPINAL CORD STIMULATOR INSERTION WITH NEVRO performed by Edgard Duggan DO at [facility] OR    TONSILLECTOMY      UPPER GASTROINTESTINAL ENDOSCOPY  11/03/2015

## 2025-02-14 DIAGNOSIS — Z79.4 TYPE 2 DIABETES MELLITUS WITH DIABETIC POLYNEUROPATHY, WITH LONG-TERM CURRENT USE OF INSULIN (HCC): ICD-10-CM

## 2025-02-14 DIAGNOSIS — E11.42 TYPE 2 DIABETES MELLITUS WITH DIABETIC POLYNEUROPATHY, WITH LONG-TERM CURRENT USE OF INSULIN (HCC): ICD-10-CM

## 2025-02-14 RX ORDER — PEN NEEDLE, DIABETIC 32GX 5/32"
NEEDLE, DISPOSABLE MISCELLANEOUS
Qty: 100 EACH | Refills: 0 | Status: SHIPPED | OUTPATIENT
Start: 2025-02-14

## 2025-02-17 ENCOUNTER — HOSPITAL ENCOUNTER (OUTPATIENT)
Age: 80
Discharge: HOME | End: 2025-02-17
Payer: MEDICARE

## 2025-02-17 DIAGNOSIS — E78.2 MIXED HYPERLIPIDEMIA: ICD-10-CM

## 2025-02-17 DIAGNOSIS — N18.31 STAGE 3A CHRONIC KIDNEY DISEASE (HCC): ICD-10-CM

## 2025-02-17 DIAGNOSIS — Z12.5 PROSTATE CANCER SCREENING: ICD-10-CM

## 2025-02-17 DIAGNOSIS — Z79.4 TYPE 2 DIABETES MELLITUS WITH DIABETIC POLYNEUROPATHY, WITH LONG-TERM CURRENT USE OF INSULIN (HCC): ICD-10-CM

## 2025-02-17 DIAGNOSIS — E11.42 TYPE 2 DIABETES MELLITUS WITH DIABETIC POLYNEUROPATHY, WITH LONG-TERM CURRENT USE OF INSULIN (HCC): ICD-10-CM

## 2025-02-17 LAB
25(OH)D3 SERPL-MCNC: 39.9 NG/ML (ref 30–100)
6-ACETYLMORPHINE, UR: NOT DETECTED
7-AMINOCLONAZEPAM, URINE: NOT DETECTED
ALBUMIN SERPL-MCNC: 4.6 G/DL (ref 3.5–5.2)
ALP SERPL-CCNC: 102 U/L (ref 40–129)
ALPHA-OH-ALPRAZ, URINE: NOT DETECTED
ALPHA-OH-MIDAZOLAM, URINE: NOT DETECTED
ALPRAZOLAM, URINE: NOT DETECTED
ALT SERPL-CCNC: 18 U/L (ref 10–50)
AMPHETAMINE, URINE: NOT DETECTED
ANION GAP SERPL CALCULATED.3IONS-SCNC: 12 MMOL/L (ref 9–16)
AST SERPL-CCNC: 22 U/L (ref 10–50)
BARBITURATES, URINE: NEGATIVE
BASOPHILS # BLD: 0 K/UL (ref 0–0.2)
BASOPHILS NFR BLD: 0 % (ref 0–2)
BENZOYLECGONINE, UR: NEGATIVE
BILIRUB SERPL-MCNC: 0.5 MG/DL (ref 0–1.2)
BUN SERPL-MCNC: 24 MG/DL (ref 8–23)
BUPRENORPHINE URINE: NOT DETECTED
CALCIUM SERPL-MCNC: 9.6 MG/DL (ref 8.6–10.4)
CARISOPRODOL, UR: NEGATIVE
CHLORIDE SERPL-SCNC: 100 MMOL/L (ref 98–107)
CHOLEST SERPL-MCNC: 124 MG/DL (ref 0–199)
CHOLESTEROL/HDL RATIO: 3
CLONAZEPAM, URINE: NOT DETECTED
CO2 SERPL-SCNC: 31 MMOL/L (ref 20–31)
CODEINE, URINE: NOT DETECTED
CREAT SERPL-MCNC: 1.6 MG/DL (ref 0.7–1.2)
CREAT UR-MCNC: 139.3 MG/DL (ref 20–400)
CREAT UR-MCNC: 156 MG/DL (ref 39–259)
DIAZEPAM, URINE: NOT DETECTED
DRUGS EXPECTED, UR: NORMAL
EER HI RES INTERP UR: NORMAL
EOSINOPHIL # BLD: 0.1 K/UL (ref 0–0.4)
EOSINOPHILS RELATIVE PERCENT: 1 % (ref 0–4)
ERYTHROCYTE [DISTWIDTH] IN BLOOD BY AUTOMATED COUNT: 13.7 % (ref 11.5–14.9)
ETHYL GLUCURONIDE UR: NEGATIVE
FENTANYL URINE: NOT DETECTED
FOLATE SERPL-MCNC: 16.9 NG/ML (ref 4.8–24.2)
GABAPENTIN: PRESENT
GFR, ESTIMATED: 44 ML/MIN/1.73M2
GLUCOSE SERPL-MCNC: 89 MG/DL (ref 74–99)
HCT VFR BLD AUTO: 49.3 % (ref 41–53)
HDLC SERPL-MCNC: 41 MG/DL
HGB BLD-MCNC: 16.5 G/DL (ref 13.5–17.5)
HYDROCODONE, URINE: NOT DETECTED
HYDROMORPHONE, URINE: NOT DETECTED
LDLC SERPL CALC-MCNC: 56 MG/DL (ref 0–100)
LORAZEPAM, URINE: NOT DETECTED
LYMPHOCYTES NFR BLD: 1.6 K/UL (ref 1–4.8)
LYMPHOCYTES RELATIVE PERCENT: 20 % (ref 24–44)
MAGNESIUM SERPL-MCNC: 2 MG/DL (ref 1.6–2.4)
MARIJUANA METAB, UR: NEGATIVE
MCH RBC QN AUTO: 30.9 PG (ref 26–34)
MCHC RBC AUTO-ENTMCNC: 33.4 G/DL (ref 31–37)
MCV RBC AUTO: 92.4 FL (ref 80–100)
MDA, URINE: NOT DETECTED
MDEA, EVE, UR: NOT DETECTED
MDMA, URINE: NOT DETECTED
MEPERIDINE METAB, UR: NOT DETECTED
METHADONE, URINE: NEGATIVE
METHAMPHETAMINE, URINE: NOT DETECTED
METHYLPHENIDATE: NOT DETECTED
MICROALBUMIN UR-MCNC: 158 MG/L (ref 0–20)
MICROALBUMIN/CREAT UR-RTO: 101 MCG/MG CREAT (ref 0–17)
MIDAZOLAM, URINE: NOT DETECTED
MONOCYTES NFR BLD: 0.6 K/UL (ref 0.1–1.3)
MONOCYTES NFR BLD: 7 % (ref 1–7)
MORPHINE, OPI1M: NOT DETECTED
NALOXONE URINE: NOT DETECTED
NEUTROPHILS NFR BLD: 72 % (ref 36–66)
NEUTS SEG NFR BLD: 5.8 K/UL (ref 1.3–9.1)
NORBUPRENORPHINE, URINE: NOT DETECTED
NORDIAZEPAM, URINE: NOT DETECTED
NORFENTANYL, URINE: NOT DETECTED
NORHYDROCODONE, URINE: NOT DETECTED
NOROXYCODONE, URINE: PRESENT
NOROXYMORPHONE, URINE: PRESENT
OXAZEPAM, URINE: NOT DETECTED
OXYCODONE URINE: PRESENT
OXYMORPHONE, URINE: PRESENT
PAIN MANAGEMENT DRUG PANEL INTERP, URINE: NORMAL
PAIN MGT DRUG PANEL, HI RES, UR: NORMAL
PCP,URINE: NEGATIVE
PHENTERMINE, UR: NOT DETECTED
PLATELET # BLD AUTO: 156 K/UL (ref 150–450)
PMV BLD AUTO: 8.2 FL (ref 6–12)
POTASSIUM SERPL-SCNC: 4.7 MMOL/L (ref 3.7–5.3)
PREGABALIN: NOT DETECTED
PROT SERPL-MCNC: 7.6 G/DL (ref 6.6–8.7)
PSA SERPL-MCNC: 1.32 NG/ML (ref 0–4)
RBC # BLD AUTO: 5.33 M/UL (ref 4.5–5.9)
SODIUM SERPL-SCNC: 143 MMOL/L (ref 136–145)
TAPENTADOL, URINE: NOT DETECTED
TAPENTADOL-O-SULFATE, URINE: NOT DETECTED
TEMAZEPAM, URINE: NOT DETECTED
TRAMADOL, URINE: NEGATIVE
TRIGL SERPL-MCNC: 135 MG/DL (ref 0–149)
URATE SERPL-MCNC: 6.5 MG/DL (ref 3.4–7)
VIT B12 SERPL-MCNC: 610 PG/ML (ref 232–1245)
WBC OTHER # BLD: 8.1 K/UL (ref 3.5–11)
ZOLPIDEM METABOLITE (ZCA), URINE: NOT DETECTED
ZOLPIDEM, URINE: NOT DETECTED

## 2025-02-17 PROCEDURE — G0103 PSA SCREENING: HCPCS

## 2025-02-17 PROCEDURE — 82570 ASSAY OF URINE CREATININE: CPT

## 2025-02-17 PROCEDURE — 85025 COMPLETE CBC W/AUTO DIFF WBC: CPT

## 2025-02-17 PROCEDURE — 83735 ASSAY OF MAGNESIUM: CPT

## 2025-02-17 PROCEDURE — 82043 UR ALBUMIN QUANTITATIVE: CPT

## 2025-02-17 PROCEDURE — 80053 COMPREHEN METABOLIC PANEL: CPT

## 2025-02-17 PROCEDURE — 82607 VITAMIN B-12: CPT

## 2025-02-17 PROCEDURE — 36415 COLL VENOUS BLD VENIPUNCTURE: CPT

## 2025-02-17 PROCEDURE — 84550 ASSAY OF BLOOD/URIC ACID: CPT

## 2025-02-17 PROCEDURE — 80061 LIPID PANEL: CPT

## 2025-02-17 PROCEDURE — 82306 VITAMIN D 25 HYDROXY: CPT

## 2025-02-17 PROCEDURE — 82746 ASSAY OF FOLIC ACID SERUM: CPT

## 2025-02-17 ASSESSMENT — PATIENT HEALTH QUESTIONNAIRE - PHQ9
2. FEELING DOWN, DEPRESSED OR HOPELESS: SEVERAL DAYS
SUM OF ALL RESPONSES TO PHQ QUESTIONS 1-9: 2
SUM OF ALL RESPONSES TO PHQ9 QUESTIONS 1 & 2: 2
SUM OF ALL RESPONSES TO PHQ QUESTIONS 1-9: 2
SUM OF ALL RESPONSES TO PHQ QUESTIONS 1-9: 2
1. LITTLE INTEREST OR PLEASURE IN DOING THINGS: SEVERAL DAYS
2. FEELING DOWN, DEPRESSED OR HOPELESS: SEVERAL DAYS
SUM OF ALL RESPONSES TO PHQ QUESTIONS 1-9: 2
SUM OF ALL RESPONSES TO PHQ9 QUESTIONS 1 & 2: 2
1. LITTLE INTEREST OR PLEASURE IN DOING THINGS: SEVERAL DAYS

## 2025-02-20 ENCOUNTER — OFFICE VISIT (OUTPATIENT)
Dept: FAMILY MEDICINE CLINIC | Age: 80
End: 2025-02-20

## 2025-02-20 VITALS
SYSTOLIC BLOOD PRESSURE: 126 MMHG | BODY MASS INDEX: 32.37 KG/M2 | WEIGHT: 239 LBS | HEIGHT: 72 IN | HEART RATE: 80 BPM | DIASTOLIC BLOOD PRESSURE: 80 MMHG | OXYGEN SATURATION: 98 %

## 2025-02-20 DIAGNOSIS — E11.42 TYPE 2 DIABETES MELLITUS WITH DIABETIC POLYNEUROPATHY, WITH LONG-TERM CURRENT USE OF INSULIN (HCC): Primary | ICD-10-CM

## 2025-02-20 DIAGNOSIS — I50.42 CHRONIC COMBINED SYSTOLIC AND DIASTOLIC CONGESTIVE HEART FAILURE (HCC): ICD-10-CM

## 2025-02-20 DIAGNOSIS — I10 ESSENTIAL HYPERTENSION: ICD-10-CM

## 2025-02-20 DIAGNOSIS — Z79.4 DIABETES MELLITUS DUE TO UNDERLYING CONDITION WITH DIABETIC NEPHROPATHY, WITH LONG-TERM CURRENT USE OF INSULIN (HCC): ICD-10-CM

## 2025-02-20 DIAGNOSIS — E78.2 MIXED HYPERLIPIDEMIA: ICD-10-CM

## 2025-02-20 DIAGNOSIS — E08.21 DIABETES MELLITUS DUE TO UNDERLYING CONDITION WITH DIABETIC NEPHROPATHY, WITH LONG-TERM CURRENT USE OF INSULIN (HCC): ICD-10-CM

## 2025-02-20 DIAGNOSIS — Z79.4 TYPE 2 DIABETES MELLITUS WITH DIABETIC POLYNEUROPATHY, WITH LONG-TERM CURRENT USE OF INSULIN (HCC): Primary | ICD-10-CM

## 2025-02-20 DIAGNOSIS — I25.810 CORONARY ARTERY DISEASE INVOLVING CORONARY BYPASS GRAFT OF NATIVE HEART WITHOUT ANGINA PECTORIS: ICD-10-CM

## 2025-02-20 PROBLEM — N18.31 STAGE 3A CHRONIC KIDNEY DISEASE (HCC): Status: RESOLVED | Noted: 2022-05-10 | Resolved: 2025-02-20

## 2025-02-20 LAB
ESTIMATED AVERAGE GLUCOSE: ABNORMAL
HBA1C MFR BLD: 7.4 %

## 2025-02-20 RX ORDER — METOPROLOL SUCCINATE 25 MG/1
25 TABLET, EXTENDED RELEASE ORAL DAILY
Qty: 90 TABLET | Refills: 1 | Status: SHIPPED | OUTPATIENT
Start: 2025-02-20

## 2025-02-20 SDOH — ECONOMIC STABILITY: FOOD INSECURITY: WITHIN THE PAST 12 MONTHS, YOU WORRIED THAT YOUR FOOD WOULD RUN OUT BEFORE YOU GOT MONEY TO BUY MORE.: NEVER TRUE

## 2025-02-20 SDOH — ECONOMIC STABILITY: FOOD INSECURITY: WITHIN THE PAST 12 MONTHS, THE FOOD YOU BOUGHT JUST DIDN'T LAST AND YOU DIDN'T HAVE MONEY TO GET MORE.: NEVER TRUE

## 2025-02-20 ASSESSMENT — ENCOUNTER SYMPTOMS
CHEST TIGHTNESS: 0
SINUS PRESSURE: 0
ABDOMINAL DISTENTION: 0
ABDOMINAL PAIN: 0
RHINORRHEA: 0
TROUBLE SWALLOWING: 0
CONSTIPATION: 0
STRIDOR: 0
SHORTNESS OF BREATH: 0
DIARRHEA: 0
COUGH: 0
NAUSEA: 0
BLOOD IN STOOL: 0
WHEEZING: 0
VOMITING: 0
SORE THROAT: 0
BACK PAIN: 1
RECTAL PAIN: 0
EYE REDNESS: 0
COLOR CHANGE: 0

## 2025-03-17 ENCOUNTER — HOSPITAL ENCOUNTER (OUTPATIENT)
Dept: PAIN MANAGEMENT | Age: 80
Discharge: HOME | End: 2025-03-17
Payer: MEDICARE

## 2025-03-17 VITALS — HEIGHT: 72 IN | BODY MASS INDEX: 32.37 KG/M2 | WEIGHT: 239 LBS

## 2025-03-17 DIAGNOSIS — M48.062 SPINAL STENOSIS, LUMBAR REGION, WITH NEUROGENIC CLAUDICATION: Primary | Chronic | ICD-10-CM

## 2025-03-17 DIAGNOSIS — M51.369 DDD (DEGENERATIVE DISC DISEASE), LUMBAR: ICD-10-CM

## 2025-03-17 DIAGNOSIS — E11.40 CHRONIC PAINFUL DIABETIC NEUROPATHY (HCC): ICD-10-CM

## 2025-03-17 DIAGNOSIS — G44.221 CHRONIC TENSION-TYPE HEADACHE, INTRACTABLE: ICD-10-CM

## 2025-03-17 DIAGNOSIS — Z79.891 CHRONIC USE OF OPIATE DRUG FOR THERAPEUTIC PURPOSE: Chronic | ICD-10-CM

## 2025-03-17 DIAGNOSIS — M47.817 LUMBOSACRAL SPONDYLOSIS WITHOUT MYELOPATHY: ICD-10-CM

## 2025-03-17 PROCEDURE — 99213 OFFICE O/P EST LOW 20 MIN: CPT

## 2025-03-17 PROCEDURE — 99213 OFFICE O/P EST LOW 20 MIN: CPT | Performed by: NURSE PRACTITIONER

## 2025-03-17 RX ORDER — OXYCODONE AND ACETAMINOPHEN 5; 325 MG/1; MG/1
1 TABLET ORAL EVERY 8 HOURS PRN
Qty: 90 TABLET | Refills: 0 | Status: SHIPPED | OUTPATIENT
Start: 2025-03-19 | End: 2025-04-18

## 2025-03-17 ASSESSMENT — ENCOUNTER SYMPTOMS
BOWEL INCONTINENCE: 0
CONSTIPATION: 0
COUGH: 0
BACK PAIN: 1
SHORTNESS OF BREATH: 0

## 2025-03-17 NOTE — PROGRESS NOTES
3/19/2025)    Chronic painful diabetic neuropathy (HCC)    Relevant Medications    oxyCODONE-acetaminophen (PERCOCET) 5-325 MG per tablet (Start on 3/19/2025)          Treatment Plan:  Patient relates current medications are helping the pain. Patient reports taking pain medications as prescribed, denies obtaining medications from different sources and denies use of illegal drugs. Medication risk and benefits have been discussed. Patient denies side effects from medications like nausea, vomiting, constipation or drowsiness. Patient reports current activities of daily living are possible due to medications and would like to continue them.     As always, we encourage daily stretching and strengthening exercises, and recommend minimizing use of pain medications unless patient cannot get through daily activities due to pain.    We have discussed with the patient the effect the patient’s medical condition and opioid medication may have on the patient’s ability to safely operate a vehicle. Pt verbalized understanding.     Due to the high risk nature of this patient's pain medication close monitoring is required.   Continue current medication management, pt has been stable and compliant.  Script written for percocet  Follow up appointment made for 4 weeks    I have reviewed the chief complaint and history of present illness (including ROS and PFSH) and vital documentation by my staff and I agree with their documentation and have added where applicable.

## 2025-04-09 DIAGNOSIS — Z79.4 TYPE 2 DIABETES MELLITUS WITH DIABETIC POLYNEUROPATHY, WITH LONG-TERM CURRENT USE OF INSULIN (HCC): ICD-10-CM

## 2025-04-09 DIAGNOSIS — E11.42 TYPE 2 DIABETES MELLITUS WITH DIABETIC POLYNEUROPATHY, WITH LONG-TERM CURRENT USE OF INSULIN (HCC): ICD-10-CM

## 2025-04-09 RX ORDER — PEN NEEDLE, DIABETIC 32GX 5/32"
NEEDLE, DISPOSABLE MISCELLANEOUS
Qty: 100 EACH | Refills: 0 | Status: SHIPPED | OUTPATIENT
Start: 2025-04-09

## 2025-04-09 NOTE — TELEPHONE ENCOUNTER
Please Approve or Refuse.  Send to Pharmacy per Pt's Request:      Next Visit Date:  5/20/2025   Last Visit Date: 2/20/2025    Hemoglobin A1C (%)   Date Value   02/20/2025 7.4   11/19/2024 8.2   08/19/2024 8.1             ( goal A1C is < 7)   BP Readings from Last 3 Encounters:   02/20/25 126/80   12/16/24 (!) 144/67   11/19/24 112/70          (goal 120/80)  BUN   Date Value Ref Range Status   02/17/2025 24 (H) 8 - 23 mg/dL Final     Creatinine   Date Value Ref Range Status   02/17/2025 1.6 (H) 0.7 - 1.2 mg/dL Final     Potassium   Date Value Ref Range Status   02/17/2025 4.7 3.7 - 5.3 mmol/L Final

## 2025-04-15 ENCOUNTER — HOSPITAL ENCOUNTER (OUTPATIENT)
Dept: PAIN MANAGEMENT | Age: 80
Discharge: HOME | End: 2025-04-15
Payer: MEDICARE

## 2025-04-15 VITALS — BODY MASS INDEX: 31.42 KG/M2 | HEIGHT: 72 IN | WEIGHT: 232 LBS

## 2025-04-15 DIAGNOSIS — E11.40 CHRONIC PAINFUL DIABETIC NEUROPATHY (HCC): ICD-10-CM

## 2025-04-15 DIAGNOSIS — Z79.891 CHRONIC USE OF OPIATE DRUG FOR THERAPEUTIC PURPOSE: Primary | ICD-10-CM

## 2025-04-15 DIAGNOSIS — Z79.4 TYPE 2 DIABETES MELLITUS WITH DIABETIC POLYNEUROPATHY, WITH LONG-TERM CURRENT USE OF INSULIN (HCC): ICD-10-CM

## 2025-04-15 DIAGNOSIS — G44.221 CHRONIC TENSION-TYPE HEADACHE, INTRACTABLE: ICD-10-CM

## 2025-04-15 DIAGNOSIS — M51.369 DDD (DEGENERATIVE DISC DISEASE), LUMBAR: ICD-10-CM

## 2025-04-15 DIAGNOSIS — M48.062 SPINAL STENOSIS, LUMBAR REGION, WITH NEUROGENIC CLAUDICATION: ICD-10-CM

## 2025-04-15 DIAGNOSIS — M47.817 LUMBOSACRAL SPONDYLOSIS WITHOUT MYELOPATHY: ICD-10-CM

## 2025-04-15 DIAGNOSIS — E11.42 TYPE 2 DIABETES MELLITUS WITH DIABETIC POLYNEUROPATHY, WITH LONG-TERM CURRENT USE OF INSULIN (HCC): ICD-10-CM

## 2025-04-15 PROCEDURE — 99213 OFFICE O/P EST LOW 20 MIN: CPT

## 2025-04-15 PROCEDURE — 99213 OFFICE O/P EST LOW 20 MIN: CPT | Performed by: NURSE PRACTITIONER

## 2025-04-15 RX ORDER — OXYCODONE AND ACETAMINOPHEN 5; 325 MG/1; MG/1
1 TABLET ORAL EVERY 8 HOURS PRN
Qty: 90 TABLET | Refills: 0 | Status: SHIPPED | OUTPATIENT
Start: 2025-04-18 | End: 2025-05-18

## 2025-04-15 ASSESSMENT — ENCOUNTER SYMPTOMS
BACK PAIN: 1
BOWEL INCONTINENCE: 0

## 2025-04-15 ASSESSMENT — PAIN SCALES - GENERAL: PAINLEVEL_OUTOF10: 1

## 2025-04-15 NOTE — PROGRESS NOTES
Chief Complaint   Patient presents with    Back Pain     Med refill       [facility]     Pt is a 80-year-old male with c/o low back pain radiating down his legs with numbness in feet d/t neuropathy. No known injury or surgery to the area with onset more than 1 year ago and has progressively worsened  Lumbar MRI 5/2024 Multilevel lumbar disc degenerative changes and facet arthropathy  L4-L5 level posterior disc bulge and mild to moderate foraminal stenosis no significant canal stenosis   Pt had Intracept procedure L4,5 and S1 on 2/6/23. He reported 70% relief.   He has completed #16/16 PT visits 5/2023 and reported some improvement in pain and feels it is helping with ROM posture and leg strength  He had Bilateral RFA for L4/5 AND L5/S1 facet joints 4/24/24 and reports only minimal relief of pain.    He had L4/5 LESI done 7/24/24  and reports 100% relief only lasting only 2 days and now 10% relief of pain        Pt has hx bilateral painful peripheral neuropathy from diabetes.  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 Since then has developed chronic headaches and follows with neurology  Pt has met with Dr Tucker for 2nd opinion but he is not interested in implant at this time.     Patient continues on chronic opioid therapy with Percocet 5-3 25 up to 3 times a day as needed.  Patient tells me this has provided him better pain relief and ability to tolerate increased activity.  Denies side effects.    HPI:     Back Pain  This is a chronic problem. The current episode started more than 1 year ago. The problem occurs constantly. The problem is unchanged. The pain is present in the lumbar spine. The quality of the pain is described as aching, burning, cramping, shooting and stabbing. The pain does not radiate. The pain is at a severity of 1/10. The pain is The same all the time. The symptoms are aggravated by bending, sitting and standing. Associated symptoms

## 2025-04-24 ENCOUNTER — OFFICE VISIT (OUTPATIENT)
Dept: PODIATRY | Age: 80
End: 2025-04-24

## 2025-04-24 VITALS — WEIGHT: 232 LBS | BODY MASS INDEX: 31.42 KG/M2 | HEIGHT: 72 IN

## 2025-04-24 DIAGNOSIS — Z79.4 TYPE 2 DIABETES MELLITUS WITH DIABETIC POLYNEUROPATHY, WITH LONG-TERM CURRENT USE OF INSULIN (HCC): ICD-10-CM

## 2025-04-24 DIAGNOSIS — E11.42 TYPE 2 DIABETES MELLITUS WITH DIABETIC POLYNEUROPATHY, WITH LONG-TERM CURRENT USE OF INSULIN (HCC): ICD-10-CM

## 2025-04-24 DIAGNOSIS — B35.1 ONYCHOMYCOSIS: Primary | ICD-10-CM

## 2025-04-24 ASSESSMENT — ENCOUNTER SYMPTOMS
VOMITING: 0
NAUSEA: 0
DIARRHEA: 0
CONSTIPATION: 0
COLOR CHANGE: 0

## 2025-04-24 NOTE — PROGRESS NOTES
[facility]  Return Patient  Chief Complaint   Patient presents with    Nail Problem     B/l nail trim/ last seen Dr. Barrera Thomas 2/20/2025       Pedro Pablo Talamantes is a 80 y.o. year old male who is here for a diabetic foot check and nail trim. He would like his nails trimmed today.     Has Charcot left foot. Has been stable. History of surgery type: Plantar planing medial and lateral foot.  Patient denies N/V/F/C.       Review of Systems   Constitutional:  Negative for chills, diaphoresis, fatigue, fever and unexpected weight change.   Cardiovascular:  Negative for leg swelling.   Gastrointestinal:  Negative for constipation, diarrhea, nausea and vomiting.   Musculoskeletal:  Positive for gait problem. Negative for arthralgias and joint swelling.   Skin:  Negative for color change, pallor, rash and wound.   Neurological:  Positive for numbness. Negative for weakness.       Vascular: DP and PT pulses palpable 2/4, Right Foot and 2/4 on the Left Foot.   CFT <3 seconds, Right Foot and <3 seconds on the Left Foot.  Edema is absent,  Right Foot and minimal on the Left Foot.      Neurological:   Sensation absent  to light touch to level of digits, both feet.      Musculoskeletal:   Muscle strength is 5/5 on the Right Foot and 5/5 on the Left Foot. Structural deformities are present on the Right Foot and present on the Left Foot, but improved  Flat medial arch left foot.     Integument:  Warm, dry, supple both feet.  I  Hyperkeratosis dorsal left 4th toe, not open, no erythema.       Sites of Onychomycosis Involvement (Check affected area)  [x] [x] [x] [x] [x] [x] [x] [x] [x] [x]  5 4 3 2 1 1 2 3 4 5                          Right                                        Left    Thickness  [x] [x] [x] [x] [x] [x] [x] [x] [x] [x]  5 4 3 2 1 1 2 3 4 5                         Right                                        Left    Dystrophic Changes

## 2025-05-04 DIAGNOSIS — E11.42 TYPE 2 DIABETES MELLITUS WITH DIABETIC POLYNEUROPATHY, WITH LONG-TERM CURRENT USE OF INSULIN (HCC): ICD-10-CM

## 2025-05-04 DIAGNOSIS — Z79.4 TYPE 2 DIABETES MELLITUS WITH DIABETIC POLYNEUROPATHY, WITH LONG-TERM CURRENT USE OF INSULIN (HCC): ICD-10-CM

## 2025-05-05 RX ORDER — INSULIN GLARGINE 100 [IU]/ML
INJECTION, SOLUTION SUBCUTANEOUS
Qty: 45 ML | Refills: 0 | Status: SHIPPED | OUTPATIENT
Start: 2025-05-05

## 2025-05-08 DIAGNOSIS — Z79.4 TYPE 2 DIABETES MELLITUS WITH DIABETIC POLYNEUROPATHY, WITH LONG-TERM CURRENT USE OF INSULIN (HCC): ICD-10-CM

## 2025-05-08 DIAGNOSIS — N18.31 STAGE 3A CHRONIC KIDNEY DISEASE (HCC): ICD-10-CM

## 2025-05-08 DIAGNOSIS — E11.42 TYPE 2 DIABETES MELLITUS WITH DIABETIC POLYNEUROPATHY, WITH LONG-TERM CURRENT USE OF INSULIN (HCC): ICD-10-CM

## 2025-05-08 RX ORDER — DAPAGLIFLOZIN 10 MG/1
10 TABLET, FILM COATED ORAL EVERY MORNING
Qty: 90 TABLET | Refills: 0 | Status: SHIPPED | OUTPATIENT
Start: 2025-05-08

## 2025-05-13 ENCOUNTER — HOSPITAL ENCOUNTER (OUTPATIENT)
Dept: PAIN MANAGEMENT | Age: 80
Discharge: HOME | End: 2025-05-13
Payer: MEDICARE

## 2025-05-13 VITALS — WEIGHT: 232 LBS | HEIGHT: 72 IN | BODY MASS INDEX: 31.42 KG/M2

## 2025-05-13 DIAGNOSIS — M50.30 DDD (DEGENERATIVE DISC DISEASE), CERVICAL: ICD-10-CM

## 2025-05-13 DIAGNOSIS — E11.40 CHRONIC PAINFUL DIABETIC NEUROPATHY (HCC): ICD-10-CM

## 2025-05-13 DIAGNOSIS — E11.42 TYPE 2 DIABETES MELLITUS WITH DIABETIC POLYNEUROPATHY, WITH LONG-TERM CURRENT USE OF INSULIN (HCC): ICD-10-CM

## 2025-05-13 DIAGNOSIS — Z79.891 CHRONIC USE OF OPIATE DRUG FOR THERAPEUTIC PURPOSE: Primary | ICD-10-CM

## 2025-05-13 DIAGNOSIS — M48.062 SPINAL STENOSIS, LUMBAR REGION, WITH NEUROGENIC CLAUDICATION: ICD-10-CM

## 2025-05-13 DIAGNOSIS — M47.817 LUMBOSACRAL SPONDYLOSIS WITHOUT MYELOPATHY: ICD-10-CM

## 2025-05-13 DIAGNOSIS — M51.369 DDD (DEGENERATIVE DISC DISEASE), LUMBAR: ICD-10-CM

## 2025-05-13 DIAGNOSIS — Z79.4 TYPE 2 DIABETES MELLITUS WITH DIABETIC POLYNEUROPATHY, WITH LONG-TERM CURRENT USE OF INSULIN (HCC): ICD-10-CM

## 2025-05-13 DIAGNOSIS — G44.221 CHRONIC TENSION-TYPE HEADACHE, INTRACTABLE: ICD-10-CM

## 2025-05-13 PROCEDURE — 99213 OFFICE O/P EST LOW 20 MIN: CPT

## 2025-05-13 PROCEDURE — 99213 OFFICE O/P EST LOW 20 MIN: CPT | Performed by: NURSE PRACTITIONER

## 2025-05-13 RX ORDER — GABAPENTIN 800 MG/1
800 TABLET ORAL DAILY
Qty: 90 TABLET | Refills: 0 | Status: SHIPPED | OUTPATIENT
Start: 2025-05-13 | End: 2025-08-11

## 2025-05-13 RX ORDER — OXYCODONE AND ACETAMINOPHEN 5; 325 MG/1; MG/1
1 TABLET ORAL EVERY 8 HOURS PRN
Qty: 90 TABLET | Refills: 0 | Status: SHIPPED | OUTPATIENT
Start: 2025-05-18 | End: 2025-06-17

## 2025-05-13 ASSESSMENT — PAIN SCALES - GENERAL: PAINLEVEL_OUTOF10: 3

## 2025-05-13 ASSESSMENT — ENCOUNTER SYMPTOMS
BACK PAIN: 1
BOWEL INCONTINENCE: 0

## 2025-05-13 NOTE — PROGRESS NOTES
(2/20/2025)    Hunger Vital Sign     Worried About Running Out of Food in the Last Year: Never true     Ran Out of Food in the Last Year: Never true   Transportation Needs: No Transportation Needs (2/20/2025)    PRAPARE - Transportation     Lack of Transportation (Medical): No     Lack of Transportation (Non-Medical): No   Physical Activity: Inactive (11/16/2024)    Exercise Vital Sign     Days of Exercise per Week: 0 days     Minutes of Exercise per Session: 0 min   Stress: Not on file   Social Connections: Not on file   Intimate Partner Violence: Unknown (2/12/2024)    [facility]    Humiliation, Afraid, Rape, and Kick questionnaire     Fear of Current or Ex-Partner: No     Emotionally Abused: Not on file     Physically Abused: Not on file     Sexually Abused: Not on file   Housing Stability: Low Risk  (2/20/2025)    Housing Stability Vital Sign     Unable to Pay for Housing in the Last Year: No     Number of Times Moved in the Last Year: 0     Homeless in the Last Year: No       Review of Systems:  Review of Systems   Constitutional: Negative for fever.   Cardiovascular:  Negative for chest pain.   Musculoskeletal:  Positive for back pain.   Gastrointestinal:  Negative for bowel incontinence.   Genitourinary:  Negative for bladder incontinence.   Neurological:  Positive for headaches, numbness, tingling and weakness.       Physical Exam:  Ht 1.829 m (6')   Wt 105.2 kg (232 lb)   BMI 31.46 kg/m²     Physical Exam  HENT:      Head: Normocephalic and atraumatic.   Pulmonary:      Effort: No respiratory distress.   Musculoskeletal:      Cervical back: Normal range of motion.      Lumbar back: Tenderness present.   Neurological:      Mental Status: He is alert.   Psychiatric:         Mood and Affect: Mood normal.         Behavior: Behavior normal.       Record/Diagnostics Review:    Last alexander 2/13/2025, reviewed today and was appropriate   Last lumbar spine MRI

## 2025-05-20 ENCOUNTER — OFFICE VISIT (OUTPATIENT)
Dept: FAMILY MEDICINE CLINIC | Age: 80
End: 2025-05-20
Payer: MEDICARE

## 2025-05-20 ENCOUNTER — HOSPITAL ENCOUNTER (OUTPATIENT)
Age: 80
Discharge: HOME | End: 2025-05-20
Payer: MEDICARE

## 2025-05-20 VITALS
SYSTOLIC BLOOD PRESSURE: 110 MMHG | WEIGHT: 239 LBS | HEART RATE: 84 BPM | BODY MASS INDEX: 32.37 KG/M2 | HEIGHT: 72 IN | DIASTOLIC BLOOD PRESSURE: 80 MMHG | OXYGEN SATURATION: 97 %

## 2025-05-20 DIAGNOSIS — I10 ESSENTIAL HYPERTENSION: ICD-10-CM

## 2025-05-20 DIAGNOSIS — E55.9 VITAMIN D DEFICIENCY: ICD-10-CM

## 2025-05-20 DIAGNOSIS — M47.817 LUMBOSACRAL SPONDYLOSIS WITHOUT MYELOPATHY: ICD-10-CM

## 2025-05-20 DIAGNOSIS — N18.31 STAGE 3A CHRONIC KIDNEY DISEASE (HCC): ICD-10-CM

## 2025-05-20 DIAGNOSIS — I25.810 CORONARY ARTERY DISEASE INVOLVING CORONARY BYPASS GRAFT OF NATIVE HEART WITHOUT ANGINA PECTORIS: ICD-10-CM

## 2025-05-20 DIAGNOSIS — E11.42 TYPE 2 DIABETES MELLITUS WITH DIABETIC POLYNEUROPATHY, WITH LONG-TERM CURRENT USE OF INSULIN (HCC): Primary | ICD-10-CM

## 2025-05-20 DIAGNOSIS — Z79.4 DIABETES MELLITUS DUE TO UNDERLYING CONDITION WITH DIABETIC NEPHROPATHY, WITH LONG-TERM CURRENT USE OF INSULIN (HCC): ICD-10-CM

## 2025-05-20 DIAGNOSIS — Z79.4 TYPE 2 DIABETES MELLITUS WITH DIABETIC POLYNEUROPATHY, WITH LONG-TERM CURRENT USE OF INSULIN (HCC): Primary | ICD-10-CM

## 2025-05-20 DIAGNOSIS — I50.42 CHRONIC COMBINED SYSTOLIC AND DIASTOLIC CONGESTIVE HEART FAILURE (HCC): ICD-10-CM

## 2025-05-20 DIAGNOSIS — R80.9 MICROALBUMINURIA: ICD-10-CM

## 2025-05-20 DIAGNOSIS — E78.2 MIXED HYPERLIPIDEMIA: ICD-10-CM

## 2025-05-20 DIAGNOSIS — E08.21 DIABETES MELLITUS DUE TO UNDERLYING CONDITION WITH DIABETIC NEPHROPATHY, WITH LONG-TERM CURRENT USE OF INSULIN (HCC): ICD-10-CM

## 2025-05-20 LAB
ANION GAP SERPL CALCULATED.3IONS-SCNC: 10 MMOL/L (ref 9–16)
BUN SERPL-MCNC: 19 MG/DL (ref 8–23)
CALCIUM SERPL-MCNC: 9.2 MG/DL (ref 8.6–10.4)
CHLORIDE SERPL-SCNC: 103 MMOL/L (ref 98–107)
CO2 SERPL-SCNC: 29 MMOL/L (ref 20–31)
CREAT SERPL-MCNC: 1.5 MG/DL (ref 0.7–1.2)
GFR, ESTIMATED: 47 ML/MIN/1.73M2
GLUCOSE SERPL-MCNC: 105 MG/DL (ref 74–99)
HBA1C MFR BLD: 7.3 %
POTASSIUM SERPL-SCNC: 5.1 MMOL/L (ref 3.7–5.3)
SODIUM SERPL-SCNC: 142 MMOL/L (ref 136–145)

## 2025-05-20 PROCEDURE — 1123F ACP DISCUSS/DSCN MKR DOCD: CPT | Performed by: FAMILY MEDICINE

## 2025-05-20 PROCEDURE — G8427 DOCREV CUR MEDS BY ELIG CLIN: HCPCS | Performed by: FAMILY MEDICINE

## 2025-05-20 PROCEDURE — 1036F TOBACCO NON-USER: CPT | Performed by: FAMILY MEDICINE

## 2025-05-20 PROCEDURE — 80048 BASIC METABOLIC PNL TOTAL CA: CPT

## 2025-05-20 PROCEDURE — 3051F HG A1C>EQUAL 7.0%<8.0%: CPT | Performed by: FAMILY MEDICINE

## 2025-05-20 PROCEDURE — 3079F DIAST BP 80-89 MM HG: CPT | Performed by: FAMILY MEDICINE

## 2025-05-20 PROCEDURE — 1159F MED LIST DOCD IN RCRD: CPT | Performed by: FAMILY MEDICINE

## 2025-05-20 PROCEDURE — G8417 CALC BMI ABV UP PARAM F/U: HCPCS | Performed by: FAMILY MEDICINE

## 2025-05-20 PROCEDURE — 83036 HEMOGLOBIN GLYCOSYLATED A1C: CPT | Performed by: FAMILY MEDICINE

## 2025-05-20 PROCEDURE — 99214 OFFICE O/P EST MOD 30 MIN: CPT | Performed by: FAMILY MEDICINE

## 2025-05-20 PROCEDURE — 3074F SYST BP LT 130 MM HG: CPT | Performed by: FAMILY MEDICINE

## 2025-05-20 PROCEDURE — 1125F AMNT PAIN NOTED PAIN PRSNT: CPT | Performed by: FAMILY MEDICINE

## 2025-05-20 PROCEDURE — 1160F RVW MEDS BY RX/DR IN RCRD: CPT | Performed by: FAMILY MEDICINE

## 2025-05-20 PROCEDURE — 36415 COLL VENOUS BLD VENIPUNCTURE: CPT

## 2025-05-20 SDOH — ECONOMIC STABILITY: FOOD INSECURITY: WITHIN THE PAST 12 MONTHS, THE FOOD YOU BOUGHT JUST DIDN'T LAST AND YOU DIDN'T HAVE MONEY TO GET MORE.: NEVER TRUE

## 2025-05-20 SDOH — ECONOMIC STABILITY: FOOD INSECURITY: WITHIN THE PAST 12 MONTHS, YOU WORRIED THAT YOUR FOOD WOULD RUN OUT BEFORE YOU GOT MONEY TO BUY MORE.: NEVER TRUE

## 2025-05-20 ASSESSMENT — ENCOUNTER SYMPTOMS
COLOR CHANGE: 0
SHORTNESS OF BREATH: 0
SINUS PRESSURE: 0
DIARRHEA: 0
RECTAL PAIN: 0
COUGH: 0
CHEST TIGHTNESS: 0
BLOOD IN STOOL: 0
EYE REDNESS: 0
VOMITING: 0
BACK PAIN: 1
CONSTIPATION: 0
ABDOMINAL DISTENTION: 0
STRIDOR: 0
TROUBLE SWALLOWING: 0
ABDOMINAL PAIN: 0
WHEEZING: 0
RHINORRHEA: 0
NAUSEA: 0
SORE THROAT: 0

## 2025-05-20 ASSESSMENT — PATIENT HEALTH QUESTIONNAIRE - PHQ9
2. FEELING DOWN, DEPRESSED OR HOPELESS: NOT AT ALL
SUM OF ALL RESPONSES TO PHQ QUESTIONS 1-9: 0
SUM OF ALL RESPONSES TO PHQ QUESTIONS 1-9: 0
1. LITTLE INTEREST OR PLEASURE IN DOING THINGS: NOT AT ALL
SUM OF ALL RESPONSES TO PHQ QUESTIONS 1-9: 0
SUM OF ALL RESPONSES TO PHQ QUESTIONS 1-9: 0

## 2025-05-20 NOTE — PROGRESS NOTES
Visit Information    Have you changed or started any medications since your last visit including any over-the-counter medicines, vitamins, or herbal medicines? no   Are you having any side effects from any of your medications? -  no  Have you stopped taking any of your medications? Is so, why? -  no    Have you seen any other physician or provider since your last visit? No  Have you had any other diagnostic tests since your last visit? No  Have you been seen in the emergency room and/or had an admission to a hospital since we last saw you? No  Have you had your routine dental cleaning in the past 6 months? no    Have you activated your [employer] account? If not, what are your barriers? Yes     Patient Care Team:  Barrera Thomas MD as PCP - General (Family Medicine)  Barrera Thomas MD as PCP - Empaneled Provider  Chente Last MD as Referring Physician (Cardiology)  Isabela Thomas MD as Consulting Physician (Gastroenterology)  Ranjith hCowdhury MD as Consulting Physician (Cardiovascular Disease)  Elroy Fields MD as Consulting Physician (Pain Management)  Em Yo DPM as Consulting Physician (Podiatry)  Jenna Galvan MD as Surgeon (Ophthalmology)  Omero Vitale as  (Nephrology)    Medical History Review  Past Medical, Family, and Social History reviewed and does contribute to the patient presenting condition    Health Maintenance   Topic Date Due    Respiratory Syncytial Virus (RSV) Pregnant or age 60 yrs+ (1 - 1-dose 75+ series) Never done    Annual Wellness Visit (Medicare)  11/20/2025    Diabetic Alb to Cr ratio (uACR) test  02/17/2026    Lipids  02/17/2026    Depression Screen  02/17/2026    GFR test (Diabetes, CKD 3-4, OR last GFR 15-59)  02/17/2026    DTaP/Tdap/Td vaccine (2 - Td or Tdap) 05/07/2031    Flu vaccine  Completed    Shingles vaccine  Completed    Pneumococcal 50+ years Vaccine  Completed    COVID-19 Vaccine  Completed    Hepatitis C screen  Addressed

## 2025-05-20 NOTE — PROGRESS NOTES
Chief Complaint   Patient presents with    Diabetes     The patient (or guardian, if applicable) and other individuals in attendance with the patient were advised that Artificial Intelligence will be utilized during this visit to record, process the conversation to generate a clinical note, and support improvement of the AI technology. The patient (or guardian, if applicable) and other individuals in attendance at the appointment consented to the use of AI, including the recording.                    Diabetes      History of Present Illness  The patient presents for a 3-month follow-up on chronic medical conditions including diabetes, high blood pressure, and high cholesterol.    He reports overall good health, with the exception of back pain that limits his mobility. Pain levels fluctuate between 2 to 3 without medication, escalating to a debilitating 8 or 9 when unmedicated. He has not experienced any adverse effects from gabapentin 800 mg or amitriptyline, which he takes at night.  Patient follows with pain management and is currently on Percocets.  Patient denies any side effects from the medication.        His insulin regimen remains unchanged, with 40 units administered in the morning and 35 units at night. He has experienced a few episodes of hypoglycemia, with blood glucose levels dropping to the 40s, including an episode on 05/19/2025. He manages these episodes by consuming small amounts of candy. These hypoglycemic episodes have occurred sporadically over the past several months, including one instance in the afternoon. He maintains a balanced diet and does not skip meals.  Patient's A1c is 7.3 which is stable as previous.  Discussed with patient to cut down on the insulin if blood sugars are persistently running low.      Coronary artery disease stable denies any chest pain shortness of breath.  Patient's recent blood work is stable.  He consults with a cardiologist annually, with his last visit

## 2025-05-22 ENCOUNTER — RESULTS FOLLOW-UP (OUTPATIENT)
Dept: FAMILY MEDICINE CLINIC | Age: 80
End: 2025-05-22

## 2025-06-04 DIAGNOSIS — E11.42 TYPE 2 DIABETES MELLITUS WITH DIABETIC POLYNEUROPATHY, WITH LONG-TERM CURRENT USE OF INSULIN (HCC): ICD-10-CM

## 2025-06-04 DIAGNOSIS — Z79.4 TYPE 2 DIABETES MELLITUS WITH DIABETIC POLYNEUROPATHY, WITH LONG-TERM CURRENT USE OF INSULIN (HCC): ICD-10-CM

## 2025-06-04 RX ORDER — PEN NEEDLE, DIABETIC 32GX 5/32"
NEEDLE, DISPOSABLE MISCELLANEOUS
Qty: 100 EACH | Refills: 0 | Status: SHIPPED | OUTPATIENT
Start: 2025-06-04

## 2025-06-04 NOTE — TELEPHONE ENCOUNTER
Please Approve or Refuse.  Send to Pharmacy per Pt's Request:      Next Visit Date:  9/23/2025   Last Visit Date: 5/20/2025    Hemoglobin A1C (%)   Date Value   05/20/2025 7.3   02/20/2025 7.4   11/19/2024 8.2             ( goal A1C is < 7)   BP Readings from Last 3 Encounters:   05/20/25 110/80   02/20/25 126/80   12/16/24 (!) 144/67          (goal 120/80)  BUN   Date Value Ref Range Status   05/20/2025 19 8 - 23 mg/dL Final     Creatinine   Date Value Ref Range Status   05/20/2025 1.5 (H) 0.7 - 1.2 mg/dL Final     Potassium   Date Value Ref Range Status   05/20/2025 5.1 3.7 - 5.3 mmol/L Final

## 2025-06-14 NOTE — PROGRESS NOTES
Visit Information    Have you changed or started any medications since your last visit including any over-the-counter medicines, vitamins, or herbal medicines? no   Are you having any side effects from any of your medications? -  no  Have you stopped taking any of your medications? Is so, why? -  no    Have you seen any other physician or provider since your last visit? No  Have you had any other diagnostic tests since your last visit? No  Have you been seen in the emergency room and/or had an admission to a hospital since we last saw you? No  Have you had your routine dental cleaning in the past 6 months? no    Have you activated your [employer] account? If not, what are your barriers? Yes     Patient Care Team:  Barrera Thomas MD as PCP - General (Family Medicine)  Barrera Thomas MD as PCP - Empaneled Provider  Chente Last MD as Referring Physician (Cardiology)  Isabela Thomas MD as Consulting Physician (Gastroenterology)  Ranjith Chowdhury MD as Consulting Physician (Cardiovascular Disease)  Elroy Fields MD as Consulting Physician (Pain Management)  Em Yo DPM as Consulting Physician (Podiatry)  Jenna Galvan MD as Surgeon (Ophthalmology)  Omero Vitale as  (Nephrology)    Medical History Review  Past Medical, Family, and Social History reviewed and does contribute to the patient presenting condition    Health Maintenance   Topic Date Due    Respiratory Syncytial Virus (RSV) Pregnant or age 60 yrs+ (1 - 1-dose 75+ series) Never done    Annual Wellness Visit (Medicare)  11/20/2025    Diabetic Alb to Cr ratio (uACR) test  02/17/2026    Lipids  02/17/2026    Depression Screen  02/17/2026    GFR test (Diabetes, CKD 3-4, OR last GFR 15-59)  02/17/2026    DTaP/Tdap/Td vaccine (2 - Td or Tdap) 05/07/2031    Flu vaccine  Completed    Shingles vaccine  Completed    Pneumococcal 50+ years Vaccine  Completed    COVID-19 Vaccine  Completed    Hepatitis C screen  Addressed    
Mellitus.  His A1c level has improved from 8.2 to 7.4, indicating better glycemic control. He will continue his current medication regimen, including Lantus 35 units in the morning and 40 units at night, and Farxiga. He is advised to monitor for symptoms of hypoglycemia, such as dizziness or lightheadedness, and to reduce insulin dosage if these occur. A repeat kidney function test will be conducted in 3 months.    2. Hypertension.  His blood pressure is well-controlled with his current medication regimen. He is currently on lisinopril 2.5 mg for proteinuria. He is advised to use compression stockings to aid circulation and prevent lightheadedness. A BMP will be ordered to be completed in 3 months before his next appointment.    3. Chronic Kidney Disease.  His kidney function has slightly decreased. He is advised to reduce his Lasix dosage from 40 mg to 20 mg. If he experiences increased leg swelling, weight gain, or shortness of breath, he should revert to the 40 mg dosage and notify via MyChart. A repeat kidney function test will be conducted in 3 months.    4. Hyperlipidemia.  He will continue his current medication regimen.    1. Type 2 diabetes mellitus with diabetic polyneuropathy, with long-term current use of insulin (HCC)  Controlled A1c has improved, continue current treatment watch for hypoglycemic episodes.  Continue to monitor blood sugars.  - Hemoglobin A1C; Future  - Hemoglobin A1C    2. Chronic combined systolic and diastolic congestive heart failure (HCC)  Last echocardiogram in 2023 ejection fraction 50 to 55% with mild diastolic dysfunction.  Previous cardiology note reviewed was on Lopressor, not sure why patient discontinued.  Started on metoprolol XL 25 mg, call placed to patient.  Continue all other medications.  Lasix decreased to 20 mg.  - metoprolol succinate (TOPROL XL) 25 MG extended release tablet; Take 1 tablet by mouth daily  Dispense: 90 tablet; Refill: 1    3. Essential 
moderate

## 2025-06-16 ENCOUNTER — HOSPITAL ENCOUNTER (OUTPATIENT)
Dept: PAIN MANAGEMENT | Age: 80
Discharge: HOME | End: 2025-06-16
Payer: MEDICARE

## 2025-06-16 VITALS — WEIGHT: 239 LBS | HEIGHT: 72 IN | BODY MASS INDEX: 32.37 KG/M2

## 2025-06-16 DIAGNOSIS — M48.062 SPINAL STENOSIS, LUMBAR REGION, WITH NEUROGENIC CLAUDICATION: Primary | Chronic | ICD-10-CM

## 2025-06-16 DIAGNOSIS — E11.610 CHARCOT FOOT DUE TO DIABETES MELLITUS (HCC): ICD-10-CM

## 2025-06-16 DIAGNOSIS — M51.369 DDD (DEGENERATIVE DISC DISEASE), LUMBAR: ICD-10-CM

## 2025-06-16 DIAGNOSIS — G44.221 CHRONIC TENSION-TYPE HEADACHE, INTRACTABLE: ICD-10-CM

## 2025-06-16 DIAGNOSIS — Z79.891 CHRONIC USE OF OPIATE DRUG FOR THERAPEUTIC PURPOSE: Chronic | ICD-10-CM

## 2025-06-16 DIAGNOSIS — E11.40 CHRONIC PAINFUL DIABETIC NEUROPATHY (HCC): ICD-10-CM

## 2025-06-16 DIAGNOSIS — M47.817 LUMBOSACRAL SPONDYLOSIS WITHOUT MYELOPATHY: ICD-10-CM

## 2025-06-16 PROCEDURE — 99213 OFFICE O/P EST LOW 20 MIN: CPT

## 2025-06-16 PROCEDURE — 99213 OFFICE O/P EST LOW 20 MIN: CPT | Performed by: NURSE PRACTITIONER

## 2025-06-16 RX ORDER — OXYCODONE AND ACETAMINOPHEN 5; 325 MG/1; MG/1
1 TABLET ORAL EVERY 8 HOURS PRN
Qty: 90 TABLET | Refills: 0 | Status: SHIPPED | OUTPATIENT
Start: 2025-06-17 | End: 2025-07-17

## 2025-06-16 ASSESSMENT — ENCOUNTER SYMPTOMS
CONSTIPATION: 0
COUGH: 0
BACK PAIN: 1
BOWEL INCONTINENCE: 0
SHORTNESS OF BREATH: 0

## 2025-06-16 ASSESSMENT — PAIN SCALES - GENERAL: PAINLEVEL_OUTOF10: 3

## 2025-06-16 NOTE — PROGRESS NOTES
Chief Complaint   Patient presents with    Back Pain    Medication Refill     Percocet        [facility]    Pt is a 80-year-old male with c/o low back pain radiating down his legs with numbness in feet d/t neuropathy. No known injury or surgery to the area with onset more than 1 year ago and has progressively worsened  Lumbar MRI 5/2024 Multilevel lumbar disc degenerative changes and facet arthropathy  L4-L5 level posterior disc bulge and mild to moderate foraminal stenosis no significant canal stenosis   Pt had Intracept procedure L4,5 and S1 on 2/6/23. He reported 70% relief.   He has completed #16/16 PT visits 5/2023 and reported some improvement in pain and feels it is helping with ROM posture and leg strength  He had Bilateral RFA for L4/5 AND L5/S1 facet joints 4/24/24 and reports only minimal relief of pain.    He had L4/5 LESI done 7/24/24  and reports 100% relief only lasting only 2 days and now 10% relief of pain        Pt has hx bilateral painful peripheral neuropathy from diabetes.  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 Since then has developed chronic headaches and follows with neurology  Pt has met with Dr Tucker for 2nd opinion but he is not interested in implant at this time.      Patient continues on chronic opioid therapy with Percocet 5-325 up to 3 times a day as needed. Patient states this has provided him better pain relief and ability to tolerate increased activity. Denies side effects.     Back Pain  This is a chronic problem. The current episode started more than 1 year ago. The problem occurs constantly. The problem is unchanged. The pain is present in the lumbar spine. The quality of the pain is described as aching. The pain radiates to the right thigh, right knee, right foot, left knee, left thigh and left foot. The pain is at a severity of 3/10. The pain is mild. The pain is Worse during the day. The symptoms are aggravated

## 2025-07-03 ENCOUNTER — OFFICE VISIT (OUTPATIENT)
Dept: PODIATRY | Age: 80
End: 2025-07-03

## 2025-07-03 VITALS — BODY MASS INDEX: 32.51 KG/M2 | WEIGHT: 240 LBS | HEIGHT: 72 IN

## 2025-07-03 DIAGNOSIS — B35.1 ONYCHOMYCOSIS: Primary | ICD-10-CM

## 2025-07-03 DIAGNOSIS — Z79.4 TYPE 2 DIABETES MELLITUS WITH DIABETIC POLYNEUROPATHY, WITH LONG-TERM CURRENT USE OF INSULIN (HCC): ICD-10-CM

## 2025-07-03 DIAGNOSIS — M14.672 CHARCOT'S JOINT OF LEFT FOOT: ICD-10-CM

## 2025-07-03 DIAGNOSIS — E11.42 TYPE 2 DIABETES MELLITUS WITH DIABETIC POLYNEUROPATHY, WITH LONG-TERM CURRENT USE OF INSULIN (HCC): ICD-10-CM

## 2025-07-03 RX ORDER — INSULIN GLARGINE 100 [IU]/ML
INJECTION, SOLUTION SUBCUTANEOUS
Qty: 45 ML | Refills: 0 | Status: SHIPPED | OUTPATIENT
Start: 2025-07-03

## 2025-07-03 ASSESSMENT — ENCOUNTER SYMPTOMS
CONSTIPATION: 0
COLOR CHANGE: 0
VOMITING: 0
DIARRHEA: 0
NAUSEA: 0

## 2025-07-03 NOTE — PROGRESS NOTES
[facility]  Return Patient  Chief Complaint   Patient presents with    Nail Problem     B/l nail trim, last seen Barrera Thomas MD 5/20/25       Pedro Pablo Talamantes is a 80 y.o. year old male who is here for a diabetic foot check and nail trim. He would like his nails trimmed today.     Has Charcot left foot. Has been stable. History of surgery type: Plantar planing medial and lateral foot.  Patient denies N/V/F/C.       Review of Systems   Constitutional:  Negative for chills, diaphoresis, fatigue, fever and unexpected weight change.   Cardiovascular:  Negative for leg swelling.   Gastrointestinal:  Negative for constipation, diarrhea, nausea and vomiting.   Musculoskeletal:  Positive for gait problem. Negative for arthralgias and joint swelling.   Skin:  Negative for color change, pallor, rash and wound.   Neurological:  Positive for numbness. Negative for weakness.       Vascular: DP and PT pulses palpable 2/4, Right Foot and 2/4 on the Left Foot.   CFT <3 seconds, Right Foot and <3 seconds on the Left Foot.  Edema is absent,  Right Foot and minimal on the Left Foot.      Neurological:   Sensation absent  to light touch to level of digits, both feet.      Musculoskeletal:   Muscle strength is 5/5 on the Right Foot and 5/5 on the Left Foot. Structural deformities are present on the Right Foot and present on the Left Foot, but improved  Flat medial arch left foot.     Integument:  Warm, dry, supple both feet.  I  Hyperkeratosis dorsal left 4th toe, not open, no erythema.       Sites of Onychomycosis Involvement (Check affected area)  [x] [x] [x] [x] [x] [x] [x] [x] [x] [x]  5 4 3 2 1 1 2 3 4 5                          Right                                        Left    Thickness  [x] [x] [x] [x] [x] [x] [x] [x] [x] [x]  5 4 3 2 1 1 2 3 4 5                         Right                                        Left    Dystrophic Changes

## 2025-07-16 ENCOUNTER — HOSPITAL ENCOUNTER (OUTPATIENT)
Dept: PAIN MANAGEMENT | Age: 80
Discharge: HOME | End: 2025-07-16
Payer: MEDICARE

## 2025-07-16 VITALS — WEIGHT: 240 LBS | HEIGHT: 72 IN | BODY MASS INDEX: 32.51 KG/M2

## 2025-07-16 DIAGNOSIS — Z79.891 CHRONIC USE OF OPIATE DRUG FOR THERAPEUTIC PURPOSE: Chronic | ICD-10-CM

## 2025-07-16 DIAGNOSIS — M47.817 LUMBOSACRAL SPONDYLOSIS WITHOUT MYELOPATHY: ICD-10-CM

## 2025-07-16 DIAGNOSIS — G44.221 CHRONIC TENSION-TYPE HEADACHE, INTRACTABLE: ICD-10-CM

## 2025-07-16 DIAGNOSIS — E11.40 CHRONIC PAINFUL DIABETIC NEUROPATHY (HCC): ICD-10-CM

## 2025-07-16 DIAGNOSIS — M51.369 DDD (DEGENERATIVE DISC DISEASE), LUMBAR: ICD-10-CM

## 2025-07-16 PROCEDURE — 99213 OFFICE O/P EST LOW 20 MIN: CPT | Performed by: NURSE PRACTITIONER

## 2025-07-16 PROCEDURE — 99213 OFFICE O/P EST LOW 20 MIN: CPT

## 2025-07-16 RX ORDER — OXYCODONE AND ACETAMINOPHEN 5; 325 MG/1; MG/1
1 TABLET ORAL EVERY 8 HOURS PRN
Qty: 90 TABLET | Refills: 0 | Status: SHIPPED | OUTPATIENT
Start: 2025-07-16 | End: 2025-08-15

## 2025-07-16 ASSESSMENT — ENCOUNTER SYMPTOMS
CONSTIPATION: 0
SHORTNESS OF BREATH: 0
BOWEL INCONTINENCE: 0
BACK PAIN: 1
COUGH: 0

## 2025-07-16 ASSESSMENT — PAIN SCALES - GENERAL: PAINLEVEL_OUTOF10: 4

## 2025-07-16 NOTE — PROGRESS NOTES
Chief Complaint   Patient presents with    Back Pain    Medication Refill     Percocet  Med contract 11/21/24         PMH   Pt is a 80-year-old male with c/o low back pain radiating down his legs with numbness in feet d/t neuropathy. No known injury or surgery to the area with onset more than 1 year ago and has progressively worsened  Lumbar MRI 5/2024 Multilevel lumbar disc degenerative changes and facet arthropathy  L4-L5 level posterior disc bulge and mild to moderate foraminal stenosis no significant canal stenosis   Pt had Intracept procedure L4,5 and S1 on 2/6/23. He reported 70% relief.   He has completed #16/16 PT visits 5/2023 and reported some improvement in pain and feels it is helping with ROM posture and leg strength  He had Bilateral RFA for L4/5 AND L5/S1 facet joints 4/24/24 and reports only minimal relief of pain.    He had L4/5 LESI done 7/24/24  and reports 100% relief only lasting only 2 days and now 10% relief of pain        Pt has hx bilateral painful peripheral neuropathy from diabetes.  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 Since then has developed chronic headaches and follows with neurology  Pt has met with Dr Tucker for 2nd opinion but he is not interested in implant at this time.      Patient continues on chronic opioid therapy with Percocet 5-325 up to 3 times a day as needed. Patient states this has provided him better pain relief and ability to tolerate increased activity. Denies side effects.       Back Pain  This is a chronic problem. The current episode started more than 1 year ago. The problem occurs constantly. The problem is unchanged. The pain is present in the lumbar spine. The quality of the pain is described as aching. The pain radiates to the right thigh, right knee, right foot, left thigh, left knee and left foot. The pain is at a severity of 4/10. The pain is mild. The pain is Worse during the day.

## 2025-07-18 DIAGNOSIS — Z79.4 TYPE 2 DIABETES MELLITUS WITH DIABETIC POLYNEUROPATHY, WITH LONG-TERM CURRENT USE OF INSULIN (HCC): ICD-10-CM

## 2025-07-18 DIAGNOSIS — E11.42 TYPE 2 DIABETES MELLITUS WITH DIABETIC POLYNEUROPATHY, WITH LONG-TERM CURRENT USE OF INSULIN (HCC): ICD-10-CM

## 2025-07-19 DIAGNOSIS — K58.9 IRRITABLE BOWEL SYNDROME WITHOUT DIARRHEA: ICD-10-CM

## 2025-07-21 RX ORDER — SIMVASTATIN 20 MG
20 TABLET ORAL NIGHTLY
Qty: 90 TABLET | Refills: 0 | Status: SHIPPED | OUTPATIENT
Start: 2025-07-21

## 2025-07-21 RX ORDER — OMEPRAZOLE 20 MG/1
CAPSULE, DELAYED RELEASE ORAL DAILY
Qty: 90 CAPSULE | Refills: 0 | Status: SHIPPED | OUTPATIENT
Start: 2025-07-21

## 2025-07-21 RX ORDER — PEN NEEDLE, DIABETIC 32GX 5/32"
NEEDLE, DISPOSABLE MISCELLANEOUS
Qty: 100 EACH | Refills: 0 | Status: SHIPPED | OUTPATIENT
Start: 2025-07-21

## 2025-07-21 RX ORDER — FUROSEMIDE 40 MG/1
40 TABLET ORAL DAILY
Qty: 90 TABLET | Refills: 0 | Status: SHIPPED | OUTPATIENT
Start: 2025-07-21

## 2025-07-21 NOTE — TELEPHONE ENCOUNTER
Please Approve or Refuse.  Send to Pharmacy per Pt's Request:      Next Visit Date:  7/19/2025   Last Visit Date: 5/20/2025    Hemoglobin A1C (%)   Date Value   05/20/2025 7.3   02/20/2025 7.4   11/19/2024 8.2             ( goal A1C is < 7)   BP Readings from Last 3 Encounters:   05/20/25 110/80   02/20/25 126/80   12/16/24 (!) 144/67          (goal 120/80)  BUN   Date Value Ref Range Status   05/20/2025 19 8 - 23 mg/dL Final     Creatinine   Date Value Ref Range Status   05/20/2025 1.5 (H) 0.7 - 1.2 mg/dL Final     Potassium   Date Value Ref Range Status   05/20/2025 5.1 3.7 - 5.3 mmol/L Final

## 2025-07-21 NOTE — TELEPHONE ENCOUNTER
Please Approve or Refuse.  Send to Pharmacy per Pt's Request:      Next Visit Date:  7/20/2025   Last Visit Date: 5/20/2025    Hemoglobin A1C (%)   Date Value   05/20/2025 7.3   02/20/2025 7.4   11/19/2024 8.2             ( goal A1C is < 7)   BP Readings from Last 3 Encounters:   05/20/25 110/80   02/20/25 126/80   12/16/24 (!) 144/67          (goal 120/80)  BUN   Date Value Ref Range Status   05/20/2025 19 8 - 23 mg/dL Final     Creatinine   Date Value Ref Range Status   05/20/2025 1.5 (H) 0.7 - 1.2 mg/dL Final     Potassium   Date Value Ref Range Status   05/20/2025 5.1 3.7 - 5.3 mmol/L Final

## 2025-08-06 DIAGNOSIS — N18.31 STAGE 3A CHRONIC KIDNEY DISEASE (HCC): ICD-10-CM

## 2025-08-06 DIAGNOSIS — I50.42 CHRONIC COMBINED SYSTOLIC AND DIASTOLIC CONGESTIVE HEART FAILURE (HCC): ICD-10-CM

## 2025-08-06 DIAGNOSIS — Z79.4 TYPE 2 DIABETES MELLITUS WITH DIABETIC POLYNEUROPATHY, WITH LONG-TERM CURRENT USE OF INSULIN (HCC): ICD-10-CM

## 2025-08-06 DIAGNOSIS — E11.42 TYPE 2 DIABETES MELLITUS WITH DIABETIC POLYNEUROPATHY, WITH LONG-TERM CURRENT USE OF INSULIN (HCC): ICD-10-CM

## 2025-08-06 RX ORDER — METOPROLOL SUCCINATE 25 MG/1
25 TABLET, EXTENDED RELEASE ORAL DAILY
Qty: 90 TABLET | Refills: 0 | Status: SHIPPED | OUTPATIENT
Start: 2025-08-06

## 2025-08-06 RX ORDER — DAPAGLIFLOZIN 10 MG/1
10 TABLET, FILM COATED ORAL EVERY MORNING
Qty: 90 TABLET | Refills: 0 | Status: SHIPPED | OUTPATIENT
Start: 2025-08-06

## 2025-08-13 ENCOUNTER — HOSPITAL ENCOUNTER (OUTPATIENT)
Dept: PAIN MANAGEMENT | Age: 80
Discharge: HOME | End: 2025-08-13
Payer: MEDICARE

## 2025-08-13 VITALS — HEIGHT: 72 IN | WEIGHT: 232 LBS | BODY MASS INDEX: 31.42 KG/M2

## 2025-08-13 DIAGNOSIS — M47.817 LUMBOSACRAL SPONDYLOSIS WITHOUT MYELOPATHY: ICD-10-CM

## 2025-08-13 DIAGNOSIS — E11.610 CHARCOT FOOT DUE TO DIABETES MELLITUS (HCC): ICD-10-CM

## 2025-08-13 DIAGNOSIS — G44.221 CHRONIC TENSION-TYPE HEADACHE, INTRACTABLE: ICD-10-CM

## 2025-08-13 DIAGNOSIS — M51.369 DDD (DEGENERATIVE DISC DISEASE), LUMBAR: ICD-10-CM

## 2025-08-13 DIAGNOSIS — Z79.891 CHRONIC USE OF OPIATE DRUG FOR THERAPEUTIC PURPOSE: Chronic | ICD-10-CM

## 2025-08-13 DIAGNOSIS — M48.062 SPINAL STENOSIS, LUMBAR REGION, WITH NEUROGENIC CLAUDICATION: Primary | Chronic | ICD-10-CM

## 2025-08-13 DIAGNOSIS — E11.40 CHRONIC PAINFUL DIABETIC NEUROPATHY (HCC): ICD-10-CM

## 2025-08-13 PROCEDURE — 99213 OFFICE O/P EST LOW 20 MIN: CPT | Performed by: NURSE PRACTITIONER

## 2025-08-13 PROCEDURE — 99213 OFFICE O/P EST LOW 20 MIN: CPT

## 2025-08-13 RX ORDER — OXYCODONE AND ACETAMINOPHEN 5; 325 MG/1; MG/1
1 TABLET ORAL EVERY 8 HOURS PRN
Qty: 90 TABLET | Refills: 0 | Status: SHIPPED | OUTPATIENT
Start: 2025-08-15 | End: 2025-09-14

## 2025-08-13 RX ORDER — GABAPENTIN 800 MG/1
800 TABLET ORAL DAILY
Qty: 90 TABLET | Refills: 0 | Status: SHIPPED | OUTPATIENT
Start: 2025-08-13 | End: 2025-11-11

## 2025-08-13 ASSESSMENT — ENCOUNTER SYMPTOMS
BACK PAIN: 1
BOWEL INCONTINENCE: 0

## 2025-08-13 ASSESSMENT — PAIN SCALES - GENERAL: PAINLEVEL_OUTOF10: 5

## (undated) DEVICE — COVER LT HNDL BLU PLAS

## (undated) DEVICE — BASIN EMSIS 700ML GRAPHITE PLAS KID SHP GRAD

## (undated) DEVICE — GAUZE,SPONGE,4"X4",16PLY,STRL,LF,10/TRAY: Brand: MEDLINE

## (undated) DEVICE — N300 LEAD ANCHOR KIT
Type: IMPLANTABLE DEVICE | Site: BACK | Status: NON-FUNCTIONAL
Brand: NEVRO®
Removed: 2023-10-20

## (undated) DEVICE — TRAP SURG QUAD PARABOLA SLOT DSGN SFTY SCRN TRAPEASE

## (undated) DEVICE — SHEET, T, LAPAROTOMY, STERILE: Brand: MEDLINE

## (undated) DEVICE — BLUE LEAD KIT, 50CM WITH 5MM SPACING
Type: IMPLANTABLE DEVICE | Site: BACK | Status: NON-FUNCTIONAL
Brand: NEVRO®
Removed: 2023-10-20

## (undated) DEVICE — Device: Brand: DEFENDO VALVE AND CONNECTOR KIT

## (undated) DEVICE — SYRINGE MED 50ML LUERLOCK TIP

## (undated) DEVICE — INTRACEPT ACCESS INSTRUMENTS - ADDITIONAL LEVEL: Brand: INTRACEPT

## (undated) DEVICE — 1010 S-DRAPE TOWEL DRAPE 10/BX: Brand: STERI-DRAPE™

## (undated) DEVICE — MHPB GEN MINOR PACK: Brand: MEDLINE INDUSTRIES, INC.

## (undated) DEVICE — CHARGER 2500 KIT: Brand: OMNIA

## (undated) DEVICE — DRESSING TRNSPAR W5XL4.5IN FLM SHT SEMIPERMEABLE WIND

## (undated) DEVICE — INTRACEPT RF PROBE: Brand: INTRACEPT

## (undated) DEVICE — BLANKET WRM W29.9XL79.1IN UP BODY FORC AIR MISTRAL-AIR

## (undated) DEVICE — ELECTRODE PT RET AD L9FT HI MOIST COND ADH HYDRGEL CORDED

## (undated) DEVICE — STRAP,POSITIONING,KNEE/BODY,FOAM,4X60": Brand: MEDLINE

## (undated) DEVICE — COVER,C-ARM,41X74: Brand: MEDLINE

## (undated) DEVICE — INSERT CUSHION HEAD PRONEVIEW

## (undated) DEVICE — SOLUTION IRRIG 1000ML STRL H2O USP PLAS POUR BTL

## (undated) DEVICE — 9165 UNIVERSAL PATIENT PLATE: Brand: 3M™

## (undated) DEVICE — SUTURE VCRL + SZ 0 L27IN ABSRB UD L36MM CT-1 1/2 CIR VCPP41D

## (undated) DEVICE — 3M™ IOBAN™ 2 ANTIMICROBIAL INCISE DRAPE 6650EZ: Brand: IOBAN™ 2

## (undated) DEVICE — Device

## (undated) DEVICE — GAUZE,SPONGE,FLUFF,6"X6.75",STRL,5/TRAY: Brand: MEDLINE

## (undated) DEVICE — INTENDED FOR TISSUE SEPARATION, AND OTHER PROCEDURES THAT REQUIRE A SHARP SURGICAL BLADE TO PUNCTURE OR CUT.: Brand: BARD-PARKER ® CARBON RIB-BACK BLADES

## (undated) DEVICE — LIQUIBAND RAPID ADHESIVE 36/CS 0.8ML: Brand: MEDLINE

## (undated) DEVICE — ADAPTER TBNG LUER STUB 15 GA INTMED

## (undated) DEVICE — SUTURE ETHBND EXCEL SZ 2-0 L30IN NONABSORBABLE GRN RB-1 X873H

## (undated) DEVICE — GLOVE SURG SZ 8 THK118MIL BLK LTX FREE POLYISOPRENE BEAD CUF

## (undated) DEVICE — NEEDLE SPINAL 22GA L3.5IN SPINOCAN

## (undated) DEVICE — JCKSON TBL POSTNER NO HD REST: Brand: MEDLINE INDUSTRIES, INC.

## (undated) DEVICE — APPLICATOR MEDICATED 26 CC SOLUTION HI LT ORNG CHLORAPREP

## (undated) DEVICE — TUBING, SUCTION, 1/4" X 12', STRAIGHT: Brand: MEDLINE

## (undated) DEVICE — GOWN,SIRUS,POLYRNF,BRTHSLV,2XL,18/CS: Brand: MEDLINE

## (undated) DEVICE — MINOR BSIN PK

## (undated) DEVICE — JELLY,LUBE,STERILE,FLIP TOP,TUBE,2-OZ: Brand: MEDLINE

## (undated) DEVICE — C-ARM: Brand: UNBRANDED

## (undated) DEVICE — INTRACEPT ACCESS INSTRUMENTS: Brand: INTRACEPT

## (undated) DEVICE — SYR PLASTIC LOSS OF RESISTANCE, 7CC, L/S: Brand: MEDLINE

## (undated) DEVICE — FORCEPS BX L240CM JAW DIA2.4MM ORNG L CAP W/ NDL DISP RAD

## (undated) DEVICE — SNARE ENDOSCP M L240CM LOOP W27MM SHTH DIA2.4MM OVL FLX

## (undated) DEVICE — MEDICINE CUP, GRADUATED, STER: Brand: MEDLINE

## (undated) DEVICE — DRAPE,TOWEL,LARGE,INVISISHIELD: Brand: MEDLINE

## (undated) DEVICE — CO2 CANNULA,SUPERSOFT, ADLT,7'O2,7'CO2: Brand: MEDLINE

## (undated) DEVICE — SUTURE VCRL SZ 2-0 L18IN ABSRB UD CT-1 L36MM 1/2 CIR J839D

## (undated) DEVICE — CANNULA NSL AD TBNG L7FT PVC STR NONFLARED PRNG O2 DEL W STD

## (undated) DEVICE — INSERT CUSH STD PRONEVIEW

## (undated) DEVICE — BANDAGE ADH W1XL3IN NAT FAB WVN FLX DURABLE N ADH PD SEAL

## (undated) DEVICE — ADHESIVE SKIN CLOSURE TOP 36 CC HI VISC DERMBND MINI

## (undated) DEVICE — CUP MED 1OZ CLR POLYPR FEED GRAD W/O LID